# Patient Record
Sex: MALE | Race: WHITE | Employment: OTHER
[De-identification: names, ages, dates, MRNs, and addresses within clinical notes are randomized per-mention and may not be internally consistent; named-entity substitution may affect disease eponyms.]

---

## 2017-01-02 ENCOUNTER — HOME CARE VISIT (OUTPATIENT)
Dept: SCHEDULING | Facility: HOME HEALTH | Age: 62
End: 2017-01-02
Payer: COMMERCIAL

## 2017-01-02 VITALS — RESPIRATION RATE: 16 BRPM | TEMPERATURE: 97.9 F

## 2017-01-02 VITALS — HEART RATE: 88 BPM | OXYGEN SATURATION: 98 % | RESPIRATION RATE: 20 BRPM | TEMPERATURE: 98.4 F | HEIGHT: 70 IN

## 2017-01-02 PROCEDURE — G0157 HHC PT ASSISTANT EA 15: HCPCS

## 2017-01-02 PROCEDURE — G0299 HHS/HOSPICE OF RN EA 15 MIN: HCPCS

## 2017-01-03 ENCOUNTER — TELEPHONE ANTICOAG (OUTPATIENT)
Dept: CARDIOLOGY CLINIC | Age: 62
End: 2017-01-03

## 2017-01-03 ENCOUNTER — HOME CARE VISIT (OUTPATIENT)
Dept: SCHEDULING | Facility: HOME HEALTH | Age: 62
End: 2017-01-03
Payer: COMMERCIAL

## 2017-01-03 ENCOUNTER — DOCUMENTATION ONLY (OUTPATIENT)
Dept: CARDIOLOGY CLINIC | Age: 62
End: 2017-01-03

## 2017-01-03 VITALS
OXYGEN SATURATION: 98 % | WEIGHT: 153 LBS | BODY MASS INDEX: 21.95 KG/M2 | RESPIRATION RATE: 20 BRPM | TEMPERATURE: 97.8 F

## 2017-01-03 VITALS — TEMPERATURE: 98.5 F

## 2017-01-03 LAB — INR, EXTERNAL: 3.2

## 2017-01-03 PROCEDURE — G0151 HHCP-SERV OF PT,EA 15 MIN: HCPCS

## 2017-01-03 PROCEDURE — G0299 HHS/HOSPICE OF RN EA 15 MIN: HCPCS

## 2017-01-03 PROCEDURE — G0152 HHCP-SERV OF OT,EA 15 MIN: HCPCS

## 2017-01-03 NOTE — PROGRESS NOTES
Reviewed full labs with patient - notable for WBC 12.9, Cr 1.23. Patient states he is recovering from a \"GI bug\" during which he had vomiting and diarrhea. Has had a low grade fever over the weekend but it has improved and he is feeling better. Instructed patient to notify our office for persistent temperatures > 99, any drainage or redness at drive line site, or any other indications that he is sick. He states understanding. Home health RN will be asked to repeat full set of labs on Friday, Jan 6. He verbalizes understanding.

## 2017-01-04 VITALS — HEART RATE: 98 BPM

## 2017-01-05 ENCOUNTER — OFFICE VISIT (OUTPATIENT)
Dept: CARDIOLOGY CLINIC | Age: 62
End: 2017-01-05

## 2017-01-05 VITALS
HEART RATE: 94 BPM | BODY MASS INDEX: 21.22 KG/M2 | HEIGHT: 70 IN | OXYGEN SATURATION: 98 % | RESPIRATION RATE: 18 BRPM | TEMPERATURE: 97.7 F | WEIGHT: 148.2 LBS

## 2017-01-05 DIAGNOSIS — Z95.811 LEFT VENTRICULAR ASSIST DEVICE PRESENT (HCC): Primary | ICD-10-CM

## 2017-01-05 DIAGNOSIS — F41.9 ANXIETY: ICD-10-CM

## 2017-01-05 DIAGNOSIS — F51.01 PRIMARY INSOMNIA: ICD-10-CM

## 2017-01-05 DIAGNOSIS — Z98.890 S/P LAMINECTOMY: ICD-10-CM

## 2017-01-05 DIAGNOSIS — I50.22 CHRONIC SYSTOLIC HEART FAILURE (HCC): ICD-10-CM

## 2017-01-05 DIAGNOSIS — Z79.01 CHRONIC ANTICOAGULATION: ICD-10-CM

## 2017-01-05 RX ORDER — ALPRAZOLAM 0.25 MG/1
0.25 TABLET ORAL
Qty: 60 TAB | Refills: 0 | Status: SHIPPED | OUTPATIENT
Start: 2017-01-05 | End: 2017-03-23

## 2017-01-05 RX ORDER — ZOLPIDEM TARTRATE 5 MG/1
5 TABLET ORAL
Qty: 30 TAB | Refills: 11 | Status: SHIPPED | OUTPATIENT
Start: 2017-01-05 | End: 2017-02-01 | Stop reason: ALTCHOICE

## 2017-01-05 NOTE — MR AVS SNAPSHOT
Visit Information Date & Time Provider Department Dept. Phone Encounter #  
 1/5/2017  2:00 PM Sabina Syed NP [address] [id number] Your Appointments 4/6/2017 11:00 AM  
PACEMAKER with [facility] (JAHAIRA SCHEDULING) Appt Note: 3 months f/u  
 [address]  
[phone]  
  
   
 [address] [facility]  
  
    
 4/6/2017 11:20 AM  
ESTABLISHED PATIENT with Bc Mendoza MD  
[facility] Appt Note: 3 months f/u  
 [address]  
[address] [facility] [address] Upcoming Health Maintenance Date Due Pneumococcal 19-64 Medium Risk (1 of 1 - PPSV23) 10/11/1975 DTaP/Tdap/Td series (1 - Tdap) 10/11/1977 ZOSTER VACCINE AGE 60> 10/11/2016 FOBT Q 1 YEAR AGE 50-75 12/20/2017 Allergies as of 1/5/2017  Review Complete On: 12/30/2016 By: Mp Hanks NP No Known Allergies Current Immunizations  Reviewed on 12/9/2016 Name Date Influenza Vaccine (Quad) PF 12/21/2016 12:00 PM  
  
 Not reviewed this visit You Were Diagnosed With   
  
 Codes Comments Anxiety    -  Primary ICD-10-CM: F41.9 ICD-9-CM: 300.00 Primary insomnia     ICD-10-CM: F51.01 
ICD-9-CM: 307.42 Vitals Pulse Temp Resp Height(growth percentile) Weight(growth percentile) SpO2  
 94 97.7 °F (36.5 °C) (Oral) 18 5' 10\" (1.778 m) 148 lb 3.2 oz (67.2 kg) 98% BMI Smoking Status 21.26 kg/m2 Former Smoker Vitals History BMI and BSA Data Body Mass Index Body Surface Area  
 21.26 kg/m 2 1.82 m 2 Preferred Pharmacy Pharmacy Name Phone  [facility] [address] [phone] Your Updated Medication List  
  
   
 This list is accurate as of: 1/5/17  4:00 PM.  Always use your most recent med list.  
  
  
  
  
 allopurinol 100 mg tablet Commonly known as:  Ever Gobble Take 1 Tab by mouth daily. ALPRAZolam 0.25 mg tablet Commonly known as:  Pauol Syedini Take 1 Tab by mouth every six (6) hours as needed for Anxiety. Max Daily Amount: 1 mg.  
  
 amiodarone 400 mg tablet Commonly known as:  Zamzam Pagechaka Take 1 Tab by mouth daily. ascorbic acid (vitamin C) 500 mg tablet Commonly known as:  VITAMIN C Take 1 Tab by mouth two (2) times a day. bumetanide 1 mg tablet Commonly known as:  Liliana Redder Take 1 Tab by mouth two (2) times a day. carvedilol 12.5 mg tablet Commonly known as:  Jaylon Couch Take 1 Tab by mouth two (2) times daily (with meals). citalopram 40 mg tablet Commonly known as:  Sherren [place] Take 1 Tab by mouth daily. clopidogrel 75 mg Tab Commonly known as:  PLAVIX Take 1 Tab by mouth daily. colchicine 0.6 mg tablet Take 1 Tab by mouth two (2) times a day. Indications: GOUT  
  
 ferrous sulfate 325 mg (65 mg iron) tablet Take 1 Tab by mouth two (2) times daily (with meals). folic acid 1 mg tablet Commonly known as:  Google Take 1 Tab by mouth daily. HYDROcodone-acetaminophen 5-325 mg per tablet Commonly known as:  [place] Hurt Take  by mouth.  
  
 magnesium oxide 400 mg tablet Commonly known as:  MAG-OX Take 1 Tab by mouth daily. ondansetron 8 mg disintegrating tablet Commonly known as:  ZOFRAN ODT Take 1 Tab by mouth every eight (8) hours as needed for Nausea. pantoprazole 40 mg tablet Commonly known as:  PROTONIX Take 1 Tab by mouth Daily (before breakfast). polyethylene glycol 17 gram packet Commonly known as:  Mackenzie Pile Take 1 Packet by mouth daily. potassium chloride 40 mEq/15 mL Liqd Commonly known as:  KAON 20% Take 15 mL by mouth daily. Indications: HYPOKALEMIA  
  
 traMADol 50 mg tablet Commonly known as:  ULTRAM  
Take 1 Tab by mouth every six (6) hours as needed. Max Daily Amount: 200 mg.  
  
 warfarin 2.5 mg tablet Commonly known as:  COUMADIN Take 1 Tab by mouth daily. zolpidem 5 mg tablet Commonly known as:  AMBIEN Take 1 Tab by mouth nightly as needed for Sleep. Max Daily Amount: 5 mg. Prescriptions Printed Refills ALPRAZolam (XANAX) 0.25 mg tablet 0 Sig: Take 1 Tab by mouth every six (6) hours as needed for Anxiety. Max Daily Amount: 1 mg. Class: Print Route: Oral  
 zolpidem (AMBIEN) 5 mg tablet 11 Sig: Take 1 Tab by mouth nightly as needed for Sleep. Max Daily Amount: 5 mg. Class: Print Route: Oral  
  
To-Do List   
 01/06/2017 9:30 AM  
  Appointment with Allyssa Abad RN at [place] [address]  
  
 01/06/2017 1:00 PM  
  Appointment with Ivet Michael at [place] [address]  
  
 01/09/2017 To Be Determined Appointment with Allyssa Abad RN at [place] [address]  
  
 01/09/2017 To Be Determined Appointment with Ivet Michael at [place] [address]  
  
 01/11/2017 To Be Determined Appointment with Allyssa Abad RN at [place] [address]  
  
 01/11/2017 To Be Determined Appointment with Ivet Michael at [place] [address]  
  
 01/13/2017 To Be Determined Appointment with Ivet Michael at [place] [address]  
  
 01/16/2017 To Be Determined Appointment with Allyssa Abad RN at [place] [address]  
  
 01/16/2017 To Be Determined Appointment with Ivet Michael at [place] [address]  
  
 01/18/2017 To Be [facility]  
  
 01/18/2017 To Be Determined Appointment with Janelle Lennon at [place] [address]  
  
 01/20/2017 To Be Determined Appointment with Janelle Lennon at [place] [address]  
  
 01/25/2017 To Be Determined Appointment with Norma Seip at [place] [address] Patient Instructions 1. Continue taking medications as prescribed. 2. Contact the VAD/HF team if symptoms develop or worsen despite medical management. 3.   Please let our office know if your nausea and diarrhea do not improve within the next three days. 4.   Follow up in 1 week. Introducing [facility] SERVICES! Karey Hernandez introduces 1[employer] patient portal. Now you can access parts of your medical record, email your doctor's office, and request medication refills online. 1. In your internet browser, go to [email or web address]/Offsite Care Resources 2. Click on the First Time User? Click Here link in the Sign In box. You will see the New Member Sign Up page. 3. Enter your [employer] Access Code exactly as it appears below. You will not need to use this code after youve completed the sign-up process. If you do not sign up before the expiration date, you must request a new code. · 1[employer] Access Code: [email or web address] Expires: 2/20/2017 10:34 AM 
 
4. Enter the last four digits of your Social Security Number (xxxx) and Date of Birth (mm/dd/yyyy) as indicated and click Submit. You will be taken to the next sign-up page. 5. Create a Its Time Compliancet ID. This will be your 1[employer] login ID and cannot be changed, so think of one that is secure and easy to remember. 6. Create a 1[employer] password. You can change your password at any time. 7. Enter your Password Reset Question and Answer. This can be used at a later time if you forget your password. 8. Enter your e-mail address. You will receive e-mail notification when new information is available in [address] 9. Click Sign Up. You can now view and download portions of your medical record. 10. Click the Download Summary menu link to download a portable copy of your medical information. If you have questions, please visit the Frequently Asked Questions section of the [employer] website. Remember, [employer] is NOT to be used for urgent needs. For medical emergencies, dial 911. Now available from your iPhone and Android! Please provide this summary of care documentation to your next provider. Your primary care clinician is listed as Lorenzo Mckeon. If you have any questions after today's visit, please call [phone].

## 2017-01-06 ENCOUNTER — TELEPHONE ANTICOAG (OUTPATIENT)
Dept: CARDIOLOGY CLINIC | Age: 62
End: 2017-01-06

## 2017-01-06 ENCOUNTER — HOME CARE VISIT (OUTPATIENT)
Dept: SCHEDULING | Facility: HOME HEALTH | Age: 62
End: 2017-01-06
Payer: COMMERCIAL

## 2017-01-06 ENCOUNTER — DOCUMENTATION ONLY (OUTPATIENT)
Dept: CARDIOLOGY CLINIC | Age: 62
End: 2017-01-06

## 2017-01-06 VITALS — TEMPERATURE: 98.4 F | OXYGEN SATURATION: 97 % | RESPIRATION RATE: 16 BRPM

## 2017-01-06 LAB — INR, EXTERNAL: 2.6

## 2017-01-06 PROCEDURE — G0157 HHC PT ASSISTANT EA 15: HCPCS

## 2017-01-06 PROCEDURE — G0299 HHS/HOSPICE OF RN EA 15 MIN: HCPCS

## 2017-01-06 NOTE — PROGRESS NOTES
Full labs reviewed with patient - notable for improved WBC (down to 10.1 from 12.9). Hgb stable at 9.9. Will repeat in 1 week. Patient verbalizes understanding.

## 2017-01-07 VITALS
OXYGEN SATURATION: 98 % | TEMPERATURE: 98.4 F | BODY MASS INDEX: 21.95 KG/M2 | WEIGHT: 153 LBS | RESPIRATION RATE: 20 BRPM

## 2017-01-09 ENCOUNTER — HOME CARE VISIT (OUTPATIENT)
Dept: SCHEDULING | Facility: HOME HEALTH | Age: 62
End: 2017-01-09
Payer: COMMERCIAL

## 2017-01-09 VITALS — HEIGHT: 70 IN | TEMPERATURE: 98.6 F | OXYGEN SATURATION: 96 %

## 2017-01-09 PROCEDURE — G0299 HHS/HOSPICE OF RN EA 15 MIN: HCPCS

## 2017-01-09 PROCEDURE — G0157 HHC PT ASSISTANT EA 15: HCPCS

## 2017-01-11 ENCOUNTER — HOME CARE VISIT (OUTPATIENT)
Dept: SCHEDULING | Facility: HOME HEALTH | Age: 62
End: 2017-01-11
Payer: COMMERCIAL

## 2017-01-11 ENCOUNTER — TELEPHONE ANTICOAG (OUTPATIENT)
Dept: CARDIOLOGY CLINIC | Age: 62
End: 2017-01-11

## 2017-01-11 VITALS — HEIGHT: 71 IN | RESPIRATION RATE: 20 BRPM | OXYGEN SATURATION: 96 % | TEMPERATURE: 98.1 F

## 2017-01-11 VITALS
TEMPERATURE: 98.4 F | WEIGHT: 155 LBS | BODY MASS INDEX: 22.24 KG/M2 | OXYGEN SATURATION: 98 % | RESPIRATION RATE: 20 BRPM

## 2017-01-11 PROCEDURE — G0157 HHC PT ASSISTANT EA 15: HCPCS

## 2017-01-11 PROCEDURE — G0299 HHS/HOSPICE OF RN EA 15 MIN: HCPCS

## 2017-01-12 ENCOUNTER — OFFICE VISIT (OUTPATIENT)
Dept: CARDIOLOGY CLINIC | Age: 62
End: 2017-01-12

## 2017-01-12 VITALS — HEART RATE: 80 BPM | OXYGEN SATURATION: 97 % | BODY MASS INDEX: 21.76 KG/M2 | TEMPERATURE: 97.6 F | WEIGHT: 156 LBS

## 2017-01-12 VITALS
OXYGEN SATURATION: 98 % | WEIGHT: 156 LBS | RESPIRATION RATE: 20 BRPM | TEMPERATURE: 98.2 F | BODY MASS INDEX: 22.38 KG/M2

## 2017-01-12 DIAGNOSIS — Z95.811 LEFT VENTRICULAR ASSIST DEVICE PRESENT (HCC): ICD-10-CM

## 2017-01-12 DIAGNOSIS — Z79.01 CHRONIC ANTICOAGULATION: ICD-10-CM

## 2017-01-12 DIAGNOSIS — Z95.810 S/P ICD (INTERNAL CARDIAC DEFIBRILLATOR) PROCEDURE: ICD-10-CM

## 2017-01-12 DIAGNOSIS — I47.20 SUSTAINED VT (VENTRICULAR TACHYCARDIA): ICD-10-CM

## 2017-01-12 DIAGNOSIS — I10 ESSENTIAL HYPERTENSION: Primary | ICD-10-CM

## 2017-01-12 RX ORDER — ALLOPURINOL 100 MG/1
100 TABLET ORAL 2 TIMES DAILY
Qty: 60 TAB | Refills: 6 | Status: SHIPPED | OUTPATIENT
Start: 2017-01-12 | End: 2017-03-11 | Stop reason: SDUPTHER

## 2017-01-12 RX ORDER — BUMETANIDE 0.5 MG/1
0.5 TABLET ORAL DAILY
Qty: 30 TAB | Refills: 2 | Status: SHIPPED | OUTPATIENT
Start: 2017-01-12 | End: 2017-03-23

## 2017-01-12 NOTE — PROGRESS NOTES
[address]  LVAD Office Visit      Date of VAD implant: 12/1/2016  Cardiologist: GRAHAM  PCP: Julius Briceno MD  Consultants: Dr. Nilson Chapman (EP), Dr. Rebecca Arevalo (GI), Dr. Birgit Dee (Ortho)    HPI: William Bowers is a 62 y.o. male with a past medical history s/p HM II LVAD for DT, h/o torasades/SVT, HTN, lumbar stenosis s/p lumbar laminectomy, CAD (s/p CABG/sp BMSx2), gout, strobic seizures, ETOH abuse and remote tobacco abuse who si seen today for routine LVAD follow up. He reports overall he is doing well except he continues to be weak. He is experiencing some pain in his low back. He states he was cleared by the ortho PA. He is continuing home PT but states he will be discharged by next week. Pt c/o bleeding from his ICD site. He states it opened up some and they held pressure multiples times. His daughter who is a nurse - put a pressure dressing on it yesterday, but they would like for me to take a look. Pt has gained weight b/c he is now eating better - he doesn't feel it's fluid. Pt c/o fever, back pain, headaches, dizziness, weight gain, cough productive clear sputum, blood tinged mucus when he blows in his nose, bleeding from ICD site and pain in his lower back. Pt denies fevers, chills, diaphoresis, lightheadedness, N/V, changes to his appetite, early satiety, chest pain, palpitations, SOB, constipation/diarrhea, edema, depression/anxiety, orthopnea, blood in urine, melena, BRBPR    Subjective:  Chief Complaint:  Chief Complaint   Patient presents with    Cardiomyopathy    Follow Up Chronic Condition            Assessment / Plan:    Heart Failure Status: NYHA Class II    Ischemic cardiomyopathy, s/p HM II LVAD implant as DT 12/1/16 -  Alarm history reviewed. VAD interrogation: Please see VAD flow sheet for readings. Pt continues to have PI events with speed drops. Adequate clinical perfusion and function of his LVAD. Will schedule for a RAMP test in the next 2 weeks.  Will decrease Bumex to .5 mg daily. Bleeding from ICD site - site examine, some eschar on the site, small area oozing dark red blood. Pressure help for 10 + minutes, bleeding almost stopped. Contacted Dr. Duckworth to make him aware to follow up with this.      Post-operative RV insufficiency: resolved, T. Bili in normal range. KLEVER: resolved, creatinine decreased to 1.03, euvolemic on exam, decrease Bumex to .5 mg daily and decrease potassium to 10 meq daily.     Multivessel CAD/S/p CABG x 2 (SVG to RCA, LIMA to LAD) - s/p- RCA BMS x 2. On Plavix, BB. No statin d/t elevated LFTs. No ASA since on Plavix and Coumadin.     Lumbar stenosis, s/p decompressive laminectomy and instrumented fusion: Pt seen by ortho PA and was told everything was good - needs to continue PT. When cleared will refer to Cardiac Rehab.      Post operative anemia d/t acute blood loss: Hgb 9.6 on 1/11/17 - will continue iron, Vitamin C and folic acid. Until Hg >10     H/o ETHO abuse:  Pt denies any alcohol use or any urge to drink alcohol. Has not had a drink since prior to his lumbar surgery.      SVT/A-fib/Torsades post op: S/p AICD 12/16. Cont. Amiodarone per Dr. Duckworth. F/u with Dr. Duckworth     HTN, MAP goal 70-90: MAP 98 today, will continue Coreg for now and adjust at next appointment if necessary. Chronic anticoagulation for LVAD, INR goal 2-2.5. INR sub-therapeutic on 1/11/17 will check PT/INR on 1/13/17  Cont. Warfarin and Plavix. Please see anticoagulation tracker for details.      Gout Pain: resolved, pt to continue Allopurinol and use Colchicine with active gout flare.       Depression/Anxiety: controlled on current dose of Celexa, pt using Xanax prn but trying to decrease this. Insomnia:  Pt has been taking Ambien most nights whether he needed it or not, he is almost out. We discussed the risks to chronic use of sleeping pills (addiction/tolerance/dependency). He will try to sleep without using sleeping pills.   Suggested he could try Melatonin 3mg-5mg 1 hour prior to bedtime. Deconditioning: Cont to work with home PT/OT, once discharged from home PT - will start Cardiac Rehab. Will send referral.    VAD specific education: Greater than 50% of appt time (60 minutes) was spent counseling Mr. Mann and his wife about LVAD management, plan of care, and medication management. Problem List:  Patient Active Problem List   Diagnosis Code    S/P laminectomy Z98.890    Sinus tachycardia R00.0    Acute respiratory failure with hypoxia ([facility]) J96.01    Essential hypertension I10    Alcohol abuse F10.10    Chronic systolic heart failure ([facility]) I50.22    CAD, multiple vessel I25.10    Ischemic cardiomyopathy I25.5    MI (myocardial infarction) ([facility] I21.3    Sustained VT (ventricular tachycardia) ([facility]) I47.2    S/P ICD (internal cardiac defibrillator) procedure Z95.810    Chronic anticoagulation Z79.01    Left ventricular assist device present ([facility] Z95.811    Gout M10.9        Past Medical History   Diagnosis Date    Chronic pain      back    Hypertension     Ill-defined condition      gout    Seizures ([facility]      strobic seizures early 19's       Family History   Problem Relation Age of Onset    Diabetes Mother     Dementia Mother     Other Mother      Latex allergy    Heart Disease Father     Stroke Father     No Known Problems Sister     Cancer Brother      brain       Social History     Social History    Marital status:      Spouse name: N/A    Number of children: N/A    Years of education: N/A     Occupational History    Not on file.      Social History Main Topics    Smoking status: Former Smoker     Packs/day: 1.50     Years: 30.00     Quit date: 2008    Smokeless tobacco: Never Used    Alcohol use 24.0 oz/week     40 Cans of beer per week    Drug use: No    Sexual activity: Not on file     Other Topics Concern    Not on file     Social History Narrative       Medications:  No Known Allergies Current Outpatient Prescriptions on File Prior to Visit   Medication Sig    ALPRAZolam (XANAX) 0.25 mg tablet Take 1 Tab by mouth every six (6) hours as needed for Anxiety. Max Daily Amount: 1 mg.  zolpidem (AMBIEN) 5 mg tablet Take 1 Tab by mouth nightly as needed for Sleep. Max Daily Amount: 5 mg.  HYDROcodone-acetaminophen (NORCO) 5-325 mg per tablet Take  by mouth.  potassium chloride (KAON 20%) 40 mEq/15 mL liqd Take 15 mL by mouth daily. Indications: HYPOKALEMIA (Patient taking differently: Take 20 mEq by mouth daily. Indications: HYPOKALEMIA)    amiodarone (PACERONE) 400 mg tablet Take 1 Tab by mouth daily.  ascorbic acid, vitamin C, (VITAMIN C) 500 mg tablet Take 1 Tab by mouth two (2) times a day.  carvedilol (COREG) 12.5 mg tablet Take 1 Tab by mouth two (2) times daily (with meals).  citalopram (CELEXA) 40 mg tablet Take 1 Tab by mouth daily.  clopidogrel (PLAVIX) 75 mg tab Take 1 Tab by mouth daily.  ferrous sulfate 325 mg (65 mg iron) tablet Take 1 Tab by mouth two (2) times daily (with meals).  folic acid (FOLVITE) 1 mg tablet Take 1 Tab by mouth daily.  magnesium oxide (MAG-OX) 400 mg tablet Take 1 Tab by mouth daily.  pantoprazole (PROTONIX) 40 mg tablet Take 1 Tab by mouth Daily (before breakfast).  warfarin (COUMADIN) 2.5 mg tablet Take 1 Tab by mouth daily.  ondansetron (ZOFRAN ODT) 8 mg disintegrating tablet Take 1 Tab by mouth every eight (8) hours as needed for Nausea.  colchicine 0.6 mg tablet Take 1 Tab by mouth two (2) times a day. Indications: GOUT    traMADol (ULTRAM) 50 mg tablet Take 1 Tab by mouth every six (6) hours as needed. Max Daily Amount: 200 mg. No current facility-administered medications on file prior to visit.          Results for orders placed or performed in visit on 01/06/17   AMB PT/INR EXTERNAL   Result Value Ref Range    INR, External 2.6     Please see scanned media for full panel of labs from 1/11/17    Recent tests or procedures:   S/p Laminectomy 11/22/16  S/p LVAD implant 12/1/16  S/p AICD implant 12/16/16  S/p Ramp test 12/16/16          Objective:  Physical Exam:  Visit Vitals    Pulse 80    Temp 97.6 °F (36.4 °C)    Wt 156 lb (70.8 kg)    SpO2 97%    BMI 21.76 kg/m2      MAP: 96    VAD readings:  LVAD ([employer]mate)  Pump Speed (RPM): 8600  Pump Flow (LPM): 4.3  PI (Pulsitility Index): 6.2  Power: 4.4    Exam:  Gen:  WA, WN, NAD   CVS:  +LVAD hum  Pul:  Slightly decreased BS, (-) r,r,w  Abd:  +BS, soft, NT,ND  Ext: 1+ b/l ankle edema, (-) calf tenderness  Neuro:  No obvious deficits  ICD site - some oozing w/ clot on site, bleeding controlled with extended pressure (-) erythema, + ecchymosis around site      Drive Line Exam:  Stabilization device intact: yes  Line inspected for damage: yes    Appearance: no edema, erythema, drainage   Sterile dressing changed per policy: no  Frequency of dressing change at home: every other day  Frequency of use of stabilization device: 100%    Follow-up Disposition:  Return in about 2 weeks (around 1/26/2017). Thank you for letting us see him with you. Rama So PA-C     [address] [place] [address]  Office: [phone]  24h VAD Pager: [phone]          Patient seen and examined. Data and note reviewed. I have discussed and agree with the plans as noted. Will decrease his diuretics today due to recurrent PI event s. Ramp echo study scheduled for next week. Thank you for letting us see him with you,     Kristi Daniel M.D.  [facility], [address] [place] [id number]  Dept: [phone]  Dept Fax: [phone]: [phone] Fax: [phone]  24 hour VAD/HF Pager: [phone]

## 2017-01-12 NOTE — MR AVS SNAPSHOT
Visit Information Date & Time Provider Department Dept. Phone Encounter #  
 4/29/8469  1:00 PM Anabel Mcgill, [address]  [id number] Follow-up Instructions Return in about 2 weeks (around 1/26/2017). Your Appointments 4/6/2017 11:00 AM  
PACEMAKER with PAC[facility] (JAHAIRA SCHEDULING) Appt Note: 3 months f/u  
 [address] [place] [address]  
[phone]  
  
   
 [address] [facility] [address]  
  
    
 4/6/2017 11:20 AM  
ESTABLISHED PATIENT with Chrystal Nayak MD  
[facility] ([facility]) Appt Note: 3 months f/u  
 [address] [place] [address]  
[address] [address] Upcoming Health Maintenance Date Due Pneumococcal 19-64 Medium Risk (1 of 1 - PPSV23) 10/11/1975 DTaP/Tdap/Td series (1 - Tdap) 10/11/1977 ZOSTER VACCINE AGE 60> 10/11/2016 FOBT Q 1 YEAR AGE 50-75 12/20/2017 Allergies as of 1/12/2017  Review Complete On: 9/15/2795 By: MRAIA LUZ Multani No Known Allergies Current Immunizations  Reviewed on 12/9/2016 Name Date Influenza Vaccine (Quad) PF 12/21/2016 12:00 PM  
  
 Not reviewed this visit You Were Diagnosed With   
  
 Codes Comments Essential hypertension    -  Primary ICD-10-CM: I10 
ICD-9-CM: 401.9 Left ventricular assist device present [facility])     ICD-10-CM: D96.471 ICD-9-CM: V43.21 Chronic anticoagulation     ICD-10-CM: Z79.01 
ICD-9-CM: V58.61 Vitals Pulse Temp Weight(growth percentile) SpO2 BMI Smoking Status 80 97.6 °F (36.4 °C) 156 lb (70.8 kg) 97% 21.76 kg/m2 Former Smoker BMI and BSA Data Body Mass Index Body Surface Area 21.76 kg/m 2 1.88 m 2 Preferred Pharmacy Pharmacy Name Phone [facility] [address] [phone] Your Updated Medication List  
  
   
This list is accurate as of: 1/12/17  4:42 PM.  Always use your most recent med list.  
  
  
  
  
 allopurinol 100 mg tablet Commonly known as:  Ardie Fleischer Take 1 Tab by mouth two (2) times a day. ALPRAZolam 0.25 mg tablet Commonly known as:  Sakshi Sanford Take 1 Tab by mouth every six (6) hours as needed for Anxiety. Max Daily Amount: 1 mg.  
  
 amiodarone 400 mg tablet Commonly known as:  Jonnathan Arzola Take 1 Tab by mouth daily. ascorbic acid (vitamin C) 500 mg tablet Commonly known as:  VITAMIN C Take 1 Tab by mouth two (2) times a day. bumetanide 0.5 mg tablet Commonly known as:  Penny Sexton Take 1 Tab by mouth daily. carvedilol 12.5 mg tablet Commonly known as:  Dustin Peat Take 1 Tab by mouth two (2) times daily (with meals). citalopram 40 mg tablet Commonly known as:  Donata Delilah Take 1 Tab by mouth daily. clopidogrel 75 mg Tab Commonly known as:  PLAVIX Take 1 Tab by mouth daily. colchicine 0.6 mg tablet Take 1 Tab by mouth two (2) times a day. Indications: GOUT  
  
 ferrous sulfate 325 mg (65 mg iron) tablet Take 1 Tab by mouth two (2) times daily (with meals). folic acid 1 mg tablet Commonly known as:  Google Take 1 Tab by mouth daily. HYDROcodone-acetaminophen 5-325 mg per tablet Commonly known as:  Nicholas Handler Take  by mouth.  
  
 magnesium oxide 400 mg tablet Commonly known as:  MAG-OX Take 1 Tab by mouth daily. ondansetron 8 mg disintegrating tablet Commonly known as:  ZOFRAN ODT Take 1 Tab by mouth every eight (8) hours as needed for Nausea. pantoprazole 40 mg tablet Commonly known as:  PROTONIX Take 1 Tab by mouth Daily (before breakfast). potassium chloride 40 mEq/15 mL Liqd Commonly known as:  KAON 20% Take 15 mL by mouth daily. Indications: HYPOKALEMIA  
  
 traMADol 50 mg tablet Commonly known as:  ULTRAM  
Take 1 Tab by mouth every six (6) hours as needed. Max Daily Amount: 200 mg.  
  
 warfarin 2.5 mg tablet Commonly known as:  COUMADIN Take 1 Tab by mouth daily. zolpidem 5 mg tablet Commonly known as:  AMBIEN Take 1 Tab by mouth nightly as needed for Sleep. Max Daily Amount: 5 mg. Prescriptions Sent to Pharmacy Refills  
 allopurinol (ZYLOPRIM) 100 mg tablet 6 Sig: Take 1 Tab by mouth two (2) times a day. Class: Normal  
 Pharmacy: [address] Ph #: [phone] Route: Oral  
 bumetanide (BUMEX) 0.5 mg tablet 2 Sig: Take 1 Tab by mouth daily. Class: Normal  
 Pharmacy: [place] [address] Ph #: [phone] Route: Oral  
  
Follow-up Instructions Return in about 2 weeks (around 2017). To-Do List   
 2017 1:00 PM  
  Appointment with Cayetano Hodgkins at [place] [address]  
  
 2017 To Be Determined Appointment with Archana Martínez RN at [place] [address]  
  
 2017 To Be Determined Appointment with Cayetano Hodgkins at [place] [address]  
  
 2017 To Be Determined Appointment with Archana Martínez RN at [place] [address]  
  
 2017 To Be Determined Appointment with Cayetano Hodgkins at [place] [address]  
  
 2017 To Be Determined Appointment with Cayetano Hodgkins at [place] [address]  
  
 2017 To Be Determined Appointment with Dany Wakefield at [place] [address] Patient Instructions 1. Decrease Bumex to .5 mg (half tablet) daily 2. Decrease potassium to 10 meq daily. , 
 
3.  Continue all other medications 4. Will arrange for ramp study - will contact you to schedule this 5.  Once complete PT will refer to Cardiac Rehab 6. Will check labwork tomorrow. 7.  Return to clinic in 2 weeks Introducing [facility] & HEALTH SERVICES! [employer] introduces Mimosa patient portal. Now you can access parts of your medical record, email your doctor's office, and request medication refills online. 1. In your internet browser, go to [email or web address] 2. Click on the First Time User? Click Here link in the Sign In box. You will see the New Member Sign Up page. 3. Enter your Mimosa Access Code exactly as it appears below. You will not need to use this code after youve completed the sign-up process. If you do not sign up before the expiration date, you must request a new code. · Mimosa Access Code: [email or web address] Expires: 2/20/2017 10:34 AM 
 
4. Enter the last four digits of your Social Security Number (xxxx) and Date of Birth (mm/dd/yyyy) as indicated and click Submit. You will be taken to the next sign-up page. 5. Create a Mimosa ID. This will be your Mimosa login ID and cannot be changed, so think of one that is secure and easy to remember. 6. Create a Mimosa password. You can change your password at any time. 7. Enter your Password Reset Question and Answer. This can be used at a later time if you forget your password. 8. Enter your e-mail address. You will receive e-mail notification when new information is available in [address] 9. Click Sign Up. You can now view and download portions of your medical record. 10. Click the Download Summary menu link to download a portable copy of your medical information. If you have questions, please visit the Frequently Asked Questions section of the Mimosa website. Remember, Mimosa is NOT to be used for urgent needs. For medical emergencies, dial 911. Now available from your iPhone and Android! Please provide this summary of care documentation to your next provider. Your primary care clinician is listed as Abdelrahman Castañeda. If you have any questions after today's visit, please call [phone].

## 2017-01-12 NOTE — PATIENT INSTRUCTIONS
1.  Decrease Bumex to .5 mg (half tablet) daily     2. Decrease potassium to 10 meq daily. ,    3.  Continue all other medications    4. Will arrange for ramp study - will contact you to schedule this    5. Once complete PT will refer to Cardiac Rehab    6. Will check labwork tomorrow.     7.  Return to clinic in 2 weeks

## 2017-01-13 ENCOUNTER — HOSPITAL ENCOUNTER (OUTPATIENT)
Dept: NON INVASIVE DIAGNOSTICS | Age: 62
Discharge: HOME | End: 2017-01-13
Attending: INTERNAL MEDICINE | Admitting: INTERNAL MEDICINE
Payer: COMMERCIAL

## 2017-01-13 ENCOUNTER — TELEPHONE (OUTPATIENT)
Dept: CARDIOLOGY CLINIC | Age: 62
End: 2017-01-13

## 2017-01-13 ENCOUNTER — TELEPHONE ANTICOAG (OUTPATIENT)
Dept: CARDIOLOGY CLINIC | Age: 62
End: 2017-01-13

## 2017-01-13 ENCOUNTER — HOME CARE VISIT (OUTPATIENT)
Dept: SCHEDULING | Facility: HOME HEALTH | Age: 62
End: 2017-01-13
Payer: COMMERCIAL

## 2017-01-13 DIAGNOSIS — Z95.811 LEFT VENTRICULAR ASSIST DEVICE PRESENT (HCC): Primary | ICD-10-CM

## 2017-01-13 LAB
INR BLD: 1.9 (ref 0.9–1.2)
INR, EXTERNAL: 1.7

## 2017-01-13 PROCEDURE — G0299 HHS/HOSPICE OF RN EA 15 MIN: HCPCS

## 2017-01-13 PROCEDURE — 85610 PROTHROMBIN TIME: CPT

## 2017-01-13 NOTE — IP AVS SNAPSHOT
[address] 
[phone] Patient: Anamika Casarez MRN: [id number] :1955 You are allergic to the following No active allergies Recent Documentation Smoking Status Former Smoker Emergency Contacts Name Discharge Info Relation Home Work Mobile Tonia Mann DISCHARGE CAREGIVER [3] Spouse [3]   [phone] About your hospitalization You were admitted on:  2017 You last received care in the:  [address] [place] [address] You were discharged on:  2017 Unit phone number:  [phone] Why you were hospitalized Your primary diagnosis was:  Not on File Providers Seen During Your Hospitalizations Provider Role Specialty Primary office phone Suresh Lackey MD Attending Provider Cardiology [phone] Your Primary Care Physician (PCP) Primary Care Physician Office Phone Office Fax [facility] [phone] Follow-up Information Follow up With Details Comments Contact Info Pina Gonzalez MD   [address] 
[phone] Your Appointments 2017 To Be Determined ROUTINE with OUMAR White [address] ([address]) [address]) 2017 To Be Determined PT ROUTINE with Liam Diggs [address] ([address]) [address]) 2017 To Be Determined ROUTINE with OUMAR White [address] ([address]) [address] ([address]) Wednesday January 18, 2017 To Be Determined PT ROUTINE with Benay Degree [address] ([address]) [address] ([address]) Friday January 20, 2017 To Be Determined PT ROUTINE with Benay Degree [address] ([address]) [address]) Wednesday January 25, 2017 To Be Determined PT DISCHARGE with Libby Cuadra [address] ([address]) [address] ([address]) Thursday January 26, 2017  3:00 PM EST Follow Up with Blake Williamson MD  
[address] [facility] ([address]  
[phone] Current Discharge Medication List  
  
CONTINUE these medications which have CHANGED Dose & Instructions Dispensing Information Comments Morning Noon Evening Bedtime  
 potassium chloride 40 mEq/15 mL Liqd Commonly known as:  KAON 20% What changed:  how much to take Your next dose is: Today, Tomorrow Other:  _________ Dose:  40 mEq Take 15 mL by mouth daily. Indications: HYPOKALEMIA Quantity:  480 mL Refills:  1 CONTINUE these medications which have NOT CHANGED Dose & Instructions Dispensing Information Comments Morning Noon Evening Bedtime  
 allopurinol 100 mg tablet Commonly known as:  Joel Gudino Your next dose is: Today, Tomorrow Other:  _________ Dose:  100 mg Take 1 Tab by mouth two (2) times a day. Quantity:  60 Tab Refills:  6 ALPRAZolam 0.25 mg tablet Commonly known as:  Everet Kill Your next dose is: Today, Tomorrow Other:  _________ Dose:  0.25 mg Take 1 Tab by mouth every six (6) hours as needed for Anxiety. Max Daily Amount: 1 mg. Quantity:  60 Tab Refills:  0  
     
   
   
   
  
 amiodarone 400 mg tablet Commonly known as:  Edna Manzanares Your next dose is: Today, Tomorrow Other:  _________ Dose:  400 mg Take 1 Tab by mouth daily. Quantity:  30 Tab Refills:  6  
     
   
   
   
  
 ascorbic acid (vitamin C) 500 mg tablet Commonly known as:  VITAMIN C Your next dose is: Today, Tomorrow Other:  _________ Dose:  500 mg Take 1 Tab by mouth two (2) times a day. Quantity:  60 Tab Refills:  6  
     
   
   
   
  
 bumetanide 0.5 mg tablet Commonly known as:  Jonathan Elijah Your next dose is: Today, Tomorrow Other:  _________ Dose:  0.5 mg Take 1 Tab by mouth daily. Quantity:  30 Tab Refills:  2  
     
   
   
   
  
 carvedilol 12.5 mg tablet Commonly known as:  José Migueljaymie Yancey Your next dose is: Today, Tomorrow Other:  _________ Dose:  12.5 mg Take 1 Tab by mouth two (2) times daily (with meals). Quantity:  60 Tab Refills:  6  
     
   
   
   
  
 citalopram 40 mg tablet Commonly known as:  Viona Creamer Your next dose is: Today, Tomorrow Other:  _________ Dose:  40 mg Take 1 Tab by mouth daily. Quantity:  30 Tab Refills:  6  
     
   
   
   
  
 clopidogrel 75 mg Tab Commonly known as:  PLAVIX Your next dose is: Today, Tomorrow Other:  _________ Dose:  75 mg Take 1 Tab by mouth daily. Quantity:  30 Tab Refills:  6  
     
   
   
   
  
 colchicine 0.6 mg tablet Your next dose is: Today, Tomorrow Other:  _________ Dose:  0.6 mg Take 1 Tab by mouth two (2) times a day. Indications: GOUT  
 Quantity:  60 Tab Refills:  4  
     
   
   
   
  
 ferrous sulfate 325 mg (65 mg iron) tablet Your next dose is: Today, Tomorrow Other:  _________ Dose:  325 mg Take 1 Tab by mouth two (2) times daily (with meals). Quantity:  60 Tab Refills:  6  
     
   
   
   
  
 folic acid 1 mg tablet Commonly known as:  Google Your next dose is: Today, Tomorrow Other:  _________ Dose:  1 mg Take 1 Tab by mouth daily. Quantity:  30 Tab Refills:  6 HYDROcodone-acetaminophen 5-325 mg per tablet Commonly known as:  March Castles Your next dose is: Today, Tomorrow Other:  _________ Take  by mouth. Refills:  0  
     
   
   
   
  
 magnesium oxide 400 mg tablet Commonly known as:  MAG-OX Your next dose is: Today, Tomorrow Other:  _________ Dose:  400 mg Take 1 Tab by mouth daily. Quantity:  30 Tab Refills:  6  
     
   
   
   
  
 ondansetron 8 mg disintegrating tablet Commonly known as:  ZOFRAN ODT Your next dose is: Today, Tomorrow Other:  _________ Dose:  8 mg Take 1 Tab by mouth every eight (8) hours as needed for Nausea. Quantity:  30 Tab Refills:  2  
     
   
   
   
  
 pantoprazole 40 mg tablet Commonly known as:  PROTONIX Your next dose is: Today, Tomorrow Other:  _________ Dose:  40 mg Take 1 Tab by mouth Daily (before breakfast). Quantity:  30 Tab Refills:  6  
     
   
   
   
  
 traMADol 50 mg tablet Commonly known as:  ULTRAM  
   
Your next dose is: Today, Tomorrow Other:  _________ Dose:  50 mg Take 1 Tab by mouth every six (6) hours as needed. Max Daily Amount: 200 mg. Quantity:  60 Tab Refills:  0  
     
   
   
   
  
 warfarin 2.5 mg tablet Commonly known as:  COUMADIN Your next dose is: Today, Tomorrow Other:  _________ Dose:  2.5 mg Take 1 Tab by mouth daily. Quantity:  120 Tab Refills:  6  
     
   
   
   
  
 zolpidem 5 mg tablet Commonly known as:  AMBIEN  
   
 Your next dose is: Today, Tomorrow Other:  _________ Dose:  5 mg Take 1 Tab by mouth nightly as needed for Sleep. Max Daily Amount: 5 mg. Quantity:  30 Tab Refills:  11 Discharge Instructions Follow up with Dr Gemma Centeno as scheduled Call Dr. Gemma Centeno at [phone] if you experience any of the following symptoms: 
Redness at the ICD site Swelling at or around the ICD or in the left arm Pain around the ICD Kai Aleman M.D. [facility] Electrophysiology/Cardiology [facility] and [facility] [place] [address], [place] [address] , [facility] [address] [facility], [address] 
[phone] Discharge Orders None Introducing [facility] & HEALTH SERVICES! [employer] introduces Qingguo patient portal. Now you can access parts of your medical record, email your doctor's office, and request medication refills online. 1. In your internet browser, go to [email or web address] 2. Click on the First Time User? Click Here link in the Sign In box. You will see the New Member Sign Up page. 3. Enter your Qingguo Access Code exactly as it appears below. You will not need to use this code after youve completed the sign-up process. If you do not sign up before the expiration date, you must request a new code. · Qingguo Access Code: [email or web address] Expires: 2/20/2017 10:34 AM 
 
4. Enter the last four digits of your Social Security Number (xxxx) and Date of Birth (mm/dd/yyyy) as indicated and click Submit. You will be taken to the next sign-up page. 5. Create a Qingguo ID. This will be your Qingguo login ID and cannot be changed, so think of one that is secure and easy to remember. 6. Create a LimeRoadt password. You can change your password at any time. 7. Enter your Password Reset Question and Answer. This can be used at a later time if you forget your password. 8. Enter your e-mail address. You will receive e-mail notification when new information is available in [address] 9. Click Sign Up. You can now view and download portions of your medical record. 10. Click the Download Summary menu link to download a portable copy of your medical information. If you have questions, please visit the Frequently Asked Questions section of the Heyo website. Remember, Heyo is NOT to be used for urgent needs. For medical emergencies, dial 911. Now available from your iPhone and Android! General Information Please provide this summary of care documentation to your next provider. Patient Signature:  ____________________________________________________________ Date:  ____________________________________________________________  
  
[facility] Provider Signature:  ____________________________________________________________ Date:  ____________________________________________________________

## 2017-01-13 NOTE — PROCEDURES
Procedure was not done because there was no hematoma in pocket to evacuate  Red blood Bleeding was from a superficial skin site when scab fell off  Patient is on plavix and coumadin post MI, CABG and then LVAD  INR is checked and it is 1.9

## 2017-01-13 NOTE — TELEPHONE ENCOUNTER
Please call patients wife regarding ICD site bleeding.     She can be reached on her mobile #[phone]

## 2017-01-13 NOTE — TELEPHONE ENCOUNTER
Spoke with Melonie Kaba, she states Froilan is bleeding \"quite a bit\" from his ICD site. He woke up with a lot of blood on him and in the bed. His daughter put a pressure dressing on it and it is not bleeding right now. Told her I would touch base with Dr. Herman Dsouza and get back to her.

## 2017-01-13 NOTE — TELEPHONE ENCOUNTER
Spoke with Dr. Scarlett Alvarado - he wants patient to come to EP lab so he can examine the ICD site. Spoke wi th Leslie Living (pts wife) they will come to outpatient registration and then will proceed to EP lab to be seen by Dr. Scarlett Alvarado.

## 2017-01-13 NOTE — DISCHARGE INSTRUCTIONS
Follow up with Dr Katey Vela as scheduled        Call Dr. Katey Vela at [phone] if you experience any of the following symptoms:  Redness at the ICD site  Swelling at or around the ICD or in the left arm  Pain around the Edith Tao M.D.  [facility]  Electrophysiology/Cardiology  [facility] and [facility] [place]  [address], [address] [place] [address]  [phone]

## 2017-01-13 NOTE — H&P
Cardiac Electrophysiology H.P Note     Subjective:      Duane Wilkerson is a 62 y.o. patient who is seen for evaluation of AICD pocket bleeding  I was not in the office when advanced CHF/LVAD clinic nurse practitioner called and mentioned that the site was bleeding and needs evaluation  The patient was at home and I was in EP lab  I was informed that his ICD pocket had hematoma and is was bleeding   The patient said over the phone that he has been putting gauzes on  No fever chills  The ICD was placed last month   He had large MI and severe CHF and had VT/VF arrest despite incomplete CABG and LVAD so ICD was placed last month for secondary prevention    Problem List  Date Reviewed: 12/29/2016          Codes Class Noted    Gout (Chronic) ICD-10-CM: M10.9  ICD-9-CM: 274.9  12/30/2016        Chronic anticoagulation ICD-10-CM: Z79.01  ICD-9-CM: V58.61  12/27/2016        Left ventricular assist device present [facility]) ICD-10-CM: Z95.811  ICD-9-CM: V43.21  12/27/2016        S/P ICD (internal cardiac defibrillator) procedure ICD-10-CM: Z95.810  ICD-9-CM: V45.02  12/16/2016    Overview Signed 12/16/2016  3:08 PM by Matt Garcia MD     12/16/16 Dual chamber Medtronic ICD for secondary prevention of sudden death  DFT < = 25 J             Sustained VT (ventricular tachycardia) ([facility] [address] ICD-10-CM: I47.2  ICD-9-CM: 427.1  12/13/2016    Overview Signed 12/13/2016  9:43 PM by Matt Garcia MD     12/6/2016 defib 150 J             MI (myocardial infarction) ([facility] [address] ICD-10-CM: I21.3  ICD-9-CM: 410.90  11/27/2016        Chronic systolic heart failure ([facility] [address] ICD-10-CM: I50.22  ICD-9-CM: 428.22  11/26/2016        CAD, multiple vessel ICD-10-CM: I25.10  ICD-9-CM: 414.00  11/26/2016        Ischemic cardiomyopathy ICD-10-CM: I25.5  ICD-9-CM: 414.8  11/26/2016        Sinus tachycardia ICD-10-CM: R00.0  ICD-9-CM: 427.89  11/24/2016        Acute respiratory failure with hypoxia [facility]) ICD-10-CM: J96.01  ICD-9-CM: 518.81  11/24/2016 Essential hypertension ICD-10-CM: I10  ICD-9-CM: 401.9  11/24/2016        Alcohol abuse ICD-10-CM: F10.10  ICD-9-CM: 305.00  11/24/2016        S/P laminectomy ICD-10-CM: W32.471  ICD-9-CM: V45.89  11/22/2016                  No Known Allergies  Past Medical History   Diagnosis Date    Chronic pain      back    Hypertension     Ill-defined condition      gout    Seizures ([address]      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     Past Surgical History   Procedure Laterality Date    Hx heent       wisdom teeth removed     Family History   Problem Relation Age of Onset    Diabetes Mother     Dementia Mother     Other Mother      Latex allergy    Heart Disease Father     Stroke Father     No Known Problems Sister     Cancer Brother      brain     Social History   Substance Use Topics    Smoking status: Former Smoker     Packs/day: 1.50     Years: 30.00     Quit date: 2008    Smokeless tobacco: Never Used    Alcohol use 24.0 oz/week     40 Cans of beer per week        Review of Systems:   Constitutional: Negative for fever, chills, weight loss, + malaise/fatigue. HEENT: Negative for nosebleeds, vision changes. Respiratory: Negative for cough, hemoptysis  Cardiovascular: Negative for chest pain, palpitations, orthopnea, claudication, leg swelling, syncope, and PND. Gastrointestinal: Negative for nausea, vomiting, diarrhea, blood in stool and melena. Genitourinary: Negative for dysuria, and hematuria. Musculoskeletal: Negative for myalgias, arthralgia. Skin: Negative for rash. Heme: Does bleed/bruise easily. Neurological: Negative for speech change and focal weakness     Objective:      Physical Exam:   Constitutional: well-developed and well-nourished. No respiratory distress. Head: Normocephalic and atraumatic. Eyes: Pupils are equal, round  ENT: hearing normal  Neck: supple. No JVD present. Cardiovascular: LVAD humming  Pulmonary/Chest: Effort normal and breath sounds normal. No wheezes.    Abdominal: Soft, no tenderness. Musculoskeletal: no edema. Neurological: alert,oriented. Skin: Skin is warm and dry, left sided ICD pocket without hematoma. Small mid incision scab oozing with red blood  Psychiatric: normal mood and affect. Behavior is normal. Judgment and thought content normal.        Assessment/Plan:   AICD pocket with superficial fresh blood bleeding from the falling off scab, confirmed with patient and his daughter. INR check is 1.9 but he is also on plavix  He had CABG LVAD and old MI  There is no pocket hematoma to evacuate  I explained risk of infection  I prepped and cleaned with chlorhexadine and then applied derma bond to the old scab and then steri strips  He will keep gauze over the pocket while putting on LVAD strap  He will call and see me in a week if there continues to be problem  Steri strips should fall off over time  There is no sign of infection    Thank you for involving me in this patient's care and please call with further concerns or questions. Danay Cloud M.D.   Electrophysiology/Cardiology  [facility] and Vascular [place]  [address], [address] [facility], [address], [facility] [address]                             [address] [place] [address]  [phone]

## 2017-01-14 ENCOUNTER — HOME CARE VISIT (OUTPATIENT)
Dept: SCHEDULING | Facility: HOME HEALTH | Age: 62
End: 2017-01-14
Payer: COMMERCIAL

## 2017-01-14 VITALS
RESPIRATION RATE: 20 BRPM | TEMPERATURE: 98.4 F | WEIGHT: 153 LBS | OXYGEN SATURATION: 98 % | BODY MASS INDEX: 21.34 KG/M2

## 2017-01-14 PROCEDURE — G0157 HHC PT ASSISTANT EA 15: HCPCS

## 2017-01-15 VITALS — TEMPERATURE: 97.3 F | HEIGHT: 71 IN | BODY MASS INDEX: 21.34 KG/M2 | OXYGEN SATURATION: 96 % | RESPIRATION RATE: 16 BRPM

## 2017-01-16 ENCOUNTER — HOME CARE VISIT (OUTPATIENT)
Dept: SCHEDULING | Facility: HOME HEALTH | Age: 62
End: 2017-01-16
Payer: COMMERCIAL

## 2017-01-16 ENCOUNTER — TELEPHONE (OUTPATIENT)
Dept: CARDIOLOGY CLINIC | Age: 62
End: 2017-01-16

## 2017-01-16 ENCOUNTER — TELEPHONE ANTICOAG (OUTPATIENT)
Dept: CARDIOLOGY CLINIC | Age: 62
End: 2017-01-16

## 2017-01-16 VITALS — HEART RATE: 57 BPM | OXYGEN SATURATION: 95 % | RESPIRATION RATE: 16 BRPM | TEMPERATURE: 99.3 F

## 2017-01-16 LAB — INR, EXTERNAL: 2.3

## 2017-01-16 PROCEDURE — G0157 HHC PT ASSISTANT EA 15: HCPCS

## 2017-01-16 PROCEDURE — G0299 HHS/HOSPICE OF RN EA 15 MIN: HCPCS

## 2017-01-16 NOTE — TELEPHONE ENCOUNTER
Jarad Beebe from [facility] called regarding needing written documentation specifying what limits patient has.     patient told her the only one he is aware of is not lifting anything over 5 lbs     Fax #[phone]

## 2017-01-17 ENCOUNTER — TELEPHONE (OUTPATIENT)
Dept: CARDIOLOGY CLINIC | Age: 62
End: 2017-01-17

## 2017-01-17 ENCOUNTER — DOCUMENTATION ONLY (OUTPATIENT)
Dept: CARDIOLOGY CLINIC | Age: 62
End: 2017-01-17

## 2017-01-17 VITALS
BODY MASS INDEX: 21.9 KG/M2 | TEMPERATURE: 97.1 F | RESPIRATION RATE: 20 BRPM | WEIGHT: 157 LBS | OXYGEN SATURATION: 98 %

## 2017-01-17 NOTE — PROGRESS NOTES
Ramp Test scheduled for patient - Thursday January 19th at 3:30pm.    Patient to be called by our Nurse Practioner

## 2017-01-17 NOTE — TELEPHONE ENCOUNTER
Called patient to advise him of ramp test scheduled for Thursday, Jan 19, at 3:30 pm.  Patient is scheduled to arrive at outpatient registration at 3:00 pm.  He verbalizes understanding.

## 2017-01-17 NOTE — TELEPHONE ENCOUNTER
Returned call to Lenora Fields PT. Reinforced sternal precautions including 5 lb weight limit for pushing/pulling/lifting. She will reinforce with the patient.

## 2017-01-18 ENCOUNTER — HOME CARE VISIT (OUTPATIENT)
Dept: SCHEDULING | Facility: HOME HEALTH | Age: 62
End: 2017-01-18
Payer: COMMERCIAL

## 2017-01-18 VITALS — RESPIRATION RATE: 16 BRPM | TEMPERATURE: 98.2 F | HEIGHT: 71 IN | OXYGEN SATURATION: 98 % | HEART RATE: 90 BPM

## 2017-01-18 PROCEDURE — G0157 HHC PT ASSISTANT EA 15: HCPCS

## 2017-01-19 ENCOUNTER — HOSPITAL ENCOUNTER (OUTPATIENT)
Dept: NON INVASIVE DIAGNOSTICS | Age: 62
Discharge: HOME | End: 2017-01-19
Attending: PHYSICIAN ASSISTANT

## 2017-01-19 ENCOUNTER — DOCUMENTATION ONLY (OUTPATIENT)
Dept: CARDIOLOGY CLINIC | Age: 62
End: 2017-01-19

## 2017-01-19 ENCOUNTER — TELEPHONE (OUTPATIENT)
Dept: CARDIOLOGY CLINIC | Age: 62
End: 2017-01-19

## 2017-01-19 DIAGNOSIS — T82.7XXA PACEMAKER INFECTION, INITIAL ENCOUNTER (HCC): Primary | ICD-10-CM

## 2017-01-19 DIAGNOSIS — Z95.811 LEFT VENTRICULAR ASSIST DEVICE PRESENT (HCC): ICD-10-CM

## 2017-01-19 RX ORDER — SODIUM CHLORIDE 0.9 % (FLUSH) 0.9 %
5-10 SYRINGE (ML) INJECTION AS NEEDED
Status: CANCELLED | OUTPATIENT
Start: 2017-01-19

## 2017-01-19 RX ORDER — SODIUM CHLORIDE 0.9 % (FLUSH) 0.9 %
5-10 SYRINGE (ML) INJECTION EVERY 8 HOURS
Status: CANCELLED | OUTPATIENT
Start: 2017-01-19

## 2017-01-19 RX ORDER — CEFAZOLIN SODIUM IN 0.9 % NACL 2 G/50 ML
2 INTRAVENOUS SOLUTION, PIGGYBACK (ML) INTRAVENOUS ONCE
Status: CANCELLED | OUTPATIENT
Start: 2017-01-19 | End: 2017-01-20

## 2017-01-19 RX ORDER — AMOXICILLIN AND CLAVULANATE POTASSIUM 875; 125 MG/1; MG/1
1 TABLET, FILM COATED ORAL EVERY 12 HOURS
Qty: 14 TAB | Refills: 0 | Status: ON HOLD | OUTPATIENT
Start: 2017-01-19 | End: 2017-01-20

## 2017-01-19 NOTE — TELEPHONE ENCOUNTER
Called patient and wife to inform them that they will need to go to Admitting at 1:45 pm to register for pacemaker pocket revision. They verbalize understanding.

## 2017-01-19 NOTE — PROGRESS NOTES
LVAD Ramp Echo Study      Baseline speed: 8600 rpm          Speed   8600    8400   8800  LVIDd   4.2    4.3   4.3  LVIDs   3.5    3.2   3.1  EF   34%    39%   40%  AV opening  Every beat   Every beat  Every beat  AI   None    None   None  MR   Trace    Trace   Trace  Inflow Velocity  0.56    0.56   0.8  IVS   Septal bounce   Septal bounce  Septal bounce  RV size  3.9    3.7   4.0  TAPSE  0.9    1.4   1.6  TR   Mild    Mild   Mild  PAPs   22    25   19    Pump Flow  4.6    4.6   4.8  Pulsatility Index 6.8    6.7   7.6  Pump Power  4.6    4.4   4.9    The patient's fixed LVAD speed was changed to 8800 rpm with a low speed of 8200 rpm. His back up controller was also changed to a fixed speed of 8800 rpm with a low speed of 8200 rpm.      Kristi Torres M.D. [facility] [place] [address] Director, [address] [facility].  [place] [phone]  Dept: [address] [place] [address] Ne: [phone]  57 hour VAD/HF Pager: [phone]

## 2017-01-19 NOTE — TELEPHONE ENCOUNTER
Per Dr. Eliseo Wheeler, patient is needing to have pocket revised for recently inserted device. He is scheduled for tomorrow, 1/20/2017 at 3:45pm. He will need to arrive to [facility] by 1:45pm. He should be NPO after 9:45am. Per Karyna Seyd NP, he will need to go to pharmacy to get antibiotic prescription tonight. Attempted to reach patient by telephone. A message was left for return call.

## 2017-01-19 NOTE — PROGRESS NOTES
There was not much drainage on the wound and I did not see any opening when I got to see him last Friday at [facility]. The patient and his daughter were asked to call if there is drainage but I did not get a call until now. I got a call from Clarence Ramirez of CHF team  I am concerned that the pocket of ICD could be infected   I recommend that he comes to [address] [place] [address] lab for me to revise.   If there is already infection then the system has to be removed

## 2017-01-19 NOTE — PROGRESS NOTES
Fabby BRAGA with Advanced Heart failure contacted Dr Felix Bell. She saw Mr Tripp Cohen in the office today and the patient mention that the ICD incision was bleeding and started draining thick yellow drainage. Reported this started happening on Sunday. [facility] The patient did not contact our office about this as instructed when he was seen last Friday. He was seen at [facility] 1/13 for bleeding from the ICD site, at the time this was a superificial abrasion draining sanguinous drainage. The area was cleaned with sterile technique and derma bond was applied to the area and covered with steri strips.      He is scheduled for a pocket revision tomorrow afternoon at 3:45, he will need to arrive at the hospital at 1:45  He will start augmentin tonight q12h for 7 days- Rx sent to pharmacy

## 2017-01-19 NOTE — TELEPHONE ENCOUNTER
Spoke with Lauren Her NP at [facility] regarding tomorrow's procedure, instructions for the patient and antibiotic which was sent to pharmacy by Meryl Garner NP. The patient is physically present in their office at time of our phone conversation. She will inform the patient of all information reviewed. Patient to call our office if additional questions or concerns. The provider verbalized understanding and will call our office with any further questions or concerns.

## 2017-01-20 ENCOUNTER — HOSPITAL ENCOUNTER (INPATIENT)
Dept: CARDIAC CATH/INVASIVE PROCEDURES | Age: 62
LOS: 4 days | Discharge: HOME | DRG: 261 | End: 2017-01-24
Attending: INTERNAL MEDICINE | Admitting: INTERNAL MEDICINE
Payer: COMMERCIAL

## 2017-01-20 ENCOUNTER — HOME CARE VISIT (OUTPATIENT)
Dept: HOME HEALTH SERVICES | Facility: HOME HEALTH | Age: 62
End: 2017-01-20
Payer: COMMERCIAL

## 2017-01-20 ENCOUNTER — TELEPHONE (OUTPATIENT)
Dept: CARDIOLOGY CLINIC | Age: 62
End: 2017-01-20

## 2017-01-20 PROBLEM — T82.7XXA ICD (IMPLANTABLE CARDIOVERTER-DEFIBRILLATOR) INFECTION (HCC): Status: ACTIVE | Noted: 2017-01-20

## 2017-01-20 LAB
ALBUMIN SERPL BCP-MCNC: 3.2 G/DL (ref 3.5–5)
ALBUMIN/GLOB SERPL: 0.9 {RATIO} (ref 1.1–2.2)
ALP SERPL-CCNC: 114 U/L (ref 45–117)
ALT SERPL-CCNC: 35 U/L (ref 12–78)
ANION GAP BLD CALC-SCNC: 10 MMOL/L (ref 5–15)
AST SERPL W P-5'-P-CCNC: 34 U/L (ref 15–37)
BASOPHILS # BLD AUTO: 0 K/UL (ref 0–0.1)
BASOPHILS # BLD: 1 % (ref 0–1)
BILIRUB SERPL-MCNC: 0.4 MG/DL (ref 0.2–1)
BUN SERPL-MCNC: 11 MG/DL (ref 6–20)
BUN/CREAT SERPL: 11 (ref 12–20)
CALCIUM SERPL-MCNC: 8.7 MG/DL (ref 8.5–10.1)
CHLORIDE SERPL-SCNC: 99 MMOL/L (ref 97–108)
CO2 SERPL-SCNC: 26 MMOL/L (ref 21–32)
CREAT SERPL-MCNC: 0.99 MG/DL (ref 0.7–1.3)
EOSINOPHIL # BLD: 0.5 K/UL (ref 0–0.4)
EOSINOPHIL NFR BLD: 7 % (ref 0–7)
ERYTHROCYTE [DISTWIDTH] IN BLOOD BY AUTOMATED COUNT: 15.9 % (ref 11.5–14.5)
GLOBULIN SER CALC-MCNC: 3.5 G/DL (ref 2–4)
GLUCOSE SERPL-MCNC: 84 MG/DL (ref 65–100)
HCT VFR BLD AUTO: 30 % (ref 36.6–50.3)
HGB BLD-MCNC: 9.2 G/DL (ref 12.1–17)
INR BLD: 2.4 (ref 0.9–1.2)
INR PPP: 2.6 (ref 0.9–1.1)
LYMPHOCYTES # BLD AUTO: 21 % (ref 12–49)
LYMPHOCYTES # BLD: 1.5 K/UL (ref 0.8–3.5)
MCH RBC QN AUTO: 25.6 PG (ref 26–34)
MCHC RBC AUTO-ENTMCNC: 30.7 G/DL (ref 30–36.5)
MCV RBC AUTO: 83.6 FL (ref 80–99)
MONOCYTES # BLD: 0.5 K/UL (ref 0–1)
MONOCYTES NFR BLD AUTO: 7 % (ref 5–13)
NEUTS SEG # BLD: 4.7 K/UL (ref 1.8–8)
NEUTS SEG NFR BLD AUTO: 64 % (ref 32–75)
PLATELET # BLD AUTO: 426 K/UL (ref 150–400)
POTASSIUM SERPL-SCNC: 4 MMOL/L (ref 3.5–5.1)
PROT SERPL-MCNC: 6.7 G/DL (ref 6.4–8.2)
PROTHROMBIN TIME: 26.1 SEC (ref 9–11.1)
RBC # BLD AUTO: 3.59 M/UL (ref 4.1–5.7)
SODIUM SERPL-SCNC: 135 MMOL/L (ref 136–145)
WBC # BLD AUTO: 7.3 K/UL (ref 4.1–11.1)

## 2017-01-20 PROCEDURE — 85610 PROTHROMBIN TIME: CPT

## 2017-01-20 PROCEDURE — 87040 BLOOD CULTURE FOR BACTERIA: CPT | Performed by: INTERNAL MEDICINE

## 2017-01-20 PROCEDURE — 74011250637 HC RX REV CODE- 250/637: Performed by: INTERNAL MEDICINE

## 2017-01-20 PROCEDURE — 87186 SC STD MICRODIL/AGAR DIL: CPT | Performed by: INTERNAL MEDICINE

## 2017-01-20 PROCEDURE — 74011250636 HC RX REV CODE- 250/636: Performed by: INTERNAL MEDICINE

## 2017-01-20 PROCEDURE — 77030031139 HC SUT VCRL2 J&J -A

## 2017-01-20 PROCEDURE — 02PA3MZ REMOVAL OF CARDIAC LEAD FROM HEART, PERCUTANEOUS APPROACH: ICD-10-PCS | Performed by: INTERNAL MEDICINE

## 2017-01-20 PROCEDURE — 74011250637 HC RX REV CODE- 250/637: Performed by: NURSE PRACTITIONER

## 2017-01-20 PROCEDURE — 85025 COMPLETE CBC W/AUTO DIFF WBC: CPT | Performed by: NURSE PRACTITIONER

## 2017-01-20 PROCEDURE — 87205 SMEAR GRAM STAIN: CPT | Performed by: INTERNAL MEDICINE

## 2017-01-20 PROCEDURE — 85610 PROTHROMBIN TIME: CPT | Performed by: NURSE PRACTITIONER

## 2017-01-20 PROCEDURE — 74011000250 HC RX REV CODE- 250: Performed by: INTERNAL MEDICINE

## 2017-01-20 PROCEDURE — 74011000272 HC RX REV CODE- 272: Performed by: INTERNAL MEDICINE

## 2017-01-20 PROCEDURE — 80053 COMPREHEN METABOLIC PANEL: CPT | Performed by: NURSE PRACTITIONER

## 2017-01-20 PROCEDURE — 77030018729 HC ELECTRD DEFIB PAD CARD -B

## 2017-01-20 PROCEDURE — 77030028698 HC BLD TISS PLSM MEDT -D

## 2017-01-20 PROCEDURE — 99152 MOD SED SAME PHYS/QHP 5/>YRS: CPT

## 2017-01-20 PROCEDURE — 0JPT0PZ REMOVAL OF CARDIAC RHYTHM RELATED DEVICE FROM TRUNK SUBCUTANEOUS TISSUE AND FASCIA, OPEN APPROACH: ICD-10-PCS | Performed by: INTERNAL MEDICINE

## 2017-01-20 PROCEDURE — 87077 CULTURE AEROBIC IDENTIFY: CPT | Performed by: INTERNAL MEDICINE

## 2017-01-20 PROCEDURE — 65660000000 HC RM CCU STEPDOWN

## 2017-01-20 PROCEDURE — 74011250636 HC RX REV CODE- 250/636

## 2017-01-20 PROCEDURE — 36415 COLL VENOUS BLD VENIPUNCTURE: CPT | Performed by: NURSE PRACTITIONER

## 2017-01-20 RX ORDER — SODIUM CHLORIDE 0.9 % (FLUSH) 0.9 %
5-10 SYRINGE (ML) INJECTION AS NEEDED
Status: DISCONTINUED | OUTPATIENT
Start: 2017-01-20 | End: 2017-01-24 | Stop reason: HOSPADM

## 2017-01-20 RX ORDER — LANOLIN ALCOHOL/MO/W.PET/CERES
325 CREAM (GRAM) TOPICAL 2 TIMES DAILY WITH MEALS
Status: DISCONTINUED | OUTPATIENT
Start: 2017-01-20 | End: 2017-01-24 | Stop reason: HOSPADM

## 2017-01-20 RX ORDER — HYDRALAZINE HYDROCHLORIDE 20 MG/ML
20 INJECTION INTRAMUSCULAR; INTRAVENOUS
Status: DISCONTINUED | OUTPATIENT
Start: 2017-01-20 | End: 2017-01-24 | Stop reason: HOSPADM

## 2017-01-20 RX ORDER — SODIUM CHLORIDE 0.9 % (FLUSH) 0.9 %
5-10 SYRINGE (ML) INJECTION EVERY 8 HOURS
Status: DISCONTINUED | OUTPATIENT
Start: 2017-01-20 | End: 2017-01-20

## 2017-01-20 RX ORDER — SODIUM CHLORIDE 0.9 % (FLUSH) 0.9 %
5-10 SYRINGE (ML) INJECTION AS NEEDED
Status: DISCONTINUED | OUTPATIENT
Start: 2017-01-20 | End: 2017-01-20

## 2017-01-20 RX ORDER — VANCOMYCIN 1.75 GRAM/500 ML IN 0.9 % SODIUM CHLORIDE INTRAVENOUS
1750 ONCE
Status: COMPLETED | OUTPATIENT
Start: 2017-01-20 | End: 2017-01-20

## 2017-01-20 RX ORDER — ALPRAZOLAM 0.25 MG/1
0.25 TABLET ORAL
Status: DISCONTINUED | OUTPATIENT
Start: 2017-01-20 | End: 2017-01-24 | Stop reason: HOSPADM

## 2017-01-20 RX ORDER — CARVEDILOL 12.5 MG/1
12.5 TABLET ORAL 2 TIMES DAILY WITH MEALS
Status: DISCONTINUED | OUTPATIENT
Start: 2017-01-20 | End: 2017-01-22

## 2017-01-20 RX ORDER — SODIUM CHLORIDE 9 MG/ML
25 INJECTION, SOLUTION INTRAVENOUS CONTINUOUS
Status: DISCONTINUED | OUTPATIENT
Start: 2017-01-20 | End: 2017-01-20

## 2017-01-20 RX ORDER — ATROPINE SULFATE 0.1 MG/ML
0.5 INJECTION INTRAVENOUS AS NEEDED
Status: DISCONTINUED | OUTPATIENT
Start: 2017-01-20 | End: 2017-01-20

## 2017-01-20 RX ORDER — HYDRALAZINE HYDROCHLORIDE 20 MG/ML
10 INJECTION INTRAMUSCULAR; INTRAVENOUS
Status: DISCONTINUED | OUTPATIENT
Start: 2017-01-20 | End: 2017-01-24 | Stop reason: HOSPADM

## 2017-01-20 RX ORDER — TRAMADOL HYDROCHLORIDE 50 MG/1
50 TABLET ORAL
Status: DISCONTINUED | OUTPATIENT
Start: 2017-01-20 | End: 2017-01-24 | Stop reason: HOSPADM

## 2017-01-20 RX ORDER — SODIUM CHLORIDE 0.9 % (FLUSH) 0.9 %
5-10 SYRINGE (ML) INJECTION EVERY 8 HOURS
Status: DISCONTINUED | OUTPATIENT
Start: 2017-01-20 | End: 2017-01-24 | Stop reason: HOSPADM

## 2017-01-20 RX ORDER — VANCOMYCIN HYDROCHLORIDE 1 G/20ML
1000 INJECTION, POWDER, LYOPHILIZED, FOR SOLUTION INTRAVENOUS ONCE
Status: ACTIVE | OUTPATIENT
Start: 2017-01-20 | End: 2017-01-21

## 2017-01-20 RX ORDER — AMIODARONE HYDROCHLORIDE 200 MG/1
200 TABLET ORAL DAILY
Status: DISCONTINUED | OUTPATIENT
Start: 2017-01-21 | End: 2017-01-24 | Stop reason: HOSPADM

## 2017-01-20 RX ORDER — WARFARIN 2.5 MG/1
2.5 TABLET ORAL EVERY EVENING
Status: DISCONTINUED | OUTPATIENT
Start: 2017-01-20 | End: 2017-01-20

## 2017-01-20 RX ORDER — AMIODARONE HYDROCHLORIDE 200 MG/1
400 TABLET ORAL DAILY
Status: DISCONTINUED | OUTPATIENT
Start: 2017-01-21 | End: 2017-01-20

## 2017-01-20 RX ORDER — CITALOPRAM 20 MG/1
40 TABLET, FILM COATED ORAL DAILY
Status: DISCONTINUED | OUTPATIENT
Start: 2017-01-21 | End: 2017-01-24 | Stop reason: HOSPADM

## 2017-01-20 RX ORDER — BUMETANIDE 1 MG/1
0.5 TABLET ORAL DAILY
Status: DISCONTINUED | OUTPATIENT
Start: 2017-01-21 | End: 2017-01-24 | Stop reason: HOSPADM

## 2017-01-20 RX ORDER — ASPIRIN 325 MG
325 TABLET, DELAYED RELEASE (ENTERIC COATED) ORAL DAILY
Status: DISCONTINUED | OUTPATIENT
Start: 2017-01-21 | End: 2017-01-20

## 2017-01-20 RX ORDER — CLOPIDOGREL BISULFATE 75 MG/1
75 TABLET ORAL DAILY
Status: DISCONTINUED | OUTPATIENT
Start: 2017-01-21 | End: 2017-01-24 | Stop reason: HOSPADM

## 2017-01-20 RX ORDER — SODIUM CHLORIDE 0.9 % (FLUSH) 0.9 %
5 SYRINGE (ML) INJECTION AS NEEDED
Status: DISCONTINUED | OUTPATIENT
Start: 2017-01-20 | End: 2017-01-20 | Stop reason: HOSPADM

## 2017-01-20 RX ORDER — LANOLIN ALCOHOL/MO/W.PET/CERES
400 CREAM (GRAM) TOPICAL DAILY
Status: DISCONTINUED | OUTPATIENT
Start: 2017-01-21 | End: 2017-01-24 | Stop reason: HOSPADM

## 2017-01-20 RX ORDER — POTASSIUM CHLORIDE 750 MG/1
10 TABLET, FILM COATED, EXTENDED RELEASE ORAL DAILY
Status: DISCONTINUED | OUTPATIENT
Start: 2017-01-21 | End: 2017-01-24 | Stop reason: HOSPADM

## 2017-01-20 RX ORDER — SODIUM CHLORIDE 0.9 % (FLUSH) 0.9 %
5-10 SYRINGE (ML) INJECTION EVERY 8 HOURS
Status: DISCONTINUED | OUTPATIENT
Start: 2017-01-20 | End: 2017-01-20 | Stop reason: HOSPADM

## 2017-01-20 RX ORDER — MIDAZOLAM HYDROCHLORIDE 1 MG/ML
.5-1 INJECTION, SOLUTION INTRAMUSCULAR; INTRAVENOUS
Status: DISCONTINUED | OUTPATIENT
Start: 2017-01-20 | End: 2017-01-20

## 2017-01-20 RX ORDER — PANTOPRAZOLE SODIUM 40 MG/1
40 TABLET, DELAYED RELEASE ORAL
Status: DISCONTINUED | OUTPATIENT
Start: 2017-01-21 | End: 2017-01-24 | Stop reason: HOSPADM

## 2017-01-20 RX ORDER — ZOLPIDEM TARTRATE 5 MG/1
5 TABLET ORAL
Status: DISCONTINUED | OUTPATIENT
Start: 2017-01-20 | End: 2017-01-24 | Stop reason: HOSPADM

## 2017-01-20 RX ORDER — ALLOPURINOL 100 MG/1
100 TABLET ORAL 2 TIMES DAILY
Status: DISCONTINUED | OUTPATIENT
Start: 2017-01-20 | End: 2017-01-24 | Stop reason: HOSPADM

## 2017-01-20 RX ORDER — CEFAZOLIN SODIUM IN 0.9 % NACL 2 G/50 ML
2 INTRAVENOUS SOLUTION, PIGGYBACK (ML) INTRAVENOUS ONCE
Status: COMPLETED | OUTPATIENT
Start: 2017-01-20 | End: 2017-01-20

## 2017-01-20 RX ORDER — WARFARIN 2.5 MG/1
2.5 TABLET ORAL ONCE
Status: DISCONTINUED | OUTPATIENT
Start: 2017-01-20 | End: 2017-01-20

## 2017-01-20 RX ORDER — VANCOMYCIN HYDROCHLORIDE 1 G/20ML
INJECTION, POWDER, LYOPHILIZED, FOR SOLUTION INTRAVENOUS
Status: COMPLETED
Start: 2017-01-20 | End: 2017-01-20

## 2017-01-20 RX ORDER — LIDOCAINE HYDROCHLORIDE AND EPINEPHRINE 10; 10 MG/ML; UG/ML
1.5 INJECTION, SOLUTION INFILTRATION; PERINEURAL
Status: DISCONTINUED | OUTPATIENT
Start: 2017-01-20 | End: 2017-01-20

## 2017-01-20 RX ORDER — FENTANYL CITRATE 50 UG/ML
25-200 INJECTION, SOLUTION INTRAMUSCULAR; INTRAVENOUS
Status: DISCONTINUED | OUTPATIENT
Start: 2017-01-20 | End: 2017-01-20

## 2017-01-20 RX ORDER — VANCOMYCIN 1.75 GRAM/500 ML IN 0.9 % SODIUM CHLORIDE INTRAVENOUS
1750 ONCE
Status: DISCONTINUED | OUTPATIENT
Start: 2017-01-20 | End: 2017-01-20

## 2017-01-20 RX ORDER — NALOXONE HYDROCHLORIDE 0.4 MG/ML
0.4 INJECTION, SOLUTION INTRAMUSCULAR; INTRAVENOUS; SUBCUTANEOUS AS NEEDED
Status: DISCONTINUED | OUTPATIENT
Start: 2017-01-20 | End: 2017-01-24 | Stop reason: HOSPADM

## 2017-01-20 RX ADMIN — Medication 10 ML: at 23:43

## 2017-01-20 RX ADMIN — SODIUM CHLORIDE: 900 IRRIGANT IRRIGATION at 17:39

## 2017-01-20 RX ADMIN — LIDOCAINE HYDROCHLORIDE,EPINEPHRINE BITARTRATE 30 MG: 10; .01 INJECTION, SOLUTION INFILTRATION; PERINEURAL at 17:04

## 2017-01-20 RX ADMIN — FENTANYL CITRATE 50 MCG: 50 INJECTION, SOLUTION INTRAMUSCULAR; INTRAVENOUS at 17:07

## 2017-01-20 RX ADMIN — FERROUS SULFATE TAB 325 MG (65 MG ELEMENTAL FE) 325 MG: 325 (65 FE) TAB at 20:46

## 2017-01-20 RX ADMIN — ALLOPURINOL 100 MG: 100 TABLET ORAL at 20:46

## 2017-01-20 RX ADMIN — Medication 5 ML: at 18:30

## 2017-01-20 RX ADMIN — VANCOMYCIN HYDROCHLORIDE 1000 MG: 1 INJECTION, POWDER, LYOPHILIZED, FOR SOLUTION INTRAVENOUS at 17:25

## 2017-01-20 RX ADMIN — MIDAZOLAM HYDROCHLORIDE 2 MG: 1 INJECTION, SOLUTION INTRAMUSCULAR; INTRAVENOUS at 17:06

## 2017-01-20 RX ADMIN — CARVEDILOL 12.5 MG: 12.5 TABLET, FILM COATED ORAL at 20:46

## 2017-01-20 RX ADMIN — TRAMADOL HYDROCHLORIDE 50 MG: 50 TABLET, FILM COATED ORAL at 23:43

## 2017-01-20 RX ADMIN — MIDAZOLAM HYDROCHLORIDE 2 MG: 1 INJECTION, SOLUTION INTRAMUSCULAR; INTRAVENOUS at 17:26

## 2017-01-20 RX ADMIN — Medication 10 ML: at 17:09

## 2017-01-20 RX ADMIN — SODIUM CHLORIDE 25 ML/HR: 900 INJECTION, SOLUTION INTRAVENOUS at 15:30

## 2017-01-20 RX ADMIN — ZOLPIDEM TARTRATE 5 MG: 5 TABLET, FILM COATED ORAL at 23:43

## 2017-01-20 RX ADMIN — FENTANYL CITRATE 50 MCG: 50 INJECTION, SOLUTION INTRAMUSCULAR; INTRAVENOUS at 16:55

## 2017-01-20 RX ADMIN — VANCOMYCIN HYDROCHLORIDE 1750 MG: 10 INJECTION, POWDER, LYOPHILIZED, FOR SOLUTION INTRAVENOUS at 20:46

## 2017-01-20 RX ADMIN — CEFAZOLIN 2 G: 1 INJECTION, POWDER, FOR SOLUTION INTRAMUSCULAR; INTRAVENOUS; PARENTERAL at 16:50

## 2017-01-20 RX ADMIN — MIDAZOLAM HYDROCHLORIDE 1 MG: 1 INJECTION, SOLUTION INTRAMUSCULAR; INTRAVENOUS at 16:55

## 2017-01-20 NOTE — PROGRESS NOTES
TRANSFER - IN REPORT:    Verbal report received from [facility] on Louisa Paredes  being received from procedure for routine progression of care. Report consisted of patients Situation, Background, Assessment and Recommendations(SBAR). Information from the following report(s) Procedure Summary, MAR and Recent Results was reviewed with the receiving clinician. Opportunity for questions and clarification was provided. Assessment completed upon patients arrival to [address] [place] [address] and care assumed. Cardiac Cath Lab Recovery Arrival Note:    Louisa Paredes arrived to [facility] recovery area. Patient procedure= removal of ICD and leads. Patient on cardiac monitor, non-invasive blood pressure, SPO2 monitor. On O2 @ 2 lpm via n/c. IV  of nacl on pump at 25 ml/hr. Patient status doing well without problems. Patient is A&Ox 4, but very sleepy. Patient reports no complaints. PROCEDURE SITE CHECK:    Procedure site:without any bleeding and or hematoma, ice bag applied, no pain/discomfort reported at procedure site. No change in patient status. Continue to monitor patient and status.

## 2017-01-20 NOTE — PROGRESS NOTES
Cardiac Cath Lab Recovery Arrival Note:      Meme Cardenas arrived to Cardiac Cath Lab, Recovery Area. Staff introduced to patient. Patient identifiers verified with NAME and DATE OF BIRTH. Procedure verified with patient. Consent forms reviewed and signed by patient or authorized representative and verified. Allergies verified. Patient and family oriented to department. Patient and family informed of procedure and plan of care. Questions answered with review. Patient prepped for procedure, per orders from physician, prior to arrival.    Patient on cardiac monitor, non-invasive blood pressure, SPO2 monitor. On room air. Patient is A&Ox 4. Patient reports no complaints. Patient in stretcher, in low position, with side rails up, call bell within reach, patient instructed to call if assistance as needed. Patient prep in: [address], [place] [address].    Patient family has pager # [phone]  Family in: hospital.   Prep by: Precious Camilo RN

## 2017-01-20 NOTE — TELEPHONE ENCOUNTER
Patient needs refills on the following medications -     Protonix  Warfarin  Celexa  Pacerone  Folic Acid  Plavix    Patient will use [facility] for a months supply then [facility] mail order.      Also, need to order antibiotic for patient

## 2017-01-20 NOTE — PROGRESS NOTES
Cardiac Cath Lab Procedure Area Arrival Note:    Tutu Escobedo arrived to Cardiac Cath Lab, Procedure Area. Patient identifiers verified with NAME and DATE OF BIRTH. Procedure verified with patient. Consent forms verified. Allergies verified. Patient informed of procedure and plan of care. Questions answered with review. Patient voiced understanding of procedure and plan of care. Patient on cardiac monitor, non-invasive blood pressure, SPO2 monitor. On RA, placed on O2 @ 2 lpm via nc. IV of ns on pump at 25 ml/hr. Patient status doing well without problems. Patient is A&Ox 3. Patient reports no pain. Patient medicated during procedure with orders obtained and verified by Dr. Deepak Cano. Refer to patients Cardiac Cath Lab PROCEDURE REPORT for vital signs, assessment, status, and response during procedure, printed at end of case. Printed report on chart or scanned into chart.

## 2017-01-20 NOTE — H&P
Cardiac Electrophysiology H&P Note     Subjective:      Ilda Hancock is a 62 y.o. male patient who came to [facility] today for ICD pocket revision and possible ICD removal  Sanjiv BRAGA with Advanced Heart failure contacted EP office yesterday late afternoon at 5 pm. She saw Mr Raimundo Castro while he was getting his RAMP echo and the patient mentioned that the ICD incision was bleeding and started draining thick yellow drainage. Reported this started happening on Sunday and the incision opened up on Tuesday evening. They said the home health nurse has been changing the dressing. The area is non tender, he denies fever. She took the patient to the office yesterday after his echo and the drainage was cultured. He was prescribed Augmentin last night, but was unable to go pick it up at the pharmacy. He was previously seen in the EP lab at [facility] one week ago for drainage from the ICD incision. However, when examineed it appeared to be a small scab in the middle of the incision. It was dry at the time I saw him so did not see drainage to recommend pocket revision at the time. The area was cleaned using chlorhexadine and derma bond was applied to the area and covered with steri strips. He was instructed to call if he had any further drainage, redness or swelling.      The ICD was placed last month- 12/16/16 dual chamber ICD   He had large MI and severe CHF and had VT/VF arrest despite incomplete CABG and LVAD so ICD was placed last month for secondary prevention    Patient Active Problem List   Diagnosis Code    S/P laminectomy Z98.890    Sinus tachycardia R00.0    Acute respiratory failure with hypoxia ([address] J96.01    Essential hypertension I10    Alcohol abuse F10.10    Chronic systolic heart failure (HCC) I50.22    CAD, multiple vessel I25.10    Ischemic cardiomyopathy I25.5    MI (myocardial infarction) ([address] I21.3    Sustained VT (ventricular tachycardia) ([facility]) I47.2    S/P ICD (internal cardiac defibrillator) procedure Z95.810    Chronic anticoagulation Z79.01    Left ventricular assist device present ([facility] Z95.811    Gout M10.9    ICD (implantable cardioverter-defibrillator) infection ([facility] T82. 7XXA     Current Facility-Administered Medications   Medication Dose Route Frequency Provider Last Rate Last Dose    ceFAZolin in 0.9% NS (ANCEF) IVPB soln 2 g  2 g IntraVENous ONCE Ruth Zuniga MD        sodium chloride (NS) flush 5-10 mL  5-10 mL IntraVENous Q8H Ruth Zuniga MD        sodium chloride (NS) flush 5-10 mL  5-10 mL IntraVENous PRN Ruth Zuniga MD        sodium chloride (NS) flush 5-10 mL  5-10 mL IntraVENous Q8H Criselda Starr NP        sodium chloride (NS) flush 5-10 mL  5-10 mL IntraVENous PRN Criselda Starr NP        [START ON 1/21/2017] potassium chloride SR (KLOR-CON 10) tablet 10 mEq  10 mEq Oral DAILY Criselda Starr NP       [facility] Prior ON 1/21/2017] bumetanide (BUMEX) tablet 0.5 mg  0.5 mg Oral DAILY Criselda Starr NP        ALPRAZolam [address]) tablet 0.25 mg  0.25 mg Oral Q6H PRN Criselda Starr NP        zolpidem (AMBIEN) tablet 5 mg  5 mg Oral QHS PRN Criselda Starr NP       [facility] [START ON 1/21/2017] amiodarone (CORDARONE) tablet 400 mg  400 mg Oral DAILY Criselda Starr NP        carvedilol (COREG) tablet 12.5 mg  12.5 mg Oral BID WITH MEALS Criselda Starr NP       [facility] Prior ON 1/21/2017] citalopram (CELEXA) tablet 40 mg  40 mg Oral DAILY Criselda Starr NP        [START ON 1/21/2017] clopidogrel (PLAVIX) tablet 75 mg  75 mg Oral DAILY Criselda Starr NP        ferrous sulfate tablet 325 mg  325 mg Oral BID WITH MEALS Criselda Starr NP       [facility] [START ON 1/21/2017] magnesium oxide (MAG-OX) tablet 400 mg  400 mg Oral DAILY Criselda Starr NP       [facility] [START ON 1/21/2017] pantoprazole (PROTONIX) tablet 40 mg  40 mg Oral ACB Criselda Starr NP        warfarin (COUMADIN) tablet 2.5 mg  2.5 mg Oral QPM Criselda Starr NP        allopurinol (ZYLOPRIM) tablet 100 mg  100 mg Oral BID Criselda Starr NP No Known Allergies  Past Medical History   Diagnosis Date    Chronic pain      back    Hypertension     Ill-defined condition      gout    Seizures ([facility]      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     Past Surgical History   Procedure Laterality Date    Hx heent       wisdom teeth removed     Family History   Problem Relation Age of Onset    Diabetes Mother     Dementia Mother     Other Mother      Latex allergy    Heart Disease Father     Stroke Father     No Known Problems Sister     Cancer Brother      brain     Social History   Substance Use Topics    Smoking status: Former Smoker     Packs/day: 1.50     Years: 30.00     Quit date: 2008    Smokeless tobacco: Never Used    Alcohol use 24.0 oz/week     40 Cans of beer per week        Review of Systems:   Constitutional: Negative for fever, chills, weight loss, + malaise/fatigue. HEENT: Negative for nosebleeds, vision changes. Respiratory: Negative for cough, hemoptysis, sputum production, and wheezing. Cardiovascular: Negative for chest pain, palpitations, orthopnea, claudication, leg swelling, syncope, and PND. Gastrointestinal: Negative for nausea, vomiting, diarrhea, constipation, blood in stool and melena. Genitourinary: Negative for dysuria, and hematuria. Musculoskeletal: Negative for myalgias, arthralgia. Skin: Negative for rash. + drainage from ICD site  Heme:  bruise easily. Neurological: Negative for speech change and focal weakness     Objective:     Visit Vitals    Temp 98.1 °F (36.7 °C)    Resp 20    Ht 5' 11\" (1.803 m)    Wt 159 lb (72.1 kg)    BMI 22.18 kg/m2      Physical Exam:   Constitutional: well-nourished/thin. No distress. uses a cane to ambulate  Head: Normocephalic and atraumatic. Eyes: Pupils are equal, round  Neck: supple. No JVD present. Cardiovascular: LVAD humm  Pulmonary/Chest: Effort normal and breath sounds normal. No wheezes. Abdominal: Soft, no tenderness. Musculoskeletal: no edema. Neurological: alert,oriented. Skin: Skin is warm and dry. ICD incision well approximated except in the middle of the incision there is a pencil eraser sized opening draining thick yellowish drainage- small amount. Skin is pink about the opening. The scab that was on the incision has fallen off  Midline sternal scar noted  LVAD drive line covered with clean and dry bandage  Psychiatric: normal mood and affect. Behavior is normal. Judgment and thought content normal.          Assessment/Plan:   ICD pocket infection with persistent drainage  LVAD  Ischemic cardiomyopathy post large NSTEMI  Hx of VF/VT Cardiac arrest  S/p CABG x2  Anticoagulation with coumadin     -Paired blood cultures/CBC with diff/CMP/PT-INR  He will need to be admitted and started on IV vancomycin until blood cultures and ICD pocket cultures are resulted. Reviewed plan with the patient and his wife, they agree with above plan. I spoke with Deysi Pierson LVAD coordinator, she is aware of his admission and said she would put in LVAD orders. Coumadin ordered for tonight, to be adjusted per advanced heart failure team.   Consult to Pharmacy to dose vancomycin. Duration of antibiotic will depend of culture results. If infection is localized to the pocket, may need 2 weeks of antibiotic treatment and device will have to reimplanted on the right side. Do not recommend subcutaneous ICD d/t the patient being thin and the placement would be very close in proximity to LVAD drive line placement, high risk of pocket erosion again    Thank you for involving me in this patient's care and please call with further concerns or questions.     Geena Pierce NP  [phone]    Addendum from EP attending:   I have seen, examined patient, and discussed with nurse practitioner, registered nurse, reviewed, updated note and agree with the assessment and plan    I have talked to him and his wife today    Exam shows LVAD noise  Chest wall shows left sided ICD pocket with a quarter size drainage on the 4x4 dressing and it is brown-yellow and thick. There is a hole 2 pen tip size in the middle of the incision and it is unclear if it is extending into the pocket of the ICD   He is thin so the erosion could have allowed skin bacteria to get inside. Last week there was a report of drainage near the scab in the middle of the incision but when I saw him in EP lab holding area, it was dry and had been cleaned up so I decided not to open up the pocket but wait and see if it remains dry and can heal (the risk of introducing skin bacteria into the pocket with revision at that stage is the same). The area started to drain again 5 days ago but I did not get a phone call. The patient and his wife said home health nurse has been helping with dressing change. Yesterday was the first time they noted the hole with drainage  Prior to AICD implant for secondary prevention the patient was critically ill post NSTEMI. He was in the hospital for extended period of time and had hematoma while on coumadin so all of these have increased the risk of pocket infection  Assessment and Plan:  I will explore the pocket incision and if the erosion is limited to superficial layer, I may consider to revise the pocket but otherwise it is assumed that the inside of the ICD pocket has been infected and the device and lead have to be removed  He will remain in hospital with external defibrillator near by and get IV antibiotic   Will wait for culture before reimplant new ICD  He does not fit life vest defibrillator due to LVAD vest  He is too thin and the drive line for LVAD is too close to S ICD site so another transvenous AICD implant is the only choice for secondary prevention of sudden death    Isael Flynn M.D.  [facility] [place]  Electrophysiology/Cardiology  [facility]  [phone] , [address]  [address] [facility] [address]  [phone]                                        [phone]

## 2017-01-20 NOTE — CONSULTS
[facility]  VAD Coordinator Progress Note      Date: January 20, 2017     Admit Date: 1/20/2017    Procedure:     ICD removal, lead extraction, pocket irrigation. Mr. Woody Lanier is a 62year old male for whom we were asked to see in consultation by Dr. Jonathan Cho for LVAD management. 1/20: ICD removal, lead extraction, and pocket irrigation. Subjective:     Antoine Doheny. Woody Lanier is a 62year old male patient with a PMH significant for ICM, s/p HM II implant as DT on 12/1/6. Additional PMH includes lumbar stenosis s/p laminectomy, sinus tachycardia, VT/VF, HTN, ETOH abuse, chronic anticoagulation, and gout. During his implant hospitalization, Mr. Woody Lanier had several episodes of VT/VF requiring external defibrillation. He was subsequently implanted with a dual chamber ICD on 12/16/16. His post-implant course has been complicated with hematoma formation and delayed wound healing. He was seen by MARIA LUZ Dugan for a scheduled ramp study on 1/19/17. Incidentally, she noted that his ICD site was still edematous and tender, and had a pencil sized opening in the incision with thick, serosanguinous drainage. The wound was cultured and dressed. Dr. Jcarlos Ornelas office was notified, and Mr. Woody Lanier was scheduled for a pocket revision 1/20. Upon initial assessment pre-procedure, his wound was noted to be draining thick, yellow fluid, and he was taken to the EP lab for ICD removal, lead extraction, and pocket irrigation. He has subsequently been admitted to [facility] for further assessment and treatment of infected ICD pocket. Mr. Woody Lanier denies any fevers, chills, rigors, or muscle aches. He has been afebrile, and wound and blood cx are pending.         Active Ambulatory Problems     Diagnosis Date Noted    S/P laminectomy 11/22/2016    Sinus tachycardia 11/24/2016    Acute respiratory failure with hypoxia ([address] 11/24/2016    Essential hypertension 11/24/2016    Alcohol abuse 11/24/2016    [facility] 2016    CAD, multiple vessel 2016    Ischemic cardiomyopathy 2016    MI (myocardial infarction) ([facility] 2016    Sustained VT (ventricular tachycardia) ([facility] 2016    S/P ICD (internal cardiac defibrillator) procedure 2016    Chronic anticoagulation 2016    Left ventricular assist device present ([facility] 2016    Gout 2016     Resolved Ambulatory Problems     Diagnosis Date Noted    HCAP (healthcare-associated pneumonia) 2016    VF (ventricular fibrillation) ([facility] 2016     Past Medical History   Diagnosis Date    Chronic pain     Hypertension     Ill-defined condition     Seizures (HCC)         Objective:   Vitals:  Visit Vitals    Pulse 89    Temp 98.1 °F (36.7 °C)    Resp 16    Ht 5' 11\" (1.803 m)    Wt 159 lb (72.1 kg)    SpO2 98%    BMI 22.18 kg/m2     \"Pulse\" reflects HR via telemetry. MAP: 90    Temp (24hrs), Av.1 °F (36.7 °C), Min:98.1 °F (36.7 °C), Max:98.1 °F (36.7 °C)    VAD Data:          Backup  at bedside, program matches primary. Oxygen Therapy:  Oxygen Therapy  O2 Sat (%): 98 % (17 1626)  Pulse via Oximetry: 89 beats per minute (17 1626)  O2 Device: Room air (17 1445)    CXR:17: No CXR as of time of this note. Admission Weight: Last Weight   Weight: 159 lb (72.1 kg) Weight: 159 lb (72.1 kg)           Intake / Output / Drain:  Last 24 hrs.: No intake or output data in the 24 hours ending 17 1759    General: AO, NAD  CV: +LVAD hum  RR: WNL  Integ: Please see pocket site description in procedural notes. ,   Extremities: No edema     Labs:  Recent Results (from the past 24 hour(s))   CBC WITH AUTOMATED DIFF    Collection Time: 17  3:46 PM   Result Value Ref Range    WBC 7.3 4.1 - 11.1 K/uL    RBC 3.59 (L) 4.10 - 5.70 M/uL    HGB 9.2 (L) 12.1 - 17.0 g/dL    HCT 30.0 (L) 36.6 - 50.3 %    MCV 83.6 80.0 - 99.0 FL    MCH 25.6 (L) 26.0 - 34.0 PG    MCHC 30.7 30.0 - 36.5 g/dL    RDW 15.9 (H) 11.5 - 14.5 %    PLATELET 654 (H) 824 - 400 K/uL    NEUTROPHILS 64 32 - 75 %    LYMPHOCYTES 21 12 - 49 %    MONOCYTES 7 5 - 13 %    EOSINOPHILS 7 0 - 7 %    BASOPHILS 1 0 - 1 %    ABS. NEUTROPHILS 4.7 1.8 - 8.0 K/UL    ABS. LYMPHOCYTES 1.5 0.8 - 3.5 K/UL    ABS. MONOCYTES 0.5 0.0 - 1.0 K/UL    ABS. EOSINOPHILS 0.5 (H) 0.0 - 0.4 K/UL    ABS. BASOPHILS 0.0 0.0 - 0.1 K/UL   METABOLIC PANEL, COMPREHENSIVE    Collection Time: 01/20/17  3:46 PM   Result Value Ref Range    Sodium 135 (L) 136 - 145 mmol/L    Potassium 4.0 3.5 - 5.1 mmol/L    Chloride 99 97 - 108 mmol/L    CO2 26 21 - 32 mmol/L    Anion gap 10 5 - 15 mmol/L    Glucose 84 65 - 100 mg/dL    BUN 11 6 - 20 MG/DL    Creatinine 0.99 0.70 - 1.30 MG/DL    BUN/Creatinine ratio 11 (L) 12 - 20      GFR est AA >60 >60 ml/min/1.73m2    GFR est non-AA >60 >60 ml/min/1.73m2    Calcium 8.7 8.5 - 10.1 MG/DL    Bilirubin, total 0.4 0.2 - 1.0 MG/DL    ALT 35 12 - 78 U/L    AST 34 15 - 37 U/L    Alk.  phosphatase 114 45 - 117 U/L    Protein, total 6.7 6.4 - 8.2 g/dL    Albumin 3.2 (L) 3.5 - 5.0 g/dL    Globulin 3.5 2.0 - 4.0 g/dL    A-G Ratio 0.9 (L) 1.1 - 2.2     PROTHROMBIN TIME + INR    Collection Time: 01/20/17  3:46 PM   Result Value Ref Range    INR 2.6 (H) 0.9 - 1.1      Prothrombin time 26.1 (H) 9.0 - 11.1 sec   POC INR    Collection Time: 01/20/17  4:28 PM   Result Value Ref Range    INR (POC) 2.4 (H) <1.2           Meds:  Current Facility-Administered Medications   Medication Dose Route Frequency    sodium chloride (NS) flush 5-10 mL  5-10 mL IntraVENous Q8H    sodium chloride (NS) flush 5-10 mL  5-10 mL IntraVENous PRN    sodium chloride (NS) flush 5-10 mL  5-10 mL IntraVENous Q8H    sodium chloride (NS) flush 5-10 mL  5-10 mL IntraVENous PRN    naloxone (NARCAN) injection 0.4 mg  0.4 mg IntraVENous PRN    sodium chloride (NS) flush 5-10 mL  5-10 mL IntraVENous Q8H    sodium chloride (NS) flush 5-10 mL  5-10 mL IntraVENous PRN    [START ON 1/21/2017] potassium chloride SR (KLOR-CON 10) tablet 10 mEq  10 mEq Oral DAILY    [START ON 1/21/2017] bumetanide (BUMEX) tablet 0.5 mg  0.5 mg Oral DAILY    ALPRAZolam (XANAX) tablet 0.25 mg  0.25 mg Oral Q6H PRN    zolpidem (AMBIEN) tablet 5 mg  5 mg Oral QHS PRN    carvedilol (COREG) tablet 12.5 mg  12.5 mg Oral BID WITH MEALS    [START ON 1/21/2017] citalopram (CELEXA) tablet 40 mg  40 mg Oral DAILY    [START ON 1/21/2017] clopidogrel (PLAVIX) tablet 75 mg  75 mg Oral DAILY    ferrous sulfate tablet 325 mg  325 mg Oral BID WITH MEALS    [START ON 1/21/2017] magnesium oxide (MAG-OX) tablet 400 mg  400 mg Oral DAILY    [START ON 1/21/2017] pantoprazole (PROTONIX) tablet 40 mg  40 mg Oral ACB    allopurinol (ZYLOPRIM) tablet 100 mg  100 mg Oral BID    Vancomycin- pharmacy to dose   Other Rx Dosing/Monitoring    vancomycin (VANCOCIN) 1750 mg in  ml infusion  1,750 mg IntraVENous ONCE    saline peripheral flush soln 5 mL  5 mL InterCATHeter PRN    0.9% sodium chloride infusion  25 mL/hr IntraVENous CONTINUOUS    [START ON 1/21/2017] amiodarone (CORDARONE) tablet 200 mg  200 mg Oral DAILY    [START ON 1/21/2017] vancomycin (VANCOCIN) 1,000 mg in 0.9% sodium chloride (MBP/ADV) 250 mL  1,000 mg IntraVENous Q12H    OTHER(NON-FORMULARY) 1,000 mg  1,000 mg  ONCE    warfarin (COUMADIN) tablet 2.5 mg  2.5 mg Oral ONCE    [START ON 1/21/2017] aspirin delayed-release tablet 325 mg  325 mg Oral DAILY          Assessment/Plan:     ICD pocket infection: S/p ICD removal by Dr. Karyna Mcdermott. Wound cx pending, check blood cx. Begin IV vanc - dosing per pharmacy. Pan culture any temps > 101. Trend CBC. ICM, s/p HM II implant as DT: Please see VAD flow sheet for readings. Continue Coreg, Bumex. No ACE-1/ARB d/t hypotension. Daily weights, strict I/O. Chronic anticoagulation, goal INR 2-2.5: INR therapeutic.   Will hold Coumadin through the week-end in case goes back for ICD on right. If INR drops below 2.0 will start Heparin gtt. Please see anticoagulation tracker for details. Hx of VT/VF: Monitor closely for ectopy since ICD removed. Continue amiodarone. Keep K+ > 4.0 and Mg++ > 2.0. Post-operative anemia: Hgb improving. Continue folic acid, iron, vitamin c.  Monitor labs. CAD, s/p CABG and PCI: Continue Coreg, Plavix. Consider statin. Hx of lumbar stenosis, s/p decompressive laminectomy and instrumentation: Management per ortho. HTN: MAP goal 70-90 mmHg. Continue Coreg. Gout: Continue allopurinol daily, colchicine for flares. Depression/anxiety: Celexa. Ppx: Pantoprazole for SUP, warfarin covers DVT ppx. Patient/Family Education:     Please see EMR for details.        Signed By: Clarisa Abdi NP      Saw patient, agree with above  Risk of morbidity and mortality - high  Medical decision making - high complexity

## 2017-01-20 NOTE — PROGRESS NOTES
Dr Katey Vela in to see pt, assessed left chest wall site, wife @ bedside.   Consent signed  7988 labs drawn, IV 22 G right FA, attempts to get blood C&S ,unable to obtain  chloraprep to left chest wall

## 2017-01-20 NOTE — PROCEDURES
Cardiac Procedure Note   Patient: Olayinka Glasgow  MRN: [id number]  SSN: [id number]   YOB: 1955 Age: 62 y.o.   Sex: male    Date of Procedure: 1/20/2017   Pre-procedure Diagnosis: ICD pocket drainage and erosion, hx of VF arrest, LVAD  Post-procedure Diagnosis: same  Procedure: ICD and leads removal  :  Dr. Kin Polanco MD    Assistant(s):  None  Anesthesia: Moderate Sedation   Estimated Blood Loss: Less than 10 mL with coumadin INR 2.6  Specimens Removed: ICD generator and lead removals  Findings: small erosion into pocket and inside pocket there was yellow white thick fluid indicative of infection  Complications: None   Implants:  None  Signed by:  Kin Polanco MD  1/20/2017  5:51 PM

## 2017-01-20 NOTE — PROGRESS NOTES
TRANSFER - OUT REPORT:    Verbal report given to Adria William on [facility] being transferred to  for routine progression of care       Report consisted of patients Situation, Background, Assessment and   Recommendations(SBAR). Information from the following report(s) SBAR, Procedure Summary and MAR was reviewed with the receiving nurse. Opportunity for questions and clarification was provided.

## 2017-01-20 NOTE — IP AVS SNAPSHOT
[address] 
[phone] Patient: Cookie Negron MRN: [id number] :1955 You are allergic to the following No active allergies Recent Documentation Height Weight BMI Smoking Status 1.803 m 73.9 kg 22.72 kg/m2 Former Smoker Unresulted Labs Order Current Status CULTURE, BLOOD, PAIRED In process HEMOGLOBIN, FREE, PLASMA In process CULTURE, BLOOD Preliminary result Emergency Contacts Name Discharge Info Relation Home Work Mobile Tonia Mann DISCHARGE CAREGIVER [3] Spouse [3]   [phone] About your hospitalization You were admitted on:  2017 You last received care in the:  [facility] 4 CV SERVICES UNIT You were discharged on:  2017 Unit phone number:  [phone] Why you were hospitalized Your primary diagnosis was:  Icd (Implantable Cardioverter-Defibrillator) Infection (Hcc) Your diagnoses also included:  Ischemic Cardiomyopathy, Left Ventricular Assist Device Present (Hcc), Mi (Myocardial Infarction) (Hcc), Chronic Anticoagulation Providers Seen During Your Hospitalizations Provider Role Specialty Primary office phone Shaggy Steinberg MD Attending Provider Cardiology [phone] Your Primary Care Physician (PCP) Primary Care Physician Office Phone Office Fax Jessica Otero [phone] Follow-up Information Follow up With Details Comments Contact Info Jennifer Rosales MD   [facility] 4 [address] [facility] 
[phone] [address] On 2017  [place] [employer] [address] 
[phone] Your Appointments   3:00 PM EST Follow Up with Ciera Yeboah MD  
[address] ([facility]) [place] [address] [place] [address]  
[phone] Wednesday February 01, 2017  8:00 AM EST  
ESTABLISHED PATIENT with Kin Polanco MD  
[facility] ([address]) [address] [place] [address]  [address] [place] [address]  
[phone] Current Discharge Medication List  
  
START taking these medications Dose & Instructions Dispensing Information Comments Morning Noon Evening Bedtime  
 enoxaparin 80 mg/0.8 mL injection Commonly known as:  LOVENOX Your next dose is: Today, Tomorrow Other:  _________ Dose:  70 mg  
70 mg by SubCUTAneous route every twelve (12) hours for 3 days. Indications: LVAD Quantity:  6 Syringe Refills:  0  
     
   
   
   
  
 levoFLOXacin 750 mg tablet Commonly known as:  Pb Meyers Your next dose is: Today, Tomorrow Other:  _________ Dose:  750 mg Take 1 Tab by mouth daily for 14 days. Quantity:  14 Tab Refills:  0 CONTINUE these medications which have CHANGED Dose & Instructions Dispensing Information Comments Morning Noon Evening Bedtime  
 amiodarone 200 mg tablet Commonly known as:  CORDARONE What changed:   
- medication strength 
- how much to take Your next dose is: Today, Tomorrow Other:  _________ Dose:  200 mg Take 1 Tab by mouth daily. Quantity:  30 Tab Refills:  3  
     
   
   
   
  
 carvedilol 25 mg tablet Commonly known as:  Ash Calvoin What changed:   
- medication strength 
- how much to take Your next dose is: Today, Tomorrow Other:  _________ Dose:  25 mg Take 1 Tab by mouth two (2) times daily (with meals). Quantity:  60 Tab Refills:  4 CONTINUE these medications which have NOT CHANGED Dose & Instructions Dispensing Information Comments Morning Noon Evening Bedtime  
 allopurinol 100 mg tablet Commonly known as:  Emma Zuniga Your next dose is: Today, Tomorrow Other:  _________ Dose:  100 mg Take 1 Tab by mouth two (2) times a day. Quantity:  60 Tab Refills:  6 ALPRAZolam 0.25 mg tablet Commonly known as:  Lucio Strickland Your next dose is: Today, Tomorrow Other:  _________ Dose:  0.25 mg Take 1 Tab by mouth every six (6) hours as needed for Anxiety. Max Daily Amount: 1 mg. Quantity:  60 Tab Refills:  0  
     
   
   
   
  
 ascorbic acid (vitamin C) 500 mg tablet Commonly known as:  VITAMIN C Your next dose is: Today, Tomorrow Other:  _________ Dose:  500 mg Take 1 Tab by mouth two (2) times a day. Quantity:  60 Tab Refills:  6  
     
   
   
   
  
 bumetanide 0.5 mg tablet Commonly known as:  [address] Your next dose is: Today, Tomorrow Other:  _________ Dose:  0.5 mg Take 1 Tab by mouth daily. Quantity:  30 Tab Refills:  2  
     
   
   
   
  
 citalopram 40 mg tablet Commonly known as:  Magalie Bongo Your next dose is: Today, Tomorrow Other:  _________ Dose:  40 mg Take 1 Tab by mouth daily. Quantity:  30 Tab Refills:  6  
     
   
   
   
  
 clopidogrel 75 mg Tab Commonly known as:  PLAVIX Your next dose is: Today, Tomorrow Other:  _________ Dose:  75 mg Take 1 Tab by mouth daily. Quantity:  30 Tab Refills:  6  
     
   
   
   
  
 colchicine 0.6 mg tablet Your next dose is: Today, Tomorrow Other:  _________ Dose:  0.6 mg Take 1 Tab by mouth two (2) times a day. Indications: GOUT  
 Quantity:  60 Tab Refills:  4  
     
   
   
   
  
 ferrous sulfate 325 mg (65 mg iron) tablet Your next dose is: Today, Tomorrow Other:  _________ Dose:  325 mg Take 1 Tab by mouth two (2) times daily (with meals). Quantity:  60 Tab Refills:  6 folic acid 1 mg tablet Commonly known as:  Google Your next dose is: Today, Tomorrow Other:  _________ Dose:  1 mg Take 1 Tab by mouth daily. Quantity:  30 Tab Refills:  6 HYDROcodone-acetaminophen 5-325 mg per tablet Commonly known as:  Mike Daria Your next dose is: Today, Tomorrow Other:  _________ Take  by mouth. Refills:  0  
     
   
   
   
  
 magnesium oxide 400 mg tablet Commonly known as:  MAG-OX Your next dose is: Today, Tomorrow Other:  _________ Dose:  400 mg Take 1 Tab by mouth daily. Quantity:  30 Tab Refills:  6  
     
   
   
   
  
 ondansetron 8 mg disintegrating tablet Commonly known as:  ZOFRAN ODT Your next dose is: Today, Tomorrow Other:  _________ Dose:  8 mg Take 1 Tab by mouth every eight (8) hours as needed for Nausea. Quantity:  30 Tab Refills:  2  
     
   
   
   
  
 pantoprazole 40 mg tablet Commonly known as:  PROTONIX Your next dose is: Today, Tomorrow Other:  _________ Dose:  40 mg Take 1 Tab by mouth Daily (before breakfast). Quantity:  30 Tab Refills:  6 POTASSIUM CHLORIDE Your next dose is: Today, Tomorrow Other:  _________ Dose:  10 mEq Take 10 mEq by mouth daily. Refills:  0  
     
   
   
   
  
 traMADol 50 mg tablet Commonly known as:  ULTRAM  
   
Your next dose is: Today, Tomorrow Other:  _________ Dose:  50 mg Take 1 Tab by mouth every six (6) hours as needed. Max Daily Amount: 200 mg. Quantity:  60 Tab Refills:  0  
     
   
   
   
  
 warfarin 2.5 mg tablet Commonly known as:  COUMADIN Your next dose is: Today, Tomorrow Other:  _________ Dose:  2.5 mg Take 1 Tab by mouth daily. Quantity:  120 Tab Refills:  6  
     
   
   
   
  
 zolpidem 5 mg tablet Commonly known as:  AMBIEN Your next dose is: Today, Tomorrow Other:  _________ Dose:  5 mg Take 1 Tab by mouth nightly as needed for Sleep. Max Daily Amount: 5 mg. Quantity:  30 Tab Refills:  11 Where to Get Your Medications These medications were sent to [address]  [address], [place] [address] [address] Phone:  [phone]  
  amiodarone 200 mg tablet  
 carvedilol 25 mg tablet  
 enoxaparin 80 mg/0.8 mL injection  
 levoFLOXacin 750 mg tablet Discharge Instructions Coumadin Dosing You were given 5 mg of Coumadin prior to discharge. We will check your INR tomorrow via [facility] Hossein Batista, and dose your Coumadin based upon the result. We will call you and provide instructions tomorrow afternoon/evening. Patient Instructions Post-ICD removal  
 
 
 
1. Change gauze over incision daily or as needed. 2. No shower, may take sponge bath provided you keep incisions and LVAD dry. 3.  Follow-up with Dr. Tosha Tucker as scheduled, we will remove the sutures in the office. Future Appointments Date Time Provider [facility] Nedra 1/26/2017 3:00 PM Erasmo Hernandez MD [facility] JAHAIRA SCHED  
2/1/2017 8:00 AM Maggi Gruber  [address]  
4/6/2017 11:00 AM PACEMAKER3, Jenn De Leon JAHAIRA SCHED  
4/6/2017 11:20 AM Maggi Gruber  [address] 4. Please start your antibiotic tomorrow afternoon and follow up with  Edvin Brody MD  
Office Phone # [phone] [address] [facility] Internal Medicine [facility] [address] 
 
6. You may use extra strength Tylenol and ice pack for pain relief. LOVENOX IS USED UNTIL INR is 2 or higher then stop LOVENOX Check INR daily LEVAQUIN for 2 weeks Isael Flynn M.D. [facility] Electrophysiology/Cardiology [facility] [place] [address], [place] [address] [place] [address] 
[phone] Discharge Orders None [employer] Announcement We are excited to announce that we are making your provider's discharge notes available to you in [employer]. You will see these notes when they are completed and signed by the physician that discharged you from your recent hospital stay. If you have any questions or concerns about any information you see in [employer], please call the Health Information Department where you were seen or reach out to your Primary Care Provider for more information about your plan of care. Introducing [facility]! Kenan Prado introduces [employer] patient portal. Now you can access parts of your medical record, email your doctor's office, and request medication refills online. 1. In your internet browser, go to [email or web address] 2. Click on the First Time User? Click Here link in the Sign In box. You will see the New Member Sign Up page. 3. Enter your [employer] Access Code exactly as it appears below. You will not need to use this code after youve completed the sign-up process. If you do not sign up before the expiration date, you must request a new code. · [employer] Access Code: [email or web address] Expires: 2/20/2017 10:34 AM 
 
4. Enter the last four digits of your Social Security Number (xxxx) and Date of Birth (mm/dd/yyyy) as indicated and click Submit. You will be taken to the next sign-up page. 5. Create a [employer] ID. This will be your [employer] login ID and cannot be changed, so think of one that is secure and easy to remember. 6. Create a [employer] password. You can change your password at any time. 7. Enter your Password Reset Question and Answer. This can be used at a later time if you forget your password. 8. Enter your e-mail address. You will receive e-mail notification when new information is available in [address] 9. Click Sign Up. You can now view and download portions of your medical record. 10. Click the Download Summary menu link to download a portable copy of your medical information. If you have questions, please visit the Frequently Asked Questions section of the [employer] website. Remember, [employer] is NOT to be used for urgent needs. For medical emergencies, dial 911. Now available from your iPhone and Android! General Information Please provide this summary of care documentation to your next provider. Patient Signature:  ____________________________________________________________ Date:  ____________________________________________________________  
  
Miki Aguilar Provider Signature:  ____________________________________________________________ Date:  ____________________________________________________________

## 2017-01-20 NOTE — DISCHARGE INSTRUCTIONS
Coumadin Dosing  You were given 5 mg of Coumadin prior to discharge. We will check your INR tomorrow via [address], and dose your Coumadin based upon the result. We will call you and provide instructions tomorrow afternoon/evening. Patient Instructions Post-ICD removal         1. Change gauze over incision daily or as needed. 2. No shower, may take sponge bath provided you keep incisions and LVAD dry. 3.  Follow-up with Dr. Abel Fung as scheduled, we will remove the sutures in the office. Future Appointments  Date Time Provider Kylah Sneed   1/26/2017 3:00 PM Rima Hook MD [facility] JAHAIRA SCHED   2/1/2017 8:00 AM Hassel Collet,  [address]   4/6/2017 11:00 AM PACEMAKER3, [address]   4/6/2017 11:20 AM Hassel Collet,  [address]     4. Please start your antibiotic tomorrow afternoon and follow up with  Amelia Byers MD   Office Phone # [phone] [address] [address] [address]    6. You may use extra strength Tylenol and ice pack for pain relief. LOVENOX IS USED UNTIL INR is 2 or higher then stop LOVENOX  Check INR daily    LEVAQUIN for 2 weeks         Mellisa Vanessa M.D.  [facility]  Electrophysiology/Cardiology  [facility] [place]  [address] [place] [address], [address]  [address]                             [address] [place] [address]  [phone]

## 2017-01-20 NOTE — IP AVS SNAPSHOT
Current Discharge Medication List  
  
Take these medications at their scheduled times Dose & Instructions Dispensing Information Comments Morning Noon Evening Bedtime  
 allopurinol 100 mg tablet Commonly known as:  Senthil Fung Your next dose is: Today, Tomorrow Other:  ____________ Dose:  100 mg Take 1 Tab by mouth two (2) times a day. Quantity:  60 Tab Refills:  6  
     
   
   
   
  
 amiodarone 200 mg tablet Commonly known as:  CORDARONE Your next dose is: Today, Tomorrow Other:  ____________ Dose:  200 mg Take 1 Tab by mouth daily. Quantity:  30 Tab Refills:  3  
     
   
   
   
  
 ascorbic acid (vitamin C) 500 mg tablet Commonly known as:  VITAMIN C Your next dose is: Today, Tomorrow Other:  ____________ Dose:  500 mg Take 1 Tab by mouth two (2) times a day. Quantity:  60 Tab Refills:  6  
     
   
   
   
  
 bumetanide 0.5 mg tablet Commonly known as:  Zaira Cherie Your next dose is: Today, Tomorrow Other:  ____________ Dose:  0.5 mg Take 1 Tab by mouth daily. Quantity:  30 Tab Refills:  2  
     
   
   
   
  
 carvedilol 25 mg tablet Commonly known as:  Marilynn Millsch Your next dose is: Today, Tomorrow Other:  ____________ Dose:  25 mg Take 1 Tab by mouth two (2) times daily (with meals). Quantity:  60 Tab Refills:  4  
     
   
   
   
  
 citalopram 40 mg tablet Commonly known as:  Nitza Garcia Your next dose is: Today, Tomorrow Other:  ____________ Dose:  40 mg Take 1 Tab by mouth daily. Quantity:  30 Tab Refills:  6  
     
   
   
   
  
 clopidogrel 75 mg Tab Commonly known as:  PLAVIX Your next dose is: Today, Tomorrow Other:  ____________ Dose:  75 mg Take 1 Tab by mouth daily. Quantity:  30 Tab Refills:  6  
     
   
   
   
  
 colchicine 0.6 mg tablet Your next dose is: Today, Tomorrow Other:  ____________ Dose:  0.6 mg Take 1 Tab by mouth two (2) times a day. Indications: GOUT  
 Quantity:  60 Tab Refills:  4  
     
   
   
   
  
 enoxaparin 80 mg/0.8 mL injection Commonly known as:  LOVENOX Your next dose is: Today, Tomorrow Other:  ____________ Dose:  70 mg  
70 mg by SubCUTAneous route every twelve (12) hours for 3 days. Indications: LVAD Quantity:  6 Syringe Refills:  0  
     
   
   
   
  
 ferrous sulfate 325 mg (65 mg iron) tablet Your next dose is: Today, Tomorrow Other:  ____________ Dose:  325 mg Take 1 Tab by mouth two (2) times daily (with meals). Quantity:  60 Tab Refills:  6  
     
   
   
   
  
 folic acid 1 mg tablet Commonly known as:  Google Your next dose is: Today, Tomorrow Other:  ____________ Dose:  1 mg Take 1 Tab by mouth daily. Quantity:  30 Tab Refills:  6  
     
   
   
   
  
 levoFLOXacin 750 mg tablet Commonly known as:  Kenard Martin Your next dose is: Today, Tomorrow Other:  ____________ Dose:  750 mg Take 1 Tab by mouth daily for 14 days. Quantity:  14 Tab Refills:  0  
     
   
   
   
  
 magnesium oxide 400 mg tablet Commonly known as:  MAG-OX Your next dose is: Today, Tomorrow Other:  ____________ Dose:  400 mg Take 1 Tab by mouth daily. Quantity:  30 Tab Refills:  6  
     
   
   
   
  
 pantoprazole 40 mg tablet Commonly known as:  PROTONIX Your next dose is: Today, Tomorrow Other:  ____________ Dose:  40 mg Take 1 Tab by mouth Daily (before breakfast). Quantity:  30 Tab Refills:  6 POTASSIUM CHLORIDE Your next dose is: Today, Tomorrow Other:  ____________ Dose:  10 mEq Take 10 mEq by mouth daily. Refills:  0 warfarin 2.5 mg tablet Commonly known as:  COUMADIN Your next dose is: Today, Tomorrow Other:  ____________ Dose:  2.5 mg Take 1 Tab by mouth daily. Quantity:  120 Tab Refills:  6 Take these medications as needed Dose & Instructions Dispensing Information Comments Morning Noon Evening Bedtime ALPRAZolam 0.25 mg tablet Commonly known as:  Rebbeca Lawn Your next dose is: Today, Tomorrow Other:  ____________ Dose:  0.25 mg Take 1 Tab by mouth every six (6) hours as needed for Anxiety. Max Daily Amount: 1 mg. Quantity:  60 Tab Refills:  0  
     
   
   
   
  
 ondansetron 8 mg disintegrating tablet Commonly known as:  ZOFRAN ODT Your next dose is: Today, Tomorrow Other:  ____________ Dose:  8 mg Take 1 Tab by mouth every eight (8) hours as needed for Nausea. Quantity:  30 Tab Refills:  2  
     
   
   
   
  
 traMADol 50 mg tablet Commonly known as:  ULTRAM  
   
Your next dose is: Today, Tomorrow Other:  ____________ Dose:  50 mg Take 1 Tab by mouth every six (6) hours as needed. Max Daily Amount: 200 mg. Quantity:  60 Tab Refills:  0  
     
   
   
   
  
 zolpidem 5 mg tablet Commonly known as:  AMBIEN Your next dose is: Today, Tomorrow Other:  ____________ Dose:  5 mg Take 1 Tab by mouth nightly as needed for Sleep. Max Daily Amount: 5 mg. Quantity:  30 Tab Refills:  11 Take these medications as directed Dose & Instructions Dispensing Information Comments Morning Noon Evening Bedtime HYDROcodone-acetaminophen 5-325 mg per tablet Commonly known as:  Tamar Loza Your next dose is: Today, Tomorrow Other:  ____________ Take  by mouth. Refills:  0 Where to Get Your Medications These medications were sent to [address]  [address] [facility], [place] [id number] Phone:  [phone]  
  amiodarone 200 mg tablet  
 carvedilol 25 mg tablet  
 enoxaparin 80 mg/0.8 mL injection  
 levoFLOXacin 750 mg tablet

## 2017-01-21 ENCOUNTER — HOME CARE VISIT (OUTPATIENT)
Dept: HOME HEALTH SERVICES | Facility: HOME HEALTH | Age: 62
End: 2017-01-21
Payer: COMMERCIAL

## 2017-01-21 LAB
ANION GAP BLD CALC-SCNC: 10 MMOL/L (ref 5–15)
BUN SERPL-MCNC: 13 MG/DL (ref 6–20)
BUN/CREAT SERPL: 15 (ref 12–20)
CALCIUM SERPL-MCNC: 8.4 MG/DL (ref 8.5–10.1)
CHLORIDE SERPL-SCNC: 104 MMOL/L (ref 97–108)
CO2 SERPL-SCNC: 24 MMOL/L (ref 21–32)
CREAT SERPL-MCNC: 0.87 MG/DL (ref 0.7–1.3)
GLUCOSE SERPL-MCNC: 84 MG/DL (ref 65–100)
INR PPP: 2.2 (ref 0.9–1.1)
LDH SERPL L TO P-CCNC: 225 U/L (ref 85–241)
POTASSIUM SERPL-SCNC: 3.9 MMOL/L (ref 3.5–5.1)
PROTHROMBIN TIME: 22.8 SEC (ref 9–11.1)
SODIUM SERPL-SCNC: 138 MMOL/L (ref 136–145)

## 2017-01-21 PROCEDURE — 65660000000 HC RM CCU STEPDOWN

## 2017-01-21 PROCEDURE — 83615 LACTATE (LD) (LDH) ENZYME: CPT | Performed by: NURSE PRACTITIONER

## 2017-01-21 PROCEDURE — 74011250637 HC RX REV CODE- 250/637: Performed by: INTERNAL MEDICINE

## 2017-01-21 PROCEDURE — 97162 PT EVAL MOD COMPLEX 30 MIN: CPT

## 2017-01-21 PROCEDURE — 74011250637 HC RX REV CODE- 250/637: Performed by: NURSE PRACTITIONER

## 2017-01-21 PROCEDURE — 97116 GAIT TRAINING THERAPY: CPT

## 2017-01-21 PROCEDURE — 80048 BASIC METABOLIC PNL TOTAL CA: CPT | Performed by: NURSE PRACTITIONER

## 2017-01-21 PROCEDURE — 36415 COLL VENOUS BLD VENIPUNCTURE: CPT | Performed by: NURSE PRACTITIONER

## 2017-01-21 PROCEDURE — 85610 PROTHROMBIN TIME: CPT | Performed by: NURSE PRACTITIONER

## 2017-01-21 PROCEDURE — 74011000258 HC RX REV CODE- 258: Performed by: INTERNAL MEDICINE

## 2017-01-21 PROCEDURE — 74011250636 HC RX REV CODE- 250/636: Performed by: INTERNAL MEDICINE

## 2017-01-21 PROCEDURE — 74011250636 HC RX REV CODE- 250/636: Performed by: THORACIC SURGERY (CARDIOTHORACIC VASCULAR SURGERY)

## 2017-01-21 PROCEDURE — 74011250636 HC RX REV CODE- 250/636: Performed by: NURSE PRACTITIONER

## 2017-01-21 RX ORDER — WARFARIN 2.5 MG/1
2.5 TABLET ORAL ONCE
Status: COMPLETED | OUTPATIENT
Start: 2017-01-21 | End: 2017-01-21

## 2017-01-21 RX ORDER — LEVOFLOXACIN 5 MG/ML
750 INJECTION, SOLUTION INTRAVENOUS EVERY 24 HOURS
Status: DISCONTINUED | OUTPATIENT
Start: 2017-01-21 | End: 2017-01-21

## 2017-01-21 RX ORDER — VANCOMYCIN HYDROCHLORIDE
1250 EVERY 12 HOURS
Status: DISCONTINUED | OUTPATIENT
Start: 2017-01-21 | End: 2017-01-23

## 2017-01-21 RX ADMIN — Medication 10 ML: at 23:16

## 2017-01-21 RX ADMIN — PANTOPRAZOLE SODIUM 40 MG: 40 TABLET, DELAYED RELEASE ORAL at 08:08

## 2017-01-21 RX ADMIN — TRAMADOL HYDROCHLORIDE 50 MG: 50 TABLET, FILM COATED ORAL at 17:29

## 2017-01-21 RX ADMIN — MEROPENEM 500 MG: 500 INJECTION, POWDER, FOR SOLUTION INTRAVENOUS at 23:16

## 2017-01-21 RX ADMIN — ALLOPURINOL 100 MG: 100 TABLET ORAL at 08:08

## 2017-01-21 RX ADMIN — CARVEDILOL 12.5 MG: 12.5 TABLET, FILM COATED ORAL at 17:29

## 2017-01-21 RX ADMIN — VANCOMYCIN HYDROCHLORIDE 1000 MG: 1 INJECTION, POWDER, LYOPHILIZED, FOR SOLUTION INTRAVENOUS at 08:07

## 2017-01-21 RX ADMIN — FERROUS SULFATE TAB 325 MG (65 MG ELEMENTAL FE) 325 MG: 325 (65 FE) TAB at 08:08

## 2017-01-21 RX ADMIN — CLOPIDOGREL BISULFATE 75 MG: 75 TABLET ORAL at 08:08

## 2017-01-21 RX ADMIN — Medication 10 ML: at 08:09

## 2017-01-21 RX ADMIN — BUMETANIDE 0.5 MG: 1 TABLET ORAL at 08:08

## 2017-01-21 RX ADMIN — Medication 10 ML: at 17:29

## 2017-01-21 RX ADMIN — ALLOPURINOL 100 MG: 100 TABLET ORAL at 17:29

## 2017-01-21 RX ADMIN — Medication 400 MG: at 08:09

## 2017-01-21 RX ADMIN — FERROUS SULFATE TAB 325 MG (65 MG ELEMENTAL FE) 325 MG: 325 (65 FE) TAB at 17:29

## 2017-01-21 RX ADMIN — VANCOMYCIN HYDROCHLORIDE 1250 MG: 10 INJECTION, POWDER, LYOPHILIZED, FOR SOLUTION INTRAVENOUS at 20:26

## 2017-01-21 RX ADMIN — CITALOPRAM HYDROBROMIDE 40 MG: 20 TABLET ORAL at 08:08

## 2017-01-21 RX ADMIN — WARFARIN SODIUM 2.5 MG: 2.5 TABLET ORAL at 17:29

## 2017-01-21 RX ADMIN — ZOLPIDEM TARTRATE 5 MG: 5 TABLET, FILM COATED ORAL at 23:20

## 2017-01-21 RX ADMIN — LEVOFLOXACIN 750 MG: 5 INJECTION, SOLUTION INTRAVENOUS at 10:30

## 2017-01-21 RX ADMIN — POTASSIUM CHLORIDE 10 MEQ: 750 TABLET, FILM COATED, EXTENDED RELEASE ORAL at 08:09

## 2017-01-21 RX ADMIN — MEROPENEM 500 MG: 500 INJECTION, POWDER, FOR SOLUTION INTRAVENOUS at 17:30

## 2017-01-21 RX ADMIN — CARVEDILOL 12.5 MG: 12.5 TABLET, FILM COATED ORAL at 08:09

## 2017-01-21 RX ADMIN — AMIODARONE HYDROCHLORIDE 200 MG: 200 TABLET ORAL at 08:08

## 2017-01-21 NOTE — PROGRESS NOTES
Pharmacist Note  Warfarin Dosing  Consult provided for this 62 y.o.male to manage warfarin for LVAD    INR Goal: 2 - 2.5    Home regimen/ tablet size: 2.5 mg daily    Drugs that may increase INR: Amiodarone and Quinolones  Drugs that may decrease INR: None  Other current anticoagulants/ drugs that may increase bleeding risk: Clopidogrel  Daily INR ordered: YES    Recent Labs      01/21/17   0434  01/20/17   1628  01/20/17   1546   HGB   --    --   9.2*   INR  2.2*  2.4*  2.6*     Date               INR                  Dose  1/21/17           2.2              2.5 mg                                                                               Assessment/ Plan: Will order warfarin 2.5 mg PO x 1 dose. Pharmacy will continue to monitor daily and adjust therapy as indicated. Please contact the pharmacist at  for outpatient recommendations if needed.

## 2017-01-21 NOTE — PROGRESS NOTES
0730: Bedside and Verbal shift change report given to CANDELARIA RN (oncoming nurse) by OUMAR Almendarez (offgoing nurse). Report included the following information SBAR, Kardex, Intake/Output, MAR, Recent Results, Med Rec Status and Cardiac Rhythm NSR to ST.   1135: Received phone call from MARIA LUZ De La Cruz to let Gayatri Mallory MD to come see patient sooner rather than later. Will page and continue to monitor. 1300: Pt's pacer dressing site saturated with serosanguinous drainage. Changed dressing site with 4 x 4 and transparent film. Notified April Greer MD and received orders to wait for Gayatri Mallory MD recommendations for proper site care. 1535: Gayatri Mallory MD at bedside. 1930: Bedside and Verbal shift change report given to OUMAR Araya (oncoming nurse) by OUMAR GAONA (offgoing nurse). Report included the following information SBAR, Kardex, Intake/Output, MAR, Recent Results, Med Rec Status and Cardiac Rhythm NSR to ST. Problem: Falls - Risk of  Goal: *Absence of falls  Outcome: Progressing Towards Goal  Pt's bed in low/locked position. All personal items and call bell within reach. Side rails up x 3. Bathroom light on at all times. Goal: *Knowledge of fall prevention  Outcome: Progressing Towards Goal  Pt aware to call out when in need of assistance. Problem: Pacer/ICD: Discharge Outcomes  Goal: *Hemodynamically stable  Outcome: Progressing Towards Goal  Pt's VSS. Goal: *Stable cardiac rhythm  Outcome: Progressing Towards Goal  Pt in NSR to ST. Goal: *Lungs clear or at baseline  Outcome: Progressing Towards Goal  Pt's lungs are clear to auscultation. Goal: *Demonstrates ability to perform prescribed activity without shortness of breath or discomfort  Outcome: Progressing Towards Goal  Pt able to ambulate without any s/sx of shortness of breath. Goal: *Anxiety reduced or absent  Outcome: Progressing Towards Goal  Pt has no s/sx or complaints of anxiety.   Goal: *Optimal pain control at patients stated goal  Outcome: Progressing Towards Goal  Pt has no complaints of pain at this time.

## 2017-01-21 NOTE — PROGRESS NOTES
Nurse in EP lab recovery area could obtain only 1 set of blood culture so far and that is negative  1 of the 2 ICD lead tip is positive for gram negative rods and this is not common skin organism I would expect to cause pocket infection (usually GPC-staph)  I noted Dr Robert Servin has started levaquin  If no GPC grow we can stop vancomycin  I have placed consult to ID, Dr Aliza Francis  Because of the lead tip infection, we need to treat this as blood stream infection and will not plan for ICD reimplant soon next week.   Therefore will resume coumadin  He is on aspirin, plavix (last month NSTEMI) and antibiotics so will restart it low dose first, INR 2.2  Amiodarone reduced to 200 mg every day  Need to avoid overshooting INR/pocket hematoma

## 2017-01-21 NOTE — PROGRESS NOTES
[facility]  Progress Note      Date: 2017     Admit Date: 2017    Procedure:  ICD removal, lead extraction, pocket irrigation.      : ICD removal, lead extraction, and pocket irrigation. Subjective:   No issues overnight. On antibiotics.       Objective:     Visit Vitals    Pulse 87    Temp 97.9 °F (36.6 °C)    Resp 16    Ht 5' 11\" (1.803 m)    Wt 160 lb 7.9 oz (72.8 kg)    SpO2 94%    BMI 22.38 kg/m2       Temp (24hrs), Av.9 °F (36.6 °C), Min:97.7 °F (36.5 °C), Max:98.1 °F (36.7 °C)    VAD Data:    LVAD (Heartmate)  Pump Speed (RPM): 8800  Pump Flow (LPM): 4.5  PI (Pulsitility Index): 6.9  Power: 4.8   Test: No  Back Up  at Bedside & Labeled: Yes  Power Module Test: No  Driveline Site Care: No  Driveline Dressing: Clean, Dry, and Intact     Backup  at bedside, program matches primary    Oxygen Therapy:  Oxygen Therapy  O2 Sat (%): 94 % (17 043)  Pulse via Oximetry: 86 beats per minute (17 1915)  O2 Device: Room air (17)  O2 Flow Rate (L/min): 2 l/min (17 1815)      Admission Weight: Last Weight   Weight: 159 lb (72.1 kg) Weight: 160 lb 7.9 oz (72.8 kg)       Intake / Output / Drain:  Last 24 hrs.: No intake or output data in the 24 hours ending 17 0656    EXAM:  Neuro: A&O  Resp: CTA  CV: no murmur, +VAD  GI: +BS Soft NT/ND  : voiding  Integ: drive line dressing & stabilization device intact  Ext: no edema  MS: sternum stable    Recent Results (from the past 24 hour(s))   CBC WITH AUTOMATED DIFF    Collection Time: 17  3:46 PM   Result Value Ref Range    WBC 7.3 4.1 - 11.1 K/uL    RBC 3.59 (L) 4.10 - 5.70 M/uL    HGB 9.2 (L) 12.1 - 17.0 g/dL    HCT 30.0 (L) 36.6 - 50.3 %    MCV 83.6 80.0 - 99.0 FL    MCH 25.6 (L) 26.0 - 34.0 PG    MCHC 30.7 30.0 - 36.5 g/dL    RDW 15.9 (H) 11.5 - 14.5 %    PLATELET 814 (H) 271 - 400 K/uL    NEUTROPHILS 64 32 - 75 % LYMPHOCYTES 21 12 - 49 %    MONOCYTES 7 5 - 13 %    EOSINOPHILS 7 0 - 7 %    BASOPHILS 1 0 - 1 %    ABS. NEUTROPHILS 4.7 1.8 - 8.0 K/UL    ABS. LYMPHOCYTES 1.5 0.8 - 3.5 K/UL    ABS. MONOCYTES 0.5 0.0 - 1.0 K/UL    ABS. EOSINOPHILS 0.5 (H) 0.0 - 0.4 K/UL    ABS. BASOPHILS 0.0 0.0 - 0.1 K/UL   METABOLIC PANEL, COMPREHENSIVE    Collection Time: 01/20/17  3:46 PM   Result Value Ref Range    Sodium 135 (L) 136 - 145 mmol/L    Potassium 4.0 3.5 - 5.1 mmol/L    Chloride 99 97 - 108 mmol/L    CO2 26 21 - 32 mmol/L    Anion gap 10 5 - 15 mmol/L    Glucose 84 65 - 100 mg/dL    BUN 11 6 - 20 MG/DL    Creatinine 0.99 0.70 - 1.30 MG/DL    BUN/Creatinine ratio 11 (L) 12 - 20      GFR est AA >60 >60 ml/min/1.73m2    GFR est non-AA >60 >60 ml/min/1.73m2    Calcium 8.7 8.5 - 10.1 MG/DL    Bilirubin, total 0.4 0.2 - 1.0 MG/DL    ALT 35 12 - 78 U/L    AST 34 15 - 37 U/L    Alk.  phosphatase 114 45 - 117 U/L    Protein, total 6.7 6.4 - 8.2 g/dL    Albumin 3.2 (L) 3.5 - 5.0 g/dL    Globulin 3.5 2.0 - 4.0 g/dL    A-G Ratio 0.9 (L) 1.1 - 2.2     PROTHROMBIN TIME + INR    Collection Time: 01/20/17  3:46 PM   Result Value Ref Range    INR 2.6 (H) 0.9 - 1.1      Prothrombin time 26.1 (H) 9.0 - 11.1 sec   CULTURE, BLOOD    Collection Time: 01/20/17  3:50 PM   Result Value Ref Range    Special Requests: NO SPECIAL REQUESTS      Culture result: NO GROWTH AFTER 10 HOURS     POC INR    Collection Time: 01/20/17  4:28 PM   Result Value Ref Range    INR (POC) 2.4 (H) <1.2     CULTURE, WOUND W GRAM STAIN    Collection Time: 01/20/17  5:55 PM   Result Value Ref Range    Special Requests: ICD POCKET     GRAM STAIN RARE  WBCS SEEN        GRAM STAIN NO ORGANISMS SEEN      Culture result: PENDING    CULTURE, WOUND W GRAM STAIN    Collection Time: 01/20/17  5:56 PM   Result Value Ref Range    Special Requests: RV LEAD TIP     GRAM STAIN RARE  WBCS SEEN        GRAM STAIN OCCASIONAL  GRAM NEGATIVE RODS        Culture result: PENDING    CULTURE, WOUND Swapnil Dew STAIN    Collection Time: 01/20/17  5:56 PM   Result Value Ref Range    Special Requests: RA LEAD TIP     GRAM STAIN NO WBC'S SEEN      GRAM STAIN NO ORGANISMS SEEN      Culture result: PENDING    LD    Collection Time: 01/21/17  4:34 AM   Result Value Ref Range     85 - 241 U/L   PROTHROMBIN TIME + INR    Collection Time: 01/21/17  4:34 AM   Result Value Ref Range    INR 2.2 (H) 0.9 - 1.1      Prothrombin time 22.8 (H) 9.0 - 18.5 sec   METABOLIC PANEL, BASIC    Collection Time: 01/21/17  4:34 AM   Result Value Ref Range    Sodium 138 136 - 145 mmol/L    Potassium 3.9 3.5 - 5.1 mmol/L    Chloride 104 97 - 108 mmol/L    CO2 24 21 - 32 mmol/L    Anion gap 10 5 - 15 mmol/L    Glucose 84 65 - 100 mg/dL    BUN 13 6 - 20 MG/DL    Creatinine 0.87 0.70 - 1.30 MG/DL    BUN/Creatinine ratio 15 12 - 20      GFR est AA >60 >60 ml/min/1.73m2    GFR est non-AA >60 >60 ml/min/1.73m2    Calcium 8.4 (L) 8.5 - 10.1 MG/DL       Current Facility-Administered Medications:     naloxone (NARCAN) injection 0.4 mg, 0.4 mg, IntraVENous, PRN, Karen Nelson NP    sodium chloride (NS) flush 5-10 mL, 5-10 mL, IntraVENous, Q8H, Karen Nelson NP, 10 mL at 01/20/17 2343    sodium chloride (NS) flush 5-10 mL, 5-10 mL, IntraVENous, PRN, Karen Nelson NP, 10 mL at 01/20/17 1709    potassium chloride SR (KLOR-CON 10) tablet 10 mEq, 10 mEq, Oral, DAILY, Karen Nelson NP    bumetanide (BUMEX) tablet 0.5 mg, 0.5 mg, Oral, DAILY, Karen Nelson NP    ALPRAZolam Larissa Broaden) tablet 0.25 mg, 0.25 mg, Oral, Q6H PRN, Karen Nelson NP    zolpidem (AMBIEN) tablet 5 mg, 5 mg, Oral, QHS PRN, Karen Nelson NP, 5 mg at 01/20/17 2343    carvedilol (COREG) tablet 12.5 mg, 12.5 mg, Oral, BID WITH MEALS, Karen Nelson NP, 12.5 mg at 01/20/17 2046    citalopram (CELEXA) tablet 40 mg, 40 mg, Oral, DAILY, Karen Nelson NP    clopidogrel (PLAVIX) tablet 75 mg, 75 mg, Oral, DAILY, Karen Nelson NP    ferrous sulfate tablet 325 mg, 325 mg, Oral, BID WITH MEALS, Daja Sharma NP, 325 mg at 01/20/17 2046    magnesium oxide (MAG-OX) tablet 400 mg, 400 mg, Oral, DAILY, Daja Sharma NP    pantoprazole (PROTONIX) tablet 40 mg, 40 mg, Oral, ACB, Daja Sharma NP    allopurinol (ZYLOPRIM) tablet 100 mg, 100 mg, Oral, BID, Daja Sharma NP, 100 mg at 01/20/17 2046    Vancomycin- pharmacy to dose, , Other, Rx Dosing/Monitoring, Daja Sharma NP    amiodarone (CORDARONE) tablet 200 mg, 200 mg, Oral, DAILY, Michael Garza MD    vancomycin (VANCOCIN) 1,000 mg in 0.9% sodium chloride (MBP/ADV) 250 mL, 1,000 mg, IntraVENous, Q12H, Daja Sharma NP    hydrALAZINE (APRESOLINE) 20 mg/mL injection 20 mg, 20 mg, IntraVENous, Q4H PRN, Betty Suh NP    hydrALAZINE (APRESOLINE) 20 mg/mL injection 10 mg, 10 mg, IntraVENous, Q4H PRN, Betty Suh NP    traMADol Fabi Poag) tablet 50 mg, 50 mg, Oral, Q6H PRN, Michael Garza MD, 50 mg at 01/20/17 2343      Assessment and Plan    ICD pocket infection: S/p ICD removal by Dr. Varsha Dodd. GNRs on leads. On vanc, start levofloxacin. Pan culture any temps > 101. Trend CBC.      ICM, s/p HM II implant as DT: Please see VAD flow sheet for readings. Continue Coreg, Bumex. No ACE-1/ARB d/t hypotension. Daily weights, strict I/O.      Chronic anticoagulation, goal INR 2-2.5: INR therapeutic. If INR drops below 2.0 will start Heparin gtt. Please see anticoagulation tracker for details.      Hx of VT/VF: Monitor closely for ectopy since ICD removed. Continue amiodarone. Keep K+ > 4.0 and Mg++ > 2.0.      Post-operative anemia: Hgb improving. Continue folic acid, iron, vitamin c. Monitor labs.      CAD, s/p CABG and PCI: Continue Coreg, Plavix. Consider statin.      Hx of lumbar stenosis, s/p decompressive laminectomy and instrumentation: Management per ortho.      HTN: MAP goal 70-90 mmHg. Continue Coreg.      Gout: Continue allopurinol daily, colchicine for flares.      Depression/anxiety: Celexa.      Ppx: Pantoprazole for SUP, warfarin covers DVT ppx.      Signed By: Bhumika Harding MD    Risk of morbidity and mortality - high  Medical decision making - high complexity

## 2017-01-21 NOTE — CONSULTS
ID consult  NAME:  Meme Cardenas                      :   1955                       MRN:   [id number]   Date/Time:  2017 4:58 PM  Subjective:   REASON FOR CONSULT:         Meme Cardenas  is a 62 y.o. male with complicated medical course  for the past 2 months is admitted with infected AICD site . Pt underwent Laminectomy L4-S1 for lumbar stenosis on 16 at [facility]- He developed SOB overnight and was diagnosed with ACUTE MI- EF of 15%. He underwent Cardiac cath and found to have triple vessel disease. He was transferred to [facility] on  and he underwent IABP on . On  he had LVAD implanted and CABG X2 ( with LIMA for LAD and SVG- He was defibrillated and  then underwent a cardiac cath and stenting of RCA -2 BMS. He continued to have monomorphic sustained VT and he was put on amiodarone, lidocaine and betablocker. He was seen by  on  and had AICD on . He was discharged to rehab on  and went home on . He was initially doing well and then noted blood oozing from the AICD site. He followed up with  as Op and it was thought to be due to superficial oozing- On  hr noted more discharge and then saw a hole in the incision site and was admitted on   . Pt during his 2 month stay was on zosyn, levaquin and vanco for a period of time. 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 He has a dog at home and the dog sleeps in the same bed with him and his wife. Says the dog did not jump on his chest or lick his chest and he has kept it covered-   The AICD was removed in entirety yesterday and cultures have been sent.  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 Social History Main Topics    Smoking status: Former Smoker     Packs/day: 1.50     Years: 30.00     Quit date: 2008    Smokeless tobacco: Never Used    Alcohol use 24.0 oz/week     40 Cans of beer per week    Drug use: No    Sexual activity: Not on file     Other Topics Concern    Not on file     Social History Narrative      Family History   Problem Relation Age of Onset    Diabetes Mother     Dementia Mother     Other Mother      Latex allergy    Heart Disease Father     Stroke Father     No Known Problems Sister     Cancer Brother      brain     No Known Allergies         REVIEW OF SYSTEMS:     Const: negative fever, negative chills, 20 pound weight loss  Eyes:  negative diplopia or visual changes, negative eye pain  ENT:  negative coryza, negative sore throat  Resp:  negative cough, hemoptysis, dyspnea  Cards: negative for chest pain, palpitations, lower extremity edema  : negative for frequency, dysuria and hematuria  Heme: oozing from the site  MS: Fatigue  Neurolo:negative for headaches, dizziness, vertigo, memory problems       Objective:   VITALS:    Visit Vitals    Pulse 94    Temp 98.3 °F (36.8 °C)    Resp 18    Ht 5' 11\" (1.803 m)    Wt 160 lb 7.9 oz (72.8 kg)    SpO2 99%    BMI 22.38 kg/m2       PHYSICAL EXAM:   General:    Alert, , no distress    Head:   Normocephalic, without obvious abnormality, atraumatic. Eyes:   Conjunctivae clear, anicteric sclerae. Pupils are equal  Nose:  Nares normal. No drainage or sinus tenderness.   Throat:    Lips, mucosa, and tongue normal.  No Thrush  Neck:  Supple, symmetrical,  no adenopathy, thyroid: non tender    no carotid bruit and no JVD.  Back:    Lumbar scar healed well  Lungs:   B/ air entry- decreased air entry left base  Heart:   s1s2  Abdomen:   Soft,   Left catheter- LVAD covered with dressing  Left chest- AICD site covered with steristrip- oozing dark brown blood    Pt's picture from early this week      Pt's picture taken at home        Extremities: Extremities normal, atraumatic, no cyanosis. No edema. DP not felt  Neurologic: Grossly non-focal    Pertinent Labs  Wbc 7.3  Hb 9.2    Cr 0.87  BC- 1/20 NG so far  WC X3 - site, Rt ventricular lead and rt atrial lead area all gram neg rods    IMAGING RESULTS:  None    Impression/Recommendation    AICD infection- gram neg mike - because of long hospitalization/previous antibiotic use MDRO need to be considered until the final report is available- So will DC levaquin and give meropenem. The risk for the infection could have been the hematoma due tocoumadin- but other environmental risk at home like his dog sharing his bed need to be considered until the bacteria is identified.   The duration of antibiotic and route will be decided depending on the final culture and also on DELLA findings  He has LVAD and hence concern for infection- await blood culture      Anemia secondary to previous surgeries and blood loss    CAD/CHF- s/p CABG/ LVAD    HTN      S/p lumbar laminectomy- healed well    Discussed with patient, his family and his nurse

## 2017-01-21 NOTE — PROGRESS NOTES
Day #2 of Vancomycin  Indication:  ICD infection  Current regimen:  1000 mg IV Q 12h  Abx regimen:  Levaquin and Vancomycin  ID Following ?: YES  Concomitant nephrotoxic drugs (requires more frequent monitoring): Loop diuretics  Frequency of BMP?: Daily (until )  Recent Labs      17   0434  17   1546   WBC   --   7.3   CREA  0.87  0.99   BUN  13  11   Est CrCl: 93 ml/min; UO: Not assessed  Temp (24hrs), Av.8 °F (36.6 °C), Min:97.6 °F (36.4 °C), Max:98.1 °F (36.7 °C)    Cultures:    Blood - NGTD - pending   Wound, ICD pocket - NGTD - pending   Wound, RV Lead tip - OCCASIONAL GRAM NEGATIVE RODS - pending   Wound, RA Lead tip - NGTD - pending    Goal trough = 15 - 20 mcg/mL    Recent trough history (date/time/level/dose/action taken):  None thus far    Plan: Change to 1250 mg Q12 hr for improved renal function. Predicting a therapeutic trough of ~15 mcg/mL.

## 2017-01-21 NOTE — PROGRESS NOTES
Problem: Mobility Impaired (Adult and Pediatric)  Goal: *Acute Goals and Plan of Care (Insert Text)  Physical Therapy Goals  Initiated 1/21/2017  1. Patient will move from supine to sit and sit to supine in bed with independence within 7 day(s). 2. Patient will transfer from bed to chair and chair to bed with independence using the least restrictive device within 7 day(s). 3. Patient will perform sit to stand with independence within 7 day(s). 4. Patient will ambulate with modified independence for 200 feet with the least restrictive device within 7 day(s). 5. Patient will ascend/descend 3 stairs with 1 handrail(s) with modified independence within 7 day(s). PHYSICAL THERAPY EVALUATION  Patient: Elisa Dawson (84 y.o. male)  Date: 1/21/2017  Primary Diagnosis: cardiac cath  ICD (implantable cardioverter-defibrillator) infection [facility])        Precautions:   Fall (LVAD)      ASSESSMENT :  Based on the objective data described below, the patient presents with near his recent baseline level of activity where he was able to independently manage his LVAD and ambulated with a quad cane. Noted small path deviations and high guard without use of his cane but much improved when using his quad cane. Will follow 3x week to further assess for needs and progress mobility in preparation for return home. Patient will benefit from skilled intervention to address the above impairments.   Patients rehabilitation potential is considered to be Good  Factors which may influence rehabilitation potential include:   [X]         None noted  [ ]         Mental ability/status  [ ]         Medical condition  [ ]         Home/family situation and support systems  [ ]         Safety awareness  [ ]         Pain tolerance/management  [ ]         Other:        PLAN :  Recommendations and Planned Interventions:  [X]           Bed Mobility Training             [ ]    Neuromuscular Re-Education  [X]           Transfer Training [ ]    Orthotic/Prosthetic Training  [X]           Gait Training                         [ ]    Modalities  [X]           Therapeutic Exercises           [ ]    Edema Management/Control  [X]           Therapeutic Activities            [ ]    Patient and Family Training/Education  [ ]           Other (comment):     Frequency/Duration: Patient will be followed by physical therapy  3 times a week to address goals. Discharge Recommendations: Home Health and To Be Determined  Further Equipment Recommendations for Discharge: None       SUBJECTIVE:   Patient stated I've been getting along pretty well at home. 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     Past Surgical History   Procedure Laterality Date    Hx heent           wisdom teeth removed     Prior Level of Function/Home Situation: modified independent with quad cane  Personal factors and/or comorbidities impacting plan of care:      Home Situation  Home Environment: Private residence  One/Two Story Residence: One story  Living Alone: No  Support Systems: Friends \ neighbors, Family member(s)  Patient Expects to be Discharged to[de-identified] Private residence  Current DME Used/Available at Home: Cane, quad, Other (comment) (LVAD equipment )     EXAMINATION/PRESENTATION/DECISION MAKING:   Critical Behavior:  Neurologic State: Alert  Orientation Level: Oriented X4        Skin:  Pace maker dressing intact  Strength:    Strength: Generally decreased, functional                    Tone & Sensation:                                  Range Of Motion:  AROM: Generally decreased, functional                       Coordination:  Coordination: Generally decreased, functional     Functional Mobility:  Bed Mobility:     Supine to Sit: Supervision        Transfers:  Sit to Stand: Supervision Balance:   Sitting: Intact  Standing: Impaired  Standing - Static: Fair  Standing - Dynamic : Fair (good with cane)  Ambulation/Gait Training:  Distance (ft): 220 Feet (ft)  Assistive Device: Gait belt;Cane, quad  Ambulation - Level of Assistance: Minimal assistance;Stand-by asssistance     Gait Description (WDL): Exceptions to WDL  Gait Abnormalities: Decreased step clearance;Trunk sway increased; Path deviations        Base of Support: Widened     Speed/Dora: Slow                                150' without DME with high guard, path deviations, use of rails              79' with quad cane                Functional Measure:  Tinetti test:      Sitting Balance: 1  Arises: 1  Attempts to Rise: 1  Immediate Standing Balance: 2  Standing Balance: 1  Nudged: 1  Eyes Closed: 1  Turn 360 Degrees - Continuous/Discontinuous: 0  Turn 360 Degrees - Steady/Unsteady: 0  Sitting Down: 0  Balance Score: 8  Indication of Gait: 1  R Step Length/Height: 1  L Step Length/Height: 1  R Foot Clearance: 1  L Foot Clearance: 1  Step Symmetry: 1  Step Continuity: 1  Path: 1  Trunk: 0  Walking Time: 1  Gait Score: 9  Total Score: 17         Tinetti Test and G-code impairment scale:  Percentage of Impairment CH     0%    CI     1-19% CJ     20-39% CK     40-59% CL     60-79% CM     80-99% CN      100%   Tinetti  Score 0-28 28 23-27 17-22 12-16 6-11 1-5 0          Tinetti Tool Score Risk of Falls  <19 = High Fall Risk  19-24 = Moderate Fall Risk  25-28 = Low Fall Risk  Tinetti ME. Performance-Oriented Assessment of Mobility Problems in Elderly Patients. Prakash 66; [id number].  (Scoring Description: PT Bulletin Feb. 10, 1993)     Older adults: Era Perez et al, 2009; n = 1000 [facility] elderly evaluated with ABC, SHERI, ADL, and IADL)  · Mean SHERI score for males aged 69-68 years = 26.21(3.40)  · Mean SHERI score for females age 69-68 years = 25.16(4.30)  · Mean SHERI score for males over 80 years = 23.29(6.02)  · Mean SHERI score for females over [de-identified] years = 17.20(8.32)               Physical Therapy Evaluation Charge Determination   History Examination Presentation Decision-Making   HIGH Complexity :3+ comorbidities / personal factors will impact the outcome/ POC  LOW Complexity : 1-2 Standardized tests and measures addressing body structure, function, activity limitation and / or participation in recreation  LOW Complexity : Stable, uncomplicated  LOW Complexity : FOTO score of       Based on the above components, the patient evaluation is determined to be of the following complexity level: LOW      Pain:  Pain Scale 1: Numeric (0 - 10)  Pain Intensity 1: 0     . After treatment:   [X]         Patient left in no apparent distress sitting up in chair  [ ]         Patient left in no apparent distress in bed  [X]         Call bell left within reach  [ ]         Nursing notified  [ ]         Caregiver present  [ ]         Bed alarm activated      COMMUNICATION/EDUCATION:   The patients plan of care was discussed with: Registered Nurse.  [X]         Fall prevention education was provided and the patient/caregiver indicated understanding. [X]         Patient/family have participated as able in goal setting and plan of care. [ ]         Patient/family agree to work toward stated goals and plan of care. [ ]         Patient understands intent and goals of therapy, but is neutral about his/her participation. [ ]         Patient is unable to participate in goal setting and plan of care.      Thank you for this referral.  Liam Jarrett, PT, DPT   Time Calculation: 25 mins

## 2017-01-21 NOTE — PROGRESS NOTES
Cardiology Progress Note  1/21/2017 11:29 AM    Admit Date: 1/20/2017    Admit Diagnosis: cardiac cath;ICD (implantable cardioverter-defibrillator) i*      Assessment:     Principal Problem:    ICD (implantable cardioverter-defibrillator) infection ([address] (1/20/2017)        Plan:     Ventricular Tachycardia  improved   ICD removed yesterday for infection, ID to see today, stable with Janine Cash MD  [phone]  Cell        Subjective:     Charla Zhou denies chest pressure/discomfort, dyspnea. He reports symptoms are unchanged since yesterday. ROS: negative except as noted above.     Objective:       Visit Vitals    Pulse (!) 103    Temp 97.6 °F (36.4 °C)    Resp 16    Ht 5' 11\" (1.803 m)    Wt 72.8 kg (160 lb 7.9 oz)    SpO2 98%    BMI 22.38 kg/m2       Current Facility-Administered Medications   Medication Dose Route Frequency    levoFLOXacin (LEVAQUIN) 750 mg in D5W IVPB  750 mg IntraVENous Q24H    warfarin (COUMADIN) tablet 2.5 mg  2.5 mg Oral ONCE    vancomycin (VANCOCIN) 1250 mg in  ml infusion  1,250 mg IntraVENous Q12H    warfarin - pharmacy to dose  1 Each Other PRN    naloxone (NARCAN) injection 0.4 mg  0.4 mg IntraVENous PRN    sodium chloride (NS) flush 5-10 mL  5-10 mL IntraVENous Q8H    sodium chloride (NS) flush 5-10 mL  5-10 mL IntraVENous PRN    potassium chloride SR (KLOR-CON 10) tablet 10 mEq  10 mEq Oral DAILY    bumetanide (BUMEX) tablet 0.5 mg  0.5 mg Oral DAILY    ALPRAZolam (XANAX) tablet 0.25 mg  0.25 mg Oral Q6H PRN    zolpidem (AMBIEN) tablet 5 mg  5 mg Oral QHS PRN    carvedilol (COREG) tablet 12.5 mg  12.5 mg Oral BID WITH MEALS    citalopram (CELEXA) tablet 40 mg  40 mg Oral DAILY    clopidogrel (PLAVIX) tablet 75 mg  75 mg Oral DAILY    ferrous sulfate tablet 325 mg  325 mg Oral BID WITH MEALS    magnesium oxide (MAG-OX) tablet 400 mg  400 mg Oral DAILY    pantoprazole (PROTONIX) tablet 40 mg  40 mg Oral ACB    allopurinol (ZYLOPRIM) tablet 100 mg  100 mg Oral BID    Vancomycin- pharmacy to dose   Other Rx Dosing/Monitoring    amiodarone (CORDARONE) tablet 200 mg  200 mg Oral DAILY    hydrALAZINE (APRESOLINE) 20 mg/mL injection 20 mg  20 mg IntraVENous Q4H PRN    hydrALAZINE (APRESOLINE) 20 mg/mL injection 10 mg  10 mg IntraVENous Q4H PRN    traMADol (ULTRAM) tablet 50 mg  50 mg Oral Q6H PRN         Physical Exam:  Visit Vitals    Pulse (!) 103    Temp 97.6 °F (36.4 °C)    Resp 16    Ht 5' 11\" (1.803 m)    Wt 72.8 kg (160 lb 7.9 oz)    SpO2 98%    BMI 22.38 kg/m2     General Appearance:  Well developed, well nourished,alert and oriented x 3,  individual in no acute distress. Ears/Nose/Mouth/Throat:   Hearing grossly normal.         Neck: Supple. Chest:   Lungs clear to auscultation bilaterally. Cardiovascular:  LVAD no heart sounds   Abdomen:   Soft, non-tender, bowel sounds are active. Extremities: No edema bilaterally. Skin: Warm and dry. Telemetry: normal sinus rhythm    Data Review:     Labs:    Recent Results (from the past 24 hour(s))   CBC WITH AUTOMATED DIFF    Collection Time: 01/20/17  3:46 PM   Result Value Ref Range    WBC 7.3 4.1 - 11.1 K/uL    RBC 3.59 (L) 4.10 - 5.70 M/uL    HGB 9.2 (L) 12.1 - 17.0 g/dL    HCT 30.0 (L) 36.6 - 50.3 %    MCV 83.6 80.0 - 99.0 FL    MCH 25.6 (L) 26.0 - 34.0 PG    MCHC 30.7 30.0 - 36.5 g/dL    RDW 15.9 (H) 11.5 - 14.5 %    PLATELET 581 (H) 361 - 400 K/uL    NEUTROPHILS 64 32 - 75 %    LYMPHOCYTES 21 12 - 49 %    MONOCYTES 7 5 - 13 %    EOSINOPHILS 7 0 - 7 %    BASOPHILS 1 0 - 1 %    ABS. NEUTROPHILS 4.7 1.8 - 8.0 K/UL    ABS. LYMPHOCYTES 1.5 0.8 - 3.5 K/UL    ABS. MONOCYTES 0.5 0.0 - 1.0 K/UL    ABS. EOSINOPHILS 0.5 (H) 0.0 - 0.4 K/UL    ABS.  BASOPHILS 0.0 0.0 - 0.1 K/UL   METABOLIC PANEL, COMPREHENSIVE    Collection Time: 01/20/17  3:46 PM   Result Value Ref Range    Sodium 135 (L) 136 - 145 mmol/L    Potassium 4.0 3.5 - 5.1 mmol/L    Chloride 99 97 - 108 mmol/L    CO2 26 21 - 32 mmol/L    Anion gap 10 5 - 15 mmol/L    Glucose 84 65 - 100 mg/dL    BUN 11 6 - 20 MG/DL    Creatinine 0.99 0.70 - 1.30 MG/DL    BUN/Creatinine ratio 11 (L) 12 - 20      GFR est AA >60 >60 ml/min/1.73m2    GFR est non-AA >60 >60 ml/min/1.73m2    Calcium 8.7 8.5 - 10.1 MG/DL    Bilirubin, total 0.4 0.2 - 1.0 MG/DL    ALT 35 12 - 78 U/L    AST 34 15 - 37 U/L    Alk.  phosphatase 114 45 - 117 U/L    Protein, total 6.7 6.4 - 8.2 g/dL    Albumin 3.2 (L) 3.5 - 5.0 g/dL    Globulin 3.5 2.0 - 4.0 g/dL    A-G Ratio 0.9 (L) 1.1 - 2.2     PROTHROMBIN TIME + INR    Collection Time: 01/20/17  3:46 PM   Result Value Ref Range    INR 2.6 (H) 0.9 - 1.1      Prothrombin time 26.1 (H) 9.0 - 11.1 sec   CULTURE, BLOOD    Collection Time: 01/20/17  3:50 PM   Result Value Ref Range    Special Requests: NO SPECIAL REQUESTS      Culture result: NO GROWTH AFTER 10 HOURS     POC INR    Collection Time: 01/20/17  4:28 PM   Result Value Ref Range    INR (POC) 2.4 (H) <1.2     CULTURE, WOUND W GRAM STAIN    Collection Time: 01/20/17  5:55 PM   Result Value Ref Range    Special Requests: ICD POCKET     GRAM STAIN RARE  WBCS SEEN        GRAM STAIN NO ORGANISMS SEEN      Culture result: LIGHT  GRAM NEGATIVE RODS       CULTURE, WOUND W GRAM STAIN    Collection Time: 01/20/17  5:56 PM   Result Value Ref Range    Special Requests: RV LEAD TIP     GRAM STAIN RARE  WBCS SEEN        GRAM STAIN OCCASIONAL  GRAM NEGATIVE RODS        Culture result: MODERATE  GRAM NEGATIVE RODS       CULTURE, WOUND W GRAM STAIN    Collection Time: 01/20/17  5:56 PM   Result Value Ref Range    Special Requests: RA LEAD TIP     GRAM STAIN NO WBC'S SEEN      GRAM STAIN NO ORGANISMS SEEN      Culture result: FEW  GRAM NEGATIVE RODS       LD    Collection Time: 01/21/17  4:34 AM   Result Value Ref Range     85 - 241 U/L   PROTHROMBIN TIME + INR    Collection Time: 01/21/17  4:34 AM   Result Value Ref Range    INR 2.2 (H) 0.9 - 1.1 Prothrombin time 22.8 (H) 9.0 - 31.9 sec   METABOLIC PANEL, BASIC    Collection Time: 01/21/17  4:34 AM   Result Value Ref Range    Sodium 138 136 - 145 mmol/L    Potassium 3.9 3.5 - 5.1 mmol/L    Chloride 104 97 - 108 mmol/L    CO2 24 21 - 32 mmol/L    Anion gap 10 5 - 15 mmol/L    Glucose 84 65 - 100 mg/dL    BUN 13 6 - 20 MG/DL    Creatinine 0.87 0.70 - 1.30 MG/DL    BUN/Creatinine ratio 15 12 - 20      GFR est AA >60 >60 ml/min/1.73m2    GFR est non-AA >60 >60 ml/min/1.73m2    Calcium 8.4 (L) 8.5 - 10.1 MG/DL       Ami Renteria MD

## 2017-01-21 NOTE — PROGRESS NOTES
TRANSFER - OUT REPORT:    Verbal report given to Carlo Mcfadden RN(name) on Cookie Negron  being transferred to CVSU(unit) for routine progression of care       Report consisted of patients Situation, Background, Assessment and   Recommendations(SBAR). Information from the following report(s) SBAR, Kardex, Procedure Summary, Intake/Output, MAR, Accordion, Recent Results, Med Rec Status and Cardiac Rhythm SR was reviewed with the receiving nurse. Lines:   PICC Triple Lumen 79/31/69 Right;Basilic (Active)       Peripheral IV 01/20/17 Right Forearm (Active)   Site Assessment Clean, dry, & intact 1/20/2017  5:55 PM   Phlebitis Assessment 0 1/20/2017  5:55 PM   Infiltration Assessment 0 1/20/2017  5:55 PM   Dressing Status Clean, dry, & intact 1/20/2017  5:55 PM   Dressing Type Transparent 1/20/2017  5:55 PM   Hub Color/Line Status Blue; Infusing 1/20/2017  5:55 PM   Alcohol Cap Used Yes 1/20/2017  5:55 PM        Opportunity for questions and clarification was provided. Patient transported with:   Monitor  Registered Nurse     [id number]  Pt rec'd to room 34 33 96 by Carlo Mcfadden RN and Tanesha RN  Left chest dsg drainage circled with marker. Ice bag maintained to left chest wall. David Blackman NP and wife @ bedside.

## 2017-01-21 NOTE — PROCEDURES
DATE OF PROCEDURE: 1/20/2017      PROCEDURE: Removal of dual chamber AICD generator and its leads under fluoroscopy.     HISTORY: This is a 62 y.o. man who has had sudden death from sustained monomorphic VT aborted by defibrillation. This happened during his recovery after LVAD implant. He had large MI before the LVAD. CABG was performed but limited grafts so there was incomplete revascularization (he had 3 vessel disease and left main severe stenosis). He was unstable so LVAD was performed. LVEF was 10 %. He has NYHA class III. Therefore he had ICD implant as secondary prevention of sudden death. Post implant while on coumadin the patient had a pocket hematoma and that resolved. However there was a scab in the middle of the incision that started to have drainage 1-2 weeks ago. The scab fell off 5 days ago and now there is a small erosion on the incision line with thick mucus draining out. This indicated that there is infection inside the pocket and the device and its leads are likely to be infected as well. If the device and leads are not removed, infection can spread further. The risks and benefits were discussed with the patient and his wife and they agreed to proceed. He has not had fever or chills  PROCEDURE IN DETAILS:   After informed consent was obtained, the patient was brought to the electrophysiology suite and was prepped and draped in the usual sterile fashion. Conscious sedation was initiated and maintained with versed and fentanyl. Local anesthesia with 2% Xylocaine was given in the left infraclavicular area along the scar. The #10 blade scalpel was then used to make a 3 cm incision. The incision was taken down the capsule layer using Bovie cautery and blunt dissection. There was white thick mucus fluid inside and this was cultured by swab. The AICD generator and both atrial and ventricular leads were removed under fluoroscopy.   The pocket is now irrigated with antibiotic solution and Vancomycin powder is placed inside the pocket  The pocket is now closed in interrupted fashion using 3-0 nylon sutures     COMPLICATIONS: None. Estimated blood loss is: 20 mL. INR is 2.6  EXPLANTED HARDWARE/Specimen removed    The ICD is a Medtronic ICD Dimitry SCHMID DR, serial # [id number], model [id number] .  DOI 12/16/2016  The atrial Lead: model # L0645934 , serial # [id number]  The ventricular lead: model # I5286166, serial # [id number]     ASSESSMENT AND PLAN:   The patient will be admitted for IV antibiotic and this will be tailored to the result of cultures

## 2017-01-22 LAB
ANION GAP BLD CALC-SCNC: 10 MMOL/L (ref 5–15)
BUN SERPL-MCNC: 11 MG/DL (ref 6–20)
BUN/CREAT SERPL: 12 (ref 12–20)
CALCIUM SERPL-MCNC: 8.6 MG/DL (ref 8.5–10.1)
CHLORIDE SERPL-SCNC: 103 MMOL/L (ref 97–108)
CO2 SERPL-SCNC: 23 MMOL/L (ref 21–32)
CREAT SERPL-MCNC: 0.95 MG/DL (ref 0.7–1.3)
GLUCOSE SERPL-MCNC: 81 MG/DL (ref 65–100)
INR PPP: 2.1 (ref 0.9–1.1)
LDH SERPL L TO P-CCNC: 243 U/L (ref 85–241)
POTASSIUM SERPL-SCNC: 3.9 MMOL/L (ref 3.5–5.1)
PROTHROMBIN TIME: 21.4 SEC (ref 9–11.1)
SODIUM SERPL-SCNC: 136 MMOL/L (ref 136–145)

## 2017-01-22 PROCEDURE — 74011250637 HC RX REV CODE- 250/637: Performed by: INTERNAL MEDICINE

## 2017-01-22 PROCEDURE — 74011250636 HC RX REV CODE- 250/636: Performed by: NURSE PRACTITIONER

## 2017-01-22 PROCEDURE — 80048 BASIC METABOLIC PNL TOTAL CA: CPT | Performed by: NURSE PRACTITIONER

## 2017-01-22 PROCEDURE — 36415 COLL VENOUS BLD VENIPUNCTURE: CPT | Performed by: NURSE PRACTITIONER

## 2017-01-22 PROCEDURE — 85610 PROTHROMBIN TIME: CPT | Performed by: NURSE PRACTITIONER

## 2017-01-22 PROCEDURE — 65660000000 HC RM CCU STEPDOWN

## 2017-01-22 PROCEDURE — 74011250637 HC RX REV CODE- 250/637: Performed by: NURSE PRACTITIONER

## 2017-01-22 PROCEDURE — 74011250637 HC RX REV CODE- 250/637: Performed by: PHYSICIAN ASSISTANT

## 2017-01-22 PROCEDURE — 83615 LACTATE (LD) (LDH) ENZYME: CPT | Performed by: NURSE PRACTITIONER

## 2017-01-22 PROCEDURE — 74011250636 HC RX REV CODE- 250/636: Performed by: INTERNAL MEDICINE

## 2017-01-22 PROCEDURE — 74011000258 HC RX REV CODE- 258: Performed by: INTERNAL MEDICINE

## 2017-01-22 RX ORDER — CARVEDILOL 12.5 MG/1
25 TABLET ORAL 2 TIMES DAILY WITH MEALS
Status: DISCONTINUED | OUTPATIENT
Start: 2017-01-22 | End: 2017-01-24 | Stop reason: HOSPADM

## 2017-01-22 RX ORDER — WARFARIN 1 MG/1
3 TABLET ORAL ONCE
Status: COMPLETED | OUTPATIENT
Start: 2017-01-22 | End: 2017-01-22

## 2017-01-22 RX ADMIN — CITALOPRAM HYDROBROMIDE 40 MG: 20 TABLET ORAL at 08:19

## 2017-01-22 RX ADMIN — MEROPENEM 500 MG: 500 INJECTION, POWDER, FOR SOLUTION INTRAVENOUS at 12:45

## 2017-01-22 RX ADMIN — ZOLPIDEM TARTRATE 5 MG: 5 TABLET, FILM COATED ORAL at 23:34

## 2017-01-22 RX ADMIN — POTASSIUM CHLORIDE 10 MEQ: 750 TABLET, FILM COATED, EXTENDED RELEASE ORAL at 08:19

## 2017-01-22 RX ADMIN — ALLOPURINOL 100 MG: 100 TABLET ORAL at 17:53

## 2017-01-22 RX ADMIN — AMIODARONE HYDROCHLORIDE 200 MG: 200 TABLET ORAL at 08:19

## 2017-01-22 RX ADMIN — VANCOMYCIN HYDROCHLORIDE 1250 MG: 10 INJECTION, POWDER, LYOPHILIZED, FOR SOLUTION INTRAVENOUS at 20:08

## 2017-01-22 RX ADMIN — MEROPENEM 500 MG: 500 INJECTION, POWDER, FOR SOLUTION INTRAVENOUS at 17:53

## 2017-01-22 RX ADMIN — FERROUS SULFATE TAB 325 MG (65 MG ELEMENTAL FE) 325 MG: 325 (65 FE) TAB at 17:53

## 2017-01-22 RX ADMIN — VANCOMYCIN HYDROCHLORIDE 1250 MG: 10 INJECTION, POWDER, LYOPHILIZED, FOR SOLUTION INTRAVENOUS at 08:28

## 2017-01-22 RX ADMIN — Medication 10 ML: at 17:54

## 2017-01-22 RX ADMIN — CARVEDILOL 12.5 MG: 12.5 TABLET, FILM COATED ORAL at 08:19

## 2017-01-22 RX ADMIN — CARVEDILOL 25 MG: 12.5 TABLET, FILM COATED ORAL at 17:53

## 2017-01-22 RX ADMIN — FERROUS SULFATE TAB 325 MG (65 MG ELEMENTAL FE) 325 MG: 325 (65 FE) TAB at 08:19

## 2017-01-22 RX ADMIN — Medication 10 ML: at 23:24

## 2017-01-22 RX ADMIN — ALLOPURINOL 100 MG: 100 TABLET ORAL at 08:19

## 2017-01-22 RX ADMIN — BUMETANIDE 0.5 MG: 1 TABLET ORAL at 08:19

## 2017-01-22 RX ADMIN — WARFARIN SODIUM 3 MG: 1 TABLET ORAL at 17:53

## 2017-01-22 RX ADMIN — Medication 10 ML: at 06:16

## 2017-01-22 RX ADMIN — MEROPENEM 500 MG: 500 INJECTION, POWDER, FOR SOLUTION INTRAVENOUS at 23:24

## 2017-01-22 RX ADMIN — PANTOPRAZOLE SODIUM 40 MG: 40 TABLET, DELAYED RELEASE ORAL at 07:46

## 2017-01-22 RX ADMIN — CLOPIDOGREL BISULFATE 75 MG: 75 TABLET ORAL at 08:19

## 2017-01-22 RX ADMIN — MEROPENEM 500 MG: 500 INJECTION, POWDER, FOR SOLUTION INTRAVENOUS at 06:16

## 2017-01-22 RX ADMIN — Medication 400 MG: at 08:19

## 2017-01-22 NOTE — PROGRESS NOTES
Pharmacist Note  Warfarin Dosing  Consult provided for this 61 y.o.male to manage warfarin for LVAD    INR Goal: 2 - 2.5  Home regimen/ tablet size: 2.5 mg daily    Drugs that may increase INR: Amiodarone and Quinolones  Drugs that may decrease INR: None  Other current anticoagulants/ drugs that may increase bleeding risk: Clopidogrel  Risk factors: None  Daily INR ordered: YES    Recent Labs      01/22/17   0423  01/21/17   0434  01/20/17   1628  01/20/17   1546   HGB   --    --    --   9.2*   INR  2.1*  2.2*  2.4*  2.6*     Date               INR                  Dose  1/21  2.2             2.5 mg  1/22  2.1  3 mg                                                                                 Assessment/ Plan: Will order warfarin 3 mg PO x 1 dose. Pharmacy will continue to monitor daily and adjust therapy as indicated. Please contact the pharmacist at  for outpatient recommendations if needed.

## 2017-01-22 NOTE — PROGRESS NOTES
Cardiology Progress Note  1/22/2017 11:29 AM    Admit Date: 1/20/2017    Admit Diagnosis: cardiac cath;ICD (implantable cardioverter-defibrillator) i*      Assessment:     Principal Problem:    ICD (implantable cardioverter-defibrillator) infection ([address] (1/20/2017)        Plan:     Ventricular Tachycardia  improved   ICD removed yesterday for infection, ID to see today, stable with Denny Taylor MD  [phone]  Cell        Subjective:     Leydi Ferrell denies chest pressure/discomfort, dyspnea. He reports symptoms are unchanged since yesterday. ID note appreciated, antibiotics changed pending bacterial identifecation. Anemia worse. ROS: negative except as noted above.     Objective:       Visit Vitals    Pulse 95    Temp 97.5 °F (36.4 °C)    Resp 16    Ht 5' 11\" (1.803 m)    Wt 73.7 kg (162 lb 7.7 oz)    SpO2 94%    BMI 22.66 kg/m2       Current Facility-Administered Medications   Medication Dose Route Frequency    vancomycin (VANCOCIN) 1250 mg in  ml infusion  1,250 mg IntraVENous Q12H    warfarin - pharmacy to dose  1 Each Other PRN    meropenem (MERREM) 500 mg in 0.9% sodium chloride (MBP/ADV) 50 mL  0.5 g IntraVENous Q6H    naloxone (NARCAN) injection 0.4 mg  0.4 mg IntraVENous PRN    sodium chloride (NS) flush 5-10 mL  5-10 mL IntraVENous Q8H    sodium chloride (NS) flush 5-10 mL  5-10 mL IntraVENous PRN    potassium chloride SR (KLOR-CON 10) tablet 10 mEq  10 mEq Oral DAILY    bumetanide (BUMEX) tablet 0.5 mg  0.5 mg Oral DAILY    ALPRAZolam (XANAX) tablet 0.25 mg  0.25 mg Oral Q6H PRN    zolpidem (AMBIEN) tablet 5 mg  5 mg Oral QHS PRN    carvedilol (COREG) tablet 12.5 mg  12.5 mg Oral BID WITH MEALS    citalopram (CELEXA) tablet 40 mg  40 mg Oral DAILY    clopidogrel (PLAVIX) tablet 75 mg  75 mg Oral DAILY    ferrous sulfate tablet 325 mg  325 mg Oral BID WITH MEALS    magnesium oxide (MAG-OX) tablet 400 mg  400 mg Oral DAILY    pantoprazole (PROTONIX) tablet 40 mg  40 mg Oral ACB    allopurinol (ZYLOPRIM) tablet 100 mg  100 mg Oral BID    Vancomycin- pharmacy to dose   Other Rx Dosing/Monitoring    amiodarone (CORDARONE) tablet 200 mg  200 mg Oral DAILY    hydrALAZINE (APRESOLINE) 20 mg/mL injection 20 mg  20 mg IntraVENous Q4H PRN    hydrALAZINE (APRESOLINE) 20 mg/mL injection 10 mg  10 mg IntraVENous Q4H PRN    traMADol (ULTRAM) tablet 50 mg  50 mg Oral Q6H PRN         Physical Exam:  Visit Vitals    Pulse 95    Temp 97.5 °F (36.4 °C)    Resp 16    Ht 5' 11\" (1.803 m)    Wt 73.7 kg (162 lb 7.7 oz)    SpO2 94%    BMI 22.66 kg/m2     General Appearance:  Well developed, well nourished,alert and oriented x 3,  individual in no acute distress. Ears/Nose/Mouth/Throat:   Hearing grossly normal.         Neck: Supple. Chest:   Lungs clear to auscultation bilaterally. Cardiovascular:  LVAD no heart sounds   Abdomen:   Soft, non-tender, bowel sounds are active. Extremities: No edema bilaterally. Skin: Warm and dry.                Telemetry: normal sinus rhythm    Data Review:     Labs:    Recent Results (from the past 24 hour(s))   LD    Collection Time: 01/22/17  4:23 AM   Result Value Ref Range     (H) 85 - 241 U/L   PROTHROMBIN TIME + INR    Collection Time: 01/22/17  4:23 AM   Result Value Ref Range    INR 2.1 (H) 0.9 - 1.1      Prothrombin time 21.4 (H) 9.0 - 10.7 sec   METABOLIC PANEL, BASIC    Collection Time: 01/22/17  4:23 AM   Result Value Ref Range    Sodium 136 136 - 145 mmol/L    Potassium 3.9 3.5 - 5.1 mmol/L    Chloride 103 97 - 108 mmol/L    CO2 23 21 - 32 mmol/L    Anion gap 10 5 - 15 mmol/L    Glucose 81 65 - 100 mg/dL    BUN 11 6 - 20 MG/DL    Creatinine 0.95 0.70 - 1.30 MG/DL    BUN/Creatinine ratio 12 12 - 20      GFR est AA >60 >60 ml/min/1.73m2    GFR est non-AA >60 >60 ml/min/1.73m2    Calcium 8.6 8.5 - 10.1 MG/DL       Deidra Vick MD

## 2017-01-22 NOTE — PROGRESS NOTES
1930: Bedside and Verbal shift change report given to OUMAR Araya (oncoming nurse) by OUMAR GAONA (offgoing nurse). Report included the following information SBAR, Kardex, Intake/Output, MAR and Recent Results. 0700: Patient had uneventful night. 0730: Bedside and Verbal shift change report given to OUMAR GAONA (oncoming nurse) by Elif Grande RN (offgoing nurse). Report included the following information SBAR, Kardex, Intake/Output, MAR and Recent Results.

## 2017-01-22 NOTE — PROGRESS NOTES
0730: Bedside and Verbal shift change report given to OUMAR GAONA (oncoming nurse) by Gricel Nunn RN (offgoing nurse). Report included the following information SBAR, Kardex, Intake/Output, MAR, Recent Results, Med Rec Status and Cardiac Rhythm NSR with 1st degree AVB. 1800: Pt had an uneventful shift. No complaints of pain. VSS. LVAD driveline exit site dressing changed. C/D/I. No drainage. 1930: Bedside and Verbal shift change report given to OUMAR Araya (oncoming nurse) by OUMAR GAONA (offgoing nurse). Report included the following information SBAR, Kardex, Intake/Output, MAR, Recent Results, Med Rec Status and Cardiac Rhythm NSR. Problem: Falls - Risk of  Goal: *Absence of falls  Outcome: Progressing Towards Goal  Pt's bed in low/locked position. All personal items and call bell within reach. Side rails up x 3. Bathroom light on at all times. Goal: *Knowledge of fall prevention  Outcome: Progressing Towards Goal  Pt aware to call out when in need of assistance. Problem: Pacer/ICD: Discharge Outcomes  Goal: *Hemodynamically stable  Outcome: Progressing Towards Goal  Pt's VSS. Goal: *Stable cardiac rhythm  Outcome: Progressing Towards Goal  Pt in NSR with 1st degree AVB. Goal: *Lungs clear or at baseline  Outcome: Progressing Towards Goal  Pt's lungs are clear to ausculation bilaterally. Goal: *Demonstrates ability to perform prescribed activity without shortness of breath or discomfort  Outcome: Progressing Towards Goal  Pt ambulates around room without any shortness of breath noted. Goal: *Anxiety reduced or absent  Outcome: Progressing Towards Goal  Pt has no s/sx or complaints of anxiety at this time.

## 2017-01-22 NOTE — PROGRESS NOTES
[facility]   Progress Note      Date: 2017     Admit Date: 2017    AICD pocket infection     Subjective:   Feels good and in good spirits.       Objective:     Visit Vitals    Pulse 95    Temp 98.4 °F (36.9 °C)    Resp 18    Ht 5' 11\" (1.803 m)    Wt 162 lb 7.7 oz (73.7 kg)    SpO2 96%    BMI 22.66 kg/m2       Temp (24hrs), Av.1 °F (36.7 °C), Min:97.6 °F (36.4 °C), Max:98.4 °F (36.9 °C)    VAD Data:    LVAD (Heartmate)  Pump Speed (RPM): 8800  Pump Flow (LPM): 4.8  Chatter in Lines: No  PI (Pulsitility Index): 7.6  Power: 4.9   Test: No  Back Up  at Bedside & Labeled: Yes  Power Module Test: No  Driveline Site Care: No  Driveline Dressing: Clean, Dry, and Intact     Backup  at bedside, program matches primary    Oxygen Therapy:  Oxygen Therapy  O2 Sat (%): 96 % (17 035)  Pulse via Oximetry: 86 beats per minute (17 191)  O2 Device: Room air (17)  O2 Flow Rate (L/min): 2 l/min (17)      Admission Weight: Last Weight   Weight: 159 lb (72.1 kg) Weight: 162 lb 7.7 oz (73.7 kg)           Intake / Output / Drain:  Last 24 hrs.:   Intake/Output Summary (Last 24 hours) at 1706  Last data filed at 17   Gross per 24 hour   Intake             1120 ml   Output              300 ml   Net              820 ml       EXAM:  Neuro: A&O  Resp: CTA  CV:  +VAD  GI: +BS Soft NT/ND  : voiding  Integ: drive line dressing & stabilization device intact  Ext: no edema    Recent Results (from the past 24 hour(s))   LD    Collection Time: 17  4:23 AM   Result Value Ref Range     (H) 85 - 241 U/L   PROTHROMBIN TIME + INR    Collection Time: 17  4:23 AM   Result Value Ref Range    INR 2.1 (H) 0.9 - 1.1      Prothrombin time 21.4 (H) 9.0 - 54.1 sec   METABOLIC PANEL, BASIC    Collection Time: 17  4:23 AM   Result Value Ref Range    Sodium 136 136 - 145 mmol/L Potassium 3.9 3.5 - 5.1 mmol/L    Chloride 103 97 - 108 mmol/L    CO2 23 21 - 32 mmol/L    Anion gap 10 5 - 15 mmol/L    Glucose 81 65 - 100 mg/dL    BUN 11 6 - 20 MG/DL    Creatinine 0.95 0.70 - 1.30 MG/DL    BUN/Creatinine ratio 12 12 - 20      GFR est AA >60 >60 ml/min/1.73m2    GFR est non-AA >60 >60 ml/min/1.73m2    Calcium 8.6 8.5 - 10.1 MG/DL         Current Facility-Administered Medications:     vancomycin (VANCOCIN) 1250 mg in  ml infusion, 1,250 mg, IntraVENous, Q12H, Simran Jane NP, Last Rate: 125 mL/hr at 01/21/17 2026, 1,250 mg at 01/21/17 2026    warfarin - pharmacy to dose, 1 Each, Other, PRN, Ashlie Grubbs MD    meropenem (MERREM) 500 mg in 0.9% sodium chloride (MBP/ADV) 50 mL, 0.5 g, IntraVENous, Q6H, Agustin Blancas MD, Last Rate: 100 mL/hr at 01/22/17 0616, 500 mg at 01/22/17 0616    naloxone (NARCAN) injection 0.4 mg, 0.4 mg, IntraVENous, PRN, Julietaolm Lucinda, NP    sodium chloride (NS) flush 5-10 mL, 5-10 mL, IntraVENous, Q8H, Randolm Starcher, NP, 10 mL at 01/22/17 0616    sodium chloride (NS) flush 5-10 mL, 5-10 mL, IntraVENous, PRN, Randolm Starcher, NP, 10 mL at 01/20/17 1709    potassium chloride SR (KLOR-CON 10) tablet 10 mEq, 10 mEq, Oral, DAILY, Randolm Starcher, NP, 10 mEq at 01/21/17 0809    bumetanide (BUMEX) tablet 0.5 mg, 0.5 mg, Oral, DAILY, Randolm Starcher, NP, 0.5 mg at 01/21/17 0808    ALPRAZolam (XANAX) tablet 0.25 mg, 0.25 mg, Oral, Q6H PRN, Randolm Starcher, NP    zolpidem (AMBIEN) tablet 5 mg, 5 mg, Oral, QHS PRN, Simran Jane NP, 5 mg at 01/21/17 2320    carvedilol (COREG) tablet 12.5 mg, 12.5 mg, Oral, BID WITH MEALS, Simran Jane NP, 12.5 mg at 01/21/17 1729    citalopram (CELEXA) tablet 40 mg, 40 mg, Oral, DAILY, Simran Jane NP, 40 mg at 01/21/17 0808    clopidogrel (PLAVIX) tablet 75 mg, 75 mg, Oral, DAILY, Simran Jane NP, 75 mg at 01/21/17 0808    ferrous sulfate tablet 325 mg, 325 mg, Oral, BID WITH MEALS, Simran Jane NP, 325 mg at 01/21/17 1729   magnesium oxide (MAG-OX) tablet 400 mg, 400 mg, Oral, DAILY, Tristen Sanchez NP, 400 mg at 01/21/17 0809    pantoprazole (PROTONIX) tablet 40 mg, 40 mg, Oral, ACB, Tristen Sanchez NP, 40 mg at 01/21/17 0808    allopurinol (ZYLOPRIM) tablet 100 mg, 100 mg, Oral, BID, Tristen Sanchez NP, 100 mg at 01/21/17 1729    Vancomycin- pharmacy to dose, , Other, Rx Dosing/Monitoring, Tristen Sanchez NP    amiodarone (CORDARONE) tablet 200 mg, 200 mg, Oral, DAILY, Shaggy Steinberg MD, 200 mg at 01/21/17 0808    hydrALAZINE (APRESOLINE) 20 mg/mL injection 20 mg, 20 mg, IntraVENous, Q4H PRN, Ashleigh Beach NP    hydrALAZINE (APRESOLINE) 20 mg/mL injection 10 mg, 10 mg, IntraVENous, Q4H PRN, Ashleigh Beach NP    traMADol (ULTRAM) tablet 50 mg, 50 mg, Oral, Q6H PRN, Shaggy Steinberg MD, 50 mg at 01/21/17 1729     Assessment and Plan     ICD pocket infection: S/p ICD removal by Dr. Denis Thomas. GNRs on leads. On vanc and juan. Pan culture any temps > 101. Trend CBC. Will likely need a PICC line and long term antibiotics        ICM, s/p HM II implant as DT: Please see VAD flow sheet for readings. Continue Coreg, Bumex. No ACE-1/ARB d/t hypotension. Daily weights, strict I/O.       Chronic anticoagulation, goal INR 2-2.5: INR therapeutic. If INR drops below 2.0 will start Heparin gtt. Please see anticoagulation tracker for details.       Hx of VT/VF: Monitor closely for ectopy since ICD removed. Continue amiodarone. Keep K+ > 4.0 and Mg++ > 2.0.       Post-operative anemia: Hgb improving. Continue folic acid, iron, vitamin c. Monitor labs.       CAD, s/p CABG and PCI: Continue Coreg, Plavix. Consider statin.       Hx of lumbar stenosis, s/p decompressive laminectomy and instrumentation: Management per ortho.       HTN: MAP goal 70-90 mmHg.  Continue Coreg.       Gout: Continue allopurinol daily, colchicine for flares.       Depression/anxiety: Celexa.       Ppx: Pantoprazole for SUP, warfarin covers DVT ppx.      Risk of morbidity and mortality - high  Medical decision making - high complexity        Signed By: Khushbu Thompson MD

## 2017-01-23 ENCOUNTER — DOCUMENTATION ONLY (OUTPATIENT)
Dept: CARDIOLOGY CLINIC | Age: 62
End: 2017-01-23

## 2017-01-23 LAB
ANION GAP BLD CALC-SCNC: 8 MMOL/L (ref 5–15)
APPEARANCE UR: CLEAR
BACTERIA SPEC CULT: NORMAL
BACTERIA URNS QL MICRO: NEGATIVE /HPF
BILIRUB UR QL: NEGATIVE
BUN SERPL-MCNC: 10 MG/DL (ref 6–20)
BUN/CREAT SERPL: 10 (ref 12–20)
CALCIUM SERPL-MCNC: 8.4 MG/DL (ref 8.5–10.1)
CHLORIDE SERPL-SCNC: 101 MMOL/L (ref 97–108)
CO2 SERPL-SCNC: 24 MMOL/L (ref 21–32)
COLOR UR: NORMAL
CREAT SERPL-MCNC: 0.97 MG/DL (ref 0.7–1.3)
DATE LAST DOSE: ABNORMAL
EPITH CASTS URNS QL MICRO: NORMAL /LPF
ERYTHROCYTE [DISTWIDTH] IN BLOOD BY AUTOMATED COUNT: 15.9 % (ref 11.5–14.5)
GLUCOSE SERPL-MCNC: 87 MG/DL (ref 65–100)
GLUCOSE UR STRIP.AUTO-MCNC: NEGATIVE MG/DL
GRAM STN SPEC: NORMAL
HCT VFR BLD AUTO: 26.2 % (ref 36.6–50.3)
HGB BLD-MCNC: 8 G/DL (ref 12.1–17)
HGB UR QL STRIP: NEGATIVE
HYALINE CASTS URNS QL MICRO: NORMAL /LPF (ref 0–5)
INR PPP: 1.9 (ref 0.9–1.1)
KETONES UR QL STRIP.AUTO: NEGATIVE MG/DL
LDH SERPL L TO P-CCNC: 346 U/L (ref 85–241)
LEUKOCYTE ESTERASE UR QL STRIP.AUTO: NEGATIVE
MCH RBC QN AUTO: 25.1 PG (ref 26–34)
MCHC RBC AUTO-ENTMCNC: 30.5 G/DL (ref 30–36.5)
MCV RBC AUTO: 82.1 FL (ref 80–99)
NITRITE UR QL STRIP.AUTO: NEGATIVE
PH UR STRIP: 6 [PH] (ref 5–8)
PLATELET # BLD AUTO: 421 K/UL (ref 150–400)
POTASSIUM SERPL-SCNC: 4.1 MMOL/L (ref 3.5–5.1)
PROT UR STRIP-MCNC: NEGATIVE MG/DL
PROTHROMBIN TIME: 19 SEC (ref 9–11.1)
RBC # BLD AUTO: 3.19 M/UL (ref 4.1–5.7)
RBC #/AREA URNS HPF: NORMAL /HPF (ref 0–5)
REPORTED DOSE,DOSE: ABNORMAL UNITS
REPORTED DOSE/TIME,TMG: ABNORMAL
SERVICE CMNT-IMP: NORMAL
SODIUM SERPL-SCNC: 133 MMOL/L (ref 136–145)
SP GR UR REFRACTOMETRY: 1.01 (ref 1–1.03)
UROBILINOGEN UR QL STRIP.AUTO: 0.2 EU/DL (ref 0.2–1)
VANCOMYCIN TROUGH SERPL-MCNC: 24.7 UG/ML (ref 5–10)
WBC # BLD AUTO: 7.2 K/UL (ref 4.1–11.1)
WBC URNS QL MICRO: NORMAL /HPF (ref 0–4)

## 2017-01-23 PROCEDURE — 65660000000 HC RM CCU STEPDOWN

## 2017-01-23 PROCEDURE — 81001 URINALYSIS AUTO W/SCOPE: CPT | Performed by: NURSE PRACTITIONER

## 2017-01-23 PROCEDURE — G8989 SELF CARE D/C STATUS: HCPCS

## 2017-01-23 PROCEDURE — 74011250636 HC RX REV CODE- 250/636: Performed by: NURSE PRACTITIONER

## 2017-01-23 PROCEDURE — G8987 SELF CARE CURRENT STATUS: HCPCS

## 2017-01-23 PROCEDURE — 36415 COLL VENOUS BLD VENIPUNCTURE: CPT | Performed by: NURSE PRACTITIONER

## 2017-01-23 PROCEDURE — 74011250636 HC RX REV CODE- 250/636: Performed by: INTERNAL MEDICINE

## 2017-01-23 PROCEDURE — 74011250637 HC RX REV CODE- 250/637: Performed by: PHYSICIAN ASSISTANT

## 2017-01-23 PROCEDURE — 80202 ASSAY OF VANCOMYCIN: CPT | Performed by: NURSE PRACTITIONER

## 2017-01-23 PROCEDURE — 85610 PROTHROMBIN TIME: CPT | Performed by: NURSE PRACTITIONER

## 2017-01-23 PROCEDURE — 74011000258 HC RX REV CODE- 258: Performed by: INTERNAL MEDICINE

## 2017-01-23 PROCEDURE — 74011250637 HC RX REV CODE- 250/637: Performed by: NURSE PRACTITIONER

## 2017-01-23 PROCEDURE — G8988 SELF CARE GOAL STATUS: HCPCS

## 2017-01-23 PROCEDURE — 83051 HEMOGLOBIN PLASMA: CPT | Performed by: NURSE PRACTITIONER

## 2017-01-23 PROCEDURE — 80048 BASIC METABOLIC PNL TOTAL CA: CPT | Performed by: NURSE PRACTITIONER

## 2017-01-23 PROCEDURE — 74011250637 HC RX REV CODE- 250/637: Performed by: INTERNAL MEDICINE

## 2017-01-23 PROCEDURE — 85027 COMPLETE CBC AUTOMATED: CPT | Performed by: NURSE PRACTITIONER

## 2017-01-23 PROCEDURE — 97165 OT EVAL LOW COMPLEX 30 MIN: CPT

## 2017-01-23 PROCEDURE — 83615 LACTATE (LD) (LDH) ENZYME: CPT | Performed by: NURSE PRACTITIONER

## 2017-01-23 RX ORDER — WARFARIN 4 MG/1
4 TABLET ORAL ONCE
Status: COMPLETED | OUTPATIENT
Start: 2017-01-23 | End: 2017-01-23

## 2017-01-23 RX ORDER — VANCOMYCIN HYDROCHLORIDE
1250
Status: DISCONTINUED | OUTPATIENT
Start: 2017-01-24 | End: 2017-01-23

## 2017-01-23 RX ADMIN — VANCOMYCIN HYDROCHLORIDE 1250 MG: 10 INJECTION, POWDER, LYOPHILIZED, FOR SOLUTION INTRAVENOUS at 07:12

## 2017-01-23 RX ADMIN — MEROPENEM 500 MG: 500 INJECTION, POWDER, FOR SOLUTION INTRAVENOUS at 05:38

## 2017-01-23 RX ADMIN — FERROUS SULFATE TAB 325 MG (65 MG ELEMENTAL FE) 325 MG: 325 (65 FE) TAB at 18:02

## 2017-01-23 RX ADMIN — MEROPENEM 500 MG: 500 INJECTION, POWDER, FOR SOLUTION INTRAVENOUS at 13:05

## 2017-01-23 RX ADMIN — Medication 10 ML: at 05:38

## 2017-01-23 RX ADMIN — WARFARIN SODIUM 4 MG: 4 TABLET ORAL at 18:02

## 2017-01-23 RX ADMIN — CLOPIDOGREL BISULFATE 75 MG: 75 TABLET ORAL at 08:35

## 2017-01-23 RX ADMIN — CITALOPRAM HYDROBROMIDE 40 MG: 20 TABLET ORAL at 08:35

## 2017-01-23 RX ADMIN — MEROPENEM 500 MG: 500 INJECTION, POWDER, FOR SOLUTION INTRAVENOUS at 18:02

## 2017-01-23 RX ADMIN — Medication 10 ML: at 22:55

## 2017-01-23 RX ADMIN — Medication 400 MG: at 08:35

## 2017-01-23 RX ADMIN — FERROUS SULFATE TAB 325 MG (65 MG ELEMENTAL FE) 325 MG: 325 (65 FE) TAB at 08:35

## 2017-01-23 RX ADMIN — AMIODARONE HYDROCHLORIDE 200 MG: 200 TABLET ORAL at 08:35

## 2017-01-23 RX ADMIN — TRAMADOL HYDROCHLORIDE 50 MG: 50 TABLET, FILM COATED ORAL at 23:31

## 2017-01-23 RX ADMIN — CARVEDILOL 25 MG: 12.5 TABLET, FILM COATED ORAL at 18:02

## 2017-01-23 RX ADMIN — ALLOPURINOL 100 MG: 100 TABLET ORAL at 18:02

## 2017-01-23 RX ADMIN — PANTOPRAZOLE SODIUM 40 MG: 40 TABLET, DELAYED RELEASE ORAL at 07:08

## 2017-01-23 RX ADMIN — POTASSIUM CHLORIDE 10 MEQ: 750 TABLET, FILM COATED, EXTENDED RELEASE ORAL at 08:35

## 2017-01-23 RX ADMIN — ZOLPIDEM TARTRATE 5 MG: 5 TABLET, FILM COATED ORAL at 22:54

## 2017-01-23 RX ADMIN — ALLOPURINOL 100 MG: 100 TABLET ORAL at 08:35

## 2017-01-23 RX ADMIN — Medication 10 ML: at 13:05

## 2017-01-23 RX ADMIN — BUMETANIDE 0.5 MG: 1 TABLET ORAL at 08:34

## 2017-01-23 RX ADMIN — CARVEDILOL 25 MG: 12.5 TABLET, FILM COATED ORAL at 08:35

## 2017-01-23 RX ADMIN — MEROPENEM 500 MG: 500 INJECTION, POWDER, FOR SOLUTION INTRAVENOUS at 23:00

## 2017-01-23 NOTE — HOME CARE
This patient is currently open to [facility] for [address] and PT. We can resume services with a resumption of care order prior to end of certification period 2/25/2017.  Chely GOTTI [facility]

## 2017-01-23 NOTE — PROGRESS NOTES
LifeVest order form completed by provider, [facility]  with supporting documentation at [phone]. Fax confirmation received.

## 2017-01-23 NOTE — PROGRESS NOTES
Cardiac Electrophysiology Progress Note      1/23/2017 10:10 AM    Admit Date: 1/20/2017    Admit Diagnosis: cardiac cath  ICD (implantable cardioverter-defibrillator) infection [facility])      Subjective:     Rey Tate feels fine. He denies chest pain or SOB. 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      Social History Main Topics    Smoking status: Former Smoker     Packs/day: 1.50     Years: 30.00     Quit date: 2008    Smokeless tobacco: Never Used    Alcohol use 24.0 oz/week     40 Cans of beer per week    Drug use: No    Sexual activity: Not on file     Other Topics Concern    Not on file     Social History Narrative       Visit Vitals    Pulse 95    Temp 98.3 °F (36.8 °C)    Resp 16    Ht 5' 11\" (1.803 m)    Wt 162 lb 7.7 oz (73.7 kg)    SpO2 92%    BMI 22.66 kg/m2     Current Facility-Administered Medications   Medication Dose Route Frequency    warfarin (COUMADIN) tablet 4 mg  4 mg Oral ONCE    carvedilol (COREG) tablet 25 mg  25 mg Oral BID WITH MEALS    warfarin - pharmacy to dose  1 Each Other PRN    meropenem (MERREM) 500 mg in 0.9% sodium chloride (MBP/ADV) 50 mL  0.5 g IntraVENous Q6H    naloxone (NARCAN) injection 0.4 mg  0.4 mg IntraVENous PRN    sodium chloride (NS) flush 5-10 mL  5-10 mL IntraVENous Q8H    sodium chloride (NS) flush 5-10 mL  5-10 mL IntraVENous PRN    potassium chloride SR (KLOR-CON 10) tablet 10 mEq  10 mEq Oral DAILY    bumetanide (BUMEX) tablet 0.5 mg  0.5 mg Oral DAILY    ALPRAZolam Dianna Roosevelt) tablet 0.25 mg  0.25 mg Oral Q6H PRN    zolpidem (AMBIEN) tablet 5 mg  5 mg Oral QHS PRN    citalopram (CELEXA) tablet 40 mg  40 mg Oral DAILY    clopidogrel (PLAVIX) tablet 75 mg  75 mg Oral DAILY    ferrous sulfate tablet 325 mg  325 mg Oral BID WITH MEALS    magnesium oxide (MAG-OX) tablet 400 mg  400 mg Oral DAILY    pantoprazole (PROTONIX) tablet 40 mg  40 mg Oral ACB    allopurinol (ZYLOPRIM) tablet 100 mg  100 mg Oral BID    amiodarone (CORDARONE) tablet 200 mg  200 mg Oral DAILY    hydrALAZINE (APRESOLINE) 20 mg/mL injection 20 mg  20 mg IntraVENous Q4H PRN    hydrALAZINE (APRESOLINE) 20 mg/mL injection 10 mg  10 mg IntraVENous Q4H PRN    traMADol (ULTRAM) tablet 50 mg  50 mg Oral Q6H PRN         Objective:      Physical Exam:  Visit Vitals    Pulse 95    Temp 98.3 °F (36.8 °C)    Resp 16    Ht 5' 11\" (1.803 m)    Wt 162 lb 7.7 oz (73.7 kg)    SpO2 92%    BMI 22.66 kg/m2     General Appearance:  Well nourished,alert and oriented x 3, and individual in no acute distress. Ears/Nose/Mouth/Throat:   Hearing grossly normal.         Neck: Supple. Chest:   Lungs clear to auscultation bilaterally. Cardiovascular:  LVAD hum   Abdomen:   Soft, non-tender, bowel sounds are active. LVAD drive line CDI- left side. Extremities: No edema bilaterally. Skin: Warm and dry. ICD removal incision edges approximated closed with nylon sutures.                 Data Review:   Labs:    Recent Results (from the past 24 hour(s))   LD    Collection Time: 01/23/17  4:30 AM   Result Value Ref Range     (H) 85 - 241 U/L   PROTHROMBIN TIME + INR    Collection Time: 01/23/17  4:30 AM   Result Value Ref Range    INR 1.9 (H) 0.9 - 1.1      Prothrombin time 19.0 (H) 9.0 - 11.1 sec   CBC W/O DIFF    Collection Time: 01/23/17  4:30 AM   Result Value Ref Range    WBC 7.2 4.1 - 11.1 K/uL    RBC 3.19 (L) 4.10 - 5.70 M/uL    HGB 8.0 (L) 12.1 - 17.0 g/dL    HCT 26.2 (L) 36.6 - 50.3 %    MCV 82.1 80.0 - 99.0 FL    MCH 25.1 (L) 26.0 - 34.0 PG    MCHC 30.5 30.0 - 36.5 g/dL    RDW 15.9 (H) 11.5 - 14.5 %    PLATELET 327 (H) 084 - 197 K/uL   METABOLIC PANEL, BASIC    Collection Time: 01/23/17  4:30 AM   Result Value Ref Range    Sodium 133 (L) 136 - 145 mmol/L    Potassium 4.1 3.5 - 5.1 mmol/L    Chloride 101 97 - 108 mmol/L    CO2 24 21 - 32 mmol/L    Anion gap 8 5 - 15 mmol/L    Glucose 87 65 - 100 mg/dL    BUN 10 6 - 20 MG/DL    Creatinine 0.97 0.70 - 1.30 MG/DL    BUN/Creatinine ratio 10 (L) 12 - 20      GFR est AA >60 >60 ml/min/1.73m2    GFR est non-AA >60 >60 ml/min/1.73m2    Calcium 8.4 (L) 8.5 - 10.1 MG/DL   VANCOMYCIN, TROUGH    Collection Time: 01/23/17  7:02 AM   Result Value Ref Range    Vancomycin,trough 24.7 (HH) 5.0 - 10.0 ug/mL    Reported dose date: NOT PROVIDED      Reported dose time: NOT PROVIDED      Reported dose: NOT PROVIDED UNITS       Telemetry: NSR      Assessment:     Principal Problem:    ICD (implantable cardioverter-defibrillator) infection ([facility] [address] (1/20/2017)    S/P ICD removal 1/20/17  Ischemic cardiomyopathy  LVAD in place  Chronic systolic CHF, NYHA class II  Hx of VF arrest  Anemia      Assessment/ Plan:  RA lead/ICD pocket: gram negative rods, cultures enterobacter cloacae  RV lead gram negative rods enterobacter species   Blood cultures negative after three days  POD#3 removal of ICD  ID following, he will need -IV antibiotics and PICC line before d/c home. ID to decide duration of therapy. Currently on meropenum and vanco.   Creatinine stable/WNL. ID input much appreciated. He will need lifevest prior to discharge. I will start process today. AHF dosing coumadin INR 1.9 this morning. Daily or PRN dressing changes to ICD removal site.  I will arrange appt for him to have suture removed in 1 week              Cherie Corona NP     Addendum from EP attending:   I have seen, examined patient, and discussed with nurse practitioner, registered nurse, reviewed, updated note and agree with the assessment and plan    I have talked to him this am. He is feeling fine, no concerns  Vital signs are stable  Exam shows LVAD sound and 2/6 diastolic murmur  Chest wall no redness of ICD site, sutures were interrupted for drainage  Assessment and Plan:  Continue with antibiotic. Stop vancomycin  I have discussed with Dr Anastasiya Motta of ID and she requested DELLA.     If no vegetation and since there is no bacteremia, may change IV meropenem to PO levaquin for 2 weeks  Life vest defibrillator until wound heals and then reimplant ICD for secondary prevention of sudden death  DELLA to be arranged for tomorrow

## 2017-01-23 NOTE — PROGRESS NOTES
0730: Bedside and Verbal shift change report given to Melisa Mujica (oncoming nurse) by Shanika Tanner (offgoing nurse). Report included the following information SBAR, Kardex, MAR and Recent Results. 0825: Received critical vanc trough from lab. Notified attending and ID who DC'd vanc. ICD site oozing, attending and NP at bedside & aware, ICD site dressing changed by NP.    1930: Uneventful shift. Bedside and Verbal shift change report given to David Torres (oncoming nurse) by Melisa Mujica (offgoing nurse). Report included the following information SBAR, Kardex, MAR and Recent Results.

## 2017-01-23 NOTE — PROGRESS NOTES
[facility]   Progress Note      Date: 2017     Admit Date: 2017    Reed Lr is a 62year old male patient with a PMH significant for ICM, s/p HM II implant as DT on . Additional PMH includes lumbar stenosis s/p laminectomy, sinus tachycardia, VT/VF, HTN, ETOH abuse, chronic anticoagulation, and gout. During his implant hospitalization, Mr. Virginia Lr had several episodes of VT/VF requiring external defibrillation. He was subsequently implanted with a dual chamber ICD on 16. His post-implant course has been complicated with hematoma formation and delayed wound healing. He was seen by MARIA LUZ Ramirez for a scheduled ramp study on 17. Incidentally, she noted that his ICD site was still edematous and tender, and had a pencil sized opening in the incision with thick, serosanguinous drainage. The wound was cultured and dressed. Dr. Immanuel Oseguera office was notified, and Mr. Virginia Lr was scheduled for a pocket revision . Upon initial assessment pre-procedure, his wound was noted to be draining thick, yellow fluid, and he was taken to the EP lab for ICD removal, lead extraction, and pocket irrigation. He has subsequently been admitted to [facility] for further assessment and treatment of infected ICD pocket. Subjective:     Seen with Dr. Shai Salinas and Dr. Colon Patch    Last 24 hours:   RA and RV lead culture (+) GNR  WBC OK, afebrile  INR therapeutic    This AM:   Feels \"fine\", but frustrated with recent events.       Objective:     Visit Vitals    Pulse 93    Temp 97.9 °F (36.6 °C)    Resp 18    Ht 5' 11\" (1.803 m)    Wt 162 lb 7.7 oz (73.7 kg)    SpO2 98%    BMI 22.66 kg/m2       Temp (24hrs), Av.1 °F (36.7 °C), Min:97.8 °F (36.6 °C), Max:98.6 °F (37 °C)    VAD Data:    LVAD (Heartmate)  Pump Speed (RPM): 8790  Pump Flow (LPM): 4.8  Chatter in Lines: No  PI (Pulsitility Index): 7.4  Power: 5   Test: Yes  Back Up  at Bedside & Labeled: Yes  Power Module Test: Yes  Driveline Site Care: No  Driveline Dressing: Clean, Dry, and Intact     Backup  at bedside, program matches primary    Oxygen Therapy:  Oxygen Therapy  O2 Sat (%): 98 % (01/23/17 1545)  Pulse via Oximetry: 86 beats per minute (01/20/17 1915)  O2 Device: Room air (01/23/17 1545)  O2 Flow Rate (L/min): 2 l/min (01/20/17 1815)      Admission Weight: Last Weight   Weight: 159 lb (72.1 kg) Weight: 162 lb 7.7 oz (73.7 kg)           Intake / Output / Drain:  Last 24 hrs.:     Intake/Output Summary (Last 24 hours) at 01/23/17 1822  Last data filed at 01/23/17 1609   Gross per 24 hour   Intake             1065 ml   Output              300 ml   Net              765 ml       EXAM:  Neuro: A&O  Resp: CTA  CV:  +VAD  GI: +BS Soft NT/ND  : voiding  Integ: drive line dressing & stabilization device intact. Left upper chest incision dressing removed, moderate sanguinous drainage. Wound closed w/interrupted sutures. Slight edema around wound edges, but no warmth, pain w/palpation.     Ext: trace lower extremity edema    Recent Results (from the past 24 hour(s))   LD    Collection Time: 01/23/17  4:30 AM   Result Value Ref Range     (H) 85 - 241 U/L   PROTHROMBIN TIME + INR    Collection Time: 01/23/17  4:30 AM   Result Value Ref Range    INR 1.9 (H) 0.9 - 1.1      Prothrombin time 19.0 (H) 9.0 - 11.1 sec   CBC W/O DIFF    Collection Time: 01/23/17  4:30 AM   Result Value Ref Range    WBC 7.2 4.1 - 11.1 K/uL    RBC 3.19 (L) 4.10 - 5.70 M/uL    HGB 8.0 (L) 12.1 - 17.0 g/dL    HCT 26.2 (L) 36.6 - 50.3 %    MCV 82.1 80.0 - 99.0 FL    MCH 25.1 (L) 26.0 - 34.0 PG    MCHC 30.5 30.0 - 36.5 g/dL    RDW 15.9 (H) 11.5 - 14.5 %    PLATELET 052 (H) 525 - 159 K/uL   METABOLIC PANEL, BASIC    Collection Time: 01/23/17  4:30 AM   Result Value Ref Range    Sodium 133 (L) 136 - 145 mmol/L    Potassium 4.1 3.5 - 5.1 mmol/L    Chloride 101 97 - 108 mmol/L    CO2 24 21 - 32 mmol/L    Anion gap 8 5 - 15 mmol/L    Glucose 87 65 - 100 mg/dL    BUN 10 6 - 20 MG/DL    Creatinine 0.97 0.70 - 1.30 MG/DL    BUN/Creatinine ratio 10 (L) 12 - 20      GFR est AA >60 >60 ml/min/1.73m2    GFR est non-AA >60 >60 ml/min/1.73m2    Calcium 8.4 (L) 8.5 - 10.1 MG/DL   VANCOMYCIN, TROUGH    Collection Time: 01/23/17  7:02 AM   Result Value Ref Range    Vancomycin,trough 24.7 (HH) 5.0 - 10.0 ug/mL    Reported dose date: NOT PROVIDED      Reported dose time: NOT PROVIDED      Reported dose: NOT PROVIDED UNITS   URINALYSIS W/MICROSCOPIC    Collection Time: 01/23/17  3:42 PM   Result Value Ref Range    Color YELLOW/STRAW      Appearance CLEAR CLEAR      Specific gravity 1.011 1.003 - 1.030      pH (UA) 6.0 5.0 - 8.0      Protein NEGATIVE  NEG mg/dL    Glucose NEGATIVE  NEG mg/dL    Ketone NEGATIVE  NEG mg/dL    Bilirubin NEGATIVE  NEG      Blood NEGATIVE  NEG      Urobilinogen 0.2 0.2 - 1.0 EU/dL    Nitrites NEGATIVE  NEG      Leukocyte Esterase NEGATIVE  NEG      WBC 0-4 0 - 4 /hpf    RBC 0-5 0 - 5 /hpf    Epithelial cells FEW FEW /lpf    Bacteria NEGATIVE  NEG /hpf    Hyaline Cast 0-2 0 - 5 /lpf       Current Facility-Administered Medications   Medication Dose Route Frequency    carvedilol (COREG) tablet 25 mg  25 mg Oral BID WITH MEALS    warfarin - pharmacy to dose  1 Each Other PRN    meropenem (MERREM) 500 mg in 0.9% sodium chloride (MBP/ADV) 50 mL  0.5 g IntraVENous Q6H    naloxone (NARCAN) injection 0.4 mg  0.4 mg IntraVENous PRN    sodium chloride (NS) flush 5-10 mL  5-10 mL IntraVENous Q8H    sodium chloride (NS) flush 5-10 mL  5-10 mL IntraVENous PRN    potassium chloride SR (KLOR-CON 10) tablet 10 mEq  10 mEq Oral DAILY    bumetanide (BUMEX) tablet 0.5 mg  0.5 mg Oral DAILY    ALPRAZolam (XANAX) tablet 0.25 mg  0.25 mg Oral Q6H PRN    zolpidem (AMBIEN) tablet 5 mg  5 mg Oral QHS PRN    citalopram (CELEXA) tablet 40 mg  40 mg Oral DAILY    clopidogrel (PLAVIX) tablet 75 mg  75 mg Oral DAILY    ferrous sulfate tablet 325 mg  325 mg Oral BID WITH MEALS    magnesium oxide (MAG-OX) tablet 400 mg  400 mg Oral DAILY    pantoprazole (PROTONIX) tablet 40 mg  40 mg Oral ACB    allopurinol (ZYLOPRIM) tablet 100 mg  100 mg Oral BID    amiodarone (CORDARONE) tablet 200 mg  200 mg Oral DAILY    hydrALAZINE (APRESOLINE) 20 mg/mL injection 20 mg  20 mg IntraVENous Q4H PRN    hydrALAZINE (APRESOLINE) 20 mg/mL injection 10 mg  10 mg IntraVENous Q4H PRN    traMADol (ULTRAM) tablet 50 mg  50 mg Oral Q6H PRN         Assessment and Plan:     ICD pocket infection: S/p ICD removal by Dr. Uday Mandujano. GNRs on both RA and RV leads. On vanc and meropenem. Pan culture any temps > 101. Trend CBC. Per ID, will need DELLA to r/o valve vegetations and if negative, can switch to PO abx.        ICM, s/p HM II implant as DT: Please see VAD flow sheet for readings. Continue Coreg, Bumex. No ACE-1/ARB d/t hypotension. Daily weights, strict I/O.       Chronic anticoagulation, goal INR 2-2.5: INR slightly sub-therapeutic (1.9). Continue Coumadin, Plavix. If INR remains < 2.0 tomorrow, start heparin gtt. Please see anticoagulation tracker for details.       Hx of VT/VF: Monitor closely for ectopy since ICD removed. Continue amiodarone. Keep K+ > 4.0 and Mg++ > 2.0.       Post-operative anemia: Hgb stable. Continue folic acid, iron, vitamin c. Monitor labs.       CAD, s/p CABG and PCI: Continue Coreg, Plavix. Consider statin.       Hx of lumbar stenosis, s/p decompressive laminectomy and instrumentation: Management per ortho. PT/OT.     HTN: MAP goal 70-90 mmHg. Continue Coreg.       Gout: Continue allopurinol daily, colchicine for flares.       Depression/anxiety: Celexa.       Ppx: Pantoprazole for SUP, warfarin covers DVT ppx.        Signed By: Olya Toledo NP        Saw patient, agree with above  Risk of morbidity and mortality - high  Medical decision making - high complexity

## 2017-01-23 NOTE — PROGRESS NOTES
Met with the patient to touch base regarding his d/c. He has no preference for IV infusion company. Referral sent to [facility] and choice letter completed. Called [facility] of [facility] and informed her of anticipated d/c of tomorrow and or Wednesday. Also notified the patient's wife of the potential d/c. Shared she will need to participate in IV antibiotic training with home infusion company to which she is agreeable. Toshia Boo NP reports she is working on [employer] for discharge. Of note, need the iv antibiotic order from infectious disease physician for IV antibiotic teaching/ medication order. Awaiting the order. Will continue to follow and assist with d/c plan.     Marcy Montiel, MSW, LCSW    Clinical    [phone]

## 2017-01-23 NOTE — PROGRESS NOTES
Problem: Discharge Planning  Goal: *Discharge to safe environment  Outcome: Progressing Towards Goal  See sw note   Goal: *Knowledge of medication management  Outcome: Progressing Towards Goal  See sw note   Goal: *Knowledge of discharge instructions  Outcome: Progressing Towards Goal  See sw note     Problem: Patient Education: Go to Patient Education Activity  Goal: Patient/Family Education  Outcome: Progressing Towards Goal  See sw note

## 2017-01-23 NOTE — PROGRESS NOTES
Pt seen ambulating in the hallways mod I with rollator. Steady and normalized gait pattern. Pt reporting that he had no issues at home with mobility except for a gout flare up which kept him from walking for 4-5 days. Pt using a quad cane at baseline. Will follow up to assess balance and address any discharge concerns with the cane. Pt cleared to ambulate the hallways with rollator with distant supervision of nursing staff. Thanks!     Alpa Johns PT, DPT

## 2017-01-23 NOTE — PROGRESS NOTES
Cultures still gram neg mike. Not finalized . Aileen Po Continue vanco and meropenem. Discussed with his nurse AP on the phone. Pt is doing well.  Wound less drainage today as per his nurse

## 2017-01-23 NOTE — PROGRESS NOTES
1930: Bedside and Verbal shift change report given to OUMAR Araya (oncoming nurse) by OUMAR GAONA (offgoing nurse). Report included the following information SBAR, Kardex, Intake/Output, MAR and Recent Results. 6275: Moderate amount of drainage on left chest dressing; changed per order. Will continue to monitor. 0700: Patient had uneventful night. 0730: Bedside and Verbal shift change report given to Janak Carlson RN (oncoming nurse) by Kamilah Montana RN (offgoing nurse). Report included the following information SBAR, Kardex, Intake/Output, MAR and Recent Results.

## 2017-01-23 NOTE — PROGRESS NOTES
Kingston Easton is a 62 y.o. male with complicated medical course  for the past 2 months is admitted with infected AICD site . Pt underwent Laminectomy L4-S1 for lumbar stenosis on 11/22/16 at [facility]- He developed SOB overnight and was diagnosed with ACUTE MI- EF of 15%. He underwent Cardiac cath and found to have triple vessel disease. He was transferred to [facility] on 11/26 and he underwent IABP on 11/29. On 12/1 he had LVAD implanted and CABG X2 ( with LIMA for LAD and SVG- He was defibrillated and then underwent a cardiac cath and stenting of RCA -2 BMS. He continued to have monomorphic sustained VT and he was put on amiodarone, lidocaine and betablocker. He was seen by  on 12/13 and had AICD on 12/16. He was discharged to rehab on 12/21 and went home on 12/27. He was initially doing well and then noted blood oozing from the AICD site. He followed up with  as Op and it was thought to be due to superficial oozing- On 1/16 hr noted more discharge and then saw a hole in the incision site and was admitted on 1/20 . Pt during his 2 month stay was on zosyn, levaquin and vanco for a period of time. 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 Says the dog did not jump on his chest or lick his chest and he has kept it covered-   The AICD was removed in entirety yesterday and cultures have been sent. He is on vanco and levaquin and I am asked to see him for the same       subjective  Doing well     Objective:   VITALS:   97.9 °F (36.6 °C) 94        PHYSICAL EXAM:   General:  Alert, , no distress   Head:   Normocephalic, without obvious abnormality, atraumatic. Eyes:   Conjunctivae clear, anicteric sclerae. Pupils are equal  Nose:  Nares normal. No drainage or sinus tenderness.   Throat:   Lips, mucosa, and tongue normal. No Thrush  Neck:  Supple, symmetrical, no adenopathy, thyroid: non tender  no carotid bruit and no JVD. Back:   Lumbar scar healed well  Lungs:  B/ air entry- decreased air entry left base  Heart:   s1s2  Abdomen:  Soft,   Left catheter- LVAD covered with dressing  Left chest- AICD site covered with steristrip- oozing dark brown blood     Pt's picture from early this week       Pt's picture taken at home          Extremities: Extremities normal, atraumatic, no cyanosis. No edema.  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

## 2017-01-23 NOTE — PROGRESS NOTES
Pharmacist Note  Warfarin Dosing  Consult provided for this 61 y.o.male to manage warfarin for LVAD    INR Goal: 2 - 2.5  Home regimen/ tablet size: 2.5 mg daily    Drugs that may increase INR: Amiodarone (levaquin dc'd on 1/21)  Drugs that may decrease INR: None  Other current anticoagulants/ drugs that may increase bleeding risk: Clopidogrel  Risk factors: None  Daily INR ordered: YES    Recent Labs      01/23/17   0430  01/22/17   0423  01/21/17   0434   01/20/17   1546   HGB  8.0*   --    --    --   9.2*   INR  1.9*  2.1*  2.2*   < >  2.6*    < > = values in this interval not displayed. Date               INR                  Dose  1/21  2.2             2.5 mg  1/22  2.1  3 mg  1/23                 1.9                   4 mg                                                                                 Assessment/ Plan: Will order warfarin 4 mg PO x 1 dose. Pharmacy will continue to monitor daily and adjust therapy as indicated. Please contact the pharmacist at  or [phone] for outpatient recommendations if needed.

## 2017-01-23 NOTE — PROGRESS NOTES
Day #4 of Vancomycin  Indication:  ICD infection  Current regimen:  1250 mg IV Q 12h  Abx regimen:  Merrem and Vancomycin  ID Following ?: YES  Concomitant nephrotoxic drugs (requires more frequent monitoring): Loop diuretics  Frequency of BMP?: Daily (through )  Recent Labs      17   0430  17   0423  17   0434  17   1546   WBC  7.2   --    --   7.3   CREA  0.97  0.95  0.87  0.99   BUN  10  11  13  11   Est CrCl: 84 ml/min; UO: Not assessed  Temp (24hrs), Av °F (36.7 °C), Min:97.5 °F (36.4 °C), Max:98.6 °F (37 °C)    Cultures:    Blood - NGTD    Wound, ICD pocket - Light GNR    Wound, RV Lead tip - Mod GNR    Wound, RA Lead tip - GNR    Goal trough = 15 - 20 mcg/mL    Plan:  Trough today @ 0702 = 24.7 (drawn 11 hours after last dose)  Extrapolated true trough = 23  Adjust dose to 1250 mg IV q 16 hours for predicted trough ~ 15. Consider DC Vancomycin once cultures finalized.

## 2017-01-23 NOTE — PROGRESS NOTES
Problem: Self Care Deficits Care Plan (Adult)  Goal: *Acute Goals and Plan of Care (Insert Text)  OCCUPATIONAL THERAPY EVALUATION/DISCHARGE  Patient: Faustino Sanchez (89 y.o. male)  Date: 1/23/2017  Primary Diagnosis: cardiac cath  ICD (implantable cardioverter-defibrillator) infection ([facility] [address]        Precautions:   Fall (LVAD)      ASSESSMENT:   Based on the objective data described below, the patient presents with overall baseline independent-Min A for functional ADLs and mobility with rollator/SPC s/p ICD removal 1/21/2017. Patient received sitting in chair with nursing present. Patient evaluated by PT this weekend, is close to functional baseline and will be followed 3x/week for strength training. Patient has been ambulating to bathroom with rollator. Patient receives assist from wife for socks at baseline d/t spinal precautions initially and now residual pain (has all LBD AE). Patient completed full PM>battery switchover today independently. Patient with no concerns, has progressed his OOB mobility, has assist as needed for ADLs at home, will progress strengthening with acute and potential HHPT. No further needs noted. Will complete orders at this time. Further skilled acute occupational therapy is not indicated at this time. Discharge Recommendations: None  Further Equipment Recommendations for Discharge: None noted        SUBJECTIVE:   Patient stated I had a home health OT come to my house after rehab, she took one look at me and said, 'Nope, you don't need me'. So I was doing great! 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 Discharged to inpatient rehab near end of December for 5 days. [facility] [facility] and [place]. Daughter is a nurse and very supportive. Patient with history of spinal surgery few days prior to MI that resulted in need for LVAD; does not need to wear a brace  Expanded or extensive additional review of patient history:      Home Situation  Home Environment: Private residence  One/Two Story Residence: One story  Living Alone: No  Support Systems: Friends \ neighbors, Family member(s)  Patient Expects to be Discharged to[de-identified] Private residence  Current DME Used/Available at Home: Cane, quad, Other (comment) (LVAD equipment )  [ ]  Right hand dominant   [ ]  Left hand dominant     EXAMINATION OF PERFORMANCE DEFICITS:  Cognitive/Behavioral Status:  Neurologic State: Alert  Orientation Level: Oriented X4  Cognition: Appropriate decision making; Appropriate for age attention/concentration; Appropriate safety awareness; Follows commands  Perception: Appears intact  Perseveration: No perseveration noted  Safety/Judgement: Awareness of environment; Insight into deficits;Home safety;Good awareness of safety precautions  Skin: Appears intact  Edema: None noted in BUEs  Vision/Perceptual:                           Acuity: Within Defined Limits    Corrective Lenses: Glasses  Range of Motion:  AROM: Within functional limits  PROM: Within functional limits                    Strength:  Strength: Within functional limits              Coordination:  Coordination: Within functional limits  Fine Motor Skills-Upper: Left Intact; Right Intact    Gross Motor Skills-Upper: Left Intact; Right Intact  Tone & Sensation:  Tone: Normal  Sensation: Intact                       Balance:  Sitting: Intact  Standing: Impaired (Per PT - close to baseline, has been ambulating to bathroom)  Standing - Static: Good  Standing - Dynamic : Fair     Functional Mobility and Transfers for ADLs:  Bed Mobility: Transfers: Toilet Transfer : Supervision; Adaptive equipment (With RW)     ADL Assessment:  Feeding: Independent     Oral Facial Hygiene/Grooming: Independent     Bathing: Minimum assistance     Upper Body Dressing: Independent     Lower Body Dressing: Minimum assistance (Wife has been donning socks d/t back pain; has AE)     Toileting: Independent                 ADL Intervention and task modifications:     Lower Body Dressing Assistance  Socks: Total assistance (dependent) (Baseline - has AE, wife completes for spinal pxn)           Cognitive Retraining  Safety/Judgement: Awareness of environment; Insight into deficits;Home safety;Good awareness of safety precautions     Functional Measure:  Barthel Index:      Bathin  Bladder: 10  Bowels: 10  Groomin  Dressin  Feeding: 10  Mobility: 10  Stairs: 0  Toilet Use: 10  Transfer (Bed to Chair and Back): 10  Total: 75         Barthel and G-code impairment scale:  Percentage of impairment CH  0% CI  1-19% CJ  20-39% CK  40-59% CL  60-79% CM  80-99% CN  100%   Barthel Score 0-100 100 99-80 79-60 59-40 20-39 1-19    0   Barthel Score 0-20 20 17-19 13-16 9-12 5-8 1-4 0      The Barthel ADL Index: Guidelines  1. The index should be used as a record of what a patient does, not as a record of what a patient could do. 2. The main aim is to establish degree of independence from any help, physical or verbal, however minor and for whatever reason. 3. The need for supervision renders the patient not independent. 4. A patient's performance should be established using the best available evidence. Asking the patient, friends/relatives and nurses are the usual sources, but direct observation and common sense are also important. However direct testing is not needed. 5. Usually the patient's performance over the preceding 24-48 hours is important, but occasionally longer periods will be relevant.   6. Middle categories imply that the patient supplies over 50 per cent of the effort. 7. Use of aids to be independent is allowed. Yuridia Bundy., Barthel, D.W. (6722). Functional evaluation: the Barthel Index. [facility] (14)2. DEAN Loya, Risa Austin., Jf Jane., Shalini, [address] (1999). Measuring the change indisability after inpatient rehabilitation; comparison of the responsiveness of the Barthel Index and Functional Homer Measure. Journal of Neurology, Neurosurgery, and Psychiatry, 66(4), 320-335. TAYA Santiago, ALICIA Navas, & Sage Jones MAugustinA. (2004.) Assessment of post-stroke quality of life in cost-effectiveness studies: The usefulness of the Barthel Index and the EuroQoL-5D. Quality of Life Research, 13, 425-38            G codes: In compliance with CMSs Claims Based Outcome Reporting, the following G-code set was chosen for this patient based on their primary functional limitation being treated: The outcome measure chosen to determine the severity of the functional limitation was the Barthel Index with a score of 75/100 which was correlated with the impairment scale.       · Self Care:               - CURRENT STATUS:    CJ - 20%-39% impaired, limited or restricted               - GOAL STATUS:           CI - 1%-19% impaired, limited or restricted               - D/C STATUS:             CI - 1%-19% impaired, limited or restricted             Occupational Therapy Evaluation Charge Determination   History Examination Decision-Making   LOW Complexity : Brief history review  LOW Complexity : 1-3 performance deficits relating to physical, cognitive , or psychosocial skils that result in activity limitations and / or participation restrictions  LOW Complexity : No comorbidities that affect functional and no verbal or physical assistance needed to complete eval tasks       Based on the above components, the patient evaluation is determined to be of the following complexity level: LOW   Pain:  Pain Scale 1: (P) Numeric (0 - 10)  Pain Intensity 1: (P) 0              Activity Tolerance:   Good. VSS. MAP ~92  Please refer to the flowsheet for vital signs taken during this treatment. After treatment:   [X]  Patient left in no apparent distress sitting up in chair  [ ]  Patient left in no apparent distress in bed  [X]  Call bell left within reach  [X]  Nursing notified  [ ]  Caregiver present  [ ]  Bed alarm activated      COMMUNICATION/EDUCATION:   Communication/Collaboration:  [X]      Home safety education was provided and the patient/caregiver indicated understanding. [X]      Patient/family have participated as able and agree with findings and recommendations. [ ]      Patient is unable to participate in plan of care at this time.   Findings and recommendations were discussed with: Physical Therapist and Registered Nurse     Maria Esther Phillip OT  Time Calculation: 11 mins

## 2017-01-24 ENCOUNTER — TELEPHONE (OUTPATIENT)
Dept: CARDIOLOGY CLINIC | Age: 62
End: 2017-01-24

## 2017-01-24 VITALS
HEIGHT: 71 IN | WEIGHT: 162.92 LBS | RESPIRATION RATE: 18 BRPM | BODY MASS INDEX: 22.81 KG/M2 | HEART RATE: 88 BPM | OXYGEN SATURATION: 100 % | TEMPERATURE: 97.5 F

## 2017-01-24 LAB
ALBUMIN SERPL BCP-MCNC: 2.6 G/DL (ref 3.5–5)
ALBUMIN/GLOB SERPL: 0.7 {RATIO} (ref 1.1–2.2)
ALP SERPL-CCNC: 97 U/L (ref 45–117)
ALT SERPL-CCNC: 24 U/L (ref 12–78)
ANION GAP BLD CALC-SCNC: 8 MMOL/L (ref 5–15)
AST SERPL W P-5'-P-CCNC: 25 U/L (ref 15–37)
BILIRUB SERPL-MCNC: 0.4 MG/DL (ref 0.2–1)
BUN SERPL-MCNC: 10 MG/DL (ref 6–20)
BUN/CREAT SERPL: 10 (ref 12–20)
CALCIUM SERPL-MCNC: 8.5 MG/DL (ref 8.5–10.1)
CHLORIDE SERPL-SCNC: 100 MMOL/L (ref 97–108)
CO2 SERPL-SCNC: 25 MMOL/L (ref 21–32)
CREAT SERPL-MCNC: 1.05 MG/DL (ref 0.7–1.3)
GLOBULIN SER CALC-MCNC: 3.6 G/DL (ref 2–4)
GLUCOSE SERPL-MCNC: 82 MG/DL (ref 65–100)
INR PPP: 1.7 (ref 0.9–1.1)
LDH SERPL L TO P-CCNC: 214 U/L (ref 85–241)
POTASSIUM SERPL-SCNC: 3.8 MMOL/L (ref 3.5–5.1)
PROT SERPL-MCNC: 6.2 G/DL (ref 6.4–8.2)
PROTHROMBIN TIME: 17.9 SEC (ref 9–11.1)
SODIUM SERPL-SCNC: 133 MMOL/L (ref 136–145)

## 2017-01-24 PROCEDURE — 74011250636 HC RX REV CODE- 250/636: Performed by: NURSE PRACTITIONER

## 2017-01-24 PROCEDURE — 87040 BLOOD CULTURE FOR BACTERIA: CPT | Performed by: INTERNAL MEDICINE

## 2017-01-24 PROCEDURE — 74011250636 HC RX REV CODE- 250/636: Performed by: INTERNAL MEDICINE

## 2017-01-24 PROCEDURE — 83615 LACTATE (LD) (LDH) ENZYME: CPT | Performed by: NURSE PRACTITIONER

## 2017-01-24 PROCEDURE — 74011000258 HC RX REV CODE- 258: Performed by: INTERNAL MEDICINE

## 2017-01-24 PROCEDURE — 74011250637 HC RX REV CODE- 250/637: Performed by: NURSE PRACTITIONER

## 2017-01-24 PROCEDURE — 36415 COLL VENOUS BLD VENIPUNCTURE: CPT | Performed by: NURSE PRACTITIONER

## 2017-01-24 PROCEDURE — 74011250637 HC RX REV CODE- 250/637: Performed by: PHYSICIAN ASSISTANT

## 2017-01-24 PROCEDURE — 99152 MOD SED SAME PHYS/QHP 5/>YRS: CPT

## 2017-01-24 PROCEDURE — 93325 DOPPLER ECHO COLOR FLOW MAPG: CPT

## 2017-01-24 PROCEDURE — 80053 COMPREHEN METABOLIC PANEL: CPT | Performed by: NURSE PRACTITIONER

## 2017-01-24 PROCEDURE — 74011250637 HC RX REV CODE- 250/637: Performed by: INTERNAL MEDICINE

## 2017-01-24 PROCEDURE — 85610 PROTHROMBIN TIME: CPT | Performed by: INTERNAL MEDICINE

## 2017-01-24 RX ORDER — ATROPINE SULFATE 0.1 MG/ML
0.5 INJECTION INTRAVENOUS AS NEEDED
Status: DISCONTINUED | OUTPATIENT
Start: 2017-01-24 | End: 2017-01-24

## 2017-01-24 RX ORDER — ENOXAPARIN SODIUM 100 MG/ML
1 INJECTION SUBCUTANEOUS EVERY 12 HOURS
Status: DISCONTINUED | OUTPATIENT
Start: 2017-01-24 | End: 2017-01-24 | Stop reason: HOSPADM

## 2017-01-24 RX ORDER — WARFARIN SODIUM 5 MG/1
5 TABLET ORAL ONCE
Status: DISCONTINUED | OUTPATIENT
Start: 2017-01-24 | End: 2017-01-24 | Stop reason: SDUPTHER

## 2017-01-24 RX ORDER — ATORVASTATIN CALCIUM 10 MG/1
10 TABLET, FILM COATED ORAL DAILY
Status: DISCONTINUED | OUTPATIENT
Start: 2017-01-24 | End: 2017-01-24 | Stop reason: HOSPADM

## 2017-01-24 RX ORDER — MIDAZOLAM HYDROCHLORIDE 1 MG/ML
.5-1 INJECTION, SOLUTION INTRAMUSCULAR; INTRAVENOUS
Status: DISCONTINUED | OUTPATIENT
Start: 2017-01-24 | End: 2017-01-24

## 2017-01-24 RX ORDER — FENTANYL CITRATE 50 UG/ML
25-200 INJECTION, SOLUTION INTRAMUSCULAR; INTRAVENOUS
Status: DISCONTINUED | OUTPATIENT
Start: 2017-01-24 | End: 2017-01-24

## 2017-01-24 RX ORDER — LEVOFLOXACIN 750 MG/1
750 TABLET ORAL DAILY
Qty: 14 TAB | Refills: 0 | Status: SHIPPED | OUTPATIENT
Start: 2017-01-24 | End: 2017-02-07

## 2017-01-24 RX ORDER — WARFARIN SODIUM 5 MG/1
5 TABLET ORAL ONCE
Status: COMPLETED | OUTPATIENT
Start: 2017-01-24 | End: 2017-01-24

## 2017-01-24 RX ORDER — AMIODARONE HYDROCHLORIDE 200 MG/1
200 TABLET ORAL DAILY
Qty: 30 TAB | Refills: 3 | Status: SHIPPED | OUTPATIENT
Start: 2017-01-24 | End: 2017-03-11 | Stop reason: SDUPTHER

## 2017-01-24 RX ORDER — CARVEDILOL 25 MG/1
25 TABLET ORAL 2 TIMES DAILY WITH MEALS
Qty: 60 TAB | Refills: 4 | Status: SHIPPED | OUTPATIENT
Start: 2017-01-24 | End: 2017-03-11 | Stop reason: SDUPTHER

## 2017-01-24 RX ORDER — ENOXAPARIN SODIUM 100 MG/ML
70 INJECTION SUBCUTANEOUS EVERY 12 HOURS
Qty: 6 SYRINGE | Refills: 0 | Status: SHIPPED | OUTPATIENT
Start: 2017-01-24 | End: 2017-01-27

## 2017-01-24 RX ORDER — SODIUM CHLORIDE 9 MG/ML
25 INJECTION, SOLUTION INTRAVENOUS CONTINUOUS
Status: DISCONTINUED | OUTPATIENT
Start: 2017-01-24 | End: 2017-01-24 | Stop reason: HOSPADM

## 2017-01-24 RX ADMIN — AMIODARONE HYDROCHLORIDE 200 MG: 200 TABLET ORAL at 12:45

## 2017-01-24 RX ADMIN — CITALOPRAM HYDROBROMIDE 40 MG: 20 TABLET ORAL at 12:44

## 2017-01-24 RX ADMIN — ATORVASTATIN CALCIUM 10 MG: 10 TABLET, FILM COATED ORAL at 12:45

## 2017-01-24 RX ADMIN — Medication 10 ML: at 05:40

## 2017-01-24 RX ADMIN — ALLOPURINOL 100 MG: 100 TABLET ORAL at 12:44

## 2017-01-24 RX ADMIN — CARVEDILOL 25 MG: 12.5 TABLET, FILM COATED ORAL at 17:59

## 2017-01-24 RX ADMIN — SODIUM CHLORIDE 25 ML/HR: 900 INJECTION, SOLUTION INTRAVENOUS at 10:00

## 2017-01-24 RX ADMIN — HYDRALAZINE HYDROCHLORIDE 10 MG: 20 INJECTION INTRAMUSCULAR; INTRAVENOUS at 05:01

## 2017-01-24 RX ADMIN — Medication 400 MG: at 12:44

## 2017-01-24 RX ADMIN — PANTOPRAZOLE SODIUM 40 MG: 40 TABLET, DELAYED RELEASE ORAL at 08:00

## 2017-01-24 RX ADMIN — WARFARIN SODIUM 5 MG: 5 TABLET ORAL at 17:00

## 2017-01-24 RX ADMIN — BUMETANIDE 0.5 MG: 1 TABLET ORAL at 12:44

## 2017-01-24 RX ADMIN — FERROUS SULFATE TAB 325 MG (65 MG ELEMENTAL FE) 325 MG: 325 (65 FE) TAB at 12:44

## 2017-01-24 RX ADMIN — FERROUS SULFATE TAB 325 MG (65 MG ELEMENTAL FE) 325 MG: 325 (65 FE) TAB at 17:58

## 2017-01-24 RX ADMIN — CARVEDILOL 25 MG: 12.5 TABLET, FILM COATED ORAL at 08:49

## 2017-01-24 RX ADMIN — MIDAZOLAM HYDROCHLORIDE 2 MG: 1 INJECTION, SOLUTION INTRAMUSCULAR; INTRAVENOUS at 11:06

## 2017-01-24 RX ADMIN — FENTANYL CITRATE 50 MCG: 50 INJECTION, SOLUTION INTRAMUSCULAR; INTRAVENOUS at 11:07

## 2017-01-24 RX ADMIN — MIDAZOLAM HYDROCHLORIDE 2 MG: 1 INJECTION, SOLUTION INTRAMUSCULAR; INTRAVENOUS at 11:09

## 2017-01-24 RX ADMIN — CLOPIDOGREL BISULFATE 75 MG: 75 TABLET ORAL at 12:45

## 2017-01-24 RX ADMIN — ENOXAPARIN SODIUM 70 MG: 80 INJECTION SUBCUTANEOUS at 12:42

## 2017-01-24 RX ADMIN — POTASSIUM CHLORIDE 10 MEQ: 750 TABLET, FILM COATED, EXTENDED RELEASE ORAL at 12:44

## 2017-01-24 RX ADMIN — ALLOPURINOL 100 MG: 100 TABLET ORAL at 17:58

## 2017-01-24 RX ADMIN — LEVOFLOXACIN 750 MG: 500 TABLET, FILM COATED ORAL at 14:34

## 2017-01-24 RX ADMIN — MEROPENEM 500 MG: 500 INJECTION, POWDER, FOR SOLUTION INTRAVENOUS at 05:39

## 2017-01-24 NOTE — PROGRESS NOTES
Ilda Hancock is a 62 y.o. male with complicated medical course  for the past 2 months is admitted with infected AICD site . Pt underwent Laminectomy L4-S1 for lumbar stenosis on 11/22/16 at [facility]- He developed SOB overnight and was diagnosed with ACUTE MI- EF of 15%. He underwent Cardiac cath and found to have triple vessel disease. He was transferred to [facility] on 11/26 and he underwent IABP on 11/29. On 12/1 he had LVAD implanted and CABG X2 ( with LIMA for LAD and SVG- He was defibrillated and then underwent a cardiac cath and stenting of RCA -2 BMS. He continued to have monomorphic sustained VT and he was put on amiodarone, lidocaine and betablocker. He was seen by  on 12/13 and had AICD on 12/16. He was discharged to rehab on 12/21 and went home on 12/27. He was initially doing well and then noted blood oozing from the AICD site. He followed up with  as Op and it was thought to be due to superficial oozing- On 1/16 hr noted more discharge and then saw a hole in the incision site and was admitted on 1/20 . Pt during his 2 month stay was on zosyn, levaquin and vanco for a period of time. 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 Says the dog did not jump on his chest or lick his chest and he has kept it covered-   The AICD was removed in entirety yesterday and cultures have been sent. He is on vanco and levaquin and I am asked to see him for the same       subjective  Doing well     Objective:   VITALS:    97.5 °F (36.4 °C) 88       PHYSICAL EXAM:   General:  Alert, , no distress   Head:   Normocephalic, without obvious abnormality, atraumatic. Eyes:   Conjunctivae clear, anicteric sclerae. Pupils are equal  Nose:  Nares normal. No drainage or sinus tenderness.   Throat:   Lips, mucosa, and tongue normal. No Thrush  Neck:  Supple, symmetrical, no adenopathy, thyroid: non tender  no carotid bruit and no JVD. Back:   Lumbar scar healed well  Lungs:  B/ air entry- decreased air entry left base  Heart:   s1s2  Abdomen:  Soft,   Left catheter- LVAD covered with dressing  Left chest- AICD site covered with steristrip-         Extremities: Extremities normal, atraumatic, no cyanosis. No edema.  DP not felt  Neurologic: Grossly non-focal     Pertinent Labs  Wbc 7.3  Hb 9.2    Cr 0.87> 1.05  BC- 1/20 NG so far  WC X3 - site, Rt ventricular lead and rt atrial lead area all gram neg rods     IMAGING RESULTS:  None     Impression/Recommendation     AICD POCKET infection with enterobacter cloacae- an intestinal organism in humans and animals and  can also be nosocomially acquired- on meropenem- sensitive to cefepime and quinolone-DELLA negative, Blood culture neg and lead wires no thrombus  So we can send him on Levaquin 750mg PO every day for 10-14 days  Will repeat a wound culture next week to look for elimination of bacteria  He has LVAD   Anemia secondary to previous surgeries and blood loss     CAD/CHF- s/p CABG/ LVAD     HTN        S/p lumbar laminectomy- healed well     Discussed with patient, his wife  and his nurse and   Discussed the side effects of levaquin- will follow him as OP in 1 week

## 2017-01-24 NOTE — PROGRESS NOTES
Pharmacist Note  Warfarin Dosing  Consult provided for this 61 y.o.male to manage warfarin for LVAD    INR Goal: 2 - 2.5  Home regimen/ tablet size: 2.5 mg daily    Drugs that may increase INR: Amiodarone (levaquin dc'd on 1/21)  Drugs that may decrease INR: None  Other current anticoagulants/ drugs that may increase bleeding risk: Clopidogrel  Risk factors: None  Daily INR ordered: YES    Recent Labs      01/24/17   0400  01/23/17   0430  01/22/17   0423   HGB   --   8.0*   --    INR  1.7*  1.9*  2.1*     Date               INR                  Dose  1/21  2.2             2.5 mg  1/22  2.1  3 mg  1/23                1.9                   4 mg  1/24                1.7                   5 mg                                                                               Assessment/ Plan: Will order warfarin 5 mg PO x 1 dose. Pharmacy will continue to monitor daily and adjust therapy as indicated. Please contact the pharmacist at  or [phone] for outpatient recommendations if needed.

## 2017-01-24 NOTE — TELEPHONE ENCOUNTER
Spoke with [facility] pharmacist regarding Carolinajerry Jarvismegan. She states he is being d/c on amiodarone and levofloxacin, medications not recommended to be given together. She requests clarification. Provider notified. Pharmacist can be reached at [phone].

## 2017-01-24 NOTE — PROGRESS NOTES
[facility]  Cardiology Progress Note            Date: January 24, 2017     Admit Date: 1/20/2017    Procedure:     ICD removal, lead extraction, pocket irrigation. Mr. Mannie Singer is a 62year old male for whom we were asked to see in consultation by Dr. Malcom Horn for LVAD management. 1/20: ICD removal, lead extraction, and pocket irrigation. Subjective:     Waldo Singer is a 62year old male patient with a PMH significant for ICM, s/p HM II implant as DT on 12/1/6. Additional PMH includes lumbar stenosis s/p laminectomy, sinus tachycardia, VT/VF, HTN, ETOH abuse, chronic anticoagulation, and gout. During his implant hospitalization, Mr. Mannie Singer had several episodes of VT/VF requiring external defibrillation. He was subsequently implanted with a dual chamber ICD on 12/16/16. His post-implant course has been complicated with hematoma formation and delayed wound healing. He was seen by MARIA LUZ Griffiths for a scheduled ramp study on 1/19/17. Incidentally, she noted that his ICD site was still edematous and tender, and had a pencil sized opening in the incision with thick, serosanguinous drainage. The wound was cultured and dressed. Dr. Cynthia Siegel office was notified, and Mr. Mannie Singer was scheduled for a pocket revision 1/20. Upon initial assessment pre-procedure, his wound was noted to be draining thick, yellow fluid, and he was taken to the EP lab for ICD removal, lead extraction, and pocket irrigation. He has subsequently been admitted to [facility] for further assessment and treatment of infected ICD pocket. His wound culture, RA and RV leads grew enterobacter. His blood culture reveals NGTD. Mr. Mannie Singer denies any fevers, chills, rigors, or muscle aches.       Current Facility-Administered Medications:     warfarin (COUMADIN) tablet 5 mg, 5 mg, Oral, ONCE, Janine Piña MD    enoxaparin (LOVENOX) injection 70 mg, 1 mg/kg, SubCUTAneous, Q12H, Alice Richards NP, 70 mg at 01/24/17 1242    atorvastatin (LIPITOR) tablet 10 mg, 10 mg, Oral, DAILY, Addi Whitakerd, NP, 10 mg at 01/24/17 1245    levoFLOXacin (LEVAQUIN) tablet 750 mg, 750 mg, Oral, Q24H, Pipe Puffer, NP, 750 mg at 01/24/17 1434    carvedilol (COREG) tablet 25 mg, 25 mg, Oral, BID WITH MEALS, MARIA LUZ Albert, 25 mg at 01/24/17 0849    warfarin - pharmacy to dose, 1 Each, Other, PRN, Harleen Benz MD    naloxone [facility]) injection 0.4 mg, 0.4 mg, IntraVENous, PRN, Pipe Puffer, NP    sodium chloride (NS) flush 5-10 mL, 5-10 mL, IntraVENous, Q8H, Pipe Puffer, NP, 10 mL at 01/24/17 0540    sodium chloride (NS) flush 5-10 mL, 5-10 mL, IntraVENous, PRN, Pipe Puffer, NP, 10 mL at 01/20/17 1709    potassium chloride SR (KLOR-CON 10) tablet 10 mEq, 10 mEq, Oral, DAILY, Pipe Puffer, NP, 10 mEq at 01/24/17 1244    bumetanide (BUMEX) tablet 0.5 mg, 0.5 mg, Oral, DAILY, Pipe Puffer, NP, 0.5 mg at 01/24/17 1244    ALPRAZolam (XANAX) tablet 0.25 mg, 0.25 mg, Oral, Q6H PRN, Pipe Puffer, NP    zolpidem (AMBIEN) tablet 5 mg, 5 mg, Oral, QHS PRN, Pipe Puffer, NP, 5 mg at 01/23/17 2254    citalopram (CELEXA) tablet 40 mg, 40 mg, Oral, DAILY, Pipe Puffer, NP, 40 mg at 01/24/17 1244    clopidogrel (PLAVIX) tablet 75 mg, 75 mg, Oral, DAILY, Pipe Puffer, NP, 75 mg at 01/24/17 1245    ferrous sulfate tablet 325 mg, 325 mg, Oral, BID WITH MEALS, Pipe Puffer, NP, 325 mg at 01/24/17 1244    magnesium oxide (MAG-OX) tablet 400 mg, 400 mg, Oral, DAILY, Pipe Puffer, NP, 400 mg at 01/24/17 1244    pantoprazole (PROTONIX) tablet 40 mg, 40 mg, Oral, ACB, Pipe Puffer, NP, 40 mg at 01/24/17 0800    allopurinol (ZYLOPRIM) tablet 100 mg, 100 mg, Oral, BID, Pipe Puffer, NP, 100 mg at 01/24/17 1244    amiodarone (CORDARONE) tablet 200 mg, 200 mg, Oral, DAILY, Harleen Benz MD, 200 mg at 01/24/17 1245    hydrALAZINE (APRESOLINE) 20 mg/mL injection 20 mg, 20 mg, IntraVENous, Q4H PRN, America Halsted, NP    hydrALAZINE (APRESOLINE) 20 mg/mL injection 10 mg, 10 mg, IntraVENous, Q4H PRN, Salina Parmar NP, 10 mg at 17 0501    traMADol (ULTRAM) tablet 50 mg, 50 mg, Oral, Q6H PRN, Kin Polanco MD, 50 mg at 17 2331    No Known Allergies       Objective:   Vitals:  Visit Vitals    Pulse 88    Temp 97.5 °F (36.4 °C)    Resp 18    Ht 5' 11\" (1.803 m)    Wt 73.9 kg (162 lb 14.7 oz)    SpO2 100%    BMI 22.72 kg/m2     \"Pulse\" reflects HR via telemetry. MAP: 90    Temp (24hrs), Av °F (36.7 °C), Min:97.5 °F (36.4 °C), Max:98.7 °F (37.1 °C)    VAD Data:    LVAD (Heartmate)  Pump Speed (RPM): 8800  Pump Flow (LPM): 4.5  Chatter in Lines: No  PI (Pulsitility Index): 4.1  Power: 4.7   Test: No  Back Up  at Bedside & Labeled: Yes  Power Module Test: No  Driveline Site Care: No (per pt his wife will change at home tonight)  Driveline Dressing: Clean, Dry, and Intact     Backup  at bedside, program matches primary. Oxygen Therapy:  Oxygen Therapy  O2 Sat (%): 100 % (17 1541)  Pulse via Oximetry: 80 beats per minute (17 1214)  O2 Device: Room air (17 1541)  O2 Flow Rate (L/min): 1 l/min (17 1200)    CXR:17: No CXR as of time of this note. Admission Weight: Last Weight   Weight: 72.1 kg (159 lb) Weight: 73.9 kg (162 lb 14.7 oz)           Intake / Output / Drain:  Last 24 hrs.:     Intake/Output Summary (Last 24 hours) at 17 1730  Last data filed at 17 1435   Gross per 24 hour   Intake              850 ml   Output              400 ml   Net              450 ml       General: AO, NAD  CV: +LVAD hum  RR: WNL  Integ: Please see pocket site description in procedural notes. ,   Extremities: No edema     Labs:  Recent Results (from the past 24 hour(s))   PROTHROMBIN TIME + INR    Collection Time: 17  4:00 AM   Result Value Ref Range    INR 1.7 (H) 0.9 - 1.1      Prothrombin time 17.9 (H) 9.0 - 11.1 sec   LD    Collection Time: 01/24/17  5:33 AM   Result Value Ref Range     85 - 293 U/L   METABOLIC PANEL, COMPREHENSIVE    Collection Time: 01/24/17  5:33 AM   Result Value Ref Range    Sodium 133 (L) 136 - 145 mmol/L    Potassium 3.8 3.5 - 5.1 mmol/L    Chloride 100 97 - 108 mmol/L    CO2 25 21 - 32 mmol/L    Anion gap 8 5 - 15 mmol/L    Glucose 82 65 - 100 mg/dL    BUN 10 6 - 20 MG/DL    Creatinine 1.05 0.70 - 1.30 MG/DL    BUN/Creatinine ratio 10 (L) 12 - 20      GFR est AA >60 >60 ml/min/1.73m2    GFR est non-AA >60 >60 ml/min/1.73m2    Calcium 8.5 8.5 - 10.1 MG/DL    Bilirubin, total 0.4 0.2 - 1.0 MG/DL    ALT 24 12 - 78 U/L    AST 25 15 - 37 U/L    Alk. phosphatase 97 45 - 117 U/L    Protein, total 6.2 (L) 6.4 - 8.2 g/dL    Albumin 2.6 (L) 3.5 - 5.0 g/dL    Globulin 3.6 2.0 - 4.0 g/dL    A-G Ratio 0.7 (L) 1.1 - 2.2         All Micro Results     Procedure Component Value Units Date/Time    CULTURE, BLOOD, PAIRED [[id number]] Collected:  01/24/17 0533    Order Status:  Completed Specimen:  Blood Updated:  01/24/17 0626    CULTURE, BLOOD [[id number]] Collected:  01/20/17 1550    Order Status:  Completed Specimen:  Blood from Blood Updated:  01/24/17 0554     Special Requests: NO SPECIAL REQUESTS        Culture result: NO GROWTH 4 DAYS       CULTURE, Tri Lacy STAIN [[id number]]  (Susceptibility) Collected:  01/20/17 1756    Order Status:  Completed Specimen:  Misc. Wound Updated:  01/23/17 0950     Special Requests: RV LEAD TIP      GRAM STAIN RARE  WBCS SEEN         OCCASIONAL  GRAM NEGATIVE RODS        Culture result: MODERATE  ENTEROBACTER SPECIES       CULTURE, Tri Lacy STAIN [[id number]]  (Susceptibility) Collected:  01/20/17 1755    Order Status:  Completed Specimen:  Misc.  Wound Updated:  01/23/17 0949     Special Requests: ICD POCKET      GRAM STAIN RARE  WBCS SEEN         NO ORGANISMS SEEN        Culture result: LIGHT  ENTEROBACTER CLOACAE       CULTURE, WOUND Jose Juan Hedges STAIN [[id number]]  (Susceptibility) Collected: 01/20/17 1756    Order Status:  Completed Specimen:  Misc. Wound Updated:  01/23/17 0948     Special Requests: RA LEAD TIP      GRAM STAIN NO WBC'S SEEN         NO ORGANISMS SEEN        Culture result: FEW  ENTEROBACTER CLOACAE       CULTURE, BLOOD, PAIRED [[id number]]     Order Status:  Canceled Specimen:  Blood                Assessment/Plan:     ICD pocket infection: S/p ICD removal by Dr. Birt Saint. Wound cx enterobacter cloacae, blood cx NGTD   IV vanco and IV merrem changed to levaquin 750 mg q 12 hours      ICM, s/p HM II implant as DT: Please see VAD flow sheet for readings. Continue Coreg, Bumex. No ACE-1/ARB d/t hypotension. Daily weights, strict I/O. Chronic anticoagulation, goal INR 2-2.5: INR 1.9  Resume coumadin - pharmacy to dose  Please see anticoagulation tracker for details. Hx of VT/VF: Monitor closely for ectopy since ICD removed. Continue amiodarone. Keep K+ > 4.0 and Mg++ > 2.0. LifeVest prior to discharge    Post-operative anemia: Hgb improving. Continue Folic acid, iron, vitamin c.  Monitor labs. CAD, s/p CABG and PCI: Continue Coreg, Plavix. Consider statin. Hx of lumbar stenosis, s/p decompressive laminectomy and instrumentation: Management per ortho. HTN: MAP goal 70-90 mmHg. Continue Coreg. Gout: Continue allopurinol daily, colchicine for flares. Depression/anxiety: Celexa. Ppx: Pantoprazole for SUP, warfarin covers DVT ppx. Thank you for letting us see him with you,    Kristi Bryan M.D.  [facility] - [address] [address]  Dept: [phone]  Loc: [phone]  22 hour VAD/HF Pager: [phone]

## 2017-01-24 NOTE — PROGRESS NOTES
Cardiac Cath Lab Procedure Area Arrival Note:    An Mejia arrived to Cardiac Cath Lab, Procedure Area. Patient identifiers verified with NAME and DATE OF BIRTH. Procedure verified with patient. Consent forms verified. Allergies verified. Patient informed of procedure and plan of care. Questions answered with review. Patient voiced understanding of procedure and plan of care. Patient on cardiac monitor, non-invasive blood pressure, SPO2 monitor. On RA, placed on O2 @ 2 lpm via nc. IV of ns on pump at 25 ml/hr. Patient status doing well without problems. Patient is A&Ox 3. Patient reports no pain. Patient medicated during procedure with orders obtained and verified by Dr. Jovan Jacinto. Refer to patients Cardiac Cath Lab PROCEDURE REPORT for vital signs, assessment, status, and response during procedure, printed at end of case. Printed report on chart or scanned into chart.

## 2017-01-24 NOTE — PROGRESS NOTES
TRANSFER - OUT REPORT:    Verbal report given to Birdie Hollingsworth RN(name) on Duane Wilkerson  being transferred to CVSU(unit) for routine progression of care       Report consisted of patients Situation, Background, Assessment and   Recommendations(SBAR). Information from the following report(s) Procedure Summary, Intake/Output, MAR, Recent Results, Med Rec Status and Cardiac Rhythm SR was reviewed with the receiving nurse. Lines:   PICC Triple Lumen 64/99/53 Right;Basilic (Active)       Peripheral IV 01/21/17 Left Arm (Active)   Site Assessment Clean, dry, & intact 1/24/2017 10:00 AM   Phlebitis Assessment 0 1/24/2017 10:00 AM   Infiltration Assessment 0 1/24/2017 10:00 AM   Dressing Status Clean, dry, & intact 1/24/2017 10:00 AM   Dressing Type Transparent 1/24/2017 10:00 AM   Hub Color/Line Status Blue; Infusing;Flushed 1/24/2017 10:00 AM   Action Taken Open ports on tubing capped 1/24/2017  4:57 AM   Alcohol Cap Used Yes 1/24/2017 10:00 AM        Opportunity for questions and clarification was provided.       Patient transported with:   Monitor  Registered Nurse, LVAD  Battery and back up battery  [id number] [phone] by [facility]

## 2017-01-24 NOTE — PROGRESS NOTES
Cardiac Electrophysiology Progress Note      1/24/2017 10:10 AM    Admit Date: 1/20/2017    Admit Diagnosis: cardiac cath;ICD (implantable cardioverter-defibrillator) i*      Subjective:     Charla Zhou feels fine. He denies chest pain or SOB. 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      Social History Main Topics    Smoking status: Former Smoker     Packs/day: 1.50     Years: 30.00     Quit date: 2008    Smokeless tobacco: Never Used    Alcohol use 24.0 oz/week     40 Cans of beer per week    Drug use: No    Sexual activity: Not on file     Other Topics Concern    Not on file     Social History Narrative       Visit Vitals    Pulse 76    Temp 97.6 °F (36.4 °C)    Resp 17    Ht 5' 11\" (1.803 m)    Wt 162 lb 14.7 oz (73.9 kg)    SpO2 100%    BMI 22.72 kg/m2     Current Facility-Administered Medications   Medication Dose Route Frequency    warfarin (COUMADIN) tablet 5 mg  5 mg Oral ONCE    enoxaparin (LOVENOX) injection 70 mg  1 mg/kg SubCUTAneous Q12H    atorvastatin (LIPITOR) tablet 10 mg  10 mg Oral DAILY    0.9% sodium chloride infusion  25 mL/hr IntraVENous CONTINUOUS    carvedilol (COREG) tablet 25 mg  25 mg Oral BID WITH MEALS    warfarin - pharmacy to dose  1 Each Other PRN    meropenem (MERREM) 500 mg in 0.9% sodium chloride (MBP/ADV) 50 mL  0.5 g IntraVENous Q6H    naloxone (NARCAN) injection 0.4 mg  0.4 mg IntraVENous PRN    sodium chloride (NS) flush 5-10 mL  5-10 mL IntraVENous Q8H    sodium chloride (NS) flush 5-10 mL  5-10 mL IntraVENous PRN    potassium chloride SR (KLOR-CON 10) tablet 10 mEq  10 mEq Oral DAILY    bumetanide (BUMEX) tablet 0.5 mg  0.5 mg Oral DAILY    ALPRAZolam (XANAX) tablet 0.25 mg  0.25 mg Oral Q6H PRN    zolpidem (AMBIEN) tablet 5 mg  5 mg Oral QHS PRN    citalopram (CELEXA) tablet 40 mg  40 mg Oral DAILY    clopidogrel (PLAVIX) tablet 75 mg  75 mg Oral DAILY    ferrous sulfate tablet 325 mg  325 mg Oral BID WITH MEALS    magnesium oxide (MAG-OX) tablet 400 mg  400 mg Oral DAILY    pantoprazole (PROTONIX) tablet 40 mg  40 mg Oral ACB    allopurinol (ZYLOPRIM) tablet 100 mg  100 mg Oral BID    amiodarone (CORDARONE) tablet 200 mg  200 mg Oral DAILY    hydrALAZINE (APRESOLINE) 20 mg/mL injection 20 mg  20 mg IntraVENous Q4H PRN    hydrALAZINE (APRESOLINE) 20 mg/mL injection 10 mg  10 mg IntraVENous Q4H PRN    traMADol (ULTRAM) tablet 50 mg  50 mg Oral Q6H PRN         Objective:      Physical Exam:  Visit Vitals    Pulse 76    Temp 97.6 °F (36.4 °C)    Resp 17    Ht 5' 11\" (1.803 m)    Wt 162 lb 14.7 oz (73.9 kg)    SpO2 100%    BMI 22.72 kg/m2     General Appearance:  Well nourished,alert and oriented x 3, and individual in no acute distress. Ears/Nose/Mouth/Throat:   Hearing grossly normal.         Neck: Supple. Chest:   Lungs clear to auscultation bilaterally. Cardiovascular:  LVAD hum   Abdomen:   Soft, non-tender, bowel sounds are active. LVAD drive line CDI- left side. Extremities: No edema bilaterally. Skin: Warm and dry. ICD removal incision edges approximated closed with nylon sutures.                 Data Review:   Labs:    Recent Results (from the past 24 hour(s))   URINALYSIS W/MICROSCOPIC    Collection Time: 01/23/17  3:42 PM   Result Value Ref Range    Color YELLOW/STRAW      Appearance CLEAR CLEAR      Specific gravity 1.011 1.003 - 1.030      pH (UA) 6.0 5.0 - 8.0      Protein NEGATIVE  NEG mg/dL    Glucose NEGATIVE  NEG mg/dL    Ketone NEGATIVE  NEG mg/dL    Bilirubin NEGATIVE  NEG      Blood NEGATIVE  NEG      Urobilinogen 0.2 0.2 - 1.0 EU/dL    Nitrites NEGATIVE  NEG      Leukocyte Esterase NEGATIVE  NEG      WBC 0-4 0 - 4 /hpf    RBC 0-5 0 - 5 /hpf    Epithelial cells FEW FEW /lpf    Bacteria NEGATIVE  NEG /hpf    Hyaline Cast 0-2 0 - 5 /lpf   PROTHROMBIN TIME + INR    Collection Time: 01/24/17  4:00 AM   Result Value Ref Range    INR 1.7 (H) 0.9 - 1.1      Prothrombin time 17.9 (H) 9.0 - 11.1 sec   LD    Collection Time: 01/24/17  5:33 AM   Result Value Ref Range     85 - 143 U/L   METABOLIC PANEL, COMPREHENSIVE    Collection Time: 01/24/17  5:33 AM   Result Value Ref Range    Sodium 133 (L) 136 - 145 mmol/L    Potassium 3.8 3.5 - 5.1 mmol/L    Chloride 100 97 - 108 mmol/L    CO2 25 21 - 32 mmol/L    Anion gap 8 5 - 15 mmol/L    Glucose 82 65 - 100 mg/dL    BUN 10 6 - 20 MG/DL    Creatinine 1.05 0.70 - 1.30 MG/DL    BUN/Creatinine ratio 10 (L) 12 - 20      GFR est AA >60 >60 ml/min/1.73m2    GFR est non-AA >60 >60 ml/min/1.73m2    Calcium 8.5 8.5 - 10.1 MG/DL    Bilirubin, total 0.4 0.2 - 1.0 MG/DL    ALT 24 12 - 78 U/L    AST 25 15 - 37 U/L    Alk.  phosphatase 97 45 - 117 U/L    Protein, total 6.2 (L) 6.4 - 8.2 g/dL    Albumin 2.6 (L) 3.5 - 5.0 g/dL    Globulin 3.6 2.0 - 4.0 g/dL    A-G Ratio 0.7 (L) 1.1 - 2.2         Telemetry: NSR      Assessment:     Principal Problem:    ICD (implantable cardioverter-defibrillator) infection ([facility] [address] (1/20/2017)      Overview: 1/20/2017: Removal of dual chamber AICD generator and its leads under       fluoroscopy      Cultures 1/20/17      RA lead/ICD pocket: gram negative rods, cultures enterobacter cloacae      RV lead gram negative rods enterobacter species             DELLA 1/24/17: No vegetation on valves     S/P ICD removal 1/20/17  Ischemic cardiomyopathy  LVAD in place  Chronic systolic CHF, NYHA class II  Hx of VF arrest  Anemia      Assessment/ Plan:  RA lead/ICD pocket: gram negative rods, cultures enterobacter cloacae  RV lead gram negative rods enterobacter species   Blood cultures negative after 4 days  POD#4 removal of ICD  He will need lifevest prior to discharge. Daily or PRN dressing changes to ICD removal site. I will arrange appt for him to have suture removed in 1 week                   Addendum from EP attending:   I have seen, examined patient, and discussed with nurse practitioner, registered nurse, reviewed, updated note and agree with the assessment and plan    I have talked to him this am. He is feeling fine, no concerns  Vital signs are stable  Exam shows LVAD sound and 2/6 diastolic murmur  Chest wall no redness of ICD site, sutures were interrupted for drainage, dressing is changed  Assessment and Plan:  Continue with antibiotic.     I have discussed with Dr Jeffery Armenta of ID about DELLA findings and will change IV meropenem to PO levaquin for 2 weeks 750 mg every day and she will see him in office 1 week  Life vest defibrillator until wound heals and then reimplant ICD for secondary prevention of sudden death  Plan to dc home when life vest defib rep fits him

## 2017-01-24 NOTE — PROGRESS NOTES
[facility]   Progress Note      Date: 2017     Admit Date: 2017    Romeo RiccihtyAugustin Colin is a 62year old male patient with a PMH significant for ICM, s/p HM II implant as DT on . Additional PMH includes lumbar stenosis s/p laminectomy, sinus tachycardia, VT/VF, HTN, ETOH abuse, chronic anticoagulation, and gout. During his implant hospitalization, Mr. Alem Colin had several episodes of VT/VF requiring external defibrillation. He was subsequently implanted with a dual chamber ICD on 16. His post-implant course has been complicated with hematoma formation and delayed wound healing. He was seen by MARIA LUZ Gray for a scheduled ramp study on 17. Incidentally, she noted that his ICD site was still edematous and tender, and had a pencil sized opening in the incision with thick, serosanguinous drainage. The wound was cultured and dressed. Dr. Lyn Migeul office was notified, and Mr. Alem Colin was scheduled for a pocket revision . Upon initial assessment pre-procedure, his wound was noted to be draining thick, yellow fluid, and he was taken to the EP lab for ICD removal, lead extraction, and pocket irrigation. He has subsequently been admitted to [facility] for further assessment and treatment of infected ICD pocket. Subjective:     Seen with Dr. Ross Claude and Dr. Tennille Leslie    Last 24 hours:   RA/RV lead cultures growing enterobacter clocae  WBC OK, afebrile  INR sub-therapeutic    This AM:   Feels \"good\" this AM, ready to get DELLA over with. Objective:     Visit Vitals    Pulse 97    Temp 98.1 °F (36.7 °C)    Resp 20    Ht 5' 11\" (1.803 m)    Wt 162 lb 14.7 oz (73.9 kg)    SpO2 94%    BMI 22.72 kg/m2     \"Pulse\" reflects HR via telemetry.      MAP: 110 (?)      Temp (24hrs), Av.1 °F (36.7 °C), Min:97.9 °F (36.6 °C), Max:98.3 °F (36.8 °C)    VAD Data:    LVAD (Heartmate)  Pump Speed (RPM): 8790  Pump Flow (LPM): 4.5  Chatter in Lines: No  PI (Pulsitility Index): 7.6  Power: 4.7   Test: Yes  Back Up  at Bedside & Labeled: Yes  Power Module Test: No  Driveline Site Care: No  Driveline Dressing: Clean, Dry, and Intact     Backup  at bedside, program matches primary    Oxygen Therapy:  Oxygen Therapy  O2 Sat (%): 94 % (01/24/17 0830)  Pulse via Oximetry: 86 beats per minute (01/20/17 1915)  O2 Device: Room air (01/24/17 0830)  O2 Flow Rate (L/min): 2 l/min (01/20/17 1815)      Admission Weight: Last Weight   Weight: 159 lb (72.1 kg) Weight: 162 lb 14.7 oz (73.9 kg)           Intake / Output / Drain:  Last 24 hrs.:     Intake/Output Summary (Last 24 hours) at 01/24/17 0948  Last data filed at 01/24/17 0804   Gross per 24 hour   Intake             1060 ml   Output              700 ml   Net              360 ml       EXAM:  Neuro: A&O  Resp: CTA  CV:  +VAD  GI: +BS Soft NT/ND  : voiding  Integ: drive line dressing & stabilization device intact. Left upper chest incision dressing removed, moderate sanguinous drainage. Wound closed w/interrupted sutures. Slight edema around wound edges, but no warmth, pain w/palpation.     Ext: trace lower extremity edema    Recent Results (from the past 24 hour(s))   URINALYSIS W/MICROSCOPIC    Collection Time: 01/23/17  3:42 PM   Result Value Ref Range    Color YELLOW/STRAW      Appearance CLEAR CLEAR      Specific gravity 1.011 1.003 - 1.030      pH (UA) 6.0 5.0 - 8.0      Protein NEGATIVE  NEG mg/dL    Glucose NEGATIVE  NEG mg/dL    Ketone NEGATIVE  NEG mg/dL    Bilirubin NEGATIVE  NEG      Blood NEGATIVE  NEG      Urobilinogen 0.2 0.2 - 1.0 EU/dL    Nitrites NEGATIVE  NEG      Leukocyte Esterase NEGATIVE  NEG      WBC 0-4 0 - 4 /hpf    RBC 0-5 0 - 5 /hpf    Epithelial cells FEW FEW /lpf    Bacteria NEGATIVE  NEG /hpf    Hyaline Cast 0-2 0 - 5 /lpf   PROTHROMBIN TIME + INR    Collection Time: 01/24/17  4:00 AM   Result Value Ref Range    INR 1.7 (H) 0.9 - 1.1      Prothrombin time 17.9 (H) 9.0 - 11.1 sec   LD    Collection Time: 01/24/17  5:33 AM   Result Value Ref Range     85 - 483 U/L   METABOLIC PANEL, COMPREHENSIVE    Collection Time: 01/24/17  5:33 AM   Result Value Ref Range    Sodium 133 (L) 136 - 145 mmol/L    Potassium 3.8 3.5 - 5.1 mmol/L    Chloride 100 97 - 108 mmol/L    CO2 25 21 - 32 mmol/L    Anion gap 8 5 - 15 mmol/L    Glucose 82 65 - 100 mg/dL    BUN 10 6 - 20 MG/DL    Creatinine 1.05 0.70 - 1.30 MG/DL    BUN/Creatinine ratio 10 (L) 12 - 20      GFR est AA >60 >60 ml/min/1.73m2    GFR est non-AA >60 >60 ml/min/1.73m2    Calcium 8.5 8.5 - 10.1 MG/DL    Bilirubin, total 0.4 0.2 - 1.0 MG/DL    ALT 24 12 - 78 U/L    AST 25 15 - 37 U/L    Alk.  phosphatase 97 45 - 117 U/L    Protein, total 6.2 (L) 6.4 - 8.2 g/dL    Albumin 2.6 (L) 3.5 - 5.0 g/dL    Globulin 3.6 2.0 - 4.0 g/dL    A-G Ratio 0.7 (L) 1.1 - 2.2         Current Facility-Administered Medications   Medication Dose Route Frequency    warfarin (COUMADIN) tablet 5 mg  5 mg Oral ONCE    enoxaparin (LOVENOX) injection 70 mg  1 mg/kg SubCUTAneous Q12H    warfarin (COUMADIN) tablet 5 mg  5 mg Oral ONCE    carvedilol (COREG) tablet 25 mg  25 mg Oral BID WITH MEALS    warfarin - pharmacy to dose  1 Each Other PRN    meropenem (MERREM) 500 mg in 0.9% sodium chloride (MBP/ADV) 50 mL  0.5 g IntraVENous Q6H    naloxone (NARCAN) injection 0.4 mg  0.4 mg IntraVENous PRN    sodium chloride (NS) flush 5-10 mL  5-10 mL IntraVENous Q8H    sodium chloride (NS) flush 5-10 mL  5-10 mL IntraVENous PRN    potassium chloride SR (KLOR-CON 10) tablet 10 mEq  10 mEq Oral DAILY    bumetanide (BUMEX) tablet 0.5 mg  0.5 mg Oral DAILY    ALPRAZolam (XANAX) tablet 0.25 mg  0.25 mg Oral Q6H PRN    zolpidem (AMBIEN) tablet 5 mg  5 mg Oral QHS PRN    citalopram (CELEXA) tablet 40 mg  40 mg Oral DAILY    clopidogrel (PLAVIX) tablet 75 mg  75 mg Oral DAILY    ferrous sulfate tablet 325 mg  325 mg Oral BID WITH MEALS    magnesium oxide (MAG-OX) tablet 400 mg  400 mg Oral DAILY    pantoprazole (PROTONIX) tablet 40 mg  40 mg Oral ACB    allopurinol (ZYLOPRIM) tablet 100 mg  100 mg Oral BID    amiodarone (CORDARONE) tablet 200 mg  200 mg Oral DAILY    hydrALAZINE (APRESOLINE) 20 mg/mL injection 20 mg  20 mg IntraVENous Q4H PRN    hydrALAZINE (APRESOLINE) 20 mg/mL injection 10 mg  10 mg IntraVENous Q4H PRN    traMADol (ULTRAM) tablet 50 mg  50 mg Oral Q6H PRN         Assessment and Plan:     ICD pocket infection: S/p ICD removal by Dr. Angel Bunch. RA and RV leads growing enterobacter clocae. DELLA this AM to assess for valve vegetations. If negative, can transition to PO abx. Until then, continue vanc and meropenem. Pan culture any temps > 101. Trend CBC.       ICM, s/p HM II implant as DT: Please see VAD flow sheet for readings. Continue Coreg, Bumex. No ACE-1/ARB d/t h/x of hypotension - re-check MAP this afternoon, if remains elevated, will start low dose ACE-1. Daily weights, strict I/O.       Chronic anticoagulation, goal INR 2-2.5: INR slightly sub-therapeutic (1.7). Begin enoxaparin 1 mg/kg q12h. Continue Coumadin, Plavix. Please see anticoagulation tracker for details.       Hx of VT/VF: Monitor closely for ectopy since ICD removed. Continue amiodarone. Keep K+ > 4.0 and Mg++ > 2.0.       Post-operative anemia: Hgb stable. Continue folic acid, iron, vitamin c. Monitor labs.       CAD, s/p CABG and PCI: Continue Coreg, Plavix. Consider statin.       Hx of lumbar stenosis, s/p decompressive laminectomy and instrumentation: Management per ortho. PT/OT.     HTN: MAP goal 70-90 mmHg. Continue Coreg, consider ACE-1 if remains hypertensive.       Gout: Continue allopurinol daily, colchicine for flares.       Depression/anxiety: Celexa.       Ppx: Pantoprazole for SUP, warfarin covers DVT ppx.        Signed By: Dallin Montero NP

## 2017-01-24 NOTE — PROGRESS NOTES
Cardiac Cath Lab Recovery Arrival Note:      Duane Wilkerson arrived to Cardiac Cath Lab, Recovery Area. Staff introduced to patient. Patient identifiers verified with NAME and DATE OF BIRTH. Procedure verified with patient. Consent forms reviewed and signed by patient or authorized representative and verified. Allergies verified. Patient and family oriented to department. Patient and family informed of procedure and plan of care. Questions answered with review. Patient prepped for procedure, per orders from physician, prior to arrival.    Patient on cardiac monitor, non-invasive blood pressure, SPO2 monitor. On room air. Patient is A&Ox 4. Patient reports no complaints. Patient in stretcher, in low position, with side rails up, call bell within reach, patient instructed to call if assistance as needed. Patient prep in: [address], [place]. Patient family has pager # [phone]  Family in: outside hospital  Wife #[phone].    Prep by: Rell Reddy RN    Pre DELLA teaching completed

## 2017-01-24 NOTE — PROGRESS NOTES
Spoke with patient's wife, Annika Lennon, about the patient being discharged today. She is aware of the plan for [facility] and the patient no longer needing home IV antibiotics. She will assist the patient home this evening. Spoke with Pino Cramer of [facility] and they will resume orders tomorrow for home health nursing and PT. Asked Clyde Schneider to put in home health orders. Will continue to follow the patient at [facility].     Sissy Alfredo, MSW, LCSW    Clinical    Norman Sims [phone]

## 2017-01-24 NOTE — CONSULTS
[facility]  Cardiology Consult Note            Date: January 23, 2017     Admit Date: 1/20/2017    Procedure:     ICD removal, lead extraction, pocket irrigation. Mr. Hernandez Patient is a 62year old male for whom we were asked to see in consultation by Dr. Abel Fung for LVAD management. 1/20: ICD removal, lead extraction, and pocket irrigation. Subjective:     Jarrett FlushingAugustin Hernandez Patient is a 62year old male patient with a PMH significant for ICM, s/p HM II implant as DT on 12/1/6. Additional PMH includes lumbar stenosis s/p laminectomy, sinus tachycardia, VT/VF, HTN, ETOH abuse, chronic anticoagulation, and gout. During his implant hospitalization, Mr. Mary Roberts had several episodes of VT/VF requiring external defibrillation. He was subsequently implanted with a dual chamber ICD on 12/16/16. His post-implant course has been complicated with hematoma formation and delayed wound healing. He was seen by MARIA LUZ Gregg for a scheduled ramp study on 1/19/17. Incidentally, she noted that his ICD site was still edematous and tender, and had a pencil sized opening in the incision with thick, serosanguinous drainage. The wound was cultured and dressed. Dr. Radha Maloney office was notified, and Mr. Mary Roberts was scheduled for a pocket revision 1/20. Upon initial assessment pre-procedure, his wound was noted to be draining thick, yellow fluid, and he was taken to the EP lab for ICD removal, lead extraction, and pocket irrigation. He has subsequently been admitted to [facility] for further assessment and treatment of infected ICD pocket. His wound culture, RA and RV leads grew enterobacter. His blood culture reveals NGTD. Mr. Hernandez Patient denies any fevers, chills, rigors, or muscle aches.         Active Ambulatory Problems     Diagnosis Date Noted    S/P laminectomy 11/22/2016    Sinus tachycardia 11/24/2016    Acute respiratory failure with hypoxia ([address] 11/24/2016    Essential hypertension 11/24/2016    Alcohol abuse 11/24/2016    Chronic systolic heart failure ([facility] 11/26/2016    CAD, multiple vessel 11/26/2016    Ischemic cardiomyopathy 11/26/2016    MI ([facility]ocardial infarction) ([facility] 11/27/2016    Sustained VT (ventricular tachycardia) ([facility] 12/13/2016    S/P ICD (internal cardiac defibrillator) procedure 12/16/2016    Chronic anticoagulation 12/27/2016    Left ventricular assist device present ([facility] 12/27/2016    Gout 12/30/2016     Resolved Ambulatory Problems     Diagnosis Date Noted    HCAP (healthcare-associated pneumonia) 11/24/2016    VF (ventricular fibrillation) ([facility] 12/13/2016     Past Medical History   Diagnosis Date    Chronic pain     Hypertension     Ill-defined condition     Seizures ([facility]      Past Surgical History   Procedure Laterality Date    Hx heent       wisdom teeth removed     Family History   Problem Relation Age of Onset    Diabetes Mother     Dementia Mother     Other Mother      Latex allergy    Heart Disease Father     Stroke Father     No Known Problems Sister     Cancer Brother      brain     Social History     Social History    Marital status:      Spouse name: N/A    Number of children: N/A    Years of education: N/A     Occupational History    Not on file.      Social History Main Topics    Smoking status: Former Smoker     Packs/day: 1.50     Years: 30.00     Quit date: 2008    Smokeless tobacco: Never Used    Alcohol use 24.0 oz/week     40 Cans of beer per week    Drug use: No    Sexual activity: Not on file     Other Topics Concern    Not on file     Social History Narrative         Current Facility-Administered Medications:     carvedilol (COREG) tablet 25 mg, 25 mg, Oral, BID WITH MEALS, MARIA LUZ Terrell, 25 mg at 01/23/17 1802    warfarin - pharmacy to dose, 1 Each, Other, PRN, Tristin Boyer MD    meropenem (MERREM) 500 mg in 0.9% sodium chloride (MBP/ADV) 50 mL, 0.5 g, IntraVENous, Q6H, Dawit Blackmon MD, Last Rate: 100 mL/hr at 01/23/17 2300, 500 mg at 01/23/17 2300    naloxone [facility]) injection 0.4 mg, 0.4 mg, IntraVENous, PRN, Yimi Hwang NP    sodium chloride (NS) flush 5-10 mL, 5-10 mL, IntraVENous, Q8H, Yimi Hwang NP, 10 mL at 01/23/17 2255    sodium chloride (NS) flush 5-10 mL, 5-10 mL, IntraVENous, PRN, Yimi Hwang NP, 10 mL at 01/20/17 1709    potassium chloride SR (KLOR-CON 10) tablet 10 mEq, 10 mEq, Oral, DAILY, Yimi Hwang NP, 10 mEq at 01/23/17 5219    bumetanide (BUMEX) tablet 0.5 mg, 0.5 mg, Oral, DAILY, Yimi Hwang NP, 0.5 mg at 01/23/17 8173    ALPRAZolam (XANAX) tablet 0.25 mg, 0.25 mg, Oral, Q6H PRN, Yimi Hwang NP    zolpidem (AMBIEN) tablet 5 mg, 5 mg, Oral, QHS PRN, Yimi Hwang NP, 5 mg at 01/23/17 2254    citalopram (CELEXA) tablet 40 mg, 40 mg, Oral, DAILY, Yimi Hwang NP, 40 mg at 01/23/17 0809    clopidogrel (PLAVIX) tablet 75 mg, 75 mg, Oral, DAILY, Yimi Hwang NP, 75 mg at 01/23/17 6550    ferrous sulfate tablet 325 mg, 325 mg, Oral, BID WITH MEALS, Yimi Hwang NP, 325 mg at 01/23/17 1802    magnesium oxide (MAG-OX) tablet 400 mg, 400 mg, Oral, DAILY, Yimi Hwang NP, 400 mg at 01/23/17 0835    pantoprazole (PROTONIX) tablet 40 mg, 40 mg, Oral, ACB, Yimi Hwang NP, 40 mg at 01/23/17 0708    allopurinol (ZYLOPRIM) tablet 100 mg, 100 mg, Oral, BID, Yimi Hwang NP, 100 mg at 01/23/17 1802    amiodarone (CORDARONE) tablet 200 mg, 200 mg, Oral, DAILY, Betsy Blizzard, MD, 200 mg at 01/23/17 0835    hydrALAZINE (APRESOLINE) 20 mg/mL injection 20 mg, 20 mg, IntraVENous, Q4H PRN, Teresa Gonzalez NP    hydrALAZINE (APRESOLINE) 20 mg/mL injection 10 mg, 10 mg, IntraVENous, Q4H PRN, Teresa Gonzalez NP    traMADol (ULTRAM) tablet 50 mg, 50 mg, Oral, Q6H PRN, Betsy Blizzard, MD, 50 mg at 01/21/17 1729    No Known Allergies       Objective:   Vitals:  Visit Vitals    Pulse 91    Temp 98.3 °F (36.8 °C)    Resp 18    Ht 5' 11\" (1.803 m)    Wt 73.7 kg (162 lb 7.7 oz)    SpO2 95%    BMI 22.66 kg/m2     \"Pulse\" reflects HR via telemetry. MAP: 90    Temp (24hrs), Av.1 °F (36.7 °C), Min:97.8 °F (36.6 °C), Max:98.6 °F (37 °C)    VAD Data:    LVAD (Heartmate)  Pump Speed (RPM): 8800  Pump Flow (LPM): 5  Chatter in Lines: No  PI (Pulsitility Index): 7.6  Power: 4.8   Test: No  Back Up  at Bedside & Labeled: Yes  Power Module Test: No  Driveline Site Care: No  Driveline Dressing: Clean, Dry, and Intact     Backup  at bedside, program matches primary. Oxygen Therapy:  Oxygen Therapy  O2 Sat (%): 95 % (17)  Pulse via Oximetry: 86 beats per minute (17)  O2 Device: Room air (17)  O2 Flow Rate (L/min): 2 l/min (17)    CXR:17: No CXR as of time of this note. Admission Weight: Last Weight   Weight: 72.1 kg (159 lb) Weight: 73.7 kg (162 lb 7.7 oz)           Intake / Output / Drain:  Last 24 hrs.:     Intake/Output Summary (Last 24 hours) at 17  Last data filed at 17   Gross per 24 hour   Intake             1595 ml   Output              300 ml   Net             1295 ml       General: AO, NAD  CV: +LVAD hum  RR: WNL  Integ: Please see pocket site description in procedural notes. ,   Extremities: No edema     Labs:  Recent Results (from the past 24 hour(s))   LD    Collection Time: 17  4:30 AM   Result Value Ref Range     (H) 85 - 241 U/L   PROTHROMBIN TIME + INR    Collection Time: 17  4:30 AM   Result Value Ref Range    INR 1.9 (H) 0.9 - 1.1      Prothrombin time 19.0 (H) 9.0 - 11.1 sec   CBC W/O DIFF    Collection Time: 17  4:30 AM   Result Value Ref Range    WBC 7.2 4.1 - 11.1 K/uL    RBC 3.19 (L) 4.10 - 5.70 M/uL    HGB 8.0 (L) 12.1 - 17.0 g/dL    HCT 26.2 (L) 36.6 - 50.3 %    MCV 82.1 80.0 - 99.0 FL    MCH 25.1 (L) 26.0 - 34.0 PG    MCHC 30.5 30.0 - 36.5 g/dL    RDW 15.9 (H) 11.5 - 14.5 %    PLATELET 286 (H) 556 - 073 K/uL   METABOLIC PANEL, BASIC    Collection Time: 01/23/17  4:30 AM   Result Value Ref Range    Sodium 133 (L) 136 - 145 mmol/L    Potassium 4.1 3.5 - 5.1 mmol/L    Chloride 101 97 - 108 mmol/L    CO2 24 21 - 32 mmol/L    Anion gap 8 5 - 15 mmol/L    Glucose 87 65 - 100 mg/dL    BUN 10 6 - 20 MG/DL    Creatinine 0.97 0.70 - 1.30 MG/DL    BUN/Creatinine ratio 10 (L) 12 - 20      GFR est AA >60 >60 ml/min/1.73m2    GFR est non-AA >60 >60 ml/min/1.73m2    Calcium 8.4 (L) 8.5 - 10.1 MG/DL   VANCOMYCIN, TROUGH    Collection Time: 01/23/17  7:02 AM   Result Value Ref Range    Vancomycin,trough 24.7 (HH) 5.0 - 10.0 ug/mL    Reported dose date: NOT PROVIDED      Reported dose time: NOT PROVIDED      Reported dose: NOT PROVIDED UNITS   URINALYSIS W/MICROSCOPIC    Collection Time: 01/23/17  3:42 PM   Result Value Ref Range    Color YELLOW/STRAW      Appearance CLEAR CLEAR      Specific gravity 1.011 1.003 - 1.030      pH (UA) 6.0 5.0 - 8.0      Protein NEGATIVE  NEG mg/dL    Glucose NEGATIVE  NEG mg/dL    Ketone NEGATIVE  NEG mg/dL    Bilirubin NEGATIVE  NEG      Blood NEGATIVE  NEG      Urobilinogen 0.2 0.2 - 1.0 EU/dL    Nitrites NEGATIVE  NEG      Leukocyte Esterase NEGATIVE  NEG      WBC 0-4 0 - 4 /hpf    RBC 0-5 0 - 5 /hpf    Epithelial cells FEW FEW /lpf    Bacteria NEGATIVE  NEG /hpf    Hyaline Cast 0-2 0 - 5 /lpf       All Micro Results     Procedure Component Value Units Date/Time    CULTURE, Deborah Clamp STAIN [[id number]]  (Susceptibility) Collected:  01/20/17 1756    Order Status:  Completed Specimen:  Misc. Wound Updated:  01/23/17 0988     Special Requests: RV LEAD TIP      GRAM STAIN RARE  WBCS SEEN         OCCASIONAL  GRAM NEGATIVE RODS        Culture result: MODERATE  ENTEROBACTER SPECIES       CULTURE, Deborah Clamp STAIN [[id number]]  (Susceptibility) Collected:  01/20/17 1755    Order Status:  Completed Specimen:  Misc.  Wound Updated:  01/23/17 0948     Special Requests: ICD POCKET      GRAM STAIN RARE  WBCS SEEN         NO ORGANISMS SEEN        Culture result: LIGHT  ENTEROBACTER CLOACAE       CULTURE, Elan Rizzo STAIN [[id number]]  (Susceptibility) Collected:  01/20/17 1756    Order Status:  Completed Specimen:  Misc. Wound Updated:  01/23/17 0948     Special Requests: RA LEAD TIP      GRAM STAIN NO WBC'S SEEN         NO ORGANISMS SEEN        Culture result: FEW  ENTEROBACTER CLOACAE       CULTURE, BLOOD [[id number]] Collected:  01/20/17 1550    Order Status:  Completed Specimen:  Blood from Blood Updated:  01/23/17 0605     Special Requests: NO SPECIAL REQUESTS        Culture result: NO GROWTH 3 DAYS       CULTURE, BLOOD, PAIRED [[id number]]     Order Status:  Canceled Specimen:  Blood                Assessment/Plan:     ICD pocket infection: S/p ICD removal by Dr. Zoila Foreman. Wound cx enterobacter, blood cx NGTD   IV vanco and IV merrem. Pan culture any temps > 101. Trend CBC. ICM, s/p HM II implant as DT: Please see VAD flow sheet for readings. Continue Coreg, Bumex. No ACE-1/ARB d/t hypotension. Daily weights, strict I/O. Chronic anticoagulation, goal INR 2-2.5: INR 1.9  Resume coumadin - pharmacy to dose  Please see anticoagulation tracker for details. Hx of VT/VF: Monitor closely for ectopy since ICD removed. Continue amiodarone. Keep K+ > 4.0 and Mg++ > 2.0. Post-operative anemia: Hgb improving. Continue folic acid, iron, vitamin c.  Monitor labs. CAD, s/p CABG and PCI: Continue Coreg, Plavix. Consider statin. Hx of lumbar stenosis, s/p decompressive laminectomy and instrumentation: Management per ortho. HTN: MAP goal 70-90 mmHg. Continue Coreg. Gout: Continue allopurinol daily, colchicine for flares. Depression/anxiety: Celexa. Ppx: Pantoprazole for SUP, warfarin covers DVT ppx. Thank you for letting us see him with you,    Kristi Rice M.D.  [facility] [place], [address] [address]  Dept: [phone]  Loc: [phone]  24 hour VAD/HF Pager: [phone]

## 2017-01-24 NOTE — PROGRESS NOTES
Dr Felix Bell in to talk with pt.    OK to change left chest wall dsg    1030 left chest wall dsg changed, area cleansed with chloraprep, sterile 4x4 dsg and tegaderm dsg applied, mod amt of serosanganous drainage noted no odor noted, sutures intact

## 2017-01-24 NOTE — PROCEDURES
Cardiac Procedure Note   Patient: Cinda Pedro  MRN: [id number]  SSN: [id number]   YOB: 1955 Age: 62 y.o. Sex: male    Date of Procedure: 1/24/2017   Pre-procedure Diagnosis: AICD pocket infection with RV and RA lead +bacteria  LVAD  Post-procedure Diagnosis: same  Procedure: DELLA  :  Dr. Conchis Bailey MD    Assistant(s):  None  Anesthesia: Moderate Sedation with versed and fentanyl  Estimated Blood Loss: none  Specimens Removed: None  Findings: no valvular vegetation, mild MR, trivial AR and TR.   No MA.  LVEF 20-25%  + pleural effusion  Complications: None   Implants:  None  Signed by:  Conchis Bailey MD  1/24/2017  11:58 AM

## 2017-01-24 NOTE — PROGRESS NOTES
Problem: Falls - Risk of  Goal: *Absence of falls  Outcome: Progressing Towards Goal  Up with standby assistance. Pt uses can and rollator at home. Problem: Pacer/ICD: Post-Procedure  Goal: Off Pathway (Use only if patient is Off Pathway)  Outcome: Progressing Towards Goal  Pacemaker removed on Friday. Education on wound site care, hand hygiene, etc.    Problem: Discharge Planning  Goal: *Discharge to safe environment  Outcome: Progressing Towards Goal  Pt to be discharged post [address] education. Provided with written and verbal discharge information. Opportunities for questions provided. Follow up appointments discussed.   Goal: *Knowledge of medication management  Outcome: Progressing Towards Goal  Copy of medications in discharge summary/teaching

## 2017-01-24 NOTE — PROGRESS NOTES
TRANSFER - IN REPORT:    Verbal report received from [facility] on [place] Easton  being received from procedure for routine progression of care. Report consisted of patients Situation, Background, Assessment and Recommendations(SBAR). Information from the following report(s) Procedure Summary, MAR and Recent Results was reviewed with the receiving clinician. Opportunity for questions and clarification was provided. Assessment completed upon patients arrival to [address] [place] [address] and care assumed. Cardiac Cath Lab Recovery Arrival Note:    [place] Easton arrived to [facility] recovery area. Patient procedure= DELLA. Patient on cardiac monitor, non-invasive blood pressure, SPO2 monitor. On  O2 @ 2 lpm via n/c. IV  of nacl on pump at 25 ml/hr. Patient status doing well without problems. Patient is A&Ox 4. Patient reports no complaints. PROCEDURE SITE CHECK:    Procedure site:without any bleeding and or hematoma, no pain/discomfort reported at procedure site. No change in patient status. Continue to monitor patient and status.     Dr Gemma Centeno talked with family via phone

## 2017-01-24 NOTE — PROGRESS NOTES
Chart reviewed. Pt off the floor at [facility] and unable to participate in Physical Therapy at this time. Will check back as time permits and as appropriate. Thank you!      Alpa Phelan, PT, DPT

## 2017-01-24 NOTE — PROGRESS NOTES
0800hrs:  Bedside and Verbal shift change report given to Mateusz Winston RN (oncoming nurse) by Wilfrid Holt RN (offgoing nurse). Report included the following information SBAR, Kardex, Procedure Summary, Intake/Output, MAR, Recent Results, Med Rec Status and Cardiac Rhythm NSR.    0825hrs:  Report given to Anca Ulrich in [address] [place] [address]. Patient scheduled for DELLA this morning. 1205hrs:  Report received from [address] in [address] [place] [address]. Patient awake but drowsy. Ok to eat once less drowsy. Stable vitals.

## 2017-01-24 NOTE — DISCHARGE SUMMARY
Cardiology Discharge Summary     Patient ID:  Cinda Pedro  [id number]  21 y.o.  1955    Admit Date: 1/20/2017    Discharge Date: 1/24/2017     Admitting Physician: Conchis Bailey MD     Discharge Physician: MD Nino Baker NP    Admission Diagnoses: cardiac cath  ICD (implantable cardioverter-defibrillator) infection [facility])    Discharge Diagnoses: Principal Problem:    ICD (implantable cardioverter-defibrillator) infection ([address] (1/20/2017)      Overview: 1/20/2017: Removal of dual chamber AICD generator and its leads under       fluoroscopy      Cultures 1/20/17      RA lead/ICD pocket: gram negative rods, cultures enterobacter cloacae      RV lead gram negative rods enterobacter species             DELLA 1/24/17: No vegetation on valves         Discharge Condition: Stable    Cardiology Procedures this Admission:  ICD explant and RA/RV ICD lead removal/ DELLA    Consults: ID and Advanced Heart Failure    Visit Vitals    Pulse 76    Temp 97.6 °F (36.4 °C)    Resp 17    Ht 5' 11\" (1.803 m)    Wt 162 lb 14.7 oz (73.9 kg)    SpO2 100%    BMI 22.72 kg/m2       Discharge Exam:     Visit Vitals    Pulse 76    Temp 97.6 °F (36.4 °C)    Resp 17    Ht 5' 11\" (1.803 m)    Wt 162 lb 14.7 oz (73.9 kg)    SpO2 100%    BMI 22.72 kg/m2     General Appearance:  Well nourished,alert and oriented x 3, and individual in no acute distress. Ears/Nose/Mouth/Throat:   Hearing grossly normal.         Neck: Supple. Chest:   Lungs clear to auscultation bilaterally. Cardiovascular:  LVAd humm   Abdomen:   Soft, non-tender. LVAD drive line    Extremities: No edema bilaterally. Skin: Warm and dry. Left side incision (removal of ICD) draining serosanguinous, thick. Nylon sutures intact, covered with CD gauze. HOSPITAL COURSE: Cinda Pedro is a 62 y.o. male who was admitted for ICD infection. Dr Herman Dsouza removed dual chamber AICD generator and its leads under fluoroscopy 1/20/2017.    Cultures 1/20/17:  RA lead/ICD pocket: gram negative rods, cultures enterobacter cloacae  RV lead gram negative rods enterobacter species   ID consulted, he was on IV Vanco then meropenum. DELLA 1/24/17 showed no vegetation on valves. He will go home with PO levaquin 750 mg daily x 14 days and follow up with ID out patient. Medications changes include increase in Coreg dose and decrease in amiodarone dose- new Rx sent to pharmacy. Coumadin dosing per Adavanced heart failure. No ACE or ARB for now d/t hypotension, AHF team will reassess MAP outpatient and may start a low dose ACEI in the future. He will go home with the [facility] defibrillator. Admission CHF NYHA class 3  Discharge CHF NYHA class 3    Disposition: home    Patient Instructions:     lovenox 70 mg q 12 hrs until INR 2 then stop it  Current Discharge Medication List      START taking these medications    Details   levoFLOXacin (LEVAQUIN) 750 mg tablet Take 1 Tab by mouth daily for 14 days. Qty: 14 Tab, Refills: 0         CONTINUE these medications which have CHANGED    Details   carvedilol (COREG) 25 mg tablet Take 1 Tab by mouth two (2) times daily (with meals). Qty: 60 Tab, Refills: 4      amiodarone (CORDARONE) 200 mg tablet Take 1 Tab by mouth daily. Qty: 30 Tab, Refills: 3         CONTINUE these medications which have NOT CHANGED    Details   POTASSIUM CHLORIDE Take 10 mEq by mouth daily. allopurinol (ZYLOPRIM) 100 mg tablet Take 1 Tab by mouth two (2) times a day. Qty: 60 Tab, Refills: 6      bumetanide (BUMEX) 0.5 mg tablet Take 1 Tab by mouth daily. Qty: 30 Tab, Refills: 2      zolpidem (AMBIEN) 5 mg tablet Take 1 Tab by mouth nightly as needed for Sleep. Max Daily Amount: 5 mg. Qty: 30 Tab, Refills: 11    Associated Diagnoses: Primary insomnia      HYDROcodone-acetaminophen (NORCO) 5-325 mg per tablet Take  by mouth.       ondansetron (ZOFRAN ODT) 8 mg disintegrating tablet Take 1 Tab by mouth every eight (8) hours as needed for Nausea. Qty: 30 Tab, Refills: 2      colchicine 0.6 mg tablet Take 1 Tab by mouth two (2) times a day. Indications: GOUT  Qty: 60 Tab, Refills: 4      ascorbic acid, vitamin C, (VITAMIN C) 500 mg tablet Take 1 Tab by mouth two (2) times a day. Qty: 60 Tab, Refills: 6      citalopram (CELEXA) 40 mg tablet Take 1 Tab by mouth daily. Qty: 30 Tab, Refills: 6      clopidogrel (PLAVIX) 75 mg tab Take 1 Tab by mouth daily. Qty: 30 Tab, Refills: 6      ferrous sulfate 325 mg (65 mg iron) tablet Take 1 Tab by mouth two (2) times daily (with meals). Qty: 60 Tab, Refills: 6      folic acid (FOLVITE) 1 mg tablet Take 1 Tab by mouth daily. Qty: 30 Tab, Refills: 6      magnesium oxide (MAG-OX) 400 mg tablet Take 1 Tab by mouth daily. Qty: 30 Tab, Refills: 6      pantoprazole (PROTONIX) 40 mg tablet Take 1 Tab by mouth Daily (before breakfast). Qty: 30 Tab, Refills: 6      traMADol (ULTRAM) 50 mg tablet Take 1 Tab by mouth every six (6) hours as needed. Max Daily Amount: 200 mg. Qty: 60 Tab, Refills: 0      warfarin (COUMADIN) 2.5 mg tablet Take 1 Tab by mouth daily. Qty: 120 Tab, Refills: 6      ALPRAZolam (XANAX) 0.25 mg tablet Take 1 Tab by mouth every six (6) hours as needed for Anxiety. Max Daily Amount: 1 mg. Qty: 60 Tab, Refills: 0    Associated Diagnoses: Anxiety             Referenced discharge instructions provided by nursing for diet and activity.     Quality Care Documentation    Patient not prescribed ACEI due to Hypotension      Patient not prescribed ARB due to Hypotension        Follow-up with     Future Appointments  Date Time Provider Kylah Sneed   1/26/2017 3:00 PM Arleth Mack MD [facility] SCHED   2/1/2017 8:00 AM George oCnnors  [address]   4/6/2017 11:00 [facility] [id number] [facility]   4/6/2017 11:20 AM George Connors  [address]         Signed:  Karla Downing NP  1/24/2017  11:56 AM  Addendum from EP attending:   I have seen, examined patient, and discussed with nurse practitioner, registered nurse, reviewed, updated note and agree with the assessment and plan    I have talked to him and his wife and advanced CHF Juli Officer, NP  Vital signs are stable  Exam shows LVAD sounds  Chest wall with drainage from interrupted sutures. Assessment and Plan:  ICD pocket erosion and + enterobacter cloacae. This is likely nosocomial infection since he was in hospital for long period of time post MI and LVAD. The lead may grew bacteria as it came out through the pocket and may have been contaminated (he did not have bacteremia or valve vegetation)  I have discussed with Dr Ina Estrada and he will be seen in a week but for now may go home with levaquin 750 mg every day 14 days  He will have lifevest defib on today before leaving, I have talked to [address] rep  He will be on lovenox till INR 2 or higher, daily check INR with home health nurse and his wife is making calls.   I have spoken to Juli Officer NP of LVAD team  Future Appointments  Date Time Provider Kylah Sneed   1/26/2017 3:00 PM Hilario Euceda MD [facility] SCHED   2/1/2017 8:00 AM Reema De Anda  [address]   4/6/2017 11:00  [place], [address] [facility]   4/6/2017 11:20 AM Reema De Anda  [address]     Dr Ina Estrada of ID team will make appt

## 2017-01-24 NOTE — PROGRESS NOTES
TRANSFER - OUT REPORT:    Verbal report given to Nathan Curiel on Olayinka Glasgow being transferred to  for routine progression of care       Report consisted of patients Situation, Background, Assessment and   Recommendations(SBAR). Information from the following report(s) SBAR, Procedure Summary and MAR was reviewed with the receiving nurse. Opportunity for questions and clarification was provided.

## 2017-01-25 ENCOUNTER — HOME CARE VISIT (OUTPATIENT)
Dept: SCHEDULING | Facility: HOME HEALTH | Age: 62
End: 2017-01-25
Payer: COMMERCIAL

## 2017-01-25 LAB — HGB FREE PLAS-MCNC: 22.6 MG/DL (ref 0–4.9)

## 2017-01-25 PROCEDURE — G0299 HHS/HOSPICE OF RN EA 15 MIN: HCPCS

## 2017-01-25 NOTE — TELEPHONE ENCOUNTER
Spoke with pharmacist Vivian Burgos at [facility] to confirm Dr. Elyssa Menjivar aware of interaction, should fill medication for the patient. Also spoke with Mr. and Mrs. Mackenzie (on speakerphone) to inform of this, and they state will go to pharmacy on today to obtain medication. The patient and his wife verbalized understanding and will call our office with any further questions or concerns.

## 2017-01-26 ENCOUNTER — HOSPITAL ENCOUNTER (OUTPATIENT)
Dept: CT IMAGING | Age: 62
Discharge: HOME | End: 2017-01-26
Attending: PHYSICIAN ASSISTANT
Payer: COMMERCIAL

## 2017-01-26 ENCOUNTER — OFFICE VISIT (OUTPATIENT)
Dept: CARDIOLOGY CLINIC | Age: 62
End: 2017-01-26

## 2017-01-26 VITALS — OXYGEN SATURATION: 98 % | HEART RATE: 67 BPM

## 2017-01-26 DIAGNOSIS — S09.90XA HEAD INJURY, INITIAL ENCOUNTER: ICD-10-CM

## 2017-01-26 DIAGNOSIS — S09.90XA HEAD INJURY, INITIAL ENCOUNTER: Primary | ICD-10-CM

## 2017-01-26 LAB
BACTERIA SPEC CULT: NORMAL
SERVICE CMNT-IMP: NORMAL

## 2017-01-26 PROCEDURE — 70450 CT HEAD/BRAIN W/O DYE: CPT

## 2017-01-26 RX ORDER — WARFARIN 2.5 MG/1
2.5 TABLET ORAL DAILY
Qty: 50 TAB | Refills: 0 | Status: SHIPPED | OUTPATIENT
Start: 2017-01-26 | End: 2017-02-07 | Stop reason: SDUPTHER

## 2017-01-26 RX ORDER — FOLIC ACID 1 MG/1
1 TABLET ORAL DAILY
Qty: 30 TAB | Refills: 6 | Status: SHIPPED | OUTPATIENT
Start: 2017-01-26 | End: 2017-08-26 | Stop reason: SDUPTHER

## 2017-01-26 RX ORDER — CLOPIDOGREL BISULFATE 75 MG/1
75 TABLET ORAL DAILY
Qty: 30 TAB | Refills: 0 | Status: SHIPPED | OUTPATIENT
Start: 2017-01-26 | End: 2017-02-22 | Stop reason: SDUPTHER

## 2017-01-26 RX ORDER — PANTOPRAZOLE SODIUM 40 MG/1
40 TABLET, DELAYED RELEASE ORAL
Qty: 30 TAB | Refills: 0 | Status: SHIPPED | OUTPATIENT
Start: 2017-01-26 | End: 2017-02-07 | Stop reason: SDUPTHER

## 2017-01-26 RX ORDER — CITALOPRAM 40 MG/1
40 TABLET, FILM COATED ORAL DAILY
Qty: 30 TAB | Refills: 0 | Status: SHIPPED | OUTPATIENT
Start: 2017-01-26 | End: 2017-02-22 | Stop reason: SDUPTHER

## 2017-01-26 NOTE — PROGRESS NOTES
Norman Sims 1721  LVAD Office Visit      Date of VAD implant: 12/1/2016  Cardiologist: GRAHAM  PCP: Justin Pemberton MD  Consultants: Dr. Gemma Centeno (EP), Dr. Georgette Crystal (GI), Dr. Yony Hollins (Ortho)    HPI: Kingston Easton is a 62 y.o. male with a past medical history s/p HM II LVAD for DT, h/o torasades/SVT, HTN, lumbar stenosis s/p lumbar laminectomy, CAD (s/p CABG/sp BMSx2), gout, strobic seizures, ETOH abuse and remote tobacco abuse who presents today for follow up after discharge from the hospital d/t ICD site infectionseen today for routine LVAD follow up. Wjhile stepping off the curb at home, pt caught his toe and fell, he hit his right arm, shoulder and head on the concrete. He denies LOC. He denies lightheadedness, dizziness, SOB, CP or any issues prior to the fall. He states \"I just tripped on my toe\". Pt is having bleeding from his elbow and forehead. In light of the patient's fall - the visit was terminated so we could get the patient to the hospital for a stat head CT. Informed the patient and Mrs. Mann we can deal with all the other issues at a later date. Since discharge from the hospital he continues to have fatigue. He is tolerating the antibiotics without issue. Wife is concerned about some LE swelling. He is not performing daily weights. His wife is also concerned about Mr. Mann experiencing some insomnia. She stated he tried the melatonin but didn't think it really helped. Pt c/o fatigue, bleeding from Lovenox injection sites,     Pt denies fevers, chills, diaphoresis, headache, visual changes, dizziness, syncope, lightheadedness, chest pain, palpitations, SOB, constipation/diarrhea, edema, depression/anxiety. Assessment / Plan:    Heart Failure Status: NYHA Class II    Ischemic cardiomyopathy, s/p HM II LVAD implant as DT 12/1/16 -  Pt denies alarrms at home. Pt not interrogated d/t urgent nature of needing to get head CT d/t trauma.     S/p fall w/ head trauma - pt denies LOC and passed mini neuro exam.  Will take to hospital for stat head CT to evaluate for bleed since pt is on Coumadin. Head laceration does not require sutures. Bleeding was controlled at elbow abrasion. ICD infection - pt continues on Levaquin and is followed by ID. ID wants to repeat cultures next week. Continue Life Vest      Problem List:  Patient Active Problem List   Diagnosis Code    S/P laminectomy Z98.890    Sinus tachycardia R00.0    Acute respiratory failure with hypoxia ([facility]) J96.01    Essential hypertension I10    Alcohol abuse F10.10    Chronic systolic heart failure ([facility]) I50.22    CAD, multiple vessel I25.10    Ischemic cardiomyopathy I25.5    MI (myocardial infarction) ([facility] [address] I21.3    Sustained VT (ventricular tachycardia) ([facility]) I47.2    S/P ICD (internal cardiac defibrillator) procedure Z95.810    Chronic anticoagulation Z79.01    Left ventricular assist device present ([address] Z95.811    Gout M10.9    ICD (implantable cardioverter-defibrillator) infection ([facility] [address] T82. Dulcynea.Sa        Past Medical History   Diagnosis Date    Chronic pain      back    Hypertension     Ill-defined condition      gout    Seizures ([facility])      strobic seizures early 19's       Family History   Problem Relation Age of Onset    Diabetes Mother     Dementia Mother     Other Mother      Latex allergy    Heart Disease Father     Stroke Father     No Known Problems Sister     Cancer Brother      brain       Social History     Social History    Marital status:      Spouse name: N/A    Number of children: N/A    Years of education: N/A     Occupational History    Not on file.      Social History Main Topics    Smoking status: Former Smoker     Packs/day: 1.50     Years: 30.00     Quit date: 2008    Smokeless tobacco: Never Used    Alcohol use 24.0 oz/week     40 Cans of beer per week    Drug use: No    Sexual activity: Not on file     Other Topics Concern    Not on file Social History Narrative       Medications:  No Known Allergies     Current Outpatient Prescriptions on File Prior to Visit   Medication Sig    pantoprazole (PROTONIX) 40 mg tablet Take 1 Tab by mouth Daily (before breakfast).  warfarin (COUMADIN) 2.5 mg tablet Take 1 Tab by mouth daily.  citalopram (CELEXA) 40 mg tablet Take 1 Tab by mouth daily.  folic acid (FOLVITE) 1 mg tablet Take 1 Tab by mouth daily.  clopidogrel (PLAVIX) 75 mg tab Take 1 Tab by mouth daily.  carvedilol (COREG) 25 mg tablet Take 1 Tab by mouth two (2) times daily (with meals).  levoFLOXacin (LEVAQUIN) 750 mg tablet Take 1 Tab by mouth daily for 14 days.  amiodarone (CORDARONE) 200 mg tablet Take 1 Tab by mouth daily.  POTASSIUM CHLORIDE Take 10 mEq by mouth daily.  allopurinol (ZYLOPRIM) 100 mg tablet Take 1 Tab by mouth two (2) times a day.  bumetanide (BUMEX) 0.5 mg tablet Take 1 Tab by mouth daily.  ALPRAZolam (XANAX) 0.25 mg tablet Take 1 Tab by mouth every six (6) hours as needed for Anxiety. Max Daily Amount: 1 mg.    HYDROcodone-acetaminophen (NORCO) 5-325 mg per tablet Take  by mouth.  colchicine 0.6 mg tablet Take 1 Tab by mouth two (2) times a day. Indications: GOUT (Patient taking differently: Take 0.6 mg by mouth as needed. Indications: GOUT)    ascorbic acid, vitamin C, (VITAMIN C) 500 mg tablet Take 1 Tab by mouth two (2) times a day.  ferrous sulfate 325 mg (65 mg iron) tablet Take 1 Tab by mouth two (2) times daily (with meals).  magnesium oxide (MAG-OX) 400 mg tablet Take 1 Tab by mouth daily.  traMADol (ULTRAM) 50 mg tablet Take 1 Tab by mouth every six (6) hours as needed. Max Daily Amount: 200 mg.  enoxaparin (LOVENOX) 80 mg/0.8 mL injection 70 mg by SubCUTAneous route every twelve (12) hours for 3 days. Indications: LVAD    zolpidem (AMBIEN) 5 mg tablet Take 1 Tab by mouth nightly as needed for Sleep. Max Daily Amount: 5 mg.     ondansetron (ZOFRAN ODT) 8 mg disintegrating tablet Take 1 Tab by mouth every eight (8) hours as needed for Nausea. No current facility-administered medications on file prior to visit.          Results for orders placed or performed during the hospital encounter of 01/20/17   CULTURE, WOUND W GRAM STAIN   Result Value Ref Range    Special Requests: ICD POCKET     GRAM STAIN RARE  WBCS SEEN        GRAM STAIN NO ORGANISMS SEEN      Culture result: LIGHT  ENTEROBACTER CLOACAE           Susceptibility    Enterobacter cloacae - ALIZA     Amikacin ($) <=16 Susceptible ug/mL     Aztreonam ($$$$) <=4 Susceptible ug/mL     Ceftazidime ($) <=1 Susceptible ug/mL     Ceftriaxone ($) <=1 Susceptible ug/mL     Cefotaxime <=2 Susceptible ug/mL     Cefepime ($$) <=4 Susceptible ug/mL     Ciprofloxacin ($) <=1 Susceptible ug/mL     Gentamicin ($) <=4 Susceptible ug/mL     Imipenem <=1 Susceptible ug/mL     Levofloxacin ($) <=2 Susceptible ug/mL     Meropenem ($$) <=1 Susceptible ug/mL     Piperacillin/Tazobac ($) <=16 Susceptible ug/mL     Tobramycin ($) <=4 Susceptible ug/mL     Trimeth-Sulfamethoxa <=2/38 Susceptible ug/mL   CULTURE, WOUND W GRAM STAIN   Result Value Ref Range    Special Requests: RV LEAD TIP     GRAM STAIN RARE  WBCS SEEN        GRAM STAIN OCCASIONAL  GRAM NEGATIVE RODS        Culture result: MODERATE  ENTEROBACTER SPECIES           Susceptibility    Enterobacter species - ALIZA     Amikacin ($) <=16 Susceptible ug/mL     Ampicillin ($) 16 Intermediate ug/mL     Ampicillin/sulbactam ($) 16/8 Intermediate ug/mL     Aztreonam ($$$$) <=4 Susceptible ug/mL     Cefazolin ($) >16 Resistant ug/mL     Ceftazidime ($) <=1 Susceptible ug/mL     Ceftriaxone ($) <=1 Susceptible ug/mL     Cefuroxime ($) 16 Intermediate ug/mL     Cefotaxime <=2 Susceptible ug/mL     Cefepime ($$) <=4 Susceptible ug/mL     Ciprofloxacin ($) <=1 Susceptible ug/mL     Gentamicin ($) <=4 Susceptible ug/mL     Imipenem <=1 Susceptible ug/mL     Levofloxacin ($) <=2 Susceptible ug/mL     Meropenem ($$) <=1 Susceptible ug/mL     Piperacillin/Tazobac ($) <=16 Susceptible ug/mL     Tobramycin ($) <=4 Susceptible ug/mL     Trimeth-Sulfamethoxa <=2/38 Susceptible ug/mL   CULTURE, WOUND W GRAM STAIN   Result Value Ref Range    Special Requests: RA LEAD TIP     GRAM STAIN NO WBC'S SEEN      GRAM STAIN NO ORGANISMS SEEN      Culture result: FEW  ENTEROBACTER CLOACAE           Susceptibility    Enterobacter cloacae - ALIZA     Amikacin ($) <=16 Susceptible ug/mL     Aztreonam ($$$$) <=4 Susceptible ug/mL     Ceftazidime ($) <=1 Susceptible ug/mL     Ceftriaxone ($) <=1 Susceptible ug/mL     Cefotaxime <=2 Susceptible ug/mL     Cefepime ($$) <=4 Susceptible ug/mL     Ciprofloxacin ($) <=1 Susceptible ug/mL     Gentamicin ($) <=4 Susceptible ug/mL     Imipenem <=1 Susceptible ug/mL     Levofloxacin ($) <=2 Susceptible ug/mL     Meropenem ($$) <=1 Susceptible ug/mL     Piperacillin/Tazobac ($) <=16 Susceptible ug/mL     Tobramycin ($) <=4 Susceptible ug/mL     Trimeth-Sulfamethoxa <=2/38 Susceptible ug/mL   CULTURE, BLOOD   Result Value Ref Range    Special Requests: NO SPECIAL REQUESTS      Culture result: NO GROWTH 6 DAYS     CULTURE, BLOOD, PAIRED   Result Value Ref Range    Special Requests: NO SPECIAL REQUESTS      Culture result: NO GROWTH 2 DAYS     CBC WITH AUTOMATED DIFF   Result Value Ref Range    WBC 7.3 4.1 - 11.1 K/uL    RBC 3.59 (L) 4.10 - 5.70 M/uL    HGB 9.2 (L) 12.1 - 17.0 g/dL    HCT 30.0 (L) 36.6 - 50.3 %    MCV 83.6 80.0 - 99.0 FL    MCH 25.6 (L) 26.0 - 34.0 PG    MCHC 30.7 30.0 - 36.5 g/dL    RDW 15.9 (H) 11.5 - 14.5 %    PLATELET 192 (H) 234 - 400 K/uL    NEUTROPHILS 64 32 - 75 %    LYMPHOCYTES 21 12 - 49 %    MONOCYTES 7 5 - 13 %    EOSINOPHILS 7 0 - 7 %    BASOPHILS 1 0 - 1 %    ABS. NEUTROPHILS 4.7 1.8 - 8.0 K/UL    ABS. LYMPHOCYTES 1.5 0.8 - 3.5 K/UL    ABS. MONOCYTES 0.5 0.0 - 1.0 K/UL    ABS. EOSINOPHILS 0.5 (H) 0.0 - 0.4 K/UL    ABS.  BASOPHILS 0.0 0.0 - 0.1 K/UL   METABOLIC PANEL, COMPREHENSIVE   Result Value Ref Range    Sodium 135 (L) 136 - 145 mmol/L    Potassium 4.0 3.5 - 5.1 mmol/L    Chloride 99 97 - 108 mmol/L    CO2 26 21 - 32 mmol/L    Anion gap 10 5 - 15 mmol/L    Glucose 84 65 - 100 mg/dL    BUN 11 6 - 20 MG/DL    Creatinine 0.99 0.70 - 1.30 MG/DL    BUN/Creatinine ratio 11 (L) 12 - 20      GFR est AA >60 >60 ml/min/1.73m2    GFR est non-AA >60 >60 ml/min/1.73m2    Calcium 8.7 8.5 - 10.1 MG/DL    Bilirubin, total 0.4 0.2 - 1.0 MG/DL    ALT 35 12 - 78 U/L    AST 34 15 - 37 U/L    Alk.  phosphatase 114 45 - 117 U/L    Protein, total 6.7 6.4 - 8.2 g/dL    Albumin 3.2 (L) 3.5 - 5.0 g/dL    Globulin 3.5 2.0 - 4.0 g/dL    A-G Ratio 0.9 (L) 1.1 - 2.2     PROTHROMBIN TIME + INR   Result Value Ref Range    INR 2.6 (H) 0.9 - 1.1      Prothrombin time 26.1 (H) 9.0 - 11.1 sec   LD   Result Value Ref Range     85 - 241 U/L   PROTHROMBIN TIME + INR   Result Value Ref Range    INR 2.2 (H) 0.9 - 1.1      Prothrombin time 22.8 (H) 9.0 - 20.1 sec   METABOLIC PANEL, BASIC   Result Value Ref Range    Sodium 138 136 - 145 mmol/L    Potassium 3.9 3.5 - 5.1 mmol/L    Chloride 104 97 - 108 mmol/L    CO2 24 21 - 32 mmol/L    Anion gap 10 5 - 15 mmol/L    Glucose 84 65 - 100 mg/dL    BUN 13 6 - 20 MG/DL    Creatinine 0.87 0.70 - 1.30 MG/DL    BUN/Creatinine ratio 15 12 - 20      GFR est AA >60 >60 ml/min/1.73m2    GFR est non-AA >60 >60 ml/min/1.73m2    Calcium 8.4 (L) 8.5 - 10.1 MG/DL   LD   Result Value Ref Range     (H) 85 - 241 U/L   PROTHROMBIN TIME + INR   Result Value Ref Range    INR 2.1 (H) 0.9 - 1.1      Prothrombin time 21.4 (H) 9.0 - 93.2 sec   METABOLIC PANEL, BASIC   Result Value Ref Range    Sodium 136 136 - 145 mmol/L    Potassium 3.9 3.5 - 5.1 mmol/L    Chloride 103 97 - 108 mmol/L    CO2 23 21 - 32 mmol/L    Anion gap 10 5 - 15 mmol/L    Glucose 81 65 - 100 mg/dL    BUN 11 6 - 20 MG/DL    Creatinine 0.95 0.70 - 1.30 MG/DL    BUN/Creatinine ratio 12 12 - 20      GFR est AA >60 >60 ml/min/1.73m2    GFR est non-AA >60 >60 ml/min/1.73m2    Calcium 8.6 8.5 - 10.1 MG/DL   LD   Result Value Ref Range     (H) 85 - 241 U/L   PROTHROMBIN TIME + INR   Result Value Ref Range    INR 1.9 (H) 0.9 - 1.1      Prothrombin time 19.0 (H) 9.0 - 11.1 sec   CBC W/O DIFF   Result Value Ref Range    WBC 7.2 4.1 - 11.1 K/uL    RBC 3.19 (L) 4.10 - 5.70 M/uL    HGB 8.0 (L) 12.1 - 17.0 g/dL    HCT 26.2 (L) 36.6 - 50.3 %    MCV 82.1 80.0 - 99.0 FL    MCH 25.1 (L) 26.0 - 34.0 PG    MCHC 30.5 30.0 - 36.5 g/dL    RDW 15.9 (H) 11.5 - 14.5 %    PLATELET 879 (H) 798 - 040 K/uL   METABOLIC PANEL, BASIC   Result Value Ref Range    Sodium 133 (L) 136 - 145 mmol/L    Potassium 4.1 3.5 - 5.1 mmol/L    Chloride 101 97 - 108 mmol/L    CO2 24 21 - 32 mmol/L    Anion gap 8 5 - 15 mmol/L    Glucose 87 65 - 100 mg/dL    BUN 10 6 - 20 MG/DL    Creatinine 0.97 0.70 - 1.30 MG/DL    BUN/Creatinine ratio 10 (L) 12 - 20      GFR est AA >60 >60 ml/min/1.73m2    GFR est non-AA >60 >60 ml/min/1.73m2    Calcium 8.4 (L) 8.5 - 10.1 MG/DL   VANCOMYCIN, TROUGH   Result Value Ref Range    Vancomycin,trough 24.7 (HH) 5.0 - 10.0 ug/mL    Reported dose date: NOT PROVIDED      Reported dose time: NOT PROVIDED      Reported dose: NOT PROVIDED UNITS   HEMOGLOBIN, FREE, PLASMA   Result Value Ref Range    Hemoglobin, Free, Plasma 22.6 (H) 0.0 - 4.9 mg/dL   URINALYSIS W/MICROSCOPIC   Result Value Ref Range    Color YELLOW/STRAW      Appearance CLEAR CLEAR      Specific gravity 1.011 1.003 - 1.030      pH (UA) 6.0 5.0 - 8.0      Protein NEGATIVE  NEG mg/dL    Glucose NEGATIVE  NEG mg/dL    Ketone NEGATIVE  NEG mg/dL    Bilirubin NEGATIVE  NEG      Blood NEGATIVE  NEG      Urobilinogen 0.2 0.2 - 1.0 EU/dL    Nitrites NEGATIVE  NEG      Leukocyte Esterase NEGATIVE  NEG      WBC 0-4 0 - 4 /hpf    RBC 0-5 0 - 5 /hpf    Epithelial cells FEW FEW /lpf    Bacteria NEGATIVE  NEG /hpf    Hyaline Cast 0-2 0 - 5 /lpf   LD   Result Value Ref Range     85 - 241 U/L   PROTHROMBIN TIME + INR   Result Value Ref Range    INR 1.7 (H) 0.9 - 1.1      Prothrombin time 17.9 (H) 9.0 - 16.3 sec   METABOLIC PANEL, COMPREHENSIVE   Result Value Ref Range    Sodium 133 (L) 136 - 145 mmol/L    Potassium 3.8 3.5 - 5.1 mmol/L    Chloride 100 97 - 108 mmol/L    CO2 25 21 - 32 mmol/L    Anion gap 8 5 - 15 mmol/L    Glucose 82 65 - 100 mg/dL    BUN 10 6 - 20 MG/DL    Creatinine 1.05 0.70 - 1.30 MG/DL    BUN/Creatinine ratio 10 (L) 12 - 20      GFR est AA >60 >60 ml/min/1.73m2    GFR est non-AA >60 >60 ml/min/1.73m2    Calcium 8.5 8.5 - 10.1 MG/DL    Bilirubin, total 0.4 0.2 - 1.0 MG/DL    ALT 24 12 - 78 U/L    AST 25 15 - 37 U/L    Alk. phosphatase 97 45 - 117 U/L    Protein, total 6.2 (L) 6.4 - 8.2 g/dL    Albumin 2.6 (L) 3.5 - 5.0 g/dL    Globulin 3.6 2.0 - 4.0 g/dL    A-G Ratio 0.7 (L) 1.1 - 2.2     POC INR   Result Value Ref Range    INR (POC) 2.4 (H) <1.2      Please see scanned media for full panel of labs from 1/11/17    Recent tests or procedures:   S/p Laminectomy 11/22/16  S/p LVAD implant 12/1/16  S/p AICD implant 12/16/16  S/p Ramp test 12/16/16          Objective:  Physical Exam:  Visit Vitals    Pulse 67    SpO2 98%      MAP: 86       Exam:  Gen:  WA, WN, NAD   HEENT:  Small raised welt  2cm x 2 cm superior and lateral to right eye - small laceration approximately . 5 cm -  Not actively bleeding, +ecchymosis and scabbed blood around area,  EOMI, PERRLA  CVS:  +LVAD hum  Pul:  CTA b/l (-) r.r,w  Abd:  +BS, soft, NT,ND  Ext: 1+ b/l ankle edema, (-) calf tenderness  Neuro:  No obvious deficits, mini neuro exam normal  ICD site - sutures intact, no redness or discharge noted.       Drive Line Exam:  Stabilization device intact: yes  Line inspected for damage: yes    Appearance: no edema, erythema, drainage   Sterile dressing changed per policy: no  Frequency of dressing change at home: every other day  Frequency of use of stabilization device: 100%    Follow-up Disposition:  Return in about 2 weeks (around 2/9/2017). Thank you for letting us see him with you.      Ivan Herrera PA-C     [facility] [address]  Office: [phone]  [address] [place] [address] Pager: [phone]

## 2017-01-27 ENCOUNTER — TELEPHONE ANTICOAG (OUTPATIENT)
Dept: CARDIOLOGY CLINIC | Age: 62
End: 2017-01-27

## 2017-01-27 ENCOUNTER — HOME CARE VISIT (OUTPATIENT)
Dept: SCHEDULING | Facility: HOME HEALTH | Age: 62
End: 2017-01-27
Payer: COMMERCIAL

## 2017-01-27 LAB
INR, EXTERNAL: 2.1
INR, EXTERNAL: 2.2

## 2017-01-27 PROCEDURE — G0299 HHS/HOSPICE OF RN EA 15 MIN: HCPCS

## 2017-01-27 NOTE — PROGRESS NOTES
Pt denies headache or issues after his fall yesterday. He is scheduled to see Dr. Nimisha Veras next week so scheduled him for a f/u visit with us the following week. Pts last H/H was low - he denies signs of bleeding, lightheadedness or dizziness. Will repeat next week. He is to notify the office if anything changes.

## 2017-01-29 LAB
BACTERIA SPEC CULT: NORMAL
SERVICE CMNT-IMP: NORMAL

## 2017-01-30 ENCOUNTER — HOME CARE VISIT (OUTPATIENT)
Dept: SCHEDULING | Facility: HOME HEALTH | Age: 62
End: 2017-01-30
Payer: COMMERCIAL

## 2017-01-30 ENCOUNTER — HOME HEALTH ADMISSION (OUTPATIENT)
Dept: HOME HEALTH SERVICES | Facility: HOME HEALTH | Age: 62
End: 2017-01-30

## 2017-01-30 ENCOUNTER — DOCUMENTATION ONLY (OUTPATIENT)
Dept: CARDIOLOGY CLINIC | Age: 62
End: 2017-01-30

## 2017-01-30 VITALS — OXYGEN SATURATION: 90 % | TEMPERATURE: 99 F

## 2017-01-30 PROCEDURE — G0151 HHCP-SERV OF PT,EA 15 MIN: HCPCS

## 2017-01-30 NOTE — PROGRESS NOTES
Patient's wife called to say [facility] PT has not been reinitiated.  spoke with Escobar Banegas, [facility] representative and she reports it will be reinitiated ASAP.  called patient's wife back to share this information and she reports they called to schedule PT. Will continue to follow the patient and assist as needed.     Maria Luisa Persaud, MSW, LCSW    Clinical    Norman Sims [phone]

## 2017-01-31 ENCOUNTER — TELEPHONE (OUTPATIENT)
Dept: CARDIOLOGY CLINIC | Age: 62
End: 2017-01-31

## 2017-01-31 ENCOUNTER — TELEPHONE ANTICOAG (OUTPATIENT)
Dept: CARDIOLOGY CLINIC | Age: 62
End: 2017-01-31

## 2017-01-31 ENCOUNTER — HOME CARE VISIT (OUTPATIENT)
Dept: SCHEDULING | Facility: HOME HEALTH | Age: 62
End: 2017-01-31
Payer: COMMERCIAL

## 2017-01-31 LAB — INR, EXTERNAL: 2.7

## 2017-01-31 PROCEDURE — G0299 HHS/HOSPICE OF RN EA 15 MIN: HCPCS

## 2017-01-31 NOTE — TELEPHONE ENCOUNTER
Patients wife, Mack Marin called the office to report patient is having increased swelling in his lower extremities. Mack Marin reports the patient has not had >3 lb weight gain. Mack Marin was advised to have the patient weigh in the morning and to contact the office with the reading to adjust his diuretic. Tracey Ga understanding.

## 2017-01-31 NOTE — PROGRESS NOTES
Patients wife (HIPAA verified) was informed to have the patient continue 5 mg of Coumadin on Mondays, 2.5 mg all other days. Patients wife verbalizes understanding. Meenu Buys with [facility] was instructed to repeat labs again on Monday 2/6/17.

## 2017-02-01 ENCOUNTER — OFFICE VISIT (OUTPATIENT)
Dept: CARDIOLOGY CLINIC | Age: 62
End: 2017-02-01

## 2017-02-01 ENCOUNTER — HOME CARE VISIT (OUTPATIENT)
Dept: SCHEDULING | Facility: HOME HEALTH | Age: 62
End: 2017-02-01
Payer: COMMERCIAL

## 2017-02-01 VITALS
BODY MASS INDEX: 22.33 KG/M2 | WEIGHT: 156 LBS | OXYGEN SATURATION: 98 % | HEIGHT: 70 IN | RESPIRATION RATE: 20 BRPM | BODY MASS INDEX: 21.76 KG/M2 | OXYGEN SATURATION: 98 % | RESPIRATION RATE: 20 BRPM | TEMPERATURE: 98.2 F | WEIGHT: 156 LBS | TEMPERATURE: 98.2 F

## 2017-02-01 VITALS — HEART RATE: 67 BPM | OXYGEN SATURATION: 95 % | RESPIRATION RATE: 16 BRPM | TEMPERATURE: 97 F

## 2017-02-01 VITALS
OXYGEN SATURATION: 98 % | WEIGHT: 157 LBS | HEIGHT: 71 IN | TEMPERATURE: 97.2 F | RESPIRATION RATE: 20 BRPM | BODY MASS INDEX: 21.98 KG/M2

## 2017-02-01 DIAGNOSIS — T82.7XXD ICD (IMPLANTABLE CARDIOVERTER-DEFIBRILLATOR) INFECTION, SUBSEQUENT ENCOUNTER: Primary | ICD-10-CM

## 2017-02-01 DIAGNOSIS — I25.10 CAD, MULTIPLE VESSEL: ICD-10-CM

## 2017-02-01 DIAGNOSIS — I25.2 OLD MI (MYOCARDIAL INFARCTION): ICD-10-CM

## 2017-02-01 DIAGNOSIS — I50.22 CHRONIC SYSTOLIC HEART FAILURE (HCC): ICD-10-CM

## 2017-02-01 DIAGNOSIS — I25.5 ISCHEMIC CARDIOMYOPATHY: ICD-10-CM

## 2017-02-01 DIAGNOSIS — Z79.01 CHRONIC ANTICOAGULATION: ICD-10-CM

## 2017-02-01 DIAGNOSIS — Z95.811 LVAD (LEFT VENTRICULAR ASSIST DEVICE) PRESENT (HCC): ICD-10-CM

## 2017-02-01 PROCEDURE — G0157 HHC PT ASSISTANT EA 15: HCPCS

## 2017-02-01 RX ORDER — CHLORHEXIDINE GLUCONATE 4 G/100ML
SOLUTION TOPICAL
Qty: 1 BOTTLE | Refills: 0 | Status: SHIPPED | OUTPATIENT
Start: 2017-02-01 | End: 2017-02-13

## 2017-02-01 RX ORDER — CYCLOBENZAPRINE HCL 5 MG
5 TABLET ORAL AS NEEDED
COMMUNITY
End: 2018-09-22

## 2017-02-01 NOTE — PROGRESS NOTES
Chief Complaint   Patient presents with    Wound Check     Verified medications with the patient. Verified pharmacy with patient.

## 2017-02-01 NOTE — MR AVS SNAPSHOT
Visit Information Date & Time Provider Department Dept. Phone Encounter #  
 2/1/2017  8:00 AM George Connors MD [facility] Arash Atkins [phone] [id number] Your Appointments 2/13/2017  4:20 PM  
ESTABLISHED PATIENT with George Connors MD  
[facility] ([facility]) Appt Note: 2 week wound check [address] [place]  
[address] [address] [facility] [address]  
  
    
 4/6/2017 11:00 AM  
PACEMAKER with Lisa Butler [facility] ([facility] SCHEDUL[facility]) Appt Note: 3 months f/u  
 [address] [place]  
[phone]  
  
   
 [address] [address] [facility] [address]  
  
    
 4/6/2017 11:20 AM  
ESTABLISHED PATIENT with George Connors MD  
[facility] ([address]) Appt Note: 3 months f/u  
 [address] [place]  
[phone] Upcoming Health Maintenance Date Due Pneumococcal 19-64 Medium Risk (1 of 1 - PPSV23) 10/11/1975 DTaP/Tdap/Td series (1 - Tdap) 10/11/1977 ZOSTER VACCINE AGE 60> 10/11/2016 FOBT Q 1 YEAR AGE 50-75 12/20/2017 Allergies as of 2/1/2017  Review Complete On: 1/26/2017 By: Tanesha John No Known Allergies Current Immunizations  Reviewed on 1/20/2017 Name Date Influenza Vaccine (Quad) PF 12/21/2016 12:00 PM  
  
 Not reviewed this visit You Were Diagnosed With   
  
 Codes Comments Chronic systolic heart failure (HCC)    -  Primary ICD-10-CM: H03.38 ICD-9-CM: 428.22   
 CAD, multiple vessel     ICD-10-CM: I25.10 ICD-9-CM: 414.00 ICD (implantable cardioverter-defibrillator) infection, subsequent encounter     ICD-10-CM: T82. 7XXD ICD-9-CM: V58.89 Vitals Smoking Status Former Smoker Preferred Pharmacy Pharmacy Name Phone [facility], [address] [place] [address] Bambi Trujillo [phone] Your Updated Medication List  
  
   
This list is accurate as of: 2/1/17  8:50 AM.  Always use your most recent med list.  
  
  
  
  
 allopurinol 100 mg tablet Commonly known as:  Mcclellan Bless Take 1 Tab by mouth two (2) times a day. ALPRAZolam 0.25 mg tablet Commonly known as:  Ken No Take 1 Tab by mouth every six (6) hours as needed for Anxiety. Max Daily Amount: 1 mg.  
  
 amiodarone 200 mg tablet Commonly known as:  CORDARONE Take 1 Tab by mouth daily. ascorbic acid (vitamin C) 500 mg tablet Commonly known as:  VITAMIN C Take 1 Tab by mouth two (2) times a day. bumetanide 0.5 mg tablet Commonly known as:  Severiano Bue Take 1 Tab by mouth daily. carvedilol 25 mg tablet Commonly known as:  Haskel Scarce Take 1 Tab by mouth two (2) times daily (with meals). chlorhexidine 4 % liquid Commonly known as:  HIBICLENS Apply minimum amount necessary to scrub the upper chest area from shoulder/neck to mid line of chest and to below the nipple each of  5 nights before the day of the procedure  
  
 citalopram 40 mg tablet Commonly known as:  [place] Take 1 Tab by mouth daily. clopidogrel 75 mg Tab Commonly known as:  PLAVIX Take 1 Tab by mouth daily. colchicine 0.6 mg tablet Take 1 Tab by mouth two (2) times a day. Indications: GOUT  
  
 cyclobenzaprine 5 mg tablet Commonly known as:  FLEXERIL Take 5 mg by mouth. ferrous sulfate 325 mg (65 mg iron) tablet Take 1 Tab by mouth two (2) times daily (with meals). folic acid 1 mg tablet Commonly known as:  Google Take 1 Tab by mouth daily. GABAPENTIN (BULK) Take 1 Tab by mouth daily. HYDROcodone-acetaminophen 5-325 mg per tablet Commonly known as:  Wilmer Longo Take  by mouth. lactobacillus (B.ani-L.aci-L.sal-L.plan-L. Shane) 10 billion cell (2 billion ea) Cap capsule Take 1 Cap by mouth daily. levoFLOXacin 750 mg tablet Commonly known as:  Phylicia Schneiders Take 1 Tab by mouth daily for 14 days. magnesium oxide 400 mg tablet Commonly known as:  MAG-OX Take 1 Tab by mouth daily. ondansetron 8 mg disintegrating tablet Commonly known as:  ZOFRAN ODT Take 1 Tab by mouth every eight (8) hours as needed for Nausea. pantoprazole 40 mg tablet Commonly known as:  PROTONIX Take 1 Tab by mouth Daily (before breakfast). POTASSIUM CHLORIDE Take 10 mEq by mouth daily. traMADol 50 mg tablet Commonly known as:  ULTRAM  
Take 1 Tab by mouth every six (6) hours as needed. Max Daily Amount: 200 mg.  
  
 warfarin 2.5 mg tablet Commonly known as:  COUMADIN Take 1 Tab by mouth daily. Prescriptions Printed Refills  
 chlorhexidine (HIBICLENS) 4 % liquid 0 Sig: Apply minimum amount necessary to scrub the upper chest area from shoulder/neck to mid line of chest and to below the nipple each of  5 nights before the day of the procedure Class: Print To-Do List   
 02/01/2017 10:15 AM  
  Appointment with Rafia Godoy at [place] [address]  
  
 02/02/2017 11:00 AM  
  Appointment with Ruben Reese RN at [place] [address]  
  
 02/03/2017 To Be Determined Appointment with Rafia Godoy at [place] [address]  
  
 02/06/2017 To Be Determined Appointment with Ruben Reese RN at [place] [address]  
  
 02/06/2017 To Be Determined Appointment with Rafia Godoy at [place] [address]  
  
 02/08/2017 To Be Determined Appointment with Ruben Reese RN at [place] [address]  
  
 02/08/2017 To Be Determined Appointment with Rafia Godoy at [place] [address]  
  
 02/10/2017 To Be Determined Appointment with Dano Jiang PT at [place] [address]  
  
 02/13/2017 To Be Determined Appointment with Denis Thomas RN at [place] [address]  
  
 02/15/2017 To Be Determined Appointment with Denis Thomas RN at [place] [address] Patient Instructions Hibiclens 4% topical solution and Bactroban nasal ointment ordered Patient it start Hibiclens and Bactroban nasal ointment application 5 days prior to procedure date Directions Hibiclens 4%: Start cleanse 5 days prior to procedure 1. Rinse area (upper chest and upper arms) with water. 2. Apply minimum amount necessary to scrub the upper chest area from shoulder/neck to mid line of chest and to below the nipple each of  5 nights before the day of the procedure 3. Let solution dry. Bactroban nasal ointment (this can be bought over-the-counter): 
Swab both nostrils with clean cotton applicator twice a day, 5 days prior to scheduled procedure. You  will need to follow up in clinic with Dr. Denis Thomas in 2 weeks. Introducing [facility] & HEALTH SERVICES! Melonie Gupta introduces Charity[employer]zz patient portal. Now you can access parts of your medical record, email your doctor's office, and request medication refills online. 1. In your internet browser, go to [email or web address] on the Run 2. Click on the First Time User? Click Here link in the Sign In box. You will see the New Member Sign Up page. 3. Enter your Charitybuzz Access Code exactly as it appears below. You will not need to use this code after youve completed the sign-up process. If you do not sign up before the expiration date, you must request a new code. · Charity[employer]zz Access Code: [email or web address] Expires: 2/20/2017 10:34 AM 
 
4. Enter the last four digits of your Social Security Number (xxxx) and Date of Birth (mm/dd/yyyy) as indicated and click Submit. You will be taken to the next sign-up page. 5. Create a [employer] ID. This will be your [employer] login ID and cannot be changed, so think of one that is secure and easy to remember. 6. Create a [employer] password. You can change your password at any time. 7. Enter your Password Reset Question and Answer. This can be used at a later time if you forget your password. 8. Enter your e-mail address. You will receive e-mail notification when new information is available in [address] 9. Click Sign Up. You can now view and download portions of your medical record. 10. Click the Download Summary menu link to download a portable copy of your medical information. If you have questions, please visit the Frequently Asked Questions section of the [employer] website. Remember, [employer] is NOT to be used for urgent needs. For medical emergencies, dial 911. Now available from your iPhone and Android! Please provide this summary of care documentation to your next provider. Your primary care clinician is listed as Mike Ken. If you have any questions after today's visit, please call [phone].

## 2017-02-01 NOTE — LETTER
2/1/2017 8:56 AM 
 
Mr. [address] [employer] [address] [place] [id number] Mr. Kapadia, Your ICD reimplant procedure has been scheduled for 3/1/2017 at 1:00pm, at [facility]. 
 
Please report to Admitting Department by 11:00am, or 2 hours prior to your scheduled procedure. Please bring a list of your current medications and medication bottles, if able, to the hospital on this day. You will need to have nothing to eat or drink after 7:00am, the day of your procedure. You may have small sips of water, if needed, to take with your medication. You will need labs drawn prior to your procedure. Please go to [facility] to have this done as soon as you are able. You will also need to see Dr. Tosha Tucker in office prior to your procedure. An appointment has been scheduled for 2/13/2017 at 4:20pm. 
 
Information will be given to you at upcoming appointment regarding whether you will need to hold any medications prior to this procedure. After your procedure, you will need to follow up with Dr. Tosha Tucker.  Your follow-up appointment has been scheduled for 3/15/2017 at 9:15am.  
 
 
Sincerely, 
 
 
Maggi Gruber MD

## 2017-02-01 NOTE — PROGRESS NOTES
Cardiac Electrophysiology Wound Check Note      Wound Check s/p ICD explantation 1/20/17. Patient is here for wound check. No fever, drainage   Physical Exam     Constitutional: well-developed and well-nourished. Life vest on. LVAD drive line covered with CDI. Skin: Left side pocket (ICD removed) is healing without redness, drainage, hematoma. Intact with sutures- removed today and derma bond applied to incision    ASSESSMENT and PLAN     ICD-10-CM ICD-9-CM    1. Chronic systolic heart failure (HCC) I50.22 428.22    2. CAD, multiple vessel I25.10 414.00    3. ICD (implantable cardioverter-defibrillator) infection, subsequent encounter T82. 7XXD V58.89        The incision is healing without redness, drainage, hematoma. Sutures removed and this was covered with skin glue. He is still finishing his antibiotic. Discussed when reimplanting the new ICD he will need to scrub the right side of the area with Hibiclens scrub for 5 days prior to procedure and apply Bactroban to nares. Will let Dr Kusum Coates know status of wound healing. Follow-up Disposition:  Follow up in 2 weeks, plan for reimplant of ICD on right side in about 4 weeks provided everything heals and he is cleared of infection.

## 2017-02-01 NOTE — PROGRESS NOTES
Cardiac Electrophysiology Wound Check Note      Wound Check   s/p ICD explantation 1/20/17 due to pocket erosion with enterobacter cloacae  Patient had long hospital course after MI, CABG and LVAD and transient pocket hematoma while on coumadin post ICD  He had dual chamber Medtronic ICD implant for secondary prevention of VF arrest  The leads were + at tip for same bacteria but no bacteremia and endocarditis so it appears to be contamination from pocket on the way out  Patient is here for wound check. Home maribel nurse and his daughter who is a nurse have changed dry to dry dressing once a day  No fever, little drainage   Physical Exam     Constitutional: well-developed and well-nourished. Life vest on. LVAD drive line covered with CDI. Skin: Left side pocket (ICD removed) is healing without redness, drainage, hematoma. Intact with nylon sutures- removed today and derma bond applied to incision    ASSESSMENT and PLAN     ICD-10-CM ICD-9-CM    1. ICD (implantable cardioverter-defibrillator) infection, subsequent encounter T82. 7XXD V58.89 CBC WITH AUTOMATED DIFF      PROTHROMBIN TIME + INR      PTT      METABOLIC PANEL, COMPREHENSIVE   2. Chronic systolic heart failure (HCC) I50.22 428.22 CBC WITH AUTOMATED DIFF      PROTHROMBIN TIME + INR      PTT      METABOLIC PANEL, COMPREHENSIVE   3. CAD, multiple vessel I25.10 414.00 CBC WITH AUTOMATED DIFF      PROTHROMBIN TIME + INR      PTT      METABOLIC PANEL, COMPREHENSIVE   4. LVAD (left ventricular assist device) present ([address] Z95.811 V43.21    5. Ischemic cardiomyopathy I25.5 414.8    6. Chronic anticoagulation Z79.01 V58.61    7. Old MI (myocardial infarction) I25.2 412        The incision is healing without redness, drainage, hematoma. Sutures removed and this was covered with skin glue. He is still finishing his antibiotic.    Discussed when reimplanting the new ICD he will need to scrub the right side of the area with Hibiclens scrub for 5 days prior to procedure and apply Bactroban to nares. Will let Dr Tariq Chao know status of wound healing. Follow-up Disposition:  Follow up in 2 weeks, provided everything heals and he is cleared of infection.

## 2017-02-02 ENCOUNTER — TELEPHONE (OUTPATIENT)
Dept: CARDIOLOGY CLINIC | Age: 62
End: 2017-02-02

## 2017-02-02 RX ORDER — LANOLIN ALCOHOL/MO/W.PET/CERES
325 CREAM (GRAM) TOPICAL 2 TIMES DAILY WITH MEALS
Qty: 60 TAB | Refills: 6 | Status: SHIPPED | OUTPATIENT
Start: 2017-02-02 | End: 2017-06-28

## 2017-02-02 RX ORDER — ASCORBIC ACID 500 MG
500 TABLET ORAL 2 TIMES DAILY
Qty: 60 TAB | Refills: 6 | Status: SHIPPED | OUTPATIENT
Start: 2017-02-02

## 2017-02-02 NOTE — TELEPHONE ENCOUNTER
Patient's wife called regarding needing refills on :    325 mg Iron   500 mg Vitamins    Pharmacy is [facility] on [address]

## 2017-02-03 ENCOUNTER — HOME CARE VISIT (OUTPATIENT)
Dept: SCHEDULING | Facility: HOME HEALTH | Age: 62
End: 2017-02-03
Payer: COMMERCIAL

## 2017-02-03 PROCEDURE — G0157 HHC PT ASSISTANT EA 15: HCPCS

## 2017-02-03 PROCEDURE — G0299 HHS/HOSPICE OF RN EA 15 MIN: HCPCS

## 2017-02-04 ENCOUNTER — TELEPHONE (OUTPATIENT)
Dept: CARDIOLOGY CLINIC | Age: 62
End: 2017-02-04

## 2017-02-04 VITALS — TEMPERATURE: 98 F | RESPIRATION RATE: 20 BRPM

## 2017-02-06 ENCOUNTER — TELEPHONE (OUTPATIENT)
Dept: CARDIOLOGY CLINIC | Age: 62
End: 2017-02-06

## 2017-02-06 ENCOUNTER — TELEPHONE ANTICOAG (OUTPATIENT)
Dept: CARDIOLOGY CLINIC | Age: 62
End: 2017-02-06

## 2017-02-06 ENCOUNTER — HOME CARE VISIT (OUTPATIENT)
Dept: SCHEDULING | Facility: HOME HEALTH | Age: 62
End: 2017-02-06
Payer: COMMERCIAL

## 2017-02-06 VITALS — HEART RATE: 75 BPM | TEMPERATURE: 97.9 F | RESPIRATION RATE: 16 BRPM | HEIGHT: 71 IN

## 2017-02-06 LAB — INR, EXTERNAL: 1.8

## 2017-02-06 PROCEDURE — G0157 HHC PT ASSISTANT EA 15: HCPCS

## 2017-02-06 PROCEDURE — G0299 HHS/HOSPICE OF RN EA 15 MIN: HCPCS

## 2017-02-06 NOTE — PROGRESS NOTES
Called patient, two patient identifiers used. Patient was advised to start taking 5 mg on M/Th, 2.5 mg all other days. Patient verbalizes understanding. Mahesh Braswell with [facility] was advised to recheck patients INR on Monday 2/13/17.

## 2017-02-06 NOTE — TELEPHONE ENCOUNTER
Called patient to follow up on events of the weekend. States he has lost 1 lb overnight and is voiding more with the 1 mg Bumex daily. States he still has LE edema by the end of the day. Advised him to continue 1 mg Bumex today and tomorrow, and we will re-evaluate during his clinic visit tomorrow at 11:00 am.  Reminded him to adhere to a Na+ restricted diet and to weigh himself each morning. He verbalizes understanding.

## 2017-02-06 NOTE — TELEPHONE ENCOUNTER
----- Message from Fatemeh Ruiz [place] sent at 2/4/2017 12:54 PM EST -----  Please f/u with him - I doubled his diuretics (Bumex 1 mg) from Friday to Monday - his weight was increased as had his LE edema. He denies SOB or orthopnea. He said he's coming in next week but appt. Didn't show up in [place] - didn't check outlook.     Thanks  Ricky Pickett

## 2017-02-07 ENCOUNTER — OFFICE VISIT (OUTPATIENT)
Dept: CARDIOLOGY CLINIC | Age: 62
End: 2017-02-07

## 2017-02-07 VITALS
WEIGHT: 168 LBS | BODY MASS INDEX: 23.52 KG/M2 | OXYGEN SATURATION: 98 % | RESPIRATION RATE: 18 BRPM | TEMPERATURE: 97.5 F | HEART RATE: 72 BPM | HEIGHT: 71 IN

## 2017-02-07 VITALS
OXYGEN SATURATION: 91 % | TEMPERATURE: 97.9 F | HEIGHT: 71 IN | RESPIRATION RATE: 16 BRPM | WEIGHT: 161 LBS | BODY MASS INDEX: 22.54 KG/M2

## 2017-02-07 VITALS
BODY MASS INDEX: 23.24 KG/M2 | RESPIRATION RATE: 18 BRPM | TEMPERATURE: 98.2 F | WEIGHT: 162 LBS | OXYGEN SATURATION: 96 %

## 2017-02-07 DIAGNOSIS — I25.10 CAD, MULTIPLE VESSEL: ICD-10-CM

## 2017-02-07 DIAGNOSIS — E87.79 OTHER HYPERVOLEMIA: ICD-10-CM

## 2017-02-07 DIAGNOSIS — R23.1 COOL SKIN: ICD-10-CM

## 2017-02-07 DIAGNOSIS — Z79.01 CHRONIC ANTICOAGULATION: ICD-10-CM

## 2017-02-07 DIAGNOSIS — I10 ESSENTIAL HYPERTENSION: ICD-10-CM

## 2017-02-07 DIAGNOSIS — T82.7XXD ICD (IMPLANTABLE CARDIOVERTER-DEFIBRILLATOR) INFECTION, SUBSEQUENT ENCOUNTER: ICD-10-CM

## 2017-02-07 DIAGNOSIS — Z98.890 S/P LAMINECTOMY: ICD-10-CM

## 2017-02-07 DIAGNOSIS — Z95.811 LVAD (LEFT VENTRICULAR ASSIST DEVICE) PRESENT (HCC): Primary | ICD-10-CM

## 2017-02-07 DIAGNOSIS — M1A.0690 IDIOPATHIC CHRONIC GOUT OF KNEE WITHOUT TOPHUS, UNSPECIFIED LATERALITY: ICD-10-CM

## 2017-02-07 RX ORDER — POTASSIUM CHLORIDE 20 MEQ/1
10 TABLET, EXTENDED RELEASE ORAL DAILY
Qty: 30 TAB | Refills: 5 | Status: SHIPPED | OUTPATIENT
Start: 2017-02-07 | End: 2017-06-28 | Stop reason: SDUPTHER

## 2017-02-07 RX ORDER — PANTOPRAZOLE SODIUM 40 MG/1
40 TABLET, DELAYED RELEASE ORAL
Qty: 30 TAB | Refills: 0 | Status: SHIPPED | OUTPATIENT
Start: 2017-02-07 | End: 2017-02-22 | Stop reason: SDUPTHER

## 2017-02-07 RX ORDER — WARFARIN 2.5 MG/1
5 TABLET ORAL DAILY
Qty: 60 TAB | Refills: 11 | Status: SHIPPED | OUTPATIENT
Start: 2017-02-07 | End: 2017-03-22 | Stop reason: SDUPTHER

## 2017-02-07 NOTE — MR AVS SNAPSHOT
Visit Information Date & Time Provider Department Dept. Phone Encounter #  
 2/7/2017 11:00 AM Tejinder Zamarripa NP [address] [id number] Your Appointments 2/13/2017  4:20 PM  
ESTABLISHED PATIENT with Abbe Adorno MD  
[facility] Appt Note: 2 week wound check [address] [facility] [address]  
  
    
 2/22/2017  3:00 PM  
Follow Up with Tejinder Zamarripa NP [address] [facility] ([facility] [place] [address]  
  
    
 3/15/2017  9:15 AM  
PACEMAKER with Bebe Glass [facility] ([facility] SCHEDULING) Appt Note: 2 wk f/u ICD reimplant wound check [address] [place]  
[address] [facility]  
  
    
 3/15/2017 10:00 AM  
ESTABLISHED PATIENT with Abbe Adorno MD  
[facility] ([facility]) Appt Note: 2 wk f/u ICD reimplant wound check [address] [place] [address]  
[phone]  
  
    
 4/6/2017 11:00 AM  
PACEMAKER with Bebe Glass [facility] ([facility] SCHEDULING) Appt Note: 3 months f/u  
 [address] [place] [address]  
[phone]  
  
    
 4/6/2017 11:20 AM  
ESTABLISHED PATIENT with Abbe Adorno MD  
[facility] ([facility]) Appt Note: 3 months f/u  
 [address]  
[phone] Upcoming Health Maintenance Date Due Pneumococcal 19-64 Medium Risk (1 of 1 - PPSV23) 10/11/1975 DTaP/Tdap/Td series (1 - Tdap) 10/11/1977 ZOSTER VACCINE AGE 60> 10/11/2016 FOBT Q 1 YEAR AGE 50-75 12/20/2017 Allergies as of 2/7/2017  Review Complete On: 2/7/2017 By: Livia Trinh LPN No Known Allergies Current Immunizations  Reviewed on 1/20/2017 Name Date Influenza Vaccine (Quad) PF 12/21/2016 12:00 PM  
  
 Not reviewed this visit Vitals Pulse Temp Resp Height(growth percentile) Weight(growth percentile) SpO2  
 72 97.5 °F (36.4 °C) (Oral) 18 5' 11\" (1.803 m) 168 lb (76.2 kg) 98% BMI Smoking Status 23.43 kg/m2 Former Smoker Vitals History BMI and BSA Data Body Mass Index Body Surface Area  
 23.43 kg/m 2 1.95 m 2 Preferred Pharmacy Pharm[facility] Name Phone [place] [phone] Your Updated Medication List  
  
   
This list is accurate as of: 2/7/17 12:44 PM.  Always use your most recent med list.  
  
  
  
  
 allopurinol 100 mg tablet Commonly known as:  Javier Mamadou Take 1 Tab by mouth two (2) times a day. ALPRAZolam 0.25 mg tablet Commonly known as:  Martha Mannheim Take 1 Tab by mouth every six (6) hours as needed for Anxiety. Max Daily Amount: 1 mg.  
  
 amiodarone 200 mg tablet Commonly known as:  CORDARONE Take 1 Tab by mouth daily. ascorbic acid (vitamin C) 500 mg tablet Commonly known as:  VITAMIN C Take 1 Tab by mouth two (2) times a day. bumetanide 0.5 mg tablet Commonly known as:  Estrella Hendricks Take 1 Tab by mouth daily. carvedilol 25 mg tablet Commonly known as:  Judi Fan Take 1 Tab by mouth two (2) times daily (with meals). chlorhexidine 4 % liquid Commonly known as:  HIBICLENS Apply minimum amount necessary to scrub the upper chest area from shoulder/neck to mid line of chest and to below the nipple each of  5 nights before the day of the procedure  
  
 citalopram 40 mg tablet Commonly known as:  Orbie Curls Take 1 Tab by mouth daily. clopidogrel 75 mg Tab Commonly known as:  PLAVIX Take 1 Tab by mouth daily. colchicine 0.6 mg tablet Take 1 Tab by mouth two (2) times a day. Indications: GOUT  
  
 cyclobenzaprine 5 mg tablet Commonly known as:  FLEXERIL Take 5 mg by mouth. ferrous sulfate 325 mg (65 mg iron) tablet Take 1 Tab by mouth two (2) times daily (with meals). folic acid 1 mg tablet Commonly known as:  Google Take 1 Tab by mouth daily. GABAPENTIN (BULK) Take 1 Tab by mouth daily. HYDROcodone-acetaminophen 5-325 mg per tablet Commonly known as:  Cari Staple Take  by mouth. lactobacillus (B.ani-L.aci-L.sal-L.plan-L. Shane) 10 billion cell (2 billion ea) Cap capsule Take 1 Cap by mouth daily. levoFLOXacin 750 mg tablet Commonly known as:  Rella Gene Take 1 Tab by mouth daily for 14 days. magnesium oxide 400 mg tablet Commonly known as:  MAG-OX Take 1 Tab by mouth daily. ondansetron 8 mg disintegrating tablet Commonly known as:  ZOFRAN ODT Take 1 Tab by mouth every eight (8) hours as needed for Nausea. pantoprazole 40 mg tablet Commonly known as:  PROTONIX Take 1 Tab by mouth Daily (before breakfast). POTASSIUM CHLORIDE Take 10 mEq by mouth daily. traMADol 50 mg tablet Commonly known as:  ULTRAM  
Take 1 Tab by mouth every six (6) hours as needed. Max Daily Amount: 200 mg.  
  
 warfarin 2.5 mg tablet Commonly known as:  COUMADIN Take 1 Tab by mouth daily. To-Do List   
 02/08/2017 To Be Determined Appointment with Kayla Cueto RN at [place] [address]  
  
 02/08/2017 3:00 PM  
  Appointment with Senthil Grissom at [place] [address]  
  
 02/10/2017 To Be Determined Appointment with Emma Ann PT at [place] [address]  
  
 02/13/2017 To Be Determined Appointment with Kayla Cueto RN at [place] [address]  
  
 02/15/2017 To Be Determined Appointment with Risa Murrell RN at [facility]  
  
 03/01/2017 1:15 PM  
  Appointment with CATH ROOM 3 [facility] at [facility] ([phone]) NPO AFTER MIDNIGHT! ROUTINE CASES:  Please arrive 2 hour prior to your scheduled appointment time. If your scheduled appointment is for 0730, 0800, 0815, please arrive by 0645. NON ROUTINE CASES:  PATIENTS WHO REQUIRE LABS, X-RAY, EKG, or MEDS:  PLEASE ARRIVE 3 HOURS PRIOR TO YOUR SCHEDULED APPOINTMENT. If you require hydration prior to your procedure, PLEASE ARRIVE 4 HOURS PRIOR TO YOUR APPOINTMENT  **** IT IS THE OFFICE SCHEDULARS RESPONSBILITY TO NOTIFY THE CATH LAB SCHEDULAR IF THE PATIENT WILL REQUIRE ANY ADDITIONAL TIME FOR PREP FROM ROUTINE CASE ***** Patient Instructions 1. Continue taking medications as prescribed. 2. Contact the VAD/HF team if symptoms develop or worsen despite medical management. 3.   Continue taking 2 tablets of Bumex (0.5 mg x 2 = 1 mg) for the next three days. Remember to weigh yourself daily - every morning, after urinating, without clothes on, before eating or drinking. Please call me with your weight on Friday morning. 4.   Ask Dr. Risa Murrell at your next appointment about showering and driving. 5.  Please notify our office if your drive line develops redness, drainage, bleeding, swelling or pain. Please notify us if you develop a fever. 6.  Follow up in 2 week(s). Introducing [facility] & HEALTH SERVICES! [facility] introduces [employer] patient portal. Now you can access parts of your medical record, email your doctor's office, and request medication refills online. 1. In your internet browser, go to [email or web address] 2. Click on the First Time User? Click Here link in the Sign In box. You will see the New Member Sign Up page. 3. Enter your P[employer] Access Code exactly as it appears below.  You will not need to use this code after youve completed the sign-up process. If you do not sign up before the expiration date, you must request a new code. · RoomActually Access Code: [email or web address] Expires: 2/20/2017 10:34 AM 
 
4. Enter the last four digits of your Social Security Number (xxxx) and Date of Birth (mm/dd/yyyy) as indicated and click Submit. You will be taken to the next sign-up page. 5. Create a RoomActually ID. This will be your RoomActually login ID and cannot be changed, so think of one that is secure and easy to remember. 6. Create a RoomActually password. You can change your password at any time. 7. Enter your Password Reset Question and Answer. This can be used at a later time if you forget your password. 8. Enter your e-mail address. You will receive e-mail notification when new information is available in [address] 9. Click Sign Up. You can now view and download portions of your medical record. 10. Click the Download Summary menu link to download a portable copy of your medical information. If you have questions, please visit the Frequently Asked Questions section of the RoomActually website. Remember, RoomActually is NOT to be used for urgent needs. For medical emergencies, dial 911. Now available from your iPhone and Android! Please provide this summary of care documentation to your next provider. Your primary care clinician is listed as Lizbet Baldwin. If you have any questions after today's visit, please call [phone].

## 2017-02-07 NOTE — PATIENT INSTRUCTIONS
1. Continue taking medications as prescribed. 2. Contact the VAD/HF team if symptoms develop or worsen despite medical management. 3.   Continue taking 2 tablets of Bumex (0.5 mg x 2 = 1 mg) for the next three days. Remember to weigh yourself daily - every morning, after urinating, without clothes on, before eating or drinking. Please call me with your weight on Friday morning. 4.   Ask Dr. Hetty Moritz at your next appointment about showering and driving. 5.  Please notify our office if your drive line develops redness, drainage, bleeding, swelling or pain. Please notify us if you develop a fever. 6.  Follow up in 2 week(s).

## 2017-02-07 NOTE — PROGRESS NOTES
Norman Sims 1721  LVAD Office Visit      Date of VAD implant: 12/1/2016    Cardiologist: GRAHAM    PCP: Alma Delia Mcgregor MD     Consultants: Dr. Rebecca Hardy (EP), Dr. Yuniel Michelle (GI), Dr. Aniya Toledo (Ortho)    HPI: Karoline Armstrong is a 62 y.o. male with a past medical history s/p ICM s/p HM II LVAD as DT, h/o torasades/SVT, HTN, lumbar stenosis s/p lumbar laminectomy, CAD (s/p CABG/sp BMSx2), gout, strobic seizures, ETOH abuse and remote tobacco abuse who presents today for a routine two week follow up appointment. Since his last appointment, he reports increased activity tolerance, and states today is the first day that he has felt really well. He continues to have mild lower extremity edema, but attributes this to dietary indiscretion. He has been walking around the house without his walker, and uses it only for \"balance\" when he's running errands. He is able to sleep on 1 pillow. His appetite is good, and he is feeling more comfortable with his LVAD. He is compliant with his LifeVest and sees Dr. Rebecca Hardy regularly for follow up related to recent ICD pocket infection. Reports: Occasional fatigue, occasional dizziness, lower extremity edema, back pain. Denies: Fevers, chills, headache, lightheadedness, syncope, chest pain, palpitations, LifeVest therapy, dyspnea, sputum production, nausea, vomiting, diarrhea, constipation, bladder changes, easy bruising or bleeding. Assessment / Plan:    Heart Failure Status: NYHA Class II    Ischemic cardiomyopathy, s/p HM II LVAD implant as DT 12/1/16 -  Pt denies alarrms at home. Alarm history reviewed. Please see VAD flow sheet for readings. Occasional PI events but no adverse alarms noted. Settings reviewed, but no changes made. Continue BB. No ACE-1/ARB d/t marginal BP. Increase Bumex to 1 mg PO daily for next three days.   Reminded Mr. Aayush Ritchie to continue daily weights, follow Na+ restricted diet, and increase intake of non-caffeinated, non-carbonated beverages to remain hydrated. Asked him to notify our office for weight gain > 2 lbs in one day or 5 lbs in one week. Hx of VT s/p ICD placement and subsequent pocket infection:  Cx from both leads  (+) enterobacter clocae. ICD removed 1/20/17. Followed regularly by Dr. Karen Crowley. He completed his 14 day course of PO Levaquin today. Remains afebrile, WBC acceptable. Upon assessment, noted to have small opening mid-incision - will notify Dr. Karen Crowley (per patient, he is aware). Asked patient to notify our office immediately for any fever, chills, or flu like symptoms. Continue LifeVest for now - will reassess need for implantation of ICD. Post-operative RV insufficiency: Resolved. Continue to trend labs.       Multivessel CAD/S/p CABG x 2 (SVG to RCA, LIMA to LAD):  S/p RCA BMS x 2. On Plavix, BB. No statin d/t elevated LFTs. No ASA since on Plavix and Coumadin.      Lumbar stenosis, s/p decompressive laminectomy and instrumented fusion: Per patient, no further restrictions. Continue PT/OT.     Post operative anemia d/t acute blood loss: Hgb stable. Continue iron, Vitamin C and folic acid uintil Hg >43      H/o ETOH abuse: No ETOH since 11/22. Reinforced importance of complete abstinence for transplant evaluation.         SVT/A-fib/Torsades post op: S/p AICD 12/16. Cont. Amiodarone per Dr. Karen Crowley. F/u with Dr. Karen Crowley     HTN, MAP goal 70-90: MAP 74 today. Continue carvedilol at 25 BID.      Chronic anticoagulation for LVAD, INR goal 2-2.5. Continue Warfarin and Plavix. Please see anticoagulation tracker for details.       Gout Pain: Resolved, pt to continue Allopurinol and use Colchicine with active gout flare. Cool skin: Fingertips pale, cool. Continue to monitor - consider Raynaud's.      Depression/Anxiety: Controlled with current dose of Celexa, pt using Xanax prn but trying to decrease this.      Deconditioning: Cont to work with home PT/OT. Begin cardiac rehab when discharged from home PT.   Will send referral to begin process. Problem List:  Patient Active Problem List   Diagnosis Code    S/P laminectomy Z98.890    Sinus tachycardia R00.0    Acute respiratory failure with hypoxia ([facility]) J96.01    Essential hypertension I10    Alcohol abuse F10.10    Chronic systolic heart failure ([facility]) I50.22    CAD, multiple vessel I25.10    Ischemic cardiomyopathy I25.5    MI (myocardial infarction) ([facility] I21.3    Sustained VT (ventricular tachycardia) ([facility]) I47.2    S/P ICD (internal cardiac defibrillator) procedure Z95.810    Chronic anticoagulation Z79.01    Left ventricular assist device present ([facility] Z95.811    Gout M10.9    ICD (implantable cardioverter-defibrillator) infection ([facility] T82. Ora.Mera        Past Medical History   Diagnosis Date    Chronic pain      back    Hypertension     Ill-defined condition      gout    Seizures ([facility])      strobic seizures early 19's       Family History   Problem Relation Age of Onset    Diabetes Mother     Dementia Mother     Other Mother      Latex allergy    Heart Disease Father     Stroke Father     No Known Problems Sister     Cancer Brother      brain       Social History     Social History    Marital status:      Spouse name: N/A    Number of children: N/A    Years of education: N/A     Occupational History    Not on file. Social History Main Topics    Smoking status: Former Smoker     Packs/day: 1.50     Years: 30.00     Quit date: 2008    Smokeless tobacco: Never Used    Alcohol use 24.0 oz/week     40 Cans of beer per week    Drug use: No    Sexual activity: Not on file     Other Topics Concern    Not on file     Social History Narrative       Medications:  No Known Allergies     Current Outpatient Prescriptions on File Prior to Visit   Medication Sig    ferrous sulfate 325 mg (65 mg iron) tablet Take 1 Tab by mouth two (2) times daily (with meals).     ascorbic acid, vitamin C, (VITAMIN C) 500 mg tablet Take 1 Tab by mouth two (2) times a day.  lactobacillus, B.ani-L.aci-L.sal-L.plan-L. Shane, 10 billion cell (2 billion ea) cap capsule Take 1 Cap by mouth daily.  cyclobenzaprine (FLEXERIL) 5 mg tablet Take 5 mg by mouth.  GABAPENTIN, BULK, Take 1 Tab by mouth daily.  citalopram (CELEXA) 40 mg tablet Take 1 Tab by mouth daily.  folic acid (FOLVITE) 1 mg tablet Take 1 Tab by mouth daily.  clopidogrel (PLAVIX) 75 mg tab Take 1 Tab by mouth daily.  carvedilol (COREG) 25 mg tablet Take 1 Tab by mouth two (2) times daily (with meals).  levoFLOXacin (LEVAQUIN) 750 mg tablet Take 1 Tab by mouth daily for 14 days.  amiodarone (CORDARONE) 200 mg tablet Take 1 Tab by mouth daily.  POTASSIUM CHLORIDE Take 10 mEq by mouth daily.  allopurinol (ZYLOPRIM) 100 mg tablet Take 1 Tab by mouth two (2) times a day.  bumetanide (BUMEX) 0.5 mg tablet Take 1 Tab by mouth daily. (Patient taking differently: Take 1 mg by mouth daily.)    ALPRAZolam (XANAX) 0.25 mg tablet Take 1 Tab by mouth every six (6) hours as needed for Anxiety. Max Daily Amount: 1 mg.    HYDROcodone-acetaminophen (NORCO) 5-325 mg per tablet Take  by mouth.  ondansetron (ZOFRAN ODT) 8 mg disintegrating tablet Take 1 Tab by mouth every eight (8) hours as needed for Nausea.  colchicine 0.6 mg tablet Take 1 Tab by mouth two (2) times a day. Indications: GOUT (Patient taking differently: Take 0.6 mg by mouth as needed. Indications: GOUT)    magnesium oxide (MAG-OX) 400 mg tablet Take 1 Tab by mouth daily.  traMADol (ULTRAM) 50 mg tablet Take 1 Tab by mouth every six (6) hours as needed. Max Daily Amount: 200 mg.  chlorhexidine (HIBICLENS) 4 % liquid Apply minimum amount necessary to scrub the upper chest area from shoulder/neck to mid line of chest and to below the nipple each of  5 nights before the day of the procedure     No current facility-administered medications on file prior to visit.          Results for orders placed or performed in visit on 02/06/17   AMB PT/INR EXTERNAL   Result Value Ref Range    INR, External 1.8        Recent tests or procedures:   S/p Laminectomy 11/22/16  S/p LVAD implant 12/1/16  S/p AICD implant 12/16/16  S/p Ramp test 12/16/16          Objective:  Physical Exam:  Visit Vitals    Pulse 72    Temp 97.5 °F (36.4 °C) (Oral)    Resp 18    Ht 5' 11\" (1.803 m)    Wt 168 lb (76.2 kg)    SpO2 98%    BMI 23.43 kg/m2      MAP: 74    LVAD (Heartmate)  Pump Speed (RPM): 8800  Pump Flow (LPM): 4.2  PI (Pulsitility Index): 6.7  Power: 4.7     Exam:  Gen:  WA, WN, NAD   CVS:  +LVAD hum  Pul:  CTA b/l (-) r.r,w  Abd:  +BS, soft, NT,ND  Ext: 1+ b/l ankle edema, (-) calf tenderness. Fingertips cool, pale, with cap refill > 5 seconds in bilateral hands. Neuro:  No obvious deficits, mini neuro exam normal  ICD site: incision closed with skin adhesive - small area mid-incision opened with scant yellow drainage noted on dressing. No pain, erythema, or edema noted. Drive Line Exam:  Stabilization device intact: yes  Line inspected for damage: yes    Appearance: no edema, erythema, drainage   Sterile dressing changed per policy: yes  Frequency of dressing change at home: every other day  Frequency of use of stabilization device: 100%    Follow-up Disposition:  Return in about 2 weeks (around 2/21/2017). Thank you for letting us see him with you.      Ame Arreaga, [facility]-[address], [address] [place] [address]  Office: [phone]  24h VAD Pager: [phone]

## 2017-02-08 ENCOUNTER — HOME CARE VISIT (OUTPATIENT)
Dept: SCHEDULING | Facility: HOME HEALTH | Age: 62
End: 2017-02-08
Payer: COMMERCIAL

## 2017-02-08 VITALS — HEART RATE: 72 BPM | TEMPERATURE: 98.3 F | RESPIRATION RATE: 16 BRPM | OXYGEN SATURATION: 95 %

## 2017-02-08 PROCEDURE — G0157 HHC PT ASSISTANT EA 15: HCPCS

## 2017-02-10 ENCOUNTER — HOME CARE VISIT (OUTPATIENT)
Dept: SCHEDULING | Facility: HOME HEALTH | Age: 62
End: 2017-02-10
Payer: COMMERCIAL

## 2017-02-10 VITALS — TEMPERATURE: 98.7 F

## 2017-02-10 PROCEDURE — G0151 HHCP-SERV OF PT,EA 15 MIN: HCPCS

## 2017-02-13 ENCOUNTER — TELEPHONE (OUTPATIENT)
Dept: CARDIOLOGY CLINIC | Age: 62
End: 2017-02-13

## 2017-02-13 ENCOUNTER — HOME CARE VISIT (OUTPATIENT)
Dept: SCHEDULING | Facility: HOME HEALTH | Age: 62
End: 2017-02-13
Payer: COMMERCIAL

## 2017-02-13 ENCOUNTER — OFFICE VISIT (OUTPATIENT)
Dept: CARDIOLOGY CLINIC | Age: 62
End: 2017-02-13

## 2017-02-13 ENCOUNTER — TELEPHONE ANTICOAG (OUTPATIENT)
Dept: CARDIOLOGY CLINIC | Age: 62
End: 2017-02-13

## 2017-02-13 DIAGNOSIS — Z95.811 LVAD (LEFT VENTRICULAR ASSIST DEVICE) PRESENT (HCC): ICD-10-CM

## 2017-02-13 DIAGNOSIS — Z51.89 VISIT FOR WOUND CHECK: Primary | ICD-10-CM

## 2017-02-13 LAB — INR, EXTERNAL: 2.8

## 2017-02-13 PROCEDURE — G0299 HHS/HOSPICE OF RN EA 15 MIN: HCPCS

## 2017-02-13 NOTE — MR AVS SNAPSHOT
Visit Information Date & Time Provider Department Dept. Phone Encounter #  
 2/13/2017  4:20 PM George Connors MD [facility] Arash Atkins [phone] [id number] Your Appointments 2/22/2017  3:00 PM  
Follow Up with Jose A Hardy NP [address] [facility] ([address]) [facility] [address]  
[address] [place] [address] [address] Upcoming Health Maintenance Date Due Pneumococcal 19-64 Medium Risk (1 of 1 - PPSV23) 10/11/1975 DTaP/Tdap/Td series (1 - Tdap) 10/11/1977 ZOSTER VACCINE AGE 60> 10/11/2016 FOBT Q 1 YEAR AGE 50-75 12/20/2017 Allergies as of 2/13/2017  Review Complete On: 2/7/2017 By: Jamaal Petty LPN No Known Allergies Current Immunizations  Reviewed on 1/20/2017 Name Date Influenza Vaccine (Quad) PF 12/21/2016 12:00 PM  
  
 Not reviewed this visit Vitals Smoking Status Former Smoker Preferred Pharmacy Pharmacy Name Phone [facility], [address] [place] [address] Sandie De Leon [phone] Your Updated Medication List  
  
   
This list is accurate as of: 2/13/17  5:29 PM.  Always use your most recent med list.  
  
  
  
  
 allopurinol 100 mg tablet Commonly known as:  Cecy Minor Take 1 Tab by mouth two (2) times a day. ALPRAZolam 0.25 mg tablet Commonly known as:  Kaleta Susan Take 1 Tab by mouth every six (6) hours as needed for Anxiety. Max Daily Amount: 1 mg.  
  
 amiodarone 200 mg tablet Commonly known as:  CORDARONE Take 1 Tab by mouth daily. ascorbic acid (vitamin C) 500 mg tablet Commonly known as:  VITAMIN C Take 1 Tab by mouth two (2) times a day. bumetanide 0.5 mg tablet Commonly known as:  Garlon Salen Take 1 Tab by mouth daily. carvedilol 25 mg tablet Commonly known as:  Gillermina Begun Take 1 Tab by mouth two (2) times daily (with meals). citalopram 40 mg tablet Commonly known as:  Junie Meigs Take 1 Tab by mouth daily. clopidogrel 75 mg Tab Commonly known as:  PLAVIX Take 1 Tab by mouth daily. colchicine 0.6 mg tablet Take 1 Tab by mouth two (2) times a day. Indications: GOUT  
  
 cyclobenzaprine 5 mg tablet Commonly known as:  FLEXERIL Take 5 mg by mouth. ferrous sulfate 325 mg (65 mg iron) tablet Take 1 Tab by mouth two (2) times daily (with meals). folic acid 1 mg tablet Commonly known as:  Google Take 1 Tab by mouth daily. GABAPENTIN (BULK) Take 1 Tab by mouth daily. HYDROcodone-acetaminophen 5-325 mg per tablet Commonly known as:  Iven Marie Take  by mouth.  
  
 magnesium oxide 400 mg tablet Commonly known as:  MAG-OX Take 1 Tab by mouth daily. ondansetron 8 mg disintegrating tablet Commonly known as:  ZOFRAN ODT Take 1 Tab by mouth every eight (8) hours as needed for Nausea. pantoprazole 40 mg tablet Commonly known as:  PROTONIX Take 1 Tab by mouth Daily (before breakfast). potassium chloride 20 mEq tablet Commonly known as:  K-DUR, KLOR-CON Take 0.5 Tabs by mouth daily. traMADol 50 mg tablet Commonly known as:  ULTRAM  
Take 1 Tab by mouth every six (6) hours as needed. Max Daily Amount: 200 mg.  
  
 warfarin 2.5 mg tablet Commonly known as:  COUMADIN Take 2 Tabs by mouth daily. To-Do List   
 02/15/2017 To Be Determined Appointment with Daria Rodriguez RN at [place] [address]  
  
 03/01/2017 1:15 PM  
  Appointment with [facility] ROOM [address] [place] [address] at [address] [place] [address] ([phone]) NPO AFTER MIDNIGHT! ROUTINE CASES:  Please arrive 2 hour prior to your scheduled appointment time. If your scheduled appointment is for 0730, 0800, 0815, please arrive by 0645.   NON ROUTINE CASES:  PATIENTS WHO REQUIRE LABS, X-RAY, EKG, or MEDS:  PLEASE ARRIVE 3 HOURS PRIOR TO YOUR SCHEDULED APPOINTMENT. If you require hydration prior to your procedure, PLEASE ARRIVE 4 HOURS PRIOR TO YOUR APPOINTMENT  **** IT IS THE OFFICE SCHEDULARS RESPONSBILITY TO NOTIFY THE CATH LAB SCHEDULAR IF THE PATIENT WILL REQUIRE ANY ADDITIONAL TIME FOR PREP FROM ROUTINE CASE ***** Patient Instructions Follow up with Dr. Shoshana Albrecht as needed. Introducing [facility] SERVICES! [employer] introduces [facility] patient portal. Now you can access parts of your medical record, email your doctor's office, and request medication refills online. 1. In your internet browser, go to [email or web address] 2. Click on the First Time User? Click Here link in the Sign In box. You will see the New Member Sign Up page. 3. Enter your [facility] Access Code exactly as it appears below. You will not need to use this code after youve completed the sign-up process. If you do not sign up before the expiration date, you must request a new code. · [facility] Access Code: [email or web address] Expires: 2/20/2017 10:34 AM 
 
4. Enter the last four digits of your Social Security Number (xxxx) and Date of Birth (mm/dd/yyyy) as indicated and click Submit. You will be taken to the next sign-up page. 5. Create a [facility] ID. This will be your [facility] login ID and cannot be changed, so think of one that is secure and easy to remember. 6. Create a [facility] password. You can change your password at any time. 7. Enter your Password Reset Question and Answer. This can be used at a later time if you forget your password. 8. Enter your e-mail address. You will receive e-mail notification when new information is available in [address] 9. Click Sign Up. You can now view and download portions of your medical record. 10. Click the Download Summary menu link to download a portable copy of your medical information.  
 
If you have questions, please visit the Frequently Asked Questions section of the [employer]. Remember, [employer] is NOT to be used for urgent needs. For medical emergencies, dial 911. Now available from your iPhone and Android! Please provide this summary of care documentation to your next provider. Your primary care clinician is listed as David Rayo. If you have any questions after today's visit, please call [phone].

## 2017-02-14 NOTE — TELEPHONE ENCOUNTER
Reviewed full labs with patient. All acceptable at baseline. Will repeat in 1 month/PRN. Patient verbalizes understanding.

## 2017-02-14 NOTE — PROGRESS NOTES
Cardiac Electrophysiology Wound Check Note      Wound Check   s/p ICD explantation 1/20/17 due to pocket erosion with enterobacter cloacae  Patient had long hospital course after MI, CABG and LVAD and transient pocket hematoma while on coumadin post ICD  He had dual chamber Medtronic ICD implant for secondary prevention of VF arrest  The leads were + at tip for same bacteria but no bacteremia and endocarditis so it appears to be contamination from pocket on the way out  Patient is here for wound check. Home maribel nurse and his daughter who is a nurse have changed dry to dry dressing once a day  No fever   There is a small hole left in medial corner and it is shallow  Physical Exam     Constitutional: well-developed and well-nourished. Life vest on. LVAD drive line covered with CDI. Skin: Left side pocket (ICD removed) is healing without redness, drainage, hematoma. ASSESSMENT and PLAN     ICD-10-CM ICD-9-CM    1. Visit for wound check Z51.89 V58.89    2. LVAD (left ventricular assist device) present [facility]) Z95.811 V43.21        The incision is healing   Patient and his wife informed me that he has been allowed to shower by advanced CHF team and recommended to not have ICD reimplant  I discussed pro and cons with him and his wife. He had ICD implanted for secondary prevention initially but since then there was infection of pocket and with LVAD he has been more stable hemodynamically and only problem is short term memory loss which his wife attributed to his major MI at [facility] after back surgery  I cannot determine that. I focused on ICD and sudden death primary prevention post LVAD is controversial.  His VF arrest occurred in hospital.  So it was used for secondary prevention rather.   However I do also think that he is more stable now and likely would be able to tolerate VT/VF with LVAD and if this occurs again, he may feel dizzy and will contact me  For now he and his wife do not want to have ICD reimplant    Follow-up Disposition: as needed

## 2017-02-17 VITALS — OXYGEN SATURATION: 98 % | BODY MASS INDEX: 22.45 KG/M2 | TEMPERATURE: 98 F | RESPIRATION RATE: 20 BRPM | WEIGHT: 161 LBS

## 2017-02-20 ENCOUNTER — HOME CARE VISIT (OUTPATIENT)
Dept: SCHEDULING | Facility: HOME HEALTH | Age: 62
End: 2017-02-20
Payer: COMMERCIAL

## 2017-02-20 ENCOUNTER — TELEPHONE ANTICOAG (OUTPATIENT)
Dept: CARDIOLOGY CLINIC | Age: 62
End: 2017-02-20

## 2017-02-20 ENCOUNTER — TELEPHONE (OUTPATIENT)
Dept: CARDIOLOGY CLINIC | Age: 62
End: 2017-02-20

## 2017-02-20 DIAGNOSIS — Z79.01 CHRONIC ANTICOAGULATION: Primary | ICD-10-CM

## 2017-02-20 LAB — INR, EXTERNAL: 1.5

## 2017-02-20 PROCEDURE — G0299 HHS/HOSPICE OF RN EA 15 MIN: HCPCS

## 2017-02-20 RX ORDER — ENOXAPARIN SODIUM 100 MG/ML
80 INJECTION SUBCUTANEOUS EVERY 12 HOURS
Qty: 8 SYRINGE | Refills: 0 | Status: SHIPPED | OUTPATIENT
Start: 2017-02-20 | End: 2017-05-30 | Stop reason: SDUPTHER

## 2017-02-21 NOTE — PROGRESS NOTES
INR 1.5. Increased warfarin dose to 5 mg PO qPM x 3 days. Will also require enoxaparin 1 mg/kg q12 SQ until INR > 2.0. Please see anticoagulation tracker for details.

## 2017-02-22 ENCOUNTER — OFFICE VISIT (OUTPATIENT)
Dept: CARDIOLOGY CLINIC | Age: 62
End: 2017-02-22

## 2017-02-22 VITALS
BODY MASS INDEX: 22.85 KG/M2 | RESPIRATION RATE: 16 BRPM | TEMPERATURE: 97.7 F | OXYGEN SATURATION: 97 % | HEART RATE: 73 BPM | WEIGHT: 163.2 LBS | HEIGHT: 71 IN

## 2017-02-22 DIAGNOSIS — M1A.0690 IDIOPATHIC CHRONIC GOUT OF KNEE WITHOUT TOPHUS, UNSPECIFIED LATERALITY: ICD-10-CM

## 2017-02-22 DIAGNOSIS — Z95.811 LVAD (LEFT VENTRICULAR ASSIST DEVICE) PRESENT (HCC): Primary | ICD-10-CM

## 2017-02-22 DIAGNOSIS — T82.7XXD ICD (IMPLANTABLE CARDIOVERTER-DEFIBRILLATOR) INFECTION, SUBSEQUENT ENCOUNTER: ICD-10-CM

## 2017-02-22 DIAGNOSIS — Z95.811 LEFT VENTRICULAR ASSIST DEVICE PRESENT (HCC): Primary | ICD-10-CM

## 2017-02-22 DIAGNOSIS — I25.10 CAD, MULTIPLE VESSEL: ICD-10-CM

## 2017-02-22 DIAGNOSIS — I10 ESSENTIAL HYPERTENSION: ICD-10-CM

## 2017-02-22 DIAGNOSIS — Z79.01 CHRONIC ANTICOAGULATION: ICD-10-CM

## 2017-02-22 DIAGNOSIS — Z98.890 S/P LAMINECTOMY: ICD-10-CM

## 2017-02-22 DIAGNOSIS — R23.1 COOL SKIN: ICD-10-CM

## 2017-02-22 RX ORDER — CLOPIDOGREL BISULFATE 75 MG/1
75 TABLET ORAL DAILY
Qty: 90 TAB | Refills: 3 | Status: SHIPPED | OUTPATIENT
Start: 2017-02-22 | End: 2017-03-11 | Stop reason: SDUPTHER

## 2017-02-22 RX ORDER — LANOLIN ALCOHOL/MO/W.PET/CERES
400 CREAM (GRAM) TOPICAL DAILY
Qty: 90 TAB | Refills: 3 | Status: SHIPPED | OUTPATIENT
Start: 2017-02-22 | End: 2017-05-03

## 2017-02-22 RX ORDER — PANTOPRAZOLE SODIUM 40 MG/1
40 TABLET, DELAYED RELEASE ORAL
Qty: 90 TAB | Refills: 3 | Status: SHIPPED | OUTPATIENT
Start: 2017-02-22 | End: 2017-03-11 | Stop reason: SDUPTHER

## 2017-02-22 RX ORDER — CITALOPRAM 40 MG/1
40 TABLET, FILM COATED ORAL DAILY
Qty: 90 TAB | Refills: 3 | Status: SHIPPED | OUTPATIENT
Start: 2017-02-22 | End: 2017-03-22 | Stop reason: ALTCHOICE

## 2017-02-22 NOTE — PROGRESS NOTES
Norman Sims 17[address]  LVAD Office Visit      Date of VAD implant: 12/1/2016  Cardiologist: GRAHAM  PCP: Charan Vanessa MD    HPI: Ariana South is a 62 y.o. male with a past medical history s/p HM II LVAD for DT, h/o torasades/SVT, HTN, lumbar stenosis s/p lumbar laminectomy, CAD (s/p CABG/sp BMSx2), gout, strobic seizures, ETOH abuse and remote tobacco abuse who is seen today for routine bi-monthly LVAD follow up. He reports that he is doing well, and today he feels \"great\". He reports about 4 \"great\" days per week, while he continues to struggle with back pain and mobility issues the rest of the week. He reports an increase in his appetite and is sleeping better. He does endorse trace lower extremity edema. He continues to have leg pain, and states that he will see Dr. Mel Taylor next week. Mr. Trisha Rueda denies headache, lightheadedness, dizziness, chest pain, palpitations, dyspnea, syncope, cough, sputum production, nausea, vomiting, easy bruising. Subjective:  Chief Complaint:  Chief Complaint   Patient presents with    Cardiomyopathy    Follow Up Chronic Condition     LVAD      ROS:   Positive for - edema, negative for - chest pain, dyspnea on exertion, irregular heartbeat, loss of consciousness, orthopnea, palpitations, paroxysmal nocturnal dyspnea, rapid heart rate or shortness of breath. Assessment / Plan:    Heart Failure Status: NYHA Class II    Ischemic cardiomyopathy, s/p HM II LVAD implant as DT 12/1/16 -  Alarm history reviewed. VAD interrogation: Please see VAD flow sheet for readings. Adequate clinical perfusion and function of his LVAD. No alarms or adverse events. Continue Coreg, Bumex. No ACE-1 d/t history of renal insufficiency - start when BP tolerates/Cr OK. Continue daily weights, Na+ restricted diet. Bleeding from ICD site - Resolved.   Site continues to heal.  Per Dr. Tosha Tucker, no indication for ICD replacement/LifeVest.      Post-operative RV insufficiency: Resolved, T. Bili in normal range. KLEVER: resolved, monitor labs. Begin ACE-1 when appropriate.      Multivessel CAD/S/p CABG x 2 (SVG to RCA, LIMA to LAD) - s/p- RCA BMS x 2. On Plavix, BB. No statin d/t elevated LFTs. No ASA since on Plavix and Coumadin.     Lumbar stenosis, s/p decompressive laminectomy and instrumented fusion: Continues to have back pain/leg weakness - to see Dr. Jaci Curiel next week. Begin cardiac rehab when cleared by ortho and D/C'd from [facility].      Post operative anemia d/t acute blood loss: Hgb stable - continue iron, Vitamin C and folic acid. Until Hg consistently >10     H/o ETOH abuse:  Pt denies any alcohol use or any urge to drink alcohol. Has not had a drink since prior to his lumbar surgery.      SVT/A-fib/Torsades post op: S/p AICD 12/16. Cont. Amiodarone per Dr. Uday Mandujano.      HTN, MAP goal 70-90: MAP 82 today, continue BB and add ACE-1 when appropriate. Chronic anticoagulation for LVAD, INR goal 2-2.5. Cont. Warfarin and Plavix. Please see anticoagulation tracker for details.      Gout Pain: resolved, pt to continue Allopurinol and use Colchicine with active gout flare.       Depression/Anxiety: controlled on current dose of Celexa. Deconditioning: Start cardiac rehab when cleared by Dr. Jaci Curiel and d/c'd from [facility]. VAD specific education: Greater than 50% of appt time (60 minutes) was spent counseling Mr. Mann and his wife about LVAD management, plan of care, and medication management.           Problem List:  Patient Active Problem List   Diagnosis Code    S/P laminectomy Z98.890    Sinus tachycardia R00.0    Acute respiratory failure with hypoxia ([facility]) J96.01    Essential hypertension I10    Alcohol abuse F10.10    Chronic systolic heart failure (HCC) I50.22    CAD, multiple vessel I25.10    Ischemic cardiomyopathy I25.5    MI (myocardial infarction) ([facility] I21.3    Sustained VT (ventricular tachycardia) ([facility]) I47.2    S/P ICD (internal cardiac defibrillator) procedure Z95.810    Chronic anticoagulation Z79.01    Left ventricular assist device present ([facility] Z95.811    Gout M10.9    ICD (implantable cardioverter-defibrillator) infection ([facility] T82. 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 Social History Main Topics    Smoking status: Former Smoker     Packs/day: 1.50     Years: 30.00     Quit date: 2008    Smokeless tobacco: Never Used    Alcohol use 24.0 oz/week     40 Cans of beer per week    Drug use: No    Sexual activity: Not on file     Other Topics Concern    Not on file     Social History Narrative       Medications:  No Known Allergies     Current Outpatient Prescriptions on File Prior to Visit   Medication Sig    enoxaparin (LOVENOX) 80 mg/0.8 mL injection 80 mg by SubCUTAneous route every twelve (12) hours. (Patient not taking: Reported on 2/26/2017)    warfarin (COUMADIN) 2.5 mg tablet Take 2 Tabs by mouth daily. (Patient taking differently: Take 2 Tabs by mouth daily. Pt to alternate 1 then 2 tabs, so 2.5mg on Sat , then 5mg Sun)    potassium chloride (K-DUR, KLOR-CON) 20 mEq tablet Take 0.5 Tabs by mouth daily.  ferrous sulfate 325 mg (65 mg iron) tablet Take 1 Tab by mouth two (2) times daily (with meals).  ascorbic acid, vitamin C, (VITAMIN C) 500 mg tablet Take 1 Tab by mouth two (2) times a day.  cyclobenzaprine (FLEXERIL) 5 mg tablet Take 5 mg by mouth.     GABAPENTIN, BULK, Take 1 Tab by mouth three (3) times daily.  folic acid (FOLVITE) 1 mg tablet Take 1 Tab by mouth daily.  carvedilol (COREG) 25 mg tablet Take 1 Tab by mouth two (2) times daily (with meals).  amiodarone (CORDARONE) 200 mg tablet Take 1 Tab by mouth daily.  allopurinol (ZYLOPRIM) 100 mg tablet Take 1 Tab by mouth two (2) times a day.  bumetanide (BUMEX) 0.5 mg tablet Take 1 Tab by mouth daily.  ALPRAZolam (XANAX) 0.25 mg tablet Take 1 Tab by mouth every six (6) hours as needed for Anxiety. Max Daily Amount: 1 mg.    HYDROcodone-acetaminophen (NORCO) 5-325 mg per tablet Take  by mouth.  ondansetron (ZOFRAN ODT) 8 mg disintegrating tablet Take 1 Tab by mouth every eight (8) hours as needed for Nausea.  colchicine 0.6 mg tablet Take 1 Tab by mouth two (2) times a day. Indications: GOUT (Patient taking differently: Take 0.6 mg by mouth as needed. Indications: GOUT)    traMADol (ULTRAM) 50 mg tablet Take 1 Tab by mouth every six (6) hours as needed. Max Daily Amount: 200 mg. No current facility-administered medications on file prior to visit.          Results for orders placed or performed in visit on 02/20/17   AMB PT/INR EXTERNAL   Result Value Ref Range    INR, External 1.5        Recent tests or procedures:   S/p Laminectomy 11/22/16  S/p LVAD implant 12/1/16  S/p AICD implant 12/16/16  S/p Ramp test 12/16/16          Objective:  Physical Exam:  Visit Vitals    Pulse 73    Temp 97.7 °F (36.5 °C) (Oral)    Resp 16    Ht 5' 11\" (1.803 m)    Wt 163 lb 3.2 oz (74 kg)    SpO2 97%    BMI 22.76 kg/m2      MAP: 82    VAD data:  LVAD (Heartmate)  Pump Speed (RPM): 8800  Pump Flow (LPM): 3.8  PI (Pulsitility Index): 6.2  Power: 4.5    Exam:  Gen:  WA, WN, NAD   CVS:  +LVAD hum  Pul:  Slightly decreased BS, (-) r,r,w  Abd:  +BS, soft, NT,ND  Ext: 1+ b/l ankle edema, (-) calf tenderness  Neuro:  No obvious deficits  ICD site - healing with small area (0.25 cm) of poor healing at previous incisional site. No drainage, warmth, or pain. Drive Line Exam:  Stabilization device intact: yes  Line inspected for damage: yes    Appearance: no edema, erythema, drainage   Sterile dressing changed per policy: no  Frequency of dressing change at home: every other day after showering. Frequency of use of stabilization device: 100%    Follow-up Disposition:  Return in about 2 weeks (around 3/8/2017). Thank you for letting us see him with you.      Lauren Her, [address] [facility] Coordinator  [address] [place] [address]  Office: [phone]  [facility] Sherlyn Thakkar Pager: [phone]

## 2017-02-22 NOTE — MR AVS SNAPSHOT
Visit Information Date & Time Provider Department Dept. Phone Encounter #  
 2/22/2017 10:30 AM Cheyenne Inman, NP [address] [id number] Upcoming Health Maintenance Date Due Pneumococcal 19-64 Medium Risk (1 of 1 - PPSV23) 10/11/1975 DTaP/Tdap/Td series (1 - Tdap) 10/11/1977 ZOSTER VACCINE AGE 60> 10/11/2016 FOBT Q 1 YEAR AGE 50-75 12/20/2017 Allergies as of 2/22/2017  Review Complete On: 2/13/2017 By: Ashlie Grubbs MD  
 No Known Allergies Current Immunizations  Reviewed on 1/20/2017 Name Date Influenza Vaccine (Quad) PF 12/21/2016 12:00 PM  
  
 Not reviewed this visit Vitals Pulse [facility] BMI and BSA Data Body Mass Index Body Surface Area  
 22.76 kg/m 2 1.93 m 2 Preferred Pharmacy Pharmacy Name Phone [place], [facility] Bambi Trujillo [phone] Your Updated Medication List  
  
   
This list is accurate as of: 2/22/17 11:50 AM.  Always use your most recent med list.  
  
  
  
  
 allopurinol 100 mg tablet Commonly known as:  Mcclellan Bless Take 1 Tab by mouth two (2) times a day. ALPRAZolam 0.25 mg tablet Commonly known as:  Ken No Take 1 Tab by mouth every six (6) hours as needed for Anxiety. Max Daily Amount: 1 mg.  
  
 amiodarone 200 mg tablet Commonly known as:  CORDARONE Take 1 Tab by mouth daily. ascorbic acid (vitamin C) 500 mg tablet Commonly known as:  VITAMIN C Take 1 Tab by mouth two (2) times a day. bumetanide 0.5 mg tablet Commonly known as:  Severiano Bue Take 1 Tab by mouth daily. carvedilol 25 mg tablet Commonly known as:  Haskel Scarce Take 1 Tab by mouth two (2) times daily (with meals). citalopram 40 mg tablet Commonly known as:  [place] Take 1 Tab by mouth daily. clopidogrel 75 mg Tab Commonly known as:  PLAVIX Take 1 Tab by mouth daily. colchicine 0.6 mg tablet Take 1 Tab by mouth two (2) times a day. Indications: GOUT  
  
 cyclobenzaprine 5 mg tablet Commonly known as:  FLEXERIL Take 5 mg by mouth.  
  
 enoxaparin 80 mg/0.8 mL injection Commonly known as:  LOVENOX 80 mg by SubCUTAneous route every twelve (12) hours. ferrous sulfate 325 mg (65 mg iron) tablet Take 1 Tab by mouth two (2) times daily (with meals). folic acid 1 mg tablet Commonly known as:  Google Take 1 Tab by mouth daily. GABAPENTIN (BULK) Take 1 Tab by mouth three (3) times daily. HYDROcodone-acetaminophen 5-325 mg per tablet Commonly known as:  Lenon Gauze Take  by mouth.  
  
 magnesium oxide 400 mg tablet Commonly known as:  MAG-OX Take 1 Tab by mouth daily. ondansetron 8 mg disintegrating tablet Commonly known as:  ZOFRAN ODT Take 1 Tab by mouth every eight (8) hours as needed for Nausea. pantoprazole 40 mg tablet Commonly known as:  PROTONIX Take 1 Tab by mouth Daily (before breakfast). potassium chloride 20 mEq tablet Commonly known as:  K-DUR, KLOR-CON Take 0.5 Tabs by mouth daily. traMADol 50 mg tablet Commonly known as:  ULTRAM  
Take 1 Tab by mouth every six (6) hours as needed. Max Daily Amount: 200 mg.  
  
 warfarin 2.5 mg tablet Commonly known as:  COUMADIN Take 2 Tabs by mouth daily. Prescriptions Sent to Pharmacy Refills  
 clopidogrel (PLAVIX) 75 mg tab 3 Sig: Take 1 Tab by mouth daily. Class: Normal  
 Pharmacy: [address] Ph #: [phone] Route: Oral  
 magnesium oxide (MAG-OX) 400 mg tablet 3 Sig: Take 1 Tab by mouth daily. Class: Normal  
 Pharmacy: [address] Ph #: [phone] Route: Oral  
 citalopram (CELEXA) 40 mg tablet 3 Sig: Take 1 Tab by mouth daily.   
 Class: Normal  
 Pharmacy: [facility] [place] [address], [address] Ph #: [phone] Route: Oral  
 pantoprazole (PROTONIX) 40 mg tablet 3 Sig: Take 1 Tab by mouth Daily (before breakfast). Class: Normal  
 [facility] [address] [address] Ph #: [phone] Route: Oral  
  
To-Do List   
 02/23/2017 9:30 AM  
  Appointment with Elyssa Menjivar RN at [place] [address]  
  
 02/27/2017 To Be Determined Appointment with Elyssa Menjivar RN at [place] [address] Introducing \[facility]\"" & HEALTH SERVICES! [facility] introduces [employer] patient portal. Now you can access parts of your medical record, email your doctor's office, and request medication refills online. 1. In your internet browser, go to [email or web address] 2. Click on the First Time User? Click Here link in the Sign In box. You will see the New Member Sign Up page. 3. Enter your [employer] Access Code exactly as it appears below. You will not need to use this code after youve completed the sign-up process. If you do not sign up before the expiration date, you must request a new code. · [employer] Access Code: [email or web address] Expires: 5/21/2017  8:06 PM 
 
4. Enter the last four digits of your Social Security Number (xxxx) and Date of Birth (mm/dd/yyyy) as indicated and click Submit. You will be taken to the next sign-up page. 5. Create a Karazt ID. This will be your [employer] login ID and cannot be changed, so think of one that is secure and easy to remember. 6. Create a [employer] password. You can change your password at any time. 7. Enter your Password Reset Question and Answer. This can be used at a later time if you forget your password. 8. Enter your e-mail address. You will receive e-mail notification when new information is available in [address] 9. Click Sign Up.  You can now view and download portions of your medical record. 10. Click the Download Summary menu link to download a portable copy of your medical information. If you have questions, please visit the Frequently Asked Questions section of the [employer] website. Remember, [employer] is NOT to be used for urgent needs. For medical emergencies, dial 911. Now available from your iPhone and Android! Please provide this summary of care documentation to your next provider. Your primary care clinician is listed as Dorothea Sheehan. If you have any questions after today's visit, please call [phone].

## 2017-02-23 ENCOUNTER — HOME CARE VISIT (OUTPATIENT)
Dept: SCHEDULING | Facility: HOME HEALTH | Age: 62
End: 2017-02-23
Payer: COMMERCIAL

## 2017-02-23 ENCOUNTER — TELEPHONE ANTICOAG (OUTPATIENT)
Dept: CARDIOLOGY CLINIC | Age: 62
End: 2017-02-23

## 2017-02-23 VITALS
WEIGHT: 162 LBS | TEMPERATURE: 97.2 F | OXYGEN SATURATION: 98 % | BODY MASS INDEX: 22.59 KG/M2 | RESPIRATION RATE: 20 BRPM

## 2017-02-23 LAB — INR, EXTERNAL: 1.7

## 2017-02-23 PROCEDURE — G0299 HHS/HOSPICE OF RN EA 15 MIN: HCPCS

## 2017-02-23 NOTE — PROGRESS NOTES
Pt instructed to continue Lovenox twice a day - will try and get Yue Tamayo nurse to check INR on Saturday

## 2017-02-24 ENCOUNTER — TELEPHONE (OUTPATIENT)
Dept: CARDIOLOGY CLINIC | Age: 62
End: 2017-02-24

## 2017-02-24 NOTE — TELEPHONE ENCOUNTER
Made her aware that Rashaad Gutierrez will draw his INR tomorrow.   He has some irritation under the tape of the driveline - they may use topical steroid cream as long as it's not too close to driveline

## 2017-02-25 ENCOUNTER — HOME CARE VISIT (OUTPATIENT)
Dept: SCHEDULING | Facility: HOME HEALTH | Age: 62
End: 2017-02-25
Payer: COMMERCIAL

## 2017-02-25 ENCOUNTER — TELEPHONE ANTICOAG (OUTPATIENT)
Dept: CARDIOLOGY CLINIC | Age: 62
End: 2017-02-25

## 2017-02-25 LAB — INR, EXTERNAL: 2.2

## 2017-02-26 VITALS
TEMPERATURE: 98.2 F | OXYGEN SATURATION: 98 % | TEMPERATURE: 97.2 F | RESPIRATION RATE: 20 BRPM | BODY MASS INDEX: 22.59 KG/M2 | WEIGHT: 161 LBS | BODY MASS INDEX: 22.54 KG/M2 | HEIGHT: 71 IN | OXYGEN SATURATION: 96 % | WEIGHT: 162 LBS | RESPIRATION RATE: 20 BRPM

## 2017-02-27 NOTE — PATIENT INSTRUCTIONS
1. Continue taking medications as prescribed. 2. Contact the VAD/HF team if symptoms develop or worsen despite medical management. 3. Please continue the enoxaparin until further notice. 4. You will be discharged from [address] [place] [address] next week. We will contact you regarding cardiac rehab.   5. Follow up in 2 weeks. [facility] Fel[facility] [place], [address], [phone] is the closest LVAD center during your vacation. Please contact our office or the emergency pager if you have an issue, and then go to the medical center.

## 2017-02-28 ENCOUNTER — HOME CARE VISIT (OUTPATIENT)
Dept: HOME HEALTH SERVICES | Facility: HOME HEALTH | Age: 62
End: 2017-02-28
Payer: COMMERCIAL

## 2017-03-01 DIAGNOSIS — Z79.01 CHRONIC ANTICOAGULATION: Primary | ICD-10-CM

## 2017-03-01 DIAGNOSIS — Z95.811 LEFT VENTRICULAR ASSIST DEVICE PRESENT (HCC): ICD-10-CM

## 2017-03-02 ENCOUNTER — TELEPHONE ANTICOAG (OUTPATIENT)
Dept: CARDIOLOGY CLINIC | Age: 62
End: 2017-03-02

## 2017-03-02 ENCOUNTER — HOME CARE VISIT (OUTPATIENT)
Dept: SCHEDULING | Facility: HOME HEALTH | Age: 62
End: 2017-03-02
Payer: COMMERCIAL

## 2017-03-02 LAB — INR, EXTERNAL: 2.3

## 2017-03-02 PROCEDURE — G0299 HHS/HOSPICE OF RN EA 15 MIN: HCPCS

## 2017-03-06 VITALS
OXYGEN SATURATION: 98 % | BODY MASS INDEX: 22.32 KG/M2 | RESPIRATION RATE: 20 BRPM | TEMPERATURE: 98.2 F | WEIGHT: 160 LBS

## 2017-03-07 PROCEDURE — A5120 SKIN BARRIER, WIPE OR SWAB: HCPCS

## 2017-03-08 ENCOUNTER — OFFICE VISIT (OUTPATIENT)
Dept: CARDIOLOGY CLINIC | Age: 62
End: 2017-03-08

## 2017-03-08 VITALS
BODY MASS INDEX: 25.49 KG/M2 | HEIGHT: 68 IN | WEIGHT: 168.2 LBS | TEMPERATURE: 97.8 F | HEART RATE: 81 BPM | OXYGEN SATURATION: 96 % | RESPIRATION RATE: 18 BRPM

## 2017-03-08 DIAGNOSIS — Z79.01 CHRONIC ANTICOAGULATION: ICD-10-CM

## 2017-03-08 DIAGNOSIS — J01.90 ACUTE NON-RECURRENT SINUSITIS, UNSPECIFIED LOCATION: ICD-10-CM

## 2017-03-08 DIAGNOSIS — T82.7XXD ICD (IMPLANTABLE CARDIOVERTER-DEFIBRILLATOR) INFECTION, SUBSEQUENT ENCOUNTER: ICD-10-CM

## 2017-03-08 DIAGNOSIS — M1A.0690 IDIOPATHIC CHRONIC GOUT OF KNEE WITHOUT TOPHUS, UNSPECIFIED LATERALITY: ICD-10-CM

## 2017-03-08 DIAGNOSIS — Z95.811 LVAD (LEFT VENTRICULAR ASSIST DEVICE) PRESENT (HCC): Primary | ICD-10-CM

## 2017-03-08 DIAGNOSIS — I25.10 CAD, MULTIPLE VESSEL: ICD-10-CM

## 2017-03-08 DIAGNOSIS — I47.20 SUSTAINED VT (VENTRICULAR TACHYCARDIA): ICD-10-CM

## 2017-03-08 DIAGNOSIS — I10 ESSENTIAL HYPERTENSION: ICD-10-CM

## 2017-03-08 DIAGNOSIS — F51.01 PRIMARY INSOMNIA: ICD-10-CM

## 2017-03-08 RX ORDER — SULFAMETHOXAZOLE AND TRIMETHOPRIM 800; 160 MG/1; MG/1
1 TABLET ORAL 2 TIMES DAILY
Qty: 10 TAB | Refills: 0 | Status: SHIPPED | OUTPATIENT
Start: 2017-03-08 | End: 2017-03-09 | Stop reason: SDUPTHER

## 2017-03-08 NOTE — MR AVS SNAPSHOT
Visit Information Date & Time Provider Department Dept. Phone Encounter #  
 3/8/2017  3:00 PM Reyessudhakar Katie, NP [address] [place] [address] [phone] Upcoming Health Maintenance Date Due Pneumococcal 19-64 Medium Risk (1 of 1 - PPSV23) 10/11/1975 DTaP/Tdap/Td series (1 - Tdap) 10/11/1977 ZOSTER VACCINE AGE 60> 10/11/2016 FOBT Q 1 YEAR AGE 50-75 12/20/2017 Allergies as of 3/8/2017  Review Complete On: 2/13/2017 By: Meryl Gold MD  
 No Known Allergies Current Immunizations  Reviewed on 1/20/2017 Name Date Influenza Vaccine (Quad) PF 12/21/2016 12:00 PM  
  
 Not reviewed this visit You Were Diagnosed With   
  
 Codes Comments Acute non-recurrent sinusitis, unspecified location    -  Primary ICD-10-CM: J01.90 ICD-9-CM: 461.9 Vitals Pulse Temp Resp Height(growth percentile) Weight(growth percentile) SpO2  
 81 97.8 °F (36.6 °C) (Oral) 18 5' 8.2\" (1.732 m) 168 lb 3.2 oz (76.3 kg) 96% BMI Smoking Status 25.42 kg/m2 Former Smoker Vitals History BMI and BSA Data Body Mass Index Body Surface Area  
 25.42 kg/m 2 1.92 m 2 Preferred Pharmacy Pharmacy Name Phone  [address] [phone] Your Updated Medication List  
  
   
This list is accurate as of: 3/8/17  5:13 PM.  Always use your most recent med list.  
  
  
  
  
 allopurinol 100 mg tablet Commonly known as:  Jennifer Velazco Take 1 Tab by mouth two (2) times a day. ALPRAZolam 0.25 mg tablet Commonly known as:  Melene Reels Take 1 Tab by mouth every six (6) hours as needed for Anxiety. Max Daily Amount: 1 mg.  
  
 amiodarone 200 mg tablet Commonly known as:  CORDARONE Take 1 Tab by mouth daily. ascorbic acid (vitamin C) 500 mg tablet Commonly known as:  VITAMIN C Take 1 Tab by mouth two (2) times a day. bumetanide 0.5 mg tablet Commonly known as:  Camila Armstrong Take 1 Tab by mouth daily. carvedilol 25 mg tablet Commonly known as:  Tyrone Stone Take 1 Tab by mouth two (2) times daily (with meals). citalopram 40 mg tablet Commonly known as:  Nena Archuleta Take 1 Tab by mouth daily. clopidogrel 75 mg Tab Commonly known as:  PLAVIX Take 1 Tab by mouth daily. colchicine 0.6 mg tablet Take 1 Tab by mouth two (2) times a day. Indications: GOUT  
  
 cyclobenzaprine 5 mg tablet Commonly known as:  FLEXERIL Take 5 mg by mouth.  
  
 enoxaparin 80 mg/0.8 mL injection Commonly known as:  LOVENOX 80 mg by SubCUTAneous route every twelve (12) hours. ferrous sulfate 325 mg (65 mg iron) tablet Take 1 Tab by mouth two (2) times daily (with meals). folic acid 1 mg tablet Commonly known as:  Google Take 1 Tab by mouth daily. GABAPENTIN (BULK) Take 1 Tab by mouth three (3) times daily. HYDROcodone-acetaminophen 5-325 mg per tablet Commonly known as:  March Castles Take  by mouth.  
  
 magnesium oxide 400 mg tablet Commonly known as:  MAG-OX Take 1 Tab by mouth daily. ondansetron 8 mg disintegrating tablet Commonly known as:  ZOFRAN ODT Take 1 Tab by mouth every eight (8) hours as needed for Nausea. pantoprazole 40 mg tablet Commonly known as:  PROTONIX Take 1 Tab by mouth Daily (before breakfast). potassium chloride 20 mEq tablet Commonly known as:  K-DUR, KLOR-CON Take 0.5 Tabs by mouth daily. traMADol 50 mg tablet Commonly known as:  ULTRAM  
Take 1 Tab by mouth every six (6) hours as needed. Max Daily Amount: 200 mg.  
  
 trimethoprim-sulfamethoxazole 160-800 mg per tablet Commonly known as:  BACTRIM DS, SEPTRA DS Take 1 Tab by mouth two (2) times a day. warfarin 2.5 mg tablet Commonly known as:  COUMADIN Take 2 Tabs by mouth daily. Prescriptions Sent to Pharmacy  Refills  
 trimethoprim-sulfamethoxazole (BACTRIM DS, SEPTRA DS) 160-800 mg per tablet 0 Sig: Take 1 Tab by mouth two (2) times a day. Class: Normal  
 Pharmacy: [facility] [address] Ph #: [phone] Route: Oral  
  
To-Do List   
 03/09/2017 To Be Determined Appointment with Olayinka Pham RN at [place] [address] Patient Instructions 1. Continue taking medications as prescribed. 2. Contact the VAD/HF team if symptoms develop or worsen despite medical management. 3. Please begin taking the Bactrim DS twice daily for 5 days. 4. We will discharge you from [address] [place] [address] at the end of this week. We will contact you regarding dressing supply kits. 5. We will refer you to cardiac rehab if OK with Dr. Dianna Mahan 6. Follow up in 2 week(s). Introducing [facility] & HEALTH SERVICES! [facility] introduces [employer] patient portal. Now you can access parts of your medical record, email your doctor's office, and request medication refills online. 1. In your internet browser, go to [email or web address] 2. Click on the First Time User? Click Here link in the Sign In box. You will see the New Member Sign Up page. 3. Enter your [employer] Access Code exactly as it appears below. You will not need to use this code after youve completed the sign-up process. If you do not sign up before the expiration date, you must request a new code. · [employer] Access Code: [email or web address] Expires: 5/21/2017  8:06 PM 
 
4. Enter the last four digits of your Social Security Number (xxxx) and Date of Birth (mm/dd/yyyy) as indicated and click Submit. You will be taken to the next sign-up page. 5. Create a [employer] ID. This will be your [employer] login ID and cannot be changed, so think of one that is secure and easy to remember. 6. Create a [employer] password. You can change your password at any time. 7. Enter your Password Reset Question and Answer. This can be used at a later time if you forget your password. 8. Enter your e-mail address. You will receive e-mail notification when new information is available in [address] 9. Click Sign Up. You can now view and download portions of your medical record. 10. Click the Download Summary menu link to download a portable copy of your medical information. If you have questions, please visit the Frequently Asked Questions section of the [employer] website. Remember, [employer] is NOT to be used for urgent needs. For medical emergencies, dial 911. Now available from your iPhone and Android! Please provide this summary of care documentation to your next provider. Your primary care clinician is listed as Jame Lundberg. If you have any questions after today's visit, please call [phone].

## 2017-03-08 NOTE — PATIENT INSTRUCTIONS
1. Continue taking medications as prescribed. 2. Contact the VAD/HF team if symptoms develop or worsen despite medical management. 3. Please begin taking the Bactrim DS twice daily for 5 days. 4. We will discharge you from [address] [place] [address] at the end of this week. We will contact you regarding dressing supply kits. 5. We will refer you to cardiac rehab if OK with Dr. Kamilla Laughlin  6. Follow up in 2 week(s).

## 2017-03-09 ENCOUNTER — HOME CARE VISIT (OUTPATIENT)
Dept: SCHEDULING | Facility: HOME HEALTH | Age: 62
End: 2017-03-09
Payer: COMMERCIAL

## 2017-03-09 ENCOUNTER — TELEPHONE ANTICOAG (OUTPATIENT)
Dept: CARDIOLOGY CLINIC | Age: 62
End: 2017-03-09

## 2017-03-09 DIAGNOSIS — J01.90 ACUTE NON-RECURRENT SINUSITIS, UNSPECIFIED LOCATION: ICD-10-CM

## 2017-03-09 LAB — INR, EXTERNAL: 1.9

## 2017-03-09 PROCEDURE — HHS10826

## 2017-03-09 PROCEDURE — A5120 SKIN BARRIER, WIPE OR SWAB: HCPCS

## 2017-03-09 PROCEDURE — G0299 HHS/HOSPICE OF RN EA 15 MIN: HCPCS

## 2017-03-09 RX ORDER — SULFAMETHOXAZOLE AND TRIMETHOPRIM 800; 160 MG/1; MG/1
1 TABLET ORAL 2 TIMES DAILY
Qty: 10 TAB | Refills: 0 | Status: SHIPPED | OUTPATIENT
Start: 2017-03-09 | End: 2017-03-23

## 2017-03-09 NOTE — PROGRESS NOTES
Norman Sims [address]  LVAD Office Visit      Date of VAD implant: 12/1/2016  Cardiologist: GRAHAM  PCP: Kenan Gregg MD    HPI: Faustino Sanchez is a 62 y.o. male with a past medical history s/p HM II LVAD for DT, h/o torasades/SVT, HTN, lumbar stenosis s/p lumbar laminectomy, CAD (s/p CABG/sp BMSx2), gout, strobic seizures, ETOH abuse and remote tobacco abuse who is seen today for routine bi-monthly LVAD follow up. He reports that he is doing well, and today he feels \"great\". He reports about 5 \"great\" days per week, while he continues to struggle with back pain and mobility issues the rest of the week. He reports an increase in his appetite and is sleeping better. He does endorse trace lower extremity edema and \"purple\" feet. He continues to have leg pain, and states that he will see Dr. Esau Nguyen this Friday. Mr. Hernandez Patient denies headache, lightheadedness, dizziness, chest pain, palpitations, dyspnea, syncope, cough, sputum production, nausea, vomiting, easy bruising. Subjective:  Chief Complaint:  Chief Complaint   Patient presents with    Cardiomyopathy    Follow Up Chronic Condition     LVAD      ROS:   Positive for - edema, negative for - chest pain, dyspnea on exertion, irregular heartbeat, loss of consciousness, orthopnea, palpitations, paroxysmal nocturnal dyspnea, rapid heart rate or shortness of breath. Assessment / Plan:    Heart Failure Status: NYHA Class II    Ischemic cardiomyopathy, s/p HM II LVAD implant as DT 12/1/16 -  Alarm history reviewed. VAD interrogation: Please see VAD flow sheet for readings. Adequate clinical perfusion and function of his LVAD. No alarms or adverse events. Continue Coreg, Bumex. No ACE-1 d/t history of renal insufficiency - start when BP tolerates/Cr OK. Continue daily weights, Na+ restricted diet. Bleeding from ICD site - Resolved. Site continues to heal.      Post-operative RV insufficiency: Resolved, T. Bili in normal range.       KLEVER: resolved, monitor labs. Begin ACE-1 when appropriate.      Multivessel CAD/S/p CABG x 2 (SVG to RCA, LIMA to LAD) - s/p- RCA BMS x 2. On Plavix, BB. No statin d/t elevated LFTs. No ASA since on Plavix and Coumadin.     Lumbar stenosis, s/p decompressive laminectomy and instrumented fusion: Continues to have back pain/leg weakness - to see Dr. Zach Juarez this week. Begin cardiac rehab when cleared by ortho and D/C'd from [facility] (this week).       Post operative anemia d/t acute blood loss: Hgb stable - continue iron, Vitamin C and folic acid. Until Hg consistently >10     H/o ETOH abuse:  Pt denies any alcohol use or any urge to drink alcohol. Has not had a drink since prior to his lumbar surgery.      SVT/A-fib/Torsades post op: S/p AICD 12/16. Cont. Amiodarone per Dr. Nimisha Veras.      HTN, MAP goal 70-90: MAP 78 today, continue BB and add ACE-1 when appropriate. Chronic anticoagulation for LVAD, INR goal 2-2.5. Cont. Warfarin and Plavix. Please see anticoagulation tracker for details.      Gout Pain: resolved, pt to continue Allopurinol and use Colchicine with active gout flare.       Depression/Anxiety: controlled on current dose of Celexa. Deconditioning: Start cardiac rehab when cleared by Dr. Zach Juarez and d/c'd from [facility]. Carotid stenosis; Refer to Dr. Preston De Oliveira ey al.      Cool extremities, possible Raynaud's: To see vascular. Encouraged patient to keep feet up. VAD specific education: Greater than 50% of appt time (60 minutes) was spent counseling Mr. Mann and his wife about LVAD management, plan of care, and medication management.           Problem List:  Patient Active Problem List   Diagnosis Code    S/P laminectomy Z98.890    Sinus tachycardia R00.0    Acute respiratory failure with hypoxia (HCC) J96.01    Essential hypertension I10    Alcohol abuse F10.10    Chronic systolic heart failure (HCC) I50.22    CAD, multiple vessel I25.10    Ischemic cardiomyopathy I25.5    MI (myocardial infarction) ([facility] [address] I21.3    Sustained VT (ventricular tachycardia) ([facility]) I47.2    S/P ICD (internal cardiac defibrillator) procedure Z95.810    Chronic anticoagulation Z79.01    Left ventricular assist device present ([facility] [address] Z95.811    Gout M10.9    ICD (implantable cardioverter-defibrillator) infection ([facility] [address] T82. 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 Social History Main Topics    Smoking status: Former Smoker     Packs/day: 1.50     Years: 30.00     Quit date: 2008    Smokeless tobacco: Never Used    Alcohol use 24.0 oz/week     40 Cans of beer per week    Drug use: No    Sexual activity: Not on file     Other Topics Concern    Not on file     Social History Narrative       Medications:  No Known Allergies     Current Outpatient Prescriptions on File Prior to Visit   Medication Sig    clopidogrel (PLAVIX) 75 mg tab Take 1 Tab by mouth daily.  magnesium oxide (MAG-OX) 400 mg tablet Take 1 Tab by mouth daily.  citalopram (CELEXA) 40 mg tablet Take 1 Tab by mouth daily.  pantoprazole (PROTONIX) 40 mg tablet Take 1 Tab by mouth Daily (before breakfast).  warfarin (COUMADIN) 2.5 mg tablet Take 2 Tabs by mouth daily. (Patient taking differently: Take 2 Tabs by mouth daily. Pt to alternate 1 then 2 tabs, so 2.5mg on Sat , then 5mg Sun)    potassium chloride (K-DUR, KLOR-CON) 20 mEq tablet Take 0.5 Tabs by mouth daily.     ferrous sulfate 325 mg (65 mg iron) tablet Take 1 Tab by mouth two (2) times daily (with meals).  ascorbic acid, vitamin C, (VITAMIN C) 500 mg tablet Take 1 Tab by mouth two (2) times a day.  cyclobenzaprine (FLEXERIL) 5 mg tablet Take 5 mg by mouth.  GABAPENTIN, BULK, Take 1 Tab by mouth three (3) times daily.  folic acid (FOLVITE) 1 mg tablet Take 1 Tab by mouth daily.  carvedilol (COREG) 25 mg tablet Take 1 Tab by mouth two (2) times daily (with meals).  amiodarone (CORDARONE) 200 mg tablet Take 1 Tab by mouth daily.  allopurinol (ZYLOPRIM) 100 mg tablet Take 1 Tab by mouth two (2) times a day.  bumetanide (BUMEX) 0.5 mg tablet Take 1 Tab by mouth daily.  ALPRAZolam (XANAX) 0.25 mg tablet Take 1 Tab by mouth every six (6) hours as needed for Anxiety. Max Daily Amount: 1 mg.    HYDROcodone-acetaminophen (NORCO) 5-325 mg per tablet Take  by mouth.  ondansetron (ZOFRAN ODT) 8 mg disintegrating tablet Take 1 Tab by mouth every eight (8) hours as needed for Nausea.  colchicine 0.6 mg tablet Take 1 Tab by mouth two (2) times a day. Indications: GOUT (Patient taking differently: Take 0.6 mg by mouth as needed. Indications: GOUT)    traMADol (ULTRAM) 50 mg tablet Take 1 Tab by mouth every six (6) hours as needed. Max Daily Amount: 200 mg.  enoxaparin (LOVENOX) 80 mg/0.8 mL injection 80 mg by SubCUTAneous route every twelve (12) hours. No current facility-administered medications on file prior to visit.          Results for orders placed or performed in visit on 03/02/17   AMB PT/INR EXTERNAL   Result Value Ref Range    INR, External 2.3        Recent tests or procedures:   S/p Laminectomy 11/22/16  S/p LVAD implant 12/1/16  S/p AICD implant 12/16/16  S/p Ramp test 12/16/16          Objective:  Physical Exam:  Visit Vitals    Pulse 81    Temp 97.8 °F (36.6 °C) (Oral)    Resp 18    Ht 5' 8.2\" (1.732 m)    Wt 168 lb 3.2 oz (76.3 kg)    SpO2 96%    BMI 25.42 kg/m2      MAP: 78    VAD data:  LVAD (Heartmate)  Pump Speed (RPM): 8800  Pump Flow (LPM): 4.5  PI (Pulsitility Index): 7.3  Power: 4.6    Exam:  Gen:  WA, WN, NAD   CVS:  +LVAD hum  Pul:  Slightly decreased BS, (-) r,r,w  Abd:  +BS, soft, NT,ND  Ext: 1+ b/l ankle edema, (-) calf tenderness  Neuro:  No obvious deficits  ICD site - healing with small area (0.25 cm) of poor healing at previous incisional site. No drainage, warmth, or pain. Drive Line Exam:  Stabilization device intact: yes  Line inspected for damage: yes    Appearance: no edema, erythema, drainage   Sterile dressing changed per policy: no  Frequency of dressing change at home: every other day after showering. Frequency of use of stabilization device: 100%    Follow-up Disposition:  Return in about 2 weeks (around 3/22/2017). Thank you for letting us see him with you.      Javier Galvan, [address]  VAD Coordinator  [address] [place]  Office: [phone]  [facility] [address] Melanie Thakkar Pager: [phone]

## 2017-03-11 RX ORDER — ALLOPURINOL 100 MG/1
100 TABLET ORAL 2 TIMES DAILY
Qty: 180 TAB | Refills: 3 | Status: SHIPPED | OUTPATIENT
Start: 2017-03-11 | End: 2017-09-07 | Stop reason: SINTOL

## 2017-03-11 RX ORDER — PANTOPRAZOLE SODIUM 40 MG/1
40 TABLET, DELAYED RELEASE ORAL
Qty: 90 TAB | Refills: 3 | Status: SHIPPED | OUTPATIENT
Start: 2017-03-11 | End: 2017-11-17 | Stop reason: SDUPTHER

## 2017-03-11 RX ORDER — CARVEDILOL 25 MG/1
25 TABLET ORAL 2 TIMES DAILY WITH MEALS
Qty: 60 TAB | Refills: 4 | Status: SHIPPED | OUTPATIENT
Start: 2017-03-11 | End: 2017-04-17 | Stop reason: SDUPTHER

## 2017-03-11 RX ORDER — CLOPIDOGREL BISULFATE 75 MG/1
75 TABLET ORAL DAILY
Qty: 90 TAB | Refills: 3 | Status: SHIPPED | OUTPATIENT
Start: 2017-03-11 | End: 2017-11-17 | Stop reason: SDUPTHER

## 2017-03-11 RX ORDER — AMIODARONE HYDROCHLORIDE 200 MG/1
200 TABLET ORAL DAILY
Qty: 90 TAB | Refills: 3 | Status: SHIPPED | OUTPATIENT
Start: 2017-03-11 | End: 2017-11-17 | Stop reason: SDUPTHER

## 2017-03-12 VITALS
BODY MASS INDEX: 23.88 KG/M2 | RESPIRATION RATE: 20 BRPM | WEIGHT: 158 LBS | OXYGEN SATURATION: 97 % | TEMPERATURE: 98.4 F

## 2017-03-13 ENCOUNTER — TELEPHONE (OUTPATIENT)
Dept: CARDIAC REHAB | Age: 62
End: 2017-03-13

## 2017-03-13 ENCOUNTER — TELEPHONE (OUTPATIENT)
Dept: CARDIOLOGY CLINIC | Age: 62
End: 2017-03-13

## 2017-03-13 NOTE — TELEPHONE ENCOUNTER
3/13/2017 Cardiac Wellness: Called Mr. Suzan Olivera  to discuss participation in the Cardiac Wellness Program following chf/LVAD Implant on 12/20/2016. Spoke with his wife who verified the insurance. Will call back to schedule intake when we have benefits. She and her daughter are available to transport him to appointments.  Alesha Dinh RN

## 2017-03-13 NOTE — TELEPHONE ENCOUNTER
Returned call to Jewel Jacobson - reports that Dallas Gallo fell at home after tripping on his shoelaces. Did not his his head - just scraped his elbow. Advised them to bring him to the ED for any neurological changes, dizziness, SOB, dyspnea. They verbalize understanding.

## 2017-03-13 NOTE — TELEPHONE ENCOUNTER
3/13/2017 Cardiac Wellness: Called Mr. Faustino Sanchez to discuss participation in the Cardiac Wellness Program following chf/LVAD Implant on 12/20/2016. Spoke with his wife who verified the insurance. Will call back to schedule intake when we have benefits. She and her daughter are available to transport him to appointments. Bianca Mujica RN    3/13/2017 Cardiac Wellness: Called Mr. Faustino Sanchez  to discuss participation in the Cardiac Wellness. Intake appt. scheduled. All instructions given.  Bianca Mujica RN

## 2017-03-15 DIAGNOSIS — Z79.01 CHRONIC ANTICOAGULATION: ICD-10-CM

## 2017-03-15 DIAGNOSIS — Z95.811 LEFT VENTRICULAR ASSIST DEVICE PRESENT (HCC): Primary | ICD-10-CM

## 2017-03-16 ENCOUNTER — TELEPHONE ANTICOAG (OUTPATIENT)
Dept: CARDIOLOGY CLINIC | Age: 62
End: 2017-03-16

## 2017-03-16 LAB
INR PPP: 3.2 (ref 0.8–1.2)
PROTHROMBIN TIME: 34.2 SEC (ref 9.1–12)

## 2017-03-21 DIAGNOSIS — Z79.01 CHRONIC ANTICOAGULATION: Primary | ICD-10-CM

## 2017-03-22 ENCOUNTER — OFFICE VISIT (OUTPATIENT)
Dept: CARDIOLOGY CLINIC | Age: 62
End: 2017-03-22

## 2017-03-22 ENCOUNTER — TELEPHONE ANTICOAG (OUTPATIENT)
Dept: CARDIOLOGY CLINIC | Age: 62
End: 2017-03-22

## 2017-03-22 ENCOUNTER — TELEPHONE (OUTPATIENT)
Dept: CARDIAC REHAB | Age: 62
End: 2017-03-22

## 2017-03-22 VITALS
SYSTOLIC BLOOD PRESSURE: 110 MMHG | HEART RATE: 75 BPM | WEIGHT: 163.8 LBS | TEMPERATURE: 97.5 F | BODY MASS INDEX: 24.83 KG/M2 | HEIGHT: 68 IN | RESPIRATION RATE: 18 BRPM | OXYGEN SATURATION: 98 %

## 2017-03-22 DIAGNOSIS — I25.10 CAD, MULTIPLE VESSEL: ICD-10-CM

## 2017-03-22 DIAGNOSIS — F33.1 MODERATE EPISODE OF RECURRENT MAJOR DEPRESSIVE DISORDER (HCC): ICD-10-CM

## 2017-03-22 DIAGNOSIS — Z95.811 LVAD (LEFT VENTRICULAR ASSIST DEVICE) PRESENT (HCC): Primary | ICD-10-CM

## 2017-03-22 DIAGNOSIS — Z98.890 S/P LAMINECTOMY: ICD-10-CM

## 2017-03-22 DIAGNOSIS — I10 ESSENTIAL HYPERTENSION: ICD-10-CM

## 2017-03-22 DIAGNOSIS — Z79.01 CHRONIC ANTICOAGULATION: ICD-10-CM

## 2017-03-22 LAB
INR PPP: 1.9 (ref 0.8–1.2)
PROTHROMBIN TIME: 19.4 SEC (ref 9.1–12)

## 2017-03-22 RX ORDER — WARFARIN 2.5 MG/1
2.5 TABLET ORAL DAILY
Qty: 120 TAB | Refills: 1 | Status: CANCELLED | OUTPATIENT
Start: 2017-03-22

## 2017-03-22 RX ORDER — WARFARIN 2.5 MG/1
5 TABLET ORAL DAILY
Qty: 180 TAB | Refills: 3 | Status: SHIPPED | OUTPATIENT
Start: 2017-03-22 | End: 2017-06-11 | Stop reason: SDUPTHER

## 2017-03-22 RX ORDER — SERTRALINE HYDROCHLORIDE 50 MG/1
50 TABLET, FILM COATED ORAL DAILY
Qty: 30 TAB | Refills: 11 | Status: SHIPPED | OUTPATIENT
Start: 2017-03-22 | End: 2017-05-03

## 2017-03-22 NOTE — MR AVS SNAPSHOT
Visit Information Date & Time Provider Department Dept. Phone Encounter #  
 3/22/2017  2:30 PM Emeterio Runner, MD [address] [id number] Upcoming Health Maintenance Date Due Pneumococcal 19-64 Medium Risk (1 of 1 - PPSV23) 10/11/1975 DTaP/Tdap/Td series (1 - Tdap) 10/11/1977 ZOSTER VACCINE AGE 60> 10/11/2016 FOBT Q 1 YEAR AGE 50-75 12/20/2017 Allergies as of 3/22/2017  Review Complete On: 3/22/2017 By: George Pearce LPN No Known Allergies Current Immunizations  Reviewed on 1/20/2017 Name Date Influenza Vaccine (Quad) PF 12/21/2016 12:00 PM  
  
 Not reviewed this visit You Were Diagnosed With   
  
 Codes Comments Moderate episode of recurrent major depressive disorder ([facility] [address]    -  Primary ICD-10-CM: F33.1 ICD-9-CM: 296.32 Chronic anticoagulation     ICD-10-CM: Z79.01 
ICD-9-CM: V58.61 Vitals BP Pulse Temp Resp Height(growth percentile) Weight(growth percentile) (!) 110/0 75 97.5 °F (36.4 °C) (Oral) 18 5' 8\" (1.727 m) 163 lb 12.8 oz (74.3 kg) SpO2 BMI Smoking Status 98% 24.91 kg/m2 Former Smoker Vitals History BMI and BSA Data Body Mass Index Body Surface Area 24.91 kg/m 2 1.89 m 2 Preferred Pharmacy Pharmacy Name Phone [facility]/PHARMACY #[id number] - [place], [place] - [address] [facility] Prosper Sommers [phone] Your Updated Medication List  
  
   
This list is accurate as of: 3/22/17  4:58 PM.  Always use your most recent med list.  
  
  
  
  
 allopurinol 100 mg tablet Commonly known as:  Chyrel Alken Take 1 Tab by mouth two (2) times a day. ALPRAZolam 0.25 mg tablet Commonly known as:  Yovani Late Take 1 Tab by mouth every six (6) hours as needed for Anxiety. Max Daily Amount: 1 mg.  
  
 amiodarone 200 mg tablet Commonly known as:  CORDARONE Take 1 Tab by mouth daily. ascorbic acid (vitamin C) 500 mg tablet Commonly known as:  VITAMIN C Take 1 Tab by mouth two (2) times a day. bumetanide 0.5 mg tablet Commonly known as:  Lamberto Golds Take 1 Tab by mouth daily. carvedilol 25 mg tablet Commonly known as:  Urban Nicol Take 1 Tab by mouth two (2) times daily (with meals). clopidogrel 75 mg Tab Commonly known as:  PLAVIX Take 1 Tab by mouth daily. colchicine 0.6 mg tablet Take 1 Tab by mouth two (2) times a day. Indications: GOUT  
  
 cyclobenzaprine 5 mg tablet Commonly known as:  FLEXERIL Take 5 mg by mouth as needed. enoxaparin 80 mg/0.8 mL injection Commonly known as:  LOVENOX 80 mg by SubCUTAneous route every twelve (12) hours. ferrous sulfate 325 mg (65 mg iron) tablet Take 1 Tab by mouth two (2) times daily (with meals). folic acid 1 mg tablet Commonly known as:  Google Take 1 Tab by mouth daily. GABAPENTIN (BULK) Take 1 Tab by mouth three (3) times daily. HYDROcodone-acetaminophen 5-325 mg per tablet Commonly known as:  Kriste Kishan Take 1 Tab by mouth every eight (8) hours as needed. magnesium oxide 400 mg tablet Commonly known as:  MAG-OX Take 1 Tab by mouth daily. ondansetron 8 mg disintegrating tablet Commonly known as:  ZOFRAN ODT Take 1 Tab by mouth every eight (8) hours as needed for Nausea. pantoprazole 40 mg tablet Commonly known as:  PROTONIX Take 1 Tab by mouth Daily (before breakfast). potassium chloride 20 mEq tablet Commonly known as:  K-DUR, KLOR-CON Take 0.5 Tabs by mouth daily. sertraline 50 mg tablet Commonly known as:  ZOLOFT Take 1 Tab by mouth daily. traMADol 50 mg tablet Commonly known as:  ULTRAM  
Take 1 Tab by mouth every six (6) hours as needed. Max Daily Amount: 200 mg.  
  
 trimethoprim-sulfamethoxazole 160-800 mg per tablet Commonly known as:  BACTRIM DS, SEPTRA DS Take 1 Tab by mouth two (2) times a day. warfarin 2.5 mg tablet Commonly known as:  COUMADIN Take 2 Tabs by mouth daily. Prescriptions Sent to Pharmacy Refills  
 sertraline (ZOLOFT) 50 mg tablet 11 Sig: Take 1 Tab by mouth daily. Class: Normal  
 Pharmacy: [facility]/pharmacy #[id number] [place], [place] - [address] AT [address] [facility] [address] Ph #: [phone] Route: Oral  
 warfarin (COUMADIN) 2.5 mg tablet 3 Sig: Take 2 Tabs by mouth daily. Class: Normal  
 Pharmacy: [facility]/pharmacy #[id number] [place], [place] - [address] AT [address] Ph #: [phone] Route: Oral  
  
To-Do List   
 03/23/2017 3:00 PM  
  Appointment with Odalis Sotelo RN at [address] [place] [address] ([phone])  
  
 03/23/2017 4:00 PM  
  Appointment with Kae Parisi at [address] [place] [address] ([phone]) Patient Instructions Continue medications as prescribed Stop taking the Celexa tonight, and begin taking the Zoloft (sertraline) tomorrow AM. Continue to take the colchicine as needed for the gout flare. We will scheduled you for a CT of your head and neck and then follow up with a vascular surgery group if needed. Follow up in 2 weeks Introducing [facility] SERVICES! Karey Hernandez introduces [employer] patient portal. Now you can access parts of your medical record, email your doctor's office, and request medication refills online. 1. In your internet browser, go to [email or web address] 2. Click on the First Time User? Click Here link in the Sign In box. You will see the New Member Sign Up page. 3. Enter your [employer] Access Code exactly as it appears below. You will not need to use this code after youve completed the sign-up process. If you do not sign up before the expiration date, you must request a new code. · [employer] Access Code: [email or web address] Expires: 5/21/2017  9:06 PM 
 
4.  Enter the last four digits of your Social Security Number (xxxx) and Date of Birth (mm/dd/yyyy) as indicated and click Submit. You will be taken to the next sign-up page. 5. Create a [employer] ID. This will be your [employer] login ID and cannot be changed, so think of one that is secure and easy to remember. 6. Create a [employer] password. You can change your password at any time. 7. Enter your Password Reset Question and Answer. This can be used at a later time if you forget your password. 8. Enter your e-mail address. You will receive e-mail notification when new information is available in [address] 9. Click Sign Up. You can now view and download portions of your medical record. 10. Click the Download Summary menu link to download a portable copy of your medical information. If you have questions, please visit the Frequently Asked Questions section of the [employer] website. Remember, [employer] is NOT to be used for urgent needs. For medical emergencies, dial 911. Now available from your iPhone and Android! Please provide this summary of care documentation to your next provider. Your primary care clinician is listed as Prachi Little. If you have any questions after today's visit, please call [phone].

## 2017-03-22 NOTE — PATIENT INSTRUCTIONS
Continue medications as prescribed  Stop taking the Celexa tonight, and begin taking the Zoloft (sertraline) tomorrow AM.    Continue to take the colchicine as needed for the gout flare. We will scheduled you for a CT of your head and neck and then follow up with a vascular surgery group if needed.   Follow up in 2 weeks

## 2017-03-23 ENCOUNTER — HOSPITAL ENCOUNTER (OUTPATIENT)
Dept: CARDIAC REHAB | Age: 62
Discharge: HOME | End: 2017-03-23
Payer: COMMERCIAL

## 2017-03-23 VITALS — WEIGHT: 163 LBS | BODY MASS INDEX: 24.71 KG/M2 | HEIGHT: 68 IN

## 2017-03-23 VITALS — BODY MASS INDEX: 24.78 KG/M2 | WEIGHT: 163 LBS

## 2017-03-23 PROCEDURE — 93798 PHYS/QHP OP CAR RHAB W/ECG: CPT

## 2017-03-23 RX ORDER — BUMETANIDE 0.5 MG/1
0.5 TABLET ORAL 2 TIMES DAILY
COMMUNITY
End: 2017-06-28

## 2017-03-23 NOTE — CARDIO/PULMONARY
Anselmo Baez  62 y.o.     Mr. Ross Johnson presented to cardiac wellness for orientation and exercise tolerance test today with a primary diagnosis of an LVAD and CABG x 2. Anselmo Baez has no significant cardiac history. He had just gotten back surgery and had an MI in the hospital. He then had some arrythmias and required surgery for an LVAD and CABG x 2. Four days after this surgery pt received 2 more stents. He received an ICD but this was removed in January d/t an infection. Cardiac risk factors include family history, hypertension and these were reviewed with the patient and his wife. Anselmo Baez is   and lives with his wife. He used to be a  and was on his feet for 12 hour sheets. ZOE-D, depression score, is 12 and this is considered moderate. He was recently taken off Cymbalta and place on Zoloft. Patient denied chest pain or SOB during 6 minute walk and was in NSR with rare PVCs, 1 pair. Anselmo Baez will attend a 60 minute class once a week and exercise 3 days a week in cardiac wellness. EF is 20%.     Anastasiya Freeman RN  3/23/2017

## 2017-03-24 ENCOUNTER — DOCUMENTATION ONLY (OUTPATIENT)
Dept: CARDIOLOGY CLINIC | Age: 62
End: 2017-03-24

## 2017-03-24 DIAGNOSIS — I65.22 STENOSIS OF LEFT CAROTID ARTERY: Primary | ICD-10-CM

## 2017-03-27 ENCOUNTER — HOSPITAL ENCOUNTER (OUTPATIENT)
Dept: VASCULAR SURGERY | Age: 62
Discharge: HOME | End: 2017-03-27
Attending: NURSE PRACTITIONER
Payer: COMMERCIAL

## 2017-03-27 ENCOUNTER — HOSPITAL ENCOUNTER (OUTPATIENT)
Dept: CARDIAC REHAB | Age: 62
Discharge: HOME | End: 2017-03-27
Payer: COMMERCIAL

## 2017-03-27 ENCOUNTER — TELEPHONE (OUTPATIENT)
Dept: CARDIOLOGY CLINIC | Age: 62
End: 2017-03-27

## 2017-03-27 VITALS — WEIGHT: 157 LBS | BODY MASS INDEX: 23.87 KG/M2

## 2017-03-27 DIAGNOSIS — I65.22 STENOSIS OF LEFT CAROTID ARTERY: ICD-10-CM

## 2017-03-27 PROCEDURE — 93798 PHYS/QHP OP CAR RHAB W/ECG: CPT

## 2017-03-27 NOTE — TELEPHONE ENCOUNTER
She wanted to know what company we arranged for Mr. Mann to get his wound care supplies. She stated Lorenzo Katz NP arranged. Will f/u with Aida Leon and let her know.

## 2017-03-27 NOTE — PROCEDURES
[employer]  *** FINAL REPORT ***    Name: Heydi Brody  MRN: [id number]    Outpatient  : 11 Oct 1955  HIS Order #: [id number]  [address] [facility] Visit #: [id number]  Date: 27 Mar 2017    TYPE OF TEST: Cerebrovascular Duplex    REASON FOR TEST  Known carotid stenosis    Right Carotid:-             Proximal               Mid                 Distal  cm/s  Systolic  Diastolic  Systolic  Diastolic  Systolic  Diastolic  CCA:     40.2      23.0                            38.0      23.0  Bulb:  ECA:     38.0      14.0  ICA:    132.0      77.0      116.0      71.0       67.0      33.0  ICA/CCA:  3.5       3.3    ICA Stenosis:    Right Vertebral:-  Finding: Antegrade  Sys:       51.0  Misty:       29.0    Right Subclavian:    Left Carotid:-            Proximal                Mid                 Distal  cm/s  Systolic  Diastolic  Systolic  Diastolic  Systolic  Diastolic  CCA:     90.4      18.0                            25.0      12.0  Bulb:  ECA:     39.0      26.0  ICA:      0.0       0.0        0.0       0.0        0.0       0.0  ICA/CCA:  0.0       0.0    ICA Stenosis:    Left Vertebral:-  Finding: Antegrade  Sys:       92.0  Misty:       61.0    Left Subclavian:    INTERPRETATION/FINDINGS  PROCEDURE:  Color duplex ultrasound imaging of extracranial  cerebrovascular arteries. FINDINGS:       Right:  Internal carotid velocity is elevated to a level  consistent with a 50 to 69 percent stenosis; there is narrowing of the   flow channel on color Doppler imaging and calcific plaque on B-mode  imaging. The common and external carotid arteries are patent and  without evidence of hemodynamically significant stenosis. Left: No flow is detected in the internal carotid artery by  pulsed Doppler or color Doppler imaging and  mixed density plaque is  visualized on B-mode imaging, consistent with occlusion.   The common  and external carotid arteries are patent and without evidence of  hemodynamically significant stenosis. IMPRESSION:  Consistent with 50-69% stenosis of the right internal  carotid and   occluded  left internal carotid. Vertebrals are patent  with antegrade flow. ADDITIONAL COMMENTS    I have personally reviewed the data relevant to the interpretation of  this  study. TECHNOLOGIST: Robert Lovell.  Konrad Cordero  Signed: 03/27/2017 11:01 AM    PHYSICIAN: Emilia Greene MD  Signed: 03/28/2017 06:55 AM

## 2017-03-28 ENCOUNTER — TELEPHONE (OUTPATIENT)
Dept: CARDIOLOGY CLINIC | Age: 62
End: 2017-03-28

## 2017-03-28 ENCOUNTER — HOSPITAL ENCOUNTER (OUTPATIENT)
Dept: CARDIAC REHAB | Age: 62
Discharge: HOME | End: 2017-03-28
Payer: COMMERCIAL

## 2017-03-28 VITALS — BODY MASS INDEX: 24.18 KG/M2 | WEIGHT: 159 LBS

## 2017-03-28 DIAGNOSIS — Z95.811 LEFT VENTRICULAR ASSIST DEVICE PRESENT (HCC): ICD-10-CM

## 2017-03-28 DIAGNOSIS — Z79.01 CHRONIC ANTICOAGULATION: Primary | ICD-10-CM

## 2017-03-28 PROCEDURE — 93797 PHYS/QHP OP CAR RHAB WO ECG: CPT | Performed by: DIETITIAN, REGISTERED

## 2017-03-28 PROCEDURE — 93798 PHYS/QHP OP CAR RHAB W/ECG: CPT | Performed by: DIETITIAN, REGISTERED

## 2017-03-28 NOTE — TELEPHONE ENCOUNTER
MARIA LUZ Dey, LPN       Caller: Unspecified (Yesterday, 11:20 AM)                     Please call and let them know we arranged for his supplies through [facility] [employer].      Thanks   Raheem Irizarry       Wife Verbalized understanding

## 2017-03-28 NOTE — TELEPHONE ENCOUNTER
Telephone Call RE:  Lab Reminder      Outcome:     [x] Patient verbalizes understanding    [] Unable to reach   [] Left message              []     Also, please call Manolo Schofield back regarding supplies.       Ethel David

## 2017-03-29 ENCOUNTER — TELEPHONE ANTICOAG (OUTPATIENT)
Dept: CARDIOLOGY CLINIC | Age: 62
End: 2017-03-29

## 2017-03-29 LAB
INR PPP: 2.5 (ref 0.8–1.2)
PROTHROMBIN TIME: 26.7 SEC (ref 9.1–12)

## 2017-03-30 ENCOUNTER — HOSPITAL ENCOUNTER (OUTPATIENT)
Dept: CARDIAC REHAB | Age: 62
Discharge: HOME | End: 2017-03-30
Payer: COMMERCIAL

## 2017-03-30 ENCOUNTER — APPOINTMENT (OUTPATIENT)
Dept: CARDIAC REHAB | Age: 62
End: 2017-03-30
Payer: COMMERCIAL

## 2017-03-30 VITALS — WEIGHT: 157 LBS | BODY MASS INDEX: 23.87 KG/M2

## 2017-03-30 PROCEDURE — 93798 PHYS/QHP OP CAR RHAB W/ECG: CPT

## 2017-04-03 ENCOUNTER — HOSPITAL ENCOUNTER (OUTPATIENT)
Dept: CARDIAC REHAB | Age: 62
Discharge: HOME | End: 2017-04-03
Payer: COMMERCIAL

## 2017-04-03 VITALS — WEIGHT: 157 LBS | BODY MASS INDEX: 23.87 KG/M2

## 2017-04-03 PROCEDURE — 93798 PHYS/QHP OP CAR RHAB W/ECG: CPT

## 2017-04-03 NOTE — PROGRESS NOTES
Norman Sims [address]  LVAD Office Visit      Date of VAD implant: 12/1/2016  Cardiologist: GRAHAM  PCP: Sandhya Del Cid MD    HPI: Bonnie Hicks is a 62 y.o. male with a past medical history s/p HM II LVAD for DT, h/o torasades/SVT, HTN, lumbar stenosis s/p lumbar laminectomy, CAD (s/p CABG/sp BMSx2), gout, strobic seizures, ETOH abuse and remote tobacco abuse who is seen today for routine bi-monthly LVAD follow up. He reports that he is doing well, and today he feels \"great\". He reports about 5 \"great\" days per week, while he continues to struggle with back pain and mobility issues the rest of the week. He reports an increase in his appetite and is sleeping better. He does endorse trace lower extremity edema and \"purple\" feet. He continues to have leg pain, and states that he will see Dr. Amaya Owusu this Friday. Mr. José Luis Quan denies headache, lightheadedness, dizziness, chest pain, palpitations, dyspnea, syncope, cough, sputum production, nausea, vomiting, easy bruising. Subjective:  Chief Complaint:  Chief Complaint   Patient presents with    Other     LVAD -driveline site irritation    Cardiomyopathy      ROS:   Positive for - edema, negative for - chest pain, dyspnea on exertion, irregular heartbeat, loss of consciousness, orthopnea, palpitations, paroxysmal nocturnal dyspnea, rapid heart rate or shortness of breath. Assessment / Plan:    Heart Failure Status: NYHA Class II    Ischemic cardiomyopathy, s/p HM II LVAD implant as DT 12/1/16 -  Alarm history reviewed. VAD interrogation: Please see VAD flow sheet for readings. Adequate clinical perfusion and function of his LVAD. No alarms or adverse events. Continue Coreg, Bumex. No ACE-1 d/t history of renal insufficiency - start when BP tolerates/Cr OK. Continue daily weights, Na+ restricted diet. Bleeding from ICD site - Resolved. Site continues to heal.      Post-operative RV insufficiency: Resolved, T. Bili in normal range. KLEVER: resolved, monitor labs. Begin ACE-1 when appropriate.      Multivessel CAD/S/p CABG x 2 (SVG to RCA, LIMA to LAD) - s/p- RCA BMS x 2. On Plavix, BB. No statin d/t elevated LFTs. No ASA since on Plavix and Coumadin.     Lumbar stenosis, s/p decompressive laminectomy and instrumented fusion: Continues to have back pain/leg weakness - to see Dr. Shaila Dick this week. Begin cardiac rehab when cleared by ortho and D/C'd from [facility] (this week).       Post operative anemia d/t acute blood loss: Hgb stable - continue iron, Vitamin C and folic acid. Until Hg consistently >10     H/o ETOH abuse:  Pt denies any alcohol use or any urge to drink alcohol. Has not had a drink since prior to his lumbar surgery.      SVT/A-fib/Torsades post op: S/p AICD 12/16. Cont. Amiodarone per Dr. Hetty Moritz.      HTN, MAP goal 70-90: MAP 78 today, continue BB and add ACE-1 when appropriate. Chronic anticoagulation for LVAD, INR goal 2-2.5. Cont. Warfarin and Plavix. Please see anticoagulation tracker for details.      Gout Pain: resolved, pt to continue Allopurinol and use Colchicine with active gout flare.       Depression/Anxiety: controlled on current dose of Celexa. Deconditioning: Start cardiac rehab when cleared by Dr. Shaila Dick and d/c'd from [facility]. Carotid stenosis; Refer to Dr. Ritchie President al.      Cool extremities, possible Raynaud's: To see vascular. Encouraged patient to keep feet up. VAD specific education: Greater than 50% of appt time (60 minutes) was spent counseling Mr. Mann and his wife about LVAD management, plan of care, and medication management.           Problem List:  Patient Active Problem List   Diagnosis Code    S/P laminectomy Z98.890    Sinus tachycardia R00.0    Acute respiratory failure with hypoxia (HCC) J96.01    Essential hypertension I10    Alcohol abuse F10.10    Chronic systolic heart failure (HCC) I50.22    CAD, multiple vessel I25.10    Ischemic cardiomyopathy I25.5    MI (myocardial infarction) ([facility] I21.3    Sustained VT (ventricular tachycardia) ([facility]) I47.2    S/P ICD (internal cardiac defibrillator) procedure Z95.810    Chronic anticoagulation Z79.01    Left ventricular assist device present ([facility] Z95.811    Gout M10.9    ICD (implantable cardioverter-defibrillator) infection ([facility] T82. 7XXA        Past Medical History:   Diagnosis Date    Arrhythmia     SVT    CAD (coronary artery disease)     Chronic pain     back    Gout     Heart failure ([facility])     Hypertension     LVAD (left ventricular assist device) present ([facility] 12/01/2016    Raynaud disease     Seizures ([facility]     strobic seizures early 19's       Family History   Problem Relation Age of Onset    Diabetes Mother     Dementia Mother     Other Mother      carotid artery blockage    Heart Disease Father     Stroke Father     Heart Attack Father      several    Hypertension Father     Elevated Lipids Father     No Known Problems Sister     Cancer Brother      brain    Lung Disease Sister     COPD Sister     Cancer Sister      lung       Social History     Social History    Marital status:      Spouse name: N/A    Number of children: N/A    Years of education: N/A     Occupational History    Not on file. Social History Main Topics    Smoking status: Former Smoker     Packs/day: 1.50     Years: 30.00     Quit date: 2008    Smokeless tobacco: Never Used    Alcohol use No    Drug use: No    Sexual activity: Not on file     Other Topics Concern    Not on file     Social History Narrative       Medications:  No Known Allergies     Current Outpatient Prescriptions on File Prior to Visit   Medication Sig    allopurinol (ZYLOPRIM) 100 mg tablet Take 1 Tab by mouth two (2) times a day.  amiodarone (CORDARONE) 200 mg tablet Take 1 Tab by mouth daily.  clopidogrel (PLAVIX) 75 mg tab Take 1 Tab by mouth daily.     carvedilol (COREG) 25 mg tablet Take 1 Tab by mouth two (2) times daily (with meals).  pantoprazole (PROTONIX) 40 mg tablet Take 1 Tab by mouth Daily (before breakfast).  magnesium oxide (MAG-OX) 400 mg tablet Take 1 Tab by mouth daily.  enoxaparin (LOVENOX) 80 mg/0.8 mL injection 80 mg by SubCUTAneous route every twelve (12) hours. (Patient taking differently: 80 mg by SubCUTAneous route as needed. When INR is low)    potassium chloride (K-DUR, KLOR-CON) 20 mEq tablet Take 0.5 Tabs by mouth daily.  ferrous sulfate 325 mg (65 mg iron) tablet Take 1 Tab by mouth two (2) times daily (with meals).  ascorbic acid, vitamin C, (VITAMIN C) 500 mg tablet Take 1 Tab by mouth two (2) times a day.  cyclobenzaprine (FLEXERIL) 5 mg tablet Take 5 mg by mouth as needed.  folic acid (FOLVITE) 1 mg tablet Take 1 Tab by mouth daily.  HYDROcodone-acetaminophen (NORCO) 5-325 mg per tablet Take 1 Tab by mouth every eight (8) hours as needed.  colchicine 0.6 mg tablet Take 1 Tab by mouth two (2) times a day. Indications: GOUT (Patient taking differently: Take 0.6 mg by mouth as needed. Indications: GOUT)     No current facility-administered medications on file prior to visit.          Results for orders placed or performed in visit on 03/21/17   PROTHROMBIN TIME + INR   Result Value Ref Range    INR 1.9 (H) 0.8 - 1.2    Prothrombin time 19.4 (H) 9.1 - 12.0 sec       Recent tests or procedures:   S/p Laminectomy 11/22/16  S/p LVAD implant 12/1/16  S/p AICD implant 12/16/16  S/p Ramp test 12/16/16          Objective:  Physical Exam:  Visit Vitals    BP (!) 110/0    Pulse 75    Temp 97.5 °F (36.4 °C) (Oral)    Resp 18    Ht 5' 8\" (1.727 m)    Wt 163 lb 12.8 oz (74.3 kg)    SpO2 98%    BMI 24.91 kg/m2      MAP: 78    VAD data:       Exam:  Gen:  WA, WN, NAD   CVS:  +LVAD hum  Pul:  Slightly decreased BS, (-) r,r,w  Abd:  +BS, soft, NT,ND  Ext: 1+ b/l ankle edema, (-) calf tenderness  Neuro:  No obvious deficits  ICD site - healing with small area (0.25 cm) of poor healing at previous incisional site. No drainage, warmth, or pain. Drive Line Exam:  Stabilization device intact: yes  Line inspected for damage: yes    Appearance: no edema, erythema, drainage   Sterile dressing changed per policy: no  Frequency of dressing change at home: every other day after showering. Frequency of use of stabilization device: 100%    Follow-up Disposition:  Return in about 2 weeks (around 4/5/2017). Thank you for letting us see him with you.      Andrzej Gonzalez, [address]  VAD Coordinator  [address] [place]  Office: [phone]  [facility] [address] Pager: [phone]

## 2017-04-04 ENCOUNTER — HOSPITAL ENCOUNTER (OUTPATIENT)
Dept: CARDIAC REHAB | Age: 62
Discharge: HOME | End: 2017-04-04
Payer: COMMERCIAL

## 2017-04-04 ENCOUNTER — TELEPHONE (OUTPATIENT)
Dept: CARDIOLOGY CLINIC | Age: 62
End: 2017-04-04

## 2017-04-04 VITALS — BODY MASS INDEX: 23.87 KG/M2 | WEIGHT: 157 LBS

## 2017-04-04 DIAGNOSIS — Z79.01 CHRONIC ANTICOAGULATION: ICD-10-CM

## 2017-04-04 DIAGNOSIS — Z79.01 CHRONIC ANTICOAGULATION: Primary | ICD-10-CM

## 2017-04-04 DIAGNOSIS — Z95.811 LEFT VENTRICULAR ASSIST DEVICE PRESENT (HCC): ICD-10-CM

## 2017-04-04 DIAGNOSIS — I25.10 CAD, MULTIPLE VESSEL: Primary | ICD-10-CM

## 2017-04-04 PROCEDURE — 93797 PHYS/QHP OP CAR RHAB WO ECG: CPT

## 2017-04-04 PROCEDURE — 93798 PHYS/QHP OP CAR RHAB W/ECG: CPT

## 2017-04-04 NOTE — TELEPHONE ENCOUNTER
Patient's wife left a voice message regarding patient. He is having diarrhea. She wants to know if we want to add any labs to his order for tomorrow.       He is scheduled with us tomorrow at 1pm.

## 2017-04-05 ENCOUNTER — TELEPHONE ANTICOAG (OUTPATIENT)
Dept: CARDIOLOGY CLINIC | Age: 62
End: 2017-04-05

## 2017-04-05 ENCOUNTER — OFFICE VISIT (OUTPATIENT)
Dept: CARDIOLOGY CLINIC | Age: 62
End: 2017-04-05

## 2017-04-05 VITALS
HEIGHT: 68 IN | RESPIRATION RATE: 18 BRPM | OXYGEN SATURATION: 99 % | WEIGHT: 157.6 LBS | BODY MASS INDEX: 23.89 KG/M2 | TEMPERATURE: 97.2 F | SYSTOLIC BLOOD PRESSURE: 80 MMHG | HEART RATE: 87 BPM

## 2017-04-05 DIAGNOSIS — I50.22 CHRONIC SYSTOLIC HEART FAILURE (HCC): Primary | ICD-10-CM

## 2017-04-05 DIAGNOSIS — Z95.810 S/P ICD (INTERNAL CARDIAC DEFIBRILLATOR) PROCEDURE: ICD-10-CM

## 2017-04-05 DIAGNOSIS — Z79.01 CHRONIC ANTICOAGULATION: ICD-10-CM

## 2017-04-05 DIAGNOSIS — I25.5 ISCHEMIC CARDIOMYOPATHY: ICD-10-CM

## 2017-04-05 DIAGNOSIS — Z95.811 LEFT VENTRICULAR ASSIST DEVICE PRESENT (HCC): ICD-10-CM

## 2017-04-05 LAB
ALBUMIN SERPL-MCNC: 4 G/DL (ref 3.6–4.8)
ALBUMIN/GLOB SERPL: 1.5 {RATIO} (ref 1.2–2.2)
ALP SERPL-CCNC: 97 IU/L (ref 39–117)
ALT SERPL-CCNC: 28 IU/L (ref 0–44)
AST SERPL-CCNC: 29 IU/L (ref 0–40)
BILIRUB SERPL-MCNC: 0.2 MG/DL (ref 0–1.2)
BUN SERPL-MCNC: 16 MG/DL (ref 8–27)
BUN/CREAT SERPL: 15 (ref 10–24)
CALCIUM SERPL-MCNC: 9 MG/DL (ref 8.6–10.2)
CHLORIDE SERPL-SCNC: 95 MMOL/L (ref 96–106)
CO2 SERPL-SCNC: 25 MMOL/L (ref 18–29)
CREAT SERPL-MCNC: 1.08 MG/DL (ref 0.76–1.27)
GLOBULIN SER CALC-MCNC: 2.6 G/DL (ref 1.5–4.5)
GLUCOSE SERPL-MCNC: 108 MG/DL (ref 65–99)
INR PPP: 2.2 (ref 0.8–1.2)
POTASSIUM SERPL-SCNC: 4.2 MMOL/L (ref 3.5–5.2)
PROT SERPL-MCNC: 6.6 G/DL (ref 6–8.5)
PROTHROMBIN TIME: 22.8 SEC (ref 9.1–12)
SODIUM SERPL-SCNC: 136 MMOL/L (ref 134–144)

## 2017-04-05 RX ORDER — TRIAMCINOLONE ACETONIDE 0.25 MG/G
CREAM TOPICAL 2 TIMES DAILY
Qty: 15 G | Refills: 4 | Status: SHIPPED | OUTPATIENT
Start: 2017-04-05 | End: 2017-11-17 | Stop reason: SDUPTHER

## 2017-04-05 NOTE — MR AVS SNAPSHOT
Visit Information Date & Time Provider Department Dept. Phone Encounter #  
 4/5/2017  1:00 PM Elba Gupta MD [address] [place] [id number] Follow-up Instructions Return in about 1 month (around 5/5/2017). Upcoming Health Maintenance Date Due Pneumococcal 19-64 Medium Risk (1 of 1 - PPSV23) 10/11/1975 DTaP/Tdap/Td series (1 - Tdap) 10/11/1977 ZOSTER VACCINE AGE 60> 10/11/2016 FOBT Q 1 YEAR AGE 50-75 12/20/2017 Allergies as of 4/5/2017  Review Complete On: 4/5/2017 By: Kwadwo Hood LPN No Known Allergies Current Immunizations  Reviewed on 1/20/2017 Name Date Influenza Vaccine (Quad) PF 12/21/2016 12:00 PM  
  
 Not reviewed this visit You Were Diagnosed With   
  
 Codes Comments Chronic systolic heart failure (HCC)    -  Primary ICD-10-CM: N09.48 ICD-9-CM: 428.22 Ischemic cardiomyopathy     ICD-10-CM: I25.5 ICD-9-CM: 414.8 S/P ICD (internal cardiac defibrillator) procedure     ICD-10-CM: Z95.810 ICD-9-CM: V45.02 Chronic anticoagulation     ICD-10-CM: Z79.01 
ICD-9-CM: V58.61 Left ventricular assist device present [facility])     ICD-10-CM: E45.250 ICD-9-CM: V43.21 Vitals BP Pulse Temp Resp Height(growth percentile) Weight(growth percentile) (!) 80/0 (BP 1 Location: Left arm, BP Patient Position: Sitting) 87 97.2 °F (36.2 °C) (Oral) 18 5' 8\" (1.727 m) 157 lb 9.6 oz (71.5 kg) SpO2 BMI Smoking Status 99% 23.96 kg/m2 Former Smoker Vitals History BMI and BSA Data Body Mass Index Body Surface Area  
 23.96 kg/m 2 1.85 m 2 Preferred Pharmacy Pharmacy Name Phone [id number] [place] - [address] AT [address] [facility] Prosper Sommers [phone] Your Updated Medication List  
  
   
This list is accurate as of: 4/5/17  2:16 PM.  Always use your most recent med list.  
  
  
  
  
 allopurinol 100 mg tablet Commonly known as:  Jarome Marie Take 1 Tab by mouth two (2) times a day. amiodarone 200 mg tablet Commonly known as:  CORDARONE Take 1 Tab by mouth daily. ascorbic acid (vitamin C) 500 mg tablet Commonly known as:  VITAMIN C Take 1 Tab by mouth two (2) times a day. bumetanide 0.5 mg tablet Commonly known as:  Shirleyjolene Millsch Take 0.5 mg by mouth daily. Half of a 1mg tablet  
  
 carvedilol 25 mg tablet Commonly known as:  Diana Houston Take 1 Tab by mouth two (2) times daily (with meals). clopidogrel 75 mg Tab Commonly known as:  PLAVIX Take 1 Tab by mouth daily. colchicine 0.6 mg tablet Take 1 Tab by mouth two (2) times a day. Indications: GOUT  
  
 cyclobenzaprine 5 mg tablet Commonly known as:  FLEXERIL Take 5 mg by mouth as needed. enoxaparin 80 mg/0.8 mL injection Commonly known as:  LOVENOX 80 mg by SubCUTAneous route every twelve (12) hours. ferrous sulfate 325 mg (65 mg iron) tablet Take 1 Tab by mouth two (2) times daily (with meals). folic acid 1 mg tablet Commonly known as:  [employer] Take 1 Tab by mouth daily. HYDROcodone-acetaminophen 5-325 mg per tablet Commonly known as:  [address] Take 1 Tab by mouth every eight (8) hours as needed. magnesium oxide 400 mg tablet Commonly known as:  MAG-OX Take 1 Tab by mouth daily. pantoprazole 40 mg tablet Commonly known as:  PROTONIX Take 1 Tab by mouth Daily (before breakfast). potassium chloride 20 mEq tablet Commonly known as:  K-DUR, KLOR-CON Take 0.5 Tabs by mouth daily. sertraline 50 mg tablet Commonly known as:  ZOLOFT Take 1 Tab by mouth daily. warfarin 2.5 mg tablet Commonly known as:  COUMADIN Take 2 Tabs by mouth daily. We Performed the Following [place] INTERROGATION VAD IN [place] W/PHYS/QHP ANALYSIS [id number] CPT(R)] Follow-up Instructions Return in about 1 month (around 5/5/2017). To-Do List   
 04/06/2017 3:30 PM  
  Appointment with [address] at [address] [place] [address] ([phone])  
  
 04/10/2017 3:30 PM  
  Appointment with [address] at [address] [place] [address] ([phone])  
  
 04/11/2017 2:00 PM  
  Appointment with [address] at [address] [place] [address] ([phone])  
  
 04/11/2017 3:00 PM  
  Appointment with [address] at [address] [place] [address] ([phone])  
  
 04/13/2017 3:30 PM  
  Appointment with [address] at [address] [place] [address] ([phone])  
  
 04/17/2017 3:30 PM  
  Appointment with [address] at [address] [place] [address] ([phone])  
  
 04/18/2017 2:00 PM  
  Appointment with [address] at [address] [place] [address] ([phone])  
  
 04/18/2017 3:00 PM  
  Appointment with [address] at [address] [place] [address] ([phone])  
  
 04/18/2017 3:00 PM  
  Appointment with [address] at [address] [place] [address] ([phone])  
  
 04/20/2017 3:30 PM  
  Appointment with [address] at [address] [place] [address] ([phone])  
  
 04/24/2017 3:30 PM  
  Appointment with [address] at [address] [place] [address] ([phone])  
  
 04/25/2017 2:00 PM  
  Appointment with [address] at [address] [place] [address] ([phone])  
  
 04/25/2017 3:00 PM  
  Appointment with [address] at [address] [place] [address] ([phone])  
  
 04/25/2017 3:00 PM  
  Appointment with [address] at [address] [place] [address] ([phone])  
  
 04/27/2017 3:30 PM  
  Appointment with [address] at [address] [place] [address] ([phone])  
  
 05/01/2017 3:30 PM  
  Appointment with [address] at [address] [place] [address] ([phone])  
  
 05/02/2017 2:00 PM  
  Appointment with [address] at [address] [place] [address] ([phone]) 05/02/2017 3:00 PM  
  Appointment with [address] at [address] [place] [address] ([phone])  
  
 05/02/2017 3:00 PM  
  Appointment with [address] at [address] [place] [address] ([phone])  
  
 05/04/2017 3:30 PM  
  Appointment with [address] at [address] [place] [address] ([phone])  05/08/2017 3:30 PM  
  Appointment with [address] at [address] [place] [address] ([phone])  
  
 05/09/2017 2:00 PM  
  Appointment with [address] at [address] [place] [address] ([phone])  
  
 05/09/2017 3:00 PM  
  Appointment with [address] at [address] [place] [address] ([phone])  
  
 05/09/2017 3:00 PM  
  Appointment with [address] at [address] [place] [address] ([phone])  
  
 05/11/2017 3:30 PM  
  Appointment with [address] at [address] [place] [address] ([phone])  
  
 05/15/2017 3:30 PM  
  Appointment with [address] at [address] [place] [address] ([phone])  
  
 05/16/2017 2:00 PM  
  Appointment with [address] at [address] [place] [address] ([phone])  
  
 05/16/2017 3:00 PM  
  Appointment with [address] at [address] [place] [address] ([phone])  
  
 05/16/2017 3:00 PM  
  Appointment with [address] at [address] [place] [address] ([phone])  
  
 05/18/2017 3:30 PM  
  Appointment with [address] at [address] [place] [address] ([phone])  
  
 05/22/2017 3:30 PM  
  Appointment with [address] at [address] [place] [address] ([phone])  
  
 05/23/2017 2:00 PM  
  Appointment with [address] at [address] [place] [address] ([phone])  
  
 05/23/2017 3:00 PM  
  Appointment with [address] at [address] [place] [address] ([phone])  
  
 05/23/2017 3:00 PM  
  Appointment with [address] at [address] [place] [address] ([phone])  
  
 05/25/2017 3:30 PM  
 Appointment with [address] at [address] [place] [address] ([phone])  
  
 05/30/2017 2:00 PM  
  Appointment with [address] at [address] [place] [address] ([phone])  
  
 05/30/2017 3:00 PM  
  Appointment with [address] at [address] [place] [address] ([phone])  
  
 05/30/2017 3:00 PM  
  Appointment with [address] at [address] [place] [address] ([phone])  
  
 06/01/2017 3:30 PM  
  Appointment with [address] at [address] [place] [address] ([phone])  
  
 06/05/2017 3:30 PM  
  Appointment with [address] at [address] [place] [address] ([phone])  
  
 06/06/2017 2:00 PM  
  Appointment with [address] at [address] [place] [address] ([phone])  
  
 06/06/2017 3:00 PM  
  Appointment with [address] at [address] [place] [address] ([phone])  
  
 06/06/2017 3:00 PM  
  Appointment with [address] at [address] [place] [address] ([phone])  
  
 06/08/2017 3:30 PM  
  Appointment with [address] at [address] [place] [address] ([phone])  
  
 06/12/2017 3:30 PM  
  Appointment with [address] at [address] [place] [address] ([phone])  
  
 06/13/2017 2:00 PM  
  Appointment with [address] at [address] [place] [address] ([phone])  
  
 06/13/2017 3:00 PM  
  Appointment with [address] at [address] [place] [address] ([phone])  
  
 06/13/2017 3:00 PM  
  Appointment with [address] at [address] [place] [address] ([phone])  
  
 06/15/2017 3:30 PM  
  Appointment with [address] at [address] [place] [address] ([phone]) Patient Instructions Decrease bumex to 0.5mg every other day- if you notice your weight going up, more swelling or more trouble breathing please go back to every day.   
 
Stop taking your magnesium for a couple of days to see if this helps your diarrhea, if not we will set you up with a GI specialist 
 
Follow up in 1 month Cont with cardiac rehab Please increase activity as tolerated Introducing [facility]! Dear Erik Wall: Thank you for requesting a [employer] account. Our records indicate that you already have an active [employer] account. You can access your account anytime at [email or web address] Did you know that you can access your hospital and ER discharge instructions at any time in [employer]? You can also review all of your test results from your hospital stay or ER visit. Additional Information If you have questions, please visit the Frequently Asked Questions section of the [employer] website at [email or web address] Remember, [employer] is NOT to be used for urgent needs. For medical emergencies, dial 911. Now available from your iPhone and Android! Please provide this summary of care documentation to your next provider. Your primary care clinician is listed as Usman Pantoja. If you have any questions after today's visit, please call [phone].

## 2017-04-05 NOTE — LETTER
4/6/2017 5:06 PM 
 
Patient:  Rozetta Goodpasture YOB: 1955 Date of Visit: 4/5/2017 Dear Becky Cutler MD 
[address] [address] VIA Facsimile: [phone] Cristina Peace MD 
[place] [address] [address] [facility] [address] VIA In Basket Josephine Crabtree MD 
[address] [address] VIA In Basket 
 : Thank you for referring Mr. Lashanda Bloom to me for evaluation/treatment. Below are the relevant portions of my assessment and plan of care. Norman Sims 1725 LVAD Office Visit Date of VAD implant: 12/1/2016 Cardiologist: GRAHAM 
PCP: Santos Aranda MD 
 
HPI: Rozetta Goodpasture is a 62 y.o. male with a past medical history s/p HM II LVAD for DT, h/o torasades/SVT, HTN, lumbar stenosis s/p lumbar laminectomy, CAD (s/p CABG/sp BMSx2), gout, strobic seizures, ETOH abuse and remote tobacco abuse who is seen today for routine bi-monthly LVAD follow up. He reports that he is doing well, except for diarrhea x 2 days. He reports an increase in his appetite and is sleeping better. He does endorse trace lower extremity edema and \"purple\" feet. He continues to have leg pain but did start cardiac rehab after seeing Dr. Ryann Rowley. He also saw Vascular sx for eval of his carotid stenosis and cool extremities, no intervention needed at this time. Mr. Georges Almaguer denies headache, lightheadedness, dizziness, chest pain, palpitations, dyspnea, syncope, cough, sputum production, nausea, vomiting, easy bruising. Subjective: Chief Complaint: 
Chief Complaint Patient presents with  Follow-up Chronic systolic heart failure, secondary to NICM s/p HMII LVAD as DT  Other  
  driveline change ROS:  
Positive for - diarrhea, back pain (chronic), tingling in his feet.   
Negative for - chest pain, dyspnea on exertion, irregular heartbeat, loss of consciousness, orthopnea, palpitations, paroxysmal nocturnal dyspnea, rapid heart rate or shortness of breath. Assessment / Plan: 
 
Heart Failure Status: NYHA Class II Ischemic cardiomyopathy, s/p HM II LVAD implant as DT 12/1/16 -  Alarm history reviewed. VAD interrogation: Please see VAD flow sheet for readings. Adequate clinical perfusion and function of his LVAD. No alarms or adverse events. Will schedule for a ramp test to optimize LVAD function. Will decrease diuretic to every other day, continue Coreg. No ACE-1 d/t history of renal insufficiency - start when BP tolerates/Cr OK. Continue daily weights, Na+ restricted diet. Bleeding from ICD site - Resolved. Diarrhea: hold mag ox for 3 days, if symptoms don't resolve will refer to GI.  
  
Post-operative RV insufficiency: Resolved, T. Bili in normal range. KLEVER: resolved, monitor labs. Begin ACE-1 when appropriate.  
  
Multivessel CAD/S/p CABG x 2 (SVG to RCA, LIMA to LAD) - s/p- RCA BMS x 2. On Plavix, BB. No statin d/t elevated LFTs. No ASA since on Plavix and Coumadin. 
  
Lumbar stenosis, s/p decompressive laminectomy and instrumented fusion: Continues to have back pain/leg weakness, follow with Dr. Robert Wong.     
  
Post operative anemia d/t acute blood loss: Hgb stable - continue iron, Vitamin C and folic acid. Until Hg consistently >10 H/o ETOH abuse:  Pt denies any alcohol use or any urge to drink alcohol. Has not had a drink since prior to his lumbar surgery.  
  
SVT/A-fib/Torsades post op: S/p AICD 12/16. Cont. Amiodarone per Dr. Carmen Jarquin.  
  
HTN, MAP goal 70-90: MAP 80 today, continue BB. Chronic anticoagulation for LVAD, INR goal 2-2.5. Cont. Warfarin and Plavix. Please see anticoagulation tracker for details.  
  
Gout Pain: resolved, pt to continue Allopurinol and use Colchicine with active gout flare.   
  
Depression/Anxiety: controlled on current dose of Celexa. Deconditioning: Cont cardiac rehab, increase activity as tolerated. Carotid stenosis; Will cont to monitor for now, will follow up with vascular in 6 months. Cool extremities, possible Raynaud's: no intervention per vascular surgery at this time. VAD specific education: Greater than 50% of appt time (60 minutes) was spent counseling Mr. Mann and his wife about LVAD management, plan of care, and medication management. Problem List: 
Patient Active Problem List  
Diagnosis Code  S/P laminectomy Z98.890  
 Sinus tachycardia R00.0  Acute respiratory failure with hypoxia ([facility]) J96.01  
 Essential hypertension I10  
 Alcohol abuse F10.10  Chronic systolic heart failure ([facility]) I50.22  
 CAD, multiple vessel I25.10  
 Ischemic cardiomyopathy I25.5  MI (myocardial infarction) ([address] I21.3  Sustained VT (ventricular tachycardia) ([facility]) I47.2  S/P ICD (internal cardiac defibrillator) procedure Z95.810  Chronic anticoagulation Z79.01  Left ventricular assist device present ([address] Z95.811  Gout M10.9  ICD (implantable cardioverter-defibrillator) infection ([address] T82. 7XXA Past Medical History:  
Diagnosis Date  Arrhythmia SVT  CAD (coronary artery disease)  Chronic pain   
 back  Gout  Heart failure ([address]  Hypertension  LVAD (left ventricular assist device) present ([address] 12/01/2016  Raynaud disease  Seizures ([address] 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 Family History Problem Relation Age of Onset  Diabetes Mother  Dementia Mother  Other Mother   
  carotid artery blockage  Heart Disease Father  Stroke Father  Heart Attack Father   
  several  
 Hypertension Father  Elevated Lipids Father  No Known Problems Sister  Cancer Brother   
  brain  Lung Disease Sister  COPD Sister  Cancer Sister   
  lung Social History Social History  Marital status:    Spouse name: N/A  
  Number of children: N/A  
 Years of education: N/A Occupational History  Not on file. Social History Main Topics  Smoking status: Former Smoker Packs/day: 1.50 Years: 30.00 Quit date: 2008  Smokeless tobacco: Never Used  Alcohol use No  
 Drug use: No  
 Sexual activity: Not on file Other Topics Concern  Not on file Social History Narrative Medications: 
No Known Allergies Current Outpatient Prescriptions on File Prior to Visit Medication Sig  bumetanide (BUMEX) 0.5 mg tablet Take 0.5 mg by mouth daily. Half of a 1mg tablet  sertraline (ZOLOFT) 50 mg tablet Take 1 Tab by mouth daily.  warfarin (COUMADIN) 2.5 mg tablet Take 2 Tabs by mouth daily.  allopurinol (ZYLOPRIM) 100 mg tablet Take 1 Tab by mouth two (2) times a day.  amiodarone (CORDARONE) 200 mg tablet Take 1 Tab by mouth daily.  clopidogrel (PLAVIX) 75 mg tab Take 1 Tab by mouth daily.  carvedilol (COREG) 25 mg tablet Take 1 Tab by mouth two (2) times daily (with meals).  pantoprazole (PROTONIX) 40 mg tablet Take 1 Tab by mouth Daily (before breakfast).  enoxaparin (LOVENOX) 80 mg/0.8 mL injection 80 mg by SubCUTAneous route every twelve (12) hours. (Patient taking differently: 80 mg by SubCUTAneous route as needed. When INR is low)  potassium chloride (K-DUR, KLOR-CON) 20 mEq tablet Take 0.5 Tabs by mouth daily.  ferrous sulfate 325 mg (65 mg iron) tablet Take 1 Tab by mouth two (2) times daily (with meals).  ascorbic acid, vitamin C, (VITAMIN C) 500 mg tablet Take 1 Tab by mouth two (2) times a day.  cyclobenzaprine (FLEXERIL) 5 mg tablet Take 5 mg by mouth as needed.  folic acid (FOLVITE) 1 mg tablet Take 1 Tab by mouth daily.  HYDROcodone-acetaminophen (NORCO) 5-325 mg per tablet Take 1 Tab by mouth every eight (8) hours as needed.  colchicine 0.6 mg tablet Take 1 Tab by mouth two (2) times a day. Indications: GOUT (Patient taking differently: Take 0.6 mg by mouth as needed. Indications: GOUT)  magnesium oxide (MAG-OX) 400 mg tablet Take 1 Tab by mouth daily. No current facility-administered medications on file prior to visit. Results for orders placed or performed in visit on 04/04/17 PROTHROMBIN TIME + INR Result Value Ref Range INR 2.2 (H) 0.8 - 1.2 Prothrombin time 22.8 (H) 9.1 - 12.0 sec METABOLIC PANEL, COMPREHENSIVE Result Value Ref Range Glucose 108 (H) 65 - 99 mg/dL BUN 16 8 - 27 mg/dL Creatinine 1.08 0.76 - 1.27 mg/dL GFR est non-AA 74 >59 mL/min/1.73 GFR est AA 86 >59 mL/min/1.73  
 BUN/Creatinine ratio 15 10 - 24 Sodium 136 134 - 144 mmol/L Potassium 4.2 3.5 - 5.2 mmol/L Chloride 95 (L) 96 - 106 mmol/L  
 CO2 25 18 - 29 mmol/L Calcium 9.0 8.6 - 10.2 mg/dL Protein, total 6.6 6.0 - 8.5 g/dL Albumin 4.0 3.6 - 4.8 g/dL GLOBULIN, TOTAL 2.6 1.5 - 4.5 g/dL A-G Ratio 1.5 1.2 - 2.2 Bilirubin, total 0.2 0.0 - 1.2 mg/dL Alk. phosphatase 97 39 - 117 IU/L  
 AST (SGOT) 29 0 - 40 IU/L  
 ALT (SGPT) 28 0 - 44 IU/L Recent tests or procedures: S/p Laminectomy 11/22/16 S/p LVAD implant 12/1/16 S/p AICD implant 12/16/16 S/p Ramp test 12/16/16 Objective: 
Physical Exam: 
Visit Vitals  BP (!) 80/0 (BP 1 Location: Left arm, BP Patient Position: Sitting)  Pulse 87  Temp 97.2 °F (36.2 °C) (Oral)  Resp 18  Ht 5' 8\" (1.727 m)  Wt 157 lb 9.6 oz (71.5 kg)  SpO2 99%  BMI 23.96 kg/m2 MAP: 80 VAD data: LVAD (Heartmate) Pump Speed (RPM): 8800 Pump Flow (LPM): 4.2 PI (Pulsitility Index): 5 Power: 4.6 Exam: 
Gen:  WA, WN, NAD  
CVS:  +LVAD hum Pul:  Slightly decreased BS, (-) r,r,w 
Abd:  +BS, soft, NT,ND Ext: 1+ b/l ankle edema, (-) calf tenderness Neuro:  No obvious deficits Drive Line Exam: 
Stabilization device intact: yes Line inspected for damage: yes Appearance: no edema, erythema, drainage Sterile dressing changed per policy: no 
Frequency of dressing change at home: every other day after showering. Frequency of use of stabilization device: 100% Follow-up Disposition: 
Return in about 1 month (around 5/5/2017). Thank you for letting us see him with you. Elie Stein NP 
VAD Coordinator [address] [place] [address] Office: [phone] 
[facility] VAD Pager: [phone] If you have questions, please do not hesitate to call me. I look forward to following Mr. Mann along with you. Sincerely, Durga Hawthorne MD

## 2017-04-05 NOTE — PATIENT INSTRUCTIONS
Decrease bumex to 0.5mg every other day- if you notice your weight going up, more swelling or more trouble breathing please go back to every day.      Stop taking your magnesium for a couple of days to see if this helps your diarrhea, if not we will set you up with a GI specialist    Follow up in 1 month    Cont with cardiac rehab     Please increase activity as tolerated

## 2017-04-06 NOTE — COMMUNICATION BODY
Norman Sims 1721  LVAD Office Visit      Date of VAD implant: 12/1/2016  Cardiologist: GRAHAM  PCP: Cydney Gtz MD    HPI: Mckinley Joseph is a 62 y.o. male with a past medical history s/p HM II LVAD for DT, h/o torasades/SVT, HTN, lumbar stenosis s/p lumbar laminectomy, CAD (s/p CABG/sp BMSx2), gout, strobic seizures, ETOH abuse and remote tobacco abuse who is seen today for routine bi-monthly LVAD follow up. He reports that he is doing well, except for diarrhea x 2 days. He reports an increase in his appetite and is sleeping better. He does endorse trace lower extremity edema and \"purple\" feet. He continues to have leg pain but did start cardiac rehab after seeing Dr. Meghann Sousa. He also saw Vascular sx for eval of his carotid stenosis and cool extremities, no intervention needed at this time. Mr. Jovani Torres denies headache, lightheadedness, dizziness, chest pain, palpitations, dyspnea, syncope, cough, sputum production, nausea, vomiting, easy bruising. Subjective:  Chief Complaint:  Chief Complaint   Patient presents with    Follow-up     Chronic systolic heart failure, secondary to NICM s/p HMII LVAD as DT    Other     driveline change      ROS:   Positive for - diarrhea, back pain (chronic), tingling in his feet. Negative for - chest pain, dyspnea on exertion, irregular heartbeat, loss of consciousness, orthopnea, palpitations, paroxysmal nocturnal dyspnea, rapid heart rate or shortness of breath. Assessment / Plan:    Heart Failure Status: NYHA Class II    Ischemic cardiomyopathy, s/p HM II LVAD implant as DT 12/1/16 -  Alarm history reviewed. VAD interrogation: Please see VAD flow sheet for readings. Adequate clinical perfusion and function of his LVAD. No alarms or adverse events. Will schedule for a ramp test to optimize LVAD function. Will decrease diuretic to every other day, continue Coreg. No ACE-1 d/t history of renal insufficiency - start when BP tolerates/Cr OK. Continue daily weights, Na+ restricted diet. Bleeding from ICD site - Resolved. Diarrhea: hold mag ox for 3 days, if symptoms don't resolve will refer to GI.      Post-operative RV insufficiency: Resolved, T. Bili in normal range. KLEVER: resolved, monitor labs. Begin ACE-1 when appropriate.      Multivessel CAD/S/p CABG x 2 (SVG to RCA, LIMA to LAD) - s/p- RCA BMS x 2. On Plavix, BB. No statin d/t elevated LFTs. No ASA since on Plavix and Coumadin.     Lumbar stenosis, s/p decompressive laminectomy and instrumented fusion: Continues to have back pain/leg weakness, follow with Dr. Westley Armendariz.         Post operative anemia d/t acute blood loss: Hgb stable - continue iron, Vitamin C and folic acid. Until Hg consistently >10     H/o ETOH abuse:  Pt denies any alcohol use or any urge to drink alcohol. Has not had a drink since prior to his lumbar surgery.      SVT/A-fib/Torsades post op: S/p AICD 12/16. Cont. Amiodarone per Dr. Kimberlyn Pak.      HTN, MAP goal 70-90: MAP 80 today, continue BB. Chronic anticoagulation for LVAD, INR goal 2-2.5. Cont. Warfarin and Plavix. Please see anticoagulation tracker for details.      Gout Pain: resolved, pt to continue Allopurinol and use Colchicine with active gout flare.       Depression/Anxiety: controlled on current dose of Celexa. Deconditioning: Cont cardiac rehab, increase activity as tolerated. Carotid stenosis; Will cont to monitor for now, will follow up with vascular in 6 months. Cool extremities, possible Raynaud's: no intervention per vascular surgery at this time. VAD specific education: Greater than 50% of appt time (60 minutes) was spent counseling Mr. Mann and his wife about LVAD management, plan of care, and medication management.           Problem List:  Patient Active Problem List   Diagnosis Code    S/P laminectomy Z98.890    Sinus tachycardia R00.0    Acute respiratory failure with hypoxia ([facility]) J96.01    Essential hypertension I10    Alcohol abuse F10.10    Chronic systolic heart failure ([facility]) I50.22    CAD, multiple vessel I25.10    Ischemic cardiomyopathy I25.5    MI (myocardial infarction) ([facility]) I21.3    Sustained VT (ventricular tachycardia) ([facility]) I47.2    S/P ICD (internal cardiac defibrillator) procedure Z95.810    Chronic anticoagulation Z79.01    Left ventricular assist device present ([facility] Z95.811    Gout M10.9    ICD (implantable cardioverter-defibrillator) infection ([facility] T82. 7XXA        Past Medical History:   Diagnosis Date    Arrhythmia     SVT    CAD (coronary artery disease)     Chronic pain     back    Gout     Heart failure ([facility])     Hypertension     LVAD (left ventricular assist device) present ([facility] 12/01/2016    Raynaud disease     Seizures ([facility]     strobic seizures early 19's       Family History   Problem Relation Age of Onset    Diabetes Mother     Dementia Mother     Other Mother      carotid artery blockage    Heart Disease Father     Stroke Father     Heart Attack Father      several    Hypertension Father     Elevated Lipids Father     No Known Problems Sister     Cancer Brother      brain    Lung Disease Sister     COPD Sister     Cancer Sister      lung       Social History     Social History    Marital status:      Spouse name: N/A    Number of children: N/A    Years of education: N/A     Occupational History    Not on file. Social History Main Topics    Smoking status: Former Smoker     Packs/day: 1.50     Years: 30.00     Quit date: 2008    Smokeless tobacco: Never Used    Alcohol use No    Drug use: No    Sexual activity: Not on file     Other Topics Concern    Not on file     Social History Narrative       Medications:  No Known Allergies     Current Outpatient Prescriptions on File Prior to Visit   Medication Sig    bumetanide (BUMEX) 0.5 mg tablet Take 0.5 mg by mouth daily.  Half of a 1mg tablet    sertraline (ZOLOFT) 50 mg tablet Take 1 Tab by mouth daily.    warfarin (COUMADIN) 2.5 mg tablet Take 2 Tabs by mouth daily.  allopurinol (ZYLOPRIM) 100 mg tablet Take 1 Tab by mouth two (2) times a day.  amiodarone (CORDARONE) 200 mg tablet Take 1 Tab by mouth daily.  clopidogrel (PLAVIX) 75 mg tab Take 1 Tab by mouth daily.  carvedilol (COREG) 25 mg tablet Take 1 Tab by mouth two (2) times daily (with meals).  pantoprazole (PROTONIX) 40 mg tablet Take 1 Tab by mouth Daily (before breakfast).  enoxaparin (LOVENOX) 80 mg/0.8 mL injection 80 mg by SubCUTAneous route every twelve (12) hours. (Patient taking differently: 80 mg by SubCUTAneous route as needed. When INR is low)    potassium chloride (K-DUR, KLOR-CON) 20 mEq tablet Take 0.5 Tabs by mouth daily.  ferrous sulfate 325 mg (65 mg iron) tablet Take 1 Tab by mouth two (2) times daily (with meals).  ascorbic acid, vitamin C, (VITAMIN C) 500 mg tablet Take 1 Tab by mouth two (2) times a day.  cyclobenzaprine (FLEXERIL) 5 mg tablet Take 5 mg by mouth as needed.  folic acid (FOLVITE) 1 mg tablet Take 1 Tab by mouth daily.  HYDROcodone-acetaminophen (NORCO) 5-325 mg per tablet Take 1 Tab by mouth every eight (8) hours as needed.  colchicine 0.6 mg tablet Take 1 Tab by mouth two (2) times a day. Indications: GOUT (Patient taking differently: Take 0.6 mg by mouth as needed. Indications: GOUT)    magnesium oxide (MAG-OX) 400 mg tablet Take 1 Tab by mouth daily. No current facility-administered medications on file prior to visit.          Results for orders placed or performed in visit on 04/04/17   PROTHROMBIN TIME + INR   Result Value Ref Range    INR 2.2 (H) 0.8 - 1.2    Prothrombin time 22.8 (H) 9.1 - 68.3 sec   METABOLIC PANEL, COMPREHENSIVE   Result Value Ref Range    Glucose 108 (H) 65 - 99 mg/dL    BUN 16 8 - 27 mg/dL    Creatinine 1.08 0.76 - 1.27 mg/dL    GFR est non-AA 74 >59 mL/min/1.73    GFR est AA 86 >59 mL/min/1.73    BUN/Creatinine ratio 15 10 - 24    Sodium 136 134 - 144 mmol/L    Potassium 4.2 3.5 - 5.2 mmol/L    Chloride 95 (L) 96 - 106 mmol/L    CO2 25 18 - 29 mmol/L    Calcium 9.0 8.6 - 10.2 mg/dL    Protein, total 6.6 6.0 - 8.5 g/dL    Albumin 4.0 3.6 - 4.8 g/dL    GLOBULIN, TOTAL 2.6 1.5 - 4.5 g/dL    A-G Ratio 1.5 1.2 - 2.2    Bilirubin, total 0.2 0.0 - 1.2 mg/dL    Alk. phosphatase 97 39 - 117 IU/L    AST (SGOT) 29 0 - 40 IU/L    ALT (SGPT) 28 0 - 44 IU/L       Recent tests or procedures:   S/p Laminectomy 11/22/16  S/p LVAD implant 12/1/16  S/p AICD implant 12/16/16  S/p Ramp test 12/16/16          Objective:  Physical Exam:  Visit Vitals    BP (!) 80/0 (BP 1 Location: Left arm, BP Patient Position: Sitting)    Pulse 87    Temp 97.2 °F (36.2 °C) (Oral)    Resp 18    Ht 5' 8\" (1.727 m)    Wt 157 lb 9.6 oz (71.5 kg)    SpO2 99%    BMI 23.96 kg/m2      MAP: 80    VAD data:  LVAD (Heartmate)  Pump Speed (RPM): 8800  Pump Flow (LPM): 4.2  PI (Pulsitility Index): 5  Power: 4.6    Exam:  Gen:  WA, WN, NAD   CVS:  +LVAD hum  Pul:  Slightly decreased BS, (-) r,r,w  Abd:  +BS, soft, NT,ND  Ext: 1+ b/l ankle edema, (-) calf tenderness  Neuro:  No obvious deficits         Drive Line Exam:  Stabilization device intact: yes  Line inspected for damage: yes  Appearance: no edema, erythema, drainage   Sterile dressing changed per policy: no  Frequency of dressing change at home: every other day after showering. Frequency of use of stabilization device: 100%    Follow-up Disposition:  Return in about 1 month (around 5/5/2017). Thank you for letting us see him with you.      Rosas Luis NP  VAD Coordinator  [address]  Office: [phone]  [facility] VAD Pager: [phone]

## 2017-04-06 NOTE — PROGRESS NOTES
Norman Sims [address]21  LVAD Office Visit      Date of VAD implant: 12/1/2016  Cardiologist: GRAHAM  PCP: Mauro Duran MD    HPI: Gena Sharp is a 62 y.o. male with a past medical history s/p HM II LVAD for DT, h/o torasades/SVT, HTN, lumbar stenosis s/p lumbar laminectomy, CAD (s/p CABG/sp BMSx2), gout, strobic seizures, ETOH abuse and remote tobacco abuse who is seen today for routine bi-monthly LVAD follow up. He reports that he is doing well, except for diarrhea x 2 days. He reports an increase in his appetite and is sleeping better. He does endorse trace lower extremity edema and \"purple\" feet. He continues to have leg pain but did start cardiac rehab after seeing Dr. Micheal Waite. He also saw Vascular sx for eval of his carotid stenosis and cool extremities, no intervention needed at this time. Mr. Jabier Mehat denies headache, lightheadedness, dizziness, chest pain, palpitations, dyspnea, syncope, cough, sputum production, nausea, vomiting, easy bruising. Subjective:  Chief Complaint:  Chief Complaint   Patient presents with    Follow-up     Chronic systolic heart failure, secondary to NICM s/p HMII LVAD as DT    Other     driveline change      ROS:   Positive for - diarrhea, back pain (chronic), tingling in his feet. Negative for - chest pain, dyspnea on exertion, irregular heartbeat, loss of consciousness, orthopnea, palpitations, paroxysmal nocturnal dyspnea, rapid heart rate or shortness of breath. Assessment / Plan:    Heart Failure Status: NYHA Class II    Ischemic cardiomyopathy, s/p HM II LVAD implant as DT 12/1/16 -  Alarm history reviewed. VAD interrogation: Please see VAD flow sheet for readings. Adequate clinical perfusion and function of his LVAD. No alarms or adverse events. Will schedule for a ramp test to optimize LVAD function. Will decrease diuretic to every other day, continue Coreg. No ACE-1 d/t history of renal insufficiency - start when BP tolerates/Cr OK. Continue daily weights, Na+ restricted diet. Bleeding from ICD site - Resolved. Diarrhea: hold mag ox for 3 days, if symptoms don't resolve will refer to GI.      Post-operative RV insufficiency: Resolved, T. Bili in normal range. KLEVER: resolved, monitor labs. Begin ACE-1 when appropriate.      Multivessel CAD/S/p CABG x 2 (SVG to RCA, LIMA to LAD) - s/p- RCA BMS x 2. On Plavix, BB. No statin d/t elevated LFTs. No ASA since on Plavix and Coumadin.     Lumbar stenosis, s/p decompressive laminectomy and instrumented fusion: Continues to have back pain/leg weakness, follow with Dr. Jd crook.         Post operative anemia d/t acute blood loss: Hgb stable - continue iron, Vitamin C and folic acid. Until Hg consistently >10     H/o ETOH abuse:  Pt denies any alcohol use or any urge to drink alcohol. Has not had a drink since prior to his lumbar surgery.      SVT/A-fib/Torsades post op: S/p AICD 12/16. Cont. Amiodarone per Dr. Lisa Aguilar.      HTN, MAP goal 70-90: MAP 80 today, continue BB. Chronic anticoagulation for LVAD, INR goal 2-2.5. Cont. Warfarin and Plavix. Please see anticoagulation tracker for details.      Gout Pain: resolved, pt to continue Allopurinol and use Colchicine with active gout flare.       Depression/Anxiety: controlled on current dose of Celexa. Deconditioning: Cont cardiac rehab, increase activity as tolerated. Carotid stenosis; Will cont to monitor for now, will follow up with vascular in 6 months. Cool extremities, possible Raynaud's: no intervention per vascular surgery at this time. VAD specific education: Greater than 50% of appt time (60 minutes) was spent counseling Mr. Mann and his wife about LVAD management, plan of care, and medication management.           Problem List:  Patient Active Problem List   Diagnosis Code    S/P laminectomy Z98.890    Sinus tachycardia R00.0    Acute respiratory failure with hypoxia (HCC) J96.01    Essential hypertension I10    Alcohol abuse F10.10    Chronic systolic heart failure ([facility]) I50.22    CAD, multiple vessel I25.10    Ischemic cardiomyopathy I25.5    MI (myocardial infarction) ([facility]) I21.3    Sustained VT (ventricular tachycardia) ([facility]) I47.2    S/P ICD (internal cardiac defibrillator) procedure Z95.810    Chronic anticoagulation Z79.01    Left ventricular assist device present ([facility] Z95.811    Gout M10.9    ICD (implantable cardioverter-defibrillator) infection ([facility] T82. 7XXA        Past Medical History:   Diagnosis Date    Arrhythmia     SVT    CAD (coronary artery disease)     Chronic pain     back    Gout     Heart failure ([facility])     Hypertension     LVAD (left ventricular assist device) present ([facility] 12/01/2016    Raynaud disease     Seizures ([facility]     strobic seizures early 19's       Family History   Problem Relation Age of Onset    Diabetes Mother     Dementia Mother     Other Mother      carotid artery blockage    Heart Disease Father     Stroke Father     Heart Attack Father      several    Hypertension Father     Elevated Lipids Father     No Known Problems Sister     Cancer Brother      brain    Lung Disease Sister     COPD Sister     Cancer Sister      lung       Social History     Social History    Marital status:      Spouse name: N/A    Number of children: N/A    Years of education: N/A     Occupational History    Not on file. Social History Main Topics    Smoking status: Former Smoker     Packs/day: 1.50     Years: 30.00     Quit date: 2008    Smokeless tobacco: Never Used    Alcohol use No    Drug use: No    Sexual activity: Not on file     Other Topics Concern    Not on file     Social History Narrative       Medications:  No Known Allergies     Current Outpatient Prescriptions on File Prior to Visit   Medication Sig    bumetanide (BUMEX) 0.5 mg tablet Take 0.5 mg by mouth daily.  Half of a 1mg tablet    sertraline (ZOLOFT) 50 mg tablet Take 1 Tab by mouth daily.    warfarin (COUMADIN) 2.5 mg tablet Take 2 Tabs by mouth daily.  allopurinol (ZYLOPRIM) 100 mg tablet Take 1 Tab by mouth two (2) times a day.  amiodarone (CORDARONE) 200 mg tablet Take 1 Tab by mouth daily.  clopidogrel (PLAVIX) 75 mg tab Take 1 Tab by mouth daily.  carvedilol (COREG) 25 mg tablet Take 1 Tab by mouth two (2) times daily (with meals).  pantoprazole (PROTONIX) 40 mg tablet Take 1 Tab by mouth Daily (before breakfast).  enoxaparin (LOVENOX) 80 mg/0.8 mL injection 80 mg by SubCUTAneous route every twelve (12) hours. (Patient taking differently: 80 mg by SubCUTAneous route as needed. When INR is low)    potassium chloride (K-DUR, KLOR-CON) 20 mEq tablet Take 0.5 Tabs by mouth daily.  ferrous sulfate 325 mg (65 mg iron) tablet Take 1 Tab by mouth two (2) times daily (with meals).  ascorbic acid, vitamin C, (VITAMIN C) 500 mg tablet Take 1 Tab by mouth two (2) times a day.  cyclobenzaprine (FLEXERIL) 5 mg tablet Take 5 mg by mouth as needed.  folic acid (FOLVITE) 1 mg tablet Take 1 Tab by mouth daily.  HYDROcodone-acetaminophen (NORCO) 5-325 mg per tablet Take 1 Tab by mouth every eight (8) hours as needed.  colchicine 0.6 mg tablet Take 1 Tab by mouth two (2) times a day. Indications: GOUT (Patient taking differently: Take 0.6 mg by mouth as needed. Indications: GOUT)    magnesium oxide (MAG-OX) 400 mg tablet Take 1 Tab by mouth daily. No current facility-administered medications on file prior to visit.          Results for orders placed or performed in visit on 04/04/17   PROTHROMBIN TIME + INR   Result Value Ref Range    INR 2.2 (H) 0.8 - 1.2    Prothrombin time 22.8 (H) 9.1 - 37.0 sec   METABOLIC PANEL, COMPREHENSIVE   Result Value Ref Range    Glucose 108 (H) 65 - 99 mg/dL    BUN 16 8 - 27 mg/dL    Creatinine 1.08 0.76 - 1.27 mg/dL    GFR est non-AA 74 >59 mL/min/1.73    GFR est AA 86 >59 mL/min/1.73    BUN/Creatinine ratio 15 10 - 24    Sodium 136 134 - 144 mmol/L    Potassium 4.2 3.5 - 5.2 mmol/L    Chloride 95 (L) 96 - 106 mmol/L    CO2 25 18 - 29 mmol/L    Calcium 9.0 8.6 - 10.2 mg/dL    Protein, total 6.6 6.0 - 8.5 g/dL    Albumin 4.0 3.6 - 4.8 g/dL    GLOBULIN, TOTAL 2.6 1.5 - 4.5 g/dL    A-G Ratio 1.5 1.2 - 2.2    Bilirubin, total 0.2 0.0 - 1.2 mg/dL    Alk. phosphatase 97 39 - 117 IU/L    AST (SGOT) 29 0 - 40 IU/L    ALT (SGPT) 28 0 - 44 IU/L       Recent tests or procedures:   S/p Laminectomy 11/22/16  S/p LVAD implant 12/1/16  S/p AICD implant 12/16/16  S/p Ramp test 12/16/16          Objective:  Physical Exam:  Visit Vitals    BP (!) 80/0 (BP 1 Location: Left arm, BP Patient Position: Sitting)    Pulse 87    Temp 97.2 °F (36.2 °C) (Oral)    Resp 18    Ht 5' 8\" (1.727 m)    Wt 157 lb 9.6 oz (71.5 kg)    SpO2 99%    BMI 23.96 kg/m2      MAP: 80    VAD data:  LVAD (Heartmate)  Pump Speed (RPM): 8800  Pump Flow (LPM): 4.2  PI (Pulsitility Index): 5  Power: 4.6    Exam:  Gen:  WA, WN, NAD   CVS:  +LVAD hum  Pul:  Slightly decreased BS, (-) r,r,w  Abd:  +BS, soft, NT,ND  Ext: 1+ b/l ankle edema, (-) calf tenderness  Neuro:  No obvious deficits         Drive Line Exam:  Stabilization device intact: yes  Line inspected for damage: yes  Appearance: no edema, erythema, drainage   Sterile dressing changed per policy: no  Frequency of dressing change at home: every other day after showering. Frequency of use of stabilization device: 100%    Follow-up Disposition:  Return in about 1 month (around 5/5/2017). Thank you for letting us see him with you.      Britt Hernandez NP  VAD Coordinator  [address] [place] [address]  Office: [phone]  [facility] VAD Pager: [phone]

## 2017-04-10 ENCOUNTER — HOSPITAL ENCOUNTER (OUTPATIENT)
Dept: CARDIAC REHAB | Age: 62
Discharge: HOME | End: 2017-04-10
Payer: COMMERCIAL

## 2017-04-10 VITALS — WEIGHT: 155 LBS | BODY MASS INDEX: 23.57 KG/M2

## 2017-04-10 PROCEDURE — 93798 PHYS/QHP OP CAR RHAB W/ECG: CPT

## 2017-04-11 ENCOUNTER — HOSPITAL ENCOUNTER (OUTPATIENT)
Dept: CARDIAC REHAB | Age: 62
Discharge: HOME | End: 2017-04-11
Payer: COMMERCIAL

## 2017-04-11 ENCOUNTER — TELEPHONE (OUTPATIENT)
Dept: CARDIOLOGY CLINIC | Age: 62
End: 2017-04-11

## 2017-04-11 VITALS — BODY MASS INDEX: 23.72 KG/M2 | WEIGHT: 156 LBS

## 2017-04-11 DIAGNOSIS — Z79.01 CHRONIC ANTICOAGULATION: Primary | ICD-10-CM

## 2017-04-11 PROCEDURE — 93798 PHYS/QHP OP CAR RHAB W/ECG: CPT

## 2017-04-11 PROCEDURE — 93797 PHYS/QHP OP CAR RHAB WO ECG: CPT

## 2017-04-11 NOTE — TELEPHONE ENCOUNTER
Telephone Call RE:  Lab Reminder      Outcome:     [x] Patient verbalizes understanding    [] Unable to reach   [] Left message              []       Trae Otero

## 2017-04-11 NOTE — TELEPHONE ENCOUNTER
Patient's wife called to report that Mr. Georges Almaguer has developed generalized pruritus after starting the Zoloft. No rash visible. Will D/C Zoloft. Referred them to his PCP for further management of anti-depressants.

## 2017-04-12 ENCOUNTER — TELEPHONE ANTICOAG (OUTPATIENT)
Dept: CARDIOLOGY CLINIC | Age: 62
End: 2017-04-12

## 2017-04-12 LAB
INR PPP: 2.7 (ref 0.8–1.2)
PROTHROMBIN TIME: 28.6 SEC (ref 9.1–12)

## 2017-04-13 ENCOUNTER — HOSPITAL ENCOUNTER (OUTPATIENT)
Dept: CARDIAC REHAB | Age: 62
Discharge: HOME | End: 2017-04-13
Payer: COMMERCIAL

## 2017-04-13 VITALS — BODY MASS INDEX: 23.42 KG/M2 | WEIGHT: 154 LBS

## 2017-04-13 PROCEDURE — 93798 PHYS/QHP OP CAR RHAB W/ECG: CPT

## 2017-04-17 ENCOUNTER — HOSPITAL ENCOUNTER (OUTPATIENT)
Dept: CARDIAC REHAB | Age: 62
Discharge: HOME | End: 2017-04-17
Payer: COMMERCIAL

## 2017-04-17 ENCOUNTER — TELEPHONE (OUTPATIENT)
Dept: CARDIOLOGY CLINIC | Age: 62
End: 2017-04-17

## 2017-04-17 VITALS — BODY MASS INDEX: 23.11 KG/M2 | WEIGHT: 152 LBS

## 2017-04-17 PROCEDURE — 93798 PHYS/QHP OP CAR RHAB W/ECG: CPT

## 2017-04-17 RX ORDER — CARVEDILOL 25 MG/1
25 TABLET ORAL 2 TIMES DAILY WITH MEALS
Qty: 180 TAB | Refills: 3 | Status: SHIPPED | OUTPATIENT
Start: 2017-04-17 | End: 2017-11-17 | Stop reason: SDUPTHER

## 2017-04-18 ENCOUNTER — HOSPITAL ENCOUNTER (OUTPATIENT)
Dept: CARDIAC REHAB | Age: 62
Discharge: HOME | End: 2017-04-18
Payer: COMMERCIAL

## 2017-04-18 VITALS — WEIGHT: 152 LBS | BODY MASS INDEX: 23.11 KG/M2

## 2017-04-18 PROCEDURE — 93798 PHYS/QHP OP CAR RHAB W/ECG: CPT

## 2017-04-18 PROCEDURE — 93797 PHYS/QHP OP CAR RHAB WO ECG: CPT

## 2017-04-20 ENCOUNTER — HOSPITAL ENCOUNTER (OUTPATIENT)
Dept: CARDIAC REHAB | Age: 62
Discharge: HOME | End: 2017-04-20
Payer: COMMERCIAL

## 2017-04-20 VITALS — WEIGHT: 154 LBS | BODY MASS INDEX: 23.42 KG/M2

## 2017-04-20 PROCEDURE — 93798 PHYS/QHP OP CAR RHAB W/ECG: CPT

## 2017-04-21 ENCOUNTER — TELEPHONE (OUTPATIENT)
Dept: CARDIOLOGY CLINIC | Age: 62
End: 2017-04-21

## 2017-04-21 NOTE — TELEPHONE ENCOUNTER
Aakash Browne from patients HR department called. He wants to check to see if patient can go back to work.     Please call him back # [phone]

## 2017-04-24 ENCOUNTER — HOSPITAL ENCOUNTER (OUTPATIENT)
Dept: CARDIAC REHAB | Age: 62
Discharge: HOME | End: 2017-04-24
Payer: COMMERCIAL

## 2017-04-24 VITALS — WEIGHT: 157 LBS | BODY MASS INDEX: 23.87 KG/M2

## 2017-04-24 PROCEDURE — 93798 PHYS/QHP OP CAR RHAB W/ECG: CPT

## 2017-04-25 ENCOUNTER — TELEPHONE (OUTPATIENT)
Dept: CARDIOLOGY CLINIC | Age: 62
End: 2017-04-25

## 2017-04-25 DIAGNOSIS — Z95.811 LEFT VENTRICULAR ASSIST DEVICE PRESENT (HCC): Primary | ICD-10-CM

## 2017-04-25 DIAGNOSIS — Z79.01 CHRONIC ANTICOAGULATION: ICD-10-CM

## 2017-04-26 ENCOUNTER — HOSPITAL ENCOUNTER (OUTPATIENT)
Dept: CARDIAC REHAB | Age: 62
Discharge: HOME | End: 2017-04-26
Payer: COMMERCIAL

## 2017-04-26 ENCOUNTER — TELEPHONE ANTICOAG (OUTPATIENT)
Dept: CARDIOLOGY CLINIC | Age: 62
End: 2017-04-26

## 2017-04-26 VITALS — BODY MASS INDEX: 23.42 KG/M2 | WEIGHT: 154 LBS

## 2017-04-26 LAB
INR PPP: 2.6 (ref 0.8–1.2)
PROTHROMBIN TIME: 27.6 SEC (ref 9.1–12)

## 2017-04-26 PROCEDURE — 93798 PHYS/QHP OP CAR RHAB W/ECG: CPT

## 2017-04-26 NOTE — PROGRESS NOTES
Spoke w/  And Mrs. Mann - they stated he's still having problems with nausea, vomited x 1 and diarrhea. He is going to see the GI doctor tomorrow. She also stated he is still having issues with the rash and his back that continues to [facility] constantly\". She said he is taking Benadryl and it's not helping and was wondering if he should try Prednisone? Recommended he f/u with his PCP to evaluate the rash.

## 2017-04-27 ENCOUNTER — HOSPITAL ENCOUNTER (OUTPATIENT)
Dept: CARDIAC REHAB | Age: 62
Discharge: HOME | End: 2017-04-27
Payer: COMMERCIAL

## 2017-04-27 VITALS — WEIGHT: 158 LBS | BODY MASS INDEX: 24.02 KG/M2

## 2017-04-27 PROCEDURE — 93798 PHYS/QHP OP CAR RHAB W/ECG: CPT

## 2017-05-01 ENCOUNTER — HOSPITAL ENCOUNTER (OUTPATIENT)
Dept: CARDIAC REHAB | Age: 62
Discharge: HOME | End: 2017-05-01
Payer: COMMERCIAL

## 2017-05-01 VITALS — BODY MASS INDEX: 23.72 KG/M2 | WEIGHT: 156 LBS

## 2017-05-01 PROCEDURE — 93798 PHYS/QHP OP CAR RHAB W/ECG: CPT

## 2017-05-02 ENCOUNTER — HOSPITAL ENCOUNTER (OUTPATIENT)
Dept: CARDIAC REHAB | Age: 62
Discharge: HOME | End: 2017-05-02
Payer: COMMERCIAL

## 2017-05-02 ENCOUNTER — HOSPITAL ENCOUNTER (OUTPATIENT)
Dept: NON INVASIVE DIAGNOSTICS | Age: 62
Discharge: HOME | End: 2017-05-02
Payer: COMMERCIAL

## 2017-05-02 ENCOUNTER — TELEPHONE (OUTPATIENT)
Dept: CARDIOLOGY CLINIC | Age: 62
End: 2017-05-02

## 2017-05-02 VITALS — BODY MASS INDEX: 23.72 KG/M2 | WEIGHT: 156 LBS

## 2017-05-02 DIAGNOSIS — Z95.811 LEFT VENTRICULAR ASSIST DEVICE PRESENT (HCC): ICD-10-CM

## 2017-05-02 DIAGNOSIS — I25.5 ISCHEMIC CARDIOMYOPATHY: ICD-10-CM

## 2017-05-02 DIAGNOSIS — Z79.01 CHRONIC ANTICOAGULATION: ICD-10-CM

## 2017-05-02 DIAGNOSIS — I50.22 CHRONIC SYSTOLIC HEART FAILURE (HCC): ICD-10-CM

## 2017-05-02 PROCEDURE — 93798 PHYS/QHP OP CAR RHAB W/ECG: CPT

## 2017-05-02 PROCEDURE — 93797 PHYS/QHP OP CAR RHAB WO ECG: CPT

## 2017-05-02 NOTE — PROGRESS NOTES
LVAD Ramp Echocardiogram      Antwon Aburto   [birth year]  MRN [id number]    Date of service: 17    Baseline speed: 8800 rpm        LVAD speed   8800 rpm   8600 rpm      Flow    4.5    4.5  PI    7.3    6.4  Power    4.8    4.6    LVIDd    4.4    4.5  LVIDs    4.1    4.2  EF    18    15  Septum   Midline    Midline  VAD velocity   0.50    0.49    RVIDd    4.3    4.3  TAPSE   1.4    1.3      AoV opening   1:1    1:1  AI    Trace    trace  MR    Mild    Mild  TR    Mild    Mild  PAPs    23    22        Recommend: Continue LVAD speed at 8800 rpm        Kristi Kramer M.D.  [facility] [place] [facility], [address]  [address] [id number]  Dept: [id number] [facility] [place]: [phone]  70 hour VAD/HF Pager: [phone]

## 2017-05-03 ENCOUNTER — OFFICE VISIT (OUTPATIENT)
Dept: CARDIOLOGY CLINIC | Age: 62
End: 2017-05-03

## 2017-05-03 VITALS
BODY MASS INDEX: 24.67 KG/M2 | RESPIRATION RATE: 16 BRPM | OXYGEN SATURATION: 99 % | HEIGHT: 68 IN | HEART RATE: 72 BPM | SYSTOLIC BLOOD PRESSURE: 110 MMHG | TEMPERATURE: 97.5 F | WEIGHT: 162.8 LBS

## 2017-05-03 DIAGNOSIS — I25.119 CORONARY ARTERY DISEASE INVOLVING NATIVE HEART WITH ANGINA PECTORIS, UNSPECIFIED VESSEL OR LESION TYPE (HCC): ICD-10-CM

## 2017-05-03 DIAGNOSIS — I10 HYPERTENSION, ESSENTIAL: ICD-10-CM

## 2017-05-03 DIAGNOSIS — I47.29 PAROXYSMAL VT: ICD-10-CM

## 2017-05-03 DIAGNOSIS — Z95.811 LVAD (LEFT VENTRICULAR ASSIST DEVICE) PRESENT (HCC): ICD-10-CM

## 2017-05-03 DIAGNOSIS — I50.9 HEART FAILURE, UNSPECIFIED (HCC): Primary | ICD-10-CM

## 2017-05-03 RX ORDER — CITALOPRAM 40 MG/1
TABLET, FILM COATED ORAL
Refills: 0 | COMMUNITY
Start: 2017-02-22 | End: 2017-05-03

## 2017-05-03 RX ORDER — GABAPENTIN 300 MG/1
CAPSULE ORAL
Refills: 0 | COMMUNITY
Start: 2017-02-19 | End: 2017-05-31 | Stop reason: ALTCHOICE

## 2017-05-03 RX ORDER — OSELTAMIVIR PHOSPHATE 75 MG/1
CAPSULE ORAL
Refills: 0 | COMMUNITY
Start: 2017-01-31 | End: 2017-05-03 | Stop reason: ALTCHOICE

## 2017-05-03 RX ORDER — ALPRAZOLAM 0.25 MG/1
TABLET ORAL
Refills: 0 | COMMUNITY
Start: 2017-03-15 | End: 2018-11-01

## 2017-05-03 RX ORDER — CYCLOBENZAPRINE HCL 10 MG
TABLET ORAL
Refills: 0 | COMMUNITY
Start: 2017-01-27 | End: 2017-05-03 | Stop reason: SDUPTHER

## 2017-05-03 NOTE — PATIENT INSTRUCTIONS
Schedule a follow-up visit in 1 month. Continue cardiac rehab. When you finish, continue exercising on your own. Please let us know if your blood pressure is running higher than 100 consistently.

## 2017-05-03 NOTE — PROGRESS NOTES
Dear Vito Vale, and Westley Armendariz,    I had the pleasure of seeing Mr. Mann today in the [address] [place] [address] for a follow-up visit. As you know, he is a 62 y.o. gentleman with heart failure due to ischemic cardiomyopathy, coronary artery disease, hypertension, paroxysmal VT, gout, and lumbar stenosis. He underwent implantation of a HeartMate II LVAD as DT along with bypass surgery 12/1/16. He'd undergone implantation of an ICD in December as well, it was explanted due to pocket infection and has not been replaced. He lives in [place] with his wife, their daughter and her 10 yo son are nearby. Mr. Prudencio Webb quit smoking several years ago and quit drinking in December. Mr. Prudencio Webb was last seen earlier this month by Dr. Ofelia Salmeron. Since then, he's felt well. He's been participating in cardiac rehab for about 6 weeks now and has seen a significant improvement in his functional capacity. He's not had chest pain, dyspnea on exertion, orthopnea, PND, palpitations, lightheadedness, or syncope. He has mild right lower extremity edema in the evening occasionally. He's taking bumex as needed, perhaps once a week. He's not had any alarms from his LVAD or problems with his driveline. He had some trouble with diarrhea earlier in the months, it's resolved with discontinuation of magnesium. His weight has been stable around 155 lbs, prior to December he'd been in the 175 lb range. He's not had any trouble with bleeding and his INR has been consistently therapeutic.       Past Medical History:   Diagnosis Date    Arrhythmia     SVT    CAD (coronary artery disease)     Chronic pain     back    Gout     Heart failure (HCC)     Hypertension     LVAD (left ventricular assist device) present ([facility] 12/01/2016    Raynaud disease     Seizures ([facility]     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     Past Surgical History:   Procedure Laterality Date    CABG, ARTERY-VEIN, TWO  12/01/2016    CARDIAC SURG PROCEDURE UNLIST 12/01/2016    LVAD    HX HEENT      wisdom teeth removed    HX ORTHOPAEDIC  11/22/2016    laminectomy    HX PACEMAKER      ICD - removed 1/2017     Social History     Social History    Marital status:      Spouse name: N/A    Number of children: N/A    Years of education: N/A     Occupational History    Not on file. Social History Main Topics    Smoking status: Former Smoker     Packs/day: 1.50     Years: 30.00     Quit date: 2008    Smokeless tobacco: Never Used    Alcohol use No    Drug use: No    Sexual activity: Not on file     Other Topics Concern    Not on file     Social History Narrative     Family History   Problem Relation Age of Onset    Diabetes Mother     Dementia Mother     Other Mother      carotid artery blockage    Heart Disease Father     Stroke Father     Heart Attack Father      several    Hypertension Father     Elevated Lipids Father     No Known Problems Sister     Cancer Brother      brain    Lung Disease Sister     COPD Sister     Cancer Sister      lung     No Known Allergies     Current Outpatient Prescriptions on File Prior to Visit   Medication Sig Dispense Refill    carvedilol (COREG) 25 mg tablet Take 1 Tab by mouth two (2) times daily (with meals). 180 Tab 3    triamcinolone acetonide (KENALOG) 0.025 % topical cream Apply  to affected area two (2) times a day. use thin layer to affected area 15 g 4    warfarin (COUMADIN) 2.5 mg tablet Take 2 Tabs by mouth daily. 180 Tab 3    allopurinol (ZYLOPRIM) 100 mg tablet Take 1 Tab by mouth two (2) times a day. 180 Tab 3    amiodarone (CORDARONE) 200 mg tablet Take 1 Tab by mouth daily. 90 Tab 3    clopidogrel (PLAVIX) 75 mg tab Take 1 Tab by mouth daily. 90 Tab 3    pantoprazole (PROTONIX) 40 mg tablet Take 1 Tab by mouth Daily (before breakfast). 90 Tab 3       3    potassium chloride (K-DUR, KLOR-CON) 20 mEq tablet Take 0.5 Tabs by mouth daily.  30 Tab 5    ferrous sulfate 325 mg (65 mg iron) tablet Take 1 Tab by mouth two (2) times daily (with meals). 60 Tab 6    ascorbic acid, vitamin C, (VITAMIN C) 500 mg tablet Take 1 Tab by mouth two (2) times a day. 60 Tab 6    folic acid (FOLVITE) 1 mg tablet Take 1 Tab by mouth daily. 30 Tab 6    HYDROcodone-acetaminophen (NORCO) 5-325 mg per tablet Take 1 Tab by mouth every eight (8) hours as needed.  bumetanide (BUMEX) 0.5 mg tablet Take 0.5 mg by mouth daily. Half of a 1mg tablet         11    enoxaparin (LOVENOX) 80 mg/0.8 mL injection 80 mg by SubCUTAneous route every twelve (12) hours. (Patient taking differently: 80 mg by SubCUTAneous route as needed. When INR is low) 8 Syringe 0    cyclobenzaprine (FLEXERIL) 5 mg tablet Take 5 mg by mouth as needed.  colchicine 0.6 mg tablet Take 1 Tab by mouth two (2) times a day. Indications: GOUT (Patient taking differently: Take 0.6 mg by mouth as needed. Indications: GOUT) 60 Tab 4     No current facility-administered medications on file prior to visit. Review of Systems:  Complete 14 point review of systems was performed. It is significant for the findings mentioned in the HPI. All other systems are negative. Physical Exam:  Visit Vitals    BP (!) 110/0    Pulse 72    Temp 97.5 °F (36.4 °C) (Oral)    Resp 16    Ht 5' 8\" (1.727 m)    Wt 162 lb 12.8 oz (73.8 kg)    SpO2 99%    BMI 24.75 kg/m2     Wt Readings from Last 3 Encounters:   05/03/17 162 lb 12.8 oz (73.8 kg)   05/02/17 156 lb (70.8 kg)   05/01/17 156 lb (70.8 kg)   Mr. Aria Gregorio is a well-developed, well-nourished  gentleman appearing stated age. He's alert and oriented times three and in no acute distress. NO scleral icterus. There's no JVD, no carotid bruits. Heart has a regular rhythm with normal S1 and S2, with a mechanical hum heard throughout the precordium. The lungs are clear to auscultation bilaterally. The abdomen is soft, nontender, and nondistended. There is no hepatosplenomegaly.   The extremities are warm and well-perfused. There is no clubbing, cyanosis, or edema. Radial pulses are 1+ bilaterally, pedal faint. There are no rashes or other skin lesions. I interrogated Mr. Soham Garrett LVAD today. Flow 4.2-4.9, speed 8800 rpm, power 4.6-4.9, PI 6.8-8.1. No alarms, power spikes, or suction events. Mr. Soham Garrett LVAD is functioning properly on interrogation today. TTE yesterday, which I reviewed today, found moderate to severely impaired LV function with apical akinesis, enlarged RV with mildly impaired function, aortic valve opens every beat, mild MR and TR, IVC not seen, and estimated PASP 23 plus right atrial pressure. Lab Results   Component Value Date/Time    WBC 7.2 01/23/2017 04:30 AM    HDL Cholesterol 51 11/27/2016 03:58 AM    ALT (SGPT) 28 04/05/2017 09:13 AM    Creatinine 1.08 04/05/2017 09:13 AM    BUN 16 04/05/2017 09:13 AM    CO2 25 04/05/2017 09:13 AM    TSH 2.83 11/28/2016 07:29 AM    Prostate Specific Ag 1.5 11/29/2016 12:57 PM    INR 2.6 04/26/2017 08:57 AM    Hemoglobin A1c 6.6 11/15/2016 12:32 PM     Mr. Deisy Thomas is a 62 y.o. gentleman status post HeartMate II LVAD. 1.  Heart failure. Mr. Deisy Thomas is euvolemic on exam with NYHA class II symptoms. LVAD functioning properly on interrogation today. Continue current regimen of coreg, plavix, bumex as needed, and warfarin with goal INR 2-2.5. I congratulated Mr. Deisy Thomas on his progress with cardiac rehab, and encouraged him to continue exercising on his own once he graduates from cardiac rehab.    2.  Hypertension. Mildly elevated blood pressure today, he says it's been in the 90 range at cardiac rehab. I've asked Mr. Deisy Thomas to let us know if his blood pressure is consistently over 100 so we can adjust his medications. 3.  Coronary artery disease. No symptoms of ischemia, continue coreg and plavix. 4.  Paroxysmal VT. Mr. Deisy Thomas reports occasional PVCs at cardiac rehab but no sustained arrhythmias.   Continue coreg and amiodarone for now, could consider stopping amiodarone in the future if he remains free of arrhythmias. 5.  Transplant candidacy. We talked about the evaluation process. Mr. Yudelka Brennan will complete 6 months of sobriety at the end of May, I suggested we schedule a visit in June at [facility] for formal transplant evaluation if he's interested in proceeding. Thank you very much for allowing me to participate in the care of this very pleasant gentleman. I've asked him to return to clinic in about a month, though of course we'd be happy to see him sooner should the need arise. Please don't hesitate to contact me if you have any questions of concerns.       Sincerely,  John Palmer MD  Cardiology

## 2017-05-03 NOTE — MR AVS SNAPSHOT
Visit Information Date & Time Provider Department Dept. Phone Encounter #  
 5/3/2017  8:30 AM Chris Hussein MD [address] [id number] Follow-up Instructions Return in about 4 weeks (around 5/31/2017). Upcoming Health Maintenance Date Due Pneumococcal 19-64 Medium Risk (1 of 1 - PPSV23) 10/11/1975 DTaP/Tdap/Td series (1 - Tdap) 10/11/1977 ZOSTER VACCINE AGE 60> 10/11/2016 INFLUENZA AGE 9 TO ADULT 8/1/2017 FOBT Q 1 YEAR AGE 50-75 12/20/2017 Allergies as of 5/3/2017  Review Complete On: 5/3/2017 By: Yordan Srivastava No Known Allergies Current Immunizations  Reviewed on 1/20/2017 Name Date Influenza Vaccine (Quad) PF 12/21/2016 12:00 PM  
  
 Not reviewed this visit Vitals Pulse Temp Resp Height(growth percentile) Weight(growth percentile) SpO2  
 72 97.5 °F (36.4 °C) (Oral) 16 5' 8\" (1.727 m) 162 lb 12.8 oz (73.8 kg) 99% BMI Smoking Status 24.75 kg/m2 Former Smoker Vitals History BMI and BSA Data Body Mass Index Body Surface Area 24.75 kg/m 2 1.88 m 2 Preferred Pharmacy Pharmacy Name Phone [facility]/PHARMACY #[id number] - [place], [place] - [address] [address] [phone] Your Updated Medication List  
  
   
This list is accurate as of: 5/3/17  9:21 AM.  Always use your most recent med list.  
  
  
  
  
 allopurinol 100 mg tablet Commonly known as:  Neftali Altamirano Take 1 Tab by mouth two (2) times a day. ALPRAZolam 0.25 mg tablet Commonly known as:  XANAX  
take 1 tablet by mouth once daily if needed  
  
 amiodarone 200 mg tablet Commonly known as:  CORDARONE Take 1 Tab by mouth daily. ascorbic acid (vitamin C) 500 mg tablet Commonly known as:  VITAMIN C Take 1 Tab by mouth two (2) times a day. bumetanide 0.5 mg tablet Commonly known as:  Terressa Haymaker Take 0.5 mg by mouth daily. Half of a 1mg tablet carvedilol 25 mg tablet Commonly known as:  Judy Yesika Take 1 Tab by mouth two (2) times daily (with meals). citalopram 40 mg tablet Commonly known as:  CELEXA  
take 1 tablet by mouth once daily  
  
 clopidogrel 75 mg Tab Commonly known as:  PLAVIX Take 1 Tab by mouth daily. colchicine 0.6 mg tablet Take 1 Tab by mouth two (2) times a day. Indications: GOUT  
  
 * cyclobenzaprine 5 mg tablet Commonly known as:  FLEXERIL Take 5 mg by mouth as needed. * cyclobenzaprine 10 mg tablet Commonly known as:  FLEXERIL  
take 1 tablet by mouth three times a day if needed  
  
 enoxaparin 80 mg/0.8 mL injection Commonly known as:  LOVENOX 80 mg by SubCUTAneous route every twelve (12) hours. ferrous sulfate 325 mg (65 mg iron) tablet Take 1 Tab by mouth two (2) times daily (with meals). folic acid 1 mg tablet Commonly known as:  Google Take 1 Tab by mouth daily. gabapentin 300 mg capsule Commonly known as:  NEURONTIN HYDROcodone-acetaminophen 5-325 mg per tablet Commonly known as:  [facility] Take 1 Tab by mouth every eight (8) hours as needed. magnesium oxide 400 mg tablet Commonly known as:  MAG-OX Take 1 Tab by mouth daily. oseltamivir 75 mg capsule Commonly known as:  TAMIFLU  
take 1 capsule by mouth once daily to PREVENT FLU for 10 days  
  
 pantoprazole 40 mg tablet Commonly known as:  PROTONIX Take 1 Tab by mouth Daily (before breakfast). potassium chloride 20 mEq tablet Commonly known as:  K-DUR, KLOR-CON Take 0.5 Tabs by mouth daily. sertraline 50 mg tablet Commonly known as:  ZOLOFT Take 1 Tab by mouth daily. triamcinolone acetonide 0.025 % topical cream  
Commonly known as:  KENALOG Apply  to affected area two (2) times a day. use thin layer to affected area  
  
 warfarin 2.5 mg tablet Commonly known as:  COUMADIN Take 2 Tabs by mouth daily. * Notice: This list has 2 medication(s) that are the same as other medications prescribed for you. Read the directions carefully, and ask your doctor or other care provider to review them with you. Follow-up Instructions Return in about 4 weeks (around 5/31/2017). To-Do List   
 05/04/2017 3:30 PM  
  Appointment with [address] at [address] [place] [address] ([phone])  05/08/2017 3:30 PM  
  Appointment with [address] at [address] [place] [address] ([phone])  
  
 05/09/2017 2:00 PM  
  Appointment with [address] at [address] [place] [address] ([phone])  
  
 05/09/2017 3:00 PM  
  Appointment with [address] at [address] [place] [address] ([phone])  
  
 05/09/2017 3:00 PM  
  Appointment with [address] at [address] [place] [address] ([phone])  
  
 05/11/2017 3:30 PM  
  Appointment with [address] at [address] [place] [address] ([phone])  
  
 05/15/2017 3:30 PM  
  Appointment with [address] at [address] [place] [address] ([phone])  
  
 05/16/2017 2:00 PM  
  Appointment with [address] at [address] [place] [address] ([phone])  
  
 05/16/2017 3:00 PM  
  Appointment with [address] at [address] [place] [address] ([phone])  
  
 05/16/2017 3:00 PM  
  Appointment with [address] at [address] [place] [address] ([phone])  
  
 05/18/2017 3:30 PM  
  Appointment with [address] at [address] [place] [address] ([phone])  
  
 05/22/2017 3:30 PM  
  Appointment with [address] at [address] [place] [address] ([phone])  
  
 05/23/2017 2:00 PM  
  Appointment with [address] at [address] [place] [address] ([phone])  
  
 05/23/2017 3:00 PM  
  Appointment with [address] at [address] [place] [address] ([phone])  
  
 05/23/2017 3:00 PM  
 Appointment with [address] at [address] [place] [address] ([phone])  
  
 05/25/2017 3:30 PM  
  Appointment with [address] at [address] [place] [address] ([phone])  
  
 05/30/2017 2:00 PM  
  Appointment with [address] at [address] [place] [address] ([phone])  
  
 05/30/2017 3:00 PM  
  Appointment with [address] at [address] [place] [address] ([phone])  
  
 05/30/2017 3:00 PM  
  Appointment with [address] at [address] [place] [address] ([phone])  
  
 06/01/2017 3:30 PM  
  Appointment with [address] at [address] [place] [address] ([phone])  
  
 06/05/2017 3:30 PM  
  Appointment with [address] at [address] [place] [address] ([phone])  
  
 06/06/2017 2:00 PM  
  Appointment with [address] at [address] [place] [address] ([phone])  
  
 06/06/2017 3:00 PM  
  Appointment with Al[address] at [address] [place] [address] ([phone])  
  
 06/06/2017 3:00 PM  
  Appointment with [address] at [address] [place] [address] ([phone])  
  
 06/08/2017 3:30 PM  
  Appointment with [address] at [address] [place] [address] ([phone])  
  
 06/12/2017 3:30 PM  
  Appointment with [address] at [address] [place] [address] ([phone])  
  
 06/13/2017 2:00 PM  
  Appointment with [address] at [address] [place] [address] ([phone])  
  
 06/13/2017 3:00 PM  
  Appointment with Al[address] at [address] [place] [address] ([phone])  
  
 06/13/2017 3:00 PM  
  Appointment with [address] at [address] [place] [address] ([phone])  
  
 06/15/2017 3:30 PM  
  Appointment with [address] at [address] [place] [address] ([phone]) Patient Instructions Schedule a follow-up visit in 1 month. Continue cardiac rehab. When you finish, continue exercising on your own. Please let us know if your blood pressure is running higher than 100 consistently. Introducing [facility] & HEALTH SERVICES! Dear Lauren Choi: Thank you for requesting a [employer] account. Our records indicate that you already have an active [employer] account. You can access your account anytime at [email or web address] Did you know that you can access your hospital and ER discharge instructions at any time in [employer]? You can also review all of your test results from your hospital stay or ER visit. Additional Information If you have questions, please visit the Frequently Asked Questions section of the [employer] website at [email or web address] Remember, [employer] is NOT to be used for urgent needs. For medical emergencies, dial 911. Now available from your iPhone and Android! Please provide this summary of care documentation to your next provider. Your primary care clinician is listed as Benton Bar. If you have any questions after today's visit, please call [phone].

## 2017-05-03 NOTE — PROGRESS NOTES
Chief Complaint   Patient presents with    Follow-up     LVAD follow-up     1. Have you been to the ER, urgent care clinic since your last visit? Hospitalized since your last visit? No    2. Have you seen or consulted any other health care providers outside of the [address] [place] [address] since your last visit? Include any pap smears or colon screening.  No

## 2017-05-04 ENCOUNTER — HOSPITAL ENCOUNTER (OUTPATIENT)
Dept: CARDIAC REHAB | Age: 62
Discharge: HOME | End: 2017-05-04
Payer: COMMERCIAL

## 2017-05-04 VITALS — BODY MASS INDEX: 23.87 KG/M2 | WEIGHT: 157 LBS

## 2017-05-04 PROCEDURE — 93798 PHYS/QHP OP CAR RHAB W/ECG: CPT

## 2017-05-08 ENCOUNTER — HOSPITAL ENCOUNTER (OUTPATIENT)
Dept: CARDIAC REHAB | Age: 62
Discharge: HOME | End: 2017-05-08
Payer: COMMERCIAL

## 2017-05-08 VITALS — WEIGHT: 160 LBS | BODY MASS INDEX: 24.33 KG/M2

## 2017-05-08 PROCEDURE — 93798 PHYS/QHP OP CAR RHAB W/ECG: CPT

## 2017-05-09 ENCOUNTER — HOSPITAL ENCOUNTER (OUTPATIENT)
Dept: CARDIAC REHAB | Age: 62
Discharge: HOME | End: 2017-05-09
Payer: COMMERCIAL

## 2017-05-09 ENCOUNTER — TELEPHONE (OUTPATIENT)
Dept: CARDIOLOGY CLINIC | Age: 62
End: 2017-05-09

## 2017-05-09 VITALS — WEIGHT: 160 LBS | BODY MASS INDEX: 24.33 KG/M2

## 2017-05-09 DIAGNOSIS — R06.02 SHORTNESS OF BREATH: ICD-10-CM

## 2017-05-09 DIAGNOSIS — I25.5 ISCHEMIC CARDIOMYOPATHY: Primary | ICD-10-CM

## 2017-05-09 PROCEDURE — 93798 PHYS/QHP OP CAR RHAB W/ECG: CPT | Performed by: DIETITIAN, REGISTERED

## 2017-05-09 PROCEDURE — 93797 PHYS/QHP OP CAR RHAB WO ECG: CPT | Performed by: DIETITIAN, REGISTERED

## 2017-05-09 NOTE — TELEPHONE ENCOUNTER
Telephone Call RE:  Lab Reminder      Outcome:     [x] Patient verbalizes understanding    [] Unable to reach   [] Left message              []       Mehrdad Guardado

## 2017-05-10 ENCOUNTER — TELEPHONE ANTICOAG (OUTPATIENT)
Dept: CARDIOLOGY CLINIC | Age: 62
End: 2017-05-10

## 2017-05-11 ENCOUNTER — HOSPITAL ENCOUNTER (OUTPATIENT)
Dept: CARDIAC REHAB | Age: 62
Discharge: HOME | End: 2017-05-11
Payer: COMMERCIAL

## 2017-05-11 VITALS — BODY MASS INDEX: 24.18 KG/M2 | WEIGHT: 159 LBS

## 2017-05-11 PROCEDURE — 93798 PHYS/QHP OP CAR RHAB W/ECG: CPT

## 2017-05-12 LAB
ALBUMIN SERPL-MCNC: 3.9 G/DL (ref 3.6–4.8)
ALBUMIN/GLOB SERPL: 1.8 {RATIO} (ref 1.2–2.2)
ALP SERPL-CCNC: 78 IU/L (ref 39–117)
ALT SERPL-CCNC: 24 IU/L (ref 0–44)
AST SERPL-CCNC: 23 IU/L (ref 0–40)
BILIRUB SERPL-MCNC: ABNORMAL MG/DL (ref 0–1.2)
BUN SERPL-MCNC: 12 MG/DL (ref 8–27)
BUN/CREAT SERPL: 11 (ref 10–24)
CALCIUM SERPL-MCNC: 8.8 MG/DL (ref 8.6–10.2)
CHLORIDE SERPL-SCNC: 101 MMOL/L (ref 96–106)
CO2 SERPL-SCNC: 26 MMOL/L (ref 18–29)
CREAT SERPL-MCNC: 1.08 MG/DL (ref 0.76–1.27)
ERYTHROCYTE [DISTWIDTH] IN BLOOD BY AUTOMATED COUNT: 19.2 % (ref 12.3–15.4)
GLOBULIN SER CALC-MCNC: 2.2 G/DL (ref 1.5–4.5)
GLUCOSE SERPL-MCNC: 104 MG/DL (ref 65–99)
HCT VFR BLD AUTO: 31.6 % (ref 37.5–51)
HGB BLD-MCNC: 10.5 G/DL (ref 12.6–17.7)
HGB FREE PLAS-MCNC: <0.2 MG/DL (ref 0–4.9)
INR PPP: 2.1 (ref 0.8–1.2)
LDH SERPL-CCNC: 212 IU/L (ref 121–224)
MCH RBC QN AUTO: 25.7 PG (ref 26.6–33)
MCHC RBC AUTO-ENTMCNC: 33.2 G/DL (ref 31.5–35.7)
MCV RBC AUTO: 78 FL (ref 79–97)
NT-PROBNP SERPL-MCNC: 1941 PG/ML (ref 0–210)
PLATELET # BLD AUTO: 397 X10E3/UL (ref 150–379)
POTASSIUM SERPL-SCNC: 4.2 MMOL/L (ref 3.5–5.2)
PROT SERPL-MCNC: 6.1 G/DL (ref 6–8.5)
PROTHROMBIN TIME: 22.1 SEC (ref 9.1–12)
RBC # BLD AUTO: 4.08 X10E6/UL (ref 4.14–5.8)
SODIUM SERPL-SCNC: 141 MMOL/L (ref 134–144)
WBC # BLD AUTO: 9.1 X10E3/UL (ref 3.4–10.8)

## 2017-05-15 ENCOUNTER — HOSPITAL ENCOUNTER (OUTPATIENT)
Dept: CARDIAC REHAB | Age: 62
Discharge: HOME | End: 2017-05-15
Payer: COMMERCIAL

## 2017-05-15 VITALS — BODY MASS INDEX: 23.87 KG/M2 | WEIGHT: 157 LBS

## 2017-05-15 PROCEDURE — 93798 PHYS/QHP OP CAR RHAB W/ECG: CPT

## 2017-05-16 ENCOUNTER — HOSPITAL ENCOUNTER (OUTPATIENT)
Dept: CARDIAC REHAB | Age: 62
Discharge: HOME | End: 2017-05-16
Payer: COMMERCIAL

## 2017-05-16 VITALS — BODY MASS INDEX: 23.87 KG/M2 | WEIGHT: 157 LBS

## 2017-05-16 PROCEDURE — 93797 PHYS/QHP OP CAR RHAB WO ECG: CPT

## 2017-05-16 PROCEDURE — 93798 PHYS/QHP OP CAR RHAB W/ECG: CPT

## 2017-05-18 ENCOUNTER — HOSPITAL ENCOUNTER (OUTPATIENT)
Dept: CARDIAC REHAB | Age: 62
Discharge: HOME | End: 2017-05-18
Payer: COMMERCIAL

## 2017-05-18 VITALS — WEIGHT: 157 LBS | BODY MASS INDEX: 23.87 KG/M2

## 2017-05-18 PROCEDURE — 93798 PHYS/QHP OP CAR RHAB W/ECG: CPT

## 2017-05-20 NOTE — PROGRESS NOTES
Norman Sims [address]  LVAD Office Visit      Date of VAD implant: 12/1/2016  Cardiologist: GRAHAM  PCP: Belen Cabrera MD  Consultants: Dr. Lindsey Villarreal (EP), Dr. Anmol Earl (GI), Dr. Nayeli Ortega (Ortho)    HPI: Julita Ghosh is a 62 y.o. male with a past medical history significant for HTN, lumbar stenosis, gout, strobic seizures, ETOH abuse and remote tobacco abuse. He suffered an MI after lum-hinkle on 11/25, and underwent LVAD implant on 12/1/16. He was discharged from [facility] and sent to [facility] inpatient rehab. He was discharged home on 12/27/16. He presents today for 1 week office follow up. Subjective:  Chief Complaint:  Chief Complaint   Patient presents with    Cardiomyopathy    Follow Up Chronic Condition     LVAD        ROS:  Positive for: Gout pain, nausea, diarrhea, fatigue. Denies HA, SOB, orthopnea, PND, syncope, dizziness, chest pain, palpitations, dyspnea, melena. Home LVAD Flowsheet reviewed: no- didn't bring- needs copies of sheets  Significant VAD alarms at home: no      Assessment / Plan:    Heart Failure Status: NYHA Class III    Ischemic cardiomyopathy, s/p HM II LVAD implant as DT 12/1/16 -  Alarm history reviewed. VAD interrogation: Please see VAD flow sheet for readings. Occasional PI events noted. Settings reviewed. Adequate clinical perfusion and function of his LVAD. Will schedule for a RAMP test next week.      Post-operative RV insufficiency: Improved, LFTs continue to improve. KLEVER, volume depletion: Cr improved with decreased diuretic dosing and increased PO fluids. Trend. Nausea/vomiting: Encouraged to stay hydrated. Ondansetron PRN. Will refer to GI if persists.      Multivessel CAD/S/p CABG x 2 (SVG to RCA, LIMA to LAD) - s/p- RCA BMS x 2. Continue Plavix, BB.  No statin d/t elevated LFTs.      Lumbar stenosis, s/p decompressive laminectomy and instrumented fusion: Plans per ortho, cont home PT/OT     Post operative anemia d/t acute blood loss: Hgb slowly improving, cont to trend. On iron, Vitamin C, and folic acid until hgb > 10 consistently.      SVT/A-fib/Torsades post op: S/p AICD 12/16. Follows with Dr. Sally Leonardo, cont amiodarone for now.      HTN, MAP goal 70-90: Within goal today, cont current dose of coreg.      Chronic anticoagulation for LVAD, INR goal 2-2.5. INR therapeutic, will cont warfarin, plavix. Please see anticoagulation tracker for details.      Gout Pain: Continue colchicine, allopurinol. Avoid all NSAIDS     Depression/Anxiety: Continue increased dose of Celexa and PRN xanax. Encouraged patient to find counselor. Deconditioning: Cont to work with home PT/OT,  encouraged patient to continue to eat small meals throughout the day.       VAD specific education: Greater than 50% of appt time (60 minutes) was spent counseling Mr. Mann and his wife about LVAD management, plan of care, and medication management. Problem List:  Patient Active Problem List   Diagnosis Code    S/P laminectomy Z98.890    Sinus tachycardia R00.0    Acute respiratory failure with hypoxia ([facility]) J96.01    Essential hypertension I10    Alcohol abuse F10.10    Chronic systolic heart failure (HCC) I50.22    CAD, multiple vessel I25.10    Ischemic cardiomyopathy I25.5    MI (myocardial infarction) ([facility] I21.3    Sustained VT (ventricular tachycardia) ([facility]) I47.2    S/P ICD (internal cardiac defibrillator) procedure Z95.810    Chronic anticoagulation Z79.01    Left ventricular assist device present ([facility] Z95.811    Gout M10.9    ICD (implantable cardioverter-defibrillator) infection ([facility] T82. 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 Social History Main Topics    Smoking status: Former Smoker     Packs/day: 1.50     Years: 30.00     Quit date: 2008    Smokeless tobacco: Never Used    Alcohol use No    Drug use: No    Sexual activity: Not on file     Other Topics Concern    Not on file     Social History Narrative       Medications:  No Known Allergies     Current Outpatient Prescriptions on File Prior to Visit   Medication Sig    HYDROcodone-acetaminophen (NORCO) 5-325 mg per tablet Take 1 Tab by mouth every eight (8) hours as needed.  colchicine 0.6 mg tablet Take 1 Tab by mouth two (2) times a day. Indications: GOUT (Patient taking differently: Take 0.6 mg by mouth as needed. Indications: GOUT)     No current facility-administered medications on file prior to visit.          Results for orders placed or performed in visit on 01/03/17   AMB PT/INR EXTERNAL   Result Value Ref Range    INR, External 3.2        Recent tests or procedures:   S/p Laminectomy 11/22/16  S/p LVAD implant 12/1/16  S/p AICD implant 12/16/16  S/p Ramp test 12/16/16          Objective:  Physical Exam:  Visit Vitals    Pulse 94    Temp 97.7 °F (36.5 °C) (Oral)    Resp 18    Ht 5' 10\" (1.778 m)    Wt 148 lb 3.2 oz (67.2 kg)    SpO2 98%    BMI 21.26 kg/m2      MAP: 86    VAD readings:  LVAD (Heartmate)  Pump Speed (RPM): 8600  Pump Flow (LPM): 5  PI (Pulsitility Index): 6.5  Power: 4.7    Exam:  General appearance: alert, fatigued, cooperative, mild distress, pale  Lungs: clear to auscultation bilaterally, no cough   Heart: regular rate and rhythm, S1, S2 normal, no murmur, + LVAD hum   Abdomen: soft, non-tender. Bowel sounds normal.   Extremities: +tenderness to B feet and R knee, no erythema noted but some mild edema on R Knee. Skin: Skin color, texture, turgor decreased. Neurologic: Grossly normal      Drive Line Exam:  Stabilization device intact: yes  Line inspected for damage: yes    Appearance: no edema, erythema, drainage   Sterile dressing changed per policy: no  Frequency of dressing change at home: 7 times a week  Frequency of use of stabilization device: 100%    Follow-up Disposition:  Return in about 1 week (around 1/12/2017). Thank you for letting us see him with you.      Deneen Cristina NP      [address], [address] [place] [address] Ave  Office: [phone]  [facility] VAD Pager: [phone]

## 2017-05-22 ENCOUNTER — HOSPITAL ENCOUNTER (OUTPATIENT)
Dept: CARDIAC REHAB | Age: 62
Discharge: HOME | End: 2017-05-22
Payer: COMMERCIAL

## 2017-05-22 VITALS — BODY MASS INDEX: 24.33 KG/M2 | WEIGHT: 160 LBS

## 2017-05-22 PROCEDURE — 93798 PHYS/QHP OP CAR RHAB W/ECG: CPT

## 2017-05-23 ENCOUNTER — HOSPITAL ENCOUNTER (OUTPATIENT)
Dept: CARDIAC REHAB | Age: 62
Discharge: HOME | End: 2017-05-23
Payer: COMMERCIAL

## 2017-05-23 VITALS — WEIGHT: 162 LBS | BODY MASS INDEX: 24.63 KG/M2

## 2017-05-23 DIAGNOSIS — Z79.01 CHRONIC ANTICOAGULATION: ICD-10-CM

## 2017-05-23 DIAGNOSIS — Z95.811 LEFT VENTRICULAR ASSIST DEVICE PRESENT (HCC): ICD-10-CM

## 2017-05-23 DIAGNOSIS — I25.5 ISCHEMIC CARDIOMYOPATHY: ICD-10-CM

## 2017-05-23 DIAGNOSIS — I50.22 CHRONIC SYSTOLIC HEART FAILURE (HCC): Primary | ICD-10-CM

## 2017-05-23 PROCEDURE — 93798 PHYS/QHP OP CAR RHAB W/ECG: CPT | Performed by: DIETITIAN, REGISTERED

## 2017-05-23 PROCEDURE — 93797 PHYS/QHP OP CAR RHAB WO ECG: CPT | Performed by: DIETITIAN, REGISTERED

## 2017-05-25 ENCOUNTER — HOSPITAL ENCOUNTER (OUTPATIENT)
Dept: CARDIAC REHAB | Age: 62
Discharge: HOME | End: 2017-05-25
Payer: COMMERCIAL

## 2017-05-25 VITALS — BODY MASS INDEX: 24.18 KG/M2 | WEIGHT: 159 LBS

## 2017-05-25 PROCEDURE — 93798 PHYS/QHP OP CAR RHAB W/ECG: CPT

## 2017-05-26 ENCOUNTER — TELEPHONE (OUTPATIENT)
Dept: CARDIOLOGY CLINIC | Age: 62
End: 2017-05-26

## 2017-05-30 ENCOUNTER — HOSPITAL ENCOUNTER (OUTPATIENT)
Dept: CARDIAC REHAB | Age: 62
Discharge: HOME | End: 2017-05-30
Payer: COMMERCIAL

## 2017-05-30 ENCOUNTER — TELEPHONE ANTICOAG (OUTPATIENT)
Dept: CARDIOLOGY CLINIC | Age: 62
End: 2017-05-30

## 2017-05-30 ENCOUNTER — TELEPHONE (OUTPATIENT)
Dept: CARDIOLOGY CLINIC | Age: 62
End: 2017-05-30

## 2017-05-30 VITALS — WEIGHT: 160 LBS | BODY MASS INDEX: 24.33 KG/M2

## 2017-05-30 DIAGNOSIS — Z79.01 CHRONIC ANTICOAGULATION: ICD-10-CM

## 2017-05-30 LAB
INR PPP: 1.6 (ref 0.8–1.2)
PROTHROMBIN TIME: 16.1 SEC (ref 9.1–12)

## 2017-05-30 RX ORDER — ENOXAPARIN SODIUM 100 MG/ML
80 INJECTION SUBCUTANEOUS EVERY 12 HOURS
Qty: 8 SYRINGE | Refills: 0 | Status: SHIPPED | OUTPATIENT
Start: 2017-05-30 | End: 2017-08-30

## 2017-05-30 NOTE — TELEPHONE ENCOUNTER
Telephone Call RE:  Appointment reminder     Outcome:     [x] Patient confirmed appointment   [] Patient rescheduled appointment for    [] Unable to reach   [] Left message              [] Other:       Mauricio Sjogren

## 2017-05-30 NOTE — PROGRESS NOTES
INR sub-therapeutic. Ordered enoxaparin 80 mg SQ q12h until INR > 2.0. Will repeat INR Friday. Patient verbalizes understanding.

## 2017-05-31 ENCOUNTER — OFFICE VISIT (OUTPATIENT)
Dept: CARDIOLOGY CLINIC | Age: 62
End: 2017-05-31

## 2017-05-31 VITALS
TEMPERATURE: 97.4 F | SYSTOLIC BLOOD PRESSURE: 74 MMHG | OXYGEN SATURATION: 97 % | WEIGHT: 160.4 LBS | BODY MASS INDEX: 24.31 KG/M2 | RESPIRATION RATE: 16 BRPM | HEIGHT: 68 IN | HEART RATE: 76 BPM

## 2017-05-31 DIAGNOSIS — F33.1 MODERATE EPISODE OF RECURRENT MAJOR DEPRESSIVE DISORDER (HCC): ICD-10-CM

## 2017-05-31 DIAGNOSIS — Z98.890 S/P LAMINECTOMY: ICD-10-CM

## 2017-05-31 DIAGNOSIS — Z95.811 LVAD (LEFT VENTRICULAR ASSIST DEVICE) PRESENT (HCC): Primary | ICD-10-CM

## 2017-05-31 DIAGNOSIS — I25.5 ISCHEMIC CARDIOMYOPATHY: ICD-10-CM

## 2017-05-31 DIAGNOSIS — I50.9 HEART FAILURE, UNSPECIFIED (HCC): ICD-10-CM

## 2017-05-31 DIAGNOSIS — I10 HYPERTENSION, ESSENTIAL: ICD-10-CM

## 2017-05-31 DIAGNOSIS — Z79.01 CHRONIC ANTICOAGULATION: ICD-10-CM

## 2017-05-31 NOTE — MR AVS SNAPSHOT
Visit Information Date & Time Provider Department Dept. Phone Encounter #  
 5/31/2017  8:30 AM Mary Ellen Dodd MD [address] [place] [id number] Upcoming Health Maintenance Date Due Pneumococcal 19-64 Medium Risk (1 of 1 - PPSV23) 10/11/1975 DTaP/Tdap/Td series (1 - Tdap) 10/11/1977 ZOSTER VACCINE AGE 60> 10/11/2016 INFLUENZA AGE 9 TO ADULT 8/1/2017 FOBT Q 1 YEAR AGE 50-75 12/20/2017 Allergies as of 5/31/2017  Review Complete On: 5/31/2017 By: Susy Mabry RN No Known Allergies Current Immunizations  Reviewed on 1/20/2017 Name Date Influenza Vaccine (Quad) PF 12/21/2016 12:00 PM  
  
 Not reviewed this visit Vitals BP Pulse Temp Resp Height(growth percentile) Weight(growth percentile) (!) 74/0 (BP 1 Location: Left arm, BP Patient Position: Sitting) 76 97.4 °F (36.3 °C) 16 5' 8\" (1.727 m) 160 lb 6.4 oz (72.8 kg) SpO2 BMI Smoking Status 97% 24.39 kg/m2 Former Smoker Vitals History BMI and BSA Data Body Mass Index Body Surface Area  
 24.39 kg/m 2 1.87 m 2 Preferred Pharmacy Pharmacy Name Phone [facility]/PHARMACY #[id number] [place] - [address] [address] [phone] Your Updated Medication List  
  
   
This list is accurate as of: 5/31/17  9:43 AM.  Always use your most recent med list.  
  
  
  
  
 allopurinol 100 mg tablet Commonly known as:  Javier Mamadou Take 1 Tab by mouth two (2) times a day. ALPRAZolam 0.25 mg tablet Commonly known as:  XANAX  
take 1 tablet by mouth once daily if needed  
  
 amiodarone 200 mg tablet Commonly known as:  CORDARONE Take 1 Tab by mouth daily. ascorbic acid (vitamin C) 500 mg tablet Commonly known as:  VITAMIN C Take 1 Tab by mouth two (2) times a day. bumetanide 0.5 mg tablet Commonly known as:  Estrellaleonel Hernandezett Take 0.5 mg by mouth two (2) times a day. Half of a 1mg tablet carvedilol 25 mg tablet Commonly known as:  Render Blonder Take 1 Tab by mouth two (2) times daily (with meals). clopidogrel 75 mg Tab Commonly known as:  PLAVIX Take 1 Tab by mouth daily. colchicine 0.6 mg tablet Take 1 Tab by mouth two (2) times a day. Indications: GOUT  
  
 cyclobenzaprine 5 mg tablet Commonly known as:  FLEXERIL Take 5 mg by mouth as needed. enoxaparin 80 mg/0.8 mL injection Commonly known as:  LOVENOX 80 mg by SubCUTAneous route every twelve (12) hours. ferrous sulfate 325 mg (65 mg iron) tablet Take 1 Tab by mouth two (2) times daily (with meals). folic acid 1 mg tablet Commonly known as:  Google Take 1 Tab by mouth daily. HYDROcodone-acetaminophen 5-325 mg per tablet Commonly known as:  Matt Nigh Take 1 Tab by mouth every eight (8) hours as needed. pantoprazole 40 mg tablet Commonly known as:  PROTONIX Take 1 Tab by mouth Daily (before breakfast). potassium chloride 20 mEq tablet Commonly known as:  K-DUR, KLOR-CON Take 0.5 Tabs by mouth daily. triamcinolone acetonide 0.025 % topical cream  
Commonly known as:  KENALOG Apply  to affected area two (2) times a day. use thin layer to affected area  
  
 warfarin 2.5 mg tablet Commonly known as:  COUMADIN Take 2 Tabs by mouth daily.   
  
  
  
  
To-Do List   
 06/01/2017 3:30 PM  
  Appointment with [address] [place] [address] St at [address] [place] [address] ([phone])  
  
 06/05/2017 3:30 PM  
  Appointment with [address] [place] [address] at [address] [place] [address] ([phone])  
  
 06/06/2017 2:00 PM  
  Appointment with Saritha FITCH [facility]  at [address] [place] [address] ([phone])  
  
 06/06/2017 3:00 PM  
  Appointment with Denis Kim RD at [address] [place] [address] ([phone])  
  
 06/06/2017 3:00 PM  
  Appointment with [address] [place] [address] St at [address] [place] [address] ([phone])  
  
 06/08/2017 3:30 PM  
 Appointment with [address] at [address] [place] [address] ([phone])  
  
 06/12/2017 3:30 PM  
  Appointment with [address] at [address] [place] [address] ([phone])  
  
 06/13/2017 2:00 PM  
  Appointment with [address] at [address] [place] [address] ([phone])  
  
 06/13/2017 3:00 PM  
  Appointment with Renae Ferreira RD at [address] [place] [address] ([phone])  
  
 06/13/2017 3:00 PM  
  Appointment with [address] at [address] [place] [address] ([phone])  
  
 06/15/2017 3:30 PM  
  Appointment with [address] at [address] [place] [address] ([phone]) Patient Instructions Please continue your current medications. Try and decrease your Bumex to once daily x 3 days, and then only every other day. Continue to weigh yourself daily and notify our office for weight gain > 2 lbs in 1 day or > 5 lbs in 1 week. Continue cardiac rehab. Continue your Lovenox injections until we notify you otherwise. Follow up in 1 month. Introducing [facility] & HEALTH SERVICES! Dear Pratima Urrutia: Thank you for requesting a [employer] account. Our records indicate that you already have an active [employer] account. You can access your account anytime at [email or web address] Did you know that you can access your hospital and ER discharge instructions at any time in [employer]? You can also review all of your test results from your hospital stay or ER visit. Additional Information If you have questions, please visit the Frequently Asked Questions section of the [employer] website at [email or web address] Remember, [employer] is NOT to be used for urgent needs. For medical emergencies, dial 911. Now available from your iPhone and Android! Please provide this summary of care documentation to your next provider. Your primary care clinician is listed as Gemma Merrill. If you have any questions after today's visit, please call [phone].

## 2017-05-31 NOTE — PATIENT INSTRUCTIONS
Please continue your current medications. Try and decrease your Bumex to once daily x 3 days, and then only every other day. Continue to weigh yourself daily and notify our office for weight gain > 2 lbs in 1 day or > 5 lbs in 1 week. Continue cardiac rehab. Continue your Lovenox injections until we notify you otherwise. Follow up in 1 month.

## 2017-05-31 NOTE — PROGRESS NOTES
Norman Sims [address]  LVAD Office Visit      Date of VAD implant: 12/1/2016  Cardiologist: GRAHAM  PCP: Lizbet Baldwin MD    HPI: Krish Sandoval is a 62 y.o. male with a past medical history s/p HM II LVAD for DT, h/o torasades/SVT, HTN, lumbar stenosis s/p lumbar laminectomy, CAD (s/p CABG/sp BMSx2), gout, strobic seizures, ETOH abuse and remote tobacco abuse who is seen today for routine monthly LVAD follow up. Since his last visit, Mr. Pedro Robert has been doing well. He has been attending cardiac rehab 3 times weekly and reports a huge improvement in his gait stability. His appetite has improved and he is walking daily. Subjective:  Chief Complaint:  Chief Complaint   Patient presents with    Leg Swelling     \" a little bit on the right side\"    Follow-up     a little bit of pain on sternum and left side of chest when coughs      ROS:   Positive for - RLE edema, improved appetite. Negative for - chest pain, dyspnea on exertion, irregular heartbeat, loss of consciousness, orthopnea, palpitations, paroxysmal nocturnal dyspnea, rapid heart rate or shortness of breath. Assessment / Plan:    Heart Failure Status: NYHA Class II    Ischemic cardiomyopathy, s/p HM II LVAD implant as DT 12/1/16 -  Alarm history reviewed. VAD interrogation: Please see VAD flow sheet for readings. Adequate clinical perfusion and function of his LVAD. No alarms or adverse events. Decrease diuretic to daily x 3 days, then every other day. Continue Coreg. No ACE-1 d/t history of renal insufficiency - start when BP tolerates/Cr OK. Continue daily weights, Na+ restricted diet. Bleeding from ICD site - Resolved. Diarrhea - Resolved     Post-operative RV insufficiency: Resolved, T. Bili in normal range. KLEVER: resolved, monitor labs. Begin ACE-1 when appropriate.      Multivessel CAD/S/p CABG x 2 (SVG to RCA, LIMA to LAD) - s/p- RCA BMS x 2. On Plavix, BB. No statin d/t elevated LFTs.  No ASA since on Plavix and Coumadin.     Lumbar stenosis, s/p decompressive laminectomy and instrumented fusion: Improving     Post operative anemia d/t acute blood loss: Hgb stable - continue iron, Vitamin C and folic acid. Until Hg consistently >10     H/o ETOH abuse:  Pt denies any alcohol use or any urge to drink alcohol. Has not had a drink since prior to his lumbar surgery.      SVT/A-fib/Torsades post op: S/p AICD 12/16. Cont. Amiodarone per Dr. Rebecca Hardy.      HTN, MAP goal 70-90: MAP 74 today, continue BB. Chronic anticoagulation for LVAD, INR goal 2-2.5. INR sub-therapeutic. Continue enoxaparin, warfarin and Plavix. Please see anticoagulation tracker for details.      Gout Pain: resolved, pt to continue Allopurinol and use Colchicine with active gout flare.       Depression/Anxiety: controlled on current dose of Celexa. Deconditioning: Cont cardiac rehab, increase activity as tolerated. Carotid stenosis; Will cont to monitor for now, will follow up with vascular in 6 months. Cool extremities, possible Raynaud's: no intervention per vascular surgery at this time. VAD specific education: Greater than 50% of appt time (60 minutes) was spent counseling Mr. Mann and his wife about LVAD management, plan of care, and medication management.           Problem List:  Patient Active Problem List   Diagnosis Code    S/P laminectomy Z98.890    Sinus tachycardia R00.0    Acute respiratory failure with hypoxia ([facility]) J96.01    Essential hypertension I10    Alcohol abuse F10.10    Chronic systolic heart failure (HCC) I50.22    CAD, multiple vessel I25.10    Ischemic cardiomyopathy I25.5    MI (myocardial infarction) ([facility] I21.3    Sustained VT (ventricular tachycardia) ([facility]) I47.2    S/P ICD (internal cardiac defibrillator) procedure Z95.810    Chronic anticoagulation Z79.01    Left ventricular assist device present ([facility] Z95.811    Gout M10.9    ICD (implantable cardioverter-defibrillator) infection ([facility] T82. 7XXA        Past Medical History:   Diagnosis Date    Arrhythmia     SVT    CAD (coronary artery disease)     Chronic pain     back    Gout     Heart failure (HCC)     Hypertension     LVAD (left ventricular assist device) present ([facility] 12/01/2016    Raynaud disease     Seizures ([facility]     strobic seizures early 19's       Family History   Problem Relation Age of Onset    Diabetes Mother     Dementia Mother     Other Mother      carotid artery blockage    Heart Disease Father     Stroke Father     Heart Attack Father      several    Hypertension Father     Elevated Lipids Father     No Known Problems Sister     Cancer Brother      brain    Lung Disease Sister     COPD Sister     Cancer Sister      lung       Social History     Social History    Marital status:      Spouse name: N/A    Number of children: N/A    Years of education: N/A     Occupational History    Not on file. Social History Main Topics    Smoking status: Former Smoker     Packs/day: 1.50     Years: 30.00     Quit date: 2008    Smokeless tobacco: Never Used    Alcohol use No    Drug use: No    Sexual activity: Not on file     Other Topics Concern    Not on file     Social History Narrative       Medications:  No Known Allergies     Current Outpatient Prescriptions on File Prior to Visit   Medication Sig    enoxaparin (LOVENOX) 80 mg/0.8 mL injection 80 mg by SubCUTAneous route every twelve (12) hours.  carvedilol (COREG) 25 mg tablet Take 1 Tab by mouth two (2) times daily (with meals).  triamcinolone acetonide (KENALOG) 0.025 % topical cream Apply  to affected area two (2) times a day. use thin layer to affected area    bumetanide (BUMEX) 0.5 mg tablet Take 0.5 mg by mouth two (2) times a day. Half of a 1mg tablet     warfarin (COUMADIN) 2.5 mg tablet Take 2 Tabs by mouth daily.  allopurinol (ZYLOPRIM) 100 mg tablet Take 1 Tab by mouth two (2) times a day.     amiodarone (CORDARONE) 200 mg tablet Take 1 Tab by mouth daily.  clopidogrel (PLAVIX) 75 mg tab Take 1 Tab by mouth daily.  pantoprazole (PROTONIX) 40 mg tablet Take 1 Tab by mouth Daily (before breakfast).  potassium chloride (K-DUR, KLOR-CON) 20 mEq tablet Take 0.5 Tabs by mouth daily.  ferrous sulfate 325 mg (65 mg iron) tablet Take 1 Tab by mouth two (2) times daily (with meals).  ascorbic acid, vitamin C, (VITAMIN C) 500 mg tablet Take 1 Tab by mouth two (2) times a day.  cyclobenzaprine (FLEXERIL) 5 mg tablet Take 5 mg by mouth as needed.  folic acid (FOLVITE) 1 mg tablet Take 1 Tab by mouth daily.  HYDROcodone-acetaminophen (NORCO) 5-325 mg per tablet Take 1 Tab by mouth every eight (8) hours as needed.  colchicine 0.6 mg tablet Take 1 Tab by mouth two (2) times a day. Indications: GOUT (Patient taking differently: Take 0.6 mg by mouth as needed. Indications: GOUT)    ALPRAZolam (XANAX) 0.25 mg tablet take 1 tablet by mouth once daily if needed     No current facility-administered medications on file prior to visit.          Results for orders placed or performed in visit on 05/23/17   PROTHROMBIN TIME + INR   Result Value Ref Range    INR 1.6 (H) 0.8 - 1.2    Prothrombin time 16.1 (H) 9.1 - 12.0 sec       Recent tests or procedures:   S/p Laminectomy 11/22/16  S/p LVAD implant 12/1/16  S/p AICD implant 12/16/16  S/p Ramp test 12/16/16          Objective:  Physical Exam:  Visit Vitals    BP (!) 74/0 (BP 1 Location: Left arm, BP Patient Position: Sitting)    Pulse 76    Temp 97.4 °F (36.3 °C)    Resp 16    Ht 5' 8\" (1.727 m)    Wt 160 lb 6.4 oz (72.8 kg)    SpO2 97%    BMI 24.39 kg/m2      MAP: 80    VAD data:  LVAD (Heartmate)  Pump Speed (RPM): 8800  Pump Flow (LPM): 4.8  PI (Pulsitility Index): 6.1  Power: 5.9    Exam:  Gen:  WA, WN, NAD   CVS:  +LVAD hum  Pul:  Slightly decreased BS, (-) r,r,w  Abd:  +BS, soft, NT,ND  Ext: 1+ b/l ankle edema, (-) calf tenderness  Neuro:  No obvious deficits         Drive Line Exam:  Stabilization device intact: yes  Line inspected for damage: yes  Appearance: no edema, erythema, drainage   Sterile dressing changed per policy: no  Frequency of dressing change at home: every other day after showering. Frequency of use of stabilization device: 100%    Follow-up Disposition: Not on File    Thank you for letting us see him with you.      Sherry Shore NP  VAD Coordinator  [facility] -[address], [address] [place] [address] Ave  Office: [phone]  \[facility] [place] [facility]\"" VAD Pager: [phone]

## 2017-06-01 ENCOUNTER — TELEPHONE (OUTPATIENT)
Dept: CARDIOLOGY CLINIC | Age: 62
End: 2017-06-01

## 2017-06-01 ENCOUNTER — HOSPITAL ENCOUNTER (OUTPATIENT)
Dept: CARDIAC REHAB | Age: 62
Discharge: HOME | End: 2017-06-01
Payer: COMMERCIAL

## 2017-06-01 VITALS — BODY MASS INDEX: 24.94 KG/M2 | WEIGHT: 164 LBS

## 2017-06-01 DIAGNOSIS — Z79.01 CHRONIC ANTICOAGULATION: ICD-10-CM

## 2017-06-01 DIAGNOSIS — Z95.811 LEFT VENTRICULAR ASSIST DEVICE PRESENT (HCC): Primary | ICD-10-CM

## 2017-06-01 NOTE — TELEPHONE ENCOUNTER
Telephone Call RE:  Lab Reminder      Outcome:     [] Patient verbalizes understanding    [] Unable to reach   [x] Left message              []       Mellie Barthel

## 2017-06-02 ENCOUNTER — TELEPHONE ANTICOAG (OUTPATIENT)
Dept: CARDIOLOGY CLINIC | Age: 62
End: 2017-06-02

## 2017-06-02 LAB
INR PPP: 2 (ref 0.8–1.2)
PROTHROMBIN TIME: 20.9 SEC (ref 9.1–12)

## 2017-06-05 ENCOUNTER — HOSPITAL ENCOUNTER (OUTPATIENT)
Dept: CARDIAC REHAB | Age: 62
Discharge: HOME | End: 2017-06-05
Payer: COMMERCIAL

## 2017-06-05 VITALS — WEIGHT: 164 LBS | BODY MASS INDEX: 24.94 KG/M2

## 2017-06-05 PROCEDURE — 93798 PHYS/QHP OP CAR RHAB W/ECG: CPT

## 2017-06-06 ENCOUNTER — HOSPITAL ENCOUNTER (OUTPATIENT)
Dept: CARDIAC REHAB | Age: 62
Discharge: HOME | End: 2017-06-06
Payer: COMMERCIAL

## 2017-06-06 VITALS — BODY MASS INDEX: 24.94 KG/M2 | WEIGHT: 164 LBS

## 2017-06-08 ENCOUNTER — TELEPHONE (OUTPATIENT)
Dept: CARDIOLOGY CLINIC | Age: 62
End: 2017-06-08

## 2017-06-08 ENCOUNTER — HOSPITAL ENCOUNTER (OUTPATIENT)
Dept: CARDIAC REHAB | Age: 62
Discharge: HOME | End: 2017-06-08
Payer: COMMERCIAL

## 2017-06-08 VITALS — BODY MASS INDEX: 24.94 KG/M2 | WEIGHT: 164 LBS

## 2017-06-08 DIAGNOSIS — Z95.811 LEFT VENTRICULAR ASSIST DEVICE PRESENT (HCC): ICD-10-CM

## 2017-06-08 DIAGNOSIS — I50.22 CHRONIC SYSTOLIC HEART FAILURE (HCC): Primary | ICD-10-CM

## 2017-06-08 DIAGNOSIS — R06.02 SHORTNESS OF BREATH: ICD-10-CM

## 2017-06-08 NOTE — TELEPHONE ENCOUNTER
Telephone Call RE:  Lab Reminder      Outcome:     [x] Patient verbalizes understanding    [] Unable to reach   [] Left message              []       Armand Patterson

## 2017-06-09 ENCOUNTER — TELEPHONE ANTICOAG (OUTPATIENT)
Dept: CARDIOLOGY CLINIC | Age: 62
End: 2017-06-09

## 2017-06-09 ENCOUNTER — DOCUMENTATION ONLY (OUTPATIENT)
Dept: CARDIOLOGY CLINIC | Age: 62
End: 2017-06-09

## 2017-06-09 NOTE — PROGRESS NOTES
INR 1.3. Please see tracker for details. He is to begin enoxaparin 80 mg SQ q12h until INR > 2.0. Repeat labs Monday.

## 2017-06-11 DIAGNOSIS — Z79.01 CHRONIC ANTICOAGULATION: Primary | ICD-10-CM

## 2017-06-11 RX ORDER — ENOXAPARIN SODIUM 100 MG/ML
80 INJECTION SUBCUTANEOUS EVERY 12 HOURS
Qty: 8 SYRINGE | Refills: 3 | Status: SHIPPED | OUTPATIENT
Start: 2017-06-11 | End: 2017-06-28 | Stop reason: SDUPTHER

## 2017-06-11 RX ORDER — WARFARIN 2.5 MG/1
5 TABLET ORAL DAILY
Qty: 180 TAB | Refills: 3 | Status: SHIPPED | OUTPATIENT
Start: 2017-06-11 | End: 2017-08-01 | Stop reason: SDUPTHER

## 2017-06-12 ENCOUNTER — TELEPHONE ANTICOAG (OUTPATIENT)
Dept: CARDIOLOGY CLINIC | Age: 62
End: 2017-06-12

## 2017-06-12 DIAGNOSIS — Z79.01 CHRONIC ANTICOAGULATION: Primary | ICD-10-CM

## 2017-06-12 LAB
ALBUMIN SERPL-MCNC: 4.1 G/DL (ref 3.6–4.8)
ALBUMIN/GLOB SERPL: 1.5 {RATIO} (ref 1.2–2.2)
ALP SERPL-CCNC: 83 IU/L (ref 39–117)
ALT SERPL-CCNC: 24 IU/L (ref 0–44)
AST SERPL-CCNC: 22 IU/L (ref 0–40)
BILIRUB SERPL-MCNC: 0.3 MG/DL (ref 0–1.2)
BUN SERPL-MCNC: 17 MG/DL (ref 8–27)
BUN/CREAT SERPL: 14 (ref 10–24)
CALCIUM SERPL-MCNC: 9.1 MG/DL (ref 8.6–10.2)
CHLORIDE SERPL-SCNC: 99 MMOL/L (ref 96–106)
CO2 SERPL-SCNC: 22 MMOL/L (ref 18–29)
CREAT SERPL-MCNC: 1.19 MG/DL (ref 0.76–1.27)
ERYTHROCYTE [DISTWIDTH] IN BLOOD BY AUTOMATED COUNT: 18.7 % (ref 12.3–15.4)
GLOBULIN SER CALC-MCNC: 2.7 G/DL (ref 1.5–4.5)
GLUCOSE SERPL-MCNC: 165 MG/DL (ref 65–99)
HCT VFR BLD AUTO: 32.9 % (ref 37.5–51)
HGB BLD-MCNC: 10.9 G/DL (ref 12.6–17.7)
HGB FREE PLAS-MCNC: <0.2 MG/DL (ref 0–4.9)
INR PPP: 1.3 (ref 0.8–1.2)
INR PPP: 2.1 (ref 0.8–1.2)
LDH SERPL-CCNC: 261 IU/L (ref 121–224)
MCH RBC QN AUTO: 26.7 PG (ref 26.6–33)
MCHC RBC AUTO-ENTMCNC: 33.1 G/DL (ref 31.5–35.7)
MCV RBC AUTO: 81 FL (ref 79–97)
NT-PROBNP SERPL-MCNC: 1641 PG/ML (ref 0–210)
PLATELET # BLD AUTO: 377 X10E3/UL (ref 150–379)
POTASSIUM SERPL-SCNC: 5 MMOL/L (ref 3.5–5.2)
PROT SERPL-MCNC: 6.8 G/DL (ref 6–8.5)
PROTHROMBIN TIME: 13.7 SEC (ref 9.1–12)
PROTHROMBIN TIME: 21.7 SEC (ref 9.1–12)
RBC # BLD AUTO: 4.08 X10E6/UL (ref 4.14–5.8)
SODIUM SERPL-SCNC: 138 MMOL/L (ref 134–144)
WBC # BLD AUTO: 8.4 X10E3/UL (ref 3.4–10.8)

## 2017-06-13 ENCOUNTER — HOSPITAL ENCOUNTER (OUTPATIENT)
Dept: CARDIAC REHAB | Age: 62
Discharge: HOME | End: 2017-06-13
Payer: COMMERCIAL

## 2017-06-13 VITALS — BODY MASS INDEX: 24.63 KG/M2 | WEIGHT: 162 LBS

## 2017-06-15 ENCOUNTER — HOSPITAL ENCOUNTER (OUTPATIENT)
Dept: CARDIAC REHAB | Age: 62
Discharge: HOME | End: 2017-06-15
Payer: COMMERCIAL

## 2017-06-15 ENCOUNTER — TELEPHONE (OUTPATIENT)
Dept: CARDIOLOGY CLINIC | Age: 62
End: 2017-06-15

## 2017-06-15 VITALS — BODY MASS INDEX: 24.94 KG/M2 | WEIGHT: 164 LBS

## 2017-06-15 DIAGNOSIS — Z95.811 LEFT VENTRICULAR ASSIST DEVICE PRESENT (HCC): Primary | ICD-10-CM

## 2017-06-15 DIAGNOSIS — Z79.01 CHRONIC ANTICOAGULATION: ICD-10-CM

## 2017-06-16 ENCOUNTER — TELEPHONE ANTICOAG (OUTPATIENT)
Dept: CARDIOLOGY CLINIC | Age: 62
End: 2017-06-16

## 2017-06-16 LAB
INR PPP: 2.9 (ref 0.8–1.2)
PROTHROMBIN TIME: 30.8 SEC (ref 9.1–12)

## 2017-06-19 ENCOUNTER — HOSPITAL ENCOUNTER (OUTPATIENT)
Dept: CARDIAC REHAB | Age: 62
Discharge: HOME | End: 2017-06-19
Payer: COMMERCIAL

## 2017-06-19 VITALS — WEIGHT: 165 LBS | BODY MASS INDEX: 25.09 KG/M2

## 2017-06-21 ENCOUNTER — TELEPHONE (OUTPATIENT)
Dept: CARDIOLOGY CLINIC | Age: 62
End: 2017-06-21

## 2017-06-21 DIAGNOSIS — Z79.01 CHRONIC ANTICOAGULATION: ICD-10-CM

## 2017-06-21 DIAGNOSIS — Z95.811 LEFT VENTRICULAR ASSIST DEVICE PRESENT (HCC): Primary | ICD-10-CM

## 2017-06-21 NOTE — TELEPHONE ENCOUNTER
Telephone Call RE:  Lab Reminder      Outcome:     [x] Patient verbalizes understanding    [] Unable to reach   [] Left message              []       Adam Raphael RN

## 2017-06-22 ENCOUNTER — TELEPHONE ANTICOAG (OUTPATIENT)
Dept: CARDIOLOGY CLINIC | Age: 62
End: 2017-06-22

## 2017-06-22 LAB
INR PPP: 3.3 (ref 0.8–1.2)
PROTHROMBIN TIME: 34.3 SEC (ref 9.1–12)

## 2017-06-23 ENCOUNTER — HOSPITAL ENCOUNTER (OUTPATIENT)
Dept: CARDIAC REHAB | Age: 62
Discharge: HOME | End: 2017-06-23
Payer: COMMERCIAL

## 2017-06-23 VITALS — BODY MASS INDEX: 25.24 KG/M2 | WEIGHT: 166 LBS

## 2017-06-23 PROCEDURE — 93798 PHYS/QHP OP CAR RHAB W/ECG: CPT

## 2017-06-26 LAB — INR, EXTERNAL: 4.6

## 2017-06-27 ENCOUNTER — TELEPHONE (OUTPATIENT)
Dept: CARDIOLOGY CLINIC | Age: 62
End: 2017-06-27

## 2017-06-27 NOTE — TELEPHONE ENCOUNTER
Telephone Call RE:  Appointment reminder     Outcome:     [x] Patient confirmed appointment   [] Patient rescheduled appointment for    [] Unable to reach   [] Left message              [] Other:       Twila Christy

## 2017-06-28 ENCOUNTER — TELEPHONE (OUTPATIENT)
Dept: CARDIOLOGY CLINIC | Age: 62
End: 2017-06-28

## 2017-06-28 ENCOUNTER — OFFICE VISIT (OUTPATIENT)
Dept: CARDIOLOGY CLINIC | Age: 62
End: 2017-06-28

## 2017-06-28 ENCOUNTER — TELEPHONE ANTICOAG (OUTPATIENT)
Dept: CARDIOLOGY CLINIC | Age: 62
End: 2017-06-28

## 2017-06-28 VITALS
BODY MASS INDEX: 25.52 KG/M2 | RESPIRATION RATE: 16 BRPM | OXYGEN SATURATION: 98 % | HEIGHT: 68 IN | TEMPERATURE: 97.6 F | HEART RATE: 79 BPM | SYSTOLIC BLOOD PRESSURE: 78 MMHG | WEIGHT: 168.4 LBS

## 2017-06-28 DIAGNOSIS — Z79.01 CHRONIC ANTICOAGULATION: ICD-10-CM

## 2017-06-28 DIAGNOSIS — R06.02 SHORTNESS OF BREATH: ICD-10-CM

## 2017-06-28 DIAGNOSIS — Z95.811 LEFT VENTRICULAR ASSIST DEVICE PRESENT (HCC): Primary | ICD-10-CM

## 2017-06-28 DIAGNOSIS — I50.22 CHRONIC SYSTOLIC HEART FAILURE (HCC): ICD-10-CM

## 2017-06-28 DIAGNOSIS — D64.9 CHRONIC ANEMIA: ICD-10-CM

## 2017-06-28 DIAGNOSIS — I50.22 CHRONIC SYSTOLIC HEART FAILURE (HCC): Primary | ICD-10-CM

## 2017-06-28 DIAGNOSIS — Z95.811 LEFT VENTRICULAR ASSIST DEVICE PRESENT (HCC): ICD-10-CM

## 2017-06-28 DIAGNOSIS — I25.5 ISCHEMIC CARDIOMYOPATHY: ICD-10-CM

## 2017-06-28 RX ORDER — POTASSIUM CHLORIDE 20 MEQ/1
10 TABLET, EXTENDED RELEASE ORAL AS NEEDED
Qty: 30 TAB | Refills: 5 | Status: ON HOLD | OUTPATIENT
Start: 2017-06-28 | End: 2017-08-30

## 2017-06-28 NOTE — PROGRESS NOTES
Norman Sims [facility]  LVAD Office Visit    Date of VAD implant: 12/1/2016  Cardiologist: GRAHAM  PCP: Vin Mcmahon MD    HPI: Dolores Ortega is a 62 y.o. male w/ PMH s/p HM II LVAD for DT, h/o torasades/SVT, HTN, lumbar stenosis s/p lumbar laminectomy, CAD (s/p CABG/sp BMSx2), gout, h/o strobic seizures, ETOH abuse and remote tobacco abuse who is seen today for routine monthly LVAD follow up. He reports overall he is doing well. He is not taking his Bumex and is still taking the potassium every day. He is also taking the iron. He denies any VAD alarms. He is doing his driveline dressing every other day. He denies any erythema, drainage or issues with the site. He is using his anchor 100%. He admits to one nose bleed during the night that lasted a few hours but it resolved without issues. 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 He joined [employer]. He finished cardiac rehab last week. He denies any alcohol use. He denies any tobacco use. He admits to rare use of the Norco for back pain and states this will be once a week every other week. This is managed by orthopedic surgeon. ROS  Pt c/o nausea, diarrhea, slight decreased in appetite, early satiety, epistaxis x1    Pt denies fatigue, fevers, chills, diaphoresis, headaches, lightheadedness, dizziness, syncope, visual changes, vomiting, chest pain, palpitations, SOB, cough, constipation, edema, depression, anxiety, BRBPR, melena. Subjective:  Chief Complaint:  Chief Complaint   Patient presents with    Follow Up Chronic Condition     no complaints    CHF      Assessment / Plan:    Heart Failure Status: NYHA Class II    Ischemic cardiomyopathy, s/p HM II LVAD implant as DT 12/1/16 -  Alarm history reviewed. VAD interrogation: Please see VAD flow sheet for readings. Adequate clinical perfusion and function of his LVAD. No alarms or adverse events - rare PI events noted. Continue Coreg at current dose.   Pt undergoing transplant evaluation at [facility]. Will return to [facility] in 4 weeks. Chronic anticoagulation for LVAD, INR goal 2.0-3.0 - Last INR supra-therapeutic. Pt held Coumadin last night, instructed to take half of a 2.5 mg tablet tonight and have PT/INR drawn tomorrow. Please see anticoagulation tracker for details. Epistaxis x 1 - resolved, will check CBC tomorrow. Pt instructed to try Afrin in the future.     Post-operative RV insufficiency - Resolved, trend T. bili    KLEVER - resolved, trend labs.     Multivessel CAD/S/p CABG x 2 (SVG to RCA, LIMA to LAD) - s/p- RCA BMS x 2. On Plavix and Coreg. No statin d/t history of elevated LFTs. No ASA  - pt in Plavix and Coumadin.     Lumbar stenosis, s/p decompressive laminectomy and instrumented fusion - managed by orthopedic surgeon. Pt w/ occ. Back pain requiring prn Norco.     Post operative anemia d/t acute blood loss - Hg stable - will stop iron. Check iron studies tomorrow with lab draw. H/o ETOH abuse - Pt denies any alcohol use.     SVT/A-fib/Torsades post op - S/p AICD 12/16. On Amiodarone per Dr. Raudel Griffith.      HTN, MAP goal 70-90 - MAP 78 today, continue Coreg at current dose. Consider adding ACEi in future if MAP will tolerate     Gout Pain - no recent flares, pt to continue Allopurinol     Depression/Anxiety - resolved, pt not taking Celexa    Deconditioning - Pt completed Cardiac Rehab and is continuing exercise at [facility]    Carotid stenosis - plan for CTA by [facility] in future for further work up of vascular disease for transplant eval.     Cool extremities, possible Raynaud's - Denies issues at this time. VAD specific education - reinforced LVAD management, plan of care and medication management.       Problem List:  Patient Active Problem List   Diagnosis Code    S/P laminectomy Z98.890    Sinus tachycardia R00.0    Acute respiratory failure with hypoxia (HCC) J96.01    Essential hypertension I10    Alcohol abuse F10.10    Chronic systolic heart failure ([facility]) I50.22    CAD, multiple vessel I25.10    Ischemic cardiomyopathy I25.5    MI (myocardial infarction) ([facility]) I21.3    Sustained VT (ventricular tachycardia) ([facility]) I47.2    S/P ICD (internal cardiac defibrillator) procedure Z95.810    Chronic anticoagulation Z79.01    Left ventricular assist device present ([facility] Z95.811    Gout M10.9    ICD (implantable cardioverter-defibrillator) infection ([facility] T82. 7XXA        Past Medical History:   Diagnosis Date    Arrhythmia     SVT    CAD (coronary artery disease)     Chronic pain     back    Gout     Heart failure ([facility])     Hypertension     LVAD (left ventricular assist device) present ([facility] 12/01/2016    Raynaud disease     Seizures ([facility]     strobic seizures early 19's       Family History   Problem Relation Age of Onset    Diabetes Mother     Dementia Mother     Other Mother      carotid artery blockage    Heart Disease Father     Stroke Father     Heart Attack Father      several    Hypertension Father     Elevated Lipids Father     No Known Problems Sister     Cancer Brother      brain    Lung Disease Sister     COPD Sister     Cancer Sister      lung       Social History     Social History    Marital status:      Spouse name: N/A    Number of children: N/A    Years of education: N/A     Occupational History    Not on file. Social History Main Topics    Smoking status: Former Smoker     Packs/day: 1.50     Years: 30.00     Quit date: 2008    Smokeless tobacco: Never Used    Alcohol use No    Drug use: No    Sexual activity: Not on file     Other Topics Concern    Not on file     Social History Narrative     Medications:  No Known Allergies     Current Outpatient Prescriptions on File Prior to Visit   Medication Sig    warfarin (COUMADIN) 2.5 mg tablet Take 2 Tabs by mouth daily.     ALPRAZolam (XANAX) 0.25 mg tablet take 1 tablet by mouth once daily if needed    carvedilol (COREG) 25 mg tablet Take 1 Tab by mouth two (2) times daily (with meals).  triamcinolone acetonide (KENALOG) 0.025 % topical cream Apply  to affected area two (2) times a day. use thin layer to affected area    allopurinol (ZYLOPRIM) 100 mg tablet Take 1 Tab by mouth two (2) times a day.  amiodarone (CORDARONE) 200 mg tablet Take 1 Tab by mouth daily.  clopidogrel (PLAVIX) 75 mg tab Take 1 Tab by mouth daily.  pantoprazole (PROTONIX) 40 mg tablet Take 1 Tab by mouth Daily (before breakfast).  ascorbic acid, vitamin C, (VITAMIN C) 500 mg tablet Take 1 Tab by mouth two (2) times a day.  cyclobenzaprine (FLEXERIL) 5 mg tablet Take 5 mg by mouth as needed.  folic acid (FOLVITE) 1 mg tablet Take 1 Tab by mouth daily.  HYDROcodone-acetaminophen (NORCO) 5-325 mg per tablet Take 1 Tab by mouth every eight (8) hours as needed.  colchicine 0.6 mg tablet Take 1 Tab by mouth two (2) times a day. Indications: GOUT (Patient taking differently: Take 0.6 mg by mouth as needed. Indications: GOUT)    enoxaparin (LOVENOX) 80 mg/0.8 mL injection 80 mg by SubCUTAneous route every twelve (12) hours. No current facility-administered medications on file prior to visit.          Results for orders placed or performed in visit on 06/21/17   PROTHROMBIN TIME + INR   Result Value Ref Range    INR 3.3 (H) 0.8 - 1.2    Prothrombin time 34.3 (H) 9.1 - 12.0 sec     Recent tests or procedures:   S/p Laminectomy 11/22/16  S/p LVAD implant 12/1/16  S/p AICD implant 12/16/16  S/p Ramp test 12/16/16     Objective:  Physical Exam:  Visit Vitals    BP (!) 78/0 (BP 1 Location: Left arm, BP Patient Position: Sitting)    Pulse 79    Temp 97.6 °F (36.4 °C)    Resp 16    Ht 5' 8\" (1.727 m)    Wt 168 lb 6.4 oz (76.4 kg)    SpO2 98%    BMI 25.61 kg/m2      MAP: 78    VAD data:  LVAD (Heartmate)  Pump Speed (RPM): 8800  Pump Flow (LPM): 4.1  PI (Pulsitility Index): 7.2  Power: 4.7    Exam:  Gen:  WA, WN, NAD   CVS:  +LVAD hum, S1/S2  Pul:  CTA b/l (-) r,r,w  Abd:  +BS, soft, NT,ND  Ext: trace b/l LE edema  Neuro:  No obvious deficits     Drive Line Exam:  Stabilization device intact: yes  Line inspected for damage: yes  Appearance: no edema, erythema, drainage   Sterile dressing changed per policy: no  Frequency of dressing change at home: every other day  Frequency of use of stabilization device: 100%    Follow-up Disposition:  Return in about 4 weeks (around 7/26/2017). Pt seen under the direct supervision of Dr. Godfrey Carmona. Thank you for letting us see him with you.      Matt Espinal PA-C  VAD Coordinator  [facility] -[address], [address]  Office: [phone]  [facility] VAD Pager: [phone]

## 2017-06-28 NOTE — LETTER
6/28/2017 12:00 PM 
 
Patient:  Bonnie Hicks YOB: 1955 Date of Visit: 6/28/2017 Dear Scharlene Libman, MD 
[address] [address] VIA Facsimile: [phone] Virginia Jacobs MD 
[place] [address] [address] [facility] 7 [address] VIA In Basket Jess Atkins MD 
[address] [address] VIA In Basket 
 : Thank you for referring Mr. Araceli Hampton to me for evaluation/treatment. Below are the relevant portions of my assessment and plan of care. [facility] [address] LVAD Office Visit Date of VAD implant: 12/1/2016 Cardiologist: GRAHAM 
PCP: Sandhya Del Cid MD 
 
HPI: Bonnie Hicks is a 62 y.o. male w/ PMH s/p HM II LVAD for DT, h/o torasades/SVT, HTN, lumbar stenosis s/p lumbar laminectomy, CAD (s/p CABG/sp BMSx2), gout, h/o strobic seizures, ETOH abuse and remote tobacco abuse who is seen today for routine monthly LVAD follow up. He reports overall he is doing well. He is not taking his Bumex and is still taking the potassium every day. He is also taking the iron. He denies any VAD alarms. He is doing his driveline dressing every other day. He denies any erythema, drainage or issues with the site. He is using his anchor 100%. He admits to one nose bleed during the night that lasted a few hours but it resolved without issues. 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 He joined [employer]. He finished cardiac rehab last week. He denies any alcohol use. He denies any tobacco use. He admits to rare use of the Norco for back pain and states this will be once a week every other week. This is managed by orthopedic surgeon. ROS Pt c/o nausea, diarrhea, slight decreased in appetite, early satiety, epistaxis x1 Pt denies fatigue, fevers, chills, diaphoresis, headaches, lightheadedness, dizziness, syncope, visual changes, vomiting, chest pain, palpitations, SOB, cough, constipation, edema, depression, anxiety, BRBPR, melena. Subjective: Chief Complaint: 
Chief Complaint Patient presents with  Follow Up Chronic Condition  
  no complaints  CHF Assessment / Plan: 
 
Heart Failure Status: NYHA Class II Ischemic cardiomyopathy, s/p HM II LVAD implant as DT 12/1/16 -  Alarm history reviewed. VAD interrogation: Please see VAD flow sheet for readings. Adequate clinical perfusion and function of his LVAD. No alarms or adverse events. Continue Coreg at current dose. Pt undergoing transplant evaluation at [facility]. Chronic anticoagulation for LVAD, INR goal 2.0-3.0 - Last INR supra-therapeutic. Pt held Coumadin last night, instructed to take half of a 2.5 mg tablet tonight and have PT/INR drawn tomorrow. Please see anticoagulation tracker for details. Epistaxis x 1 - resolved, will check CBC tomorrow. Pt instructed to try Afrin in the future. 
  
Post-operative RV insufficiency - Resolved, trend T. bili KLEVER - resolved, trend labs. 
  
Multivessel CAD/S/p CABG x 2 (SVG to RCA, LIMA to LAD) - s/p- RCA BMS x 2. On Plavix and Coreg. No statin d/t history of elevated LFTs. No ASA  - pt in Plavix and Coumadin. 
  
Lumbar stenosis, s/p decompressive laminectomy and instrumented fusion - managed by orthopedic surgeon. Pt w/ occ. Back pain requiring prn Norco. 
  
Post operative anemia d/t acute blood loss - Hg stable - will stop iron. Check iron studies tomorrow with lab draw. H/o ETOH abuse - Pt denies any alcohol use. 
  
SVT/A-fib/Torsades post op - S/p AICD 12/16. On Amiodarone per Dr. Karen Crowley.  
  
HTN, MAP goal 70-90 - MAP 78 today, continue Coreg at current dose. Consider adding ACEi in future if MAP will tolerate Gout Pain - no recent flares, pt to continue Allopurinol 
  
Depression/Anxiety - resolved, pt not taking Celexa Deconditioning - Pt completed Cardiac Rehab and is continuing exercise at [facility] Carotid stenosis - plan for CTA by [facility] in future for further work up of vascular disease for transplant eval.  
 
Cool extremities, possible Raynaud's - Denies issues at this time. VAD specific education - reinforced LVAD management, plan of care and medication management. Problem List: 
Patient Active Problem List  
Diagnosis Code  S/P laminectomy Z98.890  
 Sinus tachycardia R00.0  Acute respiratory failure with hypoxia ([facility]) J96.01  
 Essential hypertension I10  
 Alcohol abuse F10.10  Chronic systolic heart failure ([facility]) I50.22  
 CAD, multiple vessel I25.10  
 Ischemic cardiomyopathy I25.5  MI (myocardial infarction) ([address] I21.3  Sustained VT (ventricular tachycardia) ([facility]) I47.2  S/P ICD (internal cardiac defibrillator) procedure Z95.810  Chronic anticoagulation Z79.01  Left ventricular assist device present ([address] Z95.811  Gout M10.9  ICD (implantable cardioverter-defibrillator) infection ([address] T82. 7XXA Past Medical History:  
Diagnosis Date  Arrhythmia SVT  CAD (coronary artery disease)  Chronic pain   
 back  Gout  Heart failure ([address]  Hypertension  LVAD (left ventricular assist device) present ([address] 12/01/2016  Raynaud disease  Seizures ([address] 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 Family History Problem Relation Age of Onset  Diabetes Mother  Dementia Mother  Other Mother   
  carotid artery blockage  Heart Disease Father  Stroke Father  Heart Attack Father   
  several  
 Hypertension Father  Elevated Lipids Father  No Known Problems Sister  Cancer Brother   
  brain  Lung Disease Sister  COPD Sister  Cancer Sister   
  lung Social History Social History  Marital status:  Spouse name: N/A  
 Number of children: N/A  
 Years of education: N/A Occupational History  Not on file. Social History Main Topics  Smoking status: Former Smoker Packs/day: 1.50 Years: 30.00 Quit date: 2008  Smokeless tobacco: Never Used  Alcohol use No  
 Drug use: No  
 Sexual activity: Not on file Other Topics Concern  Not on file Social History Narrative Medications: 
No Known Allergies Current Outpatient Prescriptions on File Prior to Visit Medication Sig  warfarin (COUMADIN) 2.5 mg tablet Take 2 Tabs by mouth daily.  ALPRAZolam (XANAX) 0.25 mg tablet take 1 tablet by mouth once daily if needed  carvedilol (COREG) 25 mg tablet Take 1 Tab by mouth two (2) times daily (with meals).  triamcinolone acetonide (KENALOG) 0.025 % topical cream Apply  to affected area two (2) times a day. use thin layer to affected area  allopurinol (ZYLOPRIM) 100 mg tablet Take 1 Tab by mouth two (2) times a day.  amiodarone (CORDARONE) 200 mg tablet Take 1 Tab by mouth daily.  clopidogrel (PLAVIX) 75 mg tab Take 1 Tab by mouth daily.  pantoprazole (PROTONIX) 40 mg tablet Take 1 Tab by mouth Daily (before breakfast).  ascorbic acid, vitamin C, (VITAMIN C) 500 mg tablet Take 1 Tab by mouth two (2) times a day.  cyclobenzaprine (FLEXERIL) 5 mg tablet Take 5 mg by mouth as needed.  folic acid (FOLVITE) 1 mg tablet Take 1 Tab by mouth daily.  HYDROcodone-acetaminophen (NORCO) 5-325 mg per tablet Take 1 Tab by mouth every eight (8) hours as needed.  colchicine 0.6 mg tablet Take 1 Tab by mouth two (2) times a day. Indications: GOUT (Patient taking differently: Take 0.6 mg by mouth as needed. Indications: GOUT)  enoxaparin (LOVENOX) 80 mg/0.8 mL injection 80 mg by SubCUTAneous route every twelve (12) hours. No current facility-administered medications on file prior to visit. Results for orders placed or performed in visit on 06/21/17 PROTHROMBIN TIME + INR Result Value Ref Range INR 3.3 (H) 0.8 - 1.2 Prothrombin time 34.3 (H) 9.1 - 12.0 sec Recent tests or procedures: S/p Laminectomy 11/22/16 S/p LVAD implant 12/1/16 S/p AICD implant 12/16/16 S/p Ramp test 12/16/16 Objective: 
Physical Exam: 
Visit Vitals  BP (!) 78/0 (BP 1 Location: Left arm, BP Patient Position: Sitting)  Pulse 79  Temp 97.6 °F (36.4 °C)  Resp 16  
 Ht 5' 8\" (1.727 m)  Wt 168 lb 6.4 oz (76.4 kg)  SpO2 98%  BMI 25.61 kg/m2 MAP: 78 VAD data: LVAD (Heartmate) Pump Speed (RPM): 8800 Pump Flow (LPM): 4.1 PI (Pulsitility Index): 7.2 Power: 4.7 Exam: 
Gen:  WA, WN, NAD  
CVS:  +LVAD hum, S1/S2 Pul:  CTA b/l (-) r,r,w 
Abd:  +BS, soft, NT,ND Ext: trace b/l LE edema Neuro:  No obvious deficits Drive Line Exam: 
Stabilization device intact: yes Line inspected for damage: yes Appearance: no edema, erythema, drainage Sterile dressing changed per policy: no 
Frequency of dressing change at home: every other day Frequency of use of stabilization device: 100% Follow-up Disposition: 
Return in about 4 weeks (around 7/26/2017). Pt seen under the direct supervision of Dr. García Costello. Thank you for letting us see him with you. Kelsy Lindquist PA-C 
VAD Coordinator [address] [place] [address] Office: [phone] 
[facility] VAD Pager: [phone] If you have questions, please do not hesitate to call me. I look forward to following Mr. Mann along with you. Sincerely, Kemal Del Rio MD

## 2017-06-28 NOTE — PATIENT INSTRUCTIONS
1.  Stop potassium. Only take potassium if we instruct you to take the Bumex    2. Stop iron. 3.  Will check labs tomorrow     4. Continue Coreg. 5.  Daily exercise add strength training. 6.  Return to [facility] 1721 in 4 weeks. 7.  Take half of a Coumadin tablet today - will check PT/INR tomorrow.

## 2017-06-28 NOTE — COMMUNICATION BODY
[facility] [address]  LVAD Office Visit    Date of VAD implant: 12/1/2016  Cardiologist: GRAHAM  PCP: Adrián Bhat MD    HPI: Percy Esquivel is a 62 y.o. male w/ PMH s/p HM II LVAD for DT, h/o torasades/SVT, HTN, lumbar stenosis s/p lumbar laminectomy, CAD (s/p CABG/sp BMSx2), gout, h/o strobic seizures, ETOH abuse and remote tobacco abuse who is seen today for routine monthly LVAD follow up. He reports overall he is doing well. He is not taking his Bumex and is still taking the potassium every day. He is also taking the iron. He denies any VAD alarms. He is doing his driveline dressing every other day. He denies any erythema, drainage or issues with the site. He is using his anchor 100%. He admits to one nose bleed during the night that lasted a few hours but it resolved without issues. 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 He joined [employer]. He finished cardiac rehab last week. He denies any alcohol use. He denies any tobacco use. He admits to rare use of the Norco for back pain and states this will be once a week every other week. This is managed by orthopedic surgeon. ROS  Pt c/o nausea, diarrhea, slight decreased in appetite, early satiety, epistaxis x1    Pt denies fatigue, fevers, chills, diaphoresis, headaches, lightheadedness, dizziness, syncope, visual changes, vomiting, chest pain, palpitations, SOB, cough, constipation, edema, depression, anxiety, BRBPR, melena. Subjective:  Chief Complaint:  Chief Complaint   Patient presents with    Follow Up Chronic Condition     no complaints    CHF      Assessment / Plan:    Heart Failure Status: NYHA Class II    Ischemic cardiomyopathy, s/p HM II LVAD implant as DT 12/1/16 -  Alarm history reviewed. VAD interrogation: Please see VAD flow sheet for readings. Adequate clinical perfusion and function of his LVAD. No alarms or adverse events. Continue Coreg at current dose.   Pt undergoing transplant evaluation at [facility]. Chronic anticoagulation for LVAD, INR goal 2.0-3.0 - Last INR supra-therapeutic. Pt held Coumadin last night, instructed to take half of a 2.5 mg tablet tonight and have PT/INR drawn tomorrow. Please see anticoagulation tracker for details. Epistaxis x 1 - resolved, will check CBC tomorrow. Pt instructed to try Afrin in the future.     Post-operative RV insufficiency - Resolved, trend T. bili    KLEVER - resolved, trend labs.     Multivessel CAD/S/p CABG x 2 (SVG to RCA, LIMA to LAD) - s/p- RCA BMS x 2. On Plavix and Coreg. No statin d/t history of elevated LFTs. No ASA  - pt in Plavix and Coumadin.     Lumbar stenosis, s/p decompressive laminectomy and instrumented fusion - managed by orthopedic surgeon. Pt w/ occ. Back pain requiring prn Norco.     Post operative anemia d/t acute blood loss - Hg stable - will stop iron. Check iron studies tomorrow with lab draw. H/o ETOH abuse - Pt denies any alcohol use.     SVT/A-fib/Torsades post op - S/p AICD 12/16. On Amiodarone per Dr. Stefan Glass.      HTN, MAP goal 70-90 - MAP 78 today, continue Coreg at current dose. Consider adding ACEi in future if MAP will tolerate     Gout Pain - no recent flares, pt to continue Allopurinol     Depression/Anxiety - resolved, pt not taking Celexa    Deconditioning - Pt completed Cardiac Rehab and is continuing exercise at [facility]    Carotid stenosis - plan for CTA by [facility] in future for further work up of vascular disease for transplant eval.     Cool extremities, possible Raynaud's - Denies issues at this time. VAD specific education - reinforced LVAD management, plan of care and medication management.       Problem List:  Patient Active Problem List   Diagnosis Code    S/P laminectomy Z98.890    Sinus tachycardia R00.0    Acute respiratory failure with hypoxia ([facility]) J96.01    Essential hypertension I10    Alcohol abuse F10.10    Chronic systolic heart failure ([facility]) I50.22    CAD, multiple vessel I25.10    Ischemic cardiomyopathy I25.5    MI (myocardial infarction) ([facility]) I21.3    Sustained VT (ventricular tachycardia) ([facility]) I47.2    S/P ICD (internal cardiac defibrillator) procedure Z95.810    Chronic anticoagulation Z79.01    Left ventricular assist device present ([facility] Z95.811    Gout M10.9    ICD (implantable cardioverter-defibrillator) infection ([facility] T82. 7XXA        Past Medical History:   Diagnosis Date    Arrhythmia     SVT    CAD (coronary artery disease)     Chronic pain     back    Gout     Heart failure ([facility])     Hypertension     LVAD (left ventricular assist device) present ([facility] 12/01/2016    Raynaud disease     Seizures ([facility]     strobic seizures early 19's       Family History   Problem Relation Age of Onset    Diabetes Mother     Dementia Mother     Other Mother      carotid artery blockage    Heart Disease Father     Stroke Father     Heart Attack Father      several    Hypertension Father     Elevated Lipids Father     No Known Problems Sister     Cancer Brother      brain    Lung Disease Sister     COPD Sister     Cancer Sister      lung       Social History     Social History    Marital status:      Spouse name: N/A    Number of children: N/A    Years of education: N/A     Occupational History    Not on file. Social History Main Topics    Smoking status: Former Smoker     Packs/day: 1.50     Years: 30.00     Quit date: 2008    Smokeless tobacco: Never Used    Alcohol use No    Drug use: No    Sexual activity: Not on file     Other Topics Concern    Not on file     Social History Narrative     Medications:  No Known Allergies     Current Outpatient Prescriptions on File Prior to Visit   Medication Sig    warfarin (COUMADIN) 2.5 mg tablet Take 2 Tabs by mouth daily.     ALPRAZolam (XANAX) 0.25 mg tablet take 1 tablet by mouth once daily if needed    carvedilol (COREG) 25 mg tablet Take 1 Tab by mouth two (2) times daily (with meals).  triamcinolone acetonide (KENALOG) 0.025 % topical cream Apply  to affected area two (2) times a day. use thin layer to affected area    allopurinol (ZYLOPRIM) 100 mg tablet Take 1 Tab by mouth two (2) times a day.  amiodarone (CORDARONE) 200 mg tablet Take 1 Tab by mouth daily.  clopidogrel (PLAVIX) 75 mg tab Take 1 Tab by mouth daily.  pantoprazole (PROTONIX) 40 mg tablet Take 1 Tab by mouth Daily (before breakfast).  ascorbic acid, vitamin C, (VITAMIN C) 500 mg tablet Take 1 Tab by mouth two (2) times a day.  cyclobenzaprine (FLEXERIL) 5 mg tablet Take 5 mg by mouth as needed.  folic acid (FOLVITE) 1 mg tablet Take 1 Tab by mouth daily.  HYDROcodone-acetaminophen (NORCO) 5-325 mg per tablet Take 1 Tab by mouth every eight (8) hours as needed.  colchicine 0.6 mg tablet Take 1 Tab by mouth two (2) times a day. Indications: GOUT (Patient taking differently: Take 0.6 mg by mouth as needed. Indications: GOUT)    enoxaparin (LOVENOX) 80 mg/0.8 mL injection 80 mg by SubCUTAneous route every twelve (12) hours. No current facility-administered medications on file prior to visit.          Results for orders placed or performed in visit on 06/21/17   PROTHROMBIN TIME + INR   Result Value Ref Range    INR 3.3 (H) 0.8 - 1.2    Prothrombin time 34.3 (H) 9.1 - 12.0 sec     Recent tests or procedures:   S/p Laminectomy 11/22/16  S/p LVAD implant 12/1/16  S/p AICD implant 12/16/16  S/p Ramp test 12/16/16     Objective:  Physical Exam:  Visit Vitals    BP (!) 78/0 (BP 1 Location: Left arm, BP Patient Position: Sitting)    Pulse 79    Temp 97.6 °F (36.4 °C)    Resp 16    Ht 5' 8\" (1.727 m)    Wt 168 lb 6.4 oz (76.4 kg)    SpO2 98%    BMI 25.61 kg/m2      MAP: 78    VAD data:  LVAD (Heartmate)  Pump Speed (RPM): 8800  Pump Flow (LPM): 4.1  PI (Pulsitility Index): 7.2  Power: 4.7    Exam:  Gen:  WA, WN, NAD   CVS:  +LVAD hum, S1/S2  Pul:  CTA b/l (-) r,r,w  Abd:  +BS, soft, NT,ND  Ext: trace b/l LE edema  Neuro:  No obvious deficits     Drive Line Exam:  Stabilization device intact: yes  Line inspected for damage: yes  Appearance: no edema, erythema, drainage   Sterile dressing changed per policy: no  Frequency of dressing change at home: every other day  Frequency of use of stabilization device: 100%    Follow-up Disposition:  Return in about 4 weeks (around 7/26/2017). Pt seen under the direct supervision of Dr. Brooks Gottlieb. Thank you for letting us see him with you.      Crescencio Baeza PA-C  VAD Coordinator  [address] [place] [address]  Office: [phone]  [facility] VAD Pager: [phone]

## 2017-06-28 NOTE — MR AVS SNAPSHOT
Visit Information Date & Time Provider Department Dept. Phone Encounter #  
 6/28/2017  9:30 AM Keo Mcallister MD [address] [id number] Your Appointments 7/26/2017 10:00 AM  
Follow Up with Keo Mcallister MD  
[address] [facility] [address]  
[address] [place] [address] [address] Upcoming Health Maintenance Date Due Pneumococcal 19-64 Medium Risk (1 of 1 - PPSV23) 10/11/1975 DTaP/Tdap/Td series (1 - Tdap) 10/11/1977 ZOSTER VACCINE AGE 60> 10/11/2016 INFLUENZA AGE 9 TO ADULT 8/1/2017 FOBT Q 1 YEAR AGE 50-75 12/20/2017 Allergies as of 6/28/2017  Review Complete On: 6/28/2017 By: Sonya Boxer, RN No Known Allergies Current Immunizations  Reviewed on 1/20/2017 Name Date Influenza Vaccine (Quad) PF 12/21/2016 12:00 PM  
  
 Not reviewed this visit Vitals BP Pulse Temp Resp Height(growth percentile) Weight(growth percentile) (!) 78/0 (BP 1 Location: Left arm, BP Patient Position: Sitting) 79 97.6 °F (36.4 °C) 16 5' 8\" (1.727 m) 168 lb 6.4 oz (76.4 kg) SpO2 BMI Smoking Status 98% 25.61 kg/m2 Former Smoker Vitals History BMI and BSA Data Body Mass Index Body Surface Area  
 25.61 kg/m 2 1.91 m 2 Preferred Pharmacy Pharmacy Name Phone [place], [address] [place], [place] [address]ale [facility] [phone] Your Updated Medication List  
  
   
This list is accurate as of: 6/28/17 10:03 AM.  Always use your most recent med list.  
  
  
  
  
 allopurinol 100 mg tablet Commonly known as:  Fernandez Chimera Take 1 Tab by mouth two (2) times a day. ALPRAZolam 0.25 mg tablet Commonly known as:  XANAX  
take 1 tablet by mouth once daily if needed  
  
 amiodarone 200 mg tablet Commonly known as:  CORDARONE  
 Take 1 Tab by mouth daily. ascorbic acid (vitamin C) 500 mg tablet Commonly known as:  VITAMIN C Take 1 Tab by mouth two (2) times a day. carvedilol 25 mg tablet Commonly known as:  Urban Nicol Take 1 Tab by mouth two (2) times daily (with meals). clopidogrel 75 mg Tab Commonly known as:  PLAVIX Take 1 Tab by mouth daily. colchicine 0.6 mg tablet Take 1 Tab by mouth two (2) times a day. Indications: GOUT  
  
 cyclobenzaprine 5 mg tablet Commonly known as:  FLEXERIL Take 5 mg by mouth as needed. enoxaparin 80 mg/0.8 mL injection Commonly known as:  LOVENOX 80 mg by SubCUTAneous route every twelve (12) hours. folic acid 1 mg tablet Commonly known as:  Google Take 1 Tab by mouth daily. HYDROcodone-acetaminophen 5-325 mg per tablet Commonly known as:  Kriste Kishan Take 1 Tab by mouth every eight (8) hours as needed. pantoprazole 40 mg tablet Commonly known as:  PROTONIX Take 1 Tab by mouth Daily (before breakfast). potassium chloride 20 mEq tablet Commonly known as:  K-DUR, KLOR-CON Take 0.5 Tabs by mouth as needed. triamcinolone acetonide 0.025 % topical cream  
Commonly known as:  KENALOG Apply  to affected area two (2) times a day. use thin layer to affected area  
  
 warfarin 2.5 mg tablet Commonly known as:  COUMADIN Take 2 Tabs by mouth daily. Prescriptions Sent to Pharmacy Refills  
 potassium chloride (K-DUR, KLOR-CON) 20 mEq tablet 5 Sig: Take 0.5 Tabs by mouth as needed. Class: Normal  
 Pharmacy: [address] #: [phone] Route: Oral  
  
Patient Instructions 1. Stop potassium. Only take potassium if we instruct you to take the Bumex 2. Stop iron. 3.  Will check labs tomorrow 4. Continue Coreg. 5.  Daily exercise add strength training. 6.  Return to [facility] 172 in 4 weeks. 7.  Take half of a Coumadin tablet today - will check PT/INR tomorrow. Introducing \[facility]\"" & HEALTH SERVICES! Dear Sabine Mitchell: Thank you for requesting a Rew[employer] account. Our records indicate that you already have an active Rewardpod account. You can access your account anytime at [email or web address] Did you know that you can access your hospital and ER discharge instructions at any time in Rewardpod? You can also review all of your test results from your hospital stay or ER visit. Additional Information If you have questions, please visit the Frequently Asked Questions section of the Rewardpod website at [email or web address] Remember, Rewardpod is NOT to be used for urgent needs. For medical emergencies, dial 911. Now available from your iPhone and Android! Please provide this summary of care documentation to your next provider. Your primary care clinician is listed as Teetee Griffin. If you have any questions after today's visit, please call [phone].

## 2017-06-29 ENCOUNTER — TELEPHONE ANTICOAG (OUTPATIENT)
Dept: CARDIOLOGY CLINIC | Age: 62
End: 2017-06-29

## 2017-06-29 NOTE — PROGRESS NOTES
Reviewed full labs with patient. All acceptable at baseline. Will repeat in 1 month/PRN. Recent Results (from the past 24 hour(s))   IRON PROFILE    Collection Time: 06/29/17 11:12 AM   Result Value Ref Range    TIBC WILL FOLLOW     UIBC WILL FOLLOW     Iron WILL FOLLOW     Iron % saturation WILL FOLLOW    METABOLIC PANEL, COMPREHENSIVE    Collection Time: 06/29/17 11:12 AM   Result Value Ref Range    Glucose 67 65 - 99 mg/dL    BUN 21 8 - 27 mg/dL    Creatinine 1.18 0.76 - 1.27 mg/dL    GFR est non-AA 67 >59 mL/min/1.73    GFR est AA 77 >59 mL/min/1.73    BUN/Creatinine ratio 18 10 - 24    Sodium 139 134 - 144 mmol/L    Potassium 4.8 3.5 - 5.2 mmol/L    Chloride 100 96 - 106 mmol/L    CO2 23 18 - 29 mmol/L    Calcium 9.2 8.6 - 10.2 mg/dL    Protein, total 6.7 6.0 - 8.5 g/dL    Albumin 4.3 3.6 - 4.8 g/dL    GLOBULIN, TOTAL 2.4 1.5 - 4.5 g/dL    A-G Ratio 1.8 1.2 - 2.2    Bilirubin, total 0.4 0.0 - 1.2 mg/dL    Alk.  phosphatase 86 39 - 117 IU/L    AST (SGOT) 21 0 - 40 IU/L    ALT (SGPT) 22 0 - 44 IU/L   CBC W/O DIFF    Collection Time: 06/29/17 11:12 AM   Result Value Ref Range    WBC 7.9 3.4 - 10.8 x10E3/uL    RBC 4.04 (L) 4.14 - 5.80 x10E6/uL    HGB 11.1 (L) 12.6 - 17.7 g/dL    HCT 32.9 (L) 37.5 - 51.0 %    MCV 81 79 - 97 fL    MCH 27.5 26.6 - 33.0 pg    MCHC 33.7 31.5 - 35.7 g/dL    RDW 17.5 (H) 12.3 - 15.4 %    PLATELET 045 108 - 252 x10E3/uL   PROTHROMBIN TIME + INR    Collection Time: 06/29/17 11:12 AM   Result Value Ref Range    INR 1.8 (H) 0.8 - 1.2    Prothrombin time 18.9 (H) 9.1 - 12.0 sec   HEMOGLOBIN, FREE, PLASMA    Collection Time: 06/29/17 11:12 AM   Result Value Ref Range    Hemoglobin, Free, Plasma WILL FOLLOW    PRO-BNP    Collection Time: 06/29/17 11:12 AM   Result Value Ref Range    proBNP WILL FOLLOW    LD    Collection Time: 06/29/17 11:12 AM   Result Value Ref Range     (H) 121 - 224 IU/L

## 2017-07-03 ENCOUNTER — TELEPHONE (OUTPATIENT)
Dept: CARDIOLOGY CLINIC | Age: 62
End: 2017-07-03

## 2017-07-03 DIAGNOSIS — I50.22 CHRONIC SYSTOLIC HEART FAILURE (HCC): ICD-10-CM

## 2017-07-03 DIAGNOSIS — Z95.811 LEFT VENTRICULAR ASSIST DEVICE PRESENT (HCC): Primary | ICD-10-CM

## 2017-07-03 DIAGNOSIS — Z79.01 CHRONIC ANTICOAGULATION: ICD-10-CM

## 2017-07-03 LAB
ALBUMIN SERPL-MCNC: 4.3 G/DL (ref 3.6–4.8)
ALBUMIN/GLOB SERPL: 1.8 {RATIO} (ref 1.2–2.2)
ALP SERPL-CCNC: 86 IU/L (ref 39–117)
ALT SERPL-CCNC: 22 IU/L (ref 0–44)
AST SERPL-CCNC: 21 IU/L (ref 0–40)
BILIRUB SERPL-MCNC: 0.4 MG/DL (ref 0–1.2)
BUN SERPL-MCNC: 21 MG/DL (ref 8–27)
BUN/CREAT SERPL: 18 (ref 10–24)
CALCIUM SERPL-MCNC: 9.2 MG/DL (ref 8.6–10.2)
CHLORIDE SERPL-SCNC: 100 MMOL/L (ref 96–106)
CO2 SERPL-SCNC: 23 MMOL/L (ref 18–29)
CREAT SERPL-MCNC: 1.18 MG/DL (ref 0.76–1.27)
ERYTHROCYTE [DISTWIDTH] IN BLOOD BY AUTOMATED COUNT: 17.5 % (ref 12.3–15.4)
GLOBULIN SER CALC-MCNC: 2.4 G/DL (ref 1.5–4.5)
GLUCOSE SERPL-MCNC: 67 MG/DL (ref 65–99)
HCT VFR BLD AUTO: 32.9 % (ref 37.5–51)
HGB BLD-MCNC: 11.1 G/DL (ref 12.6–17.7)
HGB FREE PLAS-MCNC: <0.2 MG/DL (ref 0–4.9)
INR PPP: 1.8 (ref 0.8–1.2)
IRON SATN MFR SERPL: 27 % (ref 15–55)
IRON SERPL-MCNC: 61 UG/DL (ref 38–169)
LDH SERPL-CCNC: 249 IU/L (ref 121–224)
MCH RBC QN AUTO: 27.5 PG (ref 26.6–33)
MCHC RBC AUTO-ENTMCNC: 33.7 G/DL (ref 31.5–35.7)
MCV RBC AUTO: 81 FL (ref 79–97)
NT-PROBNP SERPL-MCNC: 1817 PG/ML (ref 0–210)
PLATELET # BLD AUTO: 336 X10E3/UL (ref 150–379)
POTASSIUM SERPL-SCNC: 4.8 MMOL/L (ref 3.5–5.2)
PROT SERPL-MCNC: 6.7 G/DL (ref 6–8.5)
PROTHROMBIN TIME: 18.9 SEC (ref 9.1–12)
RBC # BLD AUTO: 4.04 X10E6/UL (ref 4.14–5.8)
SODIUM SERPL-SCNC: 139 MMOL/L (ref 134–144)
TIBC SERPL-MCNC: 225 UG/DL (ref 250–450)
UIBC SERPL-MCNC: 164 UG/DL (ref 111–343)
WBC # BLD AUTO: 7.9 X10E3/UL (ref 3.4–10.8)

## 2017-07-03 NOTE — TELEPHONE ENCOUNTER
Telephone Call RE:  Lab Reminder      Outcome:     [] Patient verbalizes understanding    [] Unable to reach   [x] Left message              []       Herve Cyr RN

## 2017-07-05 ENCOUNTER — TELEPHONE ANTICOAG (OUTPATIENT)
Dept: CARDIOLOGY CLINIC | Age: 62
End: 2017-07-05

## 2017-07-05 LAB
INR PPP: 1.9 (ref 0.8–1.2)
PROTHROMBIN TIME: 19.8 SEC (ref 9.1–12)

## 2017-07-10 ENCOUNTER — TELEPHONE (OUTPATIENT)
Dept: CARDIOLOGY CLINIC | Age: 62
End: 2017-07-10

## 2017-07-10 DIAGNOSIS — Z95.811 LEFT VENTRICULAR ASSIST DEVICE PRESENT (HCC): ICD-10-CM

## 2017-07-10 DIAGNOSIS — I50.22 CHRONIC SYSTOLIC HEART FAILURE (HCC): Primary | ICD-10-CM

## 2017-07-10 DIAGNOSIS — I25.5 ISCHEMIC CARDIOMYOPATHY: ICD-10-CM

## 2017-07-10 DIAGNOSIS — Z79.01 CHRONIC ANTICOAGULATION: ICD-10-CM

## 2017-07-10 NOTE — TELEPHONE ENCOUNTER
Telephone Call RE:  Lab Reminder      Outcome:     [] Patient verbalizes understanding    [] Unable to reach   [x] Left message              []       Yogi Mcpherson RN

## 2017-07-11 ENCOUNTER — TELEPHONE ANTICOAG (OUTPATIENT)
Dept: CARDIOLOGY CLINIC | Age: 62
End: 2017-07-11

## 2017-07-11 LAB
INR PPP: 2.7 (ref 0.8–1.2)
PROTHROMBIN TIME: 28.3 SEC (ref 9.1–12)

## 2017-07-13 DIAGNOSIS — Z95.811 LEFT VENTRICULAR ASSIST DEVICE PRESENT (HCC): ICD-10-CM

## 2017-07-13 DIAGNOSIS — Z79.01 CHRONIC ANTICOAGULATION: Primary | ICD-10-CM

## 2017-07-13 DIAGNOSIS — I50.22 CHRONIC SYSTOLIC HEART FAILURE (HCC): ICD-10-CM

## 2017-07-17 ENCOUNTER — TELEPHONE (OUTPATIENT)
Dept: CARDIOLOGY CLINIC | Age: 62
End: 2017-07-17

## 2017-07-17 NOTE — TELEPHONE ENCOUNTER
Telephone Call RE:  Lab Reminder      Outcome:     [x] Patient verbalizes understanding    [] Unable to reach   [] Left message              []     Spoke with Melanie Valentine

## 2017-07-18 ENCOUNTER — TELEPHONE ANTICOAG (OUTPATIENT)
Dept: CARDIOLOGY CLINIC | Age: 62
End: 2017-07-18

## 2017-07-18 LAB
INR PPP: 3 (ref 0.8–1.2)
PROTHROMBIN TIME: 31.8 SEC (ref 9.1–12)

## 2017-07-19 NOTE — CARDIO/PULMONARY
Dolores Ortega  Completed phase II cardiac rehab and attended 36 sessions from 3-23-17 through 6-23-17. Dolores Ortega is interested in maintaining optimal health and will work with Godfrey Carmona, at the [place] [id number]. Dolores Ortega has a LVAD. His MAP,BP was 78/0, he improved his endurance and stamina through regular exercise, obtained his short term goal in weight. Due to his LVAD, a waist measurement was not obtained. He has also improved his nutrition, [employer] and depression scores and these were reviewed with patient. Dolores Ortega plans to continue exercising at the [facility] 5 times per week for 45 mins.   Chris Varela RN  7/19/2017

## 2017-07-25 ENCOUNTER — TELEPHONE (OUTPATIENT)
Dept: CARDIOLOGY CLINIC | Age: 62
End: 2017-07-25

## 2017-07-25 DIAGNOSIS — Z79.01 CHRONIC ANTICOAGULATION: ICD-10-CM

## 2017-07-25 DIAGNOSIS — R06.02 SHORTNESS OF BREATH: ICD-10-CM

## 2017-07-25 DIAGNOSIS — Z95.811 LEFT VENTRICULAR ASSIST DEVICE PRESENT (HCC): Primary | ICD-10-CM

## 2017-07-25 DIAGNOSIS — I25.5 ISCHEMIC CARDIOMYOPATHY: ICD-10-CM

## 2017-07-25 DIAGNOSIS — I50.22 CHRONIC SYSTOLIC HEART FAILURE (HCC): ICD-10-CM

## 2017-07-25 NOTE — TELEPHONE ENCOUNTER
Telephone Call RE:  Appointment reminder     Outcome:     [] Patient confirmed appointment   [] Patient rescheduled appointment for    [] Unable to reach   [x] Left message              [] Other:     Confirming what day patient is coming in      [facility]

## 2017-07-31 ENCOUNTER — TELEPHONE (OUTPATIENT)
Dept: CARDIOLOGY CLINIC | Age: 62
End: 2017-07-31

## 2017-08-01 ENCOUNTER — TELEPHONE ANTICOAG (OUTPATIENT)
Dept: CARDIOLOGY CLINIC | Age: 62
End: 2017-08-01

## 2017-08-01 ENCOUNTER — OFFICE VISIT (OUTPATIENT)
Dept: CARDIOLOGY CLINIC | Age: 62
End: 2017-08-01

## 2017-08-01 VITALS
WEIGHT: 166.8 LBS | BODY MASS INDEX: 25.28 KG/M2 | TEMPERATURE: 97.8 F | HEART RATE: 62 BPM | OXYGEN SATURATION: 98 % | SYSTOLIC BLOOD PRESSURE: 84 MMHG | HEIGHT: 68 IN | RESPIRATION RATE: 16 BRPM

## 2017-08-01 DIAGNOSIS — I25.5 ISCHEMIC CARDIOMYOPATHY: ICD-10-CM

## 2017-08-01 DIAGNOSIS — I25.10 CAD, MULTIPLE VESSEL: ICD-10-CM

## 2017-08-01 DIAGNOSIS — T82.7XXD ICD (IMPLANTABLE CARDIOVERTER-DEFIBRILLATOR) INFECTION, SUBSEQUENT ENCOUNTER: ICD-10-CM

## 2017-08-01 DIAGNOSIS — Z95.811 LEFT VENTRICULAR ASSIST DEVICE PRESENT (HCC): ICD-10-CM

## 2017-08-01 DIAGNOSIS — Z79.01 CHRONIC ANTICOAGULATION: ICD-10-CM

## 2017-08-01 DIAGNOSIS — I50.22 CHRONIC SYSTOLIC HEART FAILURE (HCC): Primary | ICD-10-CM

## 2017-08-01 RX ORDER — PREGABALIN 25 MG/1
25 CAPSULE ORAL 2 TIMES DAILY
Qty: 60 CAP | Refills: 0 | Status: ON HOLD | OUTPATIENT
Start: 2017-08-01 | End: 2017-08-30

## 2017-08-01 RX ORDER — WARFARIN 2.5 MG/1
5 TABLET ORAL DAILY
Qty: 180 TAB | Refills: 5 | Status: SHIPPED | OUTPATIENT
Start: 2017-08-01 | End: 2017-11-17 | Stop reason: SDUPTHER

## 2017-08-01 RX ORDER — VALSARTAN 40 MG/1
40 TABLET ORAL DAILY
Qty: 30 TAB | Refills: 2 | Status: SHIPPED | OUTPATIENT
Start: 2017-08-01 | End: 2017-10-20 | Stop reason: SDUPTHER

## 2017-08-01 NOTE — MR AVS SNAPSHOT
Visit Information Date & Time Provider Department Dept. Phone Encounter #  
 8/1/2017 10:00 AM Kahlil Sauceda MD [address] [id number] Follow-up Instructions Return in about 1 month (around 9/1/2017). Upcoming Health Maintenance Date Due Pneumococcal 19-64 Medium Risk (1 of 1 - PPSV23) 10/11/1975 DTaP/Tdap/Td series (1 - Tdap) 10/11/1977 ZOSTER VACCINE AGE 60> 8/11/2016 INFLUENZA AGE 9 TO ADULT 8/1/2017 FOBT Q 1 YEAR AGE 50-75 12/20/2017 Allergies as of 8/1/2017  Review Complete On: 8/1/2017 By: Zay Cho NP No Known Allergies Current Immunizations  Reviewed on 1/20/2017 Name Date Influenza Vaccine (Quad) PF 12/21/2016 12:00 PM  
  
 Not reviewed this visit You Were Diagnosed With   
  
 Codes Comments Chronic systolic heart failure (HCC)    -  Primary ICD-10-CM: H08.55 ICD-9-CM: 428.22   
 CAD, multiple vessel     ICD-10-CM: I25.10 ICD-9-CM: 414.00 Ischemic cardiomyopathy     ICD-10-CM: I25.5 ICD-9-CM: 414.8 ICD (implantable cardioverter-defibrillator) infection, subsequent encounter     ICD-10-CM: T82. 7XXD ICD-9-CM: V58.89 Chronic anticoagulation     ICD-10-CM: Z79.01 
ICD-9-CM: V58.61 Left ventricular assist device present [facility])     ICD-10-CM: X00.284 ICD-9-CM: V43.21 Vitals BP Pulse Temp Resp Height(growth percentile) Weight(growth percentile) (!) 84/0 (BP 1 Location: Left arm, BP Patient Position: Sitting) 62 97.8 °F (36.6 °C) (Oral) 16 5' 8\" (1.727 m) 166 lb 12.8 oz (75.7 kg) SpO2 BMI Smoking Status 98% 25.36 kg/m2 Former Smoker Vitals History BMI and BSA Data Body Mass Index Body Surface Area  
 25.36 kg/m 2 1.91 m 2 Preferred Pharmacy Pharmacy Name Phone  [facility] [phone] Your Updated Medication List  
  
   
 This list is accurate as of: 8/1/17 10:51 AM.  Always use your most recent med list.  
  
  
  
  
 allopurinol 100 mg tablet Commonly known as:  Shelvy Sisseton Take 1 Tab by mouth two (2) times a day. ALPRAZolam 0.25 mg tablet Commonly known as:  XANAX  
take 1 tablet by mouth once daily if needed  
  
 amiodarone 200 mg tablet Commonly known as:  CORDARONE Take 1 Tab by mouth daily. ascorbic acid (vitamin C) 500 mg tablet Commonly known as:  VITAMIN C Take 1 Tab by mouth two (2) times a day. carvedilol 25 mg tablet Commonly known as:  Bboby Irwin Take 1 Tab by mouth two (2) times daily (with meals). clopidogrel 75 mg Tab Commonly known as:  PLAVIX Take 1 Tab by mouth daily. colchicine 0.6 mg tablet Take 1 Tab by mouth two (2) times a day. Indications: GOUT  
  
 cyclobenzaprine 5 mg tablet Commonly known as:  FLEXERIL Take 5 mg by mouth as needed. enoxaparin 80 mg/0.8 mL injection Commonly known as:  LOVENOX 80 mg by SubCUTAneous route every twelve (12) hours. folic acid 1 mg tablet Commonly known as:  Google Take 1 Tab by mouth daily. HYDROcodone-acetaminophen 5-325 mg per tablet Commonly known as:  [place] Collar Take 1 Tab by mouth every eight (8) hours as needed. pantoprazole 40 mg tablet Commonly known as:  PROTONIX Take 1 Tab by mouth Daily (before breakfast). potassium chloride 20 mEq tablet Commonly known as:  K-DUR, KLOR-CON Take 0.5 Tabs by mouth as needed. pregabalin 25 mg capsule Commonly known as:  Lorrene Ruffini Take 1 Cap by mouth two (2) times a day. Max Daily Amount: 50 mg.  
  
 triamcinolone acetonide 0.025 % topical cream  
Commonly known as:  KENALOG Apply  to affected area two (2) times a day. use thin layer to affected area  
  
 warfarin 2.5 mg tablet Commonly known as:  COUMADIN Take 2 Tabs by mouth daily. Prescriptions Printed Refills pregabalin (LYRICA) 25 mg capsule 0 Sig: Take 1 Cap by mouth two (2) times a day. Max Daily Amount: 50 mg.  
 Class: Print Route: Oral  
  
Prescriptions Sent to Pharmacy Refills  
 warfarin (COUMADIN) 2.5 mg tablet 5 Sig: Take 2 Tabs by mouth daily. Class: Normal  
 Pharmacy: [facility] [address] Ph #: [phone] Route: Oral  
  
We Performed the Following NH INTERROGATION VAD IN [place] W/PHYS/QHP ANALYSIS [id number] CPT(R)] Follow-up Instructions Return in about 1 month (around 9/1/2017). Patient Instructions Please start Lyrica but make an appointment with Dr. Wendy Neal to follow up and help manage your neuropathy. Follow up in 1 month We will call you with your lab results this afternoon Introducing \[facility]\"" & [facility]! Dear Nimisha Polanco: Thank you for requesting a [facility] ([facility] [place] account. Our records indicate that you already have an active [employer] [facility] [place] account. You can access your account anytime at [email or web address] Did you know that you can access your hospital and ER discharge instructions at any time in [employer] ([facility] [place])? You can also review all of your test results from your hospital stay or ER visit. Additional Information If you have questions, please visit the Frequently Asked Questions section of the [employer] [employer] ([facility] [place] website at [email or web address] Remember, [facility] [employer] [facility] ([facility] [place]) is NOT to be used for urgent needs. For medical emergencies, dial 911. Now available from your iPhone and Android! Please provide this summary of care documentation to your next provider. Your primary care clinician is listed as Elis Hammond. If you have any questions after today's visit, please call [phone].

## 2017-08-01 NOTE — PATIENT INSTRUCTIONS
Please start Lyrica but make an appointment with Dr. Olya De La Vega to follow up and help manage your neuropathy.      Follow up in 1 month    We will call you with your lab results this afternoon

## 2017-08-01 NOTE — PROGRESS NOTES
Norman Sims [address]  LVAD Office Visit    Date of VAD implant: 12/1/2016  Cardiologist: GRAHAM  PCP: Cristela Rojas MD    HPI: Roney Stevens is a 62 y.o. male w/ PMH s/p HM II LVAD for DT, h/o torasades/SVT, HTN, lumbar stenosis s/p lumbar laminectomy, CAD (s/p CABG/sp BMSx2), gout, h/o strobic seizures, ETOH abuse and remote tobacco abuse who is seen today for monthly LVAD follow up. Since his last visit, he has been doing well, his only complaint is his bilateral feet pain. He just returned from the beach and continues to exercise at the gym 3x/week     He denies any alcohol use. He denies any tobacco use. He admits to rare use of the Norco for back pain and states this will be once a week every other week. This is managed by orthopedic surgeon. ROS  Pt c/o pain in B feet especially at night    Pt denies fatigue, fevers, chills, diaphoresis, headaches, lightheadedness, dizziness, syncope, visual changes, vomiting, chest pain, palpitations, SOB, cough, constipation, edema, depression, anxiety, BRBPR, melena. Subjective:  Chief Complaint:  Chief Complaint   Patient presents with    Follow-up      Assessment / Plan:    Heart Failure Status: NYHA Class II    Ischemic cardiomyopathy, s/p HM II LVAD implant as DT 12/1/16 -  Alarm history reviewed. VAD interrogation: Please see VAD flow sheet for readings. Adequate clinical perfusion and function of his LVAD. No alarms or adverse events - rare PI events noted. Continue Coreg at current dose. Pt undergoing transplant evaluation at [facility]. Will return to [facility] in 4 weeks. Chronic anticoagulation for LVAD, INR goal 2.0-3.0 - INR pending today. Please see anticoagulation tracker for details. Epistaxis x 1 - resolved     Post-operative RV insufficiency - Resolved, trend T. bili    KLEVER - resolved, trend labs.     Multivessel CAD/S/p CABG x 2 (SVG to RCA, LIMA to LAD) - s/p- RCA BMS x 2. On Plavix and Coreg.  No statin d/t history of elevated LFTs. No ASA  - pt on Plavix and Coumadin.     Lumbar stenosis, s/p decompressive laminectomy and instrumented fusion - managed by orthopedic surgeon.       Post operative anemia d/t acute blood loss - Hg stable, cont to monitor     H/o ETOH abuse - Pt denies any alcohol use.     SVT/A-fib/Torsades post op - S/p AICD 12/16. On Amiodarone per Dr. Yeison Abel.      HTN, MAP goal 70-90 - MAP 84 today, continue Coreg at current dose. Will add low dose entresto if renal function is at baseline. Gout Pain - no recent flares, pt to continue Allopurinol     Depression/Anxiety - resolved, pt not taking Celexa    Deconditioning - Pt completed Cardiac Rehab and is continuing exercise at [facility]    Carotid stenosis - plan for CTA by [facility] in future for further work up of vascular disease for transplant eval. Saw vascular at [facility], will request note. Neuropathy in feet- gabapentin ineffective in the past, will try low dose lyrica. Will follow up with PCP. VAD specific education - reinforced LVAD management, plan of care and medication management. Problem List:  Patient Active Problem List   Diagnosis Code    S/P laminectomy Z98.890    Sinus tachycardia R00.0    Acute respiratory failure with hypoxia ([facility]) J96.01    Essential hypertension I10    Alcohol abuse F10.10    Chronic systolic heart failure (HCC) I50.22    CAD, multiple vessel I25.10    Ischemic cardiomyopathy I25.5    MI (myocardial infarction) ([address] I21.3    Sustained VT (ventricular tachycardia) ([facility]) I47.2    S/P ICD (internal cardiac defibrillator) procedure Z95.810    Chronic anticoagulation Z79.01    Left ventricular assist device present ([address] Z95.811    Gout M10.9    ICD (implantable cardioverter-defibrillator) infection ([address] T82. 5XXA        Past Medical History:   Diagnosis Date    Arrhythmia     SVT    CAD (coronary artery disease)     Chronic pain     back    Gout     Heart failure ([address]     Hypertension  LVAD (left ventricular assist device) present ([facility] 12/01/2016    Raynaud disease     Seizures [facility]     strobic seizures early 19's       Family History   Problem Relation Age of Onset    Diabetes Mother     Dementia Mother     Other Mother      carotid artery blockage    Heart Disease Father     Stroke Father     Heart Attack Father      several    Hypertension Father     Elevated Lipids Father     No Known Problems Sister     Cancer Brother      brain    Lung Disease Sister     COPD Sister     Cancer Sister      lung       Social History     Social History    Marital status:      Spouse name: N/A    Number of children: N/A    Years of education: N/A     Occupational History    Not on file. Social History Main Topics    Smoking status: Former Smoker     Packs/day: 1.50     Years: 30.00     Quit date: 2008    Smokeless tobacco: Never Used    Alcohol use No    Drug use: No    Sexual activity: Not on file     Other Topics Concern    Not on file     Social History Narrative     Medications:  No Known Allergies     Current Outpatient Prescriptions on File Prior to Visit   Medication Sig    enoxaparin (LOVENOX) 80 mg/0.8 mL injection 80 mg by SubCUTAneous route every twelve (12) hours.  ALPRAZolam (XANAX) 0.25 mg tablet take 1 tablet by mouth once daily if needed    carvedilol (COREG) 25 mg tablet Take 1 Tab by mouth two (2) times daily (with meals).  triamcinolone acetonide (KENALOG) 0.025 % topical cream Apply  to affected area two (2) times a day. use thin layer to affected area    allopurinol (ZYLOPRIM) 100 mg tablet Take 1 Tab by mouth two (2) times a day.  amiodarone (CORDARONE) 200 mg tablet Take 1 Tab by mouth daily.  clopidogrel (PLAVIX) 75 mg tab Take 1 Tab by mouth daily.  pantoprazole (PROTONIX) 40 mg tablet Take 1 Tab by mouth Daily (before breakfast).  ascorbic acid, vitamin C, (VITAMIN C) 500 mg tablet Take 1 Tab by mouth two (2) times a day.  cyclobenzaprine (FLEXERIL) 5 mg tablet Take 5 mg by mouth as needed.  folic acid (FOLVITE) 1 mg tablet Take 1 Tab by mouth daily.  HYDROcodone-acetaminophen (NORCO) 5-325 mg per tablet Take 1 Tab by mouth every eight (8) hours as needed.  colchicine 0.6 mg tablet Take 1 Tab by mouth two (2) times a day. Indications: GOUT (Patient taking differently: Take 0.6 mg by mouth as needed. Indications: GOUT)    potassium chloride (K-DUR, KLOR-CON) 20 mEq tablet Take 0.5 Tabs by mouth as needed. No current facility-administered medications on file prior to visit. Results for orders placed or performed in visit on 07/13/17   PROTHROMBIN TIME + INR   Result Value Ref Range    INR 3.0 (H) 0.8 - 1.2    Prothrombin time 31.8 (H) 9.1 - 12.0 sec     Recent tests or procedures:   S/p Laminectomy 11/22/16  S/p LVAD implant 12/1/16  S/p AICD implant 12/16/16  S/p Ramp test 5/2017    Objective:  Physical Exam:  Visit Vitals    BP (!) 84/0 (BP 1 Location: Left arm, BP Patient Position: Sitting)    Pulse 62    Temp 97.8 °F (36.6 °C) (Oral)    Resp 16    Ht 5' 8\" (1.727 m)    Wt 166 lb 12.8 oz (75.7 kg)    SpO2 98%    BMI 25.36 kg/m2      MAP: 84    VAD data:  LVAD (Heartmate)  Pump Speed (RPM): 8800  Pump Flow (LPM): 4  PI (Pulsitility Index): 7.6  Power: 4.4    Exam:  Gen:  WA, WN, NAD   CVS:  +LVAD hum, S1/S2  Pul:  CTA b/l (-) r,r,w  Abd:  +BS, soft, NT,ND  Ext: trace b/l LE edema, cool extremities   Neuro:  No obvious deficits     Drive Line Exam:  Stabilization device intact: yes  Line inspected for damage: yes  Appearance: no edema, erythema, drainage   Sterile dressing changed per policy: no  Frequency of dressing change at home: every other day  Frequency of use of stabilization device: 100%    Follow-up Disposition:  Return in about 1 month (around 9/1/2017). Thank you for letting us see him with you.      Akosua Grange, NP  VAD Coordinator  [facility] [address], [facility]  Office: [phone]  \[facility]\"" VAD Pager: [phone]

## 2017-08-01 NOTE — LETTER
8/1/2017 11:17 AM 
 
Patient:  Jung Fan YOB: 1955 Date of Visit: 8/1/2017 Dear Addi Molina MD 
[address] [address] VIA Facsimile: [phone] Paz Armendariz MD 
[place] [address] [address] [facility] 7 [phone] VIA In Basket Frank Santos MD 
[address] [address] VIA In Basket 
 : Thank you for referring Mr. Edwin Reinoso to me for evaluation/treatment. Below are the relevant portions of my assessment and plan of care. Norman Sims 1721 LVAD Office Visit Date of VAD implant: 12/1/2016 Cardiologist: GRAHAM 
PCP: Neftaly Chang MD 
 
HPI: Jung Fan is a 62 y.o. male w/ PMH s/p HM II LVAD for DT, h/o torasades/SVT, HTN, lumbar stenosis s/p lumbar laminectomy, CAD (s/p CABG/sp BMSx2), gout, h/o strobic seizures, ETOH abuse and remote tobacco abuse who is seen today for monthly LVAD follow up. Since his last visit, he has been doing well, his only complaint is his bilateral feet pain. He just returned from the beach and continues to exercise at the gym 3x/week He denies any alcohol use. He denies any tobacco use. He admits to rare use of the Norco for back pain and states this will be once a week every other week. This is managed by orthopedic surgeon. ROS Pt c/o pain in B feet especially at night Pt denies fatigue, fevers, chills, diaphoresis, headaches, lightheadedness, dizziness, syncope, visual changes, vomiting, chest pain, palpitations, SOB, cough, constipation, edema, depression, anxiety, BRBPR, melena. Subjective: Chief Complaint: 
Chief Complaint Patient presents with  Follow-up Assessment / Plan: 
 
Heart Failure Status: NYHA Class II Ischemic cardiomyopathy, s/p HM II LVAD implant as DT 12/1/16 -  Alarm history reviewed.  VAD interrogation: Please see VAD flow sheet for readings. Adequate clinical perfusion and function of his LVAD. No alarms or adverse events - rare PI events noted. Continue Coreg at current dose. Pt undergoing transplant evaluation at [facility]. Will return to [facility] in 4 weeks. Chronic anticoagulation for LVAD, INR goal 2.0-3.0 - INR pending today. Please see anticoagulation tracker for details. Epistaxis x 1 - resolved 
  
Post-operative RV insufficiency - Resolved, trend T. bili KLEVER - resolved, trend labs. 
  
Multivessel CAD/S/p CABG x 2 (SVG to RCA, LIMA to LAD) - s/p- RCA BMS x 2. On Plavix and Coreg. No statin d/t history of elevated LFTs. No ASA  - pt on Plavix and Coumadin. 
  
Lumbar stenosis, s/p decompressive laminectomy and instrumented fusion - managed by orthopedic surgeon.   
  
Post operative anemia d/t acute blood loss - Hg stable, cont to monitor H/o ETOH abuse - Pt denies any alcohol use. 
  
SVT/A-fib/Torsades post op - S/p AICD 12/16. On Amiodarone per Dr. Regino Baires.  
  
HTN, MAP goal 70-90 - MAP 84 today, continue Coreg at current dose. Will add low dose entresto if renal function is at baseline. Gout Pain - no recent flares, pt to continue Allopurinol 
  
Depression/Anxiety - resolved, pt not taking Celexa Deconditioning - Pt completed Cardiac Rehab and is continuing exercise at [facility] Carotid stenosis - plan for CTA by [facility] in future for further work up of vascular disease for transplant eval. Saw vascular at [facility], will request note. Neuropathy in feet- gabapentin ineffective in the past, will try low dose lyrica. Will follow up with PCP. VAD specific education - reinforced LVAD management, plan of care and medication management. Problem List: 
Patient Active Problem List  
Diagnosis Code  S/P laminectomy Z98.890  
 Sinus tachycardia R00.0  Acute respiratory failure with hypoxia (HCC) J96.01  
 Essential hypertension I10  
 Alcohol abuse F10.10  Chronic systolic heart failure (HCC) I50.22  
 CAD, multiple vessel I25.10  
 Ischemic cardiomyopathy I25.5  [facility] I21.3  Sustained VT (ventricular tachycardia) ([facility]) I47.2  S/P ICD (internal cardiac defibrillator) procedure Z95.810  Chronic anticoagulation Z79.01  Left ventricular assist device present ([facility] Z95.811  Gout M10.9  ICD (implantable cardioverter-defibrillator) infection ([facility] T82. 7XXA Past Medical History:  
Diagnosis Date  Arrhythmia SVT  CAD (coronary artery disease)  Chronic pain   
 back  Gout  Heart failure ([facility]  Hypertension  LVAD (left ventricular assist device) present ([facility] 12/01/2016  Raynaud disease  Seizures ([facility] 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 Family History Problem Relation Age of Onset  Diabetes Mother  Dementia Mother  Other Mother   
  carotid artery blockage  Heart Disease Father  Stroke Father  Heart Attack Father   
  several  
 Hypertension Father  Elevated Lipids Father  No Known Problems Sister  Cancer Brother   
  brain  Lung Disease Sister  COPD Sister  Cancer Sister   
  lung Social History Social History  Marital status:  Spouse name: N/A  
 Number of children: N/A  
 Years of education: N/A Occupational History  Not on file. Social History Main Topics  Smoking status: Former Smoker Packs/day: 1.50 Years: 30.00 Quit date: 2008  Smokeless tobacco: Never Used  Alcohol use No  
 Drug use: No  
 Sexual activity: Not on file Other Topics Concern  Not on file Social History Narrative Medications: 
No Known Allergies Current Outpatient Prescriptions on File Prior to Visit Medication Sig  
 enoxaparin (LOVENOX) 80 mg/0.8 mL injection 80 mg by SubCUTAneous route every twelve (12) hours.  ALPRAZolam (XANAX) 0.25 mg tablet take 1 tablet by mouth once daily if needed  carvedilol (COREG) 25 mg tablet Take 1 Tab by mouth two (2) times daily (with meals).  triamcinolone acetonide (KENALOG) 0.025 % topical cream Apply  to affected area two (2) times a day. use thin layer to affected area  allopurinol (ZYLOPRIM) 100 mg tablet Take 1 Tab by mouth two (2) times a day.  amiodarone (CORDARONE) 200 mg tablet Take 1 Tab by mouth daily.  clopidogrel (PLAVIX) 75 mg tab Take 1 Tab by mouth daily.  pantoprazole (PROTONIX) 40 mg tablet Take 1 Tab by mouth Daily (before breakfast).  ascorbic acid, vitamin C, (VITAMIN C) 500 mg tablet Take 1 Tab by mouth two (2) times a day.  cyclobenzaprine (FLEXERIL) 5 mg tablet Take 5 mg by mouth as needed.  folic acid (FOLVITE) 1 mg tablet Take 1 Tab by mouth daily.  HYDROcodone-acetaminophen (NORCO) 5-325 mg per tablet Take 1 Tab by mouth every eight (8) hours as needed.  colchicine 0.6 mg tablet Take 1 Tab by mouth two (2) times a day. Indications: GOUT (Patient taking differently: Take 0.6 mg by mouth as needed. Indications: GOUT)  potassium chloride (K-DUR, KLOR-CON) 20 mEq tablet Take 0.5 Tabs by mouth as needed. No current facility-administered medications on file prior to visit. Results for orders placed or performed in visit on 07/13/17 PROTHROMBIN TIME + INR Result Value Ref Range INR 3.0 (H) 0.8 - 1.2 Prothrombin time 31.8 (H) 9.1 - 12.0 sec Recent tests or procedures: S/p Laminectomy 11/22/16 S/p LVAD implant 12/1/16 S/p AICD implant 12/16/16 S/p Ramp test 5/2017 Objective: 
Physical Exam: 
Visit Vitals  BP (!) 84/0 (BP 1 Location: Left arm, BP Patient Position: Sitting)  Pulse 62  Temp 97.8 °F (36.6 °C) (Oral)  Resp 16  
 Ht 5' 8\" (1.727 m)  Wt 166 lb 12.8 oz (75.7 kg)  SpO2 98%  BMI 25.36 kg/m2 MAP: 84 VAD data: LVAD (Heartmate) Pump Speed (RPM): 8800 Pump Flow (LPM): 4 PI (Pulsitility Index): 7.6 Power: 4.4 Exam: 
Gen:  WA, WN, NAD  
CVS:  +LVAD hum, S1/S2 Pul:  CTA b/l (-) r,r,w 
Abd:  +BS, soft, NT,ND Ext: trace b/l LE edema, cool extremities Neuro:  No obvious deficits Drive Line Exam: 
Stabilization device intact: yes Line inspected for damage: yes Appearance: no edema, erythema, drainage Sterile dressing changed per policy: no 
Frequency of dressing change at home: every other day Frequency of use of stabilization device: 100% Follow-up Disposition: 
Return in about 1 month (around 9/1/2017). Thank you for letting us see him with you. Carmen Garcia NP 
VAD Coordinator [address] [place] [address] Office: [phone] 
[facility] [place] VAD Pager: [phone] If you have questions, please do not hesitate to call me. I look forward to following Mr. Mann along with you. Sincerely, Paula Romeo MD

## 2017-08-01 NOTE — COMMUNICATION BODY
Norman Sims 1721  LVAD Office Visit    Date of VAD implant: 12/1/2016  Cardiologist: GRAHAM  PCP: Mayte Garcia MD    HPI: Manolo Herrera is a 62 y.o. male w/ PMH s/p HM II LVAD for DT, h/o torasades/SVT, HTN, lumbar stenosis s/p lumbar laminectomy, CAD (s/p CABG/sp BMSx2), gout, h/o strobic seizures, ETOH abuse and remote tobacco abuse who is seen today for monthly LVAD follow up. Since his last visit, he has been doing well, his only complaint is his bilateral feet pain. He just returned from the beach and continues to exercise at the gym 3x/week     He denies any alcohol use. He denies any tobacco use. He admits to rare use of the Norco for back pain and states this will be once a week every other week. This is managed by orthopedic surgeon. ROS  Pt c/o pain in B feet especially at night    Pt denies fatigue, fevers, chills, diaphoresis, headaches, lightheadedness, dizziness, syncope, visual changes, vomiting, chest pain, palpitations, SOB, cough, constipation, edema, depression, anxiety, BRBPR, melena. Subjective:  Chief Complaint:  Chief Complaint   Patient presents with    Follow-up      Assessment / Plan:    Heart Failure Status: NYHA Class II    Ischemic cardiomyopathy, s/p HM II LVAD implant as DT 12/1/16 -  Alarm history reviewed. VAD interrogation: Please see VAD flow sheet for readings. Adequate clinical perfusion and function of his LVAD. No alarms or adverse events - rare PI events noted. Continue Coreg at current dose. Pt undergoing transplant evaluation at [facility]. Will return to [facility] in 4 weeks. Chronic anticoagulation for LVAD, INR goal 2.0-3.0 - INR pending today. Please see anticoagulation tracker for details. Epistaxis x 1 - resolved     Post-operative RV insufficiency - Resolved, trend T. bili    KLEVER - resolved, trend labs.     Multivessel CAD/S/p CABG x 2 (SVG to RCA, LIMA to LAD) - s/p- RCA BMS x 2. On Plavix and Coreg.  No statin d/t history of elevated LFTs. No ASA  - pt on Plavix and Coumadin.     Lumbar stenosis, s/p decompressive laminectomy and instrumented fusion - managed by orthopedic surgeon.       Post operative anemia d/t acute blood loss - Hg stable, cont to monitor     H/o ETOH abuse - Pt denies any alcohol use.     SVT/A-fib/Torsades post op - S/p AICD 12/16. On Amiodarone per Dr. Duckworth.      HTN, MAP goal 70-90 - MAP 84 today, continue Coreg at current dose. Will add low dose entresto if renal function is at baseline. Gout Pain - no recent flares, pt to continue Allopurinol     Depression/Anxiety - resolved, pt not taking Celexa    Deconditioning - Pt completed Cardiac Rehab and is continuing exercise at [facility]    Carotid stenosis - plan for CTA by [facility] in future for further work up of vascular disease for transplant eval. Saw vascular at [facility], will request note. Neuropathy in feet- gabapentin ineffective in the past, will try low dose lyrica. Will follow up with PCP. VAD specific education - reinforced LVAD management, plan of care and medication management. Problem List:  Patient Active Problem List   Diagnosis Code    S/P laminectomy Z98.890    Sinus tachycardia R00.0    Acute respiratory failure with hypoxia (HCC) J96.01    Essential hypertension I10    Alcohol abuse F10.10    Chronic systolic heart failure (HCC) I50.22    CAD, multiple vessel I25.10    Ischemic cardiomyopathy I25.5    MI (myocardial infarction) ([address] I21.3    Sustained VT (ventricular tachycardia) ([facility]) I47.2    S/P ICD (internal cardiac defibrillator) procedure Z95.810    Chronic anticoagulation Z79.01    Left ventricular assist device present ([address] Z95.811    Gout M10.9    ICD (implantable cardioverter-defibrillator) infection ([address] T82. 5XXA        Past Medical History:   Diagnosis Date    Arrhythmia     SVT    CAD (coronary artery disease)     Chronic pain     back    Gout     Heart failure ([address]     Hypertension  LVAD (left ventricular assist device) present ([facility] 12/01/2016    Raynaud disease     Seizures ([facility]     strobic seizures early 19's       Family History   Problem Relation Age of Onset    Diabetes Mother     Dementia Mother     Other Mother      carotid artery blockage    Heart Disease Father     Stroke Father     Heart Attack Father      several    Hypertension Father     Elevated Lipids Father     No Known Problems Sister     Cancer Brother      brain    Lung Disease Sister     COPD Sister     Cancer Sister      lung       Social History     Social History    Marital status:      Spouse name: N/A    Number of children: N/A    Years of education: N/A     Occupational History    Not on file. Social History Main Topics    Smoking status: Former Smoker     Packs/day: 1.50     Years: 30.00     Quit date: 2008    Smokeless tobacco: Never Used    Alcohol use No    Drug use: No    Sexual activity: Not on file     Other Topics Concern    Not on file     Social History Narrative     Medications:  No Known Allergies     Current Outpatient Prescriptions on File Prior to Visit   Medication Sig    enoxaparin (LOVENOX) 80 mg/0.8 mL injection 80 mg by SubCUTAneous route every twelve (12) hours.  ALPRAZolam (XANAX) 0.25 mg tablet take 1 tablet by mouth once daily if needed    carvedilol (COREG) 25 mg tablet Take 1 Tab by mouth two (2) times daily (with meals).  triamcinolone acetonide (KENALOG) 0.025 % topical cream Apply  to affected area two (2) times a day. use thin layer to affected area    allopurinol (ZYLOPRIM) 100 mg tablet Take 1 Tab by mouth two (2) times a day.  amiodarone (CORDARONE) 200 mg tablet Take 1 Tab by mouth daily.  clopidogrel (PLAVIX) 75 mg tab Take 1 Tab by mouth daily.  pantoprazole (PROTONIX) 40 mg tablet Take 1 Tab by mouth Daily (before breakfast).  ascorbic acid, vitamin C, (VITAMIN C) 500 mg tablet Take 1 Tab by mouth two (2) times a day.  cyclobenzaprine (FLEXERIL) 5 mg tablet Take 5 mg by mouth as needed.  folic acid (FOLVITE) 1 mg tablet Take 1 Tab by mouth daily.  HYDROcodone-acetaminophen (NORCO) 5-325 mg per tablet Take 1 Tab by mouth every eight (8) hours as needed.  colchicine 0.6 mg tablet Take 1 Tab by mouth two (2) times a day. Indications: GOUT (Patient taking differently: Take 0.6 mg by mouth as needed. Indications: GOUT)    potassium chloride (K-DUR, KLOR-CON) 20 mEq tablet Take 0.5 Tabs by mouth as needed. No current facility-administered medications on file prior to visit. Results for orders placed or performed in visit on 07/13/17   PROTHROMBIN TIME + INR   Result Value Ref Range    INR 3.0 (H) 0.8 - 1.2    Prothrombin time 31.8 (H) 9.1 - 12.0 sec     Recent tests or procedures:   S/p Laminectomy 11/22/16  S/p LVAD implant 12/1/16  S/p AICD implant 12/16/16  S/p Ramp test 5/2017    Objective:  Physical Exam:  Visit Vitals    BP (!) 84/0 (BP 1 Location: Left arm, BP Patient Position: Sitting)    Pulse 62    Temp 97.8 °F (36.6 °C) (Oral)    Resp 16    Ht 5' 8\" (1.727 m)    Wt 166 lb 12.8 oz (75.7 kg)    SpO2 98%    BMI 25.36 kg/m2      MAP: 84    VAD data:  LVAD (Heartmate)  Pump Speed (RPM): 8800  Pump Flow (LPM): 4  PI (Pulsitility Index): 7.6  Power: 4.4    Exam:  Gen:  WA, WN, NAD   CVS:  +LVAD hum, S1/S2  Pul:  CTA b/l (-) r,r,w  Abd:  +BS, soft, NT,ND  Ext: trace b/l LE edema, cool extremities   Neuro:  No obvious deficits     Drive Line Exam:  Stabilization device intact: yes  Line inspected for damage: yes  Appearance: no edema, erythema, drainage   Sterile dressing changed per policy: no  Frequency of dressing change at home: every other day  Frequency of use of stabilization device: 100%    Follow-up Disposition:  Return in about 1 month (around 9/1/2017). Thank you for letting us see him with you.      Earna Ronde, NP  VAD Coordinator  [facility] [address], [address] [place] [address] Ave  Office: [phone]  [facility] VAD Pager: [phone]

## 2017-08-02 LAB
ALBUMIN SERPL-MCNC: 4.4 G/DL (ref 3.6–4.8)
ALBUMIN/GLOB SERPL: 1.8 {RATIO} (ref 1.2–2.2)
ALP SERPL-CCNC: 84 IU/L (ref 39–117)
ALT SERPL-CCNC: 20 IU/L (ref 0–44)
AST SERPL-CCNC: 22 IU/L (ref 0–40)
BILIRUB SERPL-MCNC: 0.3 MG/DL (ref 0–1.2)
BUN SERPL-MCNC: 17 MG/DL (ref 8–27)
BUN/CREAT SERPL: 14 (ref 10–24)
CALCIUM SERPL-MCNC: 9.1 MG/DL (ref 8.6–10.2)
CHLORIDE SERPL-SCNC: 101 MMOL/L (ref 96–106)
CO2 SERPL-SCNC: 24 MMOL/L (ref 18–29)
CREAT SERPL-MCNC: 1.18 MG/DL (ref 0.76–1.27)
ERYTHROCYTE [DISTWIDTH] IN BLOOD BY AUTOMATED COUNT: 14.8 % (ref 12.3–15.4)
GLOBULIN SER CALC-MCNC: 2.4 G/DL (ref 1.5–4.5)
GLUCOSE SERPL-MCNC: 118 MG/DL (ref 65–99)
HCT VFR BLD AUTO: 34.7 % (ref 37.5–51)
HGB BLD-MCNC: 11.5 G/DL (ref 12.6–17.7)
HGB FREE PLAS-MCNC: 1.9 MG/DL (ref 0–4.9)
INR PPP: 3.1 (ref 0.8–1.2)
LDH SERPL-CCNC: 271 IU/L (ref 121–224)
MCH RBC QN AUTO: 27.7 PG (ref 26.6–33)
MCHC RBC AUTO-ENTMCNC: 33.1 G/DL (ref 31.5–35.7)
MCV RBC AUTO: 84 FL (ref 79–97)
NT-PROBNP SERPL-MCNC: 1161 PG/ML (ref 0–210)
PLATELET # BLD AUTO: 378 X10E3/UL (ref 150–379)
POTASSIUM SERPL-SCNC: 4.6 MMOL/L (ref 3.5–5.2)
PROT SERPL-MCNC: 6.8 G/DL (ref 6–8.5)
PROTHROMBIN TIME: 32.4 SEC (ref 9.1–12)
RBC # BLD AUTO: 4.15 X10E6/UL (ref 4.14–5.8)
SODIUM SERPL-SCNC: 140 MMOL/L (ref 134–144)
WBC # BLD AUTO: 8.1 X10E3/UL (ref 3.4–10.8)

## 2017-08-07 ENCOUNTER — TELEPHONE (OUTPATIENT)
Dept: CARDIOLOGY CLINIC | Age: 62
End: 2017-08-07

## 2017-08-07 NOTE — TELEPHONE ENCOUNTER
Spoke with patient to scheduled R Heart Cath with Dr. Sulaiman Riley.     Patient is rescheduled to Wednesday August 30th at 9:30am.    Patient informed to arrive at outpatient registration at [address] [place] [address].

## 2017-08-08 ENCOUNTER — TELEPHONE (OUTPATIENT)
Dept: CARDIOLOGY CLINIC | Age: 62
End: 2017-08-08

## 2017-08-11 DIAGNOSIS — Z79.01 CHRONIC ANTICOAGULATION: ICD-10-CM

## 2017-08-11 DIAGNOSIS — I50.22 CHRONIC SYSTOLIC HEART FAILURE (HCC): ICD-10-CM

## 2017-08-11 DIAGNOSIS — I25.5 ISCHEMIC CARDIOMYOPATHY: ICD-10-CM

## 2017-08-11 DIAGNOSIS — R06.02 SHORTNESS OF BREATH: ICD-10-CM

## 2017-08-11 DIAGNOSIS — Z95.811 LEFT VENTRICULAR ASSIST DEVICE PRESENT (HCC): Primary | ICD-10-CM

## 2017-08-14 ENCOUNTER — TELEPHONE (OUTPATIENT)
Dept: CARDIOLOGY CLINIC | Age: 62
End: 2017-08-14

## 2017-08-15 ENCOUNTER — TELEPHONE ANTICOAG (OUTPATIENT)
Dept: CARDIOLOGY CLINIC | Age: 62
End: 2017-08-15

## 2017-08-15 NOTE — PROGRESS NOTES
Reviewed full lab results with patient. K+ mildly elevated - educated patient on importance of avoiding high K+ foods. Will repeat BMP with next INR on 8/28. Patient verbalizes understanding. Recent Results (from the past 24 hour(s))   METABOLIC PANEL, COMPREHENSIVE    Collection Time: 08/15/17 11:43 AM   Result Value Ref Range    Glucose 113 (H) 65 - 99 mg/dL    BUN 18 8 - 27 mg/dL    Creatinine 1.25 0.76 - 1.27 mg/dL    GFR est non-AA 62 >59 mL/min/1.73    GFR est AA 72 >59 mL/min/1.73    BUN/Creatinine ratio 14 10 - 24    Sodium 142 134 - 144 mmol/L    Potassium 5.3 (H) 3.5 - 5.2 mmol/L    Chloride 104 96 - 106 mmol/L    CO2 24 18 - 29 mmol/L    Calcium 9.2 8.6 - 10.2 mg/dL    Protein, total 6.9 6.0 - 8.5 g/dL    Albumin 4.4 3.6 - 4.8 g/dL    GLOBULIN, TOTAL 2.5 1.5 - 4.5 g/dL    A-G Ratio 1.8 1.2 - 2.2    Bilirubin, total 0.3 0.0 - 1.2 mg/dL    Alk.  phosphatase 81 39 - 117 IU/L    AST (SGOT) 19 0 - 40 IU/L    ALT (SGPT) 16 0 - 44 IU/L   PROTHROMBIN TIME + INR    Collection Time: 08/15/17 11:43 AM   Result Value Ref Range    INR 3.0 (H) 0.8 - 1.2    Prothrombin time 31.2 (H) 9.1 - 12.0 sec   CBC W/O DIFF    Collection Time: 08/15/17 11:43 AM   Result Value Ref Range    WBC 8.7 3.4 - 10.8 x10E3/uL    RBC 4.06 (L) 4.14 - 5.80 x10E6/uL    HGB 11.4 (L) 12.6 - 17.7 g/dL    HCT 34.0 (L) 37.5 - 51.0 %    MCV 84 79 - 97 fL    MCH 28.1 26.6 - 33.0 pg    MCHC 33.5 31.5 - 35.7 g/dL    RDW 14.5 12.3 - 15.4 %    PLATELET 959 137 - 679 x10E3/uL   LD    Collection Time: 08/15/17 11:43 AM   Result Value Ref Range     (H) 121 - 224 IU/L   HEMOGLOBIN, FREE, PLASMA    Collection Time: 08/15/17 11:43 AM   Result Value Ref Range    Hemoglobin, Free, Plasma WILL FOLLOW    NT-PRO BNP    Collection Time: 08/15/17 11:43 AM   Result Value Ref Range    PROBNP WILL FOLLOW    SPECIMEN STATUS REPORT    Collection Time: 08/15/17 11:43 AM   Result Value Ref Range    SPECIMEN STATUS REPORT COMMENT

## 2017-08-16 LAB
ALBUMIN SERPL-MCNC: 4.4 G/DL (ref 3.6–4.8)
ALBUMIN/GLOB SERPL: 1.8 {RATIO} (ref 1.2–2.2)
ALP SERPL-CCNC: 81 IU/L (ref 39–117)
ALT SERPL-CCNC: 16 IU/L (ref 0–44)
AST SERPL-CCNC: 19 IU/L (ref 0–40)
BILIRUB SERPL-MCNC: 0.3 MG/DL (ref 0–1.2)
BUN SERPL-MCNC: 18 MG/DL (ref 8–27)
BUN/CREAT SERPL: 14 (ref 10–24)
CALCIUM SERPL-MCNC: 9.2 MG/DL (ref 8.6–10.2)
CHLORIDE SERPL-SCNC: 104 MMOL/L (ref 96–106)
CO2 SERPL-SCNC: 24 MMOL/L (ref 18–29)
CREAT SERPL-MCNC: 1.25 MG/DL (ref 0.76–1.27)
ERYTHROCYTE [DISTWIDTH] IN BLOOD BY AUTOMATED COUNT: 14.5 % (ref 12.3–15.4)
GLOBULIN SER CALC-MCNC: 2.5 G/DL (ref 1.5–4.5)
GLUCOSE SERPL-MCNC: 113 MG/DL (ref 65–99)
HCT VFR BLD AUTO: 34 % (ref 37.5–51)
HGB BLD-MCNC: 11.4 G/DL (ref 12.6–17.7)
HGB FREE PLAS-MCNC: <0.2 MG/DL (ref 0–4.9)
INR PPP: 3 (ref 0.8–1.2)
LDH SERPL-CCNC: 256 IU/L (ref 121–224)
MCH RBC QN AUTO: 28.1 PG (ref 26.6–33)
MCHC RBC AUTO-ENTMCNC: 33.5 G/DL (ref 31.5–35.7)
MCV RBC AUTO: 84 FL (ref 79–97)
NT-PROBNP SERPL-MCNC: 1138 PG/ML (ref 0–210)
PLATELET # BLD AUTO: 348 X10E3/UL (ref 150–379)
POTASSIUM SERPL-SCNC: 5.3 MMOL/L (ref 3.5–5.2)
PROT SERPL-MCNC: 6.9 G/DL (ref 6–8.5)
PROTHROMBIN TIME: 31.2 SEC (ref 9.1–12)
RBC # BLD AUTO: 4.06 X10E6/UL (ref 4.14–5.8)
SODIUM SERPL-SCNC: 142 MMOL/L (ref 134–144)
SPECIMEN STATUS REPORT, ROLRST: NORMAL
WBC # BLD AUTO: 8.7 X10E3/UL (ref 3.4–10.8)

## 2017-08-25 ENCOUNTER — TELEPHONE (OUTPATIENT)
Dept: CARDIOLOGY CLINIC | Age: 62
End: 2017-08-25

## 2017-08-25 DIAGNOSIS — I50.22 CHRONIC SYSTOLIC HEART FAILURE (HCC): ICD-10-CM

## 2017-08-25 DIAGNOSIS — Z79.01 CHRONIC ANTICOAGULATION: ICD-10-CM

## 2017-08-25 DIAGNOSIS — Z95.811 LEFT VENTRICULAR ASSIST DEVICE PRESENT (HCC): Primary | ICD-10-CM

## 2017-08-26 RX ORDER — FOLIC ACID 1 MG/1
TABLET ORAL
Qty: 30 TAB | Refills: 6 | Status: SHIPPED | OUTPATIENT
Start: 2017-08-26 | End: 2017-11-17 | Stop reason: SDUPTHER

## 2017-08-28 ENCOUNTER — TELEPHONE ANTICOAG (OUTPATIENT)
Dept: CARDIOLOGY CLINIC | Age: 62
End: 2017-08-28

## 2017-08-28 ENCOUNTER — TELEPHONE (OUTPATIENT)
Dept: CARDIOLOGY CLINIC | Age: 62
End: 2017-08-28

## 2017-08-28 LAB
INR PPP: 2.2 (ref 0.8–1.2)
PROTHROMBIN TIME: 23.4 SEC (ref 9.1–12)

## 2017-08-28 NOTE — TELEPHONE ENCOUNTER
Telephone Call RE:  Appointment reminder     Outcome:     [] Patient confirmed appointment   [] Patient rescheduled appointment for    [] Unable to reach   [] Left message              [x] Other:     Patient does not need to be seen tomorrow since he has a R heart cath on Wednesday. Per our NP he can be seen next week.       Mary Garland

## 2017-08-30 ENCOUNTER — HOSPITAL ENCOUNTER (OUTPATIENT)
Dept: CARDIAC CATH/INVASIVE PROCEDURES | Age: 62
Discharge: HOME | End: 2017-08-30
Attending: INTERNAL MEDICINE | Admitting: INTERNAL MEDICINE
Payer: COMMERCIAL

## 2017-08-30 VITALS
RESPIRATION RATE: 18 BRPM | OXYGEN SATURATION: 99 % | BODY MASS INDEX: 25.16 KG/M2 | HEIGHT: 68 IN | WEIGHT: 166 LBS | TEMPERATURE: 97.7 F | HEART RATE: 66 BPM

## 2017-08-30 DIAGNOSIS — I25.10 CAD, MULTIPLE VESSEL: ICD-10-CM

## 2017-08-30 DIAGNOSIS — Z79.01 CHRONIC ANTICOAGULATION: ICD-10-CM

## 2017-08-30 DIAGNOSIS — I50.22 CHRONIC SYSTOLIC HEART FAILURE (HCC): ICD-10-CM

## 2017-08-30 DIAGNOSIS — Z95.811 LVAD (LEFT VENTRICULAR ASSIST DEVICE) PRESENT (HCC): ICD-10-CM

## 2017-08-30 PROCEDURE — 93451 RIGHT HEART CATH: CPT | Performed by: INTERNAL MEDICINE

## 2017-08-30 PROCEDURE — 74011250636 HC RX REV CODE- 250/636: Performed by: INTERNAL MEDICINE

## 2017-08-30 PROCEDURE — C1751 CATH, INF, PER/CENT/MIDLINE: HCPCS

## 2017-08-30 PROCEDURE — C1894 INTRO/SHEATH, NON-LASER: HCPCS

## 2017-08-30 PROCEDURE — 74011000250 HC RX REV CODE- 250: Performed by: INTERNAL MEDICINE

## 2017-08-30 PROCEDURE — 77030013744

## 2017-08-30 PROCEDURE — 93451 RIGHT HEART CATH: CPT

## 2017-08-30 RX ORDER — FENTANYL CITRATE 50 UG/ML
25-200 INJECTION, SOLUTION INTRAMUSCULAR; INTRAVENOUS
Status: DISCONTINUED | OUTPATIENT
Start: 2017-08-30 | End: 2017-08-30

## 2017-08-30 RX ORDER — LIDOCAINE HYDROCHLORIDE 10 MG/ML
10-30 INJECTION INFILTRATION; PERINEURAL
Status: DISCONTINUED | OUTPATIENT
Start: 2017-08-30 | End: 2017-08-30

## 2017-08-30 RX ORDER — SODIUM CHLORIDE 9 MG/ML
25 INJECTION, SOLUTION INTRAVENOUS CONTINUOUS
Status: DISCONTINUED | OUTPATIENT
Start: 2017-08-30 | End: 2017-08-30 | Stop reason: HOSPADM

## 2017-08-30 RX ORDER — HEPARIN SODIUM 200 [USP'U]/100ML
1000 INJECTION, SOLUTION INTRAVENOUS CONTINUOUS
Status: DISCONTINUED | OUTPATIENT
Start: 2017-08-30 | End: 2017-08-30

## 2017-08-30 RX ORDER — MIDAZOLAM HYDROCHLORIDE 1 MG/ML
.5-1 INJECTION, SOLUTION INTRAMUSCULAR; INTRAVENOUS
Status: DISCONTINUED | OUTPATIENT
Start: 2017-08-30 | End: 2017-08-30

## 2017-08-30 RX ORDER — ATROPINE SULFATE 0.1 MG/ML
1 INJECTION INTRAVENOUS AS NEEDED
Status: DISCONTINUED | OUTPATIENT
Start: 2017-08-30 | End: 2017-08-30

## 2017-08-30 RX ORDER — SODIUM CHLORIDE 0.9 % (FLUSH) 0.9 %
10 SYRINGE (ML) INJECTION AS NEEDED
Status: DISCONTINUED | OUTPATIENT
Start: 2017-08-30 | End: 2017-08-30

## 2017-08-30 RX ADMIN — FENTANYL CITRATE 25 MCG: 50 INJECTION, SOLUTION INTRAMUSCULAR; INTRAVENOUS at 09:46

## 2017-08-30 RX ADMIN — LIDOCAINE HYDROCHLORIDE 5 ML: 10 INJECTION, SOLUTION INFILTRATION; PERINEURAL at 09:47

## 2017-08-30 RX ADMIN — MIDAZOLAM HYDROCHLORIDE 2 MG: 1 INJECTION, SOLUTION INTRAMUSCULAR; INTRAVENOUS at 09:43

## 2017-08-30 RX ADMIN — SODIUM CHLORIDE 25 ML/HR: 900 INJECTION, SOLUTION INTRAVENOUS at 08:50

## 2017-08-30 RX ADMIN — HEPARIN SODIUM 2000 UNITS: 200 INJECTION, SOLUTION INTRAVENOUS at 09:39

## 2017-08-30 RX ADMIN — FENTANYL CITRATE 25 MCG: 50 INJECTION, SOLUTION INTRAMUSCULAR; INTRAVENOUS at 09:43

## 2017-08-30 NOTE — PROGRESS NOTES
Cardiac Cath Lab Recovery Arrival Note:      Diane Queen arrived to Cardiac Cath Lab, Recovery Area. Staff introduced to patient. Patient identifiers verified with NAME and DATE OF BIRTH. Procedure verified with patient. Consent forms reviewed and signed by patient or authorized representative and verified. Allergies verified. Patient and family oriented to department. Patient and family informed of procedure and plan of care. Questions answered with review. Patient prepped for procedure, per orders from physician, prior to arrival.    Patient on cardiac monitor, non-invasive blood pressure, SPO2 monitor. On room air. Patient is A&Ox 4. Patient reports no complaints. Patient in stretcher, in low position, with side rails up, call bell within reach, patient instructed to call if assistance as needed. Patient prep in: [address], [place] [address].    Patient family has pager # [phone]  Family in: wife in hospital.   Prep by: Genoveva Severance RN

## 2017-08-30 NOTE — IP AVS SNAPSHOT
[address] 
[phone] Patient: Matias Soriano MRN: [id number] :1955 You are allergic to the following No active allergies Recent Documentation Height Weight BMI Smoking Status 1.727 m 75.3 kg 25.24 kg/m2 Former Smoker Emergency Contacts Name Discharge Info Relation Home Work Mobile Damon  67. CAREGIVER [3] Spouse [3] [phone] About your hospitalization You were admitted on:  2017 You last received care in the:  [address] CATH LAB You were discharged on:  2017 Unit phone number:  [phone] Why you were hospitalized Your primary diagnosis was:  Not on File Providers Seen During Your Hospitalizations Provider Role Specialty Primary office phone Ashly Medina MD Attending Provider Cardiology [phone] Your Primary Care Physician (PCP) Primary Care Physician Office Phone Office Fax Arianna Ovalle [phone] Follow-up Information Follow up With Details Comments Contact Info Portillo Tovar MD    [address] [place] [address] 
[phone] [facility] Schedule an appointment as soon as possible for a visit in 1 week appt 1-2 wks  [address] [place] [address] [facility] [address] 
[phone] Your Appointments   3:30 PM EDT Follow Up with Ashly Medina MD  
1229 [facility] ([address]) [address]  
[phone] Current Discharge Medication List  
  
CONTINUE these medications which have CHANGED Dose & Instructions Dispensing Information Comments Morning Noon Evening Bedtime  
 colchicine 0.6 mg tablet What changed:   
- when to take this - reasons to take this Your last dose was: Your next dose is:    
   
   
 Dose:  0.6 mg Take 1 Tab by mouth two (2) times a day. Indications: GOUT  
 Quantity:  60 Tab Refills:  4 CONTINUE these medications which have NOT CHANGED Dose & Instructions Dispensing Information Comments Morning Noon Evening Bedtime  
 allopurinol 100 mg tablet Commonly known as:  Darius Quach Your last dose was: Your next dose is:    
   
   
 Dose:  100 mg Take 1 Tab by mouth two (2) times a day. Quantity:  180 Tab Refills:  3 ALPRAZolam 0.25 mg tablet Commonly known as:  Paco Carias Your last dose was: Your next dose is:    
   
   
 take 1 tablet by mouth once daily if needed Refills:  0  
     
   
   
   
  
 amiodarone 200 mg tablet Commonly known as:  CORDARONE Your last dose was: Your next dose is:    
   
   
 Dose:  200 mg Take 1 Tab by mouth daily. Quantity:  90 Tab Refills:  3  
     
   
   
   
  
 ascorbic acid (vitamin C) 500 mg tablet Commonly known as:  VITAMIN C Your last dose was: Your next dose is:    
   
   
 Dose:  500 mg Take 1 Tab by mouth two (2) times a day. Quantity:  60 Tab Refills:  6  
     
   
   
   
  
 carvedilol 25 mg tablet Commonly known as:  Hipolito Del Valle Your last dose was: Your next dose is:    
   
   
 Dose:  25 mg Take 1 Tab by mouth two (2) times daily (with meals). Quantity:  180 Tab Refills:  3  
     
   
   
   
  
 clopidogrel 75 mg Tab Commonly known as:  PLAVIX Your last dose was: Your next dose is:    
   
   
 Dose:  75 mg Take 1 Tab by mouth daily. Quantity:  90 Tab Refills:  3  
     
   
   
   
  
 cyclobenzaprine 5 mg tablet Commonly known as:  FLEXERIL Your last dose was: Your next dose is:    
   
   
 Dose:  5 mg Take 5 mg by mouth as needed. Refills:  0 folic acid 1 mg tablet Commonly known as:  Google Your last dose was: Your next dose is:    
   
   
 take 1 tablet by mouth once daily Quantity:  30 Tab Refills:  6 HYDROcodone-acetaminophen 5-325 mg per tablet Commonly known as:  Reina Iron Your last dose was: Your next dose is:    
   
   
 Dose:  1 Tab Take 1 Tab by mouth every eight (8) hours as needed. Refills:  0  
     
   
   
   
  
 pantoprazole 40 mg tablet Commonly known as:  PROTONIX Your last dose was: Your next dose is:    
   
   
 Dose:  40 mg Take 1 Tab by mouth Daily (before breakfast). Quantity:  90 Tab Refills:  3  
     
   
   
   
  
 triamcinolone acetonide 0.025 % topical cream  
Commonly known as:  KENALOG Your last dose was: Your next dose is:    
   
   
 Apply  to affected area two (2) times a day. use thin layer to affected area Quantity:  15 g Refills:  4  
     
   
   
   
  
 valsartan 40 mg tablet Commonly known as:  DIOVAN Your last dose was: Your next dose is:    
   
   
 Dose:  40 mg Take 1 Tab by mouth daily. Quantity:  30 Tab Refills:  2  
     
   
   
   
  
 warfarin 2.5 mg tablet Commonly known as:  COUMADIN Your last dose was: Your next dose is:    
   
   
 Dose:  5 mg Take 2 Tabs by mouth daily. Quantity:  180 Tab Refills:  5 Requires brand name Coumadin. STOP taking these medications   
 enoxaparin 80 mg/0.8 mL injection Commonly known as:  LOVENOX Discharge Instructions None Discharge Orders None Introducing [facility]! Dear Sanjuana Barragan: Thank you for requesting a [employer] account. Our records indicate that you already have an active [employer] account. You can access your account anytime at [email or web address] Did you know that you can access your hospital and ER discharge instructions at any time in [employer]? You can also review all of your test results from your hospital stay or ER visit. Additional Information If you have questions, please visit the Frequently Asked Questions section of the [employer] website at [email or web address] Remember, exozethart is NOT to be used for urgent needs. For medical emergencies, dial 911. Now available from your iPhone and Android! General Information Please provide this summary of care documentation to your next provider. Patient Signature:  ____________________________________________________________ Date:  ____________________________________________________________  
  
Chad Jacobs Provider Signature:  ____________________________________________________________ Date:  ____________________________________________________________

## 2017-08-30 NOTE — PROGRESS NOTES
TRANSFER - IN REPORT:    Verbal report received from [address] CVT on Nery Rueda  being received from procedure for routine progression of care. Report [id number] of patients Situation, Background, Assessment and Recommendations(SBAR). Information from the following report(s) Procedure Summary, MAR and Recent Results was reviewed with the receiving clinician. Opportunity for questions and clarification was provided. Assessment completed upon patients arrival to [address] [place] [address] and care assumed. Cardiac Cath Lab Recovery Arrival Note:    Nery Rueda arrived to [facility] recovery area. Patient procedure= RHC. Patient on cardiac monitor, non-invasive blood pressure, SPO2 monitor. On O2 @ 2 lpm via n/c. IV  of nacl on pump at 25 ml/hr. Patient status doing well without problems. Patient is A&Ox 4. Patient reports no complaints. PROCEDURE SITE CHECK:    Procedure site:without any bleeding and or hematoma, no pain/discomfort reported at procedure site. No change in patient status. Continue to monitor patient and status.

## 2017-08-30 NOTE — PROGRESS NOTES
Discharge instructions reviewed with pt and wife. Hard copy given to pt for home care,  1200 discharged via w/c with wife, and belongings.

## 2017-08-30 NOTE — PROCEDURES
POST PROCEDURE NOTE     Christina Mar  1955  [id number]    Date of Procedure: 2017    Preoperative diagnosis: Ischemic Cardiomyopathy    Postoperative diagnosis: Ischemic Cardiomyopathy    Procedure:   After informed consent was obtained, the patient was brought to the cath lab, prepped and draped in the usual sterile fashion. Time out was performed. The patient was given 2 mg IV versed and 50 mcgs of IV fentanyl for sedation. 2% lidocaine was used for local anesthesia around an indwelling right brachial 20 gauge Gelco IV catheter, which was exchanged over a guidewire for a 6 F sheath. A balloon tip catheter was advanced through the 6 F sheath under fluoroscopic guidance to the pulmonary artery. Pressures were recorded and cardiac output obtained using the Yesika technique. The balloon tip catheter was removed as well as the 6F sheath. Hemostasis was achieved with manual compression. The patient was transferred to the cath lab recover in stable condition.       Hemodynamics:   RA:  15/15 14 mmHg   RV:  21/13 14 mmHg   PAP Systolic:  23 mmHG   PAP Diastolic:  18 mmHg   PA mean: 20 mmHg   PCWP: 13/13 13 mmHg   TP mmHg   PVR:  1.67 wood units   PAsat:  74.3 %   Aosat:  99 %     CO:  5.67 L/min   CI: 3.0 L/min/m2  :  Dr. Acacia Lazo MD    Assistant(s):  PANDA Rodriguez    EBL: None     Specimens: none    Complications: None    Dr. Acacia Lazo MD  2017  10:09 AM

## 2017-08-30 NOTE — PROGRESS NOTES
Cardiac Cath Lab Procedure Area Arrival Note:    Betzy Peace arrived to Cardiac Cath Lab, Procedure Area. Patient identifiers verified with NAME and DATE OF BIRTH. Procedure verified with patient. Consent forms verified. Allergies verified. Patient informed of procedure and plan of care. Questions answered with review. Patient voiced understanding of procedure and plan of care. Patient on cardiac monitor, non-invasive blood pressure, SPO2 monitor. On room air then placed on O2 @ 2 lpm via NC.  IV of normal saline on pump at 25 ml/hr. Patient status doing well without problems. Patient is A&Ox 4. Patient reports no pain. Patient medicated during procedure with orders obtained and verified by Dr. Eric Maurer. Refer to patients Cardiac Cath Lab PROCEDURE REPORT for vital signs, assessment, status, and response during procedure, printed at end of case. Printed report on chart or scanned into chart.

## 2017-08-31 ENCOUNTER — TELEPHONE (OUTPATIENT)
Dept: CARDIOLOGY CLINIC | Age: 62
End: 2017-08-31

## 2017-08-31 ENCOUNTER — TELEPHONE (OUTPATIENT)
Dept: CARDIAC CATH/INVASIVE PROCEDURES | Age: 62
End: 2017-08-31

## 2017-08-31 NOTE — TELEPHONE ENCOUNTER
Returned call to wife, she states temperature is down to 97.6, but she gave patient tylenol at noon. Only pain is in neck today, slightly relieved by tylenol. No redness or pain at LVAD site. She does state that he has been resting all day, has not moved around-I asked if he has an incentive spirometer to use and she said no. I asked her to have him walk around some, take some deep breaths, cough. Will call back for further advice. Called patient back and advised him per Marcy Wadsworth to walk and exercise his lungs, call office with increased fever despite tylenol or increased pain. Patient states understanding and has no further questions.

## 2017-08-31 NOTE — TELEPHONE ENCOUNTER
Laray Gleason called to report that patient has a small fever of 99.5   Also, is expercing a pain in his neck.       Please call him back to discuss

## 2017-09-07 ENCOUNTER — OFFICE VISIT (OUTPATIENT)
Dept: CARDIOLOGY CLINIC | Age: 62
End: 2017-09-07

## 2017-09-07 VITALS
RESPIRATION RATE: 16 BRPM | OXYGEN SATURATION: 97 % | HEART RATE: 76 BPM | BODY MASS INDEX: 25.25 KG/M2 | SYSTOLIC BLOOD PRESSURE: 84 MMHG | WEIGHT: 166.6 LBS | HEIGHT: 68 IN | TEMPERATURE: 97.6 F

## 2017-09-07 DIAGNOSIS — I50.22 CHRONIC SYSTOLIC HEART FAILURE (HCC): ICD-10-CM

## 2017-09-07 DIAGNOSIS — Z95.811 LEFT VENTRICULAR ASSIST DEVICE PRESENT (HCC): Primary | ICD-10-CM

## 2017-09-07 DIAGNOSIS — Z79.01 CHRONIC ANTICOAGULATION: ICD-10-CM

## 2017-09-07 DIAGNOSIS — R06.02 SHORTNESS OF BREATH: ICD-10-CM

## 2017-09-07 DIAGNOSIS — I50.22 CHRONIC SYSTOLIC HEART FAILURE (HCC): Primary | ICD-10-CM

## 2017-09-07 DIAGNOSIS — Z98.890 S/P LAMINECTOMY: ICD-10-CM

## 2017-09-07 DIAGNOSIS — Z95.811 LEFT VENTRICULAR ASSIST DEVICE PRESENT (HCC): ICD-10-CM

## 2017-09-07 DIAGNOSIS — N52.9 ERECTILE DYSFUNCTION, UNSPECIFIED ERECTILE DYSFUNCTION TYPE: ICD-10-CM

## 2017-09-07 DIAGNOSIS — I25.5 ISCHEMIC CARDIOMYOPATHY: ICD-10-CM

## 2017-09-07 DIAGNOSIS — I25.10 CAD, MULTIPLE VESSEL: ICD-10-CM

## 2017-09-07 DIAGNOSIS — T82.7XXD ICD (IMPLANTABLE CARDIOVERTER-DEFIBRILLATOR) INFECTION, SUBSEQUENT ENCOUNTER: ICD-10-CM

## 2017-09-07 RX ORDER — SILDENAFIL 50 MG/1
50 TABLET, FILM COATED ORAL AS NEEDED
Qty: 30 TAB | Refills: 0 | Status: SHIPPED | OUTPATIENT
Start: 2017-09-07 | End: 2017-09-12 | Stop reason: CLARIF

## 2017-09-07 RX ORDER — FEBUXOSTAT 40 MG/1
40 TABLET, FILM COATED ORAL DAILY
Qty: 30 TAB | Refills: 11 | Status: SHIPPED | OUTPATIENT
Start: 2017-09-07 | End: 2017-11-17 | Stop reason: SDUPTHER

## 2017-09-07 NOTE — MR AVS SNAPSHOT
Visit Information Date & Time Provider Department Dept. Phone Encounter #  
 9/7/2017  3:30 PM Acacia Lazo MD [address] [id number] Upcoming Health Maintenance Date Due Pneumococcal 19-64 Medium Risk (1 of 1 - PPSV23) 10/11/1975 DTaP/Tdap/Td series (1 - Tdap) 10/11/1977 ZOSTER VACCINE AGE 60> 8/11/2016 INFLUENZA AGE 9 TO ADULT 8/1/2017 FOBT Q 1 YEAR AGE 50-75 12/20/2017 Allergies as of 9/7/2017  Review Complete On: 9/7/2017 By: Arnaldo Chase No Known Allergies Current Immunizations  Reviewed on 1/20/2017 Name Date Influenza Vaccine (Quad) PF 12/21/2016 12:00 PM  
  
 Not reviewed this visit Vitals BP Pulse Temp Resp Height(growth percentile) Weight(growth percentile) (!) 84/0 (BP 1 Location: Left arm, BP Patient Position: Sitting) 76 97.6 °F (36.4 °C) (Oral) 16 5' 8\" (1.727 m) 166 lb 9.6 oz (75.6 kg) SpO2 BMI Smoking Status 97% 25.33 kg/m2 Former Smoker Vitals History BMI and BSA Data Body Mass Index Body Surface Area  
 25.33 kg/m 2 1.9 m 2 Preferred Pharmacy Pharmacy Name Phone [place] [address] [phone] Your Updated Medication List  
  
   
This list is accurate as of: 9/7/17  5:08 PM.  Always use your most recent med list.  
  
  
  
  
 ALPRAZolam 0.25 mg tablet Commonly known as:  XANAX  
take 1 tablet by mouth once daily if needed  
  
 amiodarone 200 mg tablet Commonly known as:  CORDARONE Take 1 Tab by mouth daily. ascorbic acid (vitamin C) 500 mg tablet Commonly known as:  VITAMIN C Take 1 Tab by mouth two (2) times a day. carvedilol 25 mg tablet Commonly known as:  Bobby Irwin Take 1 Tab by mouth two (2) times daily (with meals). clopidogrel 75 mg Tab Commonly known as:  PLAVIX Take 1 Tab by mouth daily. colchicine 0.6 mg tablet Take 1 Tab by mouth two (2) times a day. Indications: GOUT  
  
 cyclobenzaprine 5 mg tablet Commonly known as:  FLEXERIL Take 5 mg by mouth as needed. febuxostat 40 mg Tab tablet Commonly known as:  Arlyce Villalba Take 1 Tab by mouth daily. folic acid 1 mg tablet Commonly known as:  FOLVITE  
take 1 tablet by mouth once daily HYDROcodone-acetaminophen 5-325 mg per tablet Commonly known as:  Mehnaz Martensdale Take 1 Tab by mouth every eight (8) hours as needed. pantoprazole 40 mg tablet Commonly known as:  PROTONIX Take 1 Tab by mouth Daily (before breakfast). sildenafil citrate 50 mg tablet Commonly known as:  VIAGRA Take 1 Tab by mouth as needed. triamcinolone acetonide 0.025 % topical cream  
Commonly known as:  KENALOG Apply  to affected area two (2) times a day. use thin layer to affected area  
  
 valsartan 40 mg tablet Commonly known as:  DIOVAN Take 1 Tab by mouth daily. warfarin 2.5 mg tablet Commonly known as:  COUMADIN Take 2 Tabs by mouth daily. Prescriptions Sent to Pharmacy Refills  
 febuxostat (ULORIC) 40 mg tab tablet 11 Sig: Take 1 Tab by mouth daily. Class: Normal  
 Pharmacy: [address] Ph #: [phone] Route: Oral  
 sildenafil citrate (VIAGRA) 50 mg tablet 0 Sig: Take 1 Tab by mouth as needed. Class: Normal  
 Pharmacy: [address] Ph #: [phone] Route: Oral  
  
Patient Instructions Please stop taking the allopurinol. Begin taking Uloric (febuxostat) 40 mg once daily. Take Viagra as directed. It may cause dizziness - please be careful. We will check your bloodwork on Monday and if your kidney function and potassium are OK, we will begin eplerenone (Inspra), 1 tablet daily. We will ask [facility] to evaluate you for heart transplant. Follow up in 1 month. Introducing [facility]! Dear Javier López: Thank you for requesting a [employer] account. Our records indicate that you already have an active [employer] account. You can access your account anytime at [email or web address] ([email or web address]). [email or web address] ([email or web address]) Did you know that you can access your hospital and ER discharge instructions at any time in [employer]? You can also review all of your test results from your hospital stay or ER visit. Additional Information If you have questions, please visit the Frequently Asked Questions section of the [employer] website at [email or web address]). [email or web address] ([email or web address]. Remember, [employer] is NOT to be used for urgent needs. For medical emergencies, dial 911. Now available from your iPhone and Android! Please provide this summary of care documentation to your next provider. Your primary care clinician is listed as Virgen Prater. If you have any questions after today's visit, please call [phone].

## 2017-09-07 NOTE — PROGRESS NOTES
Norman Sims 1721  LVAD Office Visit    Date of VAD implant: 12/1/2016  Cardiologist: GRAHAM  PCP: Guy Carrillo MD    HPI: Christina Yoo is a 62 y.o. male w/ PMH s/p HM II LVAD for DT, h/o torasades/SVT, HTN, lumbar stenosis s/p lumbar laminectomy, CAD (s/p CABG/sp BMSx2), gout, h/o strobic seizures, ETOH abuse and remote tobacco abuse who is seen today for monthly LVAD follow up. Since his last visit, he has been doing well. He was seen at [facility] for heart transplant evaluation. While excited, he is also concerned re: his carotid artery stenosis. He has completed cardiac rehab, but continues to exercise at the gym 3x/week     He denies any alcohol use. He denies any tobacco use. He admits to rare use of the Norco for back pain and states this will be once a week every other week. This is managed by orthopedic surgeon. ROS  Pt c/o erectile dysfunction, occasional diarrhea    Pt denies fatigue, fevers, chills, diaphoresis, headaches, lightheadedness, dizziness, syncope, visual changes, vomiting, chest pain, palpitations, SOB, cough, constipation, edema, depression, anxiety, BRBPR, melena. Subjective:  Chief Complaint:  Chief Complaint   Patient presents with    Follow-up     LVAD follow up    Diarrhea      Assessment / Plan:    Heart Failure Status: NYHA Class II    Ischemic cardiomyopathy, s/p HM II LVAD implant as DT 12/1/16 -  Alarm history reviewed. VAD interrogation: Please see VAD flow sheet for readings. Adequate clinical perfusion and function of his LVAD. No alarms or adverse events - rare PI events noted. Continue Coreg at current dose. Pt undergoing transplant evaluation at [facility]. Will also refer for evaluation at [facility]. Return to [facility] in 4 weeks. Chronic anticoagulation for LVAD, INR goal 2.0-3.0 - INR therapeutic - next due Monday. Please see anticoagulation tracker for details.     Epistaxis x 1 - resolved     Post-operative RV insufficiency - Resolved, trend T. bili    KLEVER - resolved, trend labs.     Multivessel CAD/S/p CABG x 2 (SVG to RCA, LIMA to LAD) - s/p- RCA BMS x 2. On Plavix and Coreg. No statin d/t history of elevated LFTs. No ASA  - pt on Plavix and Coumadin.     Lumbar stenosis, s/p decompressive laminectomy and instrumented fusion - managed by orthopedic surgeon.       Post operative anemia d/t acute blood loss - Hg stable, cont to monitor     H/o ETOH abuse - Pt denies any alcohol use.     SVT/A-fib/Torsades post op - S/p AICD 12/16. On Amiodarone per Dr. Denise Dalton.      HTN, MAP goal 70-90 - MAP 84 today, continue Coreg at current dose. Will add low dose entresto if renal function is at baseline. Gout Pain - no recent flares. D/C allopurinol d/t diarrhea and begin Uloric 80 mg PO daily.      Depression/Anxiety - resolved, pt not taking Celexa    Deconditioning - Pt completed Cardiac Rehab and is continuing exercise at [facility]    Carotid stenosis - plan for CTA by [facility] in future for further work up of vascular disease for transplant eval. Saw vascular at [facility], will request note. Neuropathy in feet- gabapentin ineffective in the past, lyrica too expensive. Will follow up with PCP. Erectile dysfunction - Begin sildenafil 50 mg PO PRN. Counseled on dangers of hypotension. VAD specific education - reinforced LVAD management, plan of care and medication management.       Problem List:  Patient Active Problem List   Diagnosis Code    S/P laminectomy Z98.890    Sinus tachycardia R00.0    Acute respiratory failure with hypoxia ([facility]) J96.01    Essential hypertension I10    Alcohol abuse F10.10    Chronic systolic heart failure ([facility]) I50.22    CAD, multiple vessel I25.10    Ischemic cardiomyopathy I25.5    MI (myocardial infarction) ([facility] I21.3    Sustained VT (ventricular tachycardia) ([facility]) I47.2    S/P ICD (internal cardiac defibrillator) procedure Z95.810    Chronic anticoagulation Z79.01    Left ventricular assist device present ([facility] Z95.811    Gout M10.9    ICD (implantable cardioverter-defibrillator) infection ([facility] T82. 7XXA        Past Medical History:   Diagnosis Date    Arrhythmia     SVT    CAD (coronary artery disease)     Chronic pain     back    Gout     Heart failure (HCC)     Hypertension     LVAD (left ventricular assist device) present ([facility] 12/01/2016    Raynaud disease     Seizures ([facility]     strobic seizures early 19's       Family History   Problem Relation Age of Onset    Diabetes Mother     Dementia Mother     Other Mother      carotid artery blockage    Heart Disease Father     Stroke Father     Heart Attack Father      several    Hypertension Father     Elevated Lipids Father     No Known Problems Sister     Cancer Brother      brain    Lung Disease Sister     COPD Sister     Cancer Sister      lung       Social History     Social History    Marital status:      Spouse name: N/A    Number of children: N/A    Years of education: N/A     Occupational History    Not on file. Social History Main Topics    Smoking status: Former Smoker     Packs/day: 1.50     Years: 30.00     Quit date: 2008    Smokeless tobacco: Never Used    Alcohol use No    Drug use: No    Sexual activity: Not on file     Other Topics Concern    Not on file     Social History Narrative     Medications:  No Known Allergies     Current Outpatient Prescriptions on File Prior to Visit   Medication Sig    folic acid (FOLVITE) 1 mg tablet take 1 tablet by mouth once daily    warfarin (COUMADIN) 2.5 mg tablet Take 2 Tabs by mouth daily.  valsartan (DIOVAN) 40 mg tablet Take 1 Tab by mouth daily.  carvedilol (COREG) 25 mg tablet Take 1 Tab by mouth two (2) times daily (with meals).  triamcinolone acetonide (KENALOG) 0.025 % topical cream Apply  to affected area two (2) times a day.  use thin layer to affected area    allopurinol (ZYLOPRIM) 100 mg tablet Take 1 Tab by mouth two (2) times a day.  amiodarone (CORDARONE) 200 mg tablet Take 1 Tab by mouth daily.  clopidogrel (PLAVIX) 75 mg tab Take 1 Tab by mouth daily.  pantoprazole (PROTONIX) 40 mg tablet Take 1 Tab by mouth Daily (before breakfast).  ascorbic acid, vitamin C, (VITAMIN C) 500 mg tablet Take 1 Tab by mouth two (2) times a day.  cyclobenzaprine (FLEXERIL) 5 mg tablet Take 5 mg by mouth as needed.  HYDROcodone-acetaminophen (NORCO) 5-325 mg per tablet Take 1 Tab by mouth every eight (8) hours as needed.  colchicine 0.6 mg tablet Take 1 Tab by mouth two (2) times a day. Indications: GOUT (Patient taking differently: Take 0.6 mg by mouth as needed. Indications: GOUT)    ALPRAZolam (XANAX) 0.25 mg tablet take 1 tablet by mouth once daily if needed     No current facility-administered medications on file prior to visit.          Results for orders placed or performed in visit on 08/25/17   PROTHROMBIN TIME + INR   Result Value Ref Range    INR 2.2 (H) 0.8 - 1.2    Prothrombin time 23.4 (H) 9.1 - 12.0 sec     Recent tests or procedures:   S/p Laminectomy 11/22/16  S/p LVAD implant 12/1/16  S/p AICD implant 12/16/16  S/p Ramp test 5/2017    Objective:  Physical Exam:  Visit Vitals    BP (!) 84/0 (BP 1 Location: Left arm, BP Patient Position: Sitting)    Pulse 76    Temp 97.6 °F (36.4 °C) (Oral)    Resp 16    Ht 5' 8\" (1.727 m)    Wt 166 lb 9.6 oz (75.6 kg)    SpO2 97%    BMI 25.33 kg/m2      MAP: 84    VAD data:      Visit Vitals    BP (!) 84/0 (BP 1 Location: Left arm, BP Patient Position: Sitting)    Pulse 76    Temp 97.6 °F (36.4 °C) (Oral)    Resp 16    Ht 5' 8\" (1.727 m)    Wt 166 lb 9.6 oz (75.6 kg)    SpO2 97%    BMI 25.33 kg/m2        LVAD (Heartmate)  Pump Speed (RPM): 8800  Pump Flow (LPM): 4.3  PI (Pulsitility Index): 7.2  Power: 4.6       Exam:  Gen:  WA, WN, NAD   CVS:  +LVAD hum, S1/S2  Pul:  CTA b/l (-) r,r,w  Abd:  +BS, soft, NT,ND  Ext: trace b/l LE edema, cool extremities   Neuro: No obvious deficits     Drive Line Exam:  Stabilization device intact: yes  Line inspected for damage: yes  Appearance: no edema, erythema, drainage   Sterile dressing changed per policy: no  Frequency of dressing change at home: every other day  Frequency of use of stabilization device: 100%    Follow-up Disposition:  Return in about 1 month (around 10/7/2017). Thank you for letting us see him with you.      Stefani Hendricks NP  VAD Coordinator  [address]  Office: [phone]  [facility] VAD Pager: [phone]

## 2017-09-07 NOTE — PATIENT INSTRUCTIONS
Please stop taking the allopurinol. Begin taking Uloric (febuxostat) 40 mg once daily. Take Viagra as directed. It may cause dizziness - please be careful. We will check your bloodwork on Monday and if your kidney function and potassium are OK, we will begin eplerenone (Inspra), 1 tablet daily. We will ask [facility] to evaluate you for heart transplant. Follow up in 1 month.

## 2017-09-08 ENCOUNTER — TELEPHONE (OUTPATIENT)
Dept: CARDIOLOGY CLINIC | Age: 62
End: 2017-09-08

## 2017-09-08 PROBLEM — N52.9 ERECTILE DYSFUNCTION: Status: ACTIVE | Noted: 2017-09-08

## 2017-09-11 ENCOUNTER — TELEPHONE (OUTPATIENT)
Dept: CARDIOLOGY CLINIC | Age: 62
End: 2017-09-11

## 2017-09-11 NOTE — TELEPHONE ENCOUNTER
PA in process for patient for viagra-insurance company wants him to try cialis-please advise, thank you. Per Dr. Evelina Galvan, will order Cialis instead of viagra because unable to get prior auth for viagra. Patient given instructions, e-script sent to [facility] aid for cialis.

## 2017-09-12 ENCOUNTER — TELEPHONE ANTICOAG (OUTPATIENT)
Dept: CARDIOLOGY CLINIC | Age: 62
End: 2017-09-12

## 2017-09-12 RX ORDER — TADALAFIL 10 MG/1
10 TABLET ORAL AS NEEDED
Qty: 30 TAB | Refills: 1 | Status: SHIPPED | OUTPATIENT
Start: 2017-09-12 | End: 2017-10-12 | Stop reason: SDUPTHER

## 2017-09-13 LAB
ALBUMIN SERPL-MCNC: 4.2 G/DL (ref 3.6–4.8)
ALBUMIN/GLOB SERPL: 1.6 {RATIO} (ref 1.2–2.2)
ALP SERPL-CCNC: 78 IU/L (ref 39–117)
ALT SERPL-CCNC: 19 IU/L (ref 0–44)
AST SERPL-CCNC: 19 IU/L (ref 0–40)
BILIRUB SERPL-MCNC: 0.3 MG/DL (ref 0–1.2)
BUN SERPL-MCNC: 16 MG/DL (ref 8–27)
BUN/CREAT SERPL: 14 (ref 10–24)
CALCIUM SERPL-MCNC: 9.1 MG/DL (ref 8.6–10.2)
CHLORIDE SERPL-SCNC: 97 MMOL/L (ref 96–106)
CO2 SERPL-SCNC: 26 MMOL/L (ref 18–29)
CREAT SERPL-MCNC: 1.16 MG/DL (ref 0.76–1.27)
ERYTHROCYTE [DISTWIDTH] IN BLOOD BY AUTOMATED COUNT: 14.8 % (ref 12.3–15.4)
GLOBULIN SER CALC-MCNC: 2.7 G/DL (ref 1.5–4.5)
GLUCOSE SERPL-MCNC: 79 MG/DL (ref 65–99)
HCT VFR BLD AUTO: 35.1 % (ref 37.5–51)
HGB BLD-MCNC: 11.1 G/DL (ref 12.6–17.7)
HGB FREE PLAS-MCNC: <0.2 MG/DL (ref 0–4.9)
INR PPP: 3 (ref 0.8–1.2)
LDH SERPL-CCNC: 260 IU/L (ref 121–224)
MCH RBC QN AUTO: 27.4 PG (ref 26.6–33)
MCHC RBC AUTO-ENTMCNC: 31.6 G/DL (ref 31.5–35.7)
MCV RBC AUTO: 87 FL (ref 79–97)
NT-PROBNP SERPL-MCNC: 1138 PG/ML (ref 0–210)
PLATELET # BLD AUTO: 484 X10E3/UL (ref 150–379)
POTASSIUM SERPL-SCNC: 5.4 MMOL/L (ref 3.5–5.2)
PROT SERPL-MCNC: 6.9 G/DL (ref 6–8.5)
PROTHROMBIN TIME: 29.8 SEC (ref 9.1–12)
RBC # BLD AUTO: 4.05 X10E6/UL (ref 4.14–5.8)
SODIUM SERPL-SCNC: 137 MMOL/L (ref 134–144)
WBC # BLD AUTO: 11.7 X10E3/UL (ref 3.4–10.8)

## 2017-09-21 DIAGNOSIS — I50.22 CHRONIC SYSTOLIC HEART FAILURE (HCC): Primary | ICD-10-CM

## 2017-09-21 DIAGNOSIS — Z95.811 LEFT VENTRICULAR ASSIST DEVICE PRESENT (HCC): ICD-10-CM

## 2017-09-21 DIAGNOSIS — Z79.01 CHRONIC ANTICOAGULATION: ICD-10-CM

## 2017-09-22 ENCOUNTER — TELEPHONE (OUTPATIENT)
Dept: CARDIOLOGY CLINIC | Age: 62
End: 2017-09-22

## 2017-09-22 NOTE — TELEPHONE ENCOUNTER
I spoke with patient reminding him to have blood drawn on Monday. He wanted to know if he can wait another week since his INRs have been good the last 3 times. I sent this to Linh Dumont for review and instructions. Per [facility], it is ok to wait until next week. Patient was notified.

## 2017-09-29 ENCOUNTER — TELEPHONE (OUTPATIENT)
Dept: CARDIOLOGY CLINIC | Age: 62
End: 2017-09-29

## 2017-09-29 NOTE — TELEPHONE ENCOUNTER
Spoke with patient to scheduled his next follow up appointment.     Scheduled for Thursday October 12th at 1pm

## 2017-09-29 NOTE — TELEPHONE ENCOUNTER
Telephone Call RE:  Lab Reminder      Outcome:     [x] Patient verbalizes understanding    [] Unable to reach   [] Left message              []       Tiana Ron RN

## 2017-10-02 ENCOUNTER — TELEPHONE ANTICOAG (OUTPATIENT)
Dept: CARDIOLOGY CLINIC | Age: 62
End: 2017-10-02

## 2017-10-02 LAB
INR PPP: 3.3 (ref 0.8–1.2)
PROTHROMBIN TIME: 32.8 SEC (ref 9.1–12)

## 2017-10-04 ENCOUNTER — TELEPHONE ANTICOAG (OUTPATIENT)
Dept: CARDIOLOGY CLINIC | Age: 62
End: 2017-10-04

## 2017-10-05 DIAGNOSIS — Z95.811 LEFT VENTRICULAR ASSIST DEVICE PRESENT (HCC): ICD-10-CM

## 2017-10-05 DIAGNOSIS — Z79.01 CHRONIC ANTICOAGULATION: ICD-10-CM

## 2017-10-05 DIAGNOSIS — I25.5 ISCHEMIC CARDIOMYOPATHY: ICD-10-CM

## 2017-10-05 DIAGNOSIS — R06.02 SHORTNESS OF BREATH: ICD-10-CM

## 2017-10-05 DIAGNOSIS — I50.22 CHRONIC SYSTOLIC HEART FAILURE (HCC): Primary | ICD-10-CM

## 2017-10-06 ENCOUNTER — TELEPHONE (OUTPATIENT)
Dept: CARDIOLOGY CLINIC | Age: 62
End: 2017-10-06

## 2017-10-09 ENCOUNTER — TELEPHONE ANTICOAG (OUTPATIENT)
Dept: CARDIOLOGY CLINIC | Age: 62
End: 2017-10-09

## 2017-10-11 ENCOUNTER — TELEPHONE (OUTPATIENT)
Dept: CARDIOLOGY CLINIC | Age: 62
End: 2017-10-11

## 2017-10-11 LAB
ALBUMIN SERPL-MCNC: 4.3 G/DL (ref 3.6–4.8)
ALBUMIN/GLOB SERPL: 1.7 {RATIO} (ref 1.2–2.2)
ALP SERPL-CCNC: 80 IU/L (ref 39–117)
ALT SERPL-CCNC: 21 IU/L (ref 0–44)
AST SERPL-CCNC: 22 IU/L (ref 0–40)
BILIRUB SERPL-MCNC: 0.3 MG/DL (ref 0–1.2)
BUN SERPL-MCNC: 23 MG/DL (ref 8–27)
BUN/CREAT SERPL: 18 (ref 10–24)
CALCIUM SERPL-MCNC: 9 MG/DL (ref 8.6–10.2)
CHLORIDE SERPL-SCNC: 103 MMOL/L (ref 96–106)
CO2 SERPL-SCNC: 23 MMOL/L (ref 18–29)
CREAT SERPL-MCNC: 1.29 MG/DL (ref 0.76–1.27)
ERYTHROCYTE [DISTWIDTH] IN BLOOD BY AUTOMATED COUNT: 14.7 % (ref 12.3–15.4)
GLOBULIN SER CALC-MCNC: 2.6 G/DL (ref 1.5–4.5)
GLUCOSE SERPL-MCNC: 146 MG/DL (ref 65–99)
HCT VFR BLD AUTO: 34.4 % (ref 37.5–51)
HGB BLD-MCNC: 11.5 G/DL (ref 12.6–17.7)
HGB FREE PLAS-MCNC: 0.8 MG/DL (ref 0–4.9)
INR PPP: 3.5 (ref 0.8–1.2)
LDH SERPL-CCNC: 262 IU/L (ref 121–224)
MCH RBC QN AUTO: 27.6 PG (ref 26.6–33)
MCHC RBC AUTO-ENTMCNC: 33.4 G/DL (ref 31.5–35.7)
MCV RBC AUTO: 83 FL (ref 79–97)
NT-PROBNP SERPL-MCNC: 898 PG/ML (ref 0–210)
PLATELET # BLD AUTO: 356 X10E3/UL (ref 150–379)
POTASSIUM SERPL-SCNC: 4.5 MMOL/L (ref 3.5–5.2)
PROT SERPL-MCNC: 6.9 G/DL (ref 6–8.5)
PROTHROMBIN TIME: 34.4 SEC (ref 9.1–12)
RBC # BLD AUTO: 4.16 X10E6/UL (ref 4.14–5.8)
SODIUM SERPL-SCNC: 140 MMOL/L (ref 134–144)
WBC # BLD AUTO: 8.5 X10E3/UL (ref 3.4–10.8)

## 2017-10-11 NOTE — TELEPHONE ENCOUNTER
Telephone Call RE:  Appointment reminder     Outcome:     [x] Patient confirmed appointment   [] Patient rescheduled appointment for    [] Unable to reach   [] Left message              [] Other:       Eulice Seen

## 2017-10-12 ENCOUNTER — OFFICE VISIT (OUTPATIENT)
Dept: CARDIOLOGY CLINIC | Age: 62
End: 2017-10-12

## 2017-10-12 ENCOUNTER — TELEPHONE (OUTPATIENT)
Dept: CARDIOLOGY CLINIC | Age: 62
End: 2017-10-12

## 2017-10-12 VITALS
WEIGHT: 168 LBS | HEIGHT: 68 IN | OXYGEN SATURATION: 99 % | SYSTOLIC BLOOD PRESSURE: 98 MMHG | BODY MASS INDEX: 25.46 KG/M2 | RESPIRATION RATE: 16 BRPM | TEMPERATURE: 97.7 F | HEART RATE: 64 BPM

## 2017-10-12 DIAGNOSIS — I50.22 CHRONIC SYSTOLIC HEART FAILURE (HCC): ICD-10-CM

## 2017-10-12 DIAGNOSIS — Z95.811 LEFT VENTRICULAR ASSIST DEVICE PRESENT (HCC): ICD-10-CM

## 2017-10-12 DIAGNOSIS — I25.10 CAD, MULTIPLE VESSEL: ICD-10-CM

## 2017-10-12 DIAGNOSIS — Z79.01 CHRONIC ANTICOAGULATION: ICD-10-CM

## 2017-10-12 DIAGNOSIS — T82.7XXD INFECTION INVOLVING IMPLANTABLE CARDIOVERTER-DEFIBRILLATOR (ICD), SUBSEQUENT ENCOUNTER: Primary | ICD-10-CM

## 2017-10-12 DIAGNOSIS — I25.5 ISCHEMIC CARDIOMYOPATHY: ICD-10-CM

## 2017-10-12 RX ORDER — TADALAFIL 10 MG/1
10 TABLET ORAL AS NEEDED
Qty: 30 TAB | Refills: 2 | Status: SHIPPED | OUTPATIENT
Start: 2017-10-12 | End: 2017-11-17 | Stop reason: SDUPTHER

## 2017-10-12 NOTE — PROGRESS NOTES
Norman Sims [address]  LVAD Office Visit    Date of VAD implant: 12/1/2016  Cardiologist: GRAHAM  PCP: Eneida Simmons MD    HPI: Ella Dumont is a 65 y.o. male w/ PMH s/p HM II LVAD for DT, h/o torasades/SVT, HTN, lumbar stenosis s/p lumbar laminectomy, CAD (s/p CABG/sp BMSx2), gout, h/o strobic seizures, ETOH abuse and remote tobacco abuse who is seen today for monthly LVAD follow up. Since his last visit, he has been doing well. He has been listed 1B at [facility], has continued to exercise, been compliant with his medications and follows a low sodium diet. He has no complaints today. He denies any alcohol use. He denies any tobacco use. He admits to rare use of the Norco for back pain and states this will be once a week every other week. This is managed by orthopedic surgeon. ROS  Pt c/o no complaints today     Pt denies fatigue, fevers, chills, diaphoresis, headaches, lightheadedness, dizziness, syncope, visual changes, vomiting, chest pain, palpitations, SOB, cough, constipation, edema, depression, anxiety, BRBPR, melena. Subjective:  Chief Complaint:  Chief Complaint   Patient presents with    Follow-up     denies complaints      Assessment / Plan:    Heart Failure Status: NYHA Class II    Ischemic cardiomyopathy, s/p HM II LVAD implant as DT 12/1/16 -  Alarm history reviewed. VAD interrogation: Please see VAD flow sheet for readings. Adequate clinical perfusion and function of his LVAD. No alarms or adverse events - rare PI events noted. Continue Coreg at current dose. Cont Valsartan 40mg  Currently listed 1B at [facility], needs Hep B series. Chronic anticoagulation for LVAD, INR goal 2.0-3.0 - INR therapeutic - next due Monday. Please see anticoagulation tracker for details.     Multivessel CAD/S/p CABG x 2 (SVG to RCA, LIMA to LAD) - s/p- RCA BMS x 2. On Plavix and Coreg. No statin d/t history of elevated LFTs.  No ASA  - pt on Plavix and Coumadin.     Lumbar stenosis, s/p decompressive laminectomy and instrumented fusion - managed by orthopedic surgeon.       H/o ETOH abuse - Pt denies any alcohol use.     SVT/A-fib/Torsades post op - S/p AICD 12/16. On Amiodarone per Dr. Daria Rodriguez.      HTN, MAP goal 70-90 - MAP 98 today- unusual for patient no changes to meds today, will repeat MAP in 2 weeks, Cont Coreg and Valsartan. Gout Pain - no recent flares. Uloric 80 mg PO daily.      Depression/Anxiety - resolved, pt not taking Celexa    Deconditioning - continued exercise at the gym     Carotid stenosis - asymptomatic, no interventions. Neuropathy in feet- improved, not on current therapy, will follow up with PCP     Erectile dysfunction - Cont sildenafil 50 mg PO PRN. Counseled on dangers of hypotension. VAD specific education - reinforced LVAD management, plan of care and medication management. Problem List:  Patient Active Problem List   Diagnosis Code    S/P laminectomy Z98.890    Sinus tachycardia R00.0    Acute respiratory failure with hypoxia ([facility]) J96.01    Essential hypertension I10    Alcohol abuse F10.10    Chronic systolic heart failure ([facility]) I50.22    CAD, multiple vessel I25.10    Ischemic cardiomyopathy I25.5    MI (myocardial infarction) I21.9    Sustained VT (ventricular tachycardia) ([facility]) I47.2    S/P ICD (internal cardiac defibrillator) procedure Z95.810    Chronic anticoagulation Z79.01    Left ventricular assist device present ([address] Z95.811    Gout M10.9    ICD (implantable cardioverter-defibrillator) infection ([address] T82. 7XXA    Erectile dysfunction N52.9        Past Medical History:   Diagnosis Date    Arrhythmia     SVT    CAD (coronary artery disease)     Chronic pain     back    Gout     Heart failure (HCC)     Hypertension     LVAD (left ventricular assist device) present ([address] 12/01/2016    Raynaud disease     Seizures (HCC)     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       Family History   Problem Relation Age of Onset    Diabetes Mother     Dementia Mother    [facility] Other Mother      carotid artery blockage    Heart Disease Father     Stroke Father     Heart Attack Father      several    Hypertension Father     Elevated Lipids Father     No Known Problems Sister     Cancer Brother      brain    Lung Disease Sister     COPD Sister     Cancer Sister      lung       Social History     Social History    Marital status:      Spouse name: N/A    Number of children: N/A    Years of education: N/A     Occupational History    Not on file. Social History Main Topics    Smoking status: Former Smoker     Packs/day: 1.50     Years: 30.00     Quit date: 2008    Smokeless tobacco: Never Used    Alcohol use No    Drug use: No    Sexual activity: Not on file     Other Topics Concern    Not on file     Social History Narrative     Medications:  No Known Allergies     Current Outpatient Prescriptions on File Prior to Visit   Medication Sig    febuxostat (ULORIC) 40 mg tab tablet Take 1 Tab by mouth daily.  folic acid (FOLVITE) 1 mg tablet take 1 tablet by mouth once daily    warfarin (COUMADIN) 2.5 mg tablet Take 2 Tabs by mouth daily.  valsartan (DIOVAN) 40 mg tablet Take 1 Tab by mouth daily.  ALPRAZolam (XANAX) 0.25 mg tablet take 1 tablet by mouth once daily if needed    carvedilol (COREG) 25 mg tablet Take 1 Tab by mouth two (2) times daily (with meals).  triamcinolone acetonide (KENALOG) 0.025 % topical cream Apply  to affected area two (2) times a day. use thin layer to affected area    amiodarone (CORDARONE) 200 mg tablet Take 1 Tab by mouth daily.  clopidogrel (PLAVIX) 75 mg tab Take 1 Tab by mouth daily.  pantoprazole (PROTONIX) 40 mg tablet Take 1 Tab by mouth Daily (before breakfast).  ascorbic acid, vitamin C, (VITAMIN C) 500 mg tablet Take 1 Tab by mouth two (2) times a day.  cyclobenzaprine (FLEXERIL) 5 mg tablet Take 5 mg by mouth as needed.     HYDROcodone-acetaminophen (Samina Fothergill) 5-325 mg per tablet Take 1 Tab by mouth every eight (8) hours as needed.  colchicine 0.6 mg tablet Take 1 Tab by mouth two (2) times a day. Indications: GOUT (Patient taking differently: Take 0.6 mg by mouth as needed. Indications: GOUT)     No current facility-administered medications on file prior to visit. Results for orders placed or performed in visit on 10/05/17   CBC W/O DIFF   Result Value Ref Range    WBC 8.5 3.4 - 10.8 x10E3/uL    RBC 4.16 4.14 - 5.80 x10E6/uL    HGB 11.5 (L) 12.6 - 17.7 g/dL    HCT 34.4 (L) 37.5 - 51.0 %    MCV 83 79 - 97 fL    MCH 27.6 26.6 - 33.0 pg    MCHC 33.4 31.5 - 35.7 g/dL    RDW 14.7 12.3 - 15.4 %    PLATELET 895 318 - 429 x10E3/uL   LD   Result Value Ref Range     (H) 121 - 224 IU/L   HEMOGLOBIN, FREE, PLASMA   Result Value Ref Range    Hemoglobin, Free, Plasma 0.8 0.0 - 4.9 mg/dL   METABOLIC PANEL, COMPREHENSIVE   Result Value Ref Range    Glucose 146 (H) 65 - 99 mg/dL    BUN 23 8 - 27 mg/dL    Creatinine 1.29 (H) 0.76 - 1.27 mg/dL    GFR est non-AA 60 >59 mL/min/1.73    GFR est AA 69 >59 mL/min/1.73    BUN/Creatinine ratio 18 10 - 24    Sodium 140 134 - 144 mmol/L    Potassium 4.5 3.5 - 5.2 mmol/L    Chloride 103 96 - 106 mmol/L    CO2 23 18 - 29 mmol/L    Calcium 9.0 8.6 - 10.2 mg/dL    Protein, total 6.9 6.0 - 8.5 g/dL    Albumin 4.3 3.6 - 4.8 g/dL    GLOBULIN, TOTAL 2.6 1.5 - 4.5 g/dL    A-G Ratio 1.7 1.2 - 2.2    Bilirubin, total 0.3 0.0 - 1.2 mg/dL    Alk.  phosphatase 80 39 - 117 IU/L    AST (SGOT) 22 0 - 40 IU/L    ALT (SGPT) 21 0 - 44 IU/L   NT-PRO BNP   Result Value Ref Range    PROBNP 898 (H) 0 - 210 pg/mL   PROTHROMBIN TIME + INR   Result Value Ref Range    INR 3.5 (H) 0.8 - 1.2    Prothrombin time 34.4 (H) 9.1 - 12.0 sec     Recent tests or procedures:   S/p Laminectomy 11/22/16  S/p LVAD implant 12/1/16  S/p AICD implant 12/16/16  S/p Ramp test 5/2017    Objective:  Physical Exam:  Visit Vitals    BP (!) 98/0 (BP 1 Location: Right arm, BP Patient Position: Sitting)    Pulse 64    Temp 97.7 °F (36.5 °C)    Resp 16    Ht 5' 8\" (1.727 m)    Wt 168 lb (76.2 kg)    SpO2 99%    BMI 25.54 kg/m2      MAP: 98    VAD data:  LVAD (Heartmate)  Pump Speed (RPM): 8800  Pump Flow (LPM): 4.3  PI (Pulsitility Index): 7.7  Power: 4.5     Testing  Alarms Reviewed: Yes  Back up SC speed: 8800  Back up Low Speed Limit: 8400  Emergency Equipment with Patient?: Yes  Emergency procedures reviewed?: Yes  Drive line site inspected?: Yes  Drive line intergrity inspected?: Yes  Drive line dressing changed?: No    Exam:  Gen:  WA, WN, NAD   HEENT: trachea mideline, no adenopathy   CVS:  +LVAD hum, S1/S2  Pul:  CTA b/l (-) r,r,w  Abd:  +BS, soft, NT,ND  Ext: trace b/l LE edema, cool extremities   Neuro:  No obvious deficits     Drive Line Exam:  Stabilization device intact: yes  Line inspected for damage: yes  Appearance: no edema, erythema, drainage   Sterile dressing changed per policy: no, taking a shower this evening   Frequency of dressing change at home: every other day  Frequency of use of stabilization device: 100%    Follow-up Disposition:  Return in about 1 month (around 11/12/2017). Thank you for letting us see him with you.      Jean Figueroa NP  VAD Coordinator  [address]  Office: [phone]  24h VAD Pager: [phone]

## 2017-10-12 NOTE — COMMUNICATION BODY
Norman Sims [address]  LVAD Office Visit    Date of VAD implant: 12/1/2016  Cardiologist: GRAHAM  PCP: Araceli Combs MD    HPI: Suzan Olivera is a 65 y.o. male w/ PMH s/p HM II LVAD for DT, h/o torasades/SVT, HTN, lumbar stenosis s/p lumbar laminectomy, CAD (s/p CABG/sp BMSx2), gout, h/o strobic seizures, ETOH abuse and remote tobacco abuse who is seen today for monthly LVAD follow up. Since his last visit, he has been doing well. He has been listed 1B at [facility], has continued to exercise, been compliant with his medications and follows a low sodium diet. He has no complaints today. He denies any alcohol use. He denies any tobacco use. He admits to rare use of the Norco for back pain and states this will be once a week every other week. This is managed by orthopedic surgeon. ROS  Pt c/o no complaints today     Pt denies fatigue, fevers, chills, diaphoresis, headaches, lightheadedness, dizziness, syncope, visual changes, vomiting, chest pain, palpitations, SOB, cough, constipation, edema, depression, anxiety, BRBPR, melena. Subjective:  Chief Complaint:  Chief Complaint   Patient presents with    Follow-up     denies complaints      Assessment / Plan:    Heart Failure Status: NYHA Class II    Ischemic cardiomyopathy, s/p HM II LVAD implant as DT 12/1/16 -  Alarm history reviewed. VAD interrogation: Please see VAD flow sheet for readings. Adequate clinical perfusion and function of his LVAD. No alarms or adverse events - rare PI events noted. Continue Coreg at current dose. Cont Valsartan 40mg  Currently listed 1B at [facility], needs Hep B series. Chronic anticoagulation for LVAD, INR goal 2.0-3.0 - INR therapeutic - next due Monday. Please see anticoagulation tracker for details.     Multivessel CAD/S/p CABG x 2 (SVG to RCA, LIMA to LAD) - s/p- RCA BMS x 2. On Plavix and Coreg. No statin d/t history of elevated LFTs.  No ASA  - pt on Plavix and Coumadin.     Lumbar stenosis, s/p decompressive laminectomy and instrumented fusion - managed by orthopedic surgeon.       H/o ETOH abuse - Pt denies any alcohol use.     SVT/A-fib/Torsades post op - S/p AICD 12/16. On Amiodarone per Dr. Jerica Chang.      HTN, MAP goal 70-90 - MAP 98 today- unusual for patient no changes to meds today, will repeat MAP in 2 weeks, Cont Coreg and Valsartan. Gout Pain - no recent flares. Uloric 80 mg PO daily.      Depression/Anxiety - resolved, pt not taking Celexa    Deconditioning - continued exercise at the gym     Carotid stenosis - asymptomatic, no interventions. Neuropathy in feet- improved, not on current therapy, will follow up with PCP     Erectile dysfunction - Cont sildenafil 50 mg PO PRN. Counseled on dangers of hypotension. VAD specific education - reinforced LVAD management, plan of care and medication management. Problem List:  Patient Active Problem List   Diagnosis Code    S/P laminectomy Z98.890    Sinus tachycardia R00.0    Acute respiratory failure with hypoxia ([facility]) J96.01    Essential hypertension I10    Alcohol abuse F10.10    Chronic systolic heart failure ([facility]) I50.22    CAD, multiple vessel I25.10    Ischemic cardiomyopathy I25.5    MI (myocardial infarction) I21.9    Sustained VT (ventricular tachycardia) ([facility]) I47.2    S/P ICD (internal cardiac defibrillator) procedure Z95.810    Chronic anticoagulation Z79.01    Left ventricular assist device present ([address] Z95.811    Gout M10.9    ICD (implantable cardioverter-defibrillator) infection ([address] T82. 7XXA    Erectile dysfunction N52.9        Past Medical History:   Diagnosis Date    Arrhythmia     SVT    CAD (coronary artery disease)     Chronic pain     back    Gout     Heart failure ([facility])     Hypertension     [facility] ([facility] ventricular [facility] device) [facility] 12/01/2016    Raynaud disease     Seizures (HCC)     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       Family History   Problem Relation Age of Onset    Diabetes Mother     Dementia Mother    [facility] Other Mother      carotid artery blockage    Heart Disease Father     Stroke Father     Heart Attack Father      several    Hypertension Father     Elevated Lipids Father     No Known Problems Sister     Cancer Brother      brain    Lung Disease Sister     COPD Sister     Cancer Sister      lung       Social History     Social History    Marital status:      Spouse name: N/A    Number of children: N/A    Years of education: N/A     Occupational History    Not on file. Social History Main Topics    Smoking status: Former Smoker     Packs/day: 1.50     Years: 30.00     Quit date: 2008    Smokeless tobacco: Never Used    Alcohol use No    Drug use: No    Sexual activity: Not on file     Other Topics Concern    Not on file     Social History Narrative     Medications:  No Known Allergies     Current Outpatient Prescriptions on File Prior to Visit   Medication Sig    febuxostat (ULORIC) 40 mg tab tablet Take 1 Tab by mouth daily.  folic acid (FOLVITE) 1 mg tablet take 1 tablet by mouth once daily    warfarin (COUMADIN) 2.5 mg tablet Take 2 Tabs by mouth daily.  valsartan (DIOVAN) 40 mg tablet Take 1 Tab by mouth daily.  ALPRAZolam (XANAX) 0.25 mg tablet take 1 tablet by mouth once daily if needed    carvedilol (COREG) 25 mg tablet Take 1 Tab by mouth two (2) times daily (with meals).  triamcinolone acetonide (KENALOG) 0.025 % topical cream Apply  to affected area two (2) times a day. use thin layer to affected area    amiodarone (CORDARONE) 200 mg tablet Take 1 Tab by mouth daily.  clopidogrel (PLAVIX) 75 mg tab Take 1 Tab by mouth daily.  pantoprazole (PROTONIX) 40 mg tablet Take 1 Tab by mouth Daily (before breakfast).  ascorbic acid, vitamin C, (VITAMIN C) 500 mg tablet Take 1 Tab by mouth two (2) times a day.  cyclobenzaprine (FLEXERIL) 5 mg tablet Take 5 mg by mouth as needed.     HYDROcodone-acetaminophen (Leanna Mura) 5-325 mg per tablet Take 1 Tab by mouth every eight (8) hours as needed.  colchicine 0.6 mg tablet Take 1 Tab by mouth two (2) times a day. Indications: GOUT (Patient taking differently: Take 0.6 mg by mouth as needed. Indications: GOUT)     No current facility-administered medications on file prior to visit. Results for orders placed or performed in visit on 10/05/17   CBC W/O DIFF   Result Value Ref Range    WBC 8.5 3.4 - 10.8 x10E3/uL    RBC 4.16 4.14 - 5.80 x10E6/uL    HGB 11.5 (L) 12.6 - 17.7 g/dL    HCT 34.4 (L) 37.5 - 51.0 %    MCV 83 79 - 97 fL    MCH 27.6 26.6 - 33.0 pg    MCHC 33.4 31.5 - 35.7 g/dL    RDW 14.7 12.3 - 15.4 %    PLATELET 569 218 - 335 x10E3/uL   LD   Result Value Ref Range     (H) 121 - 224 IU/L   HEMOGLOBIN, FREE, PLASMA   Result Value Ref Range    Hemoglobin, Free, Plasma 0.8 0.0 - 4.9 mg/dL   METABOLIC PANEL, COMPREHENSIVE   Result Value Ref Range    Glucose 146 (H) 65 - 99 mg/dL    BUN 23 8 - 27 mg/dL    Creatinine 1.29 (H) 0.76 - 1.27 mg/dL    GFR est non-AA 60 >59 mL/min/1.73    GFR est AA 69 >59 mL/min/1.73    BUN/Creatinine ratio 18 10 - 24    Sodium 140 134 - 144 mmol/L    Potassium 4.5 3.5 - 5.2 mmol/L    Chloride 103 96 - 106 mmol/L    CO2 23 18 - 29 mmol/L    Calcium 9.0 8.6 - 10.2 mg/dL    Protein, total 6.9 6.0 - 8.5 g/dL    Albumin 4.3 3.6 - 4.8 g/dL    GLOBULIN, TOTAL 2.6 1.5 - 4.5 g/dL    A-G Ratio 1.7 1.2 - 2.2    Bilirubin, total 0.3 0.0 - 1.2 mg/dL    Alk.  phosphatase 80 39 - 117 IU/L    AST (SGOT) 22 0 - 40 IU/L    ALT (SGPT) 21 0 - 44 IU/L   NT-PRO BNP   Result Value Ref Range    PROBNP 898 (H) 0 - 210 pg/mL   PROTHROMBIN TIME + INR   Result Value Ref Range    INR 3.5 (H) 0.8 - 1.2    Prothrombin time 34.4 (H) 9.1 - 12.0 sec     Recent tests or procedures:   S/p Laminectomy 11/22/16  S/p LVAD implant 12/1/16  S/p AICD implant 12/16/16  S/p Ramp test 5/2017    Objective:  Physical Exam:  Visit Vitals    BP (!) 98/0 (BP 1 Location: Right arm, BP Patient Position: Sitting)    Pulse 64    Temp 97.7 °F (36.5 °C)    Resp 16    Ht 5' 8\" (1.727 m)    Wt 168 lb (76.2 kg)    SpO2 99%    BMI 25.54 kg/m2      MAP: 98    VAD data:  LVAD (Heartmate)  Pump Speed (RPM): 8800  Pump Flow (LPM): 4.3  PI (Pulsitility Index): 7.7  Power: 4.5     Testing  Alarms Reviewed: Yes  Back up SC speed: 8800  Back up Low Speed Limit: 8400  Emergency Equipment with Patient?: Yes  Emergency procedures reviewed?: Yes  Drive line site inspected?: Yes  Drive line intergrity inspected?: Yes  Drive line dressing changed?: No    Exam:  Gen:  WA, WN, NAD   HEENT: trachea mideline, no adenopathy   CVS:  +LVAD hum, S1/S2  Pul:  CTA b/l (-) r,r,w  Abd:  +BS, soft, NT,ND  Ext: trace b/l LE edema, cool extremities   Neuro:  No obvious deficits     Drive Line Exam:  Stabilization device intact: yes  Line inspected for damage: yes  Appearance: no edema, erythema, drainage   Sterile dressing changed per policy: no, taking a shower this evening   Frequency of dressing change at home: every other day  Frequency of use of stabilization device: 100%    Follow-up Disposition:  Return in about 1 month (around 11/12/2017). Thank you for letting us see him with you.      Bere Henley NP  VAD Coordinator  [address]  Office: [phone]  24h VAD Pager: [phone]

## 2017-10-12 NOTE — LETTER
10/12/2017 2:07 PM 
 
Patient:  Cookie Negron YOB: 1955 Date of Visit: 10/12/2017 Dear Jennifer Rosales MD 
[facility] [address] [address] VIA Facsimile: [phone] Shaggy Steinberg MD 
[facility] [address] [facility] [address] VIA In Basket Ruth Hi MD 
[address] [facility] [address] [address] VIA In Basket 
 : Thank you for referring Mr. Shiloh Strange to me for evaluation/treatment. Below are the relevant portions of my assessment and plan of care. Norman Sims 1721 LVAD Office Visit Date of VAD implant: 12/1/2016 Cardiologist: GRAHAM 
PCP: Taj Schneider MD 
 
HPI: Cookie Negron is a 65 y.o. male w/ PMH s/p HM II LVAD for DT, h/o torasades/SVT, HTN, lumbar stenosis s/p lumbar laminectomy, CAD (s/p CABG/sp BMSx2), gout, h/o strobic seizures, ETOH abuse and remote tobacco abuse who is seen today for monthly LVAD follow up. Since his last visit, he has been doing well. He has been listed 1B at [facility], has continued to exercise, been compliant with his medications and follows a low sodium diet. He has no complaints today. He denies any alcohol use. He denies any tobacco use. He admits to rare use of the Norco for back pain and states this will be once a week every other week. This is managed by orthopedic surgeon. ROS Pt c/o no complaints today Pt denies fatigue, fevers, chills, diaphoresis, headaches, lightheadedness, dizziness, syncope, visual changes, vomiting, chest pain, palpitations, SOB, cough, constipation, edema, depression, anxiety, BRBPR, melena. Subjective: Chief Complaint: 
Chief Complaint Patient presents with  Follow-up  
  denies complaints Assessment / Plan: 
 
Heart Failure Status: NYHA Class II Ischemic cardiomyopathy, s/p HM II LVAD implant as DT 12/1/16 -  Alarm history reviewed.  VAD interrogation: Please see VAD flow sheet for readings. Adequate clinical perfusion and function of his LVAD. No alarms or adverse events - rare PI events noted. Continue Coreg at current dose. Cont Valsartan 40mg Currently listed 1B at [facility], needs Hep B series. Chronic anticoagulation for LVAD, INR goal 2.0-3.0 - INR therapeutic - next due Monday. Please see anticoagulation tracker for details. 
  
Multivessel CAD/S/p CABG x 2 (SVG to RCA, LIMA to LAD) - s/p- RCA BMS x 2. On Plavix and Coreg. No statin d/t history of elevated LFTs. No ASA  - pt on Plavix and Coumadin. 
  
Lumbar stenosis, s/p decompressive laminectomy and instrumented fusion - managed by orthopedic surgeon.   
  
H/o ETOH abuse - Pt denies any alcohol use. 
  
SVT/A-fib/Torsades post op - S/p AICD 12/16. On Amiodarone per Dr. Ruben Reese.  
  
HTN, MAP goal 70-90 - MAP 98 today- unusual for patient no changes to meds today, will repeat MAP in 2 weeks, Cont Coreg and Valsartan. Gout Pain - no recent flares. Uloric 80 mg PO daily.  
  
Depression/Anxiety - resolved, pt not taking Celexa Deconditioning - continued exercise at the gym Carotid stenosis - asymptomatic, no interventions. Neuropathy in feet- improved, not on current therapy, will follow up with PCP Erectile dysfunction - Cont sildenafil 50 mg PO PRN. Counseled on dangers of hypotension. VAD specific education - reinforced LVAD management, plan of care and medication management. Problem List: 
Patient Active Problem List  
Diagnosis Code  S/P laminectomy Z98.890  
 Sinus tachycardia R00.0  Acute respiratory failure with hypoxia ([facility]) J96.01  
 Essential hypertension I10  
 Alcohol abuse F10.10  Chronic systolic heart failure (HCC) I50.22  
 CAD, multiple vessel I25.10  
 Ischemic cardiomyopathy I25.5  MI (myocardial infarction) I21.9  Sustained VT (ventricular tachycardia) ([facility]) I47.2  S/P ICD (internal cardiac defibrillator) procedure Z95.810  Chronic anticoagulation Z79.01  
  Left ventricular assist device present ([facility] Z95.811  Gout M10.9  ICD (implantable cardioverter-defibrillator) infection ([facility] T82. 7XXA  Erectile dysfunction N52.9 Past Medical History:  
Diagnosis Date  Arrhythmia SVT  CAD (coronary artery disease)  Chronic pain   
 back  Gout  Heart failure ([facility]  Hypertension  LVAD (left ventricular assist device) present ([place] [address] 12/01/2016  Raynaud disease  Seizures ([place] [address].) 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 Family History Problem Relation Age of Onset  Diabetes Mother  Dementia Mother  Other Mother   
  carotid artery blockage  Heart Disease Father  Stroke Father  Heart Attack Father   
  several  
 Hypertension Father  Elevated Lipids Father  No Known Problems Sister  Cancer Brother   
  brain  Lung Disease Sister  COPD Sister  Cancer Sister   
  lung Social History Social History  Marital status:  Spouse name: N/A  
 Number of children: N/A  
 Years of education: N/A Occupational History  Not on file. Social History Main Topics  Smoking status: Former Smoker Packs/day: 1.50 Years: 30.00 Quit date: 2008  Smokeless tobacco: Never Used  Alcohol use No  
 Drug use: No  
 Sexual activity: Not on file Other Topics Concern  Not on file Social History Narrative Medications: 
No Known Allergies Current Outpatient Prescriptions on File Prior to Visit Medication Sig  
 febuxostat (ULORIC) 40 mg tab tablet Take 1 Tab by mouth daily.  folic acid (FOLVITE) 1 mg tablet take 1 tablet by mouth once daily  warfarin (COUMADIN) 2.5 mg tablet Take 2 Tabs by mouth daily.  valsartan (DIOVAN) 40 mg tablet Take 1 Tab by mouth daily.  ALPRAZolam (XANAX) 0.25 mg tablet take 1 tablet by mouth once daily if needed  carvedilol (COREG) 25 mg tablet Take 1 Tab by mouth two (2) times daily (with meals).  triamcinolone acetonide (KENALOG) 0.025 % topical cream Apply  to affected area two (2) times a day. use thin layer to affected area  amiodarone (CORDARONE) 200 mg tablet Take 1 Tab by mouth daily.  clopidogrel (PLAVIX) 75 mg tab Take 1 Tab by mouth daily.  pantoprazole (PROTONIX) 40 mg tablet Take 1 Tab by mouth Daily (before breakfast).  ascorbic acid, vitamin C, (VITAMIN C) 500 mg tablet Take 1 Tab by mouth two (2) times a day.  cyclobenzaprine (FLEXERIL) 5 mg tablet Take 5 mg by mouth as needed.  HYDROcodone-acetaminophen (NORCO) 5-325 mg per tablet Take 1 Tab by mouth every eight (8) hours as needed.  colchicine 0.6 mg tablet Take 1 Tab by mouth two (2) times a day. Indications: GOUT (Patient taking differently: Take 0.6 mg by mouth as needed. Indications: GOUT) No current facility-administered medications on file prior to visit. Results for orders placed or performed in visit on 10/05/17 CBC W/O DIFF Result Value Ref Range WBC 8.5 3.4 - 10.8 x10E3/uL  
 RBC 4.16 4.14 - 5.80 x10E6/uL HGB 11.5 (L) 12.6 - 17.7 g/dL HCT 34.4 (L) 37.5 - 51.0 % MCV 83 79 - 97 fL  
 MCH 27.6 26.6 - 33.0 pg  
 MCHC 33.4 31.5 - 35.7 g/dL  
 RDW 14.7 12.3 - 15.4 % PLATELET 493 114 - 174 x10E3/uL LD Result Value Ref Range  (H) 121 - 224 IU/L HEMOGLOBIN, FREE, PLASMA Result Value Ref Range Hemoglobin, Free, Plasma 0.8 0.0 - 4.9 mg/dL METABOLIC PANEL, COMPREHENSIVE Result Value Ref Range Glucose 146 (H) 65 - 99 mg/dL BUN 23 8 - 27 mg/dL Creatinine 1.29 (H) 0.76 - 1.27 mg/dL GFR est non-AA 60 >59 mL/min/1.73 GFR est AA 69 >59 mL/min/1.73  
 BUN/Creatinine ratio 18 10 - 24 Sodium 140 134 - 144 mmol/L Potassium 4.5 3.5 - 5.2 mmol/L Chloride 103 96 - 106 mmol/L  
 CO2 23 18 - 29 mmol/L Calcium 9.0 8.6 - 10.2 mg/dL Protein, total 6.9 6.0 - 8.5 g/dL Albumin 4.3 3.6 - 4.8 g/dL GLOBULIN, TOTAL 2.6 1.5 - 4.5 g/dL A-G Ratio 1.7 1.2 - 2.2 Bilirubin, total 0.3 0.0 - 1.2 mg/dL Alk. phosphatase 80 39 - 117 IU/L  
 AST (SGOT) 22 0 - 40 IU/L  
 ALT (SGPT) 21 0 - 44 IU/L  
NT-PRO BNP Result Value Ref Range PROBNP 898 (H) 0 - 210 pg/mL PROTHROMBIN TIME + INR Result Value Ref Range INR 3.5 (H) 0.8 - 1.2 Prothrombin time 34.4 (H) 9.1 - 12.0 sec Recent tests or procedures: S/p Laminectomy 11/22/16 S/p LVAD implant 12/1/16 S/p AICD implant 12/16/16 S/p Ramp test 5/2017 Objective: 
Physical Exam: 
Visit Vitals  BP (!) 98/0 (BP 1 Location: Right arm, BP Patient Position: Sitting)  Pulse 64  Temp 97.7 °F (36.5 °C)  Resp 16  
 Ht 5' 8\" (1.727 m)  Wt 168 lb (76.2 kg)  SpO2 99%  BMI 25.54 kg/m2 MAP: 98 VAD data: LVAD (Heartmate) Pump Speed (RPM): 8800 Pump Flow (LPM): 4.3 PI (Pulsitility Index): 7.7 Power: 4.5 Testing Alarms Reviewed: Yes 
Back up SC speed: 8800 Back up Low Speed Limit: 8400 Emergency Equipment with Patient?: Yes Emergency procedures reviewed?: Yes Drive line site inspected?: Yes Drive line intergrity inspected?: Yes Drive line dressing changed?: No 
 
Exam: 
Gen:  WA, WN, NAD HEENT: trachea mideline, no adenopathy CVS:  +LVAD hum, S1/S2 Pul:  CTA b/l (-) r,r,w 
Abd:  +BS, soft, NT,ND Ext: trace b/l LE edema, cool extremities Neuro:  No obvious deficits Drive Line Exam: 
Stabilization device intact: yes Line inspected for damage: yes Appearance: no edema, erythema, drainage Sterile dressing changed per policy: no, taking a shower this evening Frequency of dressing change at home: every other day Frequency of use of stabilization device: 100% Follow-up Disposition: 
Return in about 1 month (around 11/12/2017). Thank you for letting us see him with you. Garrison Amador NP 
VAD Coordinator [address] [place] [address] Office: [phone] [facility] [place] VAD Pager: [phone] If you have questions, please do not hesitate to call me. I look forward to following Mr. Mann along with you. Sincerely, Kamilla Joseph MD

## 2017-10-12 NOTE — MR AVS SNAPSHOT
Visit Information Date & Time Provider Department Dept. Phone Encounter #  
 10/12/2017  1:00 PM Gabe Desai MD [address] [id number] Follow-up Instructions Return in about 1 month (around 11/12/2017). Upcoming Health Maintenance Date Due Pneumococcal 19-64 Medium Risk (1 of 1 - PPSV23) 10/11/1975 DTaP/Tdap/Td series (1 - Tdap) 10/11/1977 ZOSTER VACCINE AGE 60> 8/11/2016 INFLUENZA AGE 9 TO ADULT 8/1/2017 FOBT Q 1 YEAR AGE 50-75 12/20/2017 Allergies as of 10/12/2017  Review Complete On: 10/12/2017 By: Olya Pereira NP No Known Allergies Current Immunizations  Reviewed on 1/20/2017 Name Date Influenza Vaccine (Quad) PF 12/21/2016 12:00 PM  
  
 Not reviewed this visit You Were Diagnosed With   
  
 Codes Comments Infection involving implantable cardioverter-defibrillator (ICD), subsequent encounter    -  Primary ICD-10-CM: T82. 7XXD ICD-9-CM: V58.89 Chronic systolic heart failure (HCC)     ICD-10-CM: I50.22 ICD-9-CM: 428.22   
 CAD, multiple vessel     ICD-10-CM: I25.10 ICD-9-CM: 414.00 Ischemic cardiomyopathy     ICD-10-CM: I25.5 ICD-9-CM: 414.8 Chronic anticoagulation     ICD-10-CM: Z79.01 
ICD-9-CM: V58.61 Left ventricular assist device present [facility])     ICD-10-CM: R00.765 ICD-9-CM: V43.21 Vitals BP Pulse Temp Resp Height(growth percentile) Weight(growth percentile) (!) 98/0 (BP 1 Location: Right arm, BP Patient Position: Sitting) 64 97.7 °F (36.5 °C) 16 5' 8\" (1.727 m) 168 lb (76.2 kg) SpO2 BMI Smoking Status 99% 25.54 kg/m2 Former Smoker Vitals History BMI and BSA Data Body Mass Index Body Surface Area 25.54 kg/m 2 1.91 m 2 Preferred Pharmacy Pharmacy Name Phone [facility] [place], [address] [place] [address] Bambi Cassandra [phone] Your Updated Medication List  
  
   
 This list is accurate as of: 10/12/17  1:31 PM.  Always use your most recent med list.  
  
  
  
  
 ALPRAZolam 0.25 mg tablet Commonly known as:  XANAX  
take 1 tablet by mouth once daily if needed  
  
 amiodarone 200 mg tablet Commonly known as:  CORDARONE Take 1 Tab by mouth daily. ascorbic acid (vitamin C) 500 mg tablet Commonly known as:  VITAMIN C Take 1 Tab by mouth two (2) times a day. carvedilol 25 mg tablet Commonly known as:  Antonia Chew Take 1 Tab by mouth two (2) times daily (with meals). clopidogrel 75 mg Tab Commonly known as:  PLAVIX Take 1 Tab by mouth daily. colchicine 0.6 mg tablet Take 1 Tab by mouth two (2) times a day. Indications: GOUT  
  
 cyclobenzaprine 5 mg tablet Commonly known as:  FLEXERIL Take 5 mg by mouth as needed. febuxostat 40 mg Tab tablet Commonly known as:  Marlo Motts Take 1 Tab by mouth daily. folic acid 1 mg tablet Commonly known as:  FOLVITE  
take 1 tablet by mouth once daily HYDROcodone-acetaminophen 5-325 mg per tablet Commonly known as:  Lenon Gauze Take 1 Tab by mouth every eight (8) hours as needed. pantoprazole 40 mg tablet Commonly known as:  PROTONIX Take 1 Tab by mouth Daily (before breakfast). tadalafil 10 mg tablet Commonly known as:  CIALIS Take 1 Tab by mouth as needed. Indications: Erectile Dysfunction  
  
 triamcinolone acetonide 0.025 % topical cream  
Commonly known as:  KENALOG Apply  to affected area two (2) times a day. use thin layer to affected area  
  
 valsartan 40 mg tablet Commonly known as:  DIOVAN Take 1 Tab by mouth daily. warfarin 2.5 mg tablet Commonly known as:  COUMADIN Take 2 Tabs by mouth daily. We Performed the Following [place] INTERROGATION VAD IN [place] W/PHYS/QHP ANALYSIS [id number] CPT(R)] Follow-up Instructions Return in about 1 month (around 11/12/2017). Patient Instructions No changes to medications today Follow up for MAP check in 2 weeks VAD visit in 1 month Cont dressing changes 3x/week and prn We will set up the Hep B vaccine series for you Introducing [address]! Dear Marii Hdz: Thank you for requesting a [employer] account. Our records indicate that you already have an active [employer] account. You can access your account anytime at [email or web address] Did you know that you can access your hospital and ER discharge instructions at any time in [employer]? You can also review all of your test results from your hospital stay or ER visit. Additional Information If you have questions, please visit the Frequently Asked Questions section of the [employer] website at [email or web address] Remember, [employer] is NOT to be used for urgent needs. For medical emergencies, dial 911. Now available from your iPhone and Android! Please provide this summary of care documentation to your next provider. Your primary care clinician is listed as Cesar Font. If you have any questions after today's visit, please call [phone].

## 2017-10-12 NOTE — PATIENT INSTRUCTIONS
No changes to medications today     Follow up for MAP check in 2 weeks    VAD visit in 1 month     Cont dressing changes 3x/week and prn    We will set up the Hep B vaccine series for you

## 2017-10-16 ENCOUNTER — TELEPHONE ANTICOAG (OUTPATIENT)
Dept: CARDIOLOGY CLINIC | Age: 62
End: 2017-10-16

## 2017-10-16 LAB
INR PPP: 2.6 (ref 0.8–1.2)
PROTHROMBIN TIME: 25.9 SEC (ref 9.1–12)

## 2017-10-20 ENCOUNTER — TELEPHONE (OUTPATIENT)
Dept: CARDIOLOGY CLINIC | Age: 62
End: 2017-10-20

## 2017-10-20 DIAGNOSIS — Z95.811 LEFT VENTRICULAR ASSIST DEVICE PRESENT (HCC): ICD-10-CM

## 2017-10-20 DIAGNOSIS — Z79.01 CHRONIC ANTICOAGULATION: ICD-10-CM

## 2017-10-20 DIAGNOSIS — I50.22 CHRONIC SYSTOLIC HEART FAILURE (HCC): Primary | ICD-10-CM

## 2017-10-20 RX ORDER — VALSARTAN 40 MG/1
40 TABLET ORAL DAILY
Qty: 30 TAB | Refills: 2 | Status: SHIPPED | OUTPATIENT
Start: 2017-10-20 | End: 2017-11-17 | Stop reason: SDUPTHER

## 2017-10-23 ENCOUNTER — TELEPHONE (OUTPATIENT)
Dept: CARDIOLOGY CLINIC | Age: 62
End: 2017-10-23

## 2017-10-23 ENCOUNTER — TELEPHONE ANTICOAG (OUTPATIENT)
Dept: CARDIOLOGY CLINIC | Age: 62
End: 2017-10-23

## 2017-10-23 LAB
INR PPP: 3.4 (ref 0.8–1.2)
PROTHROMBIN TIME: 33.7 SEC (ref 9.1–12)

## 2017-10-25 ENCOUNTER — TELEPHONE (OUTPATIENT)
Dept: CARDIOLOGY CLINIC | Age: 62
End: 2017-10-25

## 2017-10-25 NOTE — TELEPHONE ENCOUNTER
Telephone Call RE:  Appointment reminder     Outcome:     [x] Patient confirmed appointment   [] Patient rescheduled appointment for    [] Unable to reach   [] Left message              [] Other:       Lu Soliman

## 2017-10-25 NOTE — TELEPHONE ENCOUNTER
Spoke with patient and reminded him of blood draw due tomorrow. Patient understands.
25-Oct-2017 17:40

## 2017-10-26 ENCOUNTER — OFFICE VISIT (OUTPATIENT)
Dept: CARDIOLOGY CLINIC | Age: 62
End: 2017-10-26

## 2017-10-26 ENCOUNTER — TELEPHONE (OUTPATIENT)
Dept: CARDIOLOGY CLINIC | Age: 62
End: 2017-10-26

## 2017-10-26 VITALS
SYSTOLIC BLOOD PRESSURE: 90 MMHG | OXYGEN SATURATION: 99 % | HEIGHT: 68 IN | HEART RATE: 66 BPM | RESPIRATION RATE: 20 BRPM | WEIGHT: 170 LBS | TEMPERATURE: 97.7 F | BODY MASS INDEX: 25.76 KG/M2

## 2017-10-26 DIAGNOSIS — Z95.811 LEFT VENTRICULAR ASSIST DEVICE PRESENT (HCC): Primary | ICD-10-CM

## 2017-10-26 DIAGNOSIS — I10 HYPERTENSION, ESSENTIAL: ICD-10-CM

## 2017-10-26 NOTE — PROGRESS NOTES
Norman Sims 1721  LVAD Office Visit    Date of VAD implant: 12/1/2016  Cardiologist: GRAHAM  PCP: Preet Barksdale MD    HPI: Pete Medina is a 65 y.o. male w/ PMH s/p HM II LVAD for DT, h/o torasades/SVT, HTN, lumbar stenosis s/p lumbar laminectomy, CAD (s/p CABG/sp BMSx2), gout, h/o strobic seizures, ETOH abuse and remote tobacco abuse who is seen today for MAP check. He was seen for routine office follow up 2 weeks ago at which time his MAP was noted to be 98 mmHg. He has always been normotensive, and attributes this to \"white coat syndrome\". He denies any increase in caffeine, sodium, or stress. He does note that he has not been exercising as regularly as he had been. He feels well, denies any HA, blurred vision, CP, palpitations, or other associated symptoms. He takes his medications regularly. ROS  Pt c/o no complaints today     Pt denies fatigue, fevers, chills, diaphoresis, headaches, lightheadedness, dizziness, syncope, visual changes, vomiting, chest pain, palpitations, SOB, cough, constipation, edema, depression, anxiety, BRBPR, melena. Subjective:  Chief Complaint:  Chief Complaint   Patient presents with    Follow-up     LVAD follow up, no complaints      Assessment / Plan:    Heart Failure Status: NYHA Class II    Ischemic cardiomyopathy, s/p HM II LVAD implant as DT 12/1/16:  Alarm history reviewed. VAD interrogation: Please see VAD flow sheet for readings. Adequate clinical perfusion and function of his LVAD. One NO EXTERNAL ALARM related to power surge last week - immediately switched to batteries without issue  Continue Coreg at current dose. Cont Valsartan 40mg    HTN, MAP goal 70-90 mmHg:   MAP 90 mmHg today. No medication changes - would avoid hypotension d/t KENDRA occlusion. Continue current medications, but return in 2 weeks for MAP check and routine visit.     If MAP remains elevated at that time, will consider increasing medications Encouraged to increase physical activity and perform stress relieving methods I.e meditation. VAD specific education - reinforced LVAD management, plan of care and medication management. Problem List:  Patient Active Problem List   Diagnosis Code    S/P laminectomy Z98.890    Sinus tachycardia R00.0    Acute respiratory failure with hypoxia ([facility]) J96.01    Essential hypertension I10    Alcohol abuse F10.10    Chronic systolic heart failure ([facility]) I50.22    CAD, multiple vessel I25.10    Ischemic cardiomyopathy I25.5    MI (myocardial infarction) I21.9    Sustained VT (ventricular tachycardia) ([facility]) I47.2    S/P ICD (internal cardiac defibrillator) procedure Z95.810    Chronic anticoagulation Z79.01    Left ventricular assist device present ([facility] Z95.811    Gout M10.9    ICD (implantable cardioverter-defibrillator) infection ([facility] T82. 7XXA    Erectile dysfunction N52.9        Past Medical History:   Diagnosis Date    Arrhythmia     SVT    CAD (coronary artery disease)     Chronic pain     back    Gout     Heart failure ([facility]     Hypertension     LVAD (left ventricular assist device) present ([facility] 12/01/2016    Raynaud disease     Seizures ([facility]     strobic seizures early 19's       Family History   Problem Relation Age of Onset    Diabetes Mother     Dementia Mother     Other Mother      carotid artery blockage    Heart Disease Father     Stroke Father     Heart Attack Father      several    Hypertension Father     Elevated Lipids Father     No Known Problems Sister     Cancer Brother      brain    Lung Disease Sister     COPD Sister     Cancer Sister      lung       Social History     Social History    Marital status:      Spouse name: N/A    Number of children: N/A    Years of education: N/A     Occupational History    Not on file.      Social History Main Topics    Smoking status: Former Smoker     Packs/day: 1.50     Years: 30.00     Quit date: 2008    Smokeless tobacco: Never Used    Alcohol use No    Drug use: No    Sexual activity: Not on file     Other Topics Concern    Not on file     Social History Narrative     Medications:  No Known Allergies     Current Outpatient Prescriptions on File Prior to Visit   Medication Sig    valsartan (DIOVAN) 40 mg tablet Take 1 Tab by mouth daily.  tadalafil (CIALIS) 10 mg tablet Take 1 Tab by mouth as needed. Indications: Erectile Dysfunction    febuxostat (ULORIC) 40 mg tab tablet Take 1 Tab by mouth daily.  folic acid (FOLVITE) 1 mg tablet take 1 tablet by mouth once daily    warfarin (COUMADIN) 2.5 mg tablet Take 2 Tabs by mouth daily.  carvedilol (COREG) 25 mg tablet Take 1 Tab by mouth two (2) times daily (with meals).  triamcinolone acetonide (KENALOG) 0.025 % topical cream Apply  to affected area two (2) times a day. use thin layer to affected area    amiodarone (CORDARONE) 200 mg tablet Take 1 Tab by mouth daily.  clopidogrel (PLAVIX) 75 mg tab Take 1 Tab by mouth daily.  pantoprazole (PROTONIX) 40 mg tablet Take 1 Tab by mouth Daily (before breakfast).  ascorbic acid, vitamin C, (VITAMIN C) 500 mg tablet Take 1 Tab by mouth two (2) times a day.  cyclobenzaprine (FLEXERIL) 5 mg tablet Take 5 mg by mouth as needed.  HYDROcodone-acetaminophen (NORCO) 5-325 mg per tablet Take 1 Tab by mouth every eight (8) hours as needed.  colchicine 0.6 mg tablet Take 1 Tab by mouth two (2) times a day. Indications: GOUT (Patient taking differently: Take 0.6 mg by mouth as needed. Indications: GOUT)    ALPRAZolam (XANAX) 0.25 mg tablet take 1 tablet by mouth once daily if needed     No current facility-administered medications on file prior to visit.          Results for orders placed or performed in visit on 10/20/17   PROTHROMBIN TIME + INR   Result Value Ref Range    INR 3.4 (H) 0.8 - 1.2    Prothrombin time 33.7 (H) 9.1 - 12.0 sec     Recent tests or procedures:   S/p Laminectomy 11/22/16  S/p LVAD implant 12/1/16  S/p AICD implant 12/16/16  S/p Ramp test 5/2017    Objective:  Physical Exam:  Visit Vitals    BP (!) 90/0 (BP 1 Location: Left arm, BP Patient Position: Sitting)    Pulse 66    Temp 97.7 °F (36.5 °C) (Oral)    Resp 20    Ht 5' 8\" (1.727 m)    Wt 170 lb (77.1 kg)    SpO2 99%    BMI 25.85 kg/m2      MAP: 90 mmHg     VAD data:        LVAD (Heartmate)  Pump Speed (RPM): 8800  Pump Flow (LPM): 4.6  PI (Pulsitility Index): 6.1  Power: 5.5          Exam:  Gen:  WA, WN, NAD   HEENT: trachea mideline, no adenopathy   CVS:  +LVAD hum, S1/S2  Pul:  CTA b/l (-) r,r,w  Abd:  +BS, soft, NT,ND  Ext: trace b/l LE edema, cool extremities   Neuro:  No obvious deficits     Follow-up Disposition:  Return in about 2 weeks (around 11/9/2017). Thank you for letting us see him with you.      Salina Parmar NP  VAD Coordinator  [address]  Office: [phone]  24h VAD Pager: [phone]

## 2017-10-26 NOTE — MR AVS SNAPSHOT
Visit Information Date & Time Provider Department Dept. Phone Encounter #  
 10/26/2017  1:00 PM Erin Montague MD [address] [id number] Your Appointments 11/9/2017  1:00 PM  
Follow Up with Erin Montague MD  
[address] [facility] [address]  
[address] [place] [address] [address] Upcoming Health Maintenance Date Due Pneumococcal 19-64 Medium Risk (1 of 1 - PPSV23) 10/11/1975 DTaP/Tdap/Td series (1 - Tdap) 10/11/1977 ZOSTER VACCINE AGE 60> 8/11/2016 INFLUENZA AGE 9 TO ADULT 8/1/2017 FOBT Q 1 YEAR AGE 50-75 12/20/2017 Allergies as of 10/26/2017  Review Complete On: 10/26/2017 By: Walker Frey No Known Allergies Current Immunizations  Reviewed on 1/20/2017 Name Date Influenza Vaccine (Quad) PF 12/21/2016 12:00 PM  
  
 Not reviewed this visit Vitals BP Pulse Temp Resp Height(growth percentile) Weight(growth percentile) (!) 90/0 (BP 1 Location: Left arm, BP Patient Position: Sitting) 66 97.7 °F (36.5 °C) (Oral) 20 5' 8\" (1.727 m) 170 lb (77.1 kg) SpO2 BMI Smoking Status 99% 25.85 kg/m2 Former Smoker Vitals History BMI and BSA Data Body Mass Index Body Surface Area  
 25.85 kg/m 2 1.92 m 2 Preferred Pharmacy Pharmacy Name Phone [place], [address] [place] [address] Miranda Jhonny [phone] Your Updated Medication List  
  
   
This list is accurate as of: 10/26/17  2:06 PM.  Always use your most recent med list.  
  
  
  
  
 ALPRAZolam 0.25 mg tablet Commonly known as:  XANAX  
take 1 tablet by mouth once daily if needed  
  
 amiodarone 200 mg tablet Commonly known as:  CORDARONE Take 1 Tab by mouth daily. ascorbic acid (vitamin C) 500 mg tablet Commonly known as:  VITAMIN C  
 Take 1 Tab by mouth two (2) times a day. carvedilol 25 mg tablet Commonly known as:  Mar Walton Take 1 Tab by mouth two (2) times daily (with meals). clopidogrel 75 mg Tab Commonly known as:  PLAVIX Take 1 Tab by mouth daily. colchicine 0.6 mg tablet Take 1 Tab by mouth two (2) times a day. Indications: GOUT  
  
 cyclobenzaprine 5 mg tablet Commonly known as:  FLEXERIL Take 5 mg by mouth as needed. febuxostat 40 mg Tab tablet Commonly known as:  Cassandra Prader Take 1 Tab by mouth daily. folic acid 1 mg tablet Commonly known as:  FOLVITE  
take 1 tablet by mouth once daily HYDROcodone-acetaminophen 5-325 mg per tablet Commonly known as:  Viva Fermin Take 1 Tab by mouth every eight (8) hours as needed. pantoprazole 40 mg tablet Commonly known as:  PROTONIX Take 1 Tab by mouth Daily (before breakfast). tadalafil 10 mg tablet Commonly known as:  CIALIS Take 1 Tab by mouth as needed. Indications: Erectile Dysfunction  
  
 triamcinolone acetonide 0.025 % topical cream  
Commonly known as:  KENALOG Apply  to affected area two (2) times a day. use thin layer to affected area  
  
 valsartan 40 mg tablet Commonly known as:  DIOVAN Take 1 Tab by mouth daily. warfarin 2.5 mg tablet Commonly known as:  COUMADIN Take 2 Tabs by mouth daily. Patient Instructions We will set you up with a home INR tracker. Please increase your exercise. Return in 2 weeks. Introducing [facility]! Dear Kurt Zazueta: Thank you for requesting a [employer] account. Our records indicate that you already have an active [employer] account. You can access your account anytime at [email or web address]. "[email or web address]entry Did you know that you can access your hospital and ER discharge instructions at any time in [employer]icted? You can also review all of your test results from your hospital stay or ER visit. Additional Information If you have questions, please visit the Frequently Asked Questions section of the [employer] website at [email or web address] Remember, Monstrous is NOT to be used for urgent needs. For medical emergencies, dial 911. Now available from your iPhone and Android! Please provide this summary of care documentation to your next provider. Your primary care clinician is listed as David Rayo. If you have any questions after today's visit, please call [phone].

## 2017-10-27 ENCOUNTER — TELEPHONE (OUTPATIENT)
Dept: CARDIOLOGY CLINIC | Age: 62
End: 2017-10-27

## 2017-10-27 DIAGNOSIS — Z79.01 CHRONIC ANTICOAGULATION: ICD-10-CM

## 2017-10-27 DIAGNOSIS — I50.22 CHRONIC SYSTOLIC HEART FAILURE (HCC): Primary | ICD-10-CM

## 2017-10-27 DIAGNOSIS — Z95.811 LEFT VENTRICULAR ASSIST DEVICE PRESENT (HCC): ICD-10-CM

## 2017-10-30 ENCOUNTER — TELEPHONE ANTICOAG (OUTPATIENT)
Dept: CARDIOLOGY CLINIC | Age: 62
End: 2017-10-30

## 2017-10-30 LAB
INR PPP: 1.9 (ref 0.8–1.2)
PROTHROMBIN TIME: 19 SEC (ref 9.1–12)

## 2017-11-03 ENCOUNTER — TELEPHONE (OUTPATIENT)
Dept: CARDIOLOGY CLINIC | Age: 62
End: 2017-11-03

## 2017-11-03 DIAGNOSIS — Z79.01 CHRONIC ANTICOAGULATION: ICD-10-CM

## 2017-11-03 DIAGNOSIS — I50.22 CHRONIC SYSTOLIC HEART FAILURE (HCC): Primary | ICD-10-CM

## 2017-11-03 DIAGNOSIS — Z95.811 LEFT VENTRICULAR ASSIST DEVICE PRESENT (HCC): ICD-10-CM

## 2017-11-03 NOTE — TELEPHONE ENCOUNTER
I spoke with patient's wife reminding her to have patient get blood drawn on Monday. She understands.

## 2017-11-08 ENCOUNTER — TELEPHONE (OUTPATIENT)
Dept: CARDIOLOGY CLINIC | Age: 62
End: 2017-11-08

## 2017-11-08 ENCOUNTER — TELEPHONE ANTICOAG (OUTPATIENT)
Dept: CARDIOLOGY CLINIC | Age: 62
End: 2017-11-08

## 2017-11-08 LAB
INR PPP: 2.6 (ref 0.8–1.2)
PROTHROMBIN TIME: 25.7 SEC (ref 9.1–12)

## 2017-11-08 NOTE — TELEPHONE ENCOUNTER
Telephone Call RE:  Appointment reminder     Outcome:     [x] Patient confirmed appointment   [] Patient rescheduled appointment for    [] Unable to reach   [] Left message              [] Other:       Dallin Louis

## 2017-11-09 ENCOUNTER — TELEPHONE (OUTPATIENT)
Dept: CARDIOLOGY CLINIC | Age: 62
End: 2017-11-09

## 2017-11-09 DIAGNOSIS — I50.22 CHRONIC SYSTOLIC HEART FAILURE (HCC): Primary | ICD-10-CM

## 2017-11-09 DIAGNOSIS — Z79.01 CHRONIC ANTICOAGULATION: ICD-10-CM

## 2017-11-09 DIAGNOSIS — R06.02 SHORTNESS OF BREATH: ICD-10-CM

## 2017-11-09 DIAGNOSIS — Z95.811 LEFT VENTRICULAR ASSIST DEVICE PRESENT (HCC): ICD-10-CM

## 2017-11-09 NOTE — TELEPHONE ENCOUNTER
Called [facility] to follow up on order-they state they need office notes and recent lab results of PT/INR-I faxed them to [facility]. I also called [facility] to follow up on orders for Hep B series. They cannot find the orders-I re-faxed them.

## 2017-11-09 NOTE — TELEPHONE ENCOUNTER
Patient rescheduled his follow up appointment due to his grandson who he is watching is sick.     Appointment rescheduled for Wednesday November 22nd at 11am.

## 2017-11-10 ENCOUNTER — TELEPHONE (OUTPATIENT)
Dept: CARDIOLOGY CLINIC | Age: 62
End: 2017-11-10

## 2017-11-10 NOTE — TELEPHONE ENCOUNTER
Spoke with patient about hepatitis vaccine required by [facility].   Will bring in the documentation and will complete the prescribed dosing at

## 2017-11-13 ENCOUNTER — TELEPHONE (OUTPATIENT)
Dept: CARDIOLOGY CLINIC | Age: 62
End: 2017-11-13

## 2017-11-14 ENCOUNTER — TELEPHONE (OUTPATIENT)
Dept: CARDIOLOGY CLINIC | Age: 62
End: 2017-11-14

## 2017-11-14 ENCOUNTER — TELEPHONE ANTICOAG (OUTPATIENT)
Dept: CARDIOLOGY CLINIC | Age: 62
End: 2017-11-14

## 2017-11-14 NOTE — TELEPHONE ENCOUNTER
Patient is waiting to be advised regarding hepatitis dosing. He states his wife emailed Cecilhui Avalos and she forwarded email to you. Please advise, thank you. I called patient per Muriel Keane, will have him call \[facility]\"" and schedule hep B series, as what he sent was not adequate. He states understanding and will call.

## 2017-11-15 ENCOUNTER — TELEPHONE (OUTPATIENT)
Dept: CARDIOLOGY CLINIC | Age: 62
End: 2017-11-15

## 2017-11-15 LAB
BUN SERPL-MCNC: 22 MG/DL (ref 8–27)
BUN/CREAT SERPL: 19 (ref 10–24)
CALCIUM SERPL-MCNC: 9 MG/DL (ref 8.6–10.2)
CHLORIDE SERPL-SCNC: 96 MMOL/L (ref 96–106)
CO2 SERPL-SCNC: 23 MMOL/L (ref 18–29)
CREAT SERPL-MCNC: 1.13 MG/DL (ref 0.76–1.27)
ERYTHROCYTE [DISTWIDTH] IN BLOOD BY AUTOMATED COUNT: 14.8 % (ref 12.3–15.4)
GFR SERPLBLD CREATININE-BSD FMLA CKD-EPI: 70 ML/MIN/1.73
GFR SERPLBLD CREATININE-BSD FMLA CKD-EPI: 81 ML/MIN/1.73
GLUCOSE SERPL-MCNC: 81 MG/DL (ref 65–99)
HCT VFR BLD AUTO: 35.6 % (ref 37.5–51)
HGB BLD-MCNC: 12.2 G/DL (ref 12.6–17.7)
HGB FREE PLAS-MCNC: <0.2 MG/DL (ref 0–4.9)
INR PPP: 1.9 (ref 0.8–1.2)
LDH SERPL-CCNC: 301 IU/L (ref 121–224)
MCH RBC QN AUTO: 27.9 PG (ref 26.6–33)
MCHC RBC AUTO-ENTMCNC: 34.3 G/DL (ref 31.5–35.7)
MCV RBC AUTO: 82 FL (ref 79–97)
NT-PROBNP SERPL-MCNC: 1256 PG/ML (ref 0–210)
PLATELET # BLD AUTO: 390 X10E3/UL (ref 150–379)
POTASSIUM SERPL-SCNC: 5.1 MMOL/L (ref 3.5–5.2)
PROTHROMBIN TIME: 18.8 SEC (ref 9.1–12)
RBC # BLD AUTO: 4.37 X10E6/UL (ref 4.14–5.8)
SODIUM SERPL-SCNC: 133 MMOL/L (ref 134–144)
WBC # BLD AUTO: 10.4 X10E3/UL (ref 3.4–10.8)

## 2017-11-17 DIAGNOSIS — I50.22 CHRONIC SYSTOLIC HEART FAILURE (HCC): Primary | ICD-10-CM

## 2017-11-17 DIAGNOSIS — Z79.01 CHRONIC ANTICOAGULATION: ICD-10-CM

## 2017-11-17 DIAGNOSIS — Z95.811 LEFT VENTRICULAR ASSIST DEVICE PRESENT (HCC): ICD-10-CM

## 2017-11-17 RX ORDER — CLOPIDOGREL BISULFATE 75 MG/1
75 TABLET ORAL DAILY
Qty: 90 TAB | Refills: 3 | Status: SHIPPED | OUTPATIENT
Start: 2017-11-17 | End: 2018-11-01

## 2017-11-17 RX ORDER — WARFARIN 2.5 MG/1
5 TABLET ORAL DAILY
Qty: 180 TAB | Refills: 5 | Status: SHIPPED | OUTPATIENT
Start: 2017-11-17 | End: 2018-08-13 | Stop reason: SDUPTHER

## 2017-11-17 RX ORDER — FEBUXOSTAT 40 MG/1
40 TABLET, FILM COATED ORAL DAILY
Qty: 90 TAB | Refills: 3 | Status: SHIPPED | OUTPATIENT
Start: 2017-11-17 | End: 2018-11-01

## 2017-11-17 RX ORDER — VALSARTAN 40 MG/1
40 TABLET ORAL DAILY
Qty: 90 TAB | Refills: 3 | Status: SHIPPED | OUTPATIENT
Start: 2017-11-17 | End: 2018-11-01

## 2017-11-17 RX ORDER — TRIAMCINOLONE ACETONIDE 0.25 MG/G
CREAM TOPICAL 2 TIMES DAILY
Qty: 15 G | Refills: 4 | Status: SHIPPED | OUTPATIENT
Start: 2017-11-17 | End: 2018-11-01

## 2017-11-17 RX ORDER — CARVEDILOL 25 MG/1
25 TABLET ORAL 2 TIMES DAILY WITH MEALS
Qty: 180 TAB | Refills: 3 | Status: SHIPPED | OUTPATIENT
Start: 2017-11-17 | End: 2018-11-01 | Stop reason: DRUGHIGH

## 2017-11-17 RX ORDER — TADALAFIL 10 MG/1
10 TABLET ORAL AS NEEDED
Qty: 30 TAB | Refills: 2 | Status: SHIPPED | OUTPATIENT
Start: 2017-11-17 | End: 2019-06-26 | Stop reason: DRUGHIGH

## 2017-11-17 RX ORDER — COLCHICINE 0.6 MG/1
0.6 TABLET ORAL AS NEEDED
Qty: 30 TAB | Refills: 3 | Status: SHIPPED | OUTPATIENT
Start: 2017-11-17 | End: 2018-11-01 | Stop reason: SDUPTHER

## 2017-11-17 RX ORDER — PANTOPRAZOLE SODIUM 40 MG/1
40 TABLET, DELAYED RELEASE ORAL
Qty: 90 TAB | Refills: 3 | Status: SHIPPED | OUTPATIENT
Start: 2017-11-17 | End: 2018-11-01

## 2017-11-17 RX ORDER — FOLIC ACID 1 MG/1
TABLET ORAL
Qty: 90 TAB | Refills: 3 | Status: SHIPPED | OUTPATIENT
Start: 2017-11-17 | End: 2017-11-22 | Stop reason: ALTCHOICE

## 2017-11-17 RX ORDER — AMIODARONE HYDROCHLORIDE 200 MG/1
200 TABLET ORAL DAILY
Qty: 90 TAB | Refills: 3 | Status: SHIPPED | OUTPATIENT
Start: 2017-11-17 | End: 2018-11-01

## 2017-11-20 ENCOUNTER — TELEPHONE (OUTPATIENT)
Dept: CARDIOLOGY CLINIC | Age: 62
End: 2017-11-20

## 2017-11-20 NOTE — TELEPHONE ENCOUNTER
I spoke with patient's wife reminding her to have patient get his blood drawn tomorrow. She understands.

## 2017-11-21 ENCOUNTER — TELEPHONE (OUTPATIENT)
Dept: CARDIOLOGY CLINIC | Age: 62
End: 2017-11-21

## 2017-11-21 ENCOUNTER — APPOINTMENT (OUTPATIENT)
Dept: INFUSION THERAPY | Age: 62
End: 2017-11-21
Payer: COMMERCIAL

## 2017-11-21 ENCOUNTER — TELEPHONE ANTICOAG (OUTPATIENT)
Dept: CARDIOLOGY CLINIC | Age: 62
End: 2017-11-21

## 2017-11-21 LAB
INR PPP: 2.5 (ref 0.8–1.2)
PROTHROMBIN TIME: 25 SEC (ref 9.1–12)

## 2017-11-22 ENCOUNTER — OFFICE VISIT (OUTPATIENT)
Dept: CARDIOLOGY CLINIC | Age: 62
End: 2017-11-22

## 2017-11-22 VITALS
RESPIRATION RATE: 20 BRPM | BODY MASS INDEX: 26.52 KG/M2 | SYSTOLIC BLOOD PRESSURE: 92 MMHG | WEIGHT: 175 LBS | HEART RATE: 64 BPM | HEIGHT: 68 IN | OXYGEN SATURATION: 96 % | TEMPERATURE: 98 F

## 2017-11-22 DIAGNOSIS — I10 ESSENTIAL HYPERTENSION: Primary | ICD-10-CM

## 2017-11-22 DIAGNOSIS — Z79.01 CHRONIC ANTICOAGULATION: ICD-10-CM

## 2017-11-22 DIAGNOSIS — I25.10 CAD, MULTIPLE VESSEL: ICD-10-CM

## 2017-11-22 DIAGNOSIS — M1A.0690 IDIOPATHIC CHRONIC GOUT OF KNEE WITHOUT TOPHUS, UNSPECIFIED LATERALITY: Chronic | ICD-10-CM

## 2017-11-22 DIAGNOSIS — I25.5 ISCHEMIC CARDIOMYOPATHY: ICD-10-CM

## 2017-11-22 DIAGNOSIS — I50.22 CHRONIC SYSTOLIC HEART FAILURE (HCC): ICD-10-CM

## 2017-11-22 DIAGNOSIS — Z95.811 LEFT VENTRICULAR ASSIST DEVICE PRESENT (HCC): ICD-10-CM

## 2017-11-22 NOTE — COMMUNICATION BODY
Norman Sims [address]  LVAD Office Visit    Date of VAD implant: 12/1/2016  Cardiologist: GRAHAM  PCP: Sylvia Best MD    HPI: Mili De León is a 65 y.o. male w/ PMH s/p HM II LVAD for DT, h/o torasades/SVT, HTN, lumbar stenosis s/p lumbar laminectomy, CAD (s/p CABG/sp BMSx2), gout, h/o strobic seizures, ETOH abuse and remote tobacco abuse who is seen today for follow up. Since his last visit, he has been feeling well, no acute complaints, compliant with medications, active as tolerated, following a low sodium diet. ROS  Pt c/o no complaints today     Pt denies fatigue, fevers, chills, diaphoresis, headaches, lightheadedness, dizziness, syncope, visual changes, vomiting, chest pain, palpitations, SOB, cough, constipation, edema, depression, anxiety, BRBPR, melena. Subjective:  Chief Complaint:  Chief Complaint   Patient presents with    Follow-up      Assessment / Plan:    Heart Failure Status: NYHA Class II    Ischemic cardiomyopathy, s/p HM II LVAD implant as DT 12/1/16, listed 1B at [facility]  Alarm history reviewed. VAD interrogation: Please see VAD flow sheet for readings. Adequate clinical perfusion and function of his LVAD. Continue Coreg at current dose. Cont Valsartan 40mg  Labs as directed  Follow up in 2 months since [facility] visit is in December     HTN, MAP goal 70-90 mmHg:   MAP 92 mmHg today. No medication changes - would avoid hypotension d/t KENDRA occlusion. Continue current medications  Encouraged to increase physical activity and perform stress relieving methods I.e meditation. VAD specific education - reinforced LVAD management, plan of care and medication management.       Problem List:  Patient Active Problem List   Diagnosis Code    S/P laminectomy Z98.890    Sinus tachycardia R00.0    Acute respiratory failure with hypoxia (HCC) J96.01    Essential hypertension I10    Alcohol abuse F10.10    Chronic systolic heart failure (HCC) I50.22    CAD, multiple vessel I25.10    Ischemic cardiomyopathy I25.5    MI (myocardial infarction) I21.9    Sustained VT (ventricular tachycardia) ([facility]) I47.2    S/P ICD (internal cardiac defibrillator) procedure Z95.810    Chronic anticoagulation Z79.01    Left ventricular assist device present ([facility] Z95.811    Gout M10.9    ICD (implantable cardioverter-defibrillator) infection ([facility] T82. 7XXA    Erectile dysfunction N52.9        Past Medical History:   Diagnosis Date    Arrhythmia     SVT    CAD (coronary artery disease)     Chronic pain     back    Gout     Heart failure ([facility])     Hypertension     LVAD (left ventricular assist device) present ([facility] 12/01/2016    Raynaud disease     [facility]     strobic seizures early 19's       Family History   Problem Relation Age of Onset    Diabetes Mother     Dementia Mother     Other Mother      carotid artery blockage    Heart Disease Father     Stroke Father     Heart Attack Father      several    Hypertension Father     Elevated Lipids Father     No Known Problems Sister     Cancer Brother      brain    Lung Disease Sister     COPD Sister     Cancer Sister      lung       Social History     Social History    Marital status:      Spouse name: N/A    Number of children: N/A    Years of education: N/A     Occupational History    Not on file. Social History Main Topics    Smoking status: Former Smoker     Packs/day: 1.50     Years: 30.00     Quit date: 2008    Smokeless tobacco: Never Used    Alcohol use No    Drug use: No    Sexual activity: Not on file     Other Topics Concern    Not on file     Social History Narrative     Medications:  No Known Allergies     Current Outpatient Prescriptions on File Prior to Visit   Medication Sig    valsartan (DIOVAN) 40 mg tablet Take 1 Tab by mouth daily.  tadalafil (CIALIS) 10 mg tablet Take 1 Tab by mouth as needed.  Indications: Erectile Dysfunction    febuxostat (ULORIC) 40 mg tab tablet Take 1 Tab by mouth daily.  colchicine 0.6 mg tablet Take 1 Tab by mouth as needed. Indications: Gout    pantoprazole (PROTONIX) 40 mg tablet Take 1 Tab by mouth Daily (before breakfast).  clopidogrel (PLAVIX) 75 mg tab Take 1 Tab by mouth daily.  amiodarone (CORDARONE) 200 mg tablet Take 1 Tab by mouth daily.  triamcinolone acetonide (KENALOG) 0.025 % topical cream Apply  to affected area two (2) times a day. use thin layer to affected area    carvedilol (COREG) 25 mg tablet Take 1 Tab by mouth two (2) times daily (with meals).  warfarin (COUMADIN) 2.5 mg tablet Take 2 Tabs by mouth daily.  ascorbic acid, vitamin C, (VITAMIN C) 500 mg tablet Take 1 Tab by mouth two (2) times a day.  HYDROcodone-acetaminophen (NORCO) 5-325 mg per tablet Take 1 Tab by mouth every eight (8) hours as needed.  ALPRAZolam (XANAX) 0.25 mg tablet take 1 tablet by mouth once daily if needed    cyclobenzaprine (FLEXERIL) 5 mg tablet Take 5 mg by mouth as needed. No current facility-administered medications on file prior to visit.          Results for orders placed or performed in visit on 11/17/17   PROTHROMBIN TIME + INR   Result Value Ref Range    INR 2.5 (H) 0.8 - 1.2    Prothrombin time 25.0 (H) 9.1 - 12.0 sec     Recent tests or procedures:   S/p Laminectomy 11/22/16  S/p LVAD implant 12/1/16  S/p AICD implant 12/16/16  S/p Ramp test 5/2017    Objective:  Physical Exam:  Visit Vitals    BP (!) 92/0 (BP 1 Location: Right arm, BP Patient Position: Sitting)    Pulse 64    Temp 98 °F (36.7 °C) (Oral)    Resp 20    Ht 5' 8\" (1.727 m)    Wt 175 lb (79.4 kg)    SpO2 96%    BMI 26.61 kg/m2      MAP: 92 mmHg     VAD data:  LVAD (Heartmate)  Pump Speed (RPM): 8800  Pump Flow (LPM): 4.9  PI (Pulsitility Index): 6.6  Power: 4.9     LVAD (Heartmate)  Pump Speed (RPM): 8800  Pump Flow (LPM): 4.9  PI (Pulsitility Index): 6.6  Power: 4.9  Testing  Alarms Reviewed: Yes  Back up SC speed: 8800  Back up Low Speed Limit: [id number]  Emergency Equipment with Patient?: Yes  Emergency procedures reviewed?: Yes  Drive line site inspected?: Yes  Drive line intergrity inspected?: Yes  Drive line dressing changed?: No  Testing  Alarms Reviewed: Yes  Back up SC speed: 8800  Back up Low Speed Limit: 8400  Emergency Equipment with Patient?: Yes  Emergency procedures reviewed?: Yes  Drive line site inspected?: Yes  Drive line intergrity inspected?: Yes  Drive line dressing changed?: No    Results for orders placed or performed in visit on 11/22/17   TX INTERROGATION VAD IN PRSON W/PHYS/QHP ANALYSIS    Narrative    Alarm history reviewed. Please see VAD flow sheet for readings. No PI events or adverse alarms noted. Settings reviewed, but no changes made. Exam:  Gen:  WA, WN, NAD   HEENT: trachea mideline, no adenopathy   CVS:  +LVAD hum, S1/S2  Pul:  CTA b/l (-) r,r,w  Abd:  +BS, soft, NT,ND  Ext: trace b/l LE edema, cool extremities   Neuro:  No obvious deficits     Follow-up Disposition:  Return in about 2 months (around 1/22/2018). Thank you for letting us see him with you.      Jp Bloom NP  VAD Coordinator  [facility] -[address]  Office: [phone]  24h VAD Pager: [phone]

## 2017-11-22 NOTE — PROGRESS NOTES
Norman Sims [address]  LVAD Office Visit    Date of VAD implant: 12/1/2016  Cardiologist: GRAHAM  PCP: Liam Montesinos MD    HPI: Roxanna Gottron is a 65 y.o. male w/ PMH s/p HM II LVAD for DT, h/o torasades/SVT, HTN, lumbar stenosis s/p lumbar laminectomy, CAD (s/p CABG/sp BMSx2), gout, h/o strobic seizures, ETOH abuse and remote tobacco abuse who is seen today for follow up. Since his last visit, he has been feeling well, no acute complaints, compliant with medications, active as tolerated, following a low sodium diet. ROS  Pt c/o no complaints today     Pt denies fatigue, fevers, chills, diaphoresis, headaches, lightheadedness, dizziness, syncope, visual changes, vomiting, chest pain, palpitations, SOB, cough, constipation, edema, depression, anxiety, BRBPR, melena. Subjective:  Chief Complaint:  Chief Complaint   Patient presents with    Follow-up      Assessment / Plan:    Heart Failure Status: NYHA Class II    Ischemic cardiomyopathy, s/p HM II LVAD implant as DT 12/1/16, listed 1B at [facility] Needs  Alarm history reviewed. VAD interrogation: Please see VAD flow sheet for readings. Adequate clinical perfusion and function of his LVAD. Continue Coreg at current dose. Cont Valsartan 40mg  Labs as directed  Follow up in 2 months since [facility] visit is in December     HTN, MAP goal 70-90 mmHg:   MAP 92 mmHg today. No medication changes - would avoid hypotension d/t KENDRA occlusion. Continue current medications  Encouraged to increase physical activity and perform stress relieving methods I.e meditation. VAD specific education - reinforced LVAD management, plan of care and medication management.       Problem List:  Patient Active Problem List   Diagnosis Code    S/P laminectomy Z98.890    Sinus tachycardia R00.0    Acute respiratory failure with hypoxia (HCC) J96.01    Essential hypertension I10    Alcohol abuse F10.10    Chronic systolic heart failure (HCC) I50.22    CAD, multiple vessel I25.10    Ischemic cardiomyopathy I25.5    MI (myocardial infarction) I21.9    Sustained VT (ventricular tachycardia) ([facility]) I47.2    S/P ICD (internal cardiac defibrillator) procedure Z95.810    Chronic anticoagulation Z79.01    Left ventricular assist device present ([facility] Z95.811    Gout M10.9    ICD (implantable cardioverter-defibrillator) infection ([facility] T82. 7XXA    Erectile dysfunction N52.9        Past Medical History:   Diagnosis Date    Arrhythmia     SVT    CAD (coronary artery disease)     Chronic pain     back    Gout     Heart failure ([facility])     Hypertension     LVAD (left ventricular assist device) present ([facility] 12/01/2016    Raynaud disease     [facility]     strobic seizures early 19's       Family History   Problem Relation Age of Onset    Diabetes Mother     Dementia Mother     Other Mother      carotid artery blockage    Heart Disease Father     Stroke Father     Heart Attack Father      several    Hypertension Father     Elevated Lipids Father     No Known Problems Sister     Cancer Brother      brain    Lung Disease Sister     COPD Sister     Cancer Sister      lung       Social History     Social History    Marital status:      Spouse name: N/A    Number of children: N/A    Years of education: N/A     Occupational History    Not on file. Social History Main Topics    Smoking status: Former Smoker     Packs/day: 1.50     Years: 30.00     Quit date: 2008    Smokeless tobacco: Never Used    Alcohol use No    Drug use: No    Sexual activity: Not on file     Other Topics Concern    Not on file     Social History Narrative     Medications:  No Known Allergies     Current Outpatient Prescriptions on File Prior to Visit   Medication Sig    valsartan (DIOVAN) 40 mg tablet Take 1 Tab by mouth daily.  tadalafil (CIALIS) 10 mg tablet Take 1 Tab by mouth as needed.  Indications: Erectile Dysfunction    febuxostat (ULORIC) 40 mg tab tablet Take 1 Tab by mouth daily.  colchicine 0.6 mg tablet Take 1 Tab by mouth as needed. Indications: Gout    pantoprazole (PROTONIX) 40 mg tablet Take 1 Tab by mouth Daily (before breakfast).  clopidogrel (PLAVIX) 75 mg tab Take 1 Tab by mouth daily.  amiodarone (CORDARONE) 200 mg tablet Take 1 Tab by mouth daily.  triamcinolone acetonide (KENALOG) 0.025 % topical cream Apply  to affected area two (2) times a day. use thin layer to affected area    carvedilol (COREG) 25 mg tablet Take 1 Tab by mouth two (2) times daily (with meals).  warfarin (COUMADIN) 2.5 mg tablet Take 2 Tabs by mouth daily.  ascorbic acid, vitamin C, (VITAMIN C) 500 mg tablet Take 1 Tab by mouth two (2) times a day.  HYDROcodone-acetaminophen (NORCO) 5-325 mg per tablet Take 1 Tab by mouth every eight (8) hours as needed.  ALPRAZolam (XANAX) 0.25 mg tablet take 1 tablet by mouth once daily if needed    cyclobenzaprine (FLEXERIL) 5 mg tablet Take 5 mg by mouth as needed. No current facility-administered medications on file prior to visit.          Results for orders placed or performed in visit on 11/17/17   PROTHROMBIN TIME + INR   Result Value Ref Range    INR 2.5 (H) 0.8 - 1.2    Prothrombin time 25.0 (H) 9.1 - 12.0 sec     Recent tests or procedures:   S/p Laminectomy 11/22/16  S/p LVAD implant 12/1/16  S/p AICD implant 12/16/16  S/p Ramp test 5/2017    Objective:  Physical Exam:  Visit Vitals    BP (!) 92/0 (BP 1 Location: Right arm, BP Patient Position: Sitting)    Pulse 64    Temp 98 °F (36.7 °C) (Oral)    Resp 20    Ht 5' 8\" (1.727 m)    Wt 175 lb (79.4 kg)    SpO2 96%    BMI 26.61 kg/m2      MAP: 92 mmHg     VAD data:  LVAD (Heartmate)  Pump Speed (RPM): 8800  Pump Flow (LPM): 4.9  PI (Pulsitility Index): 6.6  Power: 4.9     LVAD (Heartmate)  Pump Speed (RPM): 8800  Pump Flow (LPM): 4.9  PI (Pulsitility Index): 6.6  Power: 4.9  Testing  Alarms Reviewed: Yes  Back up SC speed: 8800  Back up Low Speed Limit: [id number]  Emergency Equipment with Patient?: Yes  Emergency procedures reviewed?: Yes  Drive line site inspected?: Yes  Drive line intergrity inspected?: Yes  Drive line dressing changed?: No  Testing  Alarms Reviewed: Yes  Back up SC speed: 8800  Back up Low Speed Limit: 8400  Emergency Equipment with Patient?: Yes  Emergency procedures reviewed?: Yes  Drive line site inspected?: Yes  Drive line intergrity inspected?: Yes  Drive line dressing changed?: No    Results for orders placed or performed in visit on 11/22/17   UT INTERROGATION VAD IN PRSON W/PHYS/QHP ANALYSIS    Narrative    Alarm history reviewed. Please see VAD flow sheet for readings. No PI events or adverse alarms noted. Settings reviewed, but no changes made. Exam:  Gen:  WA, WN, NAD   HEENT: trachea mideline, no adenopathy   CVS:  +LVAD hum, S1/S2  Pul:  CTA b/l (-) r,r,w  Abd:  +BS, soft, NT,ND  Ext: trace b/l LE edema, cool extremities   Neuro:  No obvious deficits     Follow-up Disposition:  Return in about 2 months (around 1/22/2018). Thank you for letting us see him with you.      Alexander Vega NP  VAD Coordinator  [facility] -[address]  Office: [phone]  24h VAD Pager: [phone]

## 2017-11-22 NOTE — PATIENT INSTRUCTIONS
No changes to your medication today     Labs as directed    Follow up in 2 months     Please watch the salt for thanksgiving

## 2017-11-22 NOTE — MR AVS SNAPSHOT
Visit Information Date & Time Provider Department Dept. Phone Encounter #  
 11/22/2017 11:00 AM Nelly Hair MD [address] [id number] Follow-up Instructions Return in about 2 months (around 1/22/2018). Upcoming Health Maintenance Date Due Pneumococcal 19-64 Medium Risk (1 of 1 - PPSV23) 10/11/1975 DTaP/Tdap/Td series (1 - Tdap) 10/11/1977 ZOSTER VACCINE AGE 60> 8/11/2016 Influenza Age 5 to Adult 8/1/2017 FOBT Q 1 YEAR AGE 50-75 12/20/2017 Allergies as of 11/22/2017  Review Complete On: 11/22/2017 By: Garrison Amador NP No Known Allergies Current Immunizations  Reviewed on 1/20/2017 Name Date Hep B Vaccine (Dialysis)  Incomplete Influenza Vaccine ([facility]) PF 12/21/2016 12:00 PM  
  
 Not reviewed this visit You Were Diagnosed With   
  
 Codes Comments Essential hypertension    -  Primary ICD-10-CM: I10 
ICD-9-CM: 401.9 Chronic systolic heart failure (HCC)     ICD-10-CM: I50.22 ICD-9-CM: 428.22   
 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 
ICD-9-CM: V58.61 Left ventricular assist device present [facility])     ICD-10-CM: Y59.054 ICD-9-CM: V43.21 Vitals BP Pulse Temp Resp Height(growth percentile) Weight(growth percentile) (!) 92/0 (BP 1 Location: Right arm, BP Patient Position: Sitting) 64 98 °F (36.7 °C) (Oral) 20 5' 8\" (1.727 m) 175 lb (79.4 kg) SpO2 BMI Smoking Status 96% 26.61 kg/m2 Former Smoker Vitals History BMI and BSA Data Body Mass Index Body Surface Area  
 26.61 kg/m 2 1.95 m 2 Preferred Pharmacy Pharmacy Name Phone [facility]/PHARMACY #[id number] [place], [place] - [address] [address] [phone] Your Updated Medication List  
  
   
This list is accurate as of: 11/22/17 12:01 PM.  Always use your most recent med list.  
  
  
  
  
 ALPRAZolam 0.25 mg tablet Commonly known as:  XANAX  
take 1 tablet by mouth once daily if needed  
  
 amiodarone 200 mg tablet Commonly known as:  CORDARONE Take 1 Tab by mouth daily. ascorbic acid (vitamin C) 500 mg tablet Commonly known as:  VITAMIN C Take 1 Tab by mouth two (2) times a day. carvedilol 25 mg tablet Commonly known as:  Carrie Spry Take 1 Tab by mouth two (2) times daily (with meals). clopidogrel 75 mg Tab Commonly known as:  PLAVIX Take 1 Tab by mouth daily. colchicine 0.6 mg tablet Take 1 Tab by mouth as needed. Indications: Gout  
  
 cyclobenzaprine 5 mg tablet Commonly known as:  FLEXERIL Take 5 mg by mouth as needed. febuxostat 40 mg Tab tablet Commonly known as:  Janeane Dines Take 1 Tab by mouth daily. HYDROcodone-acetaminophen 5-325 mg per tablet Commonly known as:  Thelbert Mayfair Take 1 Tab by mouth every eight (8) hours as needed. pantoprazole 40 mg tablet Commonly known as:  PROTONIX Take 1 Tab by mouth Daily (before breakfast). tadalafil 10 mg tablet Commonly known as:  CIALIS Take 1 Tab by mouth as needed. Indications: Erectile Dysfunction  
  
 triamcinolone acetonide 0.025 % topical cream  
Commonly known as:  KENALOG Apply  to affected area two (2) times a day. use thin layer to affected area  
  
 valsartan 40 mg tablet Commonly known as:  DIOVAN Take 1 Tab by mouth daily. warfarin 2.5 mg tablet Commonly known as:  COUMADIN Take 2 Tabs by mouth daily. We Performed the Following [place] INTERROGATION VAD IN [place] W/PHYS/QHP ANALYSIS [id number] CPT(R)] Follow-up Instructions Return in about 2 months (around 1/22/2018). To-Do List   
 11/28/2017  3:00 PM  
  Appointment with [address] at [place] [address] ([phone]) 12/05/2017 3:00 PM  
  Appointment with [address] at [place] [address] ([phone])  
  
 12/19/2017 3:00 PM  
  Appointment with [address] at [place] [address] ([phone]) Patient Instructions No changes to your medication today Labs as directed Follow up in 2 months Please watch the salt for thanksgiving Introducing [facility]! Dear Urbano: Thank you for requesting a Cinpost account. Our records indicate that you already have an active Cinpost account. You can access your account anytime at [email or web address] Did you know that you can access your hospital and ER discharge instructions at any time in Cinpost? You can also review all of your test results from your hospital stay or ER visit. Additional Information If you have questions, please visit the Frequently Asked Questions section of the Cinpost website at [email or web address] Remember, Cinpost is NOT to be used for urgent needs. For medical emergencies, dial 911. Now available from your iPhone and Android! Please provide this summary of care documentation to your next provider. Your primary care clinician is listed as Sylvia Best. If you have any questions after today's visit, please call [phone].

## 2017-11-22 NOTE — LETTER
11/22/2017 2:20 PM 
 
Patient:  Tutu Escobedo YOB: 1955 Date of Visit: 11/22/2017 [address] MD 
[place] [address] [address] [address] VIA Facsimile: [phone] Jayleen Aguilar MD 
[facility] [address] [facility] [address] VIA In Basket 
 : Thank you for referring Mr. Evelyn Cabrera to me for evaluation/treatment. Below are the relevant portions of my assessment and plan of care. Norman Sims 1725 LVAD Office Visit Date of VAD implant: 12/1/2016 Cardiologist: GRAHAM 
PCP: Nicolasa Nageotte, MD 
 
HPI: Tutu Escobedo is a 65 y.o. male w/ PMH s/p HM II LVAD for DT, h/o torasades/SVT, HTN, lumbar stenosis s/p lumbar laminectomy, CAD (s/p CABG/sp BMSx2), gout, h/o strobic seizures, ETOH abuse and remote tobacco abuse who is seen today for follow up. Since his last visit, he has been feeling well, no acute complaints, compliant with medications, active as tolerated, following a low sodium diet. ROS Pt c/o no complaints today Pt denies fatigue, fevers, chills, diaphoresis, headaches, lightheadedness, dizziness, syncope, visual changes, vomiting, chest pain, palpitations, SOB, cough, constipation, edema, depression, anxiety, BRBPR, melena. Subjective: Chief Complaint: 
Chief Complaint Patient presents with  Follow-up Assessment / Plan: 
 
Heart Failure Status: NYHA Class II Ischemic cardiomyopathy, s/p HM II LVAD implant as DT 12/1/16, listed 1B at [facility] Alarm history reviewed. VAD interrogation: Please see VAD flow sheet for readings. Adequate clinical perfusion and function of his LVAD. Continue Coreg at current dose. Cont Valsartan 40mg Labs as directed Follow up in 2 months since [facility] visit is in December HTN, MAP goal 70-90 mmHg: MAP 92 mmHg today. No medication changes - would avoid hypotension d/t KENDRA occlusion. Continue current medications Encouraged to increase physical activity and perform stress relieving methods I.e meditation. VAD specific education - reinforced LVAD management, plan of care and medication management. Problem List: 
Patient Active Problem List  
Diagnosis Code  S/P laminectomy Z98.890  
 Sinus tachycardia R00.0  Acute respiratory failure with hypoxia ([facility]) J96.01  
 Essential hypertension I10  
 Alcohol abuse F10.10  Chronic systolic heart failure ([facility]) I50.22  
 CAD, multiple vessel I25.10  
 Ischemic cardiomyopathy I25.5  MI (myocardial infarction) I21.9  Sustained VT (ventricular tachycardia) ([facility]) I47.2  S/P ICD (internal cardiac defibrillator) procedure Z95.810  Chronic anticoagulation Z79.01  Left ventricular assist device present ([facility] Z95.811  Gout M10.9  ICD (implantable cardioverter-defibrillator) infection ([address] T82. 7XXA  Erectile dysfunction N52.9 Past Medical History:  
Diagnosis Date  Arrhythmia SVT  CAD (coronary artery disease)  Chronic pain   
 back  Gout  Heart failure ([address]  Hypertension  LVAD (left ventricular assist device) present ([address] 12/01/2016  Raynaud disease  Seizures ([address] 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 Family History Problem Relation Age of Onset  Diabetes Mother  Dementia Mother  Other Mother   
  carotid artery blockage  Heart Disease Father  Stroke Father  Heart Attack Father   
  several  
 Hypertension Father  Elevated Lipids Father  No Known Problems Sister  Cancer Brother   
  brain  Lung Disease Sister  COPD Sister  Cancer Sister   
  lung Social History Social History  Marital status:  Spouse name: N/A  
 Number of children: N/A  
 Years of education: N/A Occupational History  Not on file. Social History Main Topics  Smoking status: Former Smoker Packs/day: 1.50 Years: 30.00 Quit date: 2008  Smokeless tobacco: Never Used  Alcohol use No  
 Drug use: No  
 Sexual activity: Not on file Other Topics Concern  Not on file Social History Narrative Medications: 
No Known Allergies Current Outpatient Prescriptions on File Prior to Visit Medication Sig  
 valsartan (DIOVAN) 40 mg tablet Take 1 Tab by mouth daily.  tadalafil (CIALIS) 10 mg tablet Take 1 Tab by mouth as needed. Indications: Erectile Dysfunction  febuxostat (ULORIC) 40 mg tab tablet Take 1 Tab by mouth daily.  colchicine 0.6 mg tablet Take 1 Tab by mouth as needed. Indications: Gout  pantoprazole (PROTONIX) 40 mg tablet Take 1 Tab by mouth Daily (before breakfast).  clopidogrel (PLAVIX) 75 mg tab Take 1 Tab by mouth daily.  amiodarone (CORDARONE) 200 mg tablet Take 1 Tab by mouth daily.  triamcinolone acetonide (KENALOG) 0.025 % topical cream Apply  to affected area two (2) times a day. use thin layer to affected area  carvedilol (COREG) 25 mg tablet Take 1 Tab by mouth two (2) times daily (with meals).  warfarin (COUMADIN) 2.5 mg tablet Take 2 Tabs by mouth daily.  ascorbic acid, vitamin C, (VITAMIN C) 500 mg tablet Take 1 Tab by mouth two (2) times a day.  HYDROcodone-acetaminophen (NORCO) 5-325 mg per tablet Take 1 Tab by mouth every eight (8) hours as needed.  ALPRAZolam (XANAX) 0.25 mg tablet take 1 tablet by mouth once daily if needed  cyclobenzaprine (FLEXERIL) 5 mg tablet Take 5 mg by mouth as needed. No current facility-administered medications on file prior to visit. Results for orders placed or performed in visit on 11/17/17 PROTHROMBIN TIME + INR Result Value Ref Range INR 2.5 (H) 0.8 - 1.2 Prothrombin time 25.0 (H) 9.1 - 12.0 sec Recent tests or procedures: S/p Laminectomy 11/22/16 S/p LVAD implant 12/1/16 S/p AICD implant 12/16/16 S/p Ramp test 5/2017 Objective: 
Physical Exam: 
Visit Vitals  BP (!) 92/0 (BP 1 Location: Right arm, BP Patient Position: Sitting)  Pulse 64  Temp 98 °F (36.7 °C) (Oral)  Resp 20  
 Ht 5' 8\" (1.727 m)  Wt 175 lb (79.4 kg)  SpO2 96%  BMI 26.61 kg/m2 MAP: 92 mmHg VAD data: LVAD (Heartmate) Pump Speed (RPM): 8800 Pump Flow (LPM): 4.9 PI (Pulsitility Index): 6.6 Power: 4.9 LVAD (Heartmate) Pump Speed (RPM): 8800 Pump Flow (LPM): 4.9 PI (Pulsitility Index): 6.6 Power: 4.9 Testing Alarms Reviewed: Yes 
Back up SC speed: 8800 Back up Low Speed Limit: 8400 Emergency Equipment with Patient?: Yes Emergency procedures reviewed?: Yes Drive line site inspected?: Yes Drive line intergrity inspected?: Yes Drive line dressing changed?: No 
Testing Alarms Reviewed: Yes 
Back up SC speed: 8800 Back up Low Speed Limit: 8400 Emergency Equipment with Patient?: Yes Emergency procedures reviewed?: Yes Drive line site inspected?: Yes Drive line intergrity inspected?: Yes Drive line dressing changed?: No 
 
Results for orders placed or performed in visit on 11/22/17 [place] INTERROGATION VAD IN [place] W/PHYS/QHP ANALYSIS Narrative Alarm history reviewed. Please see VAD flow sheet for readings. No PI events or adverse alarms noted. Settings reviewed, but no changes made. Exam: 
Gen:  WA, WN, NAD HEENT: trachea mideline, no adenopathy CVS:  +LVAD hum, S1/S2 Pul:  CTA b/l (-) r,r,w 
Abd:  +BS, soft, NT,ND Ext: trace b/l LE edema, cool extremities Neuro:  No obvious deficits Follow-up Disposition: 
Return in about 2 months (around 1/22/2018). Thank you for letting us see him with you. Venice Tellez NP 
VAD Coordinator [address] [place] [address] Office: [phone] 
[facility] VAD Pager: [phone] If you have questions, please do not hesitate to call me. I look forward to following Mr. Mann along with you.  
 
 
 
Sincerely, 
 
 
 Sylvie Salinas MD

## 2017-11-28 ENCOUNTER — HOSPITAL ENCOUNTER (OUTPATIENT)
Dept: INFUSION THERAPY | Age: 62
Discharge: HOME | End: 2017-11-28
Payer: COMMERCIAL

## 2017-11-28 VITALS
SYSTOLIC BLOOD PRESSURE: 126 MMHG | TEMPERATURE: 98.1 F | RESPIRATION RATE: 18 BRPM | DIASTOLIC BLOOD PRESSURE: 86 MMHG | HEART RATE: 71 BPM

## 2017-11-28 PROCEDURE — 90471 IMMUNIZATION ADMIN: CPT

## 2017-11-28 PROCEDURE — 74011250636 HC RX REV CODE- 250/636: Performed by: NURSE PRACTITIONER

## 2017-11-28 PROCEDURE — 96372 THER/PROPH/DIAG INJ SC/IM: CPT

## 2017-11-28 PROCEDURE — 90746 HEPB VACCINE 3 DOSE ADULT IM: CPT | Performed by: NURSE PRACTITIONER

## 2017-11-28 RX ADMIN — HEPATITIS B VACCINE (RECOMBINANT) 40 MCG: 40 INJECTION, SUSPENSION INTRAMUSCULAR; SUBCUTANEOUS at 15:46

## 2017-11-28 NOTE — PROGRESS NOTES
[facility] VISIT NOTE    8122  Pt arrived at [facility] ambulatory and in no distress for Week 0 of Hep B Vaccine. Assessment completed, no new complaints at this time. Medications received:  Hep B Vaccine IM    Patient Vitals for the past 12 hrs:   Temp Pulse Resp BP   11/28/17 1543 98.1 °F (36.7 °C) 71 18 126/86     Tolerated treatment well, no adverse reaction noted. Pt declined 30 minute observation. 1550  D/C'd from [facility] ambulatory and in no distress. Next appointment is 12/5/17 at 1500.

## 2017-12-01 ENCOUNTER — TELEPHONE (OUTPATIENT)
Dept: CARDIOLOGY CLINIC | Age: 62
End: 2017-12-01

## 2017-12-01 DIAGNOSIS — Z79.01 CHRONIC ANTICOAGULATION: ICD-10-CM

## 2017-12-01 DIAGNOSIS — Z95.811 LEFT VENTRICULAR ASSIST DEVICE PRESENT (HCC): ICD-10-CM

## 2017-12-01 DIAGNOSIS — I50.22 CHRONIC SYSTOLIC HEART FAILURE (HCC): Primary | ICD-10-CM

## 2017-12-01 NOTE — TELEPHONE ENCOUNTER
Patient called to reschedule his appointment for January.     Appointment is rescheduled to Tuesday February 6th

## 2017-12-04 ENCOUNTER — TELEPHONE (OUTPATIENT)
Dept: CARDIOLOGY CLINIC | Age: 62
End: 2017-12-04

## 2017-12-05 ENCOUNTER — HOSPITAL ENCOUNTER (OUTPATIENT)
Dept: INFUSION THERAPY | Age: 62
Discharge: HOME | End: 2017-12-05
Payer: COMMERCIAL

## 2017-12-05 ENCOUNTER — TELEPHONE ANTICOAG (OUTPATIENT)
Dept: CARDIOLOGY CLINIC | Age: 62
End: 2017-12-05

## 2017-12-05 VITALS
OXYGEN SATURATION: 99 % | HEART RATE: 71 BPM | DIASTOLIC BLOOD PRESSURE: 72 MMHG | RESPIRATION RATE: 18 BRPM | TEMPERATURE: 97 F | SYSTOLIC BLOOD PRESSURE: 105 MMHG

## 2017-12-05 LAB
INR PPP: 2.8 (ref 0.8–1.2)
PROTHROMBIN TIME: 27.5 SEC (ref 9.1–12)

## 2017-12-05 PROCEDURE — 90746 HEPB VACCINE 3 DOSE ADULT IM: CPT | Performed by: NURSE PRACTITIONER

## 2017-12-05 PROCEDURE — 74011250636 HC RX REV CODE- 250/636: Performed by: NURSE PRACTITIONER

## 2017-12-05 PROCEDURE — 96372 THER/PROPH/DIAG INJ SC/IM: CPT

## 2017-12-05 RX ADMIN — HEPATITIS B VACCINE (RECOMBINANT) 40 MCG: 40 INJECTION, SUSPENSION INTRAMUSCULAR; SUBCUTANEOUS at 15:10

## 2017-12-05 NOTE — PROGRESS NOTES
Problem: Knowledge Deficit  Goal: *Verbalizes understanding of procedures and medications  Outcome: Progressing Towards Goal  Pt here for second hep B injection

## 2017-12-05 NOTE — PROGRESS NOTES
[facility] VISIT NOTE    Pt arrived at [facility] ambulatory and in no distress for Day 7 Hepatitis B Vaccine. Assessment completed, pt had no complaints. Patient Vitals for the past 12 hrs:   Temp Pulse Resp BP SpO2   12/05/17 1501 97 °F (36.1 °C) 71 18 105/72 99 %     Medications received:  Hepatitis B Vaccine IM (left arm)    Tolerated treatment well, no adverse reaction noted. D/C'd from [facility] ambulatory and in no distress accompanied by  Next appointment is 12/19/17 at 3:00.

## 2017-12-08 DIAGNOSIS — I50.22 CHRONIC SYSTOLIC HEART FAILURE (HCC): Primary | ICD-10-CM

## 2017-12-08 DIAGNOSIS — Z79.01 CHRONIC ANTICOAGULATION: ICD-10-CM

## 2017-12-08 DIAGNOSIS — Z95.811 LEFT VENTRICULAR ASSIST DEVICE PRESENT (HCC): ICD-10-CM

## 2017-12-11 ENCOUNTER — TELEPHONE (OUTPATIENT)
Dept: CARDIOLOGY CLINIC | Age: 62
End: 2017-12-11

## 2017-12-12 ENCOUNTER — APPOINTMENT (OUTPATIENT)
Dept: INFUSION THERAPY | Age: 62
End: 2017-12-12
Payer: COMMERCIAL

## 2017-12-12 ENCOUNTER — TELEPHONE ANTICOAG (OUTPATIENT)
Dept: CARDIOLOGY CLINIC | Age: 62
End: 2017-12-12

## 2017-12-12 LAB
INR PPP: 2.6 (ref 0.8–1.2)
PROTHROMBIN TIME: 26.3 SEC (ref 9.1–12)

## 2017-12-19 ENCOUNTER — HOSPITAL ENCOUNTER (OUTPATIENT)
Dept: INFUSION THERAPY | Age: 62
Discharge: HOME | End: 2017-12-19
Payer: COMMERCIAL

## 2017-12-19 VITALS
DIASTOLIC BLOOD PRESSURE: 75 MMHG | OXYGEN SATURATION: 99 % | RESPIRATION RATE: 18 BRPM | TEMPERATURE: 97.3 F | HEART RATE: 87 BPM | SYSTOLIC BLOOD PRESSURE: 117 MMHG

## 2017-12-19 PROCEDURE — 90746 HEPB VACCINE 3 DOSE ADULT IM: CPT | Performed by: NURSE PRACTITIONER

## 2017-12-19 PROCEDURE — 74011250636 HC RX REV CODE- 250/636: Performed by: NURSE PRACTITIONER

## 2017-12-19 PROCEDURE — 90471 IMMUNIZATION ADMIN: CPT

## 2017-12-19 RX ADMIN — HEPATITIS B VACCINE (RECOMBINANT) 40 MCG: 40 INJECTION, SUSPENSION INTRAMUSCULAR; SUBCUTANEOUS at 15:21

## 2017-12-19 NOTE — PROGRESS NOTES
[facility] Short Visit Progress Note    1500 Pt admit to [facility] for D 21 Hepatitis B ambulatory in stable condition. Assessment completed. No new concerns voiced. Pt is LVAD patient external leads/heart in place/ back up in car. Visit Vitals    /75    Pulse 87    Temp 97.3 °F (36.3 °C)    Resp 18    SpO2 99%           Medications:  Hepatitis B vaccine    1530 Pt tolerated treatment well. D/c home ambulatory in no distress. Pt aware no additional appointments scheduled at this time.

## 2017-12-20 ENCOUNTER — TELEPHONE (OUTPATIENT)
Dept: CARDIOLOGY CLINIC | Age: 62
End: 2017-12-20

## 2017-12-20 NOTE — TELEPHONE ENCOUNTER
Received letter from [employer] [facility] stating patient is not using his machine. I called them back and verified that he used it on 12/5 and 12/12-they confirmed this and will disregard the letter.

## 2017-12-26 ENCOUNTER — TELEPHONE ANTICOAG (OUTPATIENT)
Dept: CARDIOLOGY CLINIC | Age: 62
End: 2017-12-26

## 2017-12-26 LAB — INR, EXTERNAL: 4.4

## 2017-12-28 ENCOUNTER — TELEPHONE ANTICOAG (OUTPATIENT)
Dept: CARDIOLOGY CLINIC | Age: 62
End: 2017-12-28

## 2017-12-28 LAB — INR, EXTERNAL: 2.1

## 2018-01-04 LAB — INR, EXTERNAL: 2.6

## 2018-01-05 ENCOUNTER — TELEPHONE ANTICOAG (OUTPATIENT)
Dept: CARDIOLOGY CLINIC | Age: 63
End: 2018-01-05

## 2018-01-11 ENCOUNTER — TELEPHONE ANTICOAG (OUTPATIENT)
Dept: CARDIOLOGY CLINIC | Age: 63
End: 2018-01-11

## 2018-01-11 LAB — INR, EXTERNAL: 3.9

## 2018-01-16 ENCOUNTER — TELEPHONE ANTICOAG (OUTPATIENT)
Dept: CARDIOLOGY CLINIC | Age: 63
End: 2018-01-16

## 2018-01-16 LAB — INR, EXTERNAL: 2.4

## 2018-01-19 ENCOUNTER — TELEPHONE (OUTPATIENT)
Dept: CARDIOLOGY CLINIC | Age: 63
End: 2018-01-19

## 2018-01-23 ENCOUNTER — TELEPHONE ANTICOAG (OUTPATIENT)
Dept: CARDIOLOGY CLINIC | Age: 63
End: 2018-01-23

## 2018-01-23 LAB — INR, EXTERNAL: 3.6

## 2018-01-30 ENCOUNTER — TELEPHONE ANTICOAG (OUTPATIENT)
Dept: CARDIOLOGY CLINIC | Age: 63
End: 2018-01-30

## 2018-01-30 LAB — INR, EXTERNAL: 2.8

## 2018-01-31 ENCOUNTER — TELEPHONE (OUTPATIENT)
Dept: CARDIOLOGY CLINIC | Age: 63
End: 2018-01-31

## 2018-02-07 ENCOUNTER — OFFICE VISIT (OUTPATIENT)
Dept: CARDIOLOGY CLINIC | Age: 63
End: 2018-02-07

## 2018-02-07 VITALS
TEMPERATURE: 97.9 F | WEIGHT: 181.4 LBS | SYSTOLIC BLOOD PRESSURE: 96 MMHG | BODY MASS INDEX: 27.49 KG/M2 | HEART RATE: 90 BPM | RESPIRATION RATE: 20 BRPM | HEIGHT: 68 IN | OXYGEN SATURATION: 95 %

## 2018-02-07 DIAGNOSIS — Z79.01 CHRONIC ANTICOAGULATION: ICD-10-CM

## 2018-02-07 DIAGNOSIS — Z95.811 LVAD (LEFT VENTRICULAR ASSIST DEVICE) PRESENT (HCC): ICD-10-CM

## 2018-02-07 DIAGNOSIS — I50.22 CHRONIC SYSTOLIC HEART FAILURE (HCC): Primary | ICD-10-CM

## 2018-02-07 NOTE — PATIENT INSTRUCTIONS
No changes made today to your LVAD setting or medications. You can try magnesium malate 400mg by mouth at night, can take twice per day.       Follow up [facility] clinic in 2 months

## 2018-02-07 NOTE — COMMUNICATION BODY
Norman Sims 1721  LVAD Office Visit      Date of VAD implant: 12/1/2016    Cardiologist: GRAHAM  PCP: Jason Ruiz MD        Subjective:    HPI: Isa Joya is a 65 y.o. male with a past history of chronic systolic heart failure secondary to ICM s/p LVAD implantation with HeartMate II as destination therapy, h/o torasades/SVT, HTN, lumbar stenosis s/p lumbar laminectomy, CAD (s/p CABG/sp BMSx2), gout, h/o strobic seizures, ETOH abuse and remote tobacco abuse. He is currently completing evaluation for consideration of cardiac transplant at [facility]. He presents to clinic today for routine follow up. He has no acute complaints or concerns. He denies dizziness, chest pain, palpitations, dyspnea, orthopnea, PND, melena, hematochezia, diarrhea, constipation, pain. Home LVAD Flowsheet reviewed: no  Significant VAD alarms at home: no    Chief Complaint:     Chief Complaint   Patient presents with    Follow-up         ROS:  Review of Systems   Constitutional: Negative. HENT: Negative. Eyes: Negative. Respiratory: Negative. Cardiovascular: Negative for chest pain, palpitations, orthopnea, leg swelling and PND. Gastrointestinal: Negative. Genitourinary: Negative. Musculoskeletal: Negative. Neurological: Negative.           Problem List:  Patient Active Problem List   Diagnosis Code    S/P laminectomy Z98.890    Sinus tachycardia R00.0    Acute respiratory failure with hypoxia ([facility]) J96.01    Essential hypertension I10    Alcohol abuse F10.10    Chronic systolic heart failure (HCC) I50.22    CAD, multiple vessel I25.10    Ischemic cardiomyopathy I25.5    MI (myocardial infarction) I21.9    Sustained VT (ventricular tachycardia) ([facility]) I47.2    S/P ICD (internal cardiac defibrillator) procedure Z95.810    Chronic anticoagulation Z79.01    Left ventricular assist device present ([facility] Z95.811    Gout M10.9    ICD (implantable cardioverter-defibrillator) infection ([facility] T82. 7XXA    Erectile dysfunction N52.9        Medications:  No Known Allergies     Current Outpatient Prescriptions on File Prior to Visit   Medication Sig    valsartan (DIOVAN) 40 mg tablet Take 1 Tab by mouth daily.  tadalafil (CIALIS) 10 mg tablet Take 1 Tab by mouth as needed. Indications: Erectile Dysfunction    febuxostat (ULORIC) 40 mg tab tablet Take 1 Tab by mouth daily.  colchicine 0.6 mg tablet Take 1 Tab by mouth as needed. Indications: Gout    clopidogrel (PLAVIX) 75 mg tab Take 1 Tab by mouth daily.  amiodarone (CORDARONE) 200 mg tablet Take 1 Tab by mouth daily.  triamcinolone acetonide (KENALOG) 0.025 % topical cream Apply  to affected area two (2) times a day. use thin layer to affected area    carvedilol (COREG) 25 mg tablet Take 1 Tab by mouth two (2) times daily (with meals).  warfarin (COUMADIN) 2.5 mg tablet Take 2 Tabs by mouth daily.  ascorbic acid, vitamin C, (VITAMIN C) 500 mg tablet Take 1 Tab by mouth two (2) times a day.  cyclobenzaprine (FLEXERIL) 5 mg tablet Take 5 mg by mouth as needed.  HYDROcodone-acetaminophen (NORCO) 5-325 mg per tablet Take 1 Tab by mouth every eight (8) hours as needed.  pantoprazole (PROTONIX) 40 mg tablet Take 1 Tab by mouth Daily (before breakfast).  ALPRAZolam (XANAX) 0.25 mg tablet take 1 tablet by mouth once daily if needed     No current facility-administered medications on file prior to visit. Results for orders placed or performed in visit on 01/30/18   AMB PT/INR EXTERNAL   Result Value Ref Range    INR, External 2.8            Objective:    Visit Vitals    BP (!) 96/0 (BP 1 Location: Left arm, BP Patient Position: Sitting)    Pulse 90    Temp 97.9 °F (36.6 °C) (Oral)    Resp 20    Ht 5' 8\" (1.727 m)    Wt 181 lb 6.4 oz (82.3 kg)    SpO2 95%    BMI 27.58 kg/m2      \"Pulse\" reflects auscultated HR  \"BP\" reflects mean opening pressure by doppler.      Physical Exam:   Pulm: Respirations even and nonlabored. Lung sounds CTA throughout. CV: LVAD humm noted on auscultation with typical undulation, also note a soft S1, S2 with regular rhythm and normal rate. No JVD appreciated, negative hepatojugular reflux. Radial pulses palpable, +1 bilaterally. Periph/Skin: Extremities pink, warm and dry. No edema noted. Driveline exit site: LVAD driveline site with dressing C/D/I, no erythema noted, no drainage. VAD Interrogation:  Results for orders placed or performed in visit on 02/07/18   [place] INTERROGATION VAD IN PR[place] W/PHYS/QHP ANALYSIS    Impression    LVAD (Heartmate)  Pump Speed (RPM): 8800  Pump Flow (LPM): 4.8  PI (Pulsitility Index): 7.2  Power: 4.8     Heartmate II:     Primary Controller:   Speed: 8800         Low Speed Limit: 8200      Backup Battery Replace 13 mos   Abnormal Alarms: none     Back-up Controller:   Speed: 8800     Low Speed Limit: 8200     Backup Battery Replace 14 mos       Drive Line Exam:  Stabilization device intact: yes  Line inspected for damage: yes  Sterile dressing changed per policy: no  Frequency of dressing change at home: 3 times a week  Frequency of use of stabilization device: 100%      Assessment / Plan:    Heart Failure Status: NYHA Class II    Ischemic cardiomyopathy, s/p HM II LVAD implant as DT 12/1/16:  Appears well supported on LVAD at current settings. Adequate flows and no significant alarms noted on interrogation. Settings reviewed, no changes made. Continue Coreg 25mg BID  Cont Valsartan 40mg  Currently being evaluated by [facility] for consideration of cardiac transplant     HTN, MAP goal 70-90 mmHg:   Dopplered opening pressure 96 today, however, he does have a palpable radial pulse, so I believe this to relfect a systolic pressure. No medication changes - would avoid hypotension d/t KENDRA occlusion.      Health Maintenance  Pt due for pneumococcal vaccine and Tdap, will review with [facility] and help pt coordinate getting these from PCP or at local pharmacy. VAD specific education - discussed need to change internal batteries in his primary and backup controller in the next several months. Talked about exercise with LVAD. Thank you for the opportunity to participate in Quan's care with you. If you have any questions or concerns, please do not hesitate to contact our office.       Willis Mosqueda NP    [address] [facility]  [facility], [address] [place] [address], [address] [place] [address]  Office [phone]  Fax [phone]  24h VAD Pager: [phone]

## 2018-02-07 NOTE — MR AVS SNAPSHOT
[address] Obi [place] 13 
[phone] Patient: Terrence Cheadle MRN: [id number] Visit Information Date & Time Provider Department Dept. Phone Encounter #  
 2018 11:00 AM Padmini Rico MD [address] [id number] Follow-up Instructions Return in about 2 months (around 2018) for LVAD Follow-up. Your Appointments 2018 11:00 AM  
Follow Up with Padmini Rico MD  
[address] [facility], [facility] ([facility]) Appt Note: LVAD f/u  
 [facility] [place] 13  
411 [facility] [address] [place] 13 Upcoming Health Maintenance Date Due Pneumococcal 19-64 Medium Risk (1 of 1 - PPSV23) 10/11/1975 DTaP/Tdap/Td series (1 - Tdap) 10/11/1977 ZOSTER VACCINE AGE 60> 2016 FOBT Q 1 YEAR AGE 50-75 2017 Allergies as of 2018  Review Complete On: 2018 By: Will Jerez NP No Known Allergies Current Immunizations  Reviewed on 2017 Name Date Hep B Vaccine 2017 Hep B Vaccine (Dialysis) 2017  3:21 PM, 2017  3:10 PM, 2017  3:46 PM  
 Influenza Vaccine 10/17/2017 Influenza Vaccine (Quad) PF 2016 12:00 PM  
  
 Not reviewed this visit You Were Diagnosed With   
  
 Codes Comments Chronic systolic heart failure (HCC)    -  Primary ICD-10-CM: I09.64 ICD-9-CM: 428.22 LVAD (left ventricular assist device) present [facility])     ICD-10-CM: Y15.649 ICD-9-CM: V43.21 Chronic anticoagulation     ICD-10-CM: Z79.01 
ICD-9-CM: V58.61 Vitals BP Pulse Temp Resp Height(growth percentile) Weight(growth percentile) (!) 96/0 (BP 1 Location: Left arm, BP Patient Position: Sitting) 90 97.9 °F (36.6 °C) (Oral) 20 5' 8\" (1.727 m) 181 lb 6.4 oz (82.3 kg) SpO2 BMI Smoking Status 95% 27.58 kg/m2 Former Smoker Vitals History BMI and BSA Data Body Mass Index Body Surface Area  
 27.58 kg/m 2 1.99 m 2 Preferred Pharmacy Pharmacy Name Phone [facility] #[id number] - [place], [place] - [address] [phone] Your Updated Medication List  
  
   
This list is accurate as of: 2/7/18 12:44 PM.  Always use your most recent med list.  
  
  
  
  
 ALPRAZolam 0.25 mg tablet Commonly known as:  XANAX  
take 1 tablet by mouth once daily if needed  
  
 amiodarone 200 mg tablet Commonly known as:  CORDARONE Take 1 Tab by mouth daily. ascorbic acid (vitamin C) 500 mg tablet Commonly known as:  VITAMIN C Take 1 Tab by mouth two (2) times a day. carvedilol 25 mg tablet Commonly known as:  Vikram Si Take 1 Tab by mouth two (2) times daily (with meals). clopidogrel 75 mg Tab Commonly known as:  PLAVIX Take 1 Tab by mouth daily. colchicine 0.6 mg tablet Take 1 Tab by mouth as needed. Indications: Gout  
  
 cyclobenzaprine 5 mg tablet Commonly known as:  FLEXERIL Take 5 mg by mouth as needed. febuxostat 40 mg Tab tablet Commonly known as:  Genice Solian Take 1 Tab by mouth daily. HYDROcodone-acetaminophen 5-325 mg per tablet Commonly known as:  Ingrid Gut Take 1 Tab by mouth every eight (8) hours as needed. pantoprazole 40 mg tablet Commonly known as:  PROTONIX Take 1 Tab by mouth Daily (before breakfast). tadalafil 10 mg tablet Commonly known as:  CIALIS Take 1 Tab by mouth as needed. Indications: Erectile Dysfunction  
  
 triamcinolone acetonide 0.025 % topical cream  
Commonly known as:  KENALOG Apply  to affected area two (2) times a day. use thin layer to affected area  
  
 valsartan 40 mg tablet Commonly known as:  DIOVAN Take 1 Tab by mouth daily. warfarin 2.5 mg tablet Commonly known as:  COUMADIN Take 2 Tabs by mouth daily. We Performed the Following CBC W/O DIFF [70227 CPT(R)]   
 LD [85324 CPT(R)] METABOLIC PANEL, COMPREHENSIVE [09979 CPT(R)] NT-PRO BNP [id number] CPT(R)] TN INTERROGATION VAD IN PRSON W/PHYS/QHP ANALYSIS [id number] CPT(R)] PROTHROMBIN TIME + INR [51362 CPT(R)] Follow-up Instructions Return in about 2 months (around 4/7/2018) for LVAD Follow-up. Patient Instructions No changes made today to your LVAD setting or medications. You can try magnesium malate 400mg by mouth at night, can take twice per day. Follow up [facility] clinic in 2 months Introducing [facility] SERVICES! Dear Edouard Crandall: Thank you for requesting a [employer] account. Our records indicate that you already have an active [employer] account. You can access your account anytime at [email or web address] Did you know that you can access your hospital and ER discharge instructions at any time in [employer]? You can also review all of your test results from your hospital stay or ER visit. Additional Information If you have questions, please visit the Frequently Asked Questions section of the [employer] website at [email or web address] Remember, [employer] is NOT to be used for urgent needs. For medical emergencies, dial 911. Now available from your iPhone and Android! Please provide this summary of care documentation to your next provider. Your primary care clinician is listed as Antwon Mcgee. If you have any questions after today's visit, please call [phone].

## 2018-02-07 NOTE — PROGRESS NOTES
Norman Sims 1721  LVAD Office Visit      Date of VAD implant: 12/1/2016    Cardiologist: GRAHAM  PCP: Soraya Egan MD        Subjective:    HPI: Jase Quevedo is a 65 y.o. male with a past history of chronic systolic heart failure secondary to ICM s/p LVAD implantation with HeartMate II as destination therapy, h/o torasades/SVT, HTN, lumbar stenosis s/p lumbar laminectomy, CAD (s/p CABG/sp BMSx2), gout, h/o strobic seizures, ETOH abuse and remote tobacco abuse. He is currently completing evaluation for consideration of cardiac transplant at [facility]. He presents to clinic today for routine follow up. He has no acute complaints or concerns. He denies dizziness, chest pain, palpitations, dyspnea, orthopnea, PND, melena, hematochezia, diarrhea, constipation, pain. Home LVAD Flowsheet reviewed: no  Significant VAD alarms at home: no    Chief Complaint:     Chief Complaint   Patient presents with    Follow-up         ROS:  Review of Systems   Constitutional: Negative. HENT: Negative. Eyes: Negative. Respiratory: Negative. Cardiovascular: Negative for chest pain, palpitations, orthopnea, leg swelling and PND. Gastrointestinal: Negative. Genitourinary: Negative. Musculoskeletal: Negative. Neurological: Negative.           Problem List:  Patient Active Problem List   Diagnosis Code    S/P laminectomy Z98.890    Sinus tachycardia R00.0    Acute respiratory failure with hypoxia ([facility]) J96.01    Essential hypertension I10    Alcohol abuse F10.10    Chronic systolic heart failure (HCC) I50.22    CAD, multiple vessel I25.10    Ischemic cardiomyopathy I25.5    MI (myocardial infarction) I21.9    Sustained VT (ventricular tachycardia) ([facility]) I47.2    S/P ICD (internal cardiac defibrillator) procedure Z95.810    Chronic anticoagulation Z79.01    Left ventricular assist device present ([facility] Z95.811    Gout M10.9    ICD (implantable cardioverter-defibrillator) infection ([facility] T82. 7XXA    Erectile dysfunction N52.9        Medications:  No Known Allergies     Current Outpatient Prescriptions on File Prior to Visit   Medication Sig    valsartan (DIOVAN) 40 mg tablet Take 1 Tab by mouth daily.  tadalafil (CIALIS) 10 mg tablet Take 1 Tab by mouth as needed. Indications: Erectile Dysfunction    febuxostat (ULORIC) 40 mg tab tablet Take 1 Tab by mouth daily.  colchicine 0.6 mg tablet Take 1 Tab by mouth as needed. Indications: Gout    clopidogrel (PLAVIX) 75 mg tab Take 1 Tab by mouth daily.  amiodarone (CORDARONE) 200 mg tablet Take 1 Tab by mouth daily.  triamcinolone acetonide (KENALOG) 0.025 % topical cream Apply  to affected area two (2) times a day. use thin layer to affected area    carvedilol (COREG) 25 mg tablet Take 1 Tab by mouth two (2) times daily (with meals).  warfarin (COUMADIN) 2.5 mg tablet Take 2 Tabs by mouth daily.  ascorbic acid, vitamin C, (VITAMIN C) 500 mg tablet Take 1 Tab by mouth two (2) times a day.  cyclobenzaprine (FLEXERIL) 5 mg tablet Take 5 mg by mouth as needed.  HYDROcodone-acetaminophen (NORCO) 5-325 mg per tablet Take 1 Tab by mouth every eight (8) hours as needed.  pantoprazole (PROTONIX) 40 mg tablet Take 1 Tab by mouth Daily (before breakfast).  ALPRAZolam (XANAX) 0.25 mg tablet take 1 tablet by mouth once daily if needed     No current facility-administered medications on file prior to visit. Results for orders placed or performed in visit on 01/30/18   AMB PT/INR EXTERNAL   Result Value Ref Range    INR, External 2.8            Objective:    Visit Vitals    BP (!) 96/0 (BP 1 Location: Left arm, BP Patient Position: Sitting)    Pulse 90    Temp 97.9 °F (36.6 °C) (Oral)    Resp 20    Ht 5' 8\" (1.727 m)    Wt 181 lb 6.4 oz (82.3 kg)    SpO2 95%    BMI 27.58 kg/m2      \"Pulse\" reflects auscultated HR  \"BP\" reflects mean opening pressure by doppler.      Physical Exam:   Pulm: Respirations even and nonlabored. Lung sounds CTA throughout. CV: LVAD humm noted on auscultation with typical undulation, also note a soft S1, S2 with regular rhythm and normal rate. No JVD appreciated, negative hepatojugular reflux. Radial pulses palpable, +1 bilaterally. Periph/Skin: Extremities pink, warm and dry. No edema noted. Driveline exit site: LVAD driveline site with dressing C/D/I, no erythema noted, no drainage. VAD Interrogation:  Results for orders placed or performed in visit on 02/07/18   [place] INTERROGATION VAD IN PRSON W/PHYS/QHP ANALYSIS    Impression    LVAD (Heartmate)  Pump Speed (RPM): 8800  Pump Flow (LPM): 4.8  PI (Pulsitility Index): 7.2  Power: 4.8     Heartmate II:     Primary Controller:   Speed: 8800         Low Speed Limit: 8200      Backup Battery Replace 13 mos   Abnormal Alarms: none     Back-up Controller:   Speed: 8800     Low Speed Limit: 8200     Backup Battery Replace 14 mos       Drive Line Exam:  Stabilization device intact: yes  Line inspected for damage: yes  Sterile dressing changed per policy: no  Frequency of dressing change at home: 3 times a week  Frequency of use of stabilization device: 100%      Assessment / Plan:    Heart Failure Status: NYHA Class II    Ischemic cardiomyopathy, s/p HM II LVAD implant as DT 12/1/16:  Appears well supported on LVAD at current settings. Adequate flows and no significant alarms noted on interrogation. Settings reviewed, no changes made. Continue Coreg 25mg BID  Cont Valsartan 40mg  Currently being evaluated by [facility] for consideration of cardiac transplant     HTN, MAP goal 70-90 mmHg:   Dopplered opening pressure 96 today, however, he does have a palpable radial pulse, so I believe this to relfect a systolic pressure. No medication changes - would avoid hypotension d/t KENDRA occlusion.      Health Maintenance  Pt due for pneumococcal vaccine and Tdap, will review with [facility] and help pt coordinate getting these from PCP or at local pharmacy. VAD specific education - discussed need to change internal batteries in his primary and backup controller in the next several months. Talked about exercise with LVAD. Thank you for the opportunity to participate in Quan's care with you. If you have any questions or concerns, please do not hesitate to contact our office.       Irma Greer NP    [address] [facility]  200 [facility], [address] [place] [address], [address] [place] [address]  Office [phone]  Fax [phone]  [facility] VAD Pager: [phone]

## 2018-02-08 LAB
ALBUMIN SERPL-MCNC: 4.6 G/DL (ref 3.6–4.8)
ALBUMIN/GLOB SERPL: 1.7 {RATIO} (ref 1.2–2.2)
ALP SERPL-CCNC: 83 IU/L (ref 39–117)
ALT SERPL-CCNC: 26 IU/L (ref 0–44)
AST SERPL-CCNC: 30 IU/L (ref 0–40)
BILIRUB SERPL-MCNC: 0.4 MG/DL (ref 0–1.2)
BUN SERPL-MCNC: 20 MG/DL (ref 8–27)
BUN/CREAT SERPL: 16 (ref 10–24)
CALCIUM SERPL-MCNC: 9.4 MG/DL (ref 8.6–10.2)
CHLORIDE SERPL-SCNC: 102 MMOL/L (ref 96–106)
CO2 SERPL-SCNC: 20 MMOL/L (ref 18–29)
CREAT SERPL-MCNC: 1.28 MG/DL (ref 0.76–1.27)
ERYTHROCYTE [DISTWIDTH] IN BLOOD BY AUTOMATED COUNT: 14.6 % (ref 12.3–15.4)
GFR SERPLBLD CREATININE-BSD FMLA CKD-EPI: 60 ML/MIN/1.73
GFR SERPLBLD CREATININE-BSD FMLA CKD-EPI: 69 ML/MIN/1.73
GLOBULIN SER CALC-MCNC: 2.7 G/DL (ref 1.5–4.5)
GLUCOSE SERPL-MCNC: 103 MG/DL (ref 65–99)
HCT VFR BLD AUTO: 38 % (ref 37.5–51)
HGB BLD-MCNC: 12.2 G/DL (ref 13–17.7)
INR PPP: 2.2 (ref 0.8–1.2)
LDH SERPL-CCNC: 344 IU/L (ref 121–224)
MCH RBC QN AUTO: 27.9 PG (ref 26.6–33)
MCHC RBC AUTO-ENTMCNC: 32.1 G/DL (ref 31.5–35.7)
MCV RBC AUTO: 87 FL (ref 79–97)
NT-PROBNP SERPL-MCNC: 1010 PG/ML (ref 0–210)
PLATELET # BLD AUTO: 327 X10E3/UL (ref 150–379)
POTASSIUM SERPL-SCNC: 5.6 MMOL/L (ref 3.5–5.2)
PROT SERPL-MCNC: 7.3 G/DL (ref 6–8.5)
PROTHROMBIN TIME: 22.2 SEC (ref 9.1–12)
RBC # BLD AUTO: 4.37 X10E6/UL (ref 4.14–5.8)
SODIUM SERPL-SCNC: 139 MMOL/L (ref 134–144)
WBC # BLD AUTO: 9.9 X10E3/UL (ref 3.4–10.8)

## 2018-02-13 ENCOUNTER — TELEPHONE ANTICOAG (OUTPATIENT)
Dept: CARDIOLOGY CLINIC | Age: 63
End: 2018-02-13

## 2018-02-20 ENCOUNTER — TELEPHONE ANTICOAG (OUTPATIENT)
Dept: CARDIOLOGY CLINIC | Age: 63
End: 2018-02-20

## 2018-02-20 LAB — INR, EXTERNAL: 1.7

## 2018-02-23 LAB — INR, EXTERNAL: 2

## 2018-02-26 ENCOUNTER — TELEPHONE ANTICOAG (OUTPATIENT)
Dept: CARDIOLOGY CLINIC | Age: 63
End: 2018-02-26

## 2018-02-27 ENCOUNTER — TELEPHONE ANTICOAG (OUTPATIENT)
Dept: CARDIOLOGY CLINIC | Age: 63
End: 2018-02-27

## 2018-02-27 ENCOUNTER — TELEPHONE (OUTPATIENT)
Dept: CARDIOLOGY CLINIC | Age: 63
End: 2018-02-27

## 2018-02-27 LAB — INR, EXTERNAL: 1.7

## 2018-02-27 NOTE — TELEPHONE ENCOUNTER
Patient calling because his Uloric now costing $619 for 3 months. I called pharmacy and it does not need PA-not sure if it is deductible. He would like to just go back to allopurinol. Please advise, thank you. I called patient and left voice mail. Called wife and gave her message per Jhon Stevenson:  Allopurinol is fine from a HF standpoint.  Please ask him to go through his PCP for this  She states the PCP will probably charge for a visit for this-I asked her to try, but she will call back if they have to go in for a visit.

## 2018-03-02 ENCOUNTER — TELEPHONE ANTICOAG (OUTPATIENT)
Dept: CARDIOLOGY CLINIC | Age: 63
End: 2018-03-02

## 2018-03-02 LAB — INR, EXTERNAL: 1.1

## 2018-03-05 ENCOUNTER — TELEPHONE ANTICOAG (OUTPATIENT)
Dept: CARDIOLOGY CLINIC | Age: 63
End: 2018-03-05

## 2018-03-05 DIAGNOSIS — I50.22 CHRONIC SYSTOLIC HEART FAILURE (HCC): Primary | ICD-10-CM

## 2018-03-05 DIAGNOSIS — Z79.01 CHRONIC ANTICOAGULATION: ICD-10-CM

## 2018-03-05 DIAGNOSIS — Z95.811 LEFT VENTRICULAR ASSIST DEVICE PRESENT (HCC): ICD-10-CM

## 2018-03-05 LAB
INR PPP: 6.2 (ref 0.8–1.2)
INR, EXTERNAL: 7.6
PROTHROMBIN TIME: 59.7 SEC (ref 9.1–12)

## 2018-03-07 ENCOUNTER — TELEPHONE ANTICOAG (OUTPATIENT)
Dept: CARDIOLOGY CLINIC | Age: 63
End: 2018-03-07

## 2018-03-07 LAB — INR, EXTERNAL: 4.4

## 2018-03-09 ENCOUNTER — TELEPHONE ANTICOAG (OUTPATIENT)
Dept: CARDIOLOGY CLINIC | Age: 63
End: 2018-03-09

## 2018-03-09 LAB — INR, EXTERNAL: 2.1

## 2018-03-12 DIAGNOSIS — Z95.811 LEFT VENTRICULAR ASSIST DEVICE PRESENT (HCC): ICD-10-CM

## 2018-03-12 DIAGNOSIS — Z79.01 CHRONIC ANTICOAGULATION: ICD-10-CM

## 2018-03-12 DIAGNOSIS — I50.22 CHRONIC SYSTOLIC HEART FAILURE (HCC): Primary | ICD-10-CM

## 2018-03-13 ENCOUNTER — TELEPHONE ANTICOAG (OUTPATIENT)
Dept: CARDIOLOGY CLINIC | Age: 63
End: 2018-03-13

## 2018-03-13 DIAGNOSIS — Z95.811 LEFT VENTRICULAR ASSIST DEVICE PRESENT (HCC): ICD-10-CM

## 2018-03-13 DIAGNOSIS — Z79.01 CHRONIC ANTICOAGULATION: ICD-10-CM

## 2018-03-13 DIAGNOSIS — I50.22 CHRONIC SYSTOLIC HEART FAILURE (HCC): Primary | ICD-10-CM

## 2018-03-13 LAB
INR PPP: 1.7 (ref 0.8–1.2)
PROTHROMBIN TIME: 17.2 SEC (ref 9.1–12)

## 2018-03-13 RX ORDER — ENOXAPARIN SODIUM 100 MG/ML
80 INJECTION SUBCUTANEOUS EVERY 12 HOURS
Qty: 10 SYRINGE | Refills: 0 | Status: SHIPPED | OUTPATIENT
Start: 2018-03-13 | End: 2018-11-01

## 2018-03-13 RX ORDER — ENOXAPARIN SODIUM 100 MG/ML
80 INJECTION SUBCUTANEOUS EVERY 12 HOURS
Qty: 4.8 ML | Refills: 0 | Status: SHIPPED | OUTPATIENT
Start: 2018-03-13 | End: 2018-03-13 | Stop reason: SDUPTHER

## 2018-03-15 ENCOUNTER — TELEPHONE (OUTPATIENT)
Dept: CARDIOLOGY CLINIC | Age: 63
End: 2018-03-15

## 2018-03-16 ENCOUNTER — TELEPHONE ANTICOAG (OUTPATIENT)
Dept: CARDIOLOGY CLINIC | Age: 63
End: 2018-03-16

## 2018-03-16 LAB
INR PPP: 2.5 (ref 0.8–1.2)
PROTHROMBIN TIME: 25.5 SEC (ref 9.1–12)

## 2018-03-19 DIAGNOSIS — I25.5 ISCHEMIC CARDIOMYOPATHY: Primary | ICD-10-CM

## 2018-03-19 DIAGNOSIS — Z79.01 CHRONIC ANTICOAGULATION: ICD-10-CM

## 2018-03-19 DIAGNOSIS — I50.22 CHRONIC SYSTOLIC HEART FAILURE (HCC): ICD-10-CM

## 2018-03-19 DIAGNOSIS — Z95.811 LEFT VENTRICULAR ASSIST DEVICE PRESENT (HCC): ICD-10-CM

## 2018-03-20 ENCOUNTER — TELEPHONE ANTICOAG (OUTPATIENT)
Dept: CARDIOLOGY CLINIC | Age: 63
End: 2018-03-20

## 2018-03-20 LAB
INR PPP: 3.2 (ref 0.8–1.2)
PROTHROMBIN TIME: 31.5 SEC (ref 9.1–12)

## 2018-03-27 ENCOUNTER — TELEPHONE ANTICOAG (OUTPATIENT)
Dept: CARDIOLOGY CLINIC | Age: 63
End: 2018-03-27

## 2018-03-27 LAB
INR, EXTERNAL: 3.1
INR, EXTERNAL: 3.1

## 2018-03-29 DIAGNOSIS — Z79.01 CHRONIC ANTICOAGULATION: ICD-10-CM

## 2018-03-29 DIAGNOSIS — Z95.811 LEFT VENTRICULAR ASSIST DEVICE PRESENT (HCC): ICD-10-CM

## 2018-03-29 DIAGNOSIS — I50.22 CHRONIC SYSTOLIC HEART FAILURE (HCC): Primary | ICD-10-CM

## 2018-03-29 DIAGNOSIS — R06.02 SHORTNESS OF BREATH: ICD-10-CM

## 2018-04-02 ENCOUNTER — TELEPHONE (OUTPATIENT)
Dept: CARDIOLOGY CLINIC | Age: 63
End: 2018-04-02

## 2018-04-03 ENCOUNTER — TELEPHONE (OUTPATIENT)
Dept: CARDIOLOGY CLINIC | Age: 63
End: 2018-04-03

## 2018-04-03 NOTE — TELEPHONE ENCOUNTER
Telephone Call RE:  Appointment reminder     Outcome:     [x] Patient confirmed appointment   [] Patient rescheduled appointment for    [] Unable to reach   [x] Left message              [] Other:       Don Speck

## 2018-04-04 ENCOUNTER — HOSPITAL ENCOUNTER (OUTPATIENT)
Dept: GENERAL RADIOLOGY | Age: 63
Discharge: HOME | End: 2018-04-04
Payer: COMMERCIAL

## 2018-04-04 ENCOUNTER — TELEPHONE ANTICOAG (OUTPATIENT)
Dept: CARDIOLOGY CLINIC | Age: 63
End: 2018-04-04

## 2018-04-04 ENCOUNTER — OFFICE VISIT (OUTPATIENT)
Dept: CARDIOLOGY CLINIC | Age: 63
End: 2018-04-04

## 2018-04-04 VITALS
RESPIRATION RATE: 20 BRPM | SYSTOLIC BLOOD PRESSURE: 98 MMHG | OXYGEN SATURATION: 96 % | BODY MASS INDEX: 27.74 KG/M2 | TEMPERATURE: 97.9 F | HEIGHT: 68 IN | HEART RATE: 64 BPM | WEIGHT: 183 LBS

## 2018-04-04 DIAGNOSIS — I50.22 CHRONIC SYSTOLIC HEART FAILURE (HCC): ICD-10-CM

## 2018-04-04 DIAGNOSIS — Z79.01 CHRONIC ANTICOAGULATION: ICD-10-CM

## 2018-04-04 DIAGNOSIS — Z95.811 LEFT VENTRICULAR ASSIST DEVICE PRESENT (HCC): ICD-10-CM

## 2018-04-04 DIAGNOSIS — T82.9XXA: ICD-10-CM

## 2018-04-04 DIAGNOSIS — I50.22 CHRONIC SYSTOLIC HEART FAILURE (HCC): Primary | ICD-10-CM

## 2018-04-04 LAB — INR, EXTERNAL: 2.4

## 2018-04-04 PROCEDURE — 74018 RADEX ABDOMEN 1 VIEW: CPT

## 2018-04-04 RX ORDER — ALLOPURINOL 100 MG/1
TABLET ORAL 2 TIMES DAILY
COMMUNITY
End: 2018-09-25

## 2018-04-04 NOTE — COMMUNICATION BODY
Norman Sims 1721  LVAD Office Visit      Date of VAD implant: 12/1/2016     Cardiologist: GRAHAM  PCP: Virgen Prater MD  Consultants: Ashley Spence MD      Subjective:    HPI: Flory Knight is a 65 y.o. male with a past history of chronic systolic heart failure secondary to ICM s/p LVAD implantation with HeartMate II as destination therapy, h/o torasades/SVT, HTN, lumbar stenosis s/p lumbar laminectomy, CAD (s/p CABG/sp BMSx2), gout, h/o strobic seizures, ETOH abuse and remote tobacco abuse. He has completed evaluation for cardiac transplant at [facility] and is now listed as status 1B. He presents to clinic today for routine follow up. He has no acute complaints or concerns. He denies dizziness, chest pain, palpitations, dyspnea, orthopnea, PND, melena, hematochezia, diarrhea, constipation, pain. Home LVAD Flowsheet reviewed: no  Significant VAD alarms at home: yes    Chief Complaint:     Chief Complaint   Patient presents with    Follow-up          History:  Past Medical History:   Diagnosis Date    Arrhythmia     SVT    CAD (coronary artery disease)     Chronic pain     back    Gout     Heart failure ([address]     Hypertension     LVAD (left ventricular assist device) present ([address] 12/01/2016    Raynaud disease     Seizures ([facility]     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     Past Surgical History:   Procedure Laterality Date    CABG, ARTERY-VEIN, TWO  12/01/2016    CARDIAC SURG PROCEDURE UNLIST  12/01/2016    LVAD    HX HEENT      wisdom teeth removed    HX ORTHOPAEDIC  11/22/2016    laminectomy    HX PACEMAKER      ICD - removed 1/2017     Social History     Social History    Marital status:      Spouse name: N/A    Number of children: N/A    Years of education: N/A     Occupational History    Not on file.      Social History Main Topics    Smoking status: Former Smoker     Packs/day: 1.50     Years: 30.00     Quit date: 2008    Smokeless tobacco: Never Used    Alcohol use No  Drug use: No    Sexual activity: Not on file     Other Topics Concern    Not on file     Social History Narrative     Family History   Problem Relation Age of Onset    Diabetes Mother     Dementia Mother     Other Mother      carotid artery blockage    Heart Disease Father     Stroke Father     Heart Attack Father      several    Hypertension Father     Elevated Lipids Father     No Known Problems Sister     Cancer Brother      brain    Lung Disease Sister     COPD Sister    [facility] Cancer Sister      lung       Problem List:  Patient Active Problem List   Diagnosis Code    S/P laminectomy Z98.890    Sinus tachycardia R00.0    Acute respiratory failure with hypoxia ([facility]) J96.01    Essential hypertension I10    Alcohol abuse F10.10    Chronic systolic heart failure ([facility]) I50.22    CAD, multiple vessel I25.10    Ischemic cardiomyopathy I25.5    MI (myocardial infarction) ([facility] I21.9    Sustained VT (ventricular tachycardia) ([facility]) I47.2    S/P ICD (internal cardiac defibrillator) procedure Z95.810    Chronic anticoagulation Z79.01    Left ventricular assist device present ([facility] Z95.811    Gout M10.9    ICD (implantable cardioverter-defibrillator) infection ([facility] T82. 7XXA    Erectile dysfunction N52.9        ROS:  Review of Systems   Constitutional: Negative. HENT: Negative. Eyes: Negative. Respiratory: Negative. Negative for cough and shortness of breath. Cardiovascular: Negative. Negative for chest pain, palpitations, orthopnea, leg swelling and PND. Gastrointestinal: Negative. Genitourinary: Negative. Musculoskeletal: Negative. Skin: Negative. Neurological: Negative. Psychiatric/Behavioral: Negative. Medications:  No Known Allergies     Current Outpatient Prescriptions on File Prior to Visit   Medication Sig    enoxaparin (LOVENOX) 80 mg/0.8 mL injection 80 mg by SubCUTAneous route every twelve (12) hours.     valsartan (DIOVAN) 40 mg tablet Take 1 Tab by mouth daily.  tadalafil (CIALIS) 10 mg tablet Take 1 Tab by mouth as needed. Indications: Erectile Dysfunction    colchicine 0.6 mg tablet Take 1 Tab by mouth as needed. Indications: Gout    pantoprazole (PROTONIX) 40 mg tablet Take 1 Tab by mouth Daily (before breakfast).  clopidogrel (PLAVIX) 75 mg tab Take 1 Tab by mouth daily.  amiodarone (CORDARONE) 200 mg tablet Take 1 Tab by mouth daily.  triamcinolone acetonide (KENALOG) 0.025 % topical cream Apply  to affected area two (2) times a day. use thin layer to affected area    carvedilol (COREG) 25 mg tablet Take 1 Tab by mouth two (2) times daily (with meals).  warfarin (COUMADIN) 2.5 mg tablet Take 2 Tabs by mouth daily.  ascorbic acid, vitamin C, (VITAMIN C) 500 mg tablet Take 1 Tab by mouth two (2) times a day.  cyclobenzaprine (FLEXERIL) 5 mg tablet Take 5 mg by mouth as needed.  HYDROcodone-acetaminophen (NORCO) 5-325 mg per tablet Take 1 Tab by mouth every eight (8) hours as needed.  febuxostat (ULORIC) 40 mg tab tablet Take 1 Tab by mouth daily.  ALPRAZolam (XANAX) 0.25 mg tablet take 1 tablet by mouth once daily if needed     No current facility-administered medications on file prior to visit. Results for orders placed or performed in visit on 03/27/18   AMB PT/INR EXTERNAL   Result Value Ref Range    INR, External 3.1              Objective:    Visit Vitals    BP (!) 98/0 (BP 1 Location: Right arm, BP Patient Position: Sitting)    Pulse 64    Temp 97.9 °F (36.6 °C) (Oral)    Resp 20    Ht 5' 8\" (1.727 m)    Wt 183 lb (83 kg)    SpO2 96%    BMI 27.83 kg/m2      \"Pulse\" reflects auscultated HR  \"BP\" reflects mean opening pressure by doppler. Physical Exam:   Physical Exam   Constitutional: He is oriented to person, place, and time. He appears well-developed and well-nourished. HENT:   Head: Normocephalic and atraumatic. Eyes: EOM are normal. Pupils are equal, round, and reactive to light. Neck: Neck supple. No hepatojugular reflux and no JVD present. Cardiovascular: Regular rhythm. Bradycardia present. LVAD Humm noted on auscultation. Radial pulses non-palpable. Pulmonary/Chest: Effort normal and breath sounds normal. No respiratory distress. Abdominal: Soft. Bowel sounds are normal. He exhibits no distension. There is no tenderness. Musculoskeletal: He exhibits no edema. Neurological: He is alert and oriented to person, place, and time. Skin: Skin is warm and dry. Psychiatric: He has a normal mood and affect. His behavior is normal. Judgment and thought content normal.   Vitals reviewed. VAD Interrogation:  Results for orders placed or performed during the hospital encounter of 04/04/18   XR ABD (KUB)    Narrative    EXAM:  XR ABD (KUB)    INDICATION:  Concern for LVAD driveline discontinuity. COMPARISON: 12.8.2016. FINDINGS: A supine radiograph of the abdomen at 1203 hours (2 images) shows a  normal bowel gas pattern. No soft tissue masses or pathologic calcifications are  identified. The bones and soft tissues are within normal limits. The metallic  lead from the external device to the left ventricular assist device appears  normal at the skin entrance site. Portions of the lead overlying the medial left  ventricular assist device are not well visualized because of the single image  plane projection. No clear disruption is identified. Lumbar hardware is in place  without evidence for hardware loosening. Impression    IMPRESSION: No evidence for left ventricular cyst device lead fracture at the  skin entrance site. The lead in the epigastric region is not fully evaluated the  in one plane as described above.          Results for orders placed or performed in visit on 04/04/18   AMB PT/INR EXTERNAL   Result Value Ref Range    INR, External 2.4    Results for orders placed or performed in visit on 04/04/18   [place] INTERROGATION VAD IN PRSON W/PHYS/QHP ANALYSIS Impression    LVAD (Heartmate)  Pump Speed (RPM): 8800  Pump Flow (LPM): 4.2  PI (Pulsitility Index): 7.4  Power: 4.6     Heartmate II:     Primary Controller:   Speed: 8800         Low Speed Limit: 8200      Backup Battery Replace 11 mos   Abnormal Alarms: on 2/28 at 00:49 pt had low speed advisory --> PI event --> low flow --> pump off, lasted 2 seconds. No other alarms. Back-up Controller:   Speed: 8800     Low Speed Limit: 8200     Backup Battery Replace _ mos       Drive Line Exam:  Stabilization device intact: no  Line inspected for damage: yes    Appearance: no edema, erythema, drainage   Sterile dressing changed per policy: no  Frequency of dressing change at home: 3 times a week  Frequency of use of stabilization device: 25%      Assessment / Plan:    Heart Failure Status: NYHA Class II    Ischemic cardiomyopathy, s/p HM II LVAD implant as DT 12/1/16:  Listed UNOS status 1B at [facility] for [address] [place] [address] well supported on LVAD at current settings. Adequate flows and no s/s heart failure. Pt was noted to have a low speed advisory and pump off alarm on 2/28, consistent with a short-to-shield issue. Pt sent for X-ray of the driveline, log files downloaded and info sent to [facility] engineers and clinical specialist for review. Pt issues a loaner Power Module and Ungrounded (orange) cable for use at home and advised to remain on ungrounded circuit. Will notify [facility] as this could affect his transplant listing status. LVAD settings reviewed, no changes made.   Continue Coreg 25mg BID  Cont Valsartan 40mg      HTN, MAP goal 70-90 mmHg:   Dopplered opening pressure 98 today, could not appreciate a palpable radial pulse  No medication changes today, will continue to monitor  Caution to avoid hypotension d/t KENDRA occlusion.      VAD specific education - Explained short to shield, discussed proper use of power module and ungrounded cable    Greater than 50% of appt time (60 minutes) was spent face to face counseling [place] about LVAD management, plan of care, and medication management. Thank you for the opportunity to participate in Quan's care with you. If you have any questions or concerns, please do not hesitate to contact our office.       Bobby Shepherd NP    [address], [address] [place] [address], [address] [place] [address]  Office [phone]  Fax [phone]  24h VAD Pager: [phone]

## 2018-04-04 NOTE — PATIENT INSTRUCTIONS
We are concerned that you may have a damaged area in your driveline due to an alarm we saw when we reviewed the history on your device. We are sending the x-ray of your driveline and the log files to the device company and we will let you know what the determination is. For now, you will need to stay on an ungrounded circuit. This means, stay on your batteries or on the Power Module with the [facility] cable only. Do not use your Mobile Power Unit for now. No changes were made to your medications or LVAD settings. We will notify you of your lab results from today. Follow up in our clinic again in 2 weeks.

## 2018-04-04 NOTE — MR AVS SNAPSHOT
[address] [place] [address] [address] 
[phone] Patient: Nery Rueda MRN: [id number] Visit Information Date & Time Provider Department Dept. Phone Encounter #  
 2018 11:00 AM Trey Potts MD [address] [id number] Follow-up Instructions Return in about 2 weeks (around 2018) for LVAD Follow-up. Upcoming Health Maintenance Date Due Pneumococcal 19-64 Medium Risk (1 of 1 - PPSV23) 10/11/1975 DTaP/Tdap/Td series (1 - Tdap) 10/11/1977 ZOSTER VACCINE AGE 60> 2016 FOBT Q 1 YEAR AGE 50-75 2017 Allergies as of 2018  Review Complete On: 2018 By: Shannan Sellers NP No Known Allergies Current Immunizations  Reviewed on 2017 Name Date Hep B Vaccine 2017 Hep B Vaccine (Dialysis) 2017  3:21 PM, 2017  3:10 PM, 2017  3:46 PM  
 Influenza Vaccine 10/17/2017 Influenza Vaccine (Quad) PF 2016 12:00 PM  
  
 Not reviewed this visit You Were Diagnosed With   
  
 Codes Comments Chronic systolic heart failure (HCC)    -  Primary ICD-10-CM: F04.19 ICD-9-CM: 428.22 Left ventricular assist device present [facility])     ICD-10-CM: T88.188 ICD-9-CM: V43.21 Chronic anticoagulation     ICD-10-CM: Z79.01 
ICD-9-CM: V58.61 Left ventricular assist device complication, initial encounter     ICD-10-CM: T82. 9XXA ICD-9-CM: 996.72 Vitals BP Pulse Temp Resp Height(growth percentile) Weight(growth percentile) (!) 98/0 (BP 1 Location: Right arm, BP Patient Position: Sitting) 64 97.9 °F (36.6 °C) (Oral) 20 5' 8\" (1.727 m) 183 lb (83 kg) SpO2 BMI Smoking Status 96% 27.83 kg/m2 Former Smoker Vitals History BMI and BSA Data Body Mass Index Body Surface Area  
 27.83 kg/m 2 2 m 2 Preferred Pharmacy Pharmacy Name Phone [id number] [place], [place] - [address] [address] [phone] Your Updated Medication List  
  
   
This list is accurate as of 4/4/18 12:52 PM.  Always use your most recent med list.  
  
  
  
  
 allopurinol 100 mg tablet Commonly known as:  Marny Isai Take  by mouth two (2) times a day. ALPRAZolam 0.25 mg tablet Commonly known as:  XANAX  
take 1 tablet by mouth once daily if needed  
  
 amiodarone 200 mg tablet Commonly known as:  CORDARONE Take 1 Tab by mouth daily. ascorbic acid (vitamin C) 500 mg tablet Commonly known as:  VITAMIN C Take 1 Tab by mouth two (2) times a day. carvedilol 25 mg tablet Commonly known as:  Williamson Pennant Take 1 Tab by mouth two (2) times daily (with meals). clopidogrel 75 mg Tab Commonly known as:  PLAVIX Take 1 Tab by mouth daily. colchicine 0.6 mg tablet Take 1 Tab by mouth as needed. Indications: Gout  
  
 cyclobenzaprine 5 mg tablet Commonly known as:  FLEXERIL Take 5 mg by mouth as needed. enoxaparin 80 mg/0.8 mL injection Commonly known as:  LOVENOX 80 mg by SubCUTAneous route every twelve (12) hours. febuxostat 40 mg Tab tablet Commonly known as:  Nicky Scot Take 1 Tab by mouth daily. HYDROcodone-acetaminophen 5-325 mg per tablet Commonly known as:  Ish Pare Take 1 Tab by mouth every eight (8) hours as needed. pantoprazole 40 mg tablet Commonly known as:  PROTONIX Take 1 Tab by mouth Daily (before breakfast). tadalafil 10 mg tablet Commonly known as:  CIALIS Take 1 Tab by mouth as needed. Indications: Erectile Dysfunction  
  
 triamcinolone acetonide 0.025 % topical cream  
Commonly known as:  KENALOG Apply  to affected area two (2) times a day. use thin layer to affected area  
  
 valsartan 40 mg tablet Commonly known as:  DIOVAN Take 1 Tab by mouth daily. warfarin 2.5 mg tablet Commonly known as:  COUMADIN  
 Take 2 Tabs by mouth daily. We Performed the Following NV INTERROGATION VAD IN PRSON W/PHYS/QHP ANALYSIS [id number] CPT(R)] Follow-up Instructions Return in about 2 weeks (around 4/18/2018) for LVAD Follow-up. To-Do List   
 04/04/2018 Imaging:  XR ABD (KUB) Patient Instructions We are concerned that you may have a damaged area in your driveline due to an alarm we saw when we reviewed the history on your device. We are sending the x-ray of your driveline and the log files to the device company and we will let you know what the determination is. For now, you will need to stay on an ungrounded circuit. This means, stay on your batteries or on the Power Module with the [facility] cable only. Do not use your Mobile Power Unit for now. No changes were made to your medications or LVAD settings. We will notify you of your lab results from today. Follow up in our clinic again in 2 weeks. Introducing [facility]! Dear Halley Hoyos: Thank you for requesting a [email or web address] account. Our records indicate that you already have an active [email or web address] account. You can access your account anytime at [email or web address] Did you know that you can access your hospital and ER discharge instructions at any time in [email or web address]? You can also review all of your test results from your hospital stay or ER visit. Additional Information If you have questions, please visit the Frequently Asked Questions section of the [email or web address] website at [email or web address] Remember, [email or web address] is NOT to be used for urgent needs. For medical emergencies, dial 911. Now available from your iPhone and Android! Please provide this summary of care documentation to your next provider. Your primary care clinician is listed as Hodan Guzman. If you have any questions after today's visit, please call [phone].

## 2018-04-04 NOTE — PROGRESS NOTES
Norman Sims [address]  LVAD Office Visit      Date of VAD implant: 12/1/2016     Cardiologist: GRAHAM  PCP: Martin Hurst MD  Consultants: Gaby Woodard MD      Subjective:    HPI: Steve Jane is a 65 y.o. male with a past history of chronic systolic heart failure secondary to ICM s/p LVAD implantation with HeartMate II as destination therapy, h/o torasades/SVT, HTN, lumbar stenosis s/p lumbar laminectomy, CAD (s/p CABG/sp BMSx2), gout, h/o strobic seizures, ETOH abuse and remote tobacco abuse. He has completed evaluation for cardiac transplant at [facility] and is now listed as status 1B. He presents to clinic today for routine follow up. He has no acute complaints or concerns. He denies dizziness, chest pain, palpitations, dyspnea, orthopnea, PND, melena, hematochezia, diarrhea, constipation, pain. Home LVAD Flowsheet reviewed: no  Significant VAD alarms at home: yes    Chief Complaint:     Chief Complaint   Patient presents with    Follow-up          History:  Past Medical History:   Diagnosis Date    Arrhythmia     SVT    CAD (coronary artery disease)     Chronic pain     back    Gout     Heart failure ([address]     Hypertension     LVAD (left ventricular assist device) present ([address] 12/01/2016    Raynaud disease     Seizures ([address]     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     Past Surgical History:   Procedure Laterality Date    CABG, ARTERY-VEIN, TWO  12/01/2016    CARDIAC SURG PROCEDURE UNLIST  12/01/2016    LVAD    HX HEENT      wisdom teeth removed    HX ORTHOPAEDIC  11/22/2016    laminectomy    HX PACEMAKER      ICD - removed 1/2017     Social History     Social History    Marital status:      Spouse name: N/A    Number of children: N/A    Years of education: N/A     Occupational History    Not on file.      Social History Main Topics    Smoking status: Former Smoker     Packs/day: 1.50     Years: 30.00     Quit date: 2008    Smokeless tobacco: Never Used    Alcohol use No  Drug use: No    Sexual activity: Not on file     Other Topics Concern    Not on file     Social History Narrative     Family History   Problem Relation Age of Onset    Diabetes Mother     Dementia Mother     Other Mother      carotid artery blockage    Heart Disease Father     Stroke Father     Heart Attack Father      several    Hypertension Father     Elevated Lipids Father     No Known Problems Sister     Cancer Brother      brain    Lung Disease Sister     COPD Sister    Rosario Jarquin Cancer Sister      lung       Problem List:  Patient Active Problem List   Diagnosis Code    S/P laminectomy Z98.890    Sinus tachycardia R00.0    Acute respiratory failure with hypoxia ([facility]) J96.01    Essential hypertension I10    Alcohol abuse F10.10    Chronic systolic heart failure ([facility]) I50.22    CAD, multiple vessel I25.10    Ischemic cardiomyopathy I25.5    MI (myocardial infarction) ([facility] I21.9    Sustained VT (ventricular tachycardia) ([facility]) I47.2    S/P ICD (internal cardiac defibrillator) procedure Z95.810    Chronic anticoagulation Z79.01    Left ventricular assist device present ([facility] Z95.811    Gout M10.9    ICD (implantable cardioverter-defibrillator) infection ([facility] T82. 7XXA    Erectile dysfunction N52.9        ROS:  Review of Systems   Constitutional: Negative. HENT: Negative. Eyes: Negative. Respiratory: Negative. Negative for cough and shortness of breath. Cardiovascular: Negative. Negative for chest pain, palpitations, orthopnea, leg swelling and PND. Gastrointestinal: Negative. Genitourinary: Negative. Musculoskeletal: Negative. Skin: Negative. Neurological: Negative. Psychiatric/Behavioral: Negative. Medications:  No Known Allergies     Current Outpatient Prescriptions on File Prior to Visit   Medication Sig    enoxaparin (LOVENOX) 80 mg/0.8 mL injection 80 mg by SubCUTAneous route every twelve (12) hours.     valsartan (DIOVAN) 40 mg tablet Take 1 Tab by mouth daily.  tadalafil (CIALIS) 10 mg tablet Take 1 Tab by mouth as needed. Indications: Erectile Dysfunction    colchicine 0.6 mg tablet Take 1 Tab by mouth as needed. Indications: Gout    pantoprazole (PROTONIX) 40 mg tablet Take 1 Tab by mouth Daily (before breakfast).  clopidogrel (PLAVIX) 75 mg tab Take 1 Tab by mouth daily.  amiodarone (CORDARONE) 200 mg tablet Take 1 Tab by mouth daily.  triamcinolone acetonide (KENALOG) 0.025 % topical cream Apply  to affected area two (2) times a day. use thin layer to affected area    carvedilol (COREG) 25 mg tablet Take 1 Tab by mouth two (2) times daily (with meals).  warfarin (COUMADIN) 2.5 mg tablet Take 2 Tabs by mouth daily.  ascorbic acid, vitamin C, (VITAMIN C) 500 mg tablet Take 1 Tab by mouth two (2) times a day.  cyclobenzaprine (FLEXERIL) 5 mg tablet Take 5 mg by mouth as needed.  HYDROcodone-acetaminophen (NORCO) 5-325 mg per tablet Take 1 Tab by mouth every eight (8) hours as needed.  febuxostat (ULORIC) 40 mg tab tablet Take 1 Tab by mouth daily.  ALPRAZolam (XANAX) 0.25 mg tablet take 1 tablet by mouth once daily if needed     No current facility-administered medications on file prior to visit. Results for orders placed or performed in visit on 03/27/18   AMB PT/INR EXTERNAL   Result Value Ref Range    INR, External 3.1              Objective:    Visit Vitals    BP (!) 98/0 (BP 1 Location: Right arm, BP Patient Position: Sitting)    Pulse 64    Temp 97.9 °F (36.6 °C) (Oral)    Resp 20    Ht 5' 8\" (1.727 m)    Wt 183 lb (83 kg)    SpO2 96%    BMI 27.83 kg/m2      \"Pulse\" reflects auscultated HR  \"BP\" reflects mean opening pressure by doppler. Physical Exam:   Physical Exam   Constitutional: He is oriented to person, place, and time. He appears well-developed and well-nourished. HENT:   Head: Normocephalic and atraumatic. Eyes: EOM are normal. Pupils are equal, round, and reactive to light. Neck: Neck supple. No hepatojugular reflux and no JVD present. Cardiovascular: Regular rhythm. Bradycardia present. LVAD Humm noted on auscultation. Radial pulses non-palpable. Pulmonary/Chest: Effort normal and breath sounds normal. No respiratory distress. Abdominal: Soft. Bowel sounds are normal. He exhibits no distension. There is no tenderness. Musculoskeletal: He exhibits no edema. Neurological: He is alert and oriented to person, place, and time. Skin: Skin is warm and dry. Psychiatric: He has a normal mood and affect. His behavior is normal. Judgment and thought content normal.   Vitals reviewed. VAD Interrogation:  Results for orders placed or performed during the hospital encounter of 04/04/18   XR ABD (KUB)    Narrative    EXAM:  XR ABD (KUB)    INDICATION:  Concern for LVAD driveline discontinuity. COMPARISON: 12.8.2016. FINDINGS: A supine radiograph of the abdomen at 1203 hours (2 images) shows a  normal bowel gas pattern. No soft tissue masses or pathologic calcifications are  identified. The bones and soft tissues are within normal limits. The metallic  lead from the external device to the left ventricular assist device appears  normal at the skin entrance site. Portions of the lead overlying the medial left  ventricular assist device are not well visualized because of the single image  plane projection. No clear disruption is identified. Lumbar hardware is in place  without evidence for hardware loosening. Impression    IMPRESSION: No evidence for left ventricular cyst device lead fracture at the  skin entrance site. The lead in the epigastric region is not fully evaluated the  in one plane as described above.          Results for orders placed or performed in visit on 04/04/18   AMB PT/INR EXTERNAL   Result Value Ref Range    INR, External 2.4    Results for orders placed or performed in visit on 04/04/18   AK INTERROGATION VAD IN PRSON W/PHYS/QHP ANALYSIS Impression    LVAD (Heartmate)  Pump Speed (RPM): 8800  Pump Flow (LPM): 4.2  PI (Pulsitility Index): 7.4  Power: 4.6     Heartmate II:     Primary Controller:   Speed: 8800         Low Speed Limit: 8200      Backup Battery Replace 11 mos   Abnormal Alarms: on 2/28 at 00:49 pt had low speed advisory --> PI event --> low flow --> pump off, lasted 2 seconds. No other alarms. Back-up Controller:   Speed: 8800     Low Speed Limit: 8200     Backup Battery Replace _ mos       Drive Line Exam:  Stabilization device intact: no  Line inspected for damage: yes    Appearance: no edema, erythema, drainage   Sterile dressing changed per policy: no  Frequency of dressing change at home: 3 times a week  Frequency of use of stabilization device: 25%      Assessment / Plan:    Heart Failure Status: NYHA Class II    Ischemic cardiomyopathy, s/p HM II LVAD implant as DT 12/1/16:  Listed UNOS status 1B at [facility] for [address] [place] [address] well supported on LVAD at current settings. Adequate flows and no s/s heart failure. Pt was noted to have a low speed advisory and pump off alarm on 2/28, consistent with a short-to-shield issue. Pt sent for X-ray of the driveline, log files downloaded and info sent to [facility] engineers and clinical specialist for review. Pt issues a loaner Power Module and Ungrounded (orange) cable for use at home and advised to remain on ungrounded circuit. Will notify [facility] as this could affect his transplant listing status. LVAD settings reviewed, no changes made.   Continue Coreg 25mg BID  Cont Valsartan 40mg      HTN, MAP goal 70-90 mmHg:   Dopplered opening pressure 98 today, could not appreciate a palpable radial pulse  No medication changes today, will continue to monitor  Caution to avoid hypotension d/t KENDRA occlusion.      VAD specific education - Explained short to shield, discussed proper use of power module and ungrounded cable    Greater than 50% of appt time (60 minutes) was spent face to face counseling Jude Peacock about LVAD management, plan of care, and medication management. Thank you for the opportunity to participate in Quan's care with you. If you have any questions or concerns, please do not hesitate to contact our office.       Deb Fernandez NP    [address] [place] [address], [address] [place] [address], [address] [place] [address]  Office [phone]  Fax [phone]  24h VAD Pager: [phone]

## 2018-04-04 NOTE — LETTER
4/4/2018 1:46 PM 
 
Patient:  Shayne Holstein YOB: 1955 Date of Visit: 4/4/2018 Dear An Romeo MD 
[address] [address] VIA Facsimile: [phone] 
 : 
 
 
Thank you for referring Mr. Penny Menon to me for evaluation/treatment. Below are the relevant portions of my assessment and plan of care. Norman Sims 1721 LVAD Office Visit Date of VAD implant: 12/1/2016 
  
Cardiologist: GRAHAM 
PCP: Darylene Mems, MD 
Consultants: Levora Runner, MD 
 
 
Subjective: HPI: Shayne Holstein is a 65 y.o. male with a past history of chronic systolic heart failure secondary to ICM s/p LVAD implantation with HeartMate II as destination therapy, h/o torasades/SVT, HTN, lumbar stenosis s/p lumbar laminectomy, CAD (s/p CABG/sp BMSx2), gout, h/o strobic seizures, ETOH abuse and remote tobacco abuse. He has completed evaluation for cardiac transplant at [facility] and is now listed as status 1B. He presents to clinic today for routine follow up. He has no acute complaints or concerns. He denies dizziness, chest pain, palpitations, dyspnea, orthopnea, PND, melena, hematochezia, diarrhea, constipation, pain. Home LVAD Flowsheet reviewed: no 
Significant VAD alarms at home: yes Chief Complaint: 
  
Chief Complaint Patient presents with  Follow-up History: 
Past Medical History:  
Diagnosis Date  Arrhythmia SVT  CAD (coronary artery disease)  Chronic pain   
 back  Gout  Heart failure ([address]  Hypertension  LVAD (left ventricular assist device) present ([address] 12/01/2016  Raynaud disease  Seizures ([facility] 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 Past Surgical History:  
Procedure Laterality Date  CABG, ARTERY-VEIN, TWO  12/01/2016  CARDIAC SURG PROCEDURE UNLIST  12/01/2016 LVAD  HX HEENT    
 wisdom teeth removed  HX ORTHOPAEDIC  11/22/2016  
 laminectomy  HX PACEMAKER    
 ICD - removed 1/2017 Social History Social History  Marital status:  Spouse name: N/A  
 Number of children: N/A  
 Years of education: N/A Occupational History  Not on file. Social History Main Topics  Smoking status: Former Smoker Packs/day: 1.50 Years: 30.00 Quit date: 2008  Smokeless tobacco: Never Used  Alcohol use No  
 Drug use: No  
 Sexual activity: Not on file Other Topics Concern  Not on file Social History Narrative Family History Problem Relation Age of Onset  Diabetes Mother  Dementia Mother  Other Mother   
  carotid artery blockage  Heart Disease Father  Stroke Father  Heart Attack Father   
  several  
 Hypertension Father  Elevated Lipids Father  No Known Problems Sister  Cancer Brother   
  brain  Lung Disease Sister  COPD Sister  Cancer Sister   
  lung Problem List: 
Patient Active Problem List  
Diagnosis Code  S/P laminectomy Z98.890  
 Sinus tachycardia R00.0  Acute respiratory failure with hypoxia ([facility]) J96.01  
 Essential hypertension I10  
 Alcohol abuse F10.10  Chronic systolic heart failure ([facility]) I50.22  
 CAD, multiple vessel I25.10  
 Ischemic cardiomyopathy I25.5  MI (myocardial infarction) ([facility] I21.9  Sustained VT (ventricular tachycardia) ([facility]) I47.2  S/P ICD (internal cardiac defibrillator) procedure Z95.810  Chronic anticoagulation Z79.01  Left ventricular assist device present ([facility] Z95.811  Gout M10.9  ICD (implantable cardioverter-defibrillator) [facility] T82. 7XXA  Erectile dysfunction N52.9  
  
 
ROS: 
Review of Systems Constitutional: Negative. HENT: Negative. Eyes: Negative. Respiratory: Negative. Negative for cough and shortness of breath. Cardiovascular: Negative. Negative for chest pain, palpitations, orthopnea, leg swelling and PND. Gastrointestinal: Negative. Genitourinary: Negative. Musculoskeletal: Negative. Skin: Negative. Neurological: Negative. Psychiatric/Behavioral: Negative. Medications: 
No Known Allergies Current Outpatient Prescriptions on File Prior to Visit Medication Sig  
 enoxaparin (LOVENOX) 80 mg/0.8 mL injection 80 mg by SubCUTAneous route every twelve (12) hours.  valsartan (DIOVAN) 40 mg tablet Take 1 Tab by mouth daily.  tadalafil (CIALIS) 10 mg tablet Take 1 Tab by mouth as needed. Indications: Erectile Dysfunction  colchicine 0.6 mg tablet Take 1 Tab by mouth as needed. Indications: Gout  pantoprazole (PROTONIX) 40 mg tablet Take 1 Tab by mouth Daily (before breakfast).  clopidogrel (PLAVIX) 75 mg tab Take 1 Tab by mouth daily.  amiodarone (CORDARONE) 200 mg tablet Take 1 Tab by mouth daily.  triamcinolone acetonide (KENALOG) 0.025 % topical cream Apply  to affected area two (2) times a day. use thin layer to affected area  carvedilol (COREG) 25 mg tablet Take 1 Tab by mouth two (2) times daily (with meals).  warfarin (COUMADIN) 2.5 mg tablet Take 2 Tabs by mouth daily.  ascorbic acid, vitamin C, (VITAMIN C) 500 mg tablet Take 1 Tab by mouth two (2) times a day.  cyclobenzaprine (FLEXERIL) 5 mg tablet Take 5 mg by mouth as needed.  HYDROcodone-acetaminophen (NORCO) 5-325 mg per tablet Take 1 Tab by mouth every eight (8) hours as needed.  febuxostat (ULORIC) 40 mg tab tablet Take 1 Tab by mouth daily.  ALPRAZolam (XANAX) 0.25 mg tablet take 1 tablet by mouth once daily if needed No current facility-administered medications on file prior to visit. Results for orders placed or performed in visit on 03/27/18 AMB PT/INR EXTERNAL Result Value Ref Range INR, External 3.1 Objective: 
 
Visit Vitals  BP (!) 98/0 (BP 1 Location: Right arm, BP Patient Position: Sitting)  Pulse 64  Temp 97.9 °F (36.6 °C) (Oral)  Resp 20  
 Ht 5' 8\" (1.727 m)  Wt 183 lb (83 kg)  SpO2 96%  BMI 27.83 kg/m2 \"Pulse\" reflects auscultated HR \"BP\" reflects mean opening pressure by doppler. Physical Exam:  
Physical Exam  
Constitutional: He is oriented to person, place, and time. He appears well-developed and well-nourished. HENT:  
Head: Normocephalic and atraumatic. Eyes: EOM are normal. Pupils are equal, round, and reactive to light. Neck: Neck supple. No hepatojugular reflux and no JVD present. Cardiovascular: Regular rhythm. Bradycardia present. LVAD Humm noted on auscultation. Radial pulses non-palpable. Pulmonary/Chest: Effort normal and breath sounds normal. No respiratory distress. Abdominal: Soft. Bowel sounds are normal. He exhibits no distension. There is no tenderness. Musculoskeletal: He exhibits no edema. Neurological: He is alert and oriented to person, place, and time. Skin: Skin is warm and dry. Psychiatric: He has a normal mood and affect. His behavior is normal. Judgment and thought content normal.  
Vitals reviewed. VAD Interrogation: 
Results for orders placed or performed during the hospital encounter of 04/04/18 XR ABD (KUB) Narrative EXAM:  XR ABD (KUB) INDICATION:  Concern for LVAD driveline discontinuity. COMPARISON: 12.8.2016. FINDINGS: A supine radiograph of the abdomen at 1203 hours (2 images) shows a 
normal bowel gas pattern. No soft tissue masses or pathologic calcifications are 
identified. The bones and soft tissues are within normal limits. The metallic 
lead from the external device to the left ventricular assist device appears 
normal at the skin entrance site. Portions of the lead overlying the medial left 
ventricular assist device are not well visualized because of the single image 
plane projection. No clear disruption is identified. Lumbar hardware is in place 
without evidence for hardware loosening. Impression IMPRESSION: No evidence for left ventricular cyst device lead fracture at the 
skin entrance site. The lead in the epigastric region is not fully evaluated the 
in one plane as described above. Results for orders placed or performed in visit on 04/04/18 AMB PT/INR EXTERNAL Result Value Ref Range INR, External 2.4 Results for orders placed or performed in visit on 04/04/18 ID INTERROGATION VAD IN PRSON W/PHYS/QHP ANALYSIS Impression LVAD (Heartmate) Pump Speed (RPM): 8800 Pump Flow (LPM): 4.2 PI (Pulsitility Index): 7.4 Power: 4.6 Heartmate II:  
  Primary Controller: 
 Speed: 8800         Low Speed Limit: 8200      Backup Battery Replace 11 mos Abnormal Alarms: on 2/28 at 00:49 pt had low speed advisory --> PI event --> low flow --> pump off, lasted 2 seconds. No other alarms. Back-up Controller: 
 Speed: 8800     Low Speed Limit: 8200 Backup Battery Replace _ mos Drive Line Exam: 
Stabilization device intact: no 
Line inspected for damage: yes Appearance: no edema, erythema, drainage Sterile dressing changed per policy: no 
Frequency of dressing change at home: 3 times a week Frequency of use of stabilization device: 25% Assessment / Plan: 
 
Heart Failure Status: NYHA Class II Ischemic cardiomyopathy, s/p HM II LVAD implant as DT 12/1/16: 
Listed UNOS status 1B at [facility] for OHT Appears well supported on LVAD at current settings. Adequate flows and no s/s heart failure. Pt was noted to have a low speed advisory and pump off alarm on 2/28, consistent with a short-to-shield issue. Pt sent for X-ray of the driveline, log files downloaded and info sent to [facility] engineers and clinical specialist for review. Pt issues a loaner Power Module and Ungrounded (orange) cable for use at home and advised to remain on ungrounded circuit. Will notify [facility] as this could affect his transplant listing status. LVAD settings reviewed, no changes made. Continue Coreg 25mg BID Cont Valsartan 40mg 
   
HTN, MAP goal 70-90 mmHg: Dopplered opening pressure 98 today, could not appreciate a palpable radial pulse No medication changes today, will continue to monitor Caution to avoid hypotension d/t KENDRA occlusion.  
  
VAD specific education - Explained short to shield, discussed proper use of power module and ungrounded cable Greater than 50% of appt time (60 minutes) was spent face to face counseling Ivanna Barillas about LVAD management, plan of care, and medication management. Thank you for the opportunity to participate in Quan's care with you. If you have any questions or concerns, please do not hesitate to contact our office. MARY Covarrubiasa Loc Sims [address], [place] [address] [employer] [facility] Office [phone] Fax [phone] 
24h VAD Pager: [phone] If you have questions, please do not hesitate to call me. I look forward to following Mr. Mann along with you. Sincerely, Christopher Garcia MD

## 2018-04-06 LAB
ALBUMIN SERPL-MCNC: 4.4 G/DL (ref 3.6–4.8)
ALBUMIN/GLOB SERPL: 1.8 {RATIO} (ref 1.2–2.2)
ALP SERPL-CCNC: 73 IU/L (ref 39–117)
ALT SERPL-CCNC: 23 IU/L (ref 0–44)
AST SERPL-CCNC: 28 IU/L (ref 0–40)
BILIRUB SERPL-MCNC: 0.4 MG/DL (ref 0–1.2)
BUN SERPL-MCNC: 20 MG/DL (ref 8–27)
BUN/CREAT SERPL: 16 (ref 10–24)
CALCIUM SERPL-MCNC: 8.8 MG/DL (ref 8.6–10.2)
CHLORIDE SERPL-SCNC: 101 MMOL/L (ref 96–106)
CO2 SERPL-SCNC: 23 MMOL/L (ref 18–29)
CREAT SERPL-MCNC: 1.27 MG/DL (ref 0.76–1.27)
ERYTHROCYTE [DISTWIDTH] IN BLOOD BY AUTOMATED COUNT: 14.2 % (ref 12.3–15.4)
GFR SERPLBLD CREATININE-BSD FMLA CKD-EPI: 61 ML/MIN/1.73
GFR SERPLBLD CREATININE-BSD FMLA CKD-EPI: 70 ML/MIN/1.73
GLOBULIN SER CALC-MCNC: 2.5 G/DL (ref 1.5–4.5)
GLUCOSE SERPL-MCNC: 91 MG/DL (ref 65–99)
HCT VFR BLD AUTO: 35 % (ref 37.5–51)
HGB BLD-MCNC: 12 G/DL (ref 13–17.7)
HGB FREE PLAS-MCNC: 1.9 MG/DL (ref 0–4.9)
LDH SERPL-CCNC: 341 IU/L (ref 121–224)
Lab: NORMAL
MCH RBC QN AUTO: 29 PG (ref 26.6–33)
MCHC RBC AUTO-ENTMCNC: 34.3 G/DL (ref 31.5–35.7)
MCV RBC AUTO: 85 FL (ref 79–97)
NT-PROBNP SERPL-MCNC: 1427 PG/ML (ref 0–210)
PLATELET # BLD AUTO: 322 X10E3/UL (ref 150–379)
POTASSIUM SERPL-SCNC: 5.4 MMOL/L (ref 3.5–5.2)
PROT SERPL-MCNC: 6.9 G/DL (ref 6–8.5)
RBC # BLD AUTO: 4.14 X10E6/UL (ref 4.14–5.8)
SODIUM SERPL-SCNC: 139 MMOL/L (ref 134–144)
WBC # BLD AUTO: 8.8 X10E3/UL (ref 3.4–10.8)

## 2018-04-11 ENCOUNTER — TELEPHONE (OUTPATIENT)
Dept: CARDIOLOGY CLINIC | Age: 63
End: 2018-04-11

## 2018-04-11 NOTE — TELEPHONE ENCOUNTER
Received a letter from [employer] stating patient has not been compliant with [employer]. I advised them that he had run out of testing strips, he is now compliant now that he has testing strips. No further questions at this time.

## 2018-04-13 ENCOUNTER — TELEPHONE ANTICOAG (OUTPATIENT)
Dept: CARDIOLOGY CLINIC | Age: 63
End: 2018-04-13

## 2018-04-13 LAB — INR, EXTERNAL: 3.5

## 2018-04-18 ENCOUNTER — OFFICE VISIT (OUTPATIENT)
Dept: CARDIOLOGY CLINIC | Age: 63
End: 2018-04-18

## 2018-04-18 VITALS
OXYGEN SATURATION: 96 % | SYSTOLIC BLOOD PRESSURE: 110 MMHG | TEMPERATURE: 97.7 F | WEIGHT: 185.2 LBS | BODY MASS INDEX: 28.16 KG/M2 | HEART RATE: 64 BPM

## 2018-04-18 DIAGNOSIS — Z79.01 CHRONIC ANTICOAGULATION: ICD-10-CM

## 2018-04-18 DIAGNOSIS — Z95.811 LEFT VENTRICULAR ASSIST DEVICE PRESENT (HCC): ICD-10-CM

## 2018-04-18 DIAGNOSIS — I50.22 CHRONIC SYSTOLIC HEART FAILURE (HCC): Primary | ICD-10-CM

## 2018-04-18 DIAGNOSIS — T82.9XXD LEFT VENTRICULAR ASSIST DEVICE (LVAD) COMPLICATION, SUBSEQUENT ENCOUNTER: ICD-10-CM

## 2018-04-18 LAB
INR PPP: 3 (ref 0.8–1.2)
PROTHROMBIN TIME: 30.2 SEC (ref 9.1–12)

## 2018-04-18 NOTE — LETTER
4/19/2018 9:20 AM 
 
Patient:  Manolo Herrera YOB: 1955 Date of Visit: 4/18/2018 Dear Samia Cobb MD 
[address] [address] VIA Facsimile: [phone] 
 : 
 
 
Thank you for referring Mr. Jeff Silva to me for evaluation/treatment. Below are the relevant portions of my assessment and plan of care. Norman Sims 1722 LVAD Office Visit Date of VAD implant: 12/1/2016 
  
Cardiologist: GRAHAM 
PCP: Mayte Garcia MD 
Consultants: Laron Pierce MD 
 
 
Subjective: HPI: Manolo Herrera is a 65 y.o. male with a past history of chronic systolic heart failure secondary to ICM s/p LVAD implantation with HeartMate II as destination therapy, h/o torasades/SVT, HTN, lumbar stenosis s/p lumbar laminectomy, CAD (s/p CABG/sp BMSx2), gout, h/o strobic seizures, ETOH abuse and remote tobacco abuse. He has completed evaluation for cardiac transplant at [facility] and is now listed as status 1B. 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 There was no visible fracture or damage externally or by xray. He was sent home with an ungrounded cable and PM and [facility] was notified of the LVAD complication and are considering changing his transplant listing status to 1A due to the LVAD complication. He presents to clinic today for close follow up. He has no acute complaints or concerns. He has not had any alarms on his LVAD. He denies dizziness, chest pain, palpitations, dyspnea, orthopnea, PND, melena, hematochezia, diarrhea, constipation, pain. Home LVAD Flowsheet reviewed: no 
Significant VAD alarms at home: yes Chief Complaint: 
  
Chief Complaint Patient presents with  Follow-up History: 
Past Medical History:  
Diagnosis Date  Arrhythmia SVT  CAD (coronary artery disease)  Chronic pain   
 back  Gout  Heart failure ([facility]  Hypertension  [facility] 12/01/2016  Raynaud disease  Seizures ([facility] 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 Past Surgical History:  
Procedure Laterality Date  CABG, ARTERY-VEIN, TWO  12/01/2016  CARDIAC SURG PROCEDURE UNLIST  12/01/2016 LVAD  HX HEENT    
 wisdom teeth removed  HX ORTHOPAEDIC  11/22/2016  
 laminectomy  HX PACEMAKER    
 ICD - removed 1/2017 Social History Social History  Marital status:  Spouse name: N/A  
 Number of children: N/A  
 Years of education: N/A Occupational History  Not on file. Social History Main Topics  Smoking status: Former Smoker Packs/day: 1.50 Years: 30.00 Quit date: 2008  Smokeless tobacco: Never Used  Alcohol use No  
 Drug use: No  
 Sexual activity: Not on file Other Topics Concern  Not on file Social History Narrative Family History Problem Relation Age of Onset  Diabetes Mother  Dementia Mother  Other Mother   
  carotid artery blockage  Heart Disease Father  Stroke Father  Heart Attack Father   
  several  
 Hypertension Father  Elevated Lipids Father  No Known Problems Sister  Cancer Brother   
  brain  Lung Disease Sister  COPD Sister  Cancer Sister   
  lung Problem List: 
Patient Active Problem List  
Diagnosis Code  S/P laminectomy Z98.890  
 Sinus tachycardia R00.0  Acute respiratory failure with hypoxia ([facility]) J96.01  
 Essential hypertension I10  
 Alcohol abuse F10.10  Chronic systolic heart failure ([facility]) I50.22  
 CAD, multiple vessel I25.10  
 Ischemic cardiomyopathy I25.5  MI (myocardial infarction) ([facility] I21.9  Sustained VT (ventricular tachycardia) ([facility]) I47.2  S/P ICD (internal cardiac defibrillator) procedure Z95.810  Chronic anticoagulation Z79.01  Left ventricular assist device present ([facility] Z95.811  Gout M10.9  ICD (implantable cardioverter-defibrillator) infection ([facility] T82. 7XXA  Erectile dysfunction N52.9  
  
 
ROS: 
Review of Systems Constitutional: Negative. HENT: Negative. Eyes: Negative. Respiratory: Negative. Negative for cough and shortness of breath. Cardiovascular: Negative. Negative for chest pain, palpitations, orthopnea, leg swelling and PND. Gastrointestinal: Negative. Genitourinary: Negative. Musculoskeletal: Negative. Skin: Negative. Neurological: Negative. Psychiatric/Behavioral: Negative. Medications: 
No Known Allergies Current Outpatient Prescriptions on File Prior to Visit Medication Sig  
 allopurinol (ZYLOPRIM) 100 mg tablet Take  by mouth two (2) times a day.  valsartan (DIOVAN) 40 mg tablet Take 1 Tab by mouth daily.  tadalafil (CIALIS) 10 mg tablet Take 1 Tab by mouth as needed. Indications: Erectile Dysfunction  colchicine 0.6 mg tablet Take 1 Tab by mouth as needed. Indications: Gout  pantoprazole (PROTONIX) 40 mg tablet Take 1 Tab by mouth Daily (before breakfast).  clopidogrel (PLAVIX) 75 mg tab Take 1 Tab by mouth daily.  amiodarone (CORDARONE) 200 mg tablet Take 1 Tab by mouth daily.  triamcinolone acetonide (KENALOG) 0.025 % topical cream Apply  to affected area two (2) times a day. use thin layer to affected area  carvedilol (COREG) 25 mg tablet Take 1 Tab by mouth two (2) times daily (with meals).  warfarin (COUMADIN) 2.5 mg tablet Take 2 Tabs by mouth daily.  ascorbic acid, vitamin C, (VITAMIN C) 500 mg tablet Take 1 Tab by mouth two (2) times a day.  cyclobenzaprine (FLEXERIL) 5 mg tablet Take 5 mg by mouth as needed.  HYDROcodone-acetaminophen (NORCO) 5-325 mg per tablet Take 1 Tab by mouth every eight (8) hours as needed.  enoxaparin (LOVENOX) 80 mg/0.8 mL injection 80 mg by SubCUTAneous route every twelve (12) hours.  febuxostat (ULORIC) 40 mg tab tablet Take 1 Tab by mouth daily.  ALPRAZolam (XANAX) 0.25 mg tablet take 1 tablet by mouth once daily if needed No current facility-administered medications on file prior to visit. Results for orders placed or performed in visit on 04/18/18 PROTHROMBIN TIME + INR Result Value Ref Range INR 3.0 (H) 0.8 - 1.2 Prothrombin time 30.2 (H) 9.1 - 12.0 sec Objective: 
 
Visit Vitals  BP (!) 110/0  Pulse 64  Temp 97.7 °F (36.5 °C)  Wt 185 lb 3.2 oz (84 kg)  SpO2 96%  BMI 28.16 kg/m2 \"Pulse\" reflects auscultated HR \"BP\" reflects mean opening pressure by doppler. Physical Exam:  
Physical Exam  
Constitutional: He is oriented to person, place, and time. He appears well-developed and well-nourished. HENT:  
Head: Normocephalic and atraumatic. Eyes: EOM are normal. Pupils are equal, round, and reactive to light. Neck: Neck supple. No hepatojugular reflux and no JVD present. Cardiovascular: Normal rate, regular rhythm and intact distal pulses. LVAD Humm noted on auscultation. Radial pulses palpable. Pulmonary/Chest: Effort normal and breath sounds normal. No respiratory distress. Abdominal: Soft. Bowel sounds are normal. He exhibits no distension. There is no tenderness. Musculoskeletal: He exhibits no edema. Neurological: He is alert and oriented to person, place, and time. Skin: Skin is warm and dry. Psychiatric: He has a normal mood and affect. His behavior is normal. Judgment and thought content normal.  
Vitals reviewed. VAD Interrogation: 
Results for orders placed or performed in visit on 04/18/18 PROTHROMBIN TIME + INR Result Value Ref Range INR 3.0 (H) 0.8 - 1.2 Prothrombin time 30.2 (H) 9.1 - 12.0 sec Narrative Specimen Comment: RESULTS SENT @ 2:06PM. 4/18/18. [facility] Performed at:  [address] [place] [address]  [id number] : Ronn Lindquist MD, Phone:  [phone] SC INTERROGATION VAD IN PRSON W/PHYS/QHP ANALYSIS Impression LVAD (Heartmate) Pump Speed (RPM): 8800 Pump Flow (LPM): 4.3 PI (Pulsitility Index): 7.4 Power: 4.8 Heartmate II:  
  Primary Controller: 
 Speed: 8800         Low Speed Limit: 8200      Backup Battery Replace 11 mos Abnormal Alarms: rare PI events Back-up Controller: 
 Speed: 8800     Low Speed Limit: 8200 Backup Battery Replace 12 mos Drive Line Exam: 
Stabilization device intact: no 
Line inspected for damage: yes Appearance: no edema, erythema, drainage Sterile dressing changed per policy: no 
Frequency of dressing change at home: 3 times a week Frequency of use of stabilization device: 25% Assessment / Plan: 
 
Heart Failure Status: NYHA Class II Chronic Systolic Heart Failure d/t Ischemic cardiomyopathy, s/p HM II LVAD implant as DT 12/1/16: 
Listed UNOS status 1B at [facility] for OHT, being evaluated for upgrade to status 1A due to LVAD complication Appears well supported on LVAD at current settings. Adequate flows and no s/s heart failure. No further alarms since being on ungrounded circuit. Continue to use [employer]RA Power Module and Ungrounded (orange) cable for use at home and advised to remain on ungrounded circuit. LVAD settings reviewed, no changes made. Continue Coreg 25mg BID Cont Valsartan 40mg Pt has tubes to have PRA drawn- we markos blood, packaged and mailed sample to [facility].  
   
HTN, MAP goal 70-90 mmHg: Dopplered opening pressure 110 today, easily palpable radial pulse, so I suspect this more closely reflects a systolic BP No medication changes today, will continue to monitor Caution to avoid hypotension d/t KENDRA occlusion.  
  
Chronic Anticoagulation for LVAD (INR Goal 2-3) No ASA, pt on plavix for CAD with BMS Continue warfarin See anticoag Tracker for details Multivessel CAD/S/p CABG x 2 (SVG to RCA, LIMA to LAD) - s/p- RCA BMS x 2 Continue beta blocker, plavix No statin d/t transaminitis Chronic Back Pain Pt takes flexoril and norco for this Pt reported previous success in improving pain with TENS/Electric stim therapy, but thought he could not have it now d/t LVAD 
TENS will not interfere with LVAD, pt was cleared to pursue this to help manage his back pain. Gout No recent flares Recently changed from uloric back to allopurinol d/t medication cost 
 
Erectile dysfunction On cialis prn Pt aware to be cautious of hypotension VAD specific education - Reinforced proper use of power module and ungrounded cable. Greater than 50% of appt time (60 minutes) was spent face to face counseling Christiane Giron about LVAD management, plan of care, and medication management. Thank you for the opportunity to participate in Quan's care with you. If you have any questions or concerns, please do not hesitate to contact our office. MARY Ross [address] [place] [address], [address] [place] [address] Office [phone] Fax [phone] 
24h VAD Pager: [phone] If you have questions, please do not hesitate to call me. I look forward to following Mr. Mann along with you.  
 
 
 
Sincerely, 
 
 
Cece Pack NP

## 2018-04-18 NOTE — MR AVS SNAPSHOT
[address] [place] [address] [address] [place] [address] 
[phone] Patient: Radhames Mendez MRN: [id number] Visit Information Date & Time Provider Department Dept. Phone Encounter #  
 2018 10:30 AM Mary Martinez NP [address] [id number] Follow-up Instructions Return in about 2 months (around 2018) for LVAD Follow-up. Upcoming Health Maintenance Date Due Pneumococcal 19-64 Medium Risk (1 of 1 - PPSV23) 10/11/1975 DTaP/Tdap/Td series (1 - Tdap) 10/11/1977 ZOSTER VACCINE AGE 60> 2016 FOBT Q 1 YEAR AGE 50-75 2017 Allergies as of 2018  Review Complete On: 2018 By: Mary Martinez NP No Known Allergies Current Immunizations  Reviewed on 2017 Name Date Hep B Vaccine 2017 Hep B Vaccine (Dialysis) 2017  3:21 PM, 2017  3:10 PM, 2017  3:46 PM  
 Influenza Vaccine 10/17/2017 Influenza Vaccine (Quad) PF 2016 12:00 PM  
  
 Not reviewed this visit You Were Diagnosed With   
  
 Codes Comments Chronic systolic heart failure (HCC)    -  Primary ICD-10-CM: Y57.60 ICD-9-CM: 428.22 Left ventricular assist device present [facility])     ICD-10-CM: X78.197 ICD-9-CM: V43.21 Left ventricular assist device (LVAD) complication, subsequent encounter     ICD-10-CM: T82. 9XXD ICD-9-CM: V58.89 Chronic anticoagulation     ICD-10-CM: Z79.01 
ICD-9-CM: V58.61 Vitals BP Pulse Temp Weight(growth percentile) SpO2 BMI  
 (!) 110/0 64 97.7 °F (36.5 °C) 185 lb 3.2 oz (84 kg) 96% 28.16 kg/m2 Smoking Status Former Smoker BMI and BSA Data Body Mass Index Body Surface Area  
 28.16 kg/m 2 2.01 m 2 Preferred Pharmacy Pharmacy Name Phone [id number] [place], [place] - [address] [facility] Geno Cheng [facility] [phone] Your Updated Medication List  
  
   
This list is accurate as of 4/18/18 11:09 AM.  Always use your most recent med list.  
  
  
  
  
 allopurinol 100 mg tablet Commonly known as:  Acquanetta Highman Take  by mouth two (2) times a day. ALPRAZolam 0.25 mg tablet Commonly known as:  XANAX  
take 1 tablet by mouth once daily if needed  
  
 amiodarone 200 mg tablet Commonly known as:  CORDARONE Take 1 Tab by mouth daily. ascorbic acid (vitamin C) 500 mg tablet Commonly known as:  VITAMIN C Take 1 Tab by mouth two (2) times a day. carvedilol 25 mg tablet Commonly known as:  Fransisco Fritter Take 1 Tab by mouth two (2) times daily (with meals). clopidogrel 75 mg Tab Commonly known as:  PLAVIX Take 1 Tab by mouth daily. colchicine 0.6 mg tablet Take 1 Tab by mouth as needed. Indications: Gout  
  
 cyclobenzaprine 5 mg tablet Commonly known as:  FLEXERIL Take 5 mg by mouth as needed. enoxaparin 80 mg/0.8 mL injection Commonly known as:  LOVENOX 80 mg by SubCUTAneous route every twelve (12) hours. febuxostat 40 mg Tab tablet Commonly known as:  Brynda Wicho Take 1 Tab by mouth daily. HYDROcodone-acetaminophen 5-325 mg per tablet Commonly known as:  Piero Santa Rosa Take 1 Tab by mouth every eight (8) hours as needed. pantoprazole 40 mg tablet Commonly known as:  PROTONIX Take 1 Tab by mouth Daily (before breakfast). tadalafil 10 mg tablet Commonly known as:  CIALIS Take 1 Tab by mouth as needed. Indications: Erectile Dysfunction  
  
 triamcinolone acetonide 0.025 % topical cream  
Commonly known as:  KENALOG Apply  to affected area two (2) times a day. use thin layer to affected area  
  
 valsartan 40 mg tablet Commonly known as:  DIOVAN Take 1 Tab by mouth daily. warfarin 2.5 mg tablet Commonly known as:  COUMADIN Take 2 Tabs by mouth daily. We Performed the Following OR INTERROGATION VAD IN PRSON W/PHYS/QHP ANALYSIS [id number] CPT(R)] PROTHROMBIN TIME + INR [93552 CPT(R)] Follow-up Instructions Return in about 2 months (around 6/18/2018) for LVAD Follow-up. Patient Instructions 1. No changes were made today to your LVAD settings or medications. 2. Continue to use the ungrounded (orange) cable with the Power Module or your batteries for power. DO NOT use the Mobile Power Unit (MPU). 3. We are re-checking your INR today. We will call you with the results. 4. You can have TENS therapy/electric stim therapy for your back pain. 5. If you have any alarms or new dizziness, lightheadedness or shortness of breath, please give us a call. 6. Follow up in our clinic in 2 months. Introducing [facility]! Dear Javier López: Thank you for requesting a [employer] account. Our records indicate that you already have an active [employer] account. You can access your account anytime at [email or web address] Did you know that you can access your hospital and ER discharge instructions at any time in [employer]? You can also review all of your test results from your hospital stay or ER visit. Additional Information If you have questions, please visit the Frequently Asked Questions section of the [employer] website at [email or web address] Remember, [employer] is NOT to be used for urgent needs. For medical emergencies, dial 911. Now available from your iPhone and Android! Please provide this summary of care documentation to your next provider. Your primary care clinician is listed as Virgen Prater. If you have any questions after today's visit, please call [phone].

## 2018-04-18 NOTE — PATIENT INSTRUCTIONS
1. No changes were made today to your LVAD settings or medications. 2. Continue to use the ungrounded (orange) cable with the Power Module or your batteries for power. DO NOT use the Mobile Power Unit (MPU). 3. We are re-checking your INR today. We will call you with the results. 4. You can have TENS therapy/electric stim therapy for your back pain. 5. If you have any alarms or new dizziness, lightheadedness or shortness of breath, please give us a call. 6. Follow up in our clinic in 2 months.

## 2018-04-19 ENCOUNTER — TELEPHONE ANTICOAG (OUTPATIENT)
Dept: CARDIOLOGY CLINIC | Age: 63
End: 2018-04-19

## 2018-04-19 ENCOUNTER — DOCUMENTATION ONLY (OUTPATIENT)
Dept: CARDIOLOGY CLINIC | Age: 63
End: 2018-04-19

## 2018-04-19 DIAGNOSIS — I50.22 CHRONIC SYSTOLIC HEART FAILURE (HCC): Primary | ICD-10-CM

## 2018-04-19 DIAGNOSIS — Z95.811 LEFT VENTRICULAR ASSIST DEVICE PRESENT (HCC): ICD-10-CM

## 2018-04-19 DIAGNOSIS — Z79.01 CHRONIC ANTICOAGULATION: ICD-10-CM

## 2018-04-19 NOTE — PROGRESS NOTES
Norman Sims 1721  LVAD Office Visit      Date of VAD implant: 12/1/2016     Cardiologist: GRAHAM  PCP: Erika Samano MD  Consultants: Yara Jimenez MD      Subjective:    HPI: Josemanuel Garcia is a 65 y.o. male with a past history of chronic systolic heart failure secondary to ICM s/p LVAD implantation with HeartMate II as destination therapy, h/o torasades/SVT, HTN, lumbar stenosis s/p lumbar laminectomy, CAD (s/p CABG/sp BMSx2), gout, h/o strobic seizures, ETOH abuse and remote tobacco abuse. He has completed evaluation for cardiac transplant at [facility] and is now listed as status 1B. 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 There was no visible fracture or damage externally or by xray. He was sent home with an ungrounded cable and PM and [facility] was notified of the LVAD complication and are considering changing his transplant listing status to 1A due to the LVAD complication. He presents to clinic today for close follow up. He has no acute complaints or concerns. He has not had any alarms on his LVAD. He denies dizziness, chest pain, palpitations, dyspnea, orthopnea, PND, melena, hematochezia, diarrhea, constipation, pain.     Home LVAD Flowsheet reviewed: no  Significant VAD alarms at home: yes    Chief Complaint:     Chief Complaint   Patient presents with    Follow-up          History:  Past Medical History:   Diagnosis Date    Arrhythmia     SVT    CAD (coronary artery disease)     Chronic pain     back    Gout     Heart failure ([address]     Hypertension     LVAD (left ventricular assist device) present ([address] 12/01/2016    Raynaud disease     Seizures 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     Past Surgical History:   Procedure Laterality Date    CABG, ARTERY-VEIN, TWO  12/01/2016    CARDIAC SURG PROCEDURE UNLIST  12/01/2016    LVAD    HX HEENT      wisdom teeth removed    HX ORTHOPAEDIC  11/22/2016    laminectomy    HX PACEMAKER      ICD - removed 1/2017     Social History     Social History    Marital status:      Spouse name: N/A    Number of children: N/A    Years of education: N/A     Occupational History    Not on file. Social History Main Topics    Smoking status: Former Smoker     Packs/day: 1.50     Years: 30.00     Quit date: 2008    Smokeless tobacco: Never Used    Alcohol use No    Drug use: No    Sexual activity: Not on file     Other Topics Concern    Not on file     Social History Narrative     Family History   Problem Relation Age of Onset    Diabetes Mother     Dementia Mother     Other Mother      carotid artery blockage    Heart Disease Father     Stroke Father     Heart Attack Father      several    Hypertension Father     Elevated Lipids Father     No Known Problems Sister     Cancer Brother      brain    Lung Disease Sister     COPD Sister    [facility] Cancer Sister      lung       Problem List:  Patient Active Problem List   Diagnosis Code    S/P laminectomy Z98.890    Sinus tachycardia R00.0    Acute respiratory failure with hypoxia ([address] J96.01    Essential hypertension I10    Alcohol abuse F10.10    Chronic systolic heart failure (HCC) I50.22    CAD, multiple vessel I25.10    Ischemic cardiomyopathy I25.5    MI (myocardial infarction) ([address] I21.9    Sustained VT (ventricular tachycardia) ([facility]) I47.2    S/P ICD (internal cardiac defibrillator) procedure Z95.810    Chronic anticoagulation Z79.01    Left ventricular assist device present ([address] Z95.811    Gout M10.9    ICD (implantable cardioverter-defibrillator) infection ([address] T82. 7XXA    Erectile dysfunction N52.9        ROS:  Review of Systems   Constitutional: Negative. HENT: Negative. Eyes: Negative. Respiratory: Negative. Negative for cough and shortness of breath. Cardiovascular: Negative.   Negative for chest pain, palpitations, orthopnea, leg swelling and PND. Gastrointestinal: Negative. Genitourinary: Negative. Musculoskeletal: Negative. Skin: Negative. Neurological: Negative. Psychiatric/Behavioral: Negative. Medications:  No Known Allergies     Current Outpatient Prescriptions on File Prior to Visit   Medication Sig    allopurinol (ZYLOPRIM) 100 mg tablet Take  by mouth two (2) times a day.  valsartan (DIOVAN) 40 mg tablet Take 1 Tab by mouth daily.  tadalafil (CIALIS) 10 mg tablet Take 1 Tab by mouth as needed. Indications: Erectile Dysfunction    colchicine 0.6 mg tablet Take 1 Tab by mouth as needed. Indications: Gout    pantoprazole (PROTONIX) 40 mg tablet Take 1 Tab by mouth Daily (before breakfast).  clopidogrel (PLAVIX) 75 mg tab Take 1 Tab by mouth daily.  amiodarone (CORDARONE) 200 mg tablet Take 1 Tab by mouth daily.  triamcinolone acetonide (KENALOG) 0.025 % topical cream Apply  to affected area two (2) times a day. use thin layer to affected area    carvedilol (COREG) 25 mg tablet Take 1 Tab by mouth two (2) times daily (with meals).  warfarin (COUMADIN) 2.5 mg tablet Take 2 Tabs by mouth daily.  ascorbic acid, vitamin C, (VITAMIN C) 500 mg tablet Take 1 Tab by mouth two (2) times a day.  cyclobenzaprine (FLEXERIL) 5 mg tablet Take 5 mg by mouth as needed.  HYDROcodone-acetaminophen (NORCO) 5-325 mg per tablet Take 1 Tab by mouth every eight (8) hours as needed.  enoxaparin (LOVENOX) 80 mg/0.8 mL injection 80 mg by SubCUTAneous route every twelve (12) hours.  febuxostat (ULORIC) 40 mg tab tablet Take 1 Tab by mouth daily.  ALPRAZolam (XANAX) 0.25 mg tablet take 1 tablet by mouth once daily if needed     No current facility-administered medications on file prior to visit.          Results for orders placed or performed in visit on 04/18/18   PROTHROMBIN TIME + INR   Result Value Ref Range    INR 3.0 (H) 0.8 - 1.2    Prothrombin time 30.2 (H) 9.1 - 12.0 sec Objective:    Visit Vitals    BP (!) 110/0    Pulse 64    Temp 97.7 °F (36.5 °C)    Wt 185 lb 3.2 oz (84 kg)    SpO2 96%    BMI 28.16 kg/m2      \"Pulse\" reflects auscultated HR  \"BP\" reflects mean opening pressure by doppler. Physical Exam:   Physical Exam   Constitutional: He is oriented to person, place, and time. He appears well-developed and well-nourished. HENT:   Head: Normocephalic and atraumatic. Eyes: EOM are normal. Pupils are equal, round, and reactive to light. Neck: Neck supple. No hepatojugular reflux and no JVD present. Cardiovascular: Normal rate, regular rhythm and intact distal pulses. LVAD Humm noted on auscultation. Radial pulses palpable. Pulmonary/Chest: Effort normal and breath sounds normal. No respiratory distress. Abdominal: Soft. Bowel sounds are normal. He exhibits no distension. There is no tenderness. Musculoskeletal: He exhibits no edema. Neurological: He is alert and oriented to person, place, and time. Skin: Skin is warm and dry. Psychiatric: He has a normal mood and affect. His behavior is normal. Judgment and thought content normal.   Vitals reviewed. VAD Interrogation:  Results for orders placed or performed in visit on 04/18/18   PROTHROMBIN TIME + INR   Result Value Ref Range    INR 3.0 (H) 0.8 - 1.2    Prothrombin time 30.2 (H) 9.1 - 12.0 sec    Narrative    Specimen Comment: RESULTS SENT @ 2:06PM. 4/18/18.  [facility]  Performed at:  [address] [place] [address]  [id number]  : Rafael Barney MD, Phone:  [phone]   MO INTERROGATION VAD IN PRSON W/PHYS/QHP ANALYSIS    Impression    LVAD (Heartmate)  Pump Speed (RPM): 8800  Pump Flow (LPM): 4.3  PI (Pulsitility Index): 7.4  Power: 4.8     Heartmate II:     Primary Controller:   Speed: 8800         Low Speed Limit: 8200      Backup Battery Replace 11 mos   Abnormal Alarms: rare PI events     Back-up Controller:   Speed: 8800     Low Speed Limit: 8200     Backup Battery Replace 12 mos       Drive Line Exam:  Stabilization device intact: no  Line inspected for damage: yes    Appearance: no edema, erythema, drainage   Sterile dressing changed per policy: no  Frequency of dressing change at home: 3 times a week  Frequency of use of stabilization device: 25%      Assessment / Plan:    Heart Failure Status: NYHA Class II    Chronic Systolic Heart Failure d/t Ischemic cardiomyopathy, s/p HM II LVAD implant as DT 12/1/16:  Listed UNOS status 1B at [facility] for OHT, being evaluated for upgrade to status 1A due to LVAD complication  Appears well supported on LVAD at current settings. Adequate flows and no s/s heart failure. No further alarms since being on ungrounded circuit. Continue to use loaner Power Module and Ungrounded (orange) cable for use at home and advised to remain on ungrounded circuit. LVAD settings reviewed, no changes made. Continue Coreg 25mg BID  Cont Valsartan 40mg  Pt has tubes to have PRA drawn- we markos blood, packaged and mailed sample to [facility].       HTN, MAP goal 70-90 mmHg:   Dopplered opening pressure 110 today, easily palpable radial pulse, so I suspect this more closely reflects a systolic BP  No medication changes today, will continue to monitor  Caution to avoid hypotension d/t KENDRA occlusion.      Chronic Anticoagulation for LVAD (INR Goal 2-3)  No ASA, pt on plavix for CAD with BMS  Continue warfarin  See anticoag Tracker for details    Multivessel CAD/S/p CABG x 2 (SVG to RCA, LIMA to LAD) - s/p- RCA BMS x 2  Continue beta blocker, plavix  No statin d/t transaminitis    Chronic Back Pain  Pt takes flexoril and norco for this  Pt reported previous success in improving pain with TENS/Electric stim therapy, but thought he could not have it now d/t LVAD  TENS will not interfere with LVAD, pt was cleared to pursue this to help manage his back pain.      Gout  No recent flares  Recently changed from uloric back to allopurinol d/t medication cost    Erectile dysfunction  On cialis prn  Pt aware to be cautious of hypotension    VAD specific education - Reinforced proper use of power module and ungrounded cable. Greater than 50% of appt time (60 minutes) was spent face to face counseling Ana Lilia Repress about LVAD management, plan of care, and medication management. Thank you for the opportunity to participate in Quan's care with you. If you have any questions or concerns, please do not hesitate to contact our office.       Salvador Schulz NP    [facility]  [address] [facility], [address] [place] [address]  Office [phone]  Fax [phone]  24h VAD Pager: [phone]

## 2018-04-19 NOTE — PROGRESS NOTES
I spoke with Jorge Luis German NP VAD Coordinator from [facility] and confirmed that Cyril Spatz was upgraded to status 1A on 4/13/18 due to LVAD complication.

## 2018-04-19 NOTE — COMMUNICATION BODY
Norman Sims 1721  LVAD Office Visit      Date of VAD implant: 12/1/2016     Cardiologist: GRAHAM  PCP: Kirit Pelaez MD  Consultants: Dada Titus MD      Subjective:    HPI: Narendra Reyes is a 65 y.o. male with a past history of chronic systolic heart failure secondary to ICM s/p LVAD implantation with HeartMate II as destination therapy, h/o torasades/SVT, HTN, lumbar stenosis s/p lumbar laminectomy, CAD (s/p CABG/sp BMSx2), gout, h/o strobic seizures, ETOH abuse and remote tobacco abuse. He has completed evaluation for cardiac transplant at [facility] and is now listed as status 1B. 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 There was no visible fracture or damage externally or by xray. He was sent home with an ungrounded cable and PM and [facility] was notified of the LVAD complication and are considering changing his transplant listing status to 1A due to the LVAD complication. He presents to clinic today for close follow up. He has no acute complaints or concerns. He has not had any alarms on his LVAD. He denies dizziness, chest pain, palpitations, dyspnea, orthopnea, PND, melena, hematochezia, diarrhea, constipation, pain.     Home LVAD Flowsheet reviewed: no  Significant VAD alarms at home: yes    Chief Complaint:     Chief Complaint   Patient presents with    Follow-up          History:  Past Medical History:   Diagnosis Date    Arrhythmia     SVT    CAD (coronary artery disease)     Chronic pain     back    Gout     Heart failure ([address]     Hypertension     LVAD (left ventricular assist device) present ([address] 12/01/2016    Raynaud disease     Seizures ([facility]     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     Past Surgical History:   Procedure Laterality Date    CABG, ARTERY-VEIN, TWO  12/01/2016    CARDIAC SURG PROCEDURE UNLIST  12/01/2016    LVAD    HX HEENT      wisdom teeth removed    HX ORTHOPAEDIC  11/22/2016    laminectomy    HX PACEMAKER      ICD - removed 1/2017     Social History     Social History    Marital status:      Spouse name: N/A    Number of children: N/A    Years of education: N/A     Occupational History    Not on file. Social History Main Topics    Smoking status: Former Smoker     Packs/day: 1.50     Years: 30.00     Quit date: 2008    Smokeless tobacco: Never Used    Alcohol use No    Drug use: No    Sexual activity: Not on file     Other Topics Concern    Not on file     Social History Narrative     Family History   Problem Relation Age of Onset    Diabetes Mother     Dementia Mother     Other Mother      carotid artery blockage    Heart Disease Father     Stroke Father     Heart Attack Father      several    Hypertension Father     Elevated Lipids Father     No Known Problems Sister     Cancer Brother      brain    Lung Disease Sister     COPD Sister    [facility] Cancer Sister      lung       Problem List:  Patient Active Problem List   Diagnosis Code    S/P laminectomy Z98.890    Sinus tachycardia R00.0    Acute respiratory failure with hypoxia ([facility] J96.01    Essential hypertension I10    Alcohol abuse F10.10    Chronic systolic heart failure (HCC) I50.22    CAD, multiple vessel I25.10    Ischemic cardiomyopathy I25.5    MI (myocardial infarction) ([facility] I21.9    Sustained VT (ventricular tachycardia) ([facility] I47.2    S/P ICD (internal cardiac defibrillator) procedure Z95.810    Chronic anticoagulation Z79.01    Left ventricular assist device present ([address] Z95.811    Gout M10.9    ICD (implantable cardioverter-defibrillator) infection ([facility] T82. 7XXA    Erectile dysfunction N52.9        ROS:  Review of Systems   Constitutional: Negative. HENT: Negative. Eyes: Negative. Respiratory: Negative. Negative for cough and shortness of breath. Cardiovascular: Negative.   Negative for chest pain, palpitations, orthopnea, leg swelling and PND. Gastrointestinal: Negative. Genitourinary: Negative. Musculoskeletal: Negative. Skin: Negative. Neurological: Negative. Psychiatric/Behavioral: Negative. Medications:  No Known Allergies     Current Outpatient Prescriptions on File Prior to Visit   Medication Sig    allopurinol (ZYLOPRIM) 100 mg tablet Take  by mouth two (2) times a day.  valsartan (DIOVAN) 40 mg tablet Take 1 Tab by mouth daily.  tadalafil (CIALIS) 10 mg tablet Take 1 Tab by mouth as needed. Indications: Erectile Dysfunction    colchicine 0.6 mg tablet Take 1 Tab by mouth as needed. Indications: Gout    pantoprazole (PROTONIX) 40 mg tablet Take 1 Tab by mouth Daily (before breakfast).  clopidogrel (PLAVIX) 75 mg tab Take 1 Tab by mouth daily.  amiodarone (CORDARONE) 200 mg tablet Take 1 Tab by mouth daily.  triamcinolone acetonide (KENALOG) 0.025 % topical cream Apply  to affected area two (2) times a day. use thin layer to affected area    carvedilol (COREG) 25 mg tablet Take 1 Tab by mouth two (2) times daily (with meals).  warfarin (COUMADIN) 2.5 mg tablet Take 2 Tabs by mouth daily.  ascorbic acid, vitamin C, (VITAMIN C) 500 mg tablet Take 1 Tab by mouth two (2) times a day.  cyclobenzaprine (FLEXERIL) 5 mg tablet Take 5 mg by mouth as needed.  HYDROcodone-acetaminophen (NORCO) 5-325 mg per tablet Take 1 Tab by mouth every eight (8) hours as needed.  enoxaparin (LOVENOX) 80 mg/0.8 mL injection 80 mg by SubCUTAneous route every twelve (12) hours.  febuxostat (ULORIC) 40 mg tab tablet Take 1 Tab by mouth daily.  ALPRAZolam (XANAX) 0.25 mg tablet take 1 tablet by mouth once daily if needed     No current facility-administered medications on file prior to visit.          Results for orders placed or performed in visit on 04/18/18   PROTHROMBIN TIME + INR   Result Value Ref Range    INR 3.0 (H) 0.8 - 1.2    Prothrombin time 30.2 (H) 9.1 - 12.0 sec Objective:    Visit Vitals    BP (!) 110/0    Pulse 64    Temp 97.7 °F (36.5 °C)    Wt 185 lb 3.2 oz (84 kg)    SpO2 96%    BMI 28.16 kg/m2      \"Pulse\" reflects auscultated HR  \"BP\" reflects mean opening pressure by doppler. Physical Exam:   Physical Exam   Constitutional: He is oriented to person, place, and time. He appears well-developed and well-nourished. HENT:   Head: Normocephalic and atraumatic. Eyes: EOM are normal. Pupils are equal, round, and reactive to light. Neck: Neck supple. No hepatojugular reflux and no JVD present. Cardiovascular: Normal rate, regular rhythm and intact distal pulses. LVAD Humm noted on auscultation. Radial pulses palpable. Pulmonary/Chest: Effort normal and breath sounds normal. No respiratory distress. Abdominal: Soft. Bowel sounds are normal. He exhibits no distension. There is no tenderness. Musculoskeletal: He exhibits no edema. Neurological: He is alert and oriented to person, place, and time. Skin: Skin is warm and dry. Psychiatric: He has a normal mood and affect. His behavior is normal. Judgment and thought content normal.   Vitals reviewed. VAD Interrogation:  Results for orders placed or performed in visit on 04/18/18   PROTHROMBIN TIME + INR   Result Value Ref Range    INR 3.0 (H) 0.8 - 1.2    Prothrombin time 30.2 (H) 9.1 - 12.0 sec    Narrative    Specimen Comment: RESULTS SENT @ 2:06PM. 4/18/18.  [facility]  Performed at:  [address] [place] [address]  [id number]  : Rosetta Andrew MD, Phone:  [phone]   [place] INTERROGATION VAD IN PR[place] W/PHYS/QHP ANALYSIS    Impression    LVAD (Heartmate)  Pump Speed (RPM): 8800  Pump Flow (LPM): 4.3  PI (Pulsitility Index): 7.4  Power: 4.8     Heartmate II:     Primary Controller:   Speed: 8800         Low Speed Limit: 8200      Backup Battery Replace 11 mos   Abnormal Alarms: rare PI events     Back-up Controller:   Speed: 8800     Low Speed Limit: 8200     Backup Battery Replace 12 mos       Drive Line Exam:  Stabilization device intact: no  Line inspected for damage: yes    Appearance: no edema, erythema, drainage   Sterile dressing changed per policy: no  Frequency of dressing change at home: 3 times a week  Frequency of use of stabilization device: 25%      Assessment / Plan:    Heart Failure Status: NYHA Class II    Chronic Systolic Heart Failure d/t Ischemic cardiomyopathy, s/p HM II LVAD implant as DT 12/1/16:  Listed UNOS status 1B at [facility] for OHT, being evaluated for upgrade to status 1A due to LVAD complication  Appears well supported on LVAD at current settings. Adequate flows and no s/s heart failure. No further alarms since being on ungrounded circuit. Continue to use loaner Power Module and Ungrounded (orange) cable for use at home and advised to remain on ungrounded circuit. LVAD settings reviewed, no changes made. Continue Coreg 25mg BID  Cont Valsartan 40mg  Pt has tubes to have PRA drawn- we markos blood, packaged and mailed sample to [facility].       HTN, MAP goal 70-90 mmHg:   Dopplered opening pressure 110 today, easily palpable radial pulse, so I suspect this more closely reflects a systolic BP  No medication changes today, will continue to monitor  Caution to avoid hypotension d/t KENDRA occlusion.      Chronic Anticoagulation for LVAD (INR Goal 2-3)  No ASA, pt on plavix for CAD with BMS  Continue warfarin  See anticoag Tracker for details    Multivessel CAD/S/p CABG x 2 (SVG to RCA, LIMA to LAD) - s/p- RCA BMS x 2  Continue beta blocker, plavix  No statin d/t transaminitis    Chronic Back Pain  Pt takes flexoril and norco for this  Pt reported previous success in improving pain with TENS/Electric stim therapy, but thought he could not have it now d/t LVAD  TENS will not interfere with LVAD, pt was cleared to pursue this to help manage his back pain.      Gout  No recent flares  Recently changed from uloric back to allopurinol d/t medication cost    Erectile dysfunction  On cialis prn  Pt aware to be cautious of hypotension    VAD specific education - Reinforced proper use of power module and ungrounded cable. Greater than 50% of appt time (60 minutes) was spent face to face counseling Kartik Santos about LVAD management, plan of care, and medication management. Thank you for the opportunity to participate in Quan's care with you. If you have any questions or concerns, please do not hesitate to contact our office.       Roe Zaragoza, MARY    [address], [address] [place] [address], [address] [place] [address]  Office [phone]  Fax [phone]  24h VAD Pager: [phone]

## 2018-04-24 ENCOUNTER — TELEPHONE (OUTPATIENT)
Dept: CARDIOLOGY CLINIC | Age: 63
End: 2018-04-24

## 2018-04-26 DIAGNOSIS — R06.02 SHORTNESS OF BREATH: ICD-10-CM

## 2018-04-26 DIAGNOSIS — Z79.01 CHRONIC ANTICOAGULATION: ICD-10-CM

## 2018-04-26 DIAGNOSIS — Z95.811 LEFT VENTRICULAR ASSIST DEVICE PRESENT (HCC): ICD-10-CM

## 2018-04-26 DIAGNOSIS — I50.22 CHRONIC SYSTOLIC HEART FAILURE (HCC): Primary | ICD-10-CM

## 2018-05-01 ENCOUNTER — TELEPHONE (OUTPATIENT)
Dept: CARDIOLOGY CLINIC | Age: 63
End: 2018-05-01

## 2018-05-01 ENCOUNTER — HOSPITAL ENCOUNTER (OUTPATIENT)
Dept: INFUSION THERAPY | Age: 63
Discharge: HOME | End: 2018-05-01
Payer: COMMERCIAL

## 2018-05-01 VITALS
RESPIRATION RATE: 18 BRPM | HEART RATE: 51 BPM | TEMPERATURE: 97.9 F | SYSTOLIC BLOOD PRESSURE: 115 MMHG | DIASTOLIC BLOOD PRESSURE: 78 MMHG

## 2018-05-01 PROCEDURE — 90746 HEPB VACCINE 3 DOSE ADULT IM: CPT | Performed by: NURSE PRACTITIONER

## 2018-05-01 PROCEDURE — 96372 THER/PROPH/DIAG INJ SC/IM: CPT

## 2018-05-01 PROCEDURE — 74011250636 HC RX REV CODE- 250/636: Performed by: NURSE PRACTITIONER

## 2018-05-01 RX ADMIN — HEPATITIS B VACCINE (RECOMBINANT) 40 MCG: 40 INJECTION, SUSPENSION INTRAMUSCULAR; SUBCUTANEOUS at 13:56

## 2018-05-01 NOTE — TELEPHONE ENCOUNTER
I spoke with patient reminding him to have blood work done tomorrow. He understands. He states that he is scheduled to have an injection today, thinks it is Hepatitis B series. He says he thinks he had the Hepatitis B injections in the past.  He would like for me to check with Shmuel Carranza to see if he needs the injection today. Per Shoshana Saravia, NP - patient needs to have the repeat series. He was notified and understands, will get his first one today.

## 2018-05-01 NOTE — PROGRESS NOTES
Outpatient Infusion Center Short Visit Progress Note    1330 Pt admit to [facility] for Hep B vaccine ambulatory in stable condition. Assessment completed. No new concerns voiced. Vaccine given in left deltoid per pt request.    Visit Vitals    /78    Pulse (!) 51    Temp 97.9 °F (36.6 °C)    Resp 18     Medications:  Hep B serum IM    1400 Pt tolerated treatment well. D/c home ambulatory in no distress.  Pt aware of next appointment scheduled for 5/8 @ 1pm.

## 2018-05-02 ENCOUNTER — TELEPHONE ANTICOAG (OUTPATIENT)
Dept: CARDIOLOGY CLINIC | Age: 63
End: 2018-05-02

## 2018-05-02 DIAGNOSIS — Z95.811 LEFT VENTRICULAR ASSIST DEVICE PRESENT (HCC): ICD-10-CM

## 2018-05-02 DIAGNOSIS — I50.22 CHRONIC SYSTOLIC HEART FAILURE (HCC): ICD-10-CM

## 2018-05-02 DIAGNOSIS — Z79.01 CHRONIC ANTICOAGULATION: ICD-10-CM

## 2018-05-02 LAB — INR, EXTERNAL: 4.8

## 2018-05-04 ENCOUNTER — TELEPHONE ANTICOAG (OUTPATIENT)
Dept: CARDIOLOGY CLINIC | Age: 63
End: 2018-05-04

## 2018-05-04 LAB — INR, EXTERNAL: 3.1

## 2018-05-08 ENCOUNTER — HOSPITAL ENCOUNTER (OUTPATIENT)
Dept: INFUSION THERAPY | Age: 63
Discharge: HOME | End: 2018-05-08
Payer: COMMERCIAL

## 2018-05-08 VITALS
SYSTOLIC BLOOD PRESSURE: 105 MMHG | HEART RATE: 55 BPM | RESPIRATION RATE: 18 BRPM | TEMPERATURE: 97 F | DIASTOLIC BLOOD PRESSURE: 70 MMHG | OXYGEN SATURATION: 96 %

## 2018-05-08 PROCEDURE — 96372 THER/PROPH/DIAG INJ SC/IM: CPT

## 2018-05-08 PROCEDURE — 74011250636 HC RX REV CODE- 250/636: Performed by: NURSE PRACTITIONER

## 2018-05-08 PROCEDURE — 90746 HEPB VACCINE 3 DOSE ADULT IM: CPT | Performed by: NURSE PRACTITIONER

## 2018-05-08 RX ADMIN — HEPATITIS B VACCINE (RECOMBINANT) 40 MCG: 40 INJECTION, SUSPENSION INTRAMUSCULAR; SUBCUTANEOUS at 13:49

## 2018-05-08 NOTE — PROGRESS NOTES
Problem: Knowledge Deficit  Goal: *Verbalizes understanding of procedures and medications  Outcome: Progressing Towards Goal  Pt here for Hep B injection

## 2018-05-08 NOTE — PROGRESS NOTES
[facility] VISIT NOTE    Pt arrived at [facility] ambulatory and in no distress for Day 7 Hepatitis B vaccine. Assessment completed, pt had no complaints. Patient Vitals for the past 12 hrs:   Temp Pulse Resp BP SpO2   05/08/18 1345 97 °F (36.1 °C) (!) 55 18 105/70 96 %     Vaccine given in without difficulty in Right deltoid. .    Medications received:  Hepatitis B vaccine IM    Tolerated treatment well, no adverse reaction noted. D/C'd from [facility] ambulatory and in no distress. Next appointment is 5/22/18 at 1:00. Lesvia Rivera

## 2018-05-10 ENCOUNTER — TELEPHONE ANTICOAG (OUTPATIENT)
Dept: CARDIOLOGY CLINIC | Age: 63
End: 2018-05-10

## 2018-05-10 LAB — INR, EXTERNAL: 3.6

## 2018-05-14 ENCOUNTER — TELEPHONE ANTICOAG (OUTPATIENT)
Dept: CARDIOLOGY CLINIC | Age: 63
End: 2018-05-14

## 2018-05-14 LAB — INR, EXTERNAL: 3.1

## 2018-05-22 ENCOUNTER — HOSPITAL ENCOUNTER (OUTPATIENT)
Dept: INFUSION THERAPY | Age: 63
Discharge: HOME | End: 2018-05-22
Payer: COMMERCIAL

## 2018-05-22 ENCOUNTER — TELEPHONE ANTICOAG (OUTPATIENT)
Dept: CARDIOLOGY CLINIC | Age: 63
End: 2018-05-22

## 2018-05-22 VITALS — RESPIRATION RATE: 18 BRPM | DIASTOLIC BLOOD PRESSURE: 69 MMHG | SYSTOLIC BLOOD PRESSURE: 98 MMHG | HEART RATE: 56 BPM

## 2018-05-22 LAB — INR, EXTERNAL: 3.2

## 2018-05-22 PROCEDURE — 74011250636 HC RX REV CODE- 250/636: Performed by: NURSE PRACTITIONER

## 2018-05-22 PROCEDURE — 90746 HEPB VACCINE 3 DOSE ADULT IM: CPT | Performed by: NURSE PRACTITIONER

## 2018-05-22 PROCEDURE — 90471 IMMUNIZATION ADMIN: CPT

## 2018-05-22 RX ADMIN — HEPATITIS B VACCINE (RECOMBINANT) 40 MCG: 40 INJECTION, SUSPENSION INTRAMUSCULAR; SUBCUTANEOUS at 13:41

## 2018-05-22 NOTE — PROGRESS NOTES
Outpatient Infusion Center Short Visit Progress Note    3853  Pt admit to [facility] for day 21 Hepatitis B vaccine ambulatory in stable condition. Assessment completed. No new concerns voiced. Patient Vitals for the past 12 hrs:   Pulse Resp BP   05/22/18 1326 (!) 56 18 98/69     Medications:  Hepatitis B vaccine in right deltoid    1345  Pt tolerated treatment well. D/c home ambulatory in no distress.  Pt to follow up with MD.

## 2018-05-30 ENCOUNTER — TELEPHONE ANTICOAG (OUTPATIENT)
Dept: CARDIOLOGY CLINIC | Age: 63
End: 2018-05-30

## 2018-05-30 ENCOUNTER — TELEPHONE (OUTPATIENT)
Dept: CARDIOLOGY CLINIC | Age: 63
End: 2018-05-30

## 2018-05-30 LAB — INR, EXTERNAL: 2.7

## 2018-06-07 ENCOUNTER — TELEPHONE ANTICOAG (OUTPATIENT)
Dept: CARDIOLOGY CLINIC | Age: 63
End: 2018-06-07

## 2018-06-07 LAB — INR, EXTERNAL: 2.8

## 2018-06-20 ENCOUNTER — TELEPHONE ANTICOAG (OUTPATIENT)
Dept: CARDIOLOGY CLINIC | Age: 63
End: 2018-06-20

## 2018-06-20 LAB — INR, EXTERNAL: 2.4

## 2018-07-03 ENCOUNTER — TELEPHONE ANTICOAG (OUTPATIENT)
Dept: CARDIOLOGY CLINIC | Age: 63
End: 2018-07-03

## 2018-07-03 LAB — INR, EXTERNAL: 2.5

## 2018-07-24 ENCOUNTER — TELEPHONE (OUTPATIENT)
Dept: CARDIOLOGY CLINIC | Age: 63
End: 2018-07-24

## 2018-07-24 LAB — INR, EXTERNAL: 2.6

## 2018-07-24 NOTE — TELEPHONE ENCOUNTER
Telephone Call RE:  Appointment reminder     Outcome:     [x] Patient confirmed appointment   [] Patient rescheduled appointment for    [] Unable to reach   [] Left message              [] Other:       Eva Boyer

## 2018-07-25 ENCOUNTER — OFFICE VISIT (OUTPATIENT)
Dept: CARDIOLOGY CLINIC | Age: 63
End: 2018-07-25

## 2018-07-25 VITALS
RESPIRATION RATE: 20 BRPM | WEIGHT: 184 LBS | OXYGEN SATURATION: 91 % | SYSTOLIC BLOOD PRESSURE: 82 MMHG | HEIGHT: 68 IN | TEMPERATURE: 97.7 F | BODY MASS INDEX: 27.89 KG/M2 | HEART RATE: 54 BPM

## 2018-07-25 DIAGNOSIS — I50.22 CHRONIC SYSTOLIC HEART FAILURE (HCC): ICD-10-CM

## 2018-07-25 DIAGNOSIS — Z95.811 LEFT VENTRICULAR ASSIST DEVICE PRESENT (HCC): Primary | ICD-10-CM

## 2018-07-25 DIAGNOSIS — Z79.01 CHRONIC ANTICOAGULATION: ICD-10-CM

## 2018-07-25 RX ORDER — ASCORBIC ACID 500 MG
TABLET ORAL
COMMUNITY
Start: 2018-07-16 | End: 2018-07-25 | Stop reason: ALTCHOICE

## 2018-07-25 RX ORDER — ATORVASTATIN CALCIUM 40 MG/1
TABLET, FILM COATED ORAL DAILY
COMMUNITY
End: 2018-09-22

## 2018-07-25 RX ORDER — DULOXETIN HYDROCHLORIDE 60 MG/1
CAPSULE, DELAYED RELEASE ORAL
COMMUNITY
Start: 2018-06-20 | End: 2018-09-22

## 2018-07-25 RX ORDER — GUAIFENESIN 100 MG/5ML
81 LIQUID (ML) ORAL DAILY
COMMUNITY

## 2018-07-25 RX ORDER — DULOXETIN HYDROCHLORIDE 30 MG/1
30 CAPSULE, DELAYED RELEASE ORAL DAILY
COMMUNITY
End: 2018-07-25 | Stop reason: ALTCHOICE

## 2018-07-25 NOTE — PATIENT INSTRUCTIONS
- No changes were made today to your medications or LVAD settings. - Follow up as scheduled with [facility] on Aug 8th for your exercise testing and echocargiogram.      - If you are explanted, that will be done at [facility]. At this time, Dr. Froylan Aguillon is planning on assisting with the surgery.      - You can try to get a blood pressure reading with your cuff at home. - Keep an eye on your daily weights. Call us if you have any dizziness, lightheadedness, shortness of breath or fatigue.     - Follow up in our office in 1 month.

## 2018-07-25 NOTE — MR AVS SNAPSHOT
[address] [place] [address] [address] [place] [address] 
[phone] Patient: Suzan Olivera MRN: [id number] :1955 Visit Information Date & Time Provider Department Dept. Phone Encounter #  
 2018 10:30 AM Jc Tate NP [address] [id number] Follow-up Instructions Return in about 1 month (around 2018) for LVAD Follow-up. Upcoming Health Maintenance Date Due Pneumococcal 19-64 Medium Risk (1 of 1 - PPSV23) 10/11/1975 DTaP/Tdap/Td series (1 - Tdap) 10/11/1977 ZOSTER VACCINE AGE 60> 2016 FOBT Q 1 YEAR AGE 50-75 2017 Influenza Age 5 to Adult 2018 Allergies as of 2018  Review Complete On: 2018 By: Jc Tate NP No Known Allergies Current Immunizations  Reviewed on 2018 Name Date Hep B Vaccine 2017 Hep B Vaccine (Dialysis) 2018  1:41 PM, 2018  1:49 PM, 2018  1:56 PM, 2017  3:21 PM, 2017  3:10 PM, 2017  3:46 PM  
 Influenza Vaccine 10/17/2017, 10/1/2017 12:00 AM  
 Influenza Vaccine (Quad) PF 2016 12:00 PM  
  
 Not reviewed this visit You Were Diagnosed With   
  
 Codes Comments Left ventricular assist device present ([facility]    -  Primary ICD-10-CM: N72.705 ICD-9-CM: V43.21 Chronic systolic heart failure (HCC)     ICD-10-CM: I50.22 ICD-9-CM: 428.22 Chronic anticoagulation     ICD-10-CM: Z79.01 
ICD-9-CM: V58.61 Vitals BP Pulse Temp Resp Height(growth percentile) Weight(growth percentile) (!) 82/0 (BP 1 Location: Right arm, BP Patient Position: Sitting) (!) 54 97.7 °F (36.5 °C) (Oral) 20 5' 8\" (1.727 m) 184 lb (83.5 kg) SpO2 BMI Smoking Status 91% 27.98 kg/m2 Former Smoker Vitals History BMI and BSA Data Body Mass Index Body Surface Area  
 27.98 kg/m 2 2 m 2 Preferred Pharmacy Pharmacy Name Phone [id number] - [place], [place] - [address] [address] [phone] Your Updated Medication List  
  
   
This list is accurate as of 7/25/18 11:23 AM.  Always use your most recent med list.  
  
  
  
  
 allopurinol 100 mg tablet Commonly known as:  Clementeen Cade Take  by mouth two (2) times a day. ALPRAZolam 0.25 mg tablet Commonly known as:  XANAX  
take 1 tablet by mouth once daily if needed  
  
 amiodarone 200 mg tablet Commonly known as:  CORDARONE Take 1 Tab by mouth daily. ascorbic acid (vitamin C) 500 mg tablet Commonly known as:  VITAMIN C Take 1 Tab by mouth two (2) times a day. aspirin 81 mg chewable tablet Take 81 mg by mouth daily. atorvastatin 40 mg tablet Commonly known as:  LIPITOR Take  by mouth daily. carvedilol 25 mg tablet Commonly known as:  Carrie Spry Take 1 Tab by mouth two (2) times daily (with meals). clopidogrel 75 mg Tab Commonly known as:  PLAVIX Take 1 Tab by mouth daily. colchicine 0.6 mg tablet Take 1 Tab by mouth as needed. Indications: Gout  
  
 cyclobenzaprine 5 mg tablet Commonly known as:  FLEXERIL Take 5 mg by mouth as needed. DULoxetine 60 mg capsule Commonly known as:  CYMBALTA  
  
 enoxaparin 80 mg/0.8 mL injection Commonly known as:  LOVENOX 80 mg by SubCUTAneous route every twelve (12) hours. febuxostat 40 mg Tab tablet Commonly known as:  Janeane Dines Take 1 Tab by mouth daily. HYDROcodone-acetaminophen 5-325 mg per tablet Commonly known as:  Thelbert Hollymead Take 1 Tab by mouth every eight (8) hours as needed. pantoprazole 40 mg tablet Commonly known as:  PROTONIX Take 1 Tab by mouth Daily (before breakfast). tadalafil 10 mg tablet Commonly known as:  CIALIS Take 1 Tab by mouth as needed. Indications: Erectile Dysfunction  
  
 triamcinolone acetonide 0.025 % topical cream  
Commonly known as:  KENALOG Apply  to affected area two (2) times a day. use thin layer to affected area  
  
 valsartan 40 mg tablet Commonly known as:  DIOVAN Take 1 Tab by mouth daily. warfarin 2.5 mg tablet Commonly known as:  COUMADIN Take 2 Tabs by mouth daily. We Performed the Following [place] INTERROGATION VAD IN [place] W/PHYS/QHP ANALYSIS [id number] CPT(R)] Follow-up Instructions Return in about 1 month (around 8/25/2018) for LVAD Follow-up. Patient Instructions - No changes were made today to your medications or LVAD settings. - Follow up as scheduled with [facility] on Aug 8[facility] for your exercise testing and echocargiogram.   
 
- If you are explanted, that will be done at [facility]. At this time, Dr. Evelio Villatoro is planning on assisting with the surgery.   
 
- You can try to get a blood pressure reading with your cuff at home. - Keep an eye on your daily weights. Call us if you have any dizziness, lightheadedness, shortness of breath or fatigue.  
 
- Follow up in our office in 1 month. Introducing [facility]! Dear Sam Stout: Thank you for requesting a [employer] account. Our records indicate that you already have an active [employer] account. You can access your account anytime at [email or web address] "[email or web address]/[email or web address] Did you know that you can access your hospital and ER discharge instructions at any time in [employer]? You can also review all of your test results from your hospital stay or ER visit. Additional Information If you have questions, please visit the Frequently Asked Questions section of the [employer] website at [email or web address] "[email or web address] Remember, [employer] is NOT to be used for urgent needs. For medical emergencies, dial 911. Now available from your iPhone and Android! Please provide this summary of care documentation to your next provider. Your primary care clinician is listed as Sirena Eugene.  If you have any questions after today's visit, please call [phone].

## 2018-07-25 NOTE — PROGRESS NOTES
Norman Sims [address]  LVAD Office Visit      Date of VAD implant: 12/1/2016     Cardiologist: GRAHAM  PCP: Kayla Allen MD  Consultants: Zach Louie MD      Subjective:    HPI: Ilda Hancock is a 65 y.o. male with a past history of chronic systolic heart failure secondary to ICM s/p LVAD implantation with HeartMate II as destination therapy, h/o torasades/SVT, HTN, lumbar stenosis s/p lumbar laminectomy, CAD (s/p CABG/sp BMSx2), gout, h/o strobic seizures, ETOH abuse and remote tobacco abuse. He has completed evaluation for cardiac transplant at [facility] and is now listed as status 1B. 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 There was no visible fracture or damage externally or by xray. He was sent home with an ungrounded cable and PM and [facility] changed his transplant listing status to 1A due to the LVAD complication. On 7/13/18 he was called in for heart transplant. When they did the DELLA in the OR, he was found to have an EF of 65% and the transplant was cancelled. They decreased his pump speed from 8800 to 8000 and plan to do an outpatient CPET and RHC to evaluate for LVAD explant. He presents to clinic today for follow up. He has no acute complaints or concerns, is just very excited about the prospect of explant. He has not had any alarms on his LVAD. He denies dizziness, chest pain, palpitations, dyspnea, orthopnea, PND, melena, hematochezia, diarrhea, constipation, pain.     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 Social History Main Topics    Smoking status: Former Smoker     Packs/day: 1.50     Years: 30.00     Quit date: 2008    Smokeless tobacco: Never Used    Alcohol use No    Drug use: No    Sexual activity: Not on file     Other Topics Concern    Not on file     Social History Narrative     Family History   Problem Relation Age of Onset    Diabetes Mother     Dementia Mother     Other Mother      carotid artery blockage    Heart Disease Father     Stroke Father     Heart Attack Father      several    Hypertension Father     Elevated Lipids Father     No Known Problems Sister     Cancer Brother      brain    Lung Disease Sister     COPD Sister    [facility] Cancer Sister      lung       Problem List:  Patient Active Problem List   Diagnosis Code    S/P laminectomy Z98.890    Sinus tachycardia R00.0    Acute respiratory failure with hypoxia ([address] J96.01    Essential hypertension I10    Alcohol abuse F10.10    Chronic systolic heart failure (HCC) I50.22    CAD, multiple vessel I25.10    Ischemic cardiomyopathy I25.5    MI (myocardial infarction) ([address] I21.9    Sustained VT (ventricular tachycardia) ([facility]) I47.2    S/P ICD (internal cardiac defibrillator) procedure Z95.810    Chronic anticoagulation Z79.01    Left ventricular assist device present ([address] Z95.811    Gout M10.9    ICD (implantable cardioverter-defibrillator) infection ([address] T82. 7XXA    Erectile dysfunction N52.9        ROS:  Review of Systems   Constitutional: Negative. HENT: Negative. Eyes: Negative. Respiratory: Negative. Negative for cough and shortness of breath. Cardiovascular: Negative. Negative for chest pain, palpitations, orthopnea, leg swelling and PND. Gastrointestinal: Negative. Genitourinary: Negative. Musculoskeletal: Negative. Skin: Negative. Neurological: Negative. Psychiatric/Behavioral: Negative. Medications:  No Known Allergies     Current Outpatient Prescriptions on File Prior to Visit   Medication Sig    allopurinol (ZYLOPRIM) 100 mg tablet Take  by mouth two (2) times a day.  enoxaparin (LOVENOX) 80 mg/0.8 mL injection 80 mg by SubCUTAneous route every twelve (12) hours.  valsartan (DIOVAN) 40 mg tablet Take 1 Tab by mouth daily.  tadalafil (CIALIS) 10 mg tablet Take 1 Tab by mouth as needed. Indications: Erectile Dysfunction    colchicine 0.6 mg tablet Take 1 Tab by mouth as needed. Indications: Gout    amiodarone (CORDARONE) 200 mg tablet Take 1 Tab by mouth daily.  triamcinolone acetonide (KENALOG) 0.025 % topical cream Apply  to affected area two (2) times a day. use thin layer to affected area    carvedilol (COREG) 25 mg tablet Take 1 Tab by mouth two (2) times daily (with meals).  warfarin (COUMADIN) 2.5 mg tablet Take 2 Tabs by mouth daily.  ascorbic acid, vitamin C, (VITAMIN C) 500 mg tablet Take 1 Tab by mouth two (2) times a day.  HYDROcodone-acetaminophen (NORCO) 5-325 mg per tablet Take 1 Tab by mouth every eight (8) hours as needed.  febuxostat (ULORIC) 40 mg tab tablet Take 1 Tab by mouth daily.  pantoprazole (PROTONIX) 40 mg tablet Take 1 Tab by mouth Daily (before breakfast).  clopidogrel (PLAVIX) 75 mg tab Take 1 Tab by mouth daily.  ALPRAZolam (XANAX) 0.25 mg tablet take 1 tablet by mouth once daily if needed    cyclobenzaprine (FLEXERIL) 5 mg tablet Take 5 mg by mouth as needed.      No current facility-administered medications on file prior to visit. Results for orders placed or performed in visit on 07/25/18   AMB PT/INR EXTERNAL   Result Value Ref Range    INR, External 2.6              Objective:    Visit Vitals    BP (!) 82/0 (BP 1 Location: Right arm, BP Patient Position: Sitting)    Pulse (!) 54    Temp 97.7 °F (36.5 °C) (Oral)    Resp 20    Ht 5' 8\" (1.727 m)    Wt 184 lb (83.5 kg)    SpO2 91%    BMI 27.98 kg/m2      \"Pulse\" reflects auscultated HR  \"BP\" reflects mean opening pressure by doppler. Physical Exam:   Physical Exam   Constitutional: He is oriented to person, place, and time. He appears well-developed and well-nourished. HENT:   Head: Normocephalic and atraumatic. Eyes: EOM are normal. Pupils are equal, round, and reactive to light. Neck: Neck supple. No hepatojugular reflux and no JVD present. Cardiovascular: Normal rate, regular rhythm and intact distal pulses. LVAD Humm noted on auscultation. Radial pulses palpable. Pulmonary/Chest: Effort normal and breath sounds normal. No respiratory distress. Abdominal: Soft. Bowel sounds are normal. He exhibits no distension. There is no tenderness. Musculoskeletal: He exhibits no edema. Neurological: He is alert and oriented to person, place, and time. Skin: Skin is warm and dry. Psychiatric: He has a normal mood and affect. His behavior is normal. Judgment and thought content normal.   Vitals reviewed.       VAD Interrogation:  Results for orders placed or performed in visit on 07/25/18   AMB PT/INR EXTERNAL   Result Value Ref Range    INR, External 2.6    VT INTERROGATION VAD IN [place]SON W/PHYS/QHP ANALYSIS    Impression    LVAD (Heartmate)  Pump Speed (RPM): 8000  Pump Flow (LPM): 3.6  PI (Pulsitility Index): 6.5  Power: 3.7     Heartmate II:     Primary Controller:   Speed: 8000         Low Speed Limit: _      Backup Battery Replace _ mos   Abnormal Alarms: none     Back-up Controller:   Speed: 8000   Low Speed Limit: 8000     Backup Battery Replace 9 mos         Drive Line Exam:  Stabilization device intact: no  Line inspected for damage: yes    Appearance: no edema, erythema, drainage   Sterile dressing changed per policy: no  Frequency of dressing change at home: 3 times a week  Frequency of use of stabilization device: 25%      Assessment / Plan:    Heart Failure Status: NYHA Class II    Chronic Systolic Heart Failure d/t Ischemic cardiomyopathy, s/p HM II LVAD:  Has demonstrated cardiac recovery with last EF 65%. Being considered for explant. Appears clinically well perfused with LVAD at current settings- speed 8000rpms. Adequate flows and no s/s heart failure. No further alarms since being on ungrounded circuit. Continue to use loaner Power Module and Ungrounded (orange) cable for use at home and advised to remain on ungrounded circuit. LVAD settings reviewed, no changes made. Plan for RHC and CPET at [facility] on 8/7/17  If he is approved for explant, this will be done at [facility]  Continue Coreg 25mg BID  Cont Valsartan 40mg      HTN, MAP goal 70-90 mmHg:   MAP 82 today  No medication changes today, will continue to monitor  Caution to avoid hypotension d/t KENDRA occlusion.      Chronic Anticoagulation for LVAD (INR Goal 2-3)  On ASA  Continue warfarin  See anticoag Tracker for details    Multivessel CAD/S/p CABG x 2 (SVG to RCA, LIMA to LAD) - s/p- RCA BMS x 2  Continue beta blocker, aspirin  Statin started at [facility]    Chronic Back Pain  Pt takes flexoril and norco for this prn    Gout  No recent flares  Recently changed from uloric back to allopurinol d/t medication cost    Erectile dysfunction  On cialis prn  Pt aware to be cautious of hypotension    VAD specific education - Reinforced proper use of power module and ungrounded cable. Discussed process of weaning LVAD and consideration for explant.       Griselda Dance, MARY    [address] [facility]  [facility], [address] [place] [address], [address] [place] [address]  Office [phone]  Fax [phone]  [address] [place] [address] Pager: [phone]

## 2018-08-02 ENCOUNTER — TELEPHONE ANTICOAG (OUTPATIENT)
Dept: CARDIOLOGY CLINIC | Age: 63
End: 2018-08-02

## 2018-08-02 LAB — INR, EXTERNAL: 3.3

## 2018-08-06 LAB — INR, EXTERNAL: 2.7

## 2018-08-07 ENCOUNTER — TELEPHONE ANTICOAG (OUTPATIENT)
Dept: CARDIOLOGY CLINIC | Age: 63
End: 2018-08-07

## 2018-08-13 DIAGNOSIS — Z79.01 CHRONIC ANTICOAGULATION: ICD-10-CM

## 2018-08-13 RX ORDER — WARFARIN 2.5 MG/1
5 TABLET ORAL DAILY
Qty: 180 TAB | Refills: 3 | Status: SHIPPED | OUTPATIENT
Start: 2018-08-13 | End: 2018-08-17 | Stop reason: SDUPTHER

## 2018-08-14 ENCOUNTER — TELEPHONE ANTICOAG (OUTPATIENT)
Dept: CARDIOLOGY CLINIC | Age: 63
End: 2018-08-14

## 2018-08-14 LAB — INR, EXTERNAL: 3.4

## 2018-08-16 DIAGNOSIS — Z79.01 CHRONIC ANTICOAGULATION: ICD-10-CM

## 2018-08-17 DIAGNOSIS — Z79.01 CHRONIC ANTICOAGULATION: ICD-10-CM

## 2018-08-17 RX ORDER — WARFARIN 2.5 MG/1
5 TABLET ORAL DAILY
Qty: 180 TAB | Refills: 3 | Status: SHIPPED | OUTPATIENT
Start: 2018-08-17 | End: 2018-11-01 | Stop reason: ALTCHOICE

## 2018-08-21 ENCOUNTER — TELEPHONE ANTICOAG (OUTPATIENT)
Dept: CARDIOLOGY CLINIC | Age: 63
End: 2018-08-21

## 2018-08-21 LAB — INR, EXTERNAL: 2.8

## 2018-08-23 DIAGNOSIS — I50.22 CHRONIC SYSTOLIC HEART FAILURE (HCC): Primary | ICD-10-CM

## 2018-08-23 DIAGNOSIS — Z79.01 CHRONIC ANTICOAGULATION: ICD-10-CM

## 2018-08-23 DIAGNOSIS — Z95.811 LEFT VENTRICULAR ASSIST DEVICE PRESENT (HCC): ICD-10-CM

## 2018-08-27 ENCOUNTER — TELEPHONE (OUTPATIENT)
Dept: CARDIOLOGY CLINIC | Age: 63
End: 2018-08-27

## 2018-08-27 ENCOUNTER — TELEPHONE ANTICOAG (OUTPATIENT)
Dept: CARDIOLOGY CLINIC | Age: 63
End: 2018-08-27

## 2018-08-27 LAB
INR PPP: 2.6 (ref 0.8–1.2)
PROTHROMBIN TIME: 26.1 SEC (ref 9.1–12)

## 2018-08-27 NOTE — TELEPHONE ENCOUNTER
Telephone Call RE:  Lab Reminder      Outcome:     [x] Patient verbalizes understanding    [] Unable to reach   [] Left message              []       Denis Stone

## 2018-09-21 ENCOUNTER — HOME HEALTH ADMISSION (OUTPATIENT)
Dept: HOME HEALTH SERVICES | Facility: HOME HEALTH | Age: 63
End: 2018-09-21
Payer: COMMERCIAL

## 2018-09-22 ENCOUNTER — HOME CARE VISIT (OUTPATIENT)
Dept: SCHEDULING | Facility: HOME HEALTH | Age: 63
End: 2018-09-22
Payer: COMMERCIAL

## 2018-09-22 VITALS
RESPIRATION RATE: 20 BRPM | DIASTOLIC BLOOD PRESSURE: 58 MMHG | BODY MASS INDEX: 25.6 KG/M2 | HEIGHT: 70 IN | TEMPERATURE: 98.4 F | OXYGEN SATURATION: 99 % | HEART RATE: 61 BPM | WEIGHT: 178.8 LBS | SYSTOLIC BLOOD PRESSURE: 100 MMHG

## 2018-09-22 PROCEDURE — G0299 HHS/HOSPICE OF RN EA 15 MIN: HCPCS

## 2018-09-23 ENCOUNTER — HOME CARE VISIT (OUTPATIENT)
Dept: SCHEDULING | Facility: HOME HEALTH | Age: 63
End: 2018-09-23
Payer: COMMERCIAL

## 2018-09-23 VITALS
HEART RATE: 69 BPM | DIASTOLIC BLOOD PRESSURE: 78 MMHG | OXYGEN SATURATION: 97 % | RESPIRATION RATE: 16 BRPM | SYSTOLIC BLOOD PRESSURE: 120 MMHG | TEMPERATURE: 99.3 F

## 2018-09-23 PROCEDURE — G0151 HHCP-SERV OF PT,EA 15 MIN: HCPCS

## 2018-09-24 ENCOUNTER — HOME CARE VISIT (OUTPATIENT)
Dept: SCHEDULING | Facility: HOME HEALTH | Age: 63
End: 2018-09-24
Payer: COMMERCIAL

## 2018-09-24 PROCEDURE — G0300 HHS/HOSPICE OF LPN EA 15 MIN: HCPCS

## 2018-09-26 ENCOUNTER — HOME CARE VISIT (OUTPATIENT)
Dept: HOME HEALTH SERVICES | Facility: HOME HEALTH | Age: 63
End: 2018-09-26
Payer: COMMERCIAL

## 2018-09-26 ENCOUNTER — HOME CARE VISIT (OUTPATIENT)
Dept: SCHEDULING | Facility: HOME HEALTH | Age: 63
End: 2018-09-26
Payer: COMMERCIAL

## 2018-09-26 PROCEDURE — G0299 HHS/HOSPICE OF RN EA 15 MIN: HCPCS

## 2018-09-27 VITALS
WEIGHT: 177 LBS | TEMPERATURE: 99.3 F | DIASTOLIC BLOOD PRESSURE: 62 MMHG | OXYGEN SATURATION: 95 % | BODY MASS INDEX: 25.4 KG/M2 | HEART RATE: 68 BPM | SYSTOLIC BLOOD PRESSURE: 110 MMHG

## 2018-09-28 ENCOUNTER — HOME CARE VISIT (OUTPATIENT)
Dept: SCHEDULING | Facility: HOME HEALTH | Age: 63
End: 2018-09-28
Payer: COMMERCIAL

## 2018-09-28 VITALS
SYSTOLIC BLOOD PRESSURE: 110 MMHG | TEMPERATURE: 98.2 F | RESPIRATION RATE: 20 BRPM | HEART RATE: 66 BPM | BODY MASS INDEX: 25.54 KG/M2 | WEIGHT: 178 LBS | DIASTOLIC BLOOD PRESSURE: 68 MMHG | OXYGEN SATURATION: 97 %

## 2018-09-28 PROCEDURE — G0300 HHS/HOSPICE OF LPN EA 15 MIN: HCPCS

## 2018-10-01 ENCOUNTER — HOME CARE VISIT (OUTPATIENT)
Dept: SCHEDULING | Facility: HOME HEALTH | Age: 63
End: 2018-10-01
Payer: COMMERCIAL

## 2018-10-01 VITALS
WEIGHT: 177 LBS | OXYGEN SATURATION: 94 % | DIASTOLIC BLOOD PRESSURE: 70 MMHG | TEMPERATURE: 98.1 F | HEART RATE: 71 BPM | SYSTOLIC BLOOD PRESSURE: 112 MMHG | BODY MASS INDEX: 25.4 KG/M2

## 2018-10-01 PROCEDURE — G0300 HHS/HOSPICE OF LPN EA 15 MIN: HCPCS

## 2018-10-03 ENCOUNTER — HOME CARE VISIT (OUTPATIENT)
Dept: SCHEDULING | Facility: HOME HEALTH | Age: 63
End: 2018-10-03
Payer: COMMERCIAL

## 2018-10-03 VITALS
TEMPERATURE: 98.2 F | RESPIRATION RATE: 20 BRPM | SYSTOLIC BLOOD PRESSURE: 110 MMHG | BODY MASS INDEX: 25.4 KG/M2 | DIASTOLIC BLOOD PRESSURE: 76 MMHG | OXYGEN SATURATION: 98 % | WEIGHT: 177 LBS

## 2018-10-03 PROCEDURE — G0299 HHS/HOSPICE OF RN EA 15 MIN: HCPCS

## 2018-10-04 PROCEDURE — A4456 ADHESIVE REMOVER, WIPES: HCPCS

## 2018-10-04 PROCEDURE — A6216 NON-STERILE GAUZE<=16 SQ IN: HCPCS

## 2018-10-04 PROCEDURE — A4216 STERILE WATER/SALINE, 10 ML: HCPCS

## 2018-10-04 PROCEDURE — A9270 NON-COVERED ITEM OR SERVICE: HCPCS

## 2018-10-04 PROCEDURE — A5120 SKIN BARRIER, WIPE OR SWAB: HCPCS

## 2018-10-05 ENCOUNTER — HOME CARE VISIT (OUTPATIENT)
Dept: SCHEDULING | Facility: HOME HEALTH | Age: 63
End: 2018-10-05
Payer: COMMERCIAL

## 2018-10-05 PROCEDURE — G0300 HHS/HOSPICE OF LPN EA 15 MIN: HCPCS

## 2018-10-07 VITALS
DIASTOLIC BLOOD PRESSURE: 70 MMHG | SYSTOLIC BLOOD PRESSURE: 120 MMHG | BODY MASS INDEX: 25.25 KG/M2 | OXYGEN SATURATION: 93 % | TEMPERATURE: 99 F | WEIGHT: 176 LBS | SYSTOLIC BLOOD PRESSURE: 120 MMHG | TEMPERATURE: 98.2 F | HEART RATE: 78 BPM | DIASTOLIC BLOOD PRESSURE: 70 MMHG | OXYGEN SATURATION: 92 % | HEART RATE: 69 BPM

## 2018-10-08 ENCOUNTER — HOME CARE VISIT (OUTPATIENT)
Dept: SCHEDULING | Facility: HOME HEALTH | Age: 63
End: 2018-10-08
Payer: COMMERCIAL

## 2018-10-08 PROCEDURE — G0300 HHS/HOSPICE OF LPN EA 15 MIN: HCPCS

## 2018-10-10 ENCOUNTER — HOME CARE VISIT (OUTPATIENT)
Dept: SCHEDULING | Facility: HOME HEALTH | Age: 63
End: 2018-10-10
Payer: COMMERCIAL

## 2018-10-10 ENCOUNTER — HOME CARE VISIT (OUTPATIENT)
Dept: HOME HEALTH SERVICES | Facility: HOME HEALTH | Age: 63
End: 2018-10-10
Payer: COMMERCIAL

## 2018-10-10 VITALS
OXYGEN SATURATION: 98 % | WEIGHT: 175 LBS | DIASTOLIC BLOOD PRESSURE: 66 MMHG | RESPIRATION RATE: 20 BRPM | HEART RATE: 66 BPM | TEMPERATURE: 98.2 F | BODY MASS INDEX: 25.11 KG/M2 | SYSTOLIC BLOOD PRESSURE: 96 MMHG

## 2018-10-10 PROCEDURE — G0299 HHS/HOSPICE OF RN EA 15 MIN: HCPCS

## 2018-10-12 ENCOUNTER — HOME CARE VISIT (OUTPATIENT)
Dept: SCHEDULING | Facility: HOME HEALTH | Age: 63
End: 2018-10-12
Payer: COMMERCIAL

## 2018-10-12 PROCEDURE — G0300 HHS/HOSPICE OF LPN EA 15 MIN: HCPCS

## 2018-10-15 ENCOUNTER — HOME CARE VISIT (OUTPATIENT)
Dept: HOME HEALTH SERVICES | Facility: HOME HEALTH | Age: 63
End: 2018-10-15
Payer: COMMERCIAL

## 2018-10-16 ENCOUNTER — HOME CARE VISIT (OUTPATIENT)
Dept: SCHEDULING | Facility: HOME HEALTH | Age: 63
End: 2018-10-16
Payer: COMMERCIAL

## 2018-10-16 VITALS
BODY MASS INDEX: 25.25 KG/M2 | TEMPERATURE: 98.5 F | HEART RATE: 75 BPM | WEIGHT: 176 LBS | OXYGEN SATURATION: 95 % | DIASTOLIC BLOOD PRESSURE: 72 MMHG | SYSTOLIC BLOOD PRESSURE: 122 MMHG

## 2018-10-16 PROCEDURE — G0299 HHS/HOSPICE OF RN EA 15 MIN: HCPCS

## 2018-10-17 VITALS
TEMPERATURE: 98.9 F | DIASTOLIC BLOOD PRESSURE: 72 MMHG | SYSTOLIC BLOOD PRESSURE: 118 MMHG | HEART RATE: 76 BPM | OXYGEN SATURATION: 95 %

## 2018-10-17 PROCEDURE — A6199 ALGINATE DRSG WOUND FILLER: HCPCS

## 2018-10-17 PROCEDURE — A4452 WATERPROOF TAPE: HCPCS

## 2018-10-19 ENCOUNTER — HOME CARE VISIT (OUTPATIENT)
Dept: SCHEDULING | Facility: HOME HEALTH | Age: 63
End: 2018-10-19
Payer: COMMERCIAL

## 2018-10-19 PROCEDURE — G0300 HHS/HOSPICE OF LPN EA 15 MIN: HCPCS

## 2018-10-21 VITALS
WEIGHT: 176 LBS | BODY MASS INDEX: 25.25 KG/M2 | HEART RATE: 64 BPM | TEMPERATURE: 98.2 F | OXYGEN SATURATION: 98 % | DIASTOLIC BLOOD PRESSURE: 70 MMHG | RESPIRATION RATE: 20 BRPM | SYSTOLIC BLOOD PRESSURE: 110 MMHG

## 2018-10-22 ENCOUNTER — HOME CARE VISIT (OUTPATIENT)
Dept: SCHEDULING | Facility: HOME HEALTH | Age: 63
End: 2018-10-22
Payer: COMMERCIAL

## 2018-10-22 PROCEDURE — G0300 HHS/HOSPICE OF LPN EA 15 MIN: HCPCS

## 2018-10-24 ENCOUNTER — HOME CARE VISIT (OUTPATIENT)
Dept: SCHEDULING | Facility: HOME HEALTH | Age: 63
End: 2018-10-24
Payer: COMMERCIAL

## 2018-10-24 DIAGNOSIS — Z79.01 CHRONIC ANTICOAGULATION: ICD-10-CM

## 2018-10-24 DIAGNOSIS — I50.22 CHRONIC SYSTOLIC HEART FAILURE (HCC): Primary | ICD-10-CM

## 2018-10-24 DIAGNOSIS — R06.02 SHORTNESS OF BREATH: ICD-10-CM

## 2018-10-24 PROCEDURE — G0299 HHS/HOSPICE OF RN EA 15 MIN: HCPCS

## 2018-10-26 ENCOUNTER — HOME CARE VISIT (OUTPATIENT)
Dept: SCHEDULING | Facility: HOME HEALTH | Age: 63
End: 2018-10-26
Payer: COMMERCIAL

## 2018-10-26 PROCEDURE — G0300 HHS/HOSPICE OF LPN EA 15 MIN: HCPCS

## 2018-10-27 VITALS
SYSTOLIC BLOOD PRESSURE: 100 MMHG | TEMPERATURE: 98.2 F | OXYGEN SATURATION: 98 % | DIASTOLIC BLOOD PRESSURE: 66 MMHG | BODY MASS INDEX: 24.97 KG/M2 | RESPIRATION RATE: 20 BRPM | WEIGHT: 174 LBS

## 2018-10-28 VITALS
DIASTOLIC BLOOD PRESSURE: 72 MMHG | SYSTOLIC BLOOD PRESSURE: 100 MMHG | OXYGEN SATURATION: 98 % | HEART RATE: 72 BPM | WEIGHT: 173 LBS | TEMPERATURE: 98.7 F | DIASTOLIC BLOOD PRESSURE: 70 MMHG | SYSTOLIC BLOOD PRESSURE: 110 MMHG | BODY MASS INDEX: 24.82 KG/M2 | OXYGEN SATURATION: 98 % | HEART RATE: 61 BPM | SYSTOLIC BLOOD PRESSURE: 122 MMHG | TEMPERATURE: 97 F | HEART RATE: 63 BPM | BODY MASS INDEX: 24.68 KG/M2 | DIASTOLIC BLOOD PRESSURE: 70 MMHG | WEIGHT: 172 LBS | TEMPERATURE: 99.3 F | OXYGEN SATURATION: 94 %

## 2018-10-29 ENCOUNTER — HOME CARE VISIT (OUTPATIENT)
Dept: SCHEDULING | Facility: HOME HEALTH | Age: 63
End: 2018-10-29
Payer: COMMERCIAL

## 2018-10-29 ENCOUNTER — TELEPHONE (OUTPATIENT)
Dept: CARDIOLOGY CLINIC | Age: 63
End: 2018-10-29

## 2018-10-29 PROCEDURE — G0300 HHS/HOSPICE OF LPN EA 15 MIN: HCPCS

## 2018-10-29 NOTE — TELEPHONE ENCOUNTER
Telephone Call RE:  Lab Reminder      Outcome:     [x] Patient verbalizes understanding    [] Unable to reach   [] Left message              []     Patient states he is still with home health - will have labs drawn by them.     Heather Caceres

## 2018-10-30 ENCOUNTER — TELEPHONE ANTICOAG (OUTPATIENT)
Dept: CARDIOLOGY CLINIC | Age: 63
End: 2018-10-30

## 2018-10-30 ENCOUNTER — TELEPHONE (OUTPATIENT)
Dept: CARDIOLOGY CLINIC | Age: 63
End: 2018-10-30

## 2018-10-30 LAB — INR, EXTERNAL: 1.8

## 2018-10-30 NOTE — TELEPHONE ENCOUNTER
Spoke with patient regarding moving up his appointment.     Patient is now scheduled with Dr. Víctor Hammond on Wednesday October 31st at 1pm

## 2018-10-31 ENCOUNTER — HOME CARE VISIT (OUTPATIENT)
Dept: SCHEDULING | Facility: HOME HEALTH | Age: 63
End: 2018-10-31
Payer: COMMERCIAL

## 2018-10-31 ENCOUNTER — HOSPITAL ENCOUNTER (OUTPATIENT)
Dept: NON INVASIVE DIAGNOSTICS | Age: 63
Discharge: HOME | End: 2018-10-31
Attending: INTERNAL MEDICINE
Payer: COMMERCIAL

## 2018-10-31 ENCOUNTER — OFFICE VISIT (OUTPATIENT)
Dept: CARDIOLOGY CLINIC | Age: 63
End: 2018-10-31

## 2018-10-31 ENCOUNTER — HOSPITAL ENCOUNTER (OUTPATIENT)
Dept: GENERAL RADIOLOGY | Age: 63
Discharge: HOME | End: 2018-10-31
Attending: INTERNAL MEDICINE
Payer: COMMERCIAL

## 2018-10-31 VITALS
DIASTOLIC BLOOD PRESSURE: 58 MMHG | TEMPERATURE: 98.5 F | HEART RATE: 64 BPM | HEIGHT: 70 IN | WEIGHT: 178.6 LBS | RESPIRATION RATE: 20 BRPM | OXYGEN SATURATION: 97 % | SYSTOLIC BLOOD PRESSURE: 94 MMHG | BODY MASS INDEX: 25.57 KG/M2

## 2018-10-31 DIAGNOSIS — R06.02 SHORTNESS OF BREATH: ICD-10-CM

## 2018-10-31 DIAGNOSIS — I50.22 CHRONIC SYSTOLIC HEART FAILURE (HCC): ICD-10-CM

## 2018-10-31 DIAGNOSIS — I50.22 CHRONIC SYSTOLIC HEART FAILURE (HCC): Primary | ICD-10-CM

## 2018-10-31 PROCEDURE — G0299 HHS/HOSPICE OF RN EA 15 MIN: HCPCS

## 2018-10-31 PROCEDURE — 71046 X-RAY EXAM CHEST 2 VIEWS: CPT

## 2018-10-31 PROCEDURE — 93306 TTE W/DOPPLER COMPLETE: CPT

## 2018-10-31 NOTE — PROGRESS NOTES
[address] [facility] [place] [address] [facility] [place] in [address] [facility] Note Patient name: Darron Ecehvarria Patient : 1955 Patient MRN: [id number] Date of service: 10/31/18 Primary care physician: Tanesha Abreu MD 
Primary cardiologist: [facility] CHIEF COMPLAINT: 
Chronic systolic heart failure PLAN: 
Echo and EKG today due to marginal hemodynamics and worsening LVEF/MR on post-explant echos Patient to stay in echo labs until results read; admit if further worsening of LVEF/MR Check CXR due to shortness of breath to follow on bilateral pleural effusions Hold all hypertensives tonight and call us with BP and med list at home Obtain arm blood pressure cuff; have home health nurse call us tonight with BP Continue current dose of diuretic Continue allopurinol Continue amiodarone, check thyroid profile every 3 months and PFT/DLCO every year Chronic anticoagulation with eliquis Does not have ICD Retrieve lab results obtained yesterday by [facility] Reinforced low sal diet Reinforced fluid restriction to 6 x 8oz glasses per day ACP present on the chart Immunizations: flu annually and pneumonia every 5 years Follow-up with [facility] cardiologist 
Follow-up with PCP Return to AHF Clinic in one week with MD, Dr. Gerard Segundo or Dr. Jessie Clemente IMPRESSION: 
Fatigue Shortness of breath Coronary artery disease S/p CABG 
S/p HeartMate 2 LVAD implant as BTTon 2016 with concomitant CABG (SVG to the RCA, LIMA to LAD). S/p HeartMate 2 LVAD explant S/p ICD placement and removal in  due to hematoma Chronic systolic heart failure Stage C, NYHA class II symptoms Ischemic cardiomyopathy, LVEF 35-40% RV dysfunction, moderate Bilateral pleural effusions, L>R by CXR  PAD Gout H/o tobacco abuse, remote H/o alcohol abuse, quit 2017 Seizure disorder HISTORY OF PRESENT ILLNESS: 
I had the pleasure to see Mr. Darron Echevarria in Advanced [facility] Failure Clinic at [facility] in [place]. Mr. Eleazar Stokes is a 60 y/o man with a h/o remote tobacco use and alcohol abuse (quit 11/2016), CAD, PVD and ischemic cardiomyopathy. He presented 11/22/2016 for decompressive laminectomy at G7-V2 complicated by myocardial infarction. The cardiac catheterization on 11/25 revealed severe left main and 3 vessel disease along with a 60% right common iliac stenosis. His LVEF was 10%. He subsequently underwent placement of an IABP followed by placement of a HeartMate2 LVAD on 12/1/2016 with concomitant CABG (SVG to the RCA, LIMA to LAD). His postoperative course was complicated by RV failure and an ileus requiring TPN resulting and a transaminitis. He had multiple episodes of Torsade on 12/4/2016 prompting repeat catheterization revealing an occluded RCA vein graft. Two BMS were placed in the RCA graft. He also had SVT requiring cardioversion. He had a single lead ICD placed on 77/58 complicated by a hematoma. The ICD was later removed on 1/20/17. Following his LVAD surgery he did require inpatient rehab. Mr. Eleazar Stokes was listed for heart transplantation at [facility]. He was called in for transplantation on 7/14/2018 but on pre-op DELLA was noted to have normal LV function. He subsequently was admitted on 9/5/2018 for LVAD explant complicated buy pneumonia and bilateral pleural effusions. He was discharged on home O2 which he discontinued on his own. He has noted malaise, fatigue and wheezing. After device explant, his subsequent echocardiograms showed deterioration of LVEF and worsening mitral regurgitation from 40-45% with moderate to severe MR on 9/19/18 to 35-40% with moderate-to-severe MR on 10/15/18. INTERVAL HISTORY: 
Today, patient presents for his initial visit after LVAD explant. Patient is not feeling well, states probably 70% of how well he felt with LVAD.   
 
Patient walked to our clinic from parking garage without having to slow down or stop, but he did walk slower. Patient states he could walk more than one block without symptoms of fatigue or shortness of breath, although his wife states this is not accurate. Patient can perform home activities as usual but is more short of breath and tired. Patient denies symptoms of volume overload, abdominal bloating or leg edema. Patient denies irregular heart rate or palpitations. Patient denies other cardiac symptoms such as chest pain or leg pain with walking. Patient is compliant with fluid restriction and taking medications as prescribed. Patient manages medications himself. CARDIAC IMAGING: 
Echo LVEF 35-40%, abnormal septal motion, moderate diastolic dysfunction, elevated left atrial pressure, LVAD cannula is visualized in LV apex. Right ventricle normal size and moderately reduced function, LVEDD 4.99cm CXR (9/20/18) L>R pleural effusion Echo (9/19/18 Left Ventricle: Normal cavity size and wall thickness. Ejection fraction is 40 - 45%. Left ventricular systolic function is mild-to-moderately decreased. Abnormal septal motion. Moderate diastolic dysfunction. Elevated left atrial pressure. Right Ventricle: Ventricle not well visualized. Cavity is mildly dilated. The ejection fraction is moderately reduced. Tricuspid Valve: Normal valve structure. Moderate to severe regurgitation. There is evidence of moderate pulmonary artery systolic pressure elevation. Echo 9/7/2018 Low normal overall left ventricular systolic function. Estimated left ventricular ejection fraction 50- 55%. Moderately reduced right ventricular systolic function. Mild mitral regurgitation. Moderate tricuspid regurgitation. PASP 46 mmHg No significant change compared to the prior study dated 9/6/2018. Echo 9/6/2018 Technically difficult study. Left Ventricle: Normal cavity size and wall thickness. Ejection fraction is 50 - 55%.  Left ventricular systolic function is low normal. 
 Right Ventricle: Ventricle not well visualized. Probably mildly dilated with mildly reduced function. PASP 24 mmHg. Echo 8/30/2018 Left Ventricle: Normal cavity size and wall thickness. Ejection fraction is 40 - 45%. Left ventricular systolic function is moderately decreased. Ventricular assist device is present. Cannula not well visualized. The aortic valve opens normally, which is consistent with presence of a left ventricular assist device. Right Ventricle: Ventricle not well visualized. Cavity is moderately dilated. The ejection fraction is moderately reduced. Echo 8/8/2018 Left Ventricle: Normal cavity size and wall thickness. Ejection fraction is 40 - 45%. Left ventricular systolic segmental function is mildly decreased. Diastolic function evaluation is inconclusive. Inconclusive left atrial pressure. Ventricular assist device is present. Well visualized cannula. Flow through the cannula is laminar. Right Ventricle: Ventricle not well visualized. Cavity is moderately dilated. The ejection fraction is mildly reduced. Abnormal septal motion. Systolic flattening of interventricular septum consistent with right ventricle pressure overload.   
EF appears similar to slightly lower then the prior study. The LV is better visualized on today's study. Echo 7/16/2018 Technically difficult study. Left Ventricle: Left ventricle not well visualized. Normal cavity size. There is mild concentric hypertrophy present. Ejection fraction is probably around 50% in limited visualization. Ventricular assist device is present. Cannula not well visualized. The aortic valve opens normally Compared to the prior study from 7/14/2018, no significant change. Echo 7/14/2018 Heartmate left ventricular assist device. Mildly reduced left ventricular systolic function. Estimated left ventricular ejection fraction 45- 50%.  
Right ventricle is not optimally visualized, but appears mildly dilated with mildly reduced systolic function. Mild mitral regurgitation. Mild tricuspid regurgitation. TEEcho 7/14/2018 Left Ventricle: Normal cavity size and wall thickness. Calculated ejection fraction is 65 - 70%. The septal wall is hypokinetic. Ventricular assist device is present. Well visualized cannula. Flow through the cannula is laminar. The interventricular septum appears midline. Right Ventricle: Cavity is mildly dilated. The ejection fraction is mildly reduced. Mitral Valve: Mild regurgitation. There is a small left pleural effusion. Echo 5/2/2017 SUMMARY: 
Left ventricle: 9533 Septum midline, mild MR, AoV opens 1:1 
LVIDd 4.4, RVIDd 4.3, LVIDs 4.1, mod TR, PAP 30 
8600 Septum slightly rightward, mild MR, AoV 1:1 
RVIDd 4.3, LVIDd 4.5, mod TR, PAP 30 DELLA 1/24/2017 SUMMARY: 
Left ventricle: The ventricle was mildly dilated. Systolic function was moderately to markedly reduced by visual assessment. Ejection fraction was estimated to be 25% in the range of 20% to 30%. There was moderate diffuse hypokinesis. Right ventricle: The ventricle was mildly dilated. Left atrium: The atrium was mildly dilated. Left atrial appendage: The appendage was mildly dilated. Mitral valve: There was mild regurgitation. No obvious mass, vegetation or thrombus noted. Aortic valve: No obvious mass, vegetation or thrombus noted. Tricuspid valve: No obvious mass, vegetation or thrombus noted. Pulmonic valve: No obvious mass, vegetation or thrombus noted. Pericardium: There was a left pleural effusion. Echo 12/16/2017 SUMMARY: 
Left ventricle: Ventricular septum is midline with significant bounce motion. LVAD settings adjusted to 8600 with mild RV expansion and relatively preserved RV function. RVSP 32mmHg. Aov opens 1:1 
 
Echo 12/6/2016 SUMMARY: 
Ventricular septum: There was paradoxical motion. These changes are consistent with a post-thoracotomy state. Right ventricle:  The size was at the upper limits of normal. Systolic function was moderately reduced. There is hypokinesis of the basal-mid free wall. Tricuspid valve: There was mild regurgitation. Echo 12/5/2016 SUMMARY: 
Left ventricle: LVAD visible at LV apex. 9000 LVAD visible at LV apex, significant bounce motion of ventricular septum with rightward position. Mild TR. RV not well seen. RV appears small with moderately reduced RV function. RVSP 90HKYP Systolic function was severely reduced. DELLA 12/1/2016 AORTA ASCENDING AORTA: The ascending aorta is normal in size with no dissection. There is intimal thickening. AORTIC ARCH: The aortic arch is normal in size with no dissection. There is less than 3 mm plaque that is nonmobile. DESCENDING AORTA: The descending aorta is normal in size with no dissection. There is less than 3 mm plaque that is nonmobile. VALVES  
AORTIC VALVE: The aortic annulus is normal with no stenosis, no regurgitation. The leaflets are normal with normal leaflet motion. MITRAL VALVE: The mitral annulus is dilated with no stenosis. There is 2 to 3+ central mitral insufficiency. The vena contracta measures 4 mm. The leaflets are normal with normal leaflet motion. TRICUSPID VALVE: The tricuspid annulus is normal with no stenosis. There is 1 to 2+ tricuspid insufficiency. The leaflets are normal with normal leaflet motion. ATRIA  
RIGHT ATRIUM: The right atrium is normal in size. There is no smoke, thrombus or tumor. Catheters are visualized in the right atrium. LEFT ATRIUM: The left atrium is mildly dilated with no thrombus or tumor. There is no clot in the left atrial appendage, but reduced velocities are seen. INTERATRIAL SEPTUM: There is no PFO seen via color or bubble study. INTERVENTRICULAR SEPTUM: Normal. 
VENTRICLES  
RIGHT VENTRICLE: The right ventricle is normal in size with no hypertrophy or thrombus.  The global function of the right ventricle is normal.  
 LEFT VENTRICLE: The left ventricle is dilated, but is not hypertrophied. There is no thrombus. The estimated ejection fraction is 10% to 15%. The septal wall was dyskinetic in the basal and mid segments. The inferior and anterior walls are akinetic. The lateral wall in the basal and mid segments is the only wall that appears to be moving. PERICARDIUM: Normal. 
PLEURA: Normal. 
 
POST-BYPASS TRANSESOPHAGEAL ECHOCARDIOGRAM:  
The ejection fraction could not be estimated, as there is a left ventricular assist device in place. The inflow cannula is visualized in the apex of the left ventricle with laminar flow. The outflow cannula is visualized in the ascending aorta with laminar flow. There is no aortic insufficiency. There is no  
dissection in the aorta. The mitral regurgitation is decreased at 2+. All other findings are unchanged from previous. Echo 11/27/2016 SUMMARY: 
Left ventricle: Size was at the upper limits of normal. Systolic function was severely reduced. Ejection fraction was estimated in the range of 15% to 20%. There was severe diffuse hypokinesis. There was severe hypokinesis of the mid-apical anterior, basal-mid anteroseptal, apical lateral, and apical wall(s). Right ventricle: A pacing wire was present. Mitral valve: There was mild regurgitation. Aortic valve: The valve was trileaflet. Leaflets exhibited mildly to moderately increased thickness, normal cuspal separation, and sclerosis. Echo 11/24/2016 SUMMARY: 
Left ventricle: All walls hypokinetic with exception of the septum best appreciated in the parasternal short axis views. When definity given appears to globally reduced. Ejection fraction was estimated to be 15%. Right ventricle: Systolic function was reduced. Mitral valve: There was mild to moderate regurgitation. Aortic valve: Leaflets exhibited sclerosis. Stress VO2 8/8/2018 · Peak VO2 11.2 ml/kg/min; RER 1.01. 
 · Cardiopulmonary functional class: C (11-15 ml/kg/min); moderate to severe impairment. · ECG is non-conclusive due to failure to achieve 85% MAPHR. Baseline Peak Stress Exercise data HR 50 HR 65 Target  %HR 41% /79 BP 98/44 Exercise duration 3'17\" Rest AT Max Predicted SPO2 94 90 92 VO2 ml/kg/min 2.7 8.8 11.2 35.9 RER 0.97 0.84 1.01  
METs 3.2 VE/VCO2 45 36 35 27 Cath 8/30/2018 Baseline(8000 RPMs):  
RA 10/14/10 RV 33/12 PA 36/12/22 [facility] 9/16/12 Ao LV Yesika CO/CI 3.91/1.93 
TD CO/CI 4.47/2.2 Art sat 97% PA sat 60.8% RA10 , PA 36/12/22 , PCWP12 , Yesika CO 3.91 and CI 1.93, Thermal CO 4.47 and CI 2.2 , TPG10 , PVR , PA Saturation 60.8% 
  
Baseline (6000 RPMs): PA 42/16/25 , PCWP 18, PA Saturation 58.1% (Thermal output were not markedly different 6000 and 8000 RPMS but were unfortunately not stored) 
  Immediate Post Exercise:  
RA 20/22/17 RV  
PA 48/20/31 PC 26/25/19 Ao LV Yesika CO/CI 3.24/1.6 TD CO/CI 4.1/2.02 Art sat 97% PA sat 52.7% Recovery (20 minutes post):  
PA 48/20/31 PC 19 Yesika CO/CI 3.24/1.6 TD CO/CI 4.1/2.02  
PA sat 52.7 % Cath 8/30/2017 RA 14 
PA 21/13/14 PC 13 Yesika CO/CI 5.67/3.0 PA sat 74.3%, Cath 12/4/2016 SUMMARY: 
CORONARY CIRCULATION: 
RCA: There was a tubular 60 % stenosis in the middle third of the vessel segment. Proximal RCA: There was a 90 % stenosis at the ostium of the vessel segment. There was LAMINE grade 2 flow through the vessel (partial perfusion). Distal RCA: There was a 100 % stenosis. There was LAMINE grade 0 flow through the vessel (no flow). 1ST LESION INTERVENTIONS: 
The RCA was presumed to have occluded very recently at the site probable SVG insertion. There was also severe ostial stenosis as identified before CABG.  The occlusion was easily crossed with a guidewire and predilated with a 2.5mm balloon, restoring antegrade flow and revealing a fairly large codominant minimally irregular distal vessel with narrowing where previously occluded. A stent was advanced into the vessel but could not progress beyond the proximal segment. Finally with the addition of a gracia wire, a 2.5x18mm BMS was advanced, with some difficulty, to the target site of previous occlusion and deployed with a 12 atms inflation. Result was excellent with excellent flow. It seemed best to to treat the long area of disease in the mid RCA. A 2.5x12mm BMS was positioned across the ostium and deployed at 12 atms, with more proximal postdilatation to 14 atms. Final result was excellent although there was slight hazziness beyond the stent that did not appear to need further attention. -- A stent was performed on the 100 % lesion in the distal RCA. Following intervention there was a 0 % residual stenosis. -- A 2.5mm x 18mm RX Integrity Coronary Stent bare-metal stent was placed across the lesion and successfully deployed at a maximum inflation pressure of 12 boni. 2ND LESION INTERVENTIONS: 
A stent was performed on the 90 % lesion in the proximal RCA. Following intervention there was a 0 % residual stenosis. -- A RX Mini Vision 2.5 x 12 bare-metal stent was placed across the lesion and successfully deployed at a maximum inflation pressure of 14 boni. Cath 11/29/2016 Intra-aortic balloon pump placement. The left femoral artery was cannulated percutaneously and a sheath was placed. The 9.5 Fr balloon catheter was advanced into the aorta and the tip was fluoroscopically positioned prior to the origin of the left subclavian artery. Balloon pumping was begun, adjusting inflation and deflation times to maximize diastolic augmentation and minimize presystolic LV afterload. The intra-aortic balloon catheter was sutured in place. Cath 11/25/2016 CORONARY CIRCULATION: 1. LM 1. Left system is highly calcified 
-ostial 95% 2.  LAD 
 -moderate caliber and courses around the apex. There is a 90% lesion prox. 
-small diagonals diffuse disease 3. LCX 
-diffusely diseased with 100% lesion at OM1 4. RCA 
-ostial 90%, dominant Carotid Duplex 3/28/2017 FINDINGS: 
Right: Internal carotid velocity is elevated to a level consistent with a 50 to 69 percent stenosis; there is narrowing of the flow channel on color Doppler imaging and calcific plaque on B-mode imaging. The common and external carotid arteries are patent and without evidence of hemodynamically significant stenosis. Left: No flow is detected in the internal carotid artery by pulsed Doppler or color Doppler imaging and mixed density plaque is visualized on B-mode imaging, consistent with occlusion. The common and external carotid arteries are patent and without evidence of hemodynamically significant stenosis. IMPRESSION: Consistent with 50-69% stenosis of the right internal carotid and occluded left internal carotid. Vertebrals are patent with antegrade flow. HECTOR 11/30/2016 Right Leg PVR: 
Ankle/Brachial: 1.27 Left Leg PVR: 
Ankle/Brachial: 1.07 IMPRESSION: No evidence of hemodynamically significant lower extremity arterial obstruction. Patient has a heart pump. PFT 6/21/2017 FEV1/FVC 65; FEV1 74%; FVC 85%; TLC 92%; DLCO 45%. CTA head/ neck (7/19/2017) 1. 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 2. Moderate (50%) stenosis of the right proximal cervical ICA, moderate stenosis of the right paraclinoid ICA, moderate stenosis of the right vertebral artery origin, mild stenosis of the left V2 segment, and moderate stenosis of the right PICA origin. 3. No acute intracranial abnormality. Small regions of encephalomalacia involving the right and left frontoparietal regions.  
4. Severe multilevel cervical spondylosis including severe spinal canal stenosis at C4-C5, C5-C6 and C6-C7. Consider cervical MRI as clinically indicated. CTA chest/abdomen/pelvis 6/21/2017 Vascular: 1. 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 
2. Severe stenosis of the ARLEEN. 3. Severe stenosis of the right external iliac artery. 4. Complete occlusion of the left superficial femoral artery.  
Chest: 1. Status post LVAD with severe three-vessel coronary artery atherosclerotic calcifications 2. Apical predominant centrilobular emphysema. 3. Prominent mediastinal lymph nodes are favored reactive in nature.  
Abdomen/Pelvis: 1. Cholelithiasis. 2. Diverticulosis. 3. Status post posterior fusion and decompression at L4-L5. CT head 1/26/2017 FINDINGS: 
The ventricles and sulci are normal in size, shape and configuration and midline. There is no significant white matter disease. There is no intracranial hemorrhage, extra-axial collection, mass, mass effect or midline shift. The basilar cisterns are open. No acute infarct is identified. The bone windows demonstrate no abnormalities. The visualized portions of the paranasal sinuses and mastoid air cells are clear. Vascular calcification is noted. CT Abd/Pel 12/5/2016 FINDINGS: There is artifact from the left ventricular assist device. There is mild bibasilar atelectasis and small bilateral pleural effusions, likely. There are multiple loops of slightly prominent appearing small bowel but none are frankly dilated. There is extensive stool throughout the proximal two thirds of the colon relatively less stool in the rectosigmoid. There is no free air. The liver spleen pancreas and kidneys are unremarkable given limitations of lack of IV contrast. There are bilateral nephrograms previous contrast 
 
CTA Chest 11/24/2016 FINDINGS: 
There is airspace process in the right upper lobe as well as in both lung bases. Small bilateral pleural effusions right greater than left noted. The pulmonary arteries are well enhanced and no pulmonary emboli are identified. There is no mediastinal or hilar adenopathy or mass. The aorta enhances normally without evidence of aneurysm or dissection. The visualized portions of the upper abdominal organs are normal. 
 
Abd US 12/14/2016 Real-time sonographic imaging of the right upper quadrant was performed. The left hepatic lobe is not well evaluated due to the large bandage, however no focal abnormality is identified. The gallbladder is unremarkable. Common bile duct is normal in size. The right kidney is normal in size and appearance without evidence of mass lesion, hydronephrosis, or calcification. The pancreas is not well-visualized due to bowel gas. No free fluid. There is a small right pleural effusion. REVIEW OF SYSTEMS: 
General: Denies fever, night sweats. Ear, nose and throat: Denies difficulty hearing, sinus problems, runny nose, post-nasal drip, ringing in ears, mouth sores, loose teeth, ear pain, nosebleeds, sore throate, facial pain or numbess Cardiovascular: see above in the interval history Respiratory: Denies cough, wheezing, sputum production, hemoptysis. Gastrointestinal: Denies heartburn, constipation, intolerance to certain foods, diarrhea, abdominal pain, nausea, vomiting, difficulty swallowing, blood in stool Kidney and bladder: Denies painful urination, frequent urination, urgency, prostate problems and impotence Musculoskeletal: Denies joint pain, muscle weakness Skin and hair: Denies change in existing skin lesions, hair loss or increase, breast changes PHYSICAL EXAM: 
Vital signs:  
Visit Vitals BP 94/58 (BP 1 Location: Left arm, BP Patient Position: Sitting) Pulse 64 Temp 98.5 °F (36.9 °C) (Oral) Resp 20 Ht 5' 10\" (1.778 m) Wt 178 lb 9.6 oz (81 kg) SpO2 97% BMI 25.63 kg/m² General: Patient is well developed, well-nourished in no acute distress HEENT: Normocephalic and atraumatic. No scleral icterus. Pupils are equal, round and reactive to light and accomodation. No conjunctival injection. Oropharynx is clear. Neck: Supple. No evidence of thyroid enlargements or lymphadenopathy. JVD: Cannot be appreciated Lungs: Breath sounds are equal and clear bilaterally. No wheezes, rhonchi, or rales. Heart: Regular rate and rhythm with normal S1 and S2. No murmurs, gallops or rubs. Abdomen: Soft, no mass or tenderness. No organomegaly or hernia. Bowel sounds present. Genitourinary and rectal: deferred Extremities: No cyanosis, clubbing, or edema. Neurologic: No focal sensory or motor deficits are noted. Grossly intact. Psychiatric: Awake, alert an doriented x 3. Appropriate mood and affect. Skin: Warm, dry and well perfused. No lesions, nodules or rashes are noted. PAST MEDICAL HISTORY: 
Past Medical History:  
Diagnosis Date  Arrhythmia SVT  CAD (coronary artery disease)  Chronic pain   
 back  Gout  Heart failure ([address]  Hypertension  LVAD (left ventricular assist device) present ([address] 12/01/2016  Raynaud disease  Seizures ([facility] [facility] [address] 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 PAST SURGICAL HISTORY: 
Past Surgical History:  
Procedure Laterality Date  CABG, ARTERY-VEIN, TWO  12/01/2016  CARDIAC SURG PROCEDURE UNLIST  12/01/2016 [facility]  HX HEENT    
 wisdom teeth removed  HX ORTHOPAEDIC  11/22/2016  
 laminectomy  HX PACEMAKER    
 ICD - removed 1/2017 FAMILY HISTORY: 
Family History Problem Relation Age of Onset  Diabetes Mother  Dementia Mother  Other Mother   
     carotid artery blockage  Heart Disease Father  Stroke Father  Heart Attack Father   
     several  
 Hypertension Father  Elevated Lipids Father  No Known Problems Sister  Cancer Brother   
     brain  Lung Disease Sister  COPD Sister  Cancer Sister   
     lung SOCIAL HISTORY: 
Social History Socioeconomic History  Marital status:  Spouse name: Not on file  Number of children: Not on file  Years of education: Not on file  Highest education level: Not on file Social Needs  Financial resource strain: Not on file  Food insecurity - worry: Not on file  Food insecurity - inability: Not on file  Transportation needs - medical: Not on file  Transportation needs - non-medical: Not on file Occupational History  Not on file Tobacco Use  Smoking status: Former Smoker Packs/day: 1.50 Years: 30.00 Pack years: 45.00 Last attempt to quit: 2008 Years since quitting: 10.8  Smokeless tobacco: Never Used Substance and Sexual Activity  Alcohol use: No  
 Drug use: No  
 Sexual activity: Not on file Other Topics Concern  Not on file Social History Narrative  Not on file LABORATORY RESULTS: 
 No flowsheet data found. Lab Results Component Value Date/Time TSH 2.83 11/28/2016 07:29 AM  
 
 
CURRENT MEDICATIONS: 
 
Current Outpatient Medications:   METOPROLOL SUCCINATE PO, Take 25 mg by mouth daily. , Disp: , Rfl:  
  apixaban (ELIQUIS) 5 mg tablet, Take 1 Tab by mouth two (2) times a day., Disp: 60 Tab, Rfl: 11 
  allopurinol (ZYLOPRIM) 100 mg tablet, Take 100 mg by mouth two (2) times a day., Disp: , Rfl:  
  HYDROcodone-acetaminophen (NORCO) 5-325 mg per tablet, Take 1 Tab by mouth every eight (8) hours as needed for Pain., Disp: , Rfl:  
  atorvastatin (LIPITOR) 40 mg tablet, Take 40 mg by mouth daily. , Disp: , Rfl:  
  colchicine 0.6 mg tablet, Take 0.6 mg by mouth every other day., Disp: , Rfl:  
  cyclobenzaprine (FLEXERIL) 5 mg tablet, Take 5 mg by mouth daily as needed for Muscle Spasm(s). , Disp: , Rfl:  
  DULoxetine (CYMBALTA) 60 mg capsule, Take 60 mg by mouth daily. , Disp: , Rfl:  
   furosemide (LASIX) 40 mg tablet, Take 40 mg by mouth daily as needed (weight gain 3lbs in 24hrs). , Disp: , Rfl:  
  metoprolol tartrate (LOPRESSOR) 25 mg tablet, Take 12.5 mg by mouth two (2) times a day., Disp: , Rfl:  
  oxyCODONE IR (ROXICODONE) 5 mg immediate release tablet, Take 5 mg by mouth every eight (8) hours as needed for Pain., Disp: , Rfl:  
  OXYGEN-AIR DELIVERY SYSTEMS, Take 2 L/min by inhalation nightly., Disp: , Rfl:  
  warfarin (COUMADIN) 2.5 mg tablet, Take 2 Tabs by mouth daily. Or as directed by [facility] team, Disp: 180 Tab, Rfl: 3 
  aspirin 81 mg chewable tablet, Take 81 mg by mouth daily. , Disp: , Rfl:  
  enoxaparin (LOVENOX) 80 mg/0.8 mL injection, 80 mg by SubCUTAneous route every twelve (12) hours. , Disp: 10 Syringe, Rfl: 0 
  valsartan (DIOVAN) 40 mg tablet, Take 1 Tab by mouth daily. , Disp: 90 Tab, Rfl: 3 
  tadalafil (CIALIS) 10 mg tablet, Take 1 Tab by mouth as needed. Indications: Erectile Dysfunction, Disp: 30 Tab, Rfl: 2 
  febuxostat (ULORIC) 40 mg tab tablet, Take 1 Tab by mouth daily. , Disp: 90 Tab, Rfl: 3 
  colchicine 0.6 mg tablet, Take 1 Tab by mouth as needed. Indications: Gout, Disp: 30 Tab, Rfl: 3 
  pantoprazole (PROTONIX) 40 mg tablet, Take 1 Tab by mouth Daily (before breakfast). , Disp: 90 Tab, Rfl: 3 
  clopidogrel (PLAVIX) 75 mg tab, Take 1 Tab by mouth daily. , Disp: 90 Tab, Rfl: 3 
  amiodarone (CORDARONE) 200 mg tablet, Take 1 Tab by mouth daily. , Disp: 90 Tab, Rfl: 3 
  triamcinolone acetonide (KENALOG) 0.025 % topical cream, Apply  to affected area two (2) times a day. use thin layer to affected area, Disp: 15 g, Rfl: 4   carvedilol (COREG) 25 mg tablet, Take 1 Tab by mouth two (2) times daily (with meals). , Disp: 180 Tab, Rfl: 3 
  ALPRAZolam (XANAX) 0.25 mg tablet, take 1 tablet by mouth once daily if needed, Disp: , Rfl: 0 
  ascorbic acid, vitamin C, (VITAMIN C) 500 mg tablet, Take 1 Tab by mouth two (2) times a day., Disp: 60 Tab, Rfl: 6 Thank you for allowing me to participate in this patient's care. Ezio Hooker MD PhD 
[facility] [address], [address] Phone: [phone] Fax: [phone]

## 2018-11-01 ENCOUNTER — TELEPHONE (OUTPATIENT)
Dept: CARDIOLOGY CLINIC | Age: 63
End: 2018-11-01

## 2018-11-01 VITALS
HEART RATE: 66 BPM | DIASTOLIC BLOOD PRESSURE: 60 MMHG | OXYGEN SATURATION: 98 % | TEMPERATURE: 98.9 F | SYSTOLIC BLOOD PRESSURE: 96 MMHG | BODY MASS INDEX: 24.97 KG/M2 | WEIGHT: 174 LBS | RESPIRATION RATE: 20 BRPM

## 2018-11-01 RX ORDER — FUROSEMIDE 40 MG/1
40 TABLET ORAL DAILY
Qty: 30 TAB | Refills: 3 | Status: SHIPPED | OUTPATIENT
Start: 2018-11-01 | End: 2018-12-04

## 2018-11-01 RX ORDER — CARVEDILOL 3.12 MG/1
3.12 TABLET ORAL 2 TIMES DAILY WITH MEALS
Qty: 60 TAB | Refills: 3 | Status: SHIPPED | OUTPATIENT
Start: 2018-11-01 | End: 2019-02-13 | Stop reason: SDUPTHER

## 2018-11-01 NOTE — TELEPHONE ENCOUNTER
I called patient and advised him per Dr Shante Duggan to stop metoprolol and start carvedilol. Also reviewed all medications, advised him to take BP and HR before and after taking medication. He states understanding.

## 2018-11-02 ENCOUNTER — HOME CARE VISIT (OUTPATIENT)
Dept: HOME HEALTH SERVICES | Facility: HOME HEALTH | Age: 63
End: 2018-11-02
Payer: COMMERCIAL

## 2018-11-02 PROCEDURE — G0300 HHS/HOSPICE OF LPN EA 15 MIN: HCPCS

## 2018-11-05 ENCOUNTER — HOME CARE VISIT (OUTPATIENT)
Dept: HOME HEALTH SERVICES | Facility: HOME HEALTH | Age: 63
End: 2018-11-05
Payer: COMMERCIAL

## 2018-11-05 PROCEDURE — G0300 HHS/HOSPICE OF LPN EA 15 MIN: HCPCS

## 2018-11-07 ENCOUNTER — HOME CARE VISIT (OUTPATIENT)
Dept: SCHEDULING | Facility: HOME HEALTH | Age: 63
End: 2018-11-07
Payer: COMMERCIAL

## 2018-11-07 ENCOUNTER — TELEPHONE (OUTPATIENT)
Dept: CARDIOLOGY CLINIC | Age: 63
End: 2018-11-07

## 2018-11-07 ENCOUNTER — OFFICE VISIT (OUTPATIENT)
Dept: CARDIOLOGY CLINIC | Age: 63
End: 2018-11-07

## 2018-11-07 VITALS
RESPIRATION RATE: 20 BRPM | DIASTOLIC BLOOD PRESSURE: 58 MMHG | HEART RATE: 77 BPM | SYSTOLIC BLOOD PRESSURE: 92 MMHG | WEIGHT: 177 LBS | OXYGEN SATURATION: 98 % | HEIGHT: 70 IN | TEMPERATURE: 98.1 F | BODY MASS INDEX: 25.34 KG/M2

## 2018-11-07 DIAGNOSIS — I25.5 ISCHEMIC CARDIOMYOPATHY: Primary | ICD-10-CM

## 2018-11-07 PROCEDURE — G0299 HHS/HOSPICE OF RN EA 15 MIN: HCPCS

## 2018-11-07 NOTE — TELEPHONE ENCOUNTER
Prior auth submitted for entresto via covermymeds. Awaiting response. Prior auth approved, I notified pharmacy.   copay will be $0

## 2018-11-07 NOTE — PROGRESS NOTES
[id number] [facility] [address] [facility] in [place] Outpatient Clinic Note Patient name: Boyd Pinon Patient : 1955 Patient MRN: [id number] Date of service: 18 Primary care physician: Lawrence Schulz MD 
Primary cardiologist: Zahraa Mott MD 
 
CHIEF COMPLAINT: 
Chronic systolic heart failure IMPRESSION/PLAN: 
1. CAD s/p CABG (SVG to RCA and LIMA to LAD) on 16 On ASA, BB, statin Stable 2. ICM s/p HMII as BTT on 16 and explant, NYHA Class III, LVEF 30-35%, mild RV dysfunction On carvedilol 3.125 mg twice daily Discontinue lasix 40 mg Take lasix 20 mg  (1/2 of a 40 mg tablet) only as needed for shortness of breath Start entresto 24/26 mg, 1 tablet twice daily Recent lab results reviewed Follow up with [facility] Follow up in the [facility] in 2 weeks 3. High Risk of SCD - s/p AICD in 2017 with removal d/t hematoma, LVEF 30-35% Recommend LifeVest 
 
4. PAD - difficult femoral access with LVAD explant No symptoms of claudication 5. Chronic Anticoagulation s/p LVAD explant On eliquis 6. Gout On colchicine 0.6 mg qod 6. H/o tobacco abuse, remote 7. H/o alcohol abuse, quit 2017 8. Seizure disorder - early  No recent seizure activity 9. Chronic Lower back pain On oxycodone, flexeril 10. Prevention Influenza annually Pneumonia vaccine every 5 years HISTORY OF PRESENT ILLNESS: 
I had the pleasure to see Mr. Boyd Pinon in [address] [facility] in [place]. Mr. Alissa William is a 60 y/o man with a h/o remote tobacco use and alcohol abuse (quit 2016), CAD, PVD and ischemic cardiomyopathy. He presented 2016 for decompressive laminectomy at M2-V0 complicated by myocardial infarction.  The cardiac catheterization on  revealed severe left main and 3 vessel disease along with a 60% right common iliac stenosis. His LVEF was 10%. He subsequently underwent placement of an IABP followed by placement of a HeartMate2 LVAD on 12/1/2016 with concomitant CABG (SVG to the RCA, LIMA to LAD). His postoperative course was complicated by RV failure and an ileus requiring TPN resulting and a transaminitis. He had multiple episodes of Torsade on 12/4/2016 prompting repeat catheterization revealing an occluded RCA vein graft. Two BMS were placed in the RCA graft. He also had SVT requiring cardioversion. He had a single lead ICD placed on 28/02 complicated by a hematoma. The ICD was later removed on 1/20/17. Following his LVAD surgery he did require inpatient rehab. Mr. Mady Caldwell was listed for heart transplantation at [facility]. He was called in for transplantation on 7/14/2018 but on pre-op DELLA was noted to have normal LV function. He subsequently was admitted on 9/5/2018 for LVAD explant complicated buy pneumonia and bilateral pleural effusions. He was discharged on home O2 which he discontinued on his own. He has noted malaise, fatigue and wheezing. After device explant, his subsequent echocardiograms showed deterioration of LVEF and worsening mitral regurgitation from 40-45% with moderate to severe MR on 9/19/18 to 35-40% with moderate-to-severe MR on 10/15/18. INTERVAL HISTORY: 
Today, patient presents for follow up after a repeat echo at [facility] on 10/31/18. He feels better than at his last clinic visit, more energy and less dyspnea. He denies symptoms of volume overload, abdominal bloating or leg edema. Patient denies irregular heart rate or palpitations. Patient denies other cardiac symptoms such as chest pain or leg pain with walking. Patient does not feel that he needs to take lasix daily but is currently taking his medications as prescribed. Patient manages medications himself. CARDIAC IMAGING: 
Echo (10/31/18) Procedure information: Image quality was fair. Echocardiographic views were limited by poor acoustic window availability. LVIDd 5.5 cm Left ventricle: Systolic function was moderately to markedly reduced. Ejection fraction was estimated in the range of 30 % to 35 %. Suboptimal 
endocardial visualization limits wall motion analysis. Right ventricle: Systolic function was probably mildly reduced. Tapse 1.6 Left atrium: The atrium was dilated. Mitral valve: There was mild to moderate regurgitation. Tricuspid valve: There was mild to moderate regurgitation. There was mild 
to moderate pulmonary hypertension. Pericardium: A trivial pericardial effusion was identified. There was no 
evidence of hemodynamic compromise. There was a moderate-sized left 
pleural effusion. Echo (9/19/18 Left Ventricle: Normal cavity size and wall thickness. Ejection fraction is 40 - 45%. Left ventricular systolic function is mild-to-moderately decreased. Abnormal septal motion. Moderate diastolic dysfunction. Elevated left atrial pressure. Right Ventricle: Ventricle not well visualized. Cavity is mildly dilated. The ejection fraction is moderately reduced. Tricuspid Valve: Normal valve structure. Moderate to severe regurgitation. There is evidence of moderate pulmonary artery systolic pressure elevation. Echo 9/7/2018 Low normal overall left ventricular systolic function. Estimated left ventricular ejection fraction 50- 55%. Moderately reduced right ventricular systolic function. Mild mitral regurgitation. Moderate tricuspid regurgitation. PASP 46 mmHg No significant change compared to the prior study dated 9/6/2018. Echo 9/6/2018 Technically difficult study. Left Ventricle: Normal cavity size and wall thickness. Ejection fraction is 50 - 55%. Left ventricular systolic function is low normal. 
Right Ventricle: Ventricle not well visualized. Probably mildly dilated with mildly reduced function. PASP 24 mmHg. Echo 8/30/2018 Left Ventricle: Normal cavity size and wall thickness. Ejection fraction is 40 - 45%. Left ventricular systolic function is moderately decreased. Ventricular assist device is present. Cannula not well visualized. The aortic valve opens normally, which is consistent with presence of a left ventricular assist device. Right Ventricle: Ventricle not well visualized. Cavity is moderately dilated. The ejection fraction is moderately reduced. Echo 8/8/2018 Left Ventricle: Normal cavity size and wall thickness. Ejection fraction is 40 - 45%. Left ventricular systolic segmental function is mildly decreased. Diastolic function evaluation is inconclusive. Inconclusive left atrial pressure. Ventricular assist device is present. Well visualized cannula. Flow through the cannula is laminar. Right Ventricle: Ventricle not well visualized. Cavity is moderately dilated. The ejection fraction is mildly reduced. Abnormal septal motion. Systolic flattening of interventricular septum consistent with right ventricle pressure overload.   
EF appears similar to slightly lower then the prior study. The LV is better visualized on today's study. Echo 7/16/2018 Technically difficult study. Left Ventricle: Left ventricle not well visualized. Normal cavity size. There is mild concentric hypertrophy present. Ejection fraction is probably around 50% in limited visualization. Ventricular assist device is present. Cannula not well visualized. The aortic valve opens normally Compared to the prior study from 7/14/2018, no significant change. Echo 7/14/2018 Heartmate left ventricular assist device. Mildly reduced left ventricular systolic function. Estimated left ventricular ejection fraction 45- 50%. Right ventricle is not optimally visualized, but appears mildly dilated with mildly reduced systolic function. Mild mitral regurgitation. Mild tricuspid regurgitation. TEEcho 7/14/2018 Left Ventricle: Normal cavity size and wall thickness. Calculated ejection fraction is 65 - 70%. The septal wall is hypokinetic. Ventricular assist device is present. Well visualized cannula. Flow through the cannula is laminar. The interventricular septum appears midline. Right Ventricle: Cavity is mildly dilated. The ejection fraction is mildly reduced. Mitral Valve: Mild regurgitation. There is a small left pleural effusion. Echo 5/2/2017 SUMMARY: 
Left ventricle: 0636 Septum midline, mild MR, AoV opens 1:1 
LVIDd 4.4, RVIDd 4.3, LVIDs 4.1, mod TR, PAP 30 
8600 Septum slightly rightward, mild MR, AoV 1:1 
RVIDd 4.3, LVIDd 4.5, mod TR, PAP 30 DELLA 1/24/2017 SUMMARY: 
Left ventricle: The ventricle was mildly dilated. Systolic function was moderately to markedly reduced by visual assessment. Ejection fraction was estimated to be 25% in the range of 20% to 30%. There was moderate diffuse hypokinesis. Right ventricle: The ventricle was mildly dilated. Left atrium: The atrium was mildly dilated. Left atrial appendage: The appendage was mildly dilated. Mitral valve: There was mild regurgitation. No obvious mass, vegetation or thrombus noted. Aortic valve: No obvious mass, vegetation or thrombus noted. Tricuspid valve: No obvious mass, vegetation or thrombus noted. Pulmonic valve: No obvious mass, vegetation or thrombus noted. Pericardium: There was a left pleural effusion. Echo 12/16/2017 SUMMARY: 
Left ventricle: Ventricular septum is midline with significant bounce motion. LVAD settings adjusted to 8600 with mild RV expansion and relatively preserved RV function. RVSP 32mmHg. Aov opens 1:1 
 
Echo 12/6/2016 SUMMARY: 
Ventricular septum: There was paradoxical motion. These changes are consistent with a post-thoracotomy state. Right ventricle: The size was at the upper limits of normal. Systolic function was moderately reduced. There is hypokinesis of the basal-mid free wall. Tricuspid valve: There was mild regurgitation. Echo 12/5/2016 SUMMARY: 
Left ventricle: LVAD visible at LV apex. 9000 LVAD visible at LV apex, significant bounce motion of ventricular septum with rightward position. Mild TR. RV not well seen. RV appears small with moderately reduced RV function. RVSP [id number] Systolic function was severely reduced. DELLA 12/1/2016 AORTA ASCENDING AORTA: The ascending aorta is normal in size with no dissection. There is intimal thickening. AORTIC ARCH: The aortic arch is normal in size with no dissection. There is less than 3 mm plaque that is nonmobile. DESCENDING AORTA: The descending aorta is normal in size with no dissection. There is less than 3 mm plaque that is nonmobile. VALVES  
AORTIC VALVE: The aortic annulus is normal with no stenosis, no regurgitation. The leaflets are normal with normal leaflet motion. MITRAL VALVE: The mitral annulus is dilated with no stenosis. There is 2 to 3+ central mitral insufficiency. The vena contracta measures 4 mm. The leaflets are normal with normal leaflet motion. TRICUSPID VALVE: The tricuspid annulus is normal with no stenosis. There is 1 to 2+ tricuspid insufficiency. The leaflets are normal with normal leaflet motion. ATRIA  
RIGHT ATRIUM: The right atrium is normal in size. There is no smoke, thrombus or tumor. Catheters are visualized in the right atrium. LEFT ATRIUM: The left atrium is mildly dilated with no thrombus or tumor. There is no clot in the left atrial appendage, but reduced velocities are seen. INTERATRIAL SEPTUM: There is no PFO seen via color or bubble study. INTERVENTRICULAR SEPTUM: Normal. 
VENTRICLES  
RIGHT VENTRICLE: The right ventricle is normal in size with no hypertrophy or thrombus. The global function of the right ventricle is normal.  
LEFT VENTRICLE: The left ventricle is dilated, but is not hypertrophied. There is no thrombus. The estimated ejection fraction is 10% to 15%.  The septal wall was dyskinetic in the basal and mid segments. The inferior and anterior walls are akinetic. The lateral wall in the basal and mid segments is the only wall that appears to be moving. PERICARDIUM: Normal. 
PLEURA: Normal. 
 
POST-BYPASS TRANSESOPHAGEAL ECHOCARDIOGRAM:  
The ejection fraction could not be estimated, as there is a left ventricular assist device in place. The inflow cannula is visualized in the apex of the left ventricle with laminar flow. The outflow cannula is visualized in the ascending aorta with laminar flow. There is no aortic insufficiency. There is no  
dissection in the aorta. The mitral regurgitation is decreased at 2+. All other findings are unchanged from previous. Echo 11/27/2016 SUMMARY: 
Left ventricle: Size was at the upper limits of normal. Systolic function was severely reduced. Ejection fraction was estimated in the range of 15% to 20%. There was severe diffuse hypokinesis. There was severe hypokinesis of the mid-apical anterior, basal-mid anteroseptal, apical lateral, and apical wall(s). Right ventricle: A pacing wire was present. Mitral valve: There was mild regurgitation. Aortic valve: The valve was trileaflet. Leaflets exhibited mildly to moderately increased thickness, normal cuspal separation, and sclerosis. Echo 11/24/2016 SUMMARY: 
Left ventricle: All walls hypokinetic with exception of the septum best appreciated in the parasternal short axis views. When definity given appears to globally reduced. Ejection fraction was estimated to be 15%. Right ventricle: Systolic function was reduced. Mitral valve: There was mild to moderate regurgitation. Aortic valve: Leaflets exhibited sclerosis. Stress VO2 8/8/2018 · Peak VO2 11.2 ml/kg/min; RER 1.01. 
· Cardiopulmonary functional class: C (11-15 ml/kg/min); moderate to severe impairment. · ECG is non-conclusive due to failure to achieve 85% MAPHR. Baseline Peak Stress Exercise data HR 50 HR 65 Target  %HR 41% /79 BP 98/44 Exercise duration 3'17\" Rest AT Max Predicted SPO2 94 90 92 VO2 ml/kg/min 2.7 8.8 11.2 35.9 RER 0.97 0.84 1.01  
METs 3.2 VE/VCO2 45 36 35 27 Cath 8/30/2018 Baseline(8000 RPMs):  
RA 10/14/10 RV 33/12 PA 36/12/22 [facility] 9/16/12 Ao LV Yesika CO/CI 3.91/1.93 
TD CO/CI 4.47/2.2 Art sat 97% PA sat 60.8% RA10 , PA 36/12/22 , PCWP12 , Yesika CO 3.91 and CI 1.93, Thermal CO 4.47 and CI 2.2 , TPG10 , PVR , PA Saturation 60.8% 
  
Baseline (6000 RPMs): PA 42/16/25 , PCWP 18, PA Saturation 58.1% (Thermal output were not markedly different 6000 and 8000 RPMS but were unfortunately not stored) 
  Immediate Post Exercise:  
RA 20/22/17 RV  
PA 48/20/31 PC 26/25/19 Ao LV Yesika CO/CI 3.24/1.6 TD CO/CI 4.1/2.02 Art sat 97% PA sat 52.7% Recovery (20 minutes post):  
PA 48/20/31 PC 19 Yesika CO/CI 3.24/1.6 TD CO/CI 4.1/2.02  
PA sat 52.7 % Cath 8/30/2017 RA 14 
PA 21/13/14 PC 13 Yesika CO/CI 5.67/3.0 PA sat 74.3%, Cath 12/4/2016 SUMMARY: 
CORONARY CIRCULATION: 
RCA: There was a tubular 60 % stenosis in the middle third of the vessel segment. Proximal RCA: There was a 90 % stenosis at the ostium of the vessel segment. There was LAMINE grade 2 flow through the vessel (partial perfusion). Distal RCA: There was a 100 % stenosis. There was LAMINE grade 0 flow through the vessel (no flow). 1ST LESION INTERVENTIONS: 
The RCA was presumed to have occluded very recently at the site probable SVG insertion. There was also severe ostial stenosis as identified before CABG. The occlusion was easily crossed with a guidewire and predilated with a 2.5mm balloon, restoring antegrade flow and revealing a fairly large codominant minimally irregular distal vessel with narrowing where previously occluded. A stent was advanced into the vessel but could not progress beyond the proximal segment.  Finally with the addition of a gracia wire, a 2.5x18mm BMS was advanced, with some difficulty, to the target site of previous occlusion and deployed with a 12 atms inflation. Result was excellent with excellent flow. It seemed best to to treat the long area of disease in the mid RCA. A 2.5x12mm BMS was positioned across the ostium and deployed at 12 atms, with more proximal postdilatation to 14 atms. Final result was excellent although there was slight hazziness beyond the stent that did not appear to need further attention. -- A stent was performed on the 100 % lesion in the distal RCA. Following intervention there was a 0 % residual stenosis. -- A 2.5mm x 18mm RX Integrity Coronary Stent bare-metal stent was placed across the lesion and successfully deployed at a maximum inflation pressure of 12 boni. 2ND LESION INTERVENTIONS: 
A stent was performed on the 90 % lesion in the proximal RCA. Following intervention there was a 0 % residual stenosis. -- A RX Mini Vision 2.5 x 12 bare-metal stent was placed across the lesion and successfully deployed at a maximum inflation pressure of 14 boni. Cath 11/29/2016 Intra-aortic balloon pump placement. The left femoral artery was cannulated percutaneously and a sheath was placed. The 9.5 Fr balloon catheter was advanced into the aorta and the tip was fluoroscopically positioned prior to the origin of the left subclavian artery. Balloon pumping was begun, adjusting inflation and deflation times to maximize diastolic augmentation and minimize presystolic LV afterload. The intra-aortic balloon catheter was sutured in place. Cath 11/25/2016 CORONARY CIRCULATION: 1. LM 1. Left system is highly calcified 
-ostial 95% 2. LAD 
-moderate caliber and courses around the apex. There is a 90% lesion prox. 
-small diagonals diffuse disease 3. LCX 
-diffusely diseased with 100% lesion at OM1 4. RCA 
-ostial 90%, dominant Carotid Duplex 3/28/2017 FINDINGS: 
 Right: Internal carotid velocity is elevated to a level consistent with a 50 to 69 percent stenosis; there is narrowing of the flow channel on color Doppler imaging and calcific plaque on B-mode imaging. The common and external carotid arteries are patent and without evidence of hemodynamically significant stenosis. Left: No flow is detected in the internal carotid artery by pulsed Doppler or color Doppler imaging and mixed density plaque is visualized on B-mode imaging, consistent with occlusion. The common and external carotid arteries are patent and without evidence of hemodynamically significant stenosis. IMPRESSION: Consistent with 50-69% stenosis of the right internal carotid and occluded left internal carotid. Vertebrals are patent with antegrade flow. HECTOR 11/30/2016 Right Leg PVR: 
Ankle/Brachial: 1.27 Left Leg PVR: 
Ankle/Brachial: 1.07 IMPRESSION: No evidence of hemodynamically significant lower extremity arterial obstruction. Patient has a heart pump. PFT 6/21/2017 FEV1/FVC 65; FEV1 74%; FVC 85%; TLC 92%; DLCO 45%. CTA head/ neck (7/19/2017) 1. 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 2. Moderate (50%) stenosis of the right proximal cervical ICA, moderate stenosis of the right paraclinoid ICA, moderate stenosis of the right vertebral artery origin, mild stenosis of the left V2 segment, and moderate stenosis of the right PICA origin. 3. No acute intracranial abnormality. Small regions of encephalomalacia involving the right and left frontoparietal regions. 4. Severe multilevel cervical spondylosis including severe spinal canal stenosis at C4-C5, C5-C6 and C6-C7. Consider cervical MRI as clinically indicated. CTA chest/abdomen/pelvis 6/21/2017 Vascular: 1. 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 2. Severe stenosis of the ARLEEN. 3. Severe stenosis of the right external iliac artery. 4. Complete occlusion of the left superficial femoral artery.  
Chest: 1. Status post LVAD with severe three-vessel coronary artery atherosclerotic calcifications 2. Apical predominant centrilobular emphysema. 3. Prominent mediastinal lymph nodes are favored reactive in nature.  
Abdomen/Pelvis: 1. Cholelithiasis. 2. Diverticulosis. 3. Status post posterior fusion and decompression at L4-L5. CT head 1/26/2017 FINDINGS: 
The ventricles and sulci are normal in size, shape and configuration and midline. There is no significant white matter disease. There is no intracranial hemorrhage, extra-axial collection, mass, mass effect or midline shift. The basilar cisterns are open. No acute infarct is identified. The bone windows demonstrate no abnormalities. The visualized portions of the paranasal sinuses and mastoid air cells are clear. Vascular calcification is noted. CT Abd/Pel 12/5/2016 FINDINGS: There is artifact from the left ventricular assist device. There is mild bibasilar atelectasis and small bilateral pleural effusions, likely. There are multiple loops of slightly prominent appearing small bowel but none are frankly dilated. There is extensive stool throughout the proximal two thirds of the colon relatively less stool in the rectosigmoid. There is no free air. The liver spleen pancreas and kidneys are unremarkable given limitations of lack of IV contrast. There are bilateral nephrograms previous contrast 
 
CTA Chest 11/24/2016 FINDINGS: 
There is airspace process in the right upper lobe as well as in both lung bases. Small bilateral pleural effusions right greater than left noted. The pulmonary arteries are well enhanced and no pulmonary emboli are identified. There is no mediastinal or hilar adenopathy or mass. The aorta enhances normally without evidence of aneurysm or dissection. The visualized portions of the upper abdominal organs are normal. 
 
Abd US 12/14/2016 Real-time sonographic imaging of the right upper quadrant was performed. The left hepatic lobe is not well evaluated due to the large bandage, however no focal abnormality is identified. The gallbladder is unremarkable. Common bile duct is normal in size. The right kidney is normal in size and appearance without evidence of mass lesion, hydronephrosis, or calcification. The pancreas is not well-visualized due to bowel gas. No free fluid. There is a small right pleural effusion. REVIEW OF SYSTEMS: 
General: Denies fever, night sweats. Ear, nose and throat: Denies difficulty hearing, sinus problems, runny nose, post-nasal drip, ringing in ears, mouth sores, loose teeth, ear pain, nosebleeds, sore throate, facial pain or numbess Cardiovascular: see above in the interval history Respiratory: Denies cough, wheezing, sputum production, hemoptysis. Gastrointestinal: Denies heartburn, constipation, intolerance to certain foods, diarrhea, abdominal pain, nausea, vomiting, difficulty swallowing, blood in stool Kidney and bladder: Denies painful urination, frequent urination, urgency, prostate problems and impotence Musculoskeletal: Denies joint pain, muscle weakness Skin and hair: Denies change in existing skin lesions, hair loss or increase, breast changes PHYSICAL EXAM: 
Vital signs:  
Visit Vitals BP 92/58 (BP 1 Location: Right arm, BP Patient Position: Sitting) Pulse 77 Temp 98.1 °F (36.7 °C) (Oral) Resp 20 Ht 5' 10\" (1.778 m) Wt 177 lb (80.3 kg) SpO2 98% BMI 25.40 kg/m² General: Patient is well developed, well-nourished in no acute distress HEENT: Normocephalic and atraumatic. No scleral icterus. Pupils are equal, round and reactive to light and accomodation. No conjunctival injection. Oropharynx is clear. Neck: Supple. No evidence of thyroid enlargements or lymphadenopathy. JVD: Cannot be appreciated Lungs: Breath sounds are equal and clear bilaterally. No wheezes, rhonchi, or rales. Heart: Regular rate and rhythm with normal S1 and S2, S4 gallop, No murmurs, gallops or rubs. Abdomen: Soft, no mass or tenderness. No organomegaly or hernia. Bowel sounds present. Genitourinary and rectal: deferred Extremities: No cyanosis, clubbing, or edema. Neurologic: No focal sensory or motor deficits are noted. Grossly intact. Psychiatric: Awake, alert an doriented x 3. Appropriate mood and affect. Skin: Warm, dry and well perfused. No lesions, nodules or rashes are noted. PAST MEDICAL HISTORY: 
Past Medical History:  
Diagnosis Date  Arrhythmia SVT  CAD (coronary artery disease)  Chronic pain   
 back  Gout  Heart failure ([address]  Hypertension  LVAD (left ventricular assist device) present ([facility] [address] 12/01/2016  Raynaud disease  Seizures ([facility] [address] 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 PAST SURGICAL HISTORY: 
Past Surgical History:  
Procedure Laterality Date  CABG, ARTERY-VEIN, TWO  12/01/2016  CARDIAC SURG PROCEDURE UNLIST  12/01/2016 LVAD  HX HEENT    
 wisdom teeth removed  HX ORTHOPAEDIC  11/22/2016  
 laminectomy  HX PACEMAKER    
 ICD - removed 1/2017 FAMILY HISTORY: 
Family History Problem Relation Age of Onset  Diabetes Mother  Dementia Mother  Other Mother   
     carotid artery blockage  Heart Disease Father  Stroke Father  Heart Attack Father   
     several  
 Hypertension Father  Elevated Lipids Father  No Known Problems Sister  Cancer Brother   
     brain  Lung Disease Sister  COPD Sister  Cancer Sister   
     lung SOCIAL HISTORY: 
Social History Socioeconomic History  Marital status:  Spouse name: Not on file  Number of children: Not on file  Years of education: Not on file  Highest education level: Not on file Social Needs  Financial resource strain: Not on file  Food insecurity - worry: Not on file  Food insecurity - inability: Not on file  Transportation needs - medical: Not on file  Transportation needs - non-medical: Not on file Occupational History  Not on file Tobacco Use  Smoking status: Former Smoker Packs/day: 1.50 Years: 30.00 Pack years: 45.00 Last attempt to quit: 2008 Years since quitting: 10.8  Smokeless tobacco: Never Used Substance and Sexual Activity  Alcohol use: No  
 Drug use: No  
 Sexual activity: Not on file Other Topics Concern  Not on file Social History Narrative  Not on file LABORATORY RESULTS: 
 No flowsheet data found. Lab Results Component Value Date/Time TSH 2.83 11/28/2016 07:29 AM  
 
 
CURRENT MEDICATIONS: 
 
Current Outpatient Medications:  
  tamsulosin (FLOMAX) 0.4 mg capsule, Take 0.4 mg by mouth daily. , Disp: , Rfl:  
  furosemide (LASIX) 40 mg tablet, Take 1 Tab by mouth daily. , Disp: 30 Tab, Rfl: 3 
  carvedilol (COREG) 3.125 mg tablet, Take 1 Tab by mouth two (2) times daily (with meals). , Disp: 60 Tab, Rfl: 3 
  apixaban (ELIQUIS) 5 mg tablet, Take 1 Tab by mouth two (2) times a day., Disp: 60 Tab, Rfl: 11 
  atorvastatin (LIPITOR) 40 mg tablet, Take 40 mg by mouth daily. , Disp: , Rfl:  
  colchicine 0.6 mg tablet, Take 0.6 mg by mouth every other day., Disp: , Rfl:  
  cyclobenzaprine (FLEXERIL) 5 mg tablet, Take 5 mg by mouth daily as needed for Muscle Spasm(s). , Disp: , Rfl:  
  oxyCODONE IR (ROXICODONE) 5 mg immediate release tablet, Take 5 mg by mouth every eight (8) hours as needed for Pain., Disp: , Rfl:  
  aspirin 81 mg chewable tablet, Take 81 mg by mouth daily. , Disp: , Rfl:  
  tadalafil (CIALIS) 10 mg tablet, Take 1 Tab by mouth as needed.  Indications: Erectile Dysfunction, Disp: 30 Tab, Rfl: 2 
  ascorbic acid, vitamin C, (VITAMIN C) 500 mg tablet, Take 1 Tab by mouth two (2) times a day., Disp: 60 Tab, Rfl: 6 
  OXYGEN-AIR DELIVERY SYSTEMS, Take 2 L/min by inhalation nightly., Disp: , Rfl:  
 
 
 
Thank you for letting us see him with you, Kristi Hinton MD, Eran Redmond Chief of Cardiology, [facility] Director Norman Sims [address], [address] Office [phone] Fax [phone]

## 2018-11-07 NOTE — TELEPHONE ENCOUNTER
I faxed all paperwork for lifevest to [facility] and notified representative of need for lifevest.

## 2018-11-07 NOTE — PATIENT INSTRUCTIONS
1. Discontinue furosemide 2. Start entresto 24/26mg, 1 tablet twice daily 3. Recommend LifeVest 
4. Follow up in the [facility] in 2 weeks 5. Follow up with [facility]

## 2018-11-07 NOTE — LETTER
11/8/2018 8:48 AM 
 
Patient:  Bolivar Minor YOB: 1955 Date of Visit: 11/7/2018 Dear Jay[address] MD 
[address] [address] VIA Facsimile: [phone] Herman White MD 
[address] [address] VIA Facsimile: [phone] 
 : Thank you for referring Mr. Bala Gray to me for evaluation/treatment. Below are the relevant portions of my assessment and plan of care. If you have questions, please do not hesitate to call me. I look forward to following Mr. Mann along with you. Sincerely, Addie Blanco MD

## 2018-11-09 ENCOUNTER — HOME CARE VISIT (OUTPATIENT)
Dept: HOME HEALTH SERVICES | Facility: HOME HEALTH | Age: 63
End: 2018-11-09
Payer: COMMERCIAL

## 2018-11-09 VITALS
HEART RATE: 66 BPM | WEIGHT: 174 LBS | BODY MASS INDEX: 24.97 KG/M2 | DIASTOLIC BLOOD PRESSURE: 62 MMHG | OXYGEN SATURATION: 98 % | SYSTOLIC BLOOD PRESSURE: 96 MMHG | TEMPERATURE: 98.2 F | RESPIRATION RATE: 20 BRPM

## 2018-11-13 ENCOUNTER — HOME CARE VISIT (OUTPATIENT)
Dept: SCHEDULING | Facility: HOME HEALTH | Age: 63
End: 2018-11-13
Payer: COMMERCIAL

## 2018-11-13 PROCEDURE — G0299 HHS/HOSPICE OF RN EA 15 MIN: HCPCS

## 2018-11-15 VITALS
WEIGHT: 176 LBS | HEART RATE: 70 BPM | RESPIRATION RATE: 20 BRPM | TEMPERATURE: 98.2 F | BODY MASS INDEX: 25.25 KG/M2 | OXYGEN SATURATION: 98 % | DIASTOLIC BLOOD PRESSURE: 70 MMHG | SYSTOLIC BLOOD PRESSURE: 100 MMHG

## 2018-11-17 VITALS
OXYGEN SATURATION: 96 % | WEIGHT: 172 LBS | TEMPERATURE: 98.6 F | SYSTOLIC BLOOD PRESSURE: 112 MMHG | SYSTOLIC BLOOD PRESSURE: 102 MMHG | HEART RATE: 71 BPM | TEMPERATURE: 98.4 F | OXYGEN SATURATION: 96 % | BODY MASS INDEX: 25.11 KG/M2 | BODY MASS INDEX: 24.68 KG/M2 | WEIGHT: 175 LBS | DIASTOLIC BLOOD PRESSURE: 64 MMHG | HEART RATE: 67 BPM | DIASTOLIC BLOOD PRESSURE: 70 MMHG

## 2018-11-18 VITALS
OXYGEN SATURATION: 96 % | SYSTOLIC BLOOD PRESSURE: 118 MMHG | TEMPERATURE: 98.9 F | DIASTOLIC BLOOD PRESSURE: 72 MMHG | HEART RATE: 78 BPM

## 2018-11-21 ENCOUNTER — OFFICE VISIT (OUTPATIENT)
Dept: CARDIOLOGY CLINIC | Age: 63
End: 2018-11-21

## 2018-11-21 VITALS
TEMPERATURE: 97.5 F | BODY MASS INDEX: 26.17 KG/M2 | SYSTOLIC BLOOD PRESSURE: 96 MMHG | DIASTOLIC BLOOD PRESSURE: 58 MMHG | OXYGEN SATURATION: 98 % | WEIGHT: 182.8 LBS | HEART RATE: 78 BPM | RESPIRATION RATE: 20 BRPM | HEIGHT: 70 IN

## 2018-11-21 DIAGNOSIS — I25.5 ISCHEMIC CARDIOMYOPATHY: Primary | ICD-10-CM

## 2018-11-21 DIAGNOSIS — I50.22 CHRONIC SYSTOLIC HEART FAILURE (HCC): Primary | ICD-10-CM

## 2018-11-21 RX ORDER — HYDROCODONE BITARTRATE AND ACETAMINOPHEN 5; 325 MG/1; MG/1
TABLET ORAL
Refills: 0 | COMMUNITY
Start: 2018-11-02

## 2018-11-21 RX ORDER — PANTOPRAZOLE SODIUM 40 MG/1
TABLET, DELAYED RELEASE ORAL
Refills: 3 | COMMUNITY
Start: 2018-11-13 | End: 2019-02-13 | Stop reason: SDUPTHER

## 2018-11-21 RX ORDER — DULOXETIN HYDROCHLORIDE 60 MG/1
60 CAPSULE, DELAYED RELEASE ORAL DAILY
COMMUNITY

## 2018-11-21 RX ORDER — ALLOPURINOL 100 MG/1
100 TABLET ORAL DAILY
COMMUNITY

## 2018-11-21 NOTE — PROGRESS NOTES
[address] [facility] in [place] Outpatient Clinic Note Patient name: Shiloh Phillips Patient : 1955 Patient MRN: [id number] Date of service: 18 Primary care physician: Miley Dos Santos MD 
Primary cardiologist: Dion Garcia MD 
 
CHIEF COMPLAINT: 
Chronic systolic heart failure IMPRESSION/PLAN: 
1. CAD s/p CABG (SVG to RCA and LIMA to LAD) on 16 On ASA, BB, statin Stable 2. ICM s/p HMII as BTT on 16 and explant, NYHA Class III, LVEF 30-35%, mild RV dysfunction On carvedilol 3.125 mg twice daily Take lasix 20 mg  (1/2 of a 40 mg tablet) only as needed for shortness of breath Increase entresto to 49/51 mg, 1 tablet twice daily Recent lab results reviewed at [facility] Referred to cardiac rehab at [facility] Follow up in the [facility] in 2 weeks 3. High Risk of SCD - s/p AICD in 2017 with removal d/t hematoma, LVEF 30-35% Recommend LifeVest 
 
4. PAD - difficult femoral access with LVAD explant No symptoms of claudication 5. Chronic Anticoagulation s/p LVAD explant On eliquis 6. Gout On colchicine 0.6 mg every other day On allopurinol 100 mg daily 6. H/o tobacco abuse, remote 7. H/o alcohol abuse, quit 2017 8. Seizure disorder - early  No recent seizure activity 9. Chronic Lower back pain On oxycodone, flexeril 10. Peripheral neuropathy On cymbalta 11. Prevention Influenza annually Pneumonia vaccine every 5 years HISTORY OF PRESENT ILLNESS: 
I had the pleasure to see Mr. Shiloh Phillips in [address] [facility] in [place]. Mr. Vicki Arriola is a 58 y/o man with a h/o remote tobacco use and alcohol abuse (quit 2016), CAD, PVD and ischemic cardiomyopathy. He presented 2016 for decompressive laminectomy at [facility] complicated by myocardial infarction.  The cardiac catheterization on  revealed severe left main and 3 vessel disease along with a 60% right common iliac stenosis. His LVEF was 10%. He subsequently underwent placement of an IABP followed by placement of a HeartMate2 LVAD on 12/1/2016 with concomitant CABG (SVG to the RCA, LIMA to LAD). His postoperative course was complicated by RV failure and an ileus requiring TPN resulting and a transaminitis. He had multiple episodes of Torsade on 12/4/2016 prompting repeat catheterization revealing an occluded RCA vein graft. Two BMS were placed in the RCA graft. He also had SVT requiring cardioversion. He had a single lead ICD placed on 09/89 complicated by a hematoma. The ICD was later removed on 1/20/17. Following his LVAD surgery he did require inpatient rehab. Mr. Vladimir Crawford was listed for heart transplantation at [facility]. He was called in for transplantation on 7/14/2018 but on pre-op DELLA was noted to have normal LV function. He subsequently was admitted on 9/5/2018 for LVAD explant complicated buy pneumonia and bilateral pleural effusions. He was discharged on home O2 which he discontinued on his own. He has noted malaise, fatigue and wheezing. After device explant, his subsequent echocardiograms showed deterioration of LVEF and worsening mitral regurgitation from 40-45% with moderate to severe MR on 9/19/18 to 35-40% with moderate-to-severe MR on 10/15/18. INTERVAL HISTORY: 
Today, patient presents for follow up. He feels better on entresto with more energy and less dyspnea. He denies symptoms of volume overload, abdominal bloating or leg edema. Patient denies irregular heart rate or palpitations. Patient denies other cardiac symptoms such as chest pain or leg pain with walking. He takes lasix only if his weight is up and he experiences edema or worsening shortness of breath. CARDIAC IMAGING: 
Echo (10/31/18) Procedure information: Image quality was fair. Echocardiographic views were limited by poor acoustic window availability. LVIDd 5.5 cm Left ventricle: Systolic function was moderately to markedly reduced. Ejection fraction was estimated in the range of 30 % to 35 %. Suboptimal 
endocardial visualization limits wall motion analysis. Right ventricle: Systolic function was probably mildly reduced. Tapse 1.6 Left atrium: The atrium was dilated. Mitral valve: There was mild to moderate regurgitation. Tricuspid valve: There was mild to moderate regurgitation. There was mild 
to moderate pulmonary hypertension. Pericardium: A trivial pericardial effusion was identified. There was no 
evidence of hemodynamic compromise. There was a moderate-sized left 
pleural effusion. Echo (9/19/18 Left Ventricle: Normal cavity size and wall thickness. Ejection fraction is 40 - 45%. Left ventricular systolic function is mild-to-moderately decreased. Abnormal septal motion. Moderate diastolic dysfunction. Elevated left atrial pressure. Right Ventricle: Ventricle not well visualized. Cavity is mildly dilated. The ejection fraction is moderately reduced. Tricuspid Valve: Normal valve structure. Moderate to severe regurgitation. There is evidence of moderate pulmonary artery systolic pressure elevation. Echo 9/7/2018 Low normal overall left ventricular systolic function. Estimated left ventricular ejection fraction 50- 55%. Moderately reduced right ventricular systolic function. Mild mitral regurgitation. Moderate tricuspid regurgitation. PASP 46 mmHg No significant change compared to the prior study dated 9/6/2018. Echo 9/6/2018 Technically difficult study. Left Ventricle: Normal cavity size and wall thickness. Ejection fraction is 50 - 55%. Left ventricular systolic function is low normal. 
Right Ventricle: Ventricle not well visualized. Probably mildly dilated with mildly reduced function. PASP 24 mmHg. Echo 8/30/2018 Left Ventricle: Normal cavity size and wall thickness. Ejection fraction is 40 - 45%. Left ventricular systolic function is moderately decreased. Ventricular assist device is present. Cannula not well visualized. The aortic valve opens normally, which is consistent with presence of a left ventricular assist device. Right Ventricle: Ventricle not well visualized. Cavity is moderately dilated. The ejection fraction is moderately reduced. Echo 8/8/2018 Left Ventricle: Normal cavity size and wall thickness. Ejection fraction is 40 - 45%. Left ventricular systolic segmental function is mildly decreased. Diastolic function evaluation is inconclusive. Inconclusive left atrial pressure. Ventricular assist device is present. Well visualized cannula. Flow through the cannula is laminar. Right Ventricle: Ventricle not well visualized. Cavity is moderately dilated. The ejection fraction is mildly reduced. Abnormal septal motion. Systolic flattening of interventricular septum consistent with right ventricle pressure overload.   
EF appears similar to slightly lower then the prior study. The LV is better visualized on today's study. Echo 7/16/2018 Technically difficult study. Left Ventricle: Left ventricle not well visualized. Normal cavity size. There is mild concentric hypertrophy present. Ejection fraction is probably around 50% in limited visualization. Ventricular assist device is present. Cannula not well visualized. The aortic valve opens normally Compared to the prior study from 7/14/2018, no significant change. Echo 7/14/2018 Heartmate left ventricular assist device. Mildly reduced left ventricular systolic function. Estimated left ventricular ejection fraction 45- 50%. Right ventricle is not optimally visualized, but appears mildly dilated with mildly reduced systolic function. Mild mitral regurgitation. Mild tricuspid regurgitation. TEEcho 7/14/2018 Left Ventricle: Normal cavity size and wall thickness. Calculated ejection fraction is 65 - 70%. The septal wall is hypokinetic. Ventricular assist device is present. Well visualized cannula. Flow through the cannula is laminar. The interventricular septum appears midline. Right Ventricle: Cavity is mildly dilated. The ejection fraction is mildly reduced. Mitral Valve: Mild regurgitation. There is a small left pleural effusion. Echo 5/2/2017 SUMMARY: 
Left ventricle: 5117 Septum midline, mild MR, AoV opens 1:1 
LVIDd 4.4, RVIDd 4.3, LVIDs 4.1, mod TR, PAP 30 
8600 Septum slightly rightward, mild MR, AoV 1:1 
RVIDd 4.3, LVIDd 4.5, mod TR, PAP 30 DELLA 1/24/2017 SUMMARY: 
Left ventricle: The ventricle was mildly dilated. Systolic function was moderately to markedly reduced by visual assessment. Ejection fraction was estimated to be 25% in the range of 20% to 30%. There was moderate diffuse hypokinesis. Right ventricle: The ventricle was mildly dilated. Left atrium: The atrium was mildly dilated. Left atrial appendage: The appendage was mildly dilated. Mitral valve: There was mild regurgitation. No obvious mass, vegetation or thrombus noted. Aortic valve: No obvious mass, vegetation or thrombus noted. Tricuspid valve: No obvious mass, vegetation or thrombus noted. Pulmonic valve: No obvious mass, vegetation or thrombus noted. Pericardium: There was a left pleural effusion. Echo 12/16/2017 SUMMARY: 
Left ventricle: Ventricular septum is midline with significant bounce motion. LVAD settings adjusted to 8600 with mild RV expansion and relatively preserved RV function. RVSP 32mmHg. Aov opens 1:1 
 
Echo 12/6/2016 SUMMARY: 
Ventricular septum: There was paradoxical motion. These changes are consistent with a post-thoracotomy state. Right ventricle: The size was at the upper limits of normal. Systolic function was moderately reduced. There is hypokinesis of the basal-mid free wall. Tricuspid valve: There was mild regurgitation. Echo 12/5/2016 SUMMARY: 
Left ventricle: LVAD visible at LV apex. 9000 LVAD visible at LV apex, significant bounce motion of ventricular septum with rightward position. Mild TR. RV not well seen. RV appears small with moderately reduced RV function. RVSP 39VCIO Systolic function was severely reduced. DELLA 12/1/2016 AORTA ASCENDING AORTA: The ascending aorta is normal in size with no dissection. There is intimal thickening. AORTIC ARCH: The aortic arch is normal in size with no dissection. There is less than 3 mm plaque that is nonmobile. DESCENDING AORTA: The descending aorta is normal in size with no dissection. There is less than 3 mm plaque that is nonmobile. VALVES  
AORTIC VALVE: The aortic annulus is normal with no stenosis, no regurgitation. The leaflets are normal with normal leaflet motion. MITRAL VALVE: The mitral annulus is dilated with no stenosis. There is 2 to 3+ central mitral insufficiency. The vena contracta measures 4 mm. The leaflets are normal with normal leaflet motion. TRICUSPID VALVE: The tricuspid annulus is normal with no stenosis. There is 1 to 2+ tricuspid insufficiency. The leaflets are normal with normal leaflet motion. ATRIA  
RIGHT ATRIUM: The right atrium is normal in size. There is no smoke, thrombus or tumor. Catheters are visualized in the right atrium. LEFT ATRIUM: The left atrium is mildly dilated with no thrombus or tumor. There is no clot in the left atrial appendage, but reduced velocities are seen. INTERATRIAL SEPTUM: There is no PFO seen via color or bubble study. INTERVENTRICULAR SEPTUM: Normal. 
VENTRICLES  
RIGHT VENTRICLE: The right ventricle is normal in size with no hypertrophy or thrombus. The global function of the right ventricle is normal.  
LEFT VENTRICLE: The left ventricle is dilated, but is not hypertrophied. There is no thrombus. The estimated ejection fraction is 10% to 15%.  The septal wall was dyskinetic in the basal and mid segments. The inferior and anterior walls are akinetic. The lateral wall in the basal and mid segments is the only wall that appears to be moving. PERICARDIUM: Normal. 
PLEURA: Normal. 
 
POST-BYPASS TRANSESOPHAGEAL ECHOCARDIOGRAM:  
The ejection fraction could not be estimated, as there is a left ventricular assist device in place. The inflow cannula is visualized in the apex of the left ventricle with laminar flow. The outflow cannula is visualized in the ascending aorta with laminar flow. There is no aortic insufficiency. There is no  
dissection in the aorta. The mitral regurgitation is decreased at 2+. All other findings are unchanged from previous. Echo 11/27/2016 SUMMARY: 
Left ventricle: Size was at the upper limits of normal. Systolic function was severely reduced. Ejection fraction was estimated in the range of 15% to 20%. There was severe diffuse hypokinesis. There was severe hypokinesis of the mid-apical anterior, basal-mid anteroseptal, apical lateral, and apical wall(s). Right ventricle: A pacing wire was present. Mitral valve: There was mild regurgitation. Aortic valve: The valve was trileaflet. Leaflets exhibited mildly to moderately increased thickness, normal cuspal separation, and sclerosis. Echo 11/24/2016 SUMMARY: 
Left ventricle: All walls hypokinetic with exception of the septum best appreciated in the parasternal short axis views. When definity given appears to globally reduced. Ejection fraction was estimated to be 15%. Right ventricle: Systolic function was reduced. Mitral valve: There was mild to moderate regurgitation. Aortic valve: Leaflets exhibited sclerosis. Stress VO2 8/8/2018 · Peak VO2 11.2 ml/kg/min; RER 1.01. 
· Cardiopulmonary functional class: C (11-15 ml/kg/min); moderate to severe impairment. · ECG is non-conclusive due to failure to achieve 85% MAPHR. Baseline Peak Stress Exercise data HR 50 HR 65 Target  %HR 41% /79 BP 98/44 Exercise duration 3'17\" Rest AT Max Predicted SPO2 94 90 92 VO2 ml/kg/min 2.7 8.8 11.2 35.9 RER 0.97 0.84 1.01  
METs 3.2 VE/VCO2 45 36 35 27 Cath 8/30/2018 Baseline(8000 RPMs):  
RA 10/14/10 RV 33/12 PA 36/12/22 [facility] 9/16/12 Ao LV Yesika CO/CI 3.91/1.93 
TD CO/CI 4.47/2.2 Art sat 97% PA sat 60.8% RA10 , PA 36/12/22 , PCWP12 , Yesika CO 3.91 and CI 1.93, Thermal CO 4.47 and CI 2.2 , TPG10 , PVR , PA Saturation 60.8% 
  
Baseline (6000 RPMs): PA 42/16/25 , PCWP 18, PA Saturation 58.1% (Thermal output were not markedly different 6000 and 8000 RPMS but were unfortunately not stored) 
  Immediate Post Exercise:  
RA 20/22/17 RV  
PA 48/20/31 PC 26/25/19 Ao LV Yesika CO/CI 3.24/1.6 TD CO/CI 4.1/2.02 Art sat 97% PA sat 52.7% Recovery (20 minutes post):  
PA 48/20/31 PC 19 Yesika CO/CI 3.24/1.6 TD CO/CI 4.1/2.02  
PA sat 52.7 % Cath 8/30/2017 RA 14 
PA 21/13/14 PC 13 Yesika CO/CI 5.67/3.0 PA sat 74.3%, Cath 12/4/2016 SUMMARY: 
CORONARY CIRCULATION: 
RCA: There was a tubular 60 % stenosis in the middle third of the vessel segment. Proximal RCA: There was a 90 % stenosis at the ostium of the vessel segment. There was LAMINE grade 2 flow through the vessel (partial perfusion). Distal RCA: There was a 100 % stenosis. There was LAMINE grade 0 flow through the vessel (no flow). 1ST LESION INTERVENTIONS: 
The RCA was presumed to have occluded very recently at the site probable SVG insertion. There was also severe ostial stenosis as identified before CABG. The occlusion was easily crossed with a guidewire and predilated with a 2.5mm balloon, restoring antegrade flow and revealing a fairly large codominant minimally irregular distal vessel with narrowing where previously occluded. A stent was advanced into the vessel but could not progress beyond the proximal segment.  Finally with the addition of a gracia wire, a 2.5x18mm BMS was advanced, with some difficulty, to the target site of previous occlusion and deployed with a 12 atms inflation. Result was excellent with excellent flow. It seemed best to to treat the long area of disease in the mid RCA. A 2.5x12mm BMS was positioned across the ostium and deployed at 12 atms, with more proximal postdilatation to 14 atms. Final result was excellent although there was slight hazziness beyond the stent that did not appear to need further attention. -- A stent was performed on the 100 % lesion in the distal RCA. Following intervention there was a 0 % residual stenosis. -- A 2.5mm x 18mm RX Integrity Coronary Stent bare-metal stent was placed across the lesion and successfully deployed at a maximum inflation pressure of 12 boni. 2ND LESION INTERVENTIONS: 
A stent was performed on the 90 % lesion in the proximal RCA. Following intervention there was a 0 % residual stenosis. -- A RX Mini Vision 2.5 x 12 bare-metal stent was placed across the lesion and successfully deployed at a maximum inflation pressure of 14 boni. Cath 11/29/2016 Intra-aortic balloon pump placement. The left femoral artery was cannulated percutaneously and a sheath was placed. The 9.5 Fr balloon catheter was advanced into the aorta and the tip was fluoroscopically positioned prior to the origin of the left subclavian artery. Balloon pumping was begun, adjusting inflation and deflation times to maximize diastolic augmentation and minimize presystolic LV afterload. The intra-aortic balloon catheter was sutured in place. Cath 11/25/2016 CORONARY CIRCULATION: 1. LM 1. Left system is highly calcified 
-ostial 95% 2. LAD 
-moderate caliber and courses around the apex. There is a 90% lesion prox. 
-small diagonals diffuse disease 3. LCX 
-diffusely diseased with 100% lesion at OM1 4. RCA 
-ostial 90%, dominant Carotid Duplex 3/28/2017 FINDINGS: 
 Right: Internal carotid velocity is elevated to a level consistent with a 50 to 69 percent stenosis; there is narrowing of the flow channel on color Doppler imaging and calcific plaque on B-mode imaging. The common and external carotid arteries are patent and without evidence of hemodynamically significant stenosis. Left: No flow is detected in the internal carotid artery by pulsed Doppler or color Doppler imaging and mixed density plaque is visualized on B-mode imaging, consistent with occlusion. The common and external carotid arteries are patent and without evidence of hemodynamically significant stenosis. IMPRESSION: Consistent with 50-69% stenosis of the right internal carotid and occluded left internal carotid. Vertebrals are patent with antegrade flow. HECTOR 11/30/2016 Right Leg PVR: 
Ankle/Brachial: 1.27 Left Leg PVR: 
Ankle/Brachial: 1.07 IMPRESSION: No evidence of hemodynamically significant lower extremity arterial obstruction. Patient has a heart pump. PFT 6/21/2017 FEV1/FVC 65; FEV1 74%; FVC 85%; TLC 92%; DLCO 45%. CTA head/ neck (7/19/2017) 1. 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 2. Moderate (50%) stenosis of the right proximal cervical ICA, moderate stenosis of the right paraclinoid ICA, moderate stenosis of the right vertebral artery origin, mild stenosis of the left V2 segment, and moderate stenosis of the right PICA origin. 3. No acute intracranial abnormality. Small regions of encephalomalacia involving the right and left frontoparietal regions. 4. Severe multilevel cervical spondylosis including severe spinal canal stenosis at C4-C5, C5-C6 and C6-C7. Consider cervical MRI as clinically indicated. CTA chest/abdomen/pelvis 6/21/2017 Vascular: 1. 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 2. Severe stenosis of the ARLEEN. 3. Severe stenosis of the right external iliac artery. 4. Complete occlusion of the left superficial femoral artery.  
Chest: 1. Status post LVAD with severe three-vessel coronary artery atherosclerotic calcifications 2. Apical predominant centrilobular emphysema. 3. Prominent mediastinal lymph nodes are favored reactive in nature.  
Abdomen/Pelvis: 1. Cholelithiasis. 2. Diverticulosis. 3. Status post posterior fusion and decompression at L4-L5. CT head 1/26/2017 FINDINGS: 
The ventricles and sulci are normal in size, shape and configuration and midline. There is no significant white matter disease. There is no intracranial hemorrhage, extra-axial collection, mass, mass effect or midline shift. The basilar cisterns are open. No acute infarct is identified. The bone windows demonstrate no abnormalities. The visualized portions of the paranasal sinuses and mastoid air cells are clear. Vascular calcification is noted. CT Abd/Pel 12/5/2016 FINDINGS: There is artifact from the left ventricular assist device. There is mild bibasilar atelectasis and small bilateral pleural effusions, likely. There are multiple loops of slightly prominent appearing small bowel but none are frankly dilated. There is extensive stool throughout the proximal two thirds of the colon relatively less stool in the rectosigmoid. There is no free air. The liver spleen pancreas and kidneys are unremarkable given limitations of lack of IV contrast. There are bilateral nephrograms previous contrast 
 
CTA Chest 11/24/2016 FINDINGS: 
There is airspace process in the right upper lobe as well as in both lung bases. Small bilateral pleural effusions right greater than left noted. The pulmonary arteries are well enhanced and no pulmonary emboli are identified. There is no mediastinal or hilar adenopathy or mass. The aorta enhances normally without evidence of aneurysm or dissection. The visualized portions of the upper abdominal organs are normal. 
 
Abd US 12/14/2016 Real-time sonographic imaging of the right upper quadrant was performed. The left hepatic lobe is not well evaluated due to the large bandage, however no focal abnormality is identified. The gallbladder is unremarkable. Common bile duct is normal in size. The right kidney is normal in size and appearance without evidence of mass lesion, hydronephrosis, or calcification. The pancreas is not well-visualized due to bowel gas. No free fluid. There is a small right pleural effusion. REVIEW OF SYSTEMS: 
General: Denies fever, night sweats. Ear, nose and throat: Denies difficulty hearing, sinus problems, runny nose, post-nasal drip, ringing in ears, mouth sores, loose teeth, ear pain, nosebleeds, sore throate, facial pain or numbess Cardiovascular: see above in the interval history Respiratory: Denies cough, wheezing, sputum production, hemoptysis. Gastrointestinal: Denies heartburn, constipation, intolerance to certain foods, diarrhea, abdominal pain, nausea, vomiting, difficulty swallowing, blood in stool Kidney and bladder: Denies painful urination, frequent urination, urgency, prostate problems and impotence Musculoskeletal: Denies joint pain, muscle weakness Skin and hair: Denies change in existing skin lesions, hair loss or increase, breast changes PHYSICAL EXAM: 
Vital signs:  
Visit Vitals BP 96/58 (BP 1 Location: Right arm, BP Patient Position: Sitting) Pulse 78 Temp 97.5 °F (36.4 °C) (Oral) Resp 20 Ht 5' 10\" (1.778 m) Wt 182 lb 12.8 oz (82.9 kg) SpO2 98% BMI 26.23 kg/m² General: Patient is well developed, well-nourished in no acute distress HEENT: Normocephalic and atraumatic. No scleral icterus. Pupils are equal, round and reactive to light and accomodation. No conjunctival injection. Oropharynx is clear. Neck: Supple. No evidence of thyroid enlargements or lymphadenopathy. JVD: Cannot be appreciated Lungs: Breath sounds are equal and clear bilaterally. No wheezes, rhonchi, or rales. Heart: Regular rate and rhythm with normal S1 and S2, S4 gallop, No murmurs, gallops or rubs. Abdomen: Soft, no mass or tenderness. No organomegaly or hernia. Bowel sounds present. Genitourinary and rectal: deferred Extremities: No cyanosis, clubbing, or edema. Neurologic: No focal sensory or motor deficits are noted. Grossly intact. Psychiatric: Awake, alert an doriented x 3. Appropriate mood and affect. Skin: Warm, dry and well perfused. No lesions, nodules or rashes are noted. PAST MEDICAL HISTORY: 
Past Medical History:  
Diagnosis Date  Arrhythmia SVT  CAD (coronary artery disease)  Chronic pain   
 back  Gout  Heart failure ([address]  Hypertension  LVAD (left ventricular assist device) present ([facility] [address] 12/01/2016  Raynaud disease  Seizures ([facility] [address] 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 PAST SURGICAL HISTORY: 
Past Surgical History:  
Procedure Laterality Date  CABG, ARTERY-VEIN, TWO  12/01/2016  CARDIAC SURG PROCEDURE UNLIST  12/01/2016 LVAD  HX HEENT    
 wisdom teeth removed  HX ORTHOPAEDIC  11/22/2016  
 laminectomy  HX PACEMAKER    
 ICD - removed 1/2017 FAMILY HISTORY: 
Family History Problem Relation Age of Onset  Diabetes Mother  Dementia Mother  Other Mother   
     carotid artery blockage  Heart Disease Father  Stroke Father  Heart Attack Father   
     several  
 Hypertension Father  Elevated Lipids Father  No Known Problems Sister  Cancer Brother   
     brain  Lung Disease Sister  COPD Sister  Cancer Sister   
     lung SOCIAL HISTORY: 
Social History Socioeconomic History  Marital status:  Spouse name: Not on file  Number of children: Not on file  Years of education: Not on file  Highest education level: Not on file Tobacco Use  
  Smoking status: Former Smoker Packs/day: 1.50 Years: 30.00 Pack years: 45.00 Last attempt to quit: 2008 Years since quitting: 10.8  Smokeless tobacco: Never Used Substance and Sexual Activity  Alcohol use: No  
 Drug use: No  
 
 
LABORATORY RESULTS: 
 No flowsheet data found. Lab Results Component Value Date/Time TSH 2.83 11/28/2016 07:29 AM  
 
 
CURRENT MEDICATIONS: 
 
Current Outpatient Medications:  
  sacubitril-valsartan (ENTRESTO) 49 mg/51 mg tablet, Take 1 Tab by mouth two (2) times a day., Disp: 60 Tab, Rfl: 3 
  DULoxetine (CYMBALTA) 60 mg capsule, Take 60 mg by mouth daily. , Disp: , Rfl:  
  allopurinol (ZYLOPRIM) 100 mg tablet, Take  by mouth daily. , Disp: , Rfl:  
  tamsulosin (FLOMAX) 0.4 mg capsule, Take 0.4 mg by mouth daily. , Disp: , Rfl:  
  furosemide (LASIX) 40 mg tablet, Take 1 Tab by mouth daily. , Disp: 30 Tab, Rfl: 3 
  carvedilol (COREG) 3.125 mg tablet, Take 1 Tab by mouth two (2) times daily (with meals). , Disp: 60 Tab, Rfl: 3 
  apixaban (ELIQUIS) 5 mg tablet, Take 1 Tab by mouth two (2) times a day., Disp: 60 Tab, Rfl: 11 
  atorvastatin (LIPITOR) 40 mg tablet, Take 40 mg by mouth daily. , Disp: , Rfl:  
  colchicine 0.6 mg tablet, Take 0.6 mg by mouth every other day., Disp: , Rfl:  
  cyclobenzaprine (FLEXERIL) 5 mg tablet, Take 5 mg by mouth daily as needed for Muscle Spasm(s). , Disp: , Rfl:  
  OXYGEN-AIR DELIVERY SYSTEMS, Take 2 L/min by inhalation nightly., Disp: , Rfl:  
  aspirin 81 mg chewable tablet, Take 81 mg by mouth daily. , Disp: , Rfl:  
  tadalafil (CIALIS) 10 mg tablet, Take 1 Tab by mouth as needed. Indications: Erectile Dysfunction, Disp: 30 Tab, Rfl: 2 
  ascorbic acid, vitamin C, (VITAMIN C) 500 mg tablet, Take 1 Tab by mouth two (2) times a day., Disp: 60 Tab, Rfl: 6 
  pantoprazole (PROTONIX) 40 mg tablet, TAKE 1 TABLET BY MOUTH DAILY BEFORE BREAKFAST, Disp: , Rfl: 3   HYDROcodone-acetaminophen (NORCO) 5-325 mg per tablet, TAKE 1 TABLET EVERY 6 HOURS AS NEEDED, Disp: , Rfl: 0 Thank you for letting us see him with you, Kristi Segundo MD, Jazmyn Ro Chief of Cardiology, [facility] Medical Director [facility]s [address], [address] Office [phone] Fax [phone]

## 2018-11-21 NOTE — LETTER
11/21/2018 12:00 PM 
 
Patient:  Guerita Johns YOB: 1955 Date of Visit: 11/21/2018 Dear Jada Hogue MD 
[address] [address] VIA Facsimile: [phone] Kirk Samuels MD 
[address] [address] VIA Facsimile: [phone] 
 : Thank you for referring Mr. Renell Riedel to me for evaluation/treatment. Below are the relevant portions of my assessment and plan of care. If you have questions, please do not hesitate to call me. I look forward to following Mr. Mann along with you. Sincerely, Preston Daigle MD

## 2018-11-21 NOTE — PATIENT INSTRUCTIONS
1. Increase entresto to 49/51 mg, 1 tablet twice daily 2. Take furosemide 20 mg daily as needed for shortness of breath 3. Follow up in the [facility] in 2 weeks

## 2018-11-26 ENCOUNTER — TELEPHONE (OUTPATIENT)
Dept: CARDIAC REHAB | Age: 63
End: 2018-11-26

## 2018-11-26 NOTE — TELEPHONE ENCOUNTER
11/26/2018 Cardiac Rehab: Called Mr. Manasa Perez to discuss participation in the Cardiac Rehab Program . Confirmed intake appointment on 12/4/2018 @ 10am. Mr. Manasa Perez is without questions or concerns. Advised patient we would mail out the new  Patient paperwork to his home. We asked that he bring in the completed paperwork and his medications listed. We advised the patient to wear comfortable clothing  And where we are located in [facility]. Thank you for the Referral. . Sonia OconnellDr. [facility]

## 2018-12-04 ENCOUNTER — HOSPITAL ENCOUNTER (OUTPATIENT)
Dept: CARDIAC REHAB | Age: 63
Discharge: HOME | End: 2018-12-04
Payer: COMMERCIAL

## 2018-12-04 VITALS — WEIGHT: 184 LBS | HEIGHT: 70 IN | BODY MASS INDEX: 26.34 KG/M2

## 2018-12-04 PROCEDURE — 93798 PHYS/QHP OP CAR RHAB W/ECG: CPT

## 2018-12-04 RX ORDER — AMIODARONE HYDROCHLORIDE 200 MG/1
200 TABLET ORAL DAILY
COMMUNITY
End: 2019-02-13 | Stop reason: SDUPTHER

## 2018-12-04 NOTE — CARDIO/PULMONARY
Arsen Alston 59 y.o. Presented to cardiac wellness for orientation and exercise tolerance test today with a primary diagnosis of systolic heart failure. He has completed a full program with [facility] cardiac rehab after his CABG/LVAD and his last session was 6/23/17. Arsen Alston has a history of MI, PCI, CABG, and LVAD. His LVAD was explanted on 9/5/2018. He is wearing a lifevest at intake today. Cardiac risk factors include hypertension. Arsen Alston is  and lives with his wife. He is retired. PHQ9 depression score, is 3 and this is considered normal. He states he is very grateful and has no worries after being through so much medical procedures. Patient denied chest pain or SOB during 6 minute walk and was in [address] [place] [address] with isolated PAC. Mr. Keyla Ovalle will attend exercise 2- 3 days a week in cardiac wellness. EF is 30-35%.

## 2018-12-05 ENCOUNTER — OFFICE VISIT (OUTPATIENT)
Dept: CARDIOLOGY CLINIC | Age: 63
End: 2018-12-05

## 2018-12-05 VITALS
TEMPERATURE: 97.5 F | WEIGHT: 185.8 LBS | OXYGEN SATURATION: 97 % | BODY MASS INDEX: 26.6 KG/M2 | SYSTOLIC BLOOD PRESSURE: 102 MMHG | RESPIRATION RATE: 20 BRPM | HEART RATE: 73 BPM | DIASTOLIC BLOOD PRESSURE: 62 MMHG | HEIGHT: 70 IN

## 2018-12-05 DIAGNOSIS — I25.5 ISCHEMIC CARDIOMYOPATHY: Primary | ICD-10-CM

## 2018-12-05 DIAGNOSIS — I50.22 CHRONIC SYSTOLIC HEART FAILURE (HCC): ICD-10-CM

## 2018-12-05 DIAGNOSIS — R06.02 SHORTNESS OF BREATH: ICD-10-CM

## 2018-12-05 DIAGNOSIS — Z79.01 CHRONIC ANTICOAGULATION: ICD-10-CM

## 2018-12-05 DIAGNOSIS — E78.5 HYPERLIPIDEMIA, UNSPECIFIED HYPERLIPIDEMIA TYPE: ICD-10-CM

## 2018-12-05 NOTE — PROGRESS NOTES
[facility] Progress Note      DOS:  12/5/2018    PRIMARY CARE PHYSICIAN: Spring Flores MD      HPI: 59y.o. year old male with a history of remote tobacco use and alcohol abuse (quit 11/2016), CAD, PVD and ischemic cardiomyopathy. He presented 11/22/2016 for decompressive laminectomy at -Q5 complicated by myocardial infarction. The cardiac catheterization on 11/25/16 revealed severe left main and 3 vessel disease along with a 60% right common iliac stenosis. His LVEF was 10%. He subsequently underwent placement of an IABP followed by placement of a HeartMate2 LVAD on 12/1/2016 with concomitant CABG (SVG to the RCA, LIMA to LAD). His postoperative course was complicated by RV failure and an ileus requiring TPN resulting and a transaminitis. He had multiple episodes of Torsade on 12/4/2016 prompting repeat catheterization revealing an occluded RCA vein graft. Two BMS were placed in the RCA graft. He also had SVT requiring cardioversion. He had a single lead ICD placed on 98/98/15 complicated by a hematoma. The ICD was later removed on 1/20/17. Following his LVAD surgery he did require inpatient rehab.      Mr. Eleazar Stokes was listed for heart transplantation at [facility]. He was called in for transplantation on 7/14/2018 but on pre-op DELLA was noted to have normal LV function and the transplant surgery was aborted. He subsequently was admitted on 9/5/2018 for LVAD explant- his post-operative course was complicated by pneumonia and bilateral pleural effusions. He was discharged on home O2 which he discontinued on his own.       After device explant, his subsequent echocardiograms showed deterioration of LVEF and worsening mitral regurgitation from 40-45% with moderate to severe MR on 9/19/18 to 35-40% with moderate-to-severe MR on 10/15/18. He has been removed from the transplant list at [facility] and is not interested in pursuing transplant evaluation at another center at this time.   He has been followed in clinic here with Dr. Scott Brown and Dr. Tasia Conteh, working on optimization of GDMT and he has been placed on a LifeVest.      He was last seen 2 weeks ago and had his Entresto dose increased to 49/51mg BID. He presents to [facility] today for close follow up. He reports feeling well today, and has no acute concerns or complaints. He denies dizziness, lightheadedness, palpitations, chest pain, shortness of breath or swelling. He and his wife are planning a vacation in April and he wants to be off the LifeVest for that trip. Chief Complaint   Patient presents with    Follow-up       Review of Systems   Constitutional: Negative. Negative for chills, fever, malaise/fatigue and weight loss. HENT: Negative. Eyes: Negative. Respiratory: Negative for cough and shortness of breath. Cardiovascular: Negative. Negative for chest pain, palpitations, orthopnea, leg swelling and PND. Gastrointestinal: Negative. Genitourinary: Negative. Musculoskeletal: Positive for back pain. Skin: Negative. Neurological: Negative. Negative for dizziness. Endo/Heme/Allergies: Negative. Psychiatric/Behavioral: Negative.           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     Past Surgical History:   Procedure Laterality Date    CABG, ARTERY-VEIN, TWO  12/01/2016    CARDIAC SURG PROCEDURE UNLIST  12/01/2016    LVAD    HX HEENT      wisdom teeth removed    HX ORTHOPAEDIC  11/22/2016    laminectomy    HX PACEMAKER      ICD - removed 1/2017     Social History     Socioeconomic History    Marital status:      Spouse name: Zoila Marshall    Number of children: 2    Years of education: Not on file    Highest education level: Some college, no degree   Social Needs    Financial resource strain: Not hard at all   Epi[employer]Blair insecurity - worry: Never true    Food insecurity - inability: Never true   [employer] needs - medical: No    Transportation needs - non-medical: No   Occupational History    Not on file   Tobacco Use    Smoking status: Former Smoker     Packs/day: 1.50     Years: 30.00     Pack years: 45.00     Last attempt to quit: 2008     Years since quitting: 10.9    Smokeless tobacco: Never Used   Substance and Sexual Activity    Alcohol use: No    Drug use: No    Sexual activity: Not on file   Other Topics Concern     Service Not Asked    Blood Transfusions Not Asked    Caffeine Concern Not Asked    Occupational Exposure Not Asked    Hobby Hazards Not Asked    Sleep Concern Not Asked    Stress Concern Not Asked    Weight Concern Not Asked    Special Diet Not Asked    Back Care Not Asked    Exercise Not Asked    Bike Helmet Not Asked    Seat Belt Not Asked    Self-Exams Not Asked   Social History Narrative    Not on file     Family History   Problem Relation Age of Onset    Diabetes Mother     Dementia Mother     Other Mother         carotid artery blockage    Heart Disease Father     Stroke Father     Heart Attack Father         several    Hypertension Father     Elevated Lipids Father     No Known Problems Sister     Cancer Brother         brain    Lung Disease Sister     COPD Sister     Cancer Sister         lung       Current Medications:   Current Outpatient Medications   Medication Sig Dispense Refill    [START ON 12/10/2018] sacubitril-valsartan (ENTRESTO) 97 mg/103 mg tablet Take 1 Tab by mouth two (2) times a day. 60 Tab 5    amiodarone (CORDARONE) 200 mg tablet Take 200 mg by mouth.       pantoprazole (PROTONIX) 40 mg tablet TAKE 1 TABLET BY MOUTH DAILY BEFORE BREAKFAST  3    HYDROcodone-acetaminophen (NORCO) 5-325 mg per tablet TAKE 1 TABLET EVERY 6 HOURS AS NEEDED  0    DULoxetine (CYMBALTA) 60 mg capsule Take 60 mg by mouth daily.  allopurinol (ZYLOPRIM) 100 mg tablet Take  by mouth daily.  tamsulosin (FLOMAX) 0.4 mg capsule Take 0.4 mg by mouth daily.  carvedilol (COREG) 3.125 mg tablet Take 1 Tab by mouth two (2) times daily (with meals). 60 Tab 3    apixaban (ELIQUIS) 5 mg tablet Take 1 Tab by mouth two (2) times a day. 60 Tab 11    atorvastatin (LIPITOR) 40 mg tablet Take 40 mg by mouth daily.  colchicine 0.6 mg tablet Take 0.6 mg by mouth every other day.  cyclobenzaprine (FLEXERIL) 5 mg tablet Take 5 mg by mouth daily as needed for Muscle Spasm(s).  aspirin 81 mg chewable tablet Take 81 mg by mouth daily.  tadalafil (CIALIS) 10 mg tablet Take 1 Tab by mouth as needed. Indications: Erectile Dysfunction 30 Tab 2    ascorbic acid, vitamin C, (VITAMIN C) 500 mg tablet Take 1 Tab by mouth two (2) times a day. 60 Tab 6       Allergies: Allergies   Allergen Reactions    Morphine Other (comments)     Hallucinations. Confusion. Impaired judgement    Chlorhexidine Rash           Physical Exam:   Physical Exam   Constitutional: He is oriented to person, place, and time. He appears well-developed and well-nourished. No distress. HENT:   Head: Normocephalic and atraumatic. Eyes: EOM are normal. Pupils are equal, round, and reactive to light. Neck: Neck supple. No JVD present. Cardiovascular: Normal rate, regular rhythm, normal heart sounds and intact distal pulses. Pulmonary/Chest: Effort normal and breath sounds normal. No respiratory distress. Abdominal: Soft. Bowel sounds are normal. He exhibits no distension. There is no tenderness. Musculoskeletal: Normal range of motion. He exhibits no edema. Neurological: He is alert and oriented to person, place, and time. Skin: Skin is warm and dry. Psychiatric: He has a normal mood and affect.  His behavior is normal.   Nursing note and vitals reviewed. Vitals:    Visit Vitals  /62 (BP 1 Location: Left arm, BP Patient Position: Sitting)   Pulse 73   Temp 97.5 °F (36.4 °C) (Oral)   Resp 20   Ht 5' 10\" (1.778 m)   Wt 185 lb 12.8 oz (84.3 kg)   SpO2 97%   BMI 26.66 kg/m²           EKG:   10/31/18 NSR    Echocardiogram:  10/31/18  SUMMARY:  Procedure information: Image quality was fair. Echocardiographic views  were limited by poor acoustic window availability. Left ventricle: Systolic function was moderately to markedly reduced. Ejection fraction was estimated in the range of 30 % to 35 %. Suboptimal  endocardial visualization limits wall motion analysis. Right ventricle: Systolic function was probably mildly reduced. Tapse 1.6    Left atrium: The atrium was dilated. Mitral valve: There was mild to moderate regurgitation. Tricuspid valve: There was mild to moderate regurgitation. There was mild  to moderate pulmonary hypertension. Pericardium: A trivial pericardial effusion was identified. There was no  evidence of hemodynamic compromise. There was a moderate-sized left  pleural effusion.         Impression / Plan:   Heart Failure Status: NYHA Class III    CAD s/p CABG (SVG to RCA, LIMA to LAD) on 12/1/16  On ASA, BB, Statin  Stable    ICM s/p HMII as BTT on 12/1/16 and explant, NYHA Class III, LVEF 30-35%, mild RV dysfunction  Continue Coreg 3.125mg BID  Increase Entresto to 97/103mg in the evening, 49/51mg in the morning for 5 days, then to 97/103mg BID starting Monday  Lasix 20mg PO as needed for shortness of breath or weight gain  Full set of labs today (CBC, CMP, NT Pro BNP)  Continue cardiac rehab at [facility]  Follow up in 2 weeks in office, likely with Feliberto Victoria DNP    High Risk of SCD- s/p AICD 2017 w/removal d/t hematoma, LVEF 30-35%  Continue to wear Life Vest    PAD  No current problems with claudication  On ASA    Chronic Anticoagulation s/p LVAD explant  Continue eliquis    Gout  Continue allopurinol 100mg daily  Colchicine 0.6mg every other day    Chronic Lower Back Pain  Managed by PCP  On oxycodone, flexeril    Peripheral Neuropathy  On Cymbalta    Prevention  Influenza vaccine annually  Pneumonia vaccine every 5 years     H/o tobacco abuse, remote     H/o alcohol abuse, quit 1/2017            Jeanne Jorge NP    [address] [facility] [place]  [address] [facility], [address]  [address]  Office [phone]  Fax [phone]  24 hour VAD/HF Pager: [phone]

## 2018-12-05 NOTE — PATIENT INSTRUCTIONS
1. Increase evening dose of Entresto, starting tonight, for 5 days. Morning dose will be 49/51mg, evening dose 97/103mg. 2. Starting on Monday 12/10/18, increase the morning dose of Entresto also, so this will be 97/103mg twice per day. 3. Continue to monitor your blood pressures at home. 4. If you develop dizziness, lightheadedness or if your blood pressure drop below 90/60, call and let us know. 5. Continue with cardiac rehab. 6. Follow up in our office in 2 weeks with Feliberto Victoria DNP.

## 2018-12-06 ENCOUNTER — TELEPHONE (OUTPATIENT)
Dept: CARDIOLOGY CLINIC | Age: 63
End: 2018-12-06

## 2018-12-06 ENCOUNTER — HOSPITAL ENCOUNTER (OUTPATIENT)
Dept: CARDIAC REHAB | Age: 63
Discharge: HOME | End: 2018-12-06
Payer: COMMERCIAL

## 2018-12-06 VITALS — BODY MASS INDEX: 26.4 KG/M2 | WEIGHT: 184 LBS

## 2018-12-06 DIAGNOSIS — R06.02 SHORTNESS OF BREATH: ICD-10-CM

## 2018-12-06 DIAGNOSIS — I50.22 CHRONIC SYSTOLIC HEART FAILURE (HCC): Primary | ICD-10-CM

## 2018-12-06 LAB
ALBUMIN SERPL-MCNC: 4.2 G/DL (ref 3.6–4.8)
ALBUMIN/GLOB SERPL: 1.9 {RATIO} (ref 1.2–2.2)
ALP SERPL-CCNC: 97 IU/L (ref 39–117)
ALT SERPL-CCNC: 15 IU/L (ref 0–44)
APO B SERPL-MCNC: 55 MG/DL (ref 52–135)
AST SERPL-CCNC: 20 IU/L (ref 0–40)
BILIRUB SERPL-MCNC: 0.5 MG/DL (ref 0–1.2)
BUN SERPL-MCNC: 19 MG/DL (ref 8–27)
BUN/CREAT SERPL: 16 (ref 10–24)
CALCIUM SERPL-MCNC: 9 MG/DL (ref 8.6–10.2)
CHLORIDE SERPL-SCNC: 103 MMOL/L (ref 96–106)
CHOLEST SERPL-MCNC: 128 MG/DL (ref 100–199)
CO2 SERPL-SCNC: 20 MMOL/L (ref 20–29)
CREAT SERPL-MCNC: 1.16 MG/DL (ref 0.76–1.27)
ERYTHROCYTE [DISTWIDTH] IN BLOOD BY AUTOMATED COUNT: 16.4 % (ref 12.3–15.4)
GLOBULIN SER CALC-MCNC: 2.2 G/DL (ref 1.5–4.5)
GLUCOSE SERPL-MCNC: 65 MG/DL (ref 65–99)
HCT VFR BLD AUTO: 32.8 % (ref 37.5–51)
HDLC SERPL-MCNC: 77 MG/DL
HGB BLD-MCNC: 10.2 G/DL (ref 13–17.7)
LDLC SERPL CALC-MCNC: 45 MG/DL (ref 0–99)
MCH RBC QN AUTO: 26.8 PG (ref 26.6–33)
MCHC RBC AUTO-ENTMCNC: 31.1 G/DL (ref 31.5–35.7)
MCV RBC AUTO: 86 FL (ref 79–97)
NT-PROBNP SERPL-MCNC: 4226 PG/ML (ref 0–210)
PLATELET # BLD AUTO: 372 X10E3/UL (ref 150–379)
POTASSIUM SERPL-SCNC: 5.6 MMOL/L (ref 3.5–5.2)
PROT SERPL-MCNC: 6.4 G/DL (ref 6–8.5)
RBC # BLD AUTO: 3.81 X10E6/UL (ref 4.14–5.8)
SODIUM SERPL-SCNC: 140 MMOL/L (ref 134–144)
TRIGL SERPL-MCNC: 29 MG/DL (ref 0–149)
VLDLC SERPL CALC-MCNC: 6 MG/DL (ref 5–40)
WBC # BLD AUTO: 8.5 X10E3/UL (ref 3.4–10.8)

## 2018-12-06 PROCEDURE — 93798 PHYS/QHP OP CAR RHAB W/ECG: CPT

## 2018-12-06 NOTE — TELEPHONE ENCOUNTER
I called patient and requested that he have labs redrawn tomorrow-he agrees and will go in am-lab orders placed.

## 2018-12-07 ENCOUNTER — HOSPITAL ENCOUNTER (OUTPATIENT)
Dept: CARDIAC REHAB | Age: 63
Discharge: HOME | End: 2018-12-07
Payer: COMMERCIAL

## 2018-12-07 VITALS — BODY MASS INDEX: 26.4 KG/M2 | WEIGHT: 184 LBS

## 2018-12-07 LAB
BUN SERPL-MCNC: 19 MG/DL (ref 8–27)
BUN/CREAT SERPL: 14 (ref 10–24)
CALCIUM SERPL-MCNC: 9.3 MG/DL (ref 8.6–10.2)
CHLORIDE SERPL-SCNC: 100 MMOL/L (ref 96–106)
CO2 SERPL-SCNC: 20 MMOL/L (ref 20–29)
CREAT SERPL-MCNC: 1.33 MG/DL (ref 0.76–1.27)
GLUCOSE SERPL-MCNC: 89 MG/DL (ref 65–99)
NT-PROBNP SERPL-MCNC: 3751 PG/ML (ref 0–210)
POTASSIUM SERPL-SCNC: 5.3 MMOL/L (ref 3.5–5.2)
SODIUM SERPL-SCNC: 136 MMOL/L (ref 134–144)

## 2018-12-07 PROCEDURE — 93798 PHYS/QHP OP CAR RHAB W/ECG: CPT

## 2018-12-10 ENCOUNTER — TELEPHONE (OUTPATIENT)
Dept: CARDIOLOGY CLINIC | Age: 63
End: 2018-12-10

## 2018-12-10 NOTE — TELEPHONE ENCOUNTER
----- Message from Petr Rendon NP sent at 12/10/2018 11:56 AM EST -----  K slightly elevated and Cr has increased slightly with the increased evening dose of Entresto. Will have pt return to 49/51mg PO BID and will repeat labs at next clinic visit next week. Can consider trying low dose AA instead, or work on low potassium diet education prior to further increase in [place]. I called patient, reviewed lab results. I reminded him to drink plenty of water, he states he probably does not drink 6-7 glasses water per day. I will also email list of high potassium foods. Placed him on lab list for 12/19. He will also cut back to entresto 49/51 one pill twice per day. He states understanding of instructions and has no further questions.

## 2018-12-10 NOTE — TELEPHONE ENCOUNTER
K slightly elevated and Cr has increased slightly with the increased evening dose of Entresto. Will have pt return to 49/51mg PO BID and will repeat labs at next clinic visit next week. Can consider trying low dose AA instead, or work on low potassium diet education prior to further increase in [place].

## 2018-12-12 ENCOUNTER — HOSPITAL ENCOUNTER (OUTPATIENT)
Dept: CARDIAC REHAB | Age: 63
Discharge: HOME | End: 2018-12-12
Payer: COMMERCIAL

## 2018-12-12 VITALS — BODY MASS INDEX: 26.69 KG/M2 | WEIGHT: 186 LBS

## 2018-12-12 PROCEDURE — 93798 PHYS/QHP OP CAR RHAB W/ECG: CPT

## 2018-12-14 ENCOUNTER — HOSPITAL ENCOUNTER (OUTPATIENT)
Dept: CARDIAC REHAB | Age: 63
Discharge: HOME | End: 2018-12-14
Payer: COMMERCIAL

## 2018-12-14 VITALS — WEIGHT: 185 LBS | BODY MASS INDEX: 26.54 KG/M2

## 2018-12-14 PROCEDURE — 93798 PHYS/QHP OP CAR RHAB W/ECG: CPT

## 2018-12-17 ENCOUNTER — HOSPITAL ENCOUNTER (OUTPATIENT)
Dept: CARDIAC REHAB | Age: 63
Discharge: HOME | End: 2018-12-17
Payer: COMMERCIAL

## 2018-12-17 VITALS — BODY MASS INDEX: 26.54 KG/M2 | WEIGHT: 185 LBS

## 2018-12-17 PROCEDURE — 93798 PHYS/QHP OP CAR RHAB W/ECG: CPT

## 2018-12-18 ENCOUNTER — TELEPHONE (OUTPATIENT)
Dept: CARDIOLOGY CLINIC | Age: 63
End: 2018-12-18

## 2018-12-18 NOTE — TELEPHONE ENCOUNTER
Telephone Call RE:  Appointment reminder     Outcome:     [] Patient confirmed appointment   [] Patient rescheduled appointment for    [] Unable to reach   [x] Left message              [] Other:       Елена Rodriguez

## 2018-12-19 ENCOUNTER — HOSPITAL ENCOUNTER (OUTPATIENT)
Dept: CARDIAC REHAB | Age: 63
Discharge: HOME | End: 2018-12-19
Payer: COMMERCIAL

## 2018-12-19 ENCOUNTER — OFFICE VISIT (OUTPATIENT)
Dept: CARDIOLOGY CLINIC | Age: 63
End: 2018-12-19

## 2018-12-19 VITALS
OXYGEN SATURATION: 96 % | BODY MASS INDEX: 26.26 KG/M2 | HEIGHT: 70 IN | TEMPERATURE: 97.6 F | SYSTOLIC BLOOD PRESSURE: 122 MMHG | HEART RATE: 82 BPM | WEIGHT: 183.4 LBS | DIASTOLIC BLOOD PRESSURE: 64 MMHG | RESPIRATION RATE: 19 BRPM

## 2018-12-19 VITALS — BODY MASS INDEX: 26.54 KG/M2 | WEIGHT: 185 LBS

## 2018-12-19 DIAGNOSIS — I25.5 ISCHEMIC CARDIOMYOPATHY: ICD-10-CM

## 2018-12-19 DIAGNOSIS — I25.10 CAD, MULTIPLE VESSEL: ICD-10-CM

## 2018-12-19 DIAGNOSIS — I50.22 CHRONIC SYSTOLIC HEART FAILURE (HCC): Primary | ICD-10-CM

## 2018-12-19 DIAGNOSIS — R06.09 DOE (DYSPNEA ON EXERTION): ICD-10-CM

## 2018-12-19 DIAGNOSIS — I47.20 SUSTAINED VT (VENTRICULAR TACHYCARDIA): ICD-10-CM

## 2018-12-19 DIAGNOSIS — Z95.810 S/P ICD (INTERNAL CARDIAC DEFIBRILLATOR) PROCEDURE: ICD-10-CM

## 2018-12-19 PROCEDURE — 93798 PHYS/QHP OP CAR RHAB W/ECG: CPT

## 2018-12-19 NOTE — LETTER
12/20/2018 3:14 PM 
 
Patient:  Kalee Renner YOB: 1955 Date of Visit: 12/19/2018 Dear MD [facility] [address] [address] VIA Facsimile: [phone] Desi Atkins MD 
[address] [place] [address] [phone] [address] VIA In Basket Candelario Negrete MD 
[facility] [address] [facility] [address] [address] VIA In Basket 
 : Thank you for referring Mr. Vikram Chairez to me for evaluation/treatment. Below are the relevant portions of my assessment and plan of care. If you have questions, please do not hesitate to call me. I look forward to following Mr. Mann along with you. Sincerely, PASQUALE Kaufman

## 2018-12-19 NOTE — PATIENT INSTRUCTIONS
You are doing well     CONTINUE CURRENT medications    Take twice a day medications 12 hours apart with food    Labs today BMP, proBNP     Cardiac rehab     continue low potassium diet   remain hydrated     Walk more    Keep a positive attitude       HF Education: Continue daily weights (in the morning, after voiding). Notify HF team of overnight weight gains > 2 lbs or weekly >5 lbs or if any of the following Sx. Continue to limit sodium intake & monitor your fluid intake .

## 2018-12-19 NOTE — PROGRESS NOTES
Health Maintenance Due   Topic Date Due    Pneumococcal 19-64 Medium Risk (1 of 1 - PPSV23) 10/11/1975    DTaP/Tdap/Td series (1 - Tdap) 10/11/1977    Shingrix Vaccine Age 50> (1 of 2) 10/11/2006    FOBT Q 1 YEAR AGE 50-75  12/20/2017    Influenza Age 5 to Adult  08/01/2018       Chief Complaint   Patient presents with    Follow-up       1. Have you been to the ER, urgent care clinic since your last visit? Hospitalized since your last visit? No    2. Have you seen or consulted any other health care providers outside of the [address] [place] [address] since your last visit? Include any pap smears or colon screening. No    3) Do you have an Advance Directive on file? yes    Patient is accompanied by self I have received verbal consent from Sharon Whitten to discuss any/all medical information while they are present in the room.

## 2018-12-19 NOTE — PROGRESS NOTES
[facility] Clinic Note      DOS:  12/19/2018  REFERRING PROVIDER: Kimberly Bernal MD  PRIMARY CARE PHYSICIAN: Va Del Cid MD  PRIMARY CARDIOLOGIST:  Mendez Beckwith MD   EP: Lexx Christian MD   PULMONARY: Lisa Young MD       IMPRESSION/PLAN:   ICM s/p HMII as BTT on 12/1/16 and explant, NYHA Class II, LVEF 30-35%, mild RV dysfunction              Check labs and increase  Entresto to 97/103 if K and CR OK            Also can consider adding low dose aldactone 12.5 mg  If labs remains stable            Continue carvedilol 3.125 mg twice daily             PRN  lasix 20 mg  (1/2 of a 40 mg tablet) only as needed for shortness of breath- he has not required        Continue cardiac rehab               Recent lab results reviewed at [facility]              Referred to cardiac rehab at [facility]              Follow up in the [facility] in 2 weeks      CAD s/p CABG (SVG to RCA and LIMA to LAD) on 12/1/16 s/p BMS x2 -> SVG-RCA graft              Continue ASA, BB, statin      High Risk of SCD - s/p AICD in 2017 with removal d/t hematoma, LVEF 30-35%             Continue  LifeVest     PAD - difficult femoral access with LVAD explant              No symptoms of claudication      Chronic Anticoagulation s/p LVAD explant              On eliquis- no bleeding      Hyperkalemia       Ck labs today and watch closely with adjustment of meds      Consider valtessa if needed    Gout              On colchicine 0.6 mg every other day              On allopurinol 100 mg daily      Chronic Lower back pain              On oxycodone, flexeril    H/o tobacco abuse, remote     H/o alcohol abuse, quit 1/2017      Seizure disorder - early 20s              No recent seizure activity     Peripheral neuropathy              On cymbalta     Prevention              Influenza annually              Pneumonia vaccine every 5 years       Thank you for allowing me to participate in the care of this delightful  Gentleman.   Please do not hesitate to call with any questions. Geno Salmeron RN, ACNP-BC, [facility]             Chief Complaint   Patient presents with    Follow-up         HPI: Abeba Schafer is a 59y.o. year old male  with a history of HFrEF in the setting of ICM s/p LVAD and recent LVAD explant complicated by PVD,  remote tobacco use and alcohol abuse (quit 11/2016) who presents for follow up of his HFrEF. Mr. Irma Londono presented 11/22/2016 for decompressive laminectomy at [facility]- complicated by myocardial infarction. The cardiac catheterization (11/25/16) revealed severe left main and 3 vessel disease along with a 60% right common iliac stenosis and  LVEF was 10%. He subsequently underwent placement of an IABP followed by placement of a HeartMate2 LVAD on 12/1/2016 with concomitant CABG (SVG to the RCA, LIMA to LAD). His postoperative course was complicated by RV failure and an ileus requiring TPN resulting and a transaminitis. He had multiple episodes of Torsade on 12/4/2016 prompting repeat catheterization revealing an occluded RCA vein graft. Two BMS were placed in the RCA graft. He also had SVT requiring cardioversion. He had a single lead ICD placed on 59/14/24 complicated by a hematoma. The ICD was later removed on 1/20/17. Following his LVAD surgery he did require inpatient rehab.      Mr. Irma Londono was listed for heart transplantation at [facility]. He was called in for transplantation on 7/14/2018 but on pre-op DELLA was noted to have normal LV function and the transplant surgery was aborted. He subsequently was admitted on 9/5/2018 for LVAD explant- his post-operative course was complicated by pneumonia and bilateral pleural effusions. He was discharged on home O2 which he discontinued on his own.       After device explant, his subsequent echocardiograms showed deterioration of LVEF and worsening mitral regurgitation from 40-45% with moderate to severe MR on 9/19/18 to 35-40% with moderate-to-severe MR on 10/15/18.   He has been removed from the transplant list at [facility] and is not interested in pursuing transplant evaluation at another center at this time. He has been followed in clinic here with Dr. Barry Jessica and Dr. Scott Mtz, working on optimization of GDMT and he has been placed on a LifeVest.       He was last seen 2 weeks ago and presents to day feeling well and has not acute complaints or concerns. His  Entresto dose was initially  increased to the third tier, 97/103 mg BID; however, his K increased to 5.6, so his dose Entresto was reduced to 49/51 mg BID, and he was instructed on a low K diet and encouraged to hydrate. He is following a low K diet, and hydrating with 48-52 ounces of fluid, and denies dizziness. .     Participating in cardiac rehab Ludin Mckeon   Following daily weights 177 at home   Has not needed  lasix   Negative sleep study 1 month ago    He and his wife are planning a vacation in April and he wants to be off the LifeVest for that trip.             CARDIAC EVALUATION  DELLA 2018 - normal LV function and the transplant surgery was aborted. He subsequently was admitted on 2018 for LVAD explant  ECHO 18:LVEF 40-45% mod-severe MR   ECHO 10/15/18:EF 35-40%, mod-severe MR   ECHO 10/31/18: TDS, EF , reduced RVEF, TAPSE 1.6, LAE, mild- mod MR/TR      CATH 16: severe LM, and 3 VD with 60% right common iliac stenosis. EF: 10%. ----S/p IABP   ----S/p HeartMate2 LVAD 2016   S/p CAB2016: LIMA-> LAD, SVG-> RCA     CATH 16 following torsades SVG-> RCA TO   ---- s/p BMS x 2-.  RCA  RHC 2017:RA: 15/15 14, RV:  14,PA: , m 20, PCWP 13, PVR 1.67 CO 5.67, CI 3    S/p LVAD explant 18 (UVA)    SVT s/p DCCV  AICD  single lead cx by hematoma  AICD explanted 17    EKG:    10/31/18 NSR        Review of Systems:     General:  Denies fever, chills, fatigue   HEENT: Denies epistaxis, angioedema   Pulmonary: Denies cough, wheeze, hemoptysis   Cardiac: Denies exertional chest pain, palpitations, orthopnea, PND, edema, lightheadedness, syncope, near syncope, bleeding or Life Vest firing.  Compliant with meds and Life Vest    GI:   Denies  abdominal pain, change in bowel habits, or black or bloody stools  Musculo: Positive for back pain for which he takes prn flexril and percocet- once last week Gout sx controlled with current meds   Neuro: Denies  TIA/CVA sx  Skin:  Denies rash   Allergies: Reviewed   Psych: Denies depression or anxiety       History:  Past Medical History:   Diagnosis Date    Arrhythmia     SVT    Arthritis     CAD (coronary artery disease)     Chronic pain     back    Congestive heart failure (HCC)     Gout     [facility]     Hypertension     LVAD (left ventricular assist device) present ([facility] 12/01/2016    Myocardial infarction [facility]     Raynaud disease     Seizures ([facility]     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     Past Surgical History:   Procedure Laterality Date    CABG, ARTERY-VEIN, TWO  12/01/2016    CARDIAC SURG PROCEDURE UNLIST  12/01/2016    LVAD    HX HEENT      wisdom teeth removed    HX ORTHOPAEDIC  11/22/2016    laminectomy    HX PACEMAKER      ICD - removed 1/2017     Social History     Socioeconomic History    Marital status:      Spouse name: Bello Bolden    Number of children: 2    Years of education: Not on file    Highest education level: Some college, no degree   Social Needs    Financial resource strain: Not hard at all   [place] insecurity - worry: Never true    Food insecurity - inability: Never true   [place] needs - medical: No    Transportation needs - non-medical: No   Occupational History    Not on file   Tobacco Use    Smoking status: Former Smoker     Packs/day: 1.50     Years: 30.00     Pack years: 45.00     Last attempt to quit: 2008     Years since quitting: 10.9    Smokeless tobacco: Never Used   Substance and Sexual Activity    Alcohol use: No    Drug use: No    Sexual activity: No   Other Topics Concern     Service Not Asked    Blood Transfusions Not Asked    Caffeine Concern Not Asked    Occupational Exposure Not Asked    Hobby Hazards Not Asked    Sleep Concern Not Asked    Stress Concern Not Asked    Weight Concern Not Asked    Special Diet Not Asked    Back Care Not Asked    Exercise Not Asked    Bike Helmet Not Asked   [address] Not Asked    Self-Exams Not Asked   Social History Narrative    Not on file     Family History   Problem Relation Age of Onset    Diabetes Mother     Dementia Mother     Other Mother         carotid artery blockage    Heart Disease Father     Stroke Father     Heart Attack Father         several    Hypertension Father     Elevated Lipids Father     No Known Problems Sister     Cancer Brother         brain    Lung Disease Sister     COPD Sister     Cancer Sister         lung       Current Medications:   Current Outpatient Medications   Medication Sig Dispense Refill    sacubitril-valsartan (ENTRESTO) 97 mg/103 mg tablet Take 1 Tab by mouth two (2) times a day. 60 Tab 5    amiodarone (CORDARONE) 200 mg tablet Take 200 mg by mouth.  pantoprazole (PROTONIX) 40 mg tablet TAKE 1 TABLET BY MOUTH DAILY BEFORE BREAKFAST  3    HYDROcodone-acetaminophen (NORCO) 5-325 mg per tablet TAKE 1 TABLET EVERY 6 HOURS AS NEEDED  0    DULoxetine (CYMBALTA) 60 mg capsule Take 60 mg by mouth daily.  allopurinol (ZYLOPRIM) 100 mg tablet Take  by mouth daily.  tamsulosin (FLOMAX) 0.4 mg capsule Take 0.4 mg by mouth daily.  carvedilol (COREG) 3.125 mg tablet Take 1 Tab by mouth two (2) times daily (with meals). 60 Tab 3    apixaban (ELIQUIS) 5 mg tablet Take 1 Tab by mouth two (2) times a day. 60 Tab 11    atorvastatin (LIPITOR) 40 mg tablet Take 40 mg by mouth daily.  colchicine 0.6 mg tablet Take 0.6 mg by mouth every other day.  cyclobenzaprine (FLEXERIL) 5 mg tablet Take 5 mg by mouth daily as needed for Muscle Spasm(s).       aspirin 81 mg chewable tablet Take 81 mg by mouth daily.  tadalafil (CIALIS) 10 mg tablet Take 1 Tab by mouth as needed. Indications: Erectile Dysfunction 30 Tab 2    ascorbic acid, vitamin C, (VITAMIN C) 500 mg tablet Take 1 Tab by mouth two (2) times a day. 60 Tab 6       Allergies: Allergies   Allergen Reactions    Morphine Other (comments)     Hallucinations. Confusion. Impaired judgement    Chlorhexidine Rash           Physical Exam:   Vitals:    Visit Vitals  /64 (BP 1 Location: Right arm, BP Patient Position: Sitting)   Pulse 82   Temp 97.6 °F (36.4 °C) (Oral)   Resp 19   Ht 5' 10\" (1.778 m)   Wt 183 lb 6.4 oz (83.2 kg)   SpO2 96%   BMI 26.32 kg/m²      General:  Well developed WM, AAOx3, pleasant,  cooperative, no acute distress. HEENT:  Atraumatic. Pink and moist.  Anicteric sclerae. Neck:   Supple, no adenopoathy. Lungs:  CTA bilaterally. No wheezing/rhonchi/rales. Heart:   Life Vest in place, PMI: Median sternotomy scar,  Regular rhythm, S1, S2 present, no murmur, no rubs, no gallops. JVD 10 cm (-) HJR . No carotid bruits. Abdomen:  Soft, non-distended, non-tender. + Bowel sounds. No bruits. Extremities:  Venous stasis changes, No edema, no clubbing, no cyanosis. No calf tenderness  Neurologic:  Grossly intact. Alert and oriented X 3. No acute neurological distress. Skin:   intact, no wounds, ecchymosis, bruising, rashes   Psych:  Good insight. Not anxious nor agitated.     Recent Labs:     Lab Results   Component Value Date/Time    WBC 8.5 12/05/2018 12:00 AM    HGB 10.2 (L) 12/05/2018 12:00 AM    HCT 32.8 (L) 12/05/2018 12:00 AM    PLATELET 689 94/50/0166 12:00 AM    MCV 86 12/05/2018 12:00 AM     Lab Results   Component Value Date/Time    GFR est non-AA 57 (L) 12/07/2018 12:11 PM    GFR est AA 66 12/07/2018 12:11 PM    Creatinine 1.33 (H) 12/07/2018 12:11 PM    BUN 19 12/07/2018 12:11 PM    Sodium 136 12/07/2018 12:11 PM    Potassium 5.3 (H) 12/07/2018 12:11 PM    Chloride 100 12/07/2018 12:11 PM CO2 20 12/07/2018 12:11 PM    Magnesium 1.7 12/21/2016 05:10 AM    Phosphorus 1.9 (L) 11/25/2016 09:36 PM     Lab Results   Component Value Date/Time    TSH 2.83 11/28/2016 07:29 AM      Lab Results   Component Value Date/Time    Sodium 136 12/07/2018 12:11 PM    Potassium 5.3 (H) 12/07/2018 12:11 PM    Chloride 100 12/07/2018 12:11 PM    CO2 20 12/07/2018 12:11 PM    Anion gap 8 01/24/2017 05:33 AM    Glucose 89 12/07/2018 12:11 PM    BUN 19 12/07/2018 12:11 PM    Creatinine 1.33 (H) 12/07/2018 12:11 PM    BUN/Creatinine ratio 14 12/07/2018 12:11 PM    GFR est AA 66 12/07/2018 12:11 PM    GFR est non-AA 57 (L) 12/07/2018 12:11 PM    Calcium 9.3 12/07/2018 12:11 PM    Bilirubin, total 0.5 12/05/2018 12:00 AM    ALT (SGPT) 15 12/05/2018 12:00 AM    AST (SGOT) 20 12/05/2018 12:00 AM    Alk. phosphatase 97 12/05/2018 12:00 AM    Protein, total 6.4 12/05/2018 12:00 AM    Albumin 4.2 12/05/2018 12:00 AM    Globulin 3.6 01/24/2017 05:33 AM    A-G Ratio 1.9 12/05/2018 12:00 AM      Lab Results   Component Value Date/Time    Hemoglobin A1c 6.6 (H) 11/15/2016 12:32 PM        I have discussed the diagnosis with  Bolivar Minor  and the intended plan as seen in the above orders. Questions were answered concerning future plans. I have discussed medication side effects and warnings with the patient as well. Thank you for allowing me to participate in the care of this delightfully pleasant gentleman. Please do not hesitate to call with any questions. Geno Friend  RN, ACNP-BC, [address], [address] [place] [address]  [phone]  24 hour VAD/HF Pager: [phone]

## 2018-12-20 LAB
BUN SERPL-MCNC: 16 MG/DL (ref 8–27)
BUN/CREAT SERPL: 13 (ref 10–24)
CALCIUM SERPL-MCNC: 9.1 MG/DL (ref 8.6–10.2)
CHLORIDE SERPL-SCNC: 105 MMOL/L (ref 96–106)
CO2 SERPL-SCNC: 21 MMOL/L (ref 20–29)
CREAT SERPL-MCNC: 1.19 MG/DL (ref 0.76–1.27)
GLUCOSE SERPL-MCNC: 90 MG/DL (ref 65–99)
POTASSIUM SERPL-SCNC: 5.2 MMOL/L (ref 3.5–5.2)
SODIUM SERPL-SCNC: 138 MMOL/L (ref 134–144)

## 2018-12-21 ENCOUNTER — HOSPITAL ENCOUNTER (OUTPATIENT)
Dept: CARDIAC REHAB | Age: 63
Discharge: HOME | End: 2018-12-21
Payer: COMMERCIAL

## 2018-12-21 ENCOUNTER — TELEPHONE (OUTPATIENT)
Dept: CARDIOLOGY CLINIC | Age: 63
End: 2018-12-21

## 2018-12-21 VITALS — BODY MASS INDEX: 26.4 KG/M2 | WEIGHT: 184 LBS

## 2018-12-21 LAB
NT-PROBNP SERPL-MCNC: 3518 PG/ML (ref 0–210)
SPECIMEN STATUS REPORT, ROLRST: NORMAL

## 2018-12-21 PROCEDURE — 93798 PHYS/QHP OP CAR RHAB W/ECG: CPT

## 2018-12-21 NOTE — TELEPHONE ENCOUNTER
----- Message from PASQUALE Aggarwal sent at 12/21/2018 12:56 PM EST -----  Labs stable  Continue Current plan  Thank you      I called patient, reviewed all labs, he states understanding and has no questions.

## 2018-12-26 ENCOUNTER — HOSPITAL ENCOUNTER (OUTPATIENT)
Dept: CARDIAC REHAB | Age: 63
Discharge: HOME | End: 2018-12-26
Payer: COMMERCIAL

## 2018-12-26 VITALS — WEIGHT: 184 LBS | BODY MASS INDEX: 26.4 KG/M2

## 2018-12-26 PROCEDURE — 93798 PHYS/QHP OP CAR RHAB W/ECG: CPT

## 2018-12-28 ENCOUNTER — HOSPITAL ENCOUNTER (OUTPATIENT)
Dept: CARDIAC REHAB | Age: 63
Discharge: HOME | End: 2018-12-28
Payer: COMMERCIAL

## 2018-12-28 VITALS — WEIGHT: 183 LBS | BODY MASS INDEX: 26.26 KG/M2

## 2018-12-28 PROCEDURE — 93798 PHYS/QHP OP CAR RHAB W/ECG: CPT

## 2019-01-02 ENCOUNTER — HOSPITAL ENCOUNTER (OUTPATIENT)
Dept: CARDIAC REHAB | Age: 64
Discharge: HOME | End: 2019-01-02
Payer: COMMERCIAL

## 2019-01-02 VITALS — WEIGHT: 183 LBS | BODY MASS INDEX: 26.26 KG/M2

## 2019-01-02 PROCEDURE — 93798 PHYS/QHP OP CAR RHAB W/ECG: CPT

## 2019-01-03 ENCOUNTER — HOSPITAL ENCOUNTER (OUTPATIENT)
Dept: CARDIAC REHAB | Age: 64
Discharge: HOME | End: 2019-01-03
Payer: COMMERCIAL

## 2019-01-03 VITALS — BODY MASS INDEX: 26.4 KG/M2 | WEIGHT: 184 LBS

## 2019-01-03 PROCEDURE — 93798 PHYS/QHP OP CAR RHAB W/ECG: CPT

## 2019-01-04 ENCOUNTER — HOSPITAL ENCOUNTER (OUTPATIENT)
Dept: CARDIAC REHAB | Age: 64
Discharge: HOME | End: 2019-01-04
Payer: COMMERCIAL

## 2019-01-04 VITALS — BODY MASS INDEX: 26.26 KG/M2 | WEIGHT: 183 LBS

## 2019-01-04 PROCEDURE — 93798 PHYS/QHP OP CAR RHAB W/ECG: CPT

## 2019-01-07 ENCOUNTER — HOSPITAL ENCOUNTER (OUTPATIENT)
Dept: CARDIAC REHAB | Age: 64
Discharge: HOME | End: 2019-01-07
Payer: COMMERCIAL

## 2019-01-07 VITALS — WEIGHT: 183 LBS | BODY MASS INDEX: 26.26 KG/M2

## 2019-01-07 PROCEDURE — 93798 PHYS/QHP OP CAR RHAB W/ECG: CPT

## 2019-01-09 ENCOUNTER — OFFICE VISIT (OUTPATIENT)
Dept: CARDIOLOGY CLINIC | Age: 64
End: 2019-01-09

## 2019-01-09 ENCOUNTER — HOSPITAL ENCOUNTER (OUTPATIENT)
Dept: CARDIAC REHAB | Age: 64
Discharge: HOME | End: 2019-01-09
Payer: COMMERCIAL

## 2019-01-09 ENCOUNTER — TELEPHONE (OUTPATIENT)
Dept: CARDIOLOGY CLINIC | Age: 64
End: 2019-01-09

## 2019-01-09 VITALS
TEMPERATURE: 96.4 F | SYSTOLIC BLOOD PRESSURE: 100 MMHG | BODY MASS INDEX: 26.03 KG/M2 | WEIGHT: 181.8 LBS | HEART RATE: 71 BPM | RESPIRATION RATE: 20 BRPM | HEIGHT: 70 IN | OXYGEN SATURATION: 97 % | DIASTOLIC BLOOD PRESSURE: 62 MMHG

## 2019-01-09 VITALS — WEIGHT: 182 LBS | BODY MASS INDEX: 26.11 KG/M2

## 2019-01-09 DIAGNOSIS — I25.5 ISCHEMIC CARDIOMYOPATHY: ICD-10-CM

## 2019-01-09 DIAGNOSIS — I47.20 SUSTAINED VT (VENTRICULAR TACHYCARDIA): ICD-10-CM

## 2019-01-09 DIAGNOSIS — I50.22 CHRONIC SYSTOLIC HEART FAILURE (HCC): Primary | ICD-10-CM

## 2019-01-09 DIAGNOSIS — Z95.810 S/P ICD (INTERNAL CARDIAC DEFIBRILLATOR) PROCEDURE: ICD-10-CM

## 2019-01-09 DIAGNOSIS — R06.09 DOE (DYSPNEA ON EXERTION): ICD-10-CM

## 2019-01-09 DIAGNOSIS — Z79.01 CHRONIC ANTICOAGULATION: ICD-10-CM

## 2019-01-09 DIAGNOSIS — I25.10 CAD, MULTIPLE VESSEL: ICD-10-CM

## 2019-01-09 PROCEDURE — 93798 PHYS/QHP OP CAR RHAB W/ECG: CPT

## 2019-01-09 NOTE — LETTER
1/9/2019 3:48 PM 
 
Patient:  Miguel Mayers YOB: 1955 Date of Visit: 1/9/2019 Dear Caryn Robison MD 
[address] [address] VIA Facsimile: [phone] Romain Ramos MD 
[address] [phone] VIA In Basket Tushar Shoemaker MD 
[facility] [address] [facility] [address] VIA In Basket 
 : Thank you for referring Mr. Dallas Preciado to me for evaluation/treatment. Below are the relevant portions of my assessment and plan of care. If you have questions, please do not hesitate to call me. I look forward to following Mr. Mann along with you. Sincerely, NICK McadamsP

## 2019-01-09 NOTE — PATIENT INSTRUCTIONS
You are doing well    Recommend you  Wear the Life vest to prevent sudden cardiac death      CONTINUE CURRENT medications  Low potassium diet    Take twice a day medications 12 hours apart with food    Labs today BMP, proBNP       Echo in the near future with Dr. Kareem Fernández office   Do not drink beer    Remains active, continue cardiac rehab     Keep a positive attitude     Follow up  3 weeks     HF Education: Continue daily weights (in the morning, after voiding). Notify HF team of overnight weight gains > 2 lbs or weekly >5 lbs or if any of the following Sx. Continue to limit sodium intake & monitor your fluid intake .

## 2019-01-09 NOTE — PROGRESS NOTES
[facility] Clinic Note      DOS:  1/9/2019  REFERRING PROVIDER: Li Velez MD  PRIMARY CARE PHYSICIAN: Radha Hale MD  PRIMARY CARDIOLOGIST:  Cynthia Esqueda MD   EP: Romana Castro MD   PULMONARY: Estefani Echevarria MD       IMPRESSION/PLAN:   ICM s/p HMII as BTT on 12/1/16 and explant, NYHA Class II, LVEF 30-35%, mild RV dysfunction     Unable to increase  Entresto to 97/103 mg BID 2/2 elevated K        Discussed the option of increasing Entresto  To 97/103 mg BID and adding Valtessa.  Favor rechecking echo and revisit based on EF     Unable to add aldactone 2/2 hyperkalemia             Continue carvedilol 3.125 mg twice daily             PRN  lasix 20 mg  - for shortness of breath- he has not required        Continue cardiac rehab - he has had 15 sessions               Follow up in the [facility] in 3 weeks     Echo soon       CAD s/p CABG (SVG to RCA and LIMA to LAD) on 12/1/16 s/p BMS x2 -> SVG-RCA graft              Continue ASA, BB, statin      High Risk of SCD - s/p AICD in 2017 with removal d/t hematoma, LVEF 30-35%             Recommended he wear the LifeVest, discussed risk and benefits- he verbalized understanding          PAD - difficult femoral access with LVAD explant              No symptoms of claudication      Chronic Anticoagulation s/p LVAD explant              On eliquis- no bleeding      Hyperkalemia       Follow labs     Low K diet and remains hydrated       Consider valtessa if needed    Gout              On colchicine 0.6 mg every other day              On allopurinol 100 mg daily      Chronic Lower back pain              On oxycodone, flexeril    H/o tobacco abuse, remote     H/o alcohol abuse, quit 1/2017      Seizure disorder - early 20s              No recent seizure activity     Peripheral neuropathy              On cymbalta     Prevention              Influenza annually              Pneumonia vaccine every 5 years       Thank you for allowing me to participate in the care of this delightful  Gentleman. Please do not hesitate to call with any questions. Geno Barboza RN, ACNP-BC, [facility]       Chief Complaint   Patient presents with    Follow-up         HPI: Carlene Calvillo is a 59y.o. year old male  with a history of HFrEF in the setting of ICM s/p LVAD and recent LVAD explant complicated by PVD,  remote tobacco use and alcohol abuse (quit 11/2016) who presents for follow up of his HFrEF. Mr. Janey Escalante presented 11/22/2016 for decomp[facility] lamine[facility] complicated by myocardial infarction. The cardiac catheterization (11/25/16) revealed severe left main and 3 vessel disease along with a 60% right common iliac stenosis and  LVEF was 10%. He subsequently underwent placement of an IABP followed by placement of a HeartMate2 LVAD on 12/1/2016 with concomitant CABG (SVG to the RCA, LIMA to LAD). His postoperative course was complicated by RV failure and an ileus requiring TPN resulting and a transaminitis. He had multiple episodes of Torsade on 12/4/2016 prompting repeat catheterization revealing an occluded RCA vein graft. Two BMS were placed in the RCA graft. He also had SVT requiring cardioversion. He had a single lead ICD placed on 86/83/62 complicated by a hematoma. The ICD was later removed on 1/20/17. Following his LVAD surgery he did require inpatient rehab.      Mr. Janey Escalante was listed for heart transplantation at [facility]. He was called in for transplantation on 7/14/2018 but on pre-op DELLA was noted to have normal LV function and the transplant surgery was aborted. He subsequently was admitted on 9/5/2018 for LVAD explant- his post-operative course was complicated by pneumonia and bilateral pleural effusions.  He was discharged on home O2 which he discontinued on his own.       After device explant, his subsequent echocardiograms showed deterioration of LVEF and worsening mitral regurgitation from 40-45% with moderate to severe MR on 9/19/18 to 35-40% with moderate-to-severe MR on 10/15/18. He has been removed from the transplant list at [facility] and is not interested in pursuing transplant evaluation at another center at this time. He has been followed in clinic here with Dr. Faraz Trivedi and Dr. Alley Hancock, working on optimization of GDMT and he has been placed on a LifeVest.       He was last seen 3 weeks ago and presents to day feeling well and has not acute complaints or concerns. His wife accompanies him today. His  Entresto dose was initially  increased to the third tier, 97/103 mg BID; however, his K increased to 5.6, so his dose Entresto was reduced to 49/51 mg BID, and he was instructed on a low K diet and encouraged to hydrate. Drinks ~ 50 oz of water and 1-2 beers most day. He continues with high normal K. He denies acute complaints or concerns   Participating in cardiac rehab [facility]av[facility] Burt and walks his dog on the off days  Following daily weights 177# at home   Has not needed  lasix   Negative sleep study 1 month ago   They have spent a weekend at a [employer] and sat in a hot tub. He has stopped  wearing the Life Vest    He and his wife are planning a vacation in April in [facility] and he wants to be off the LifeVest for that trip.             CARDIAC EVALUATION  DELLA 2018 - normal LV function and the transplant surgery was aborted. He subsequently was admitted on 2018 for LVAD explant  ECHO 18:LVEF 40-45% mod-severe MR   ECHO 10/15/18:EF 35-40%, mod-severe MR   ECHO 10/31/18: TDS, EF 30-35%, reduced RVEF, TAPSE 1.6, LAE, mild- mod MR/TR      CATH 16: severe LM, and 3 VD with 60% right common iliac stenosis. EF: 10%. ----S/p IABP   ----S/p HeartMate2 LVAD 2016   S/p CAB2016: LIMA-> LAD, SVG-> RCA     CATH 16 following torsades SVG-> RCA TO   ---- s/p BMS x 2-.  RCA  RHC 2017:RA: 15/15 14, RV:  14,PA: , m 20, PCWP 13, PVR 1.67 CO 5.67, CI 3    S/p LVAD explant 18 ([facility])    SVT s/p DCCV  AICD  single lead cx by hematoma  AICD explanted 1/20/17    EKG:    10/31/18 NSR        Review of Systems:     General:  Denies fever, chills, fatigue   HEENT: Denies epistaxis, angioedema   Pulmonary: Denies cough, wheeze, hemoptysis   Cardiac: Denies exertional chest pain, palpitations, orthopnea, PND, edema, lightheadedness, syncope, near syncope, bleeding or Life Vest firing.       GI:   Denies  abdominal pain, change in bowel habits, or black or bloody stools  Musculo: Positive for back pain for which he takes prn flexril and percocet- Gout sx controlled with current meds   Neuro: Denies  TIA/CVA sx   Skin:  Denies rash   Allergies: Reviewed   Psych: Denies depression or anxiety       History:  Past Medical History:   Diagnosis Date    Arrhythmia     SVT    Arthritis     CAD (coronary artery disease)     Chronic pain     back    Congestive heart failure (HCC)     Gout     [facility]     Hypertension     ICD (implantable cardioverter-defibrillator) in place     Long term current use of anticoagulant therapy     LVAD (left ventricular assist device) present ([facility] 12/01/2016    Myocardial infarction ([facility]     Raynaud disease     Seizures ([facility]     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     Past Surgical History:   Procedure Laterality Date    CABG, ARTERY-VEIN, TWO  12/01/2016    CARDIAC SURG PROCEDURE UNLIST  12/01/2016    LVAD    HX CORONARY ARTERY BYPASS GRAFT      HX CORONARY STENT PLACEMENT      HX HEART CATHETERIZATION      HX HEENT      wisdom teeth removed    HX ORTHOPAEDIC  11/22/2016    laminectomy    HX PACEMAKER      ICD - removed 1/2017    HX PTCA       Social History     Socioeconomic History    Marital status:      Spouse name: Kai Morales    Number of children: 2    Years of education: Not on file    Highest education level: Some college, no degree   Social Needs    Financial resource strain: Not hard at all   Epi-Blair insecurity - worry: Never true    Food insecurity - inability: Never [employer] needs - medical: No    Transportation needs - non-medical: No   Occupational History    Not on file   Tobacco Use    Smoking status: Former Smoker     Packs/day: 1.50     Years: 30.00     Pack years: 45.00     Last attempt to quit:      Years since quittin.0    Smokeless tobacco: Never Used   Substance and Sexual Activity    Alcohol use: No    Drug use: No    Sexual activity: No   Other Topics Concern     Service Not Asked    Blood Transfusions Not Asked    Caffeine Concern Not Asked    Occupational Exposure Not Asked    Hobby Hazards Not Asked    Sleep Concern Not Asked    Stress Concern Not Asked    Weight Concern Not Asked    Special Diet Not Asked    Back Care Not Asked    Exercise Not Asked    Bike Helmet Not Asked    Seat Belt Not Asked    Self-Exams Not Asked   Social History Narrative    Not on file     Family History   Problem Relation Age of Onset    Diabetes Mother     Dementia Mother     Other Mother         carotid artery blockage    Heart Disease Father     Stroke Father     Heart Attack Father         several    Hypertension Father     Elevated Lipids Father     No Known Problems Sister     Cancer Brother         brain    Lung Disease Sister     COPD Sister     Cancer Sister         lung       Current Medications:   Current Outpatient Medications   Medication Sig Dispense Refill    sacubitril-valsartan (ENTRESTO) 49 mg/51 mg tablet Take 1 Tab by mouth two (2) times a day.  amiodarone (CORDARONE) 200 mg tablet Take 200 mg by mouth.  pantoprazole (PROTONIX) 40 mg tablet TAKE 1 TABLET BY MOUTH DAILY BEFORE BREAKFAST  3    HYDROcodone-acetaminophen (NORCO) 5-325 mg per tablet TAKE 1 TABLET EVERY 6 HOURS AS NEEDED  0    DULoxetine (CYMBALTA) 60 mg capsule Take 60 mg by mouth daily.  allopurinol (ZYLOPRIM) 100 mg tablet Take  by mouth daily.  tamsulosin (FLOMAX) 0.4 mg capsule Take 0.4 mg by mouth daily.       carvedilol (COREG) 3.125 mg tablet Take 1 Tab by mouth two (2) times daily (with meals). 60 Tab 3    apixaban (ELIQUIS) 5 mg tablet Take 1 Tab by mouth two (2) times a day. 60 Tab 11    atorvastatin (LIPITOR) 40 mg tablet Take 40 mg by mouth daily.  colchicine 0.6 mg tablet Take 0.6 mg by mouth every other day.  cyclobenzaprine (FLEXERIL) 5 mg tablet Take 5 mg by mouth daily as needed for Muscle Spasm(s).  aspirin 81 mg chewable tablet Take 81 mg by mouth daily.  tadalafil (CIALIS) 10 mg tablet Take 1 Tab by mouth as needed. Indications: Erectile Dysfunction 30 Tab 2    ascorbic acid, vitamin C, (VITAMIN C) 500 mg tablet Take 1 Tab by mouth two (2) times a day. 60 Tab 6       Allergies: Allergies   Allergen Reactions    Morphine Other (comments)     Hallucinations. Confusion. Impaired judgement    Chlorhexidine Rash           Physical Exam:   Vitals:    Visit Vitals  /62 (BP 1 Location: Right arm, BP Patient Position: Sitting)   Pulse 71   Temp 96.4 °F (35.8 °C) (Oral)   Resp 20   Ht 5' 10\" (1.778 m)   Wt 181 lb 12.8 oz (82.5 kg)   SpO2 97%   BMI 26.09 kg/m²      General:  Well developed WM, AAOx3, pleasant,  cooperative, no acute distress. HEENT:  Atraumatic. Pink and moist.  Anicteric sclerae. Neck:   Supple, no adenopoathy. Lungs:  CTA bilaterally. No wheezing/rhonchi/rales. Heart:   PMI: Median sternotomy scar,  Regular rhythm, S1, S2 present, no murmur, no rubs, no gallops. JVD 10 cm (-) HJR . No carotid bruits. Abdomen:  Soft, non-distended, non-tender. + Bowel sounds. No bruits. Extremities:  Venous stasis changes, No edema, no clubbing, no cyanosis. No calf tenderness  Neurologic:  Grossly intact. Alert and oriented X 3. No acute neurological distress. Skin:   intact, no wounds, ecchymosis, bruising, rashes   Psych:  Good insight. Not anxious nor agitated.     Recent Labs:     Lab Results   Component Value Date/Time    WBC 8.5 12/05/2018 12:00 AM    HGB 10.2 (L) 12/05/2018 12:00 AM    HCT 32.8 (L) 12/05/2018 12:00 AM    PLATELET 048 21/29/2707 12:00 AM    MCV 86 12/05/2018 12:00 AM     Lab Results   Component Value Date/Time    GFR est non-AA 65 12/19/2018 12:00 AM    GFR est AA 75 12/19/2018 12:00 AM    Creatinine 1.19 12/19/2018 12:00 AM    BUN 16 12/19/2018 12:00 AM    Sodium 138 12/19/2018 12:00 AM    Potassium 5.2 12/19/2018 12:00 AM    Chloride 105 12/19/2018 12:00 AM    CO2 21 12/19/2018 12:00 AM    Magnesium 1.7 12/21/2016 05:10 AM    Phosphorus 1.9 (L) 11/25/2016 09:36 PM     Lab Results   Component Value Date/Time    TSH 2.83 11/28/2016 07:29 AM      Lab Results   Component Value Date/Time    Sodium 138 12/19/2018 12:00 AM    Potassium 5.2 12/19/2018 12:00 AM    Chloride 105 12/19/2018 12:00 AM    CO2 21 12/19/2018 12:00 AM    Anion gap 8 01/24/2017 05:33 AM    Glucose 90 12/19/2018 12:00 AM    BUN 16 12/19/2018 12:00 AM    Creatinine 1.19 12/19/2018 12:00 AM    BUN/Creatinine ratio 13 12/19/2018 12:00 AM    GFR est AA 75 12/19/2018 12:00 AM    GFR est non-AA 65 12/19/2018 12:00 AM    Calcium 9.1 12/19/2018 12:00 AM    Bilirubin, total 0.5 12/05/2018 12:00 AM    ALT (SGPT) 15 12/05/2018 12:00 AM    AST (SGOT) 20 12/05/2018 12:00 AM    Alk. phosphatase 97 12/05/2018 12:00 AM    Protein, total 6.4 12/05/2018 12:00 AM    Albumin 4.2 12/05/2018 12:00 AM    Globulin 3.6 01/24/2017 05:33 AM    A-G Ratio 1.9 12/05/2018 12:00 AM      Lab Results   Component Value Date/Time    Hemoglobin A1c 6.6 (H) 11/15/2016 12:32 PM        I have discussed the diagnosis with  Alexa Thomas  and the intended plan as seen in the above orders. Questions were answered concerning future plans. I have discussed medication side effects and warnings with the patient as well. Thank you for allowing me to participate in the care of this delightfully pleasant gentleman. Please do not hesitate to call with any questions. Geno Street RN, ACNP-BC, [facility]      Advanced [facility] Failure Javier Lazaro, [address] [facility]  [phone]  24 hour VAD/HF Pager: [phone]

## 2019-01-11 ENCOUNTER — HOSPITAL ENCOUNTER (OUTPATIENT)
Dept: CARDIAC REHAB | Age: 64
Discharge: HOME | End: 2019-01-11
Payer: COMMERCIAL

## 2019-01-11 ENCOUNTER — DOCUMENTATION ONLY (OUTPATIENT)
Dept: CARDIOLOGY CLINIC | Age: 64
End: 2019-01-11

## 2019-01-11 VITALS — WEIGHT: 179 LBS | BODY MASS INDEX: 25.68 KG/M2

## 2019-01-11 PROCEDURE — 93798 PHYS/QHP OP CAR RHAB W/ECG: CPT

## 2019-01-11 NOTE — PROGRESS NOTES
met with Boni Rosado and Sang Min following his office visit with Alise Noland at [facility]. Discussed Medicare enrollment as per Sudhakar he will be eligible in May 2019. Discussed Medicare A/B, Medigap, Medicare Advantage policies and also the potential for him to stay on [employer] as his primary insurance. Encouraged him to inquire with the insurance carrier that his providers/ hospitals will be considered in network. He reports they will consider their options and follow up with .      Fidel Lacy, MSW, LCSW    Clinical    [id number]   [employer] ® ACP Facilitator

## 2019-01-14 ENCOUNTER — HOSPITAL ENCOUNTER (OUTPATIENT)
Dept: CARDIAC REHAB | Age: 64
Discharge: HOME | End: 2019-01-14
Payer: COMMERCIAL

## 2019-01-14 VITALS — BODY MASS INDEX: 25.83 KG/M2 | WEIGHT: 180 LBS

## 2019-01-14 PROCEDURE — 93798 PHYS/QHP OP CAR RHAB W/ECG: CPT

## 2019-01-16 ENCOUNTER — HOSPITAL ENCOUNTER (OUTPATIENT)
Dept: NON INVASIVE DIAGNOSTICS | Age: 64
Discharge: HOME | End: 2019-01-16
Attending: NURSE PRACTITIONER
Payer: COMMERCIAL

## 2019-01-16 ENCOUNTER — HOSPITAL ENCOUNTER (OUTPATIENT)
Dept: CARDIAC REHAB | Age: 64
Discharge: HOME | End: 2019-01-16
Payer: COMMERCIAL

## 2019-01-16 ENCOUNTER — TELEPHONE (OUTPATIENT)
Dept: CARDIOLOGY CLINIC | Age: 64
End: 2019-01-16

## 2019-01-16 VITALS — WEIGHT: 179 LBS | BODY MASS INDEX: 25.68 KG/M2

## 2019-01-16 DIAGNOSIS — I25.5 ISCHEMIC CARDIOMYOPATHY: ICD-10-CM

## 2019-01-16 DIAGNOSIS — I47.20 SUSTAINED VT (VENTRICULAR TACHYCARDIA): ICD-10-CM

## 2019-01-16 DIAGNOSIS — I50.22 CHRONIC SYSTOLIC HEART FAILURE (HCC): Primary | ICD-10-CM

## 2019-01-16 DIAGNOSIS — I50.22 CHRONIC SYSTOLIC HEART FAILURE (HCC): ICD-10-CM

## 2019-01-16 LAB
BNP SERPL-MCNC: 1810.8 PG/ML (ref 0–100)
BUN SERPL-MCNC: 21 MG/DL (ref 8–27)
BUN/CREAT SERPL: 17 (ref 10–24)
CALCIUM SERPL-MCNC: 9 MG/DL (ref 8.6–10.2)
CHLORIDE SERPL-SCNC: 102 MMOL/L (ref 96–106)
CO2 SERPL-SCNC: 19 MMOL/L (ref 20–29)
CREAT SERPL-MCNC: 1.23 MG/DL (ref 0.76–1.27)
GLUCOSE SERPL-MCNC: 75 MG/DL (ref 65–99)
POTASSIUM SERPL-SCNC: 5.5 MMOL/L (ref 3.5–5.2)
SODIUM SERPL-SCNC: 136 MMOL/L (ref 134–144)

## 2019-01-16 PROCEDURE — C8929 TTE W OR WO FOL WCON,DOPPLER: HCPCS

## 2019-01-16 PROCEDURE — 74011250636 HC RX REV CODE- 250/636: Performed by: NURSE PRACTITIONER

## 2019-01-16 PROCEDURE — 93798 PHYS/QHP OP CAR RHAB W/ECG: CPT

## 2019-01-16 RX ADMIN — PERFLUTREN 2 ML: 6.52 INJECTION, SUSPENSION INTRAVENOUS at 14:22

## 2019-01-16 NOTE — PROGRESS NOTES
K elevated  Add valtessa 8.4G daily and recheck in 1 week   we can decide then about increasing the entresto to 97/103 mg   Thank you

## 2019-01-16 NOTE — TELEPHONE ENCOUNTER
----- Message from PASQUALE Mcneil sent at 1/16/2019  3:04 PM EST -----  K elevated  Add valtessa 8.4G daily and recheck in 1 week   we can decide then about increasing the entresto to 97/103 mg   Thank you    I called patient, reviewed lab results. He states understanding. He states he has been following a low potassium diet. Will send prescription for veltassa to pharmacy, will check for prior auth, if needed, patient will come tomorrow for samples. He states understanding of instructions.

## 2019-01-17 DIAGNOSIS — E87.5 HYPERKALEMIA: ICD-10-CM

## 2019-01-17 DIAGNOSIS — I51.3 APICAL MURAL THROMBUS: Primary | ICD-10-CM

## 2019-01-17 DIAGNOSIS — Z79.01 CHRONIC ANTICOAGULATION: ICD-10-CM

## 2019-01-17 LAB
INR PPP: 1.1 (ref 0.8–1.2)
PROTHROMBIN TIME: 11.5 SEC (ref 9.1–12)

## 2019-01-17 RX ORDER — WARFARIN 2.5 MG/1
5 TABLET ORAL
Qty: 60 TAB | Refills: 0 | Status: SHIPPED | OUTPATIENT
Start: 2019-01-17 | End: 2019-02-14 | Stop reason: SDUPTHER

## 2019-01-17 RX ORDER — ENOXAPARIN SODIUM 100 MG/ML
80 INJECTION SUBCUTANEOUS EVERY 12 HOURS
Qty: 7 SYRINGE | Refills: 1 | Status: SHIPPED | OUTPATIENT
Start: 2019-01-17 | End: 2019-01-30

## 2019-01-17 NOTE — PROGRESS NOTES
EF a bit worse now 25-30% and there is a ? Apical thrombus. Given he is s/p LVAD explant and with reduced LVEF, he is at risk. Do not feel MRI will add anything   Denies TIA sx   Favor starting coumadin - asking Dr. Rosalia Calvo office to follow since he will need close monitoring.    I spoke with Scott Spangler in the coumadin clinic   He is to stop Eliquis today  Stat PT/INR  Start Lovenox 80 mg subQ BID  Start Coumadin 5 mg daily  INR check Monday Jan 21 @ 1120 am at Dr. Rosalia Calvo office   Bleeding risk reviewed   I spoke with patient   He verbalized understanding

## 2019-01-18 ENCOUNTER — TELEPHONE (OUTPATIENT)
Dept: CARDIOLOGY CLINIC | Age: 64
End: 2019-01-18

## 2019-01-18 ENCOUNTER — HOSPITAL ENCOUNTER (OUTPATIENT)
Dept: CARDIAC REHAB | Age: 64
Discharge: HOME | End: 2019-01-18
Payer: COMMERCIAL

## 2019-01-18 VITALS — WEIGHT: 179 LBS | BODY MASS INDEX: 25.68 KG/M2

## 2019-01-18 LAB
BUN SERPL-MCNC: 25 MG/DL (ref 8–27)
BUN/CREAT SERPL: 19 (ref 10–24)
CALCIUM SERPL-MCNC: 9.3 MG/DL (ref 8.6–10.2)
CHLORIDE SERPL-SCNC: 101 MMOL/L (ref 96–106)
CO2 SERPL-SCNC: 16 MMOL/L (ref 20–29)
CREAT SERPL-MCNC: 1.34 MG/DL (ref 0.76–1.27)
GLUCOSE SERPL-MCNC: 84 MG/DL (ref 65–99)
POTASSIUM SERPL-SCNC: 5.4 MMOL/L (ref 3.5–5.2)
SODIUM SERPL-SCNC: 137 MMOL/L (ref 134–144)

## 2019-01-18 PROCEDURE — 93798 PHYS/QHP OP CAR RHAB W/ECG: CPT

## 2019-01-18 NOTE — TELEPHONE ENCOUNTER
----- Message from PASQUALE Garces sent at 1/18/2019  9:29 AM EST -----  K still elevated   Continue with jatin   thank you     Left message for patient to return call with message as noted above.

## 2019-01-18 NOTE — TELEPHONE ENCOUNTER
I returned call to patient, reviewed latest lab results. He states he is taking Veltassa. Also reviewed high and low potassium foods, he has list at home that I sent him.

## 2019-01-21 ENCOUNTER — ANTI-COAG VISIT (OUTPATIENT)
Dept: CARDIOLOGY CLINIC | Age: 64
End: 2019-01-21

## 2019-01-21 ENCOUNTER — HOSPITAL ENCOUNTER (OUTPATIENT)
Dept: CARDIAC REHAB | Age: 64
Discharge: HOME | End: 2019-01-21
Payer: COMMERCIAL

## 2019-01-21 VITALS — BODY MASS INDEX: 25.68 KG/M2 | WEIGHT: 179 LBS

## 2019-01-21 DIAGNOSIS — Z79.01 ANTICOAGULATED ON COUMADIN: Primary | ICD-10-CM

## 2019-01-21 LAB
INR BLD: 1.3
INR, EXTERNAL: 1.3 (ref 2–3)
PT POC: 16 SECONDS
VALID INTERNAL CONTROL?: YES

## 2019-01-21 PROCEDURE — 93798 PHYS/QHP OP CAR RHAB W/ECG: CPT

## 2019-01-23 ENCOUNTER — HOSPITAL ENCOUNTER (OUTPATIENT)
Dept: CARDIAC REHAB | Age: 64
Discharge: HOME | End: 2019-01-23
Payer: COMMERCIAL

## 2019-01-23 VITALS — WEIGHT: 180 LBS | BODY MASS INDEX: 25.83 KG/M2

## 2019-01-23 PROCEDURE — 93798 PHYS/QHP OP CAR RHAB W/ECG: CPT

## 2019-01-24 ENCOUNTER — TELEPHONE (OUTPATIENT)
Dept: CARDIOLOGY CLINIC | Age: 64
End: 2019-01-24

## 2019-01-24 NOTE — TELEPHONE ENCOUNTER
Telephone Call RE:  Lab Reminder      Outcome:     [x] Patient verbalizes understanding    [] Unable to reach   [] Left message              []       Kenzie Miranda

## 2019-01-25 ENCOUNTER — ANTI-COAG VISIT (OUTPATIENT)
Dept: CARDIOLOGY CLINIC | Age: 64
End: 2019-01-25

## 2019-01-25 ENCOUNTER — HOSPITAL ENCOUNTER (OUTPATIENT)
Dept: CARDIAC REHAB | Age: 64
Discharge: HOME | End: 2019-01-25
Payer: COMMERCIAL

## 2019-01-25 VITALS — BODY MASS INDEX: 25.83 KG/M2 | WEIGHT: 180 LBS

## 2019-01-25 DIAGNOSIS — Z79.01 ANTICOAGULATED ON COUMADIN: Primary | ICD-10-CM

## 2019-01-25 LAB
BUN SERPL-MCNC: 20 MG/DL (ref 8–27)
BUN/CREAT SERPL: 14 (ref 10–24)
CALCIUM SERPL-MCNC: 8.6 MG/DL (ref 8.6–10.2)
CHLORIDE SERPL-SCNC: 102 MMOL/L (ref 96–106)
CO2 SERPL-SCNC: 21 MMOL/L (ref 20–29)
CREAT SERPL-MCNC: 1.42 MG/DL (ref 0.76–1.27)
GLUCOSE SERPL-MCNC: 128 MG/DL (ref 65–99)
INR BLD: 46.8
INR, EXTERNAL: 3.9 (ref 2–3)
POTASSIUM SERPL-SCNC: 4.6 MMOL/L (ref 3.5–5.2)
PT POC: 3.9 SECONDS
SODIUM SERPL-SCNC: 136 MMOL/L (ref 134–144)
VALID INTERNAL CONTROL?: YES

## 2019-01-25 PROCEDURE — 93798 PHYS/QHP OP CAR RHAB W/ECG: CPT

## 2019-01-28 ENCOUNTER — HOSPITAL ENCOUNTER (OUTPATIENT)
Dept: CARDIAC REHAB | Age: 64
Discharge: HOME | End: 2019-01-28
Payer: COMMERCIAL

## 2019-01-28 ENCOUNTER — TELEPHONE ANTICOAG (OUTPATIENT)
Dept: CARDIOLOGY CLINIC | Age: 64
End: 2019-01-28

## 2019-01-28 VITALS — WEIGHT: 180 LBS | BODY MASS INDEX: 25.83 KG/M2

## 2019-01-28 LAB — INR, EXTERNAL: 2.2 (ref 2–3)

## 2019-01-28 PROCEDURE — 93798 PHYS/QHP OP CAR RHAB W/ECG: CPT

## 2019-01-29 ENCOUNTER — TELEPHONE (OUTPATIENT)
Dept: CARDIOLOGY CLINIC | Age: 64
End: 2019-01-29

## 2019-01-29 NOTE — PROGRESS NOTES
[facility] Clinic Note      DOS:  1/30/2019  REFERRING PROVIDER: Candida Adams MD  PRIMARY CARE PHYSICIAN: Gerald King MD  PRIMARY CARDIOLOGIST:  Tavo Miranda MD   EP: Baron Zaira MD   PULMONARY: Munira Crowder MD       IMPRESSION/PLAN:   ICM s/p HMII as BTT on 12/1/16 and explant, NYHA Class II, LVEF 30-35%, mild RV dysfunction     Gently increase  Entresto to 97/103 mg BID 2/2- watch K- may need to increase Valtessa      Unable to add aldactone 2/2 hyperkalemia             Continue carvedilol 3.125 mg twice daily             PRN  lasix 20 mg  - for shortness of breath- he has not required        Continue cardiac rehab - he has had 24 sessions               Follow up in the [facility] in 3-4 weeks      CAD s/p CABG (SVG to RCA and LIMA to LAD) on 12/1/16 s/p BMS x2 -> SVG-RCA graft              Continue ASA, BB, statin      High Risk of SCD - s/p AICD in 2017 with removal d/t hematoma, LVEF 30-35%              Recommended he wear the LifeVest, discussed risk and benefits- he verbalized understanding     Recommend he make an appointment with Dr. Robby Castillo, or he would like to see  [facility] since he lives closer to [facility] to discuss AICD implant.          PAD - difficult femoral access with LVAD explant              No symptoms of claudication      Chronic Anticoagulation s/p LVAD explant              On Coumadin - no bleeding - CAV following      Hyperkalemia K 4.6        Follow labs     Low K diet and remains hydrated       Increase  valtessa 16.8 mg  if needed- labs being drawn 2/4     Gout              On colchicine 0.6 mg every other day              On allopurinol 100 mg daily      Chronic Lower back pain              On oxycodone, flexeril    H/o tobacco abuse, remote     H/o alcohol abuse, quit 1/2017      Seizure disorder - early 20s              No recent seizure activity     Peripheral neuropathy              On cymbalta     Prevention              Influenza annually              Pneumonia vaccine every 5 years       Thank you for allowing me to participate in the care of this delightful  Gentleman. Please do not hesitate to call with any questions. Geno Otero RN, ACNP-BC, [facility]       Chief Complaint   Patient presents with    CHF     follow up         HPI: Misty Martini is a 59y.o. year old male  with a history of HFrEF in the setting of ICM s/p LVAD and recent LVAD explant complicated by PVD,  remote tobacco use and alcohol abuse (quit 11/2016) who presents for follow up of his HFrEF. Mr. Phylicia Fisher presented 11/22/2016 for decompressive laminectomy at A0-L1 complicated by myocardial infarction. The cardiac catheterization (11/25/16) revealed severe left main and 3 vessel disease along with a 60% right common iliac stenosis and  LVEF was 10%. He subsequently underwent placement of an IABP followed by placement of a HeartMate2 LVAD on 12/1/2016 with concomitant CABG (SVG to the RCA, LIMA to LAD). His postoperative course was complicated by RV failure and an ileus requiring TPN resulting and a transaminitis. He had multiple episodes of Torsade on 12/4/2016 prompting repeat catheterization revealing an occluded RCA vein graft. Two BMS were placed in the RCA graft. He also had SVT requiring cardioversion. He had a single lead ICD placed on 00/32/15 complicated by a hematoma. The ICD was later removed on 1/20/17. Following his LVAD surgery he did require inpatient rehab.      Mr. Phylicia Fisher was listed for heart transplantation at [facility]. He was called in for transplantation on 7/14/2018 but on pre-op DELLA was noted to have normal LV function and the transplant surgery was aborted. He subsequently was admitted on 9/5/2018 for LVAD explant- his post-operative course was complicated by pneumonia and bilateral pleural effusions.  He was discharged on home O2 which he discontinued on his own.       After device explant, his subsequent echocardiograms showed deterioration of LVEF and worsening mitral regurgitation from 40-45% with moderate to severe MR on 9/19/18 to 35-40% with moderate-to-severe MR on 10/15/18. He has been removed from the transplant list at [facility] and is not interested in pursuing transplant evaluation at another center at this time. He has been followed in clinic here with Dr. Aniya Araiza and Dr. Haris Bingham, working on optimization of GDMT and he has been placed on a LifeVest.       He was last seen 3 weeks ago and  His wife accompanies him today. Nery Rueda presents to day feeling well and has not acute complaints or concerns. His  Entresto dose was initially  increased to the third tier, 97/103 mg BID; however, his K increased to 5.6, so his dose Entresto was reduced to 49/51 mg BID, and he was instructed on a low K diet, Valtessa added, and encouraged to hydrate. Drinks ~ 50 oz of water and  Has replaced beer with near beer. complinat with meds, no bleeding on coumadin. His wife reached out to [facility] re recent echo and EF read as 25-30%. Daily weight stable 180, -120 range   Participating in cardiac rehab [facility] and walks his dog on the off days  Following daily weights 177# at home   Has not needed  lasix   Negative sleep study 1 month ago   He is asking about seeing EP for AICD implant    He and his wife are planning a vacation in April in [facility] and he wants to be off the LifeVest for that trip.             CARDIAC EVALUATION  DELLA 7/14/2018 - normal LV function and the transplant surgery was aborted. He subsequently was admitted on 9/5/2018 for LVAD explant  ECHO 9/19/18:LVEF 40-45% mod-severe MR   ECHO 10/15/18:EF 35-40%, mod-severe MR   ECHO 10/31/18: TDS, EF 30-35%, reduced RVEF, TAPSE 1.6, LAE, mild- mod MR/TR  ECHO 1/16/19: EF 25-30%, question of thrombus in apex. Mild- mod TR. Consider cardiac MR to exclude left ventricular thrombus. CATH 11/25/16: severe LM, and 3 VD with 60% right common iliac stenosis.  EF: 10%. ----S/p IABP   ----S/p HeartMate2 LVAD 2016   S/p CAB2016: LIMA-> LAD, SVG-> RCA     CATH 16 following torsades SVG-> RCA TO   ---- s/p BMS x 2-. RCA  RHC 2017:RA: 1515 14, RV:  14,PA: 23, m 20, PCWP 13, PVR 1.67 CO 5.67, CI 3    S/p LVAD explant 18 ([facility])    SVT s/p DCCV  AICD  single lead cx by hematoma  AICD explanted 17    EKG:    10/31/18 NSR QRS 88ms   ms        Review of Systems:     General:  Denies fever, chills, fatigue   HEENT: Denies epistaxis, angioedema   Pulmonary: Denies cough, wheeze, hemoptysis   Cardiac: Denies exertional chest pain, palpitations, orthopnea, PND, edema, lightheadedness, syncope, near syncope, bleeding or Life Vest firing.       GI:   Denies  abdominal pain, change in bowel habits, or black or bloody stools  Musculo: Positive for back pain for which he takes prn flexril and percocet- Gout sx controlled with current meds   Neuro: Denies  TIA/CVA sx   Skin:  Denies rash   Allergies: Reviewed   Psych: Denies depression or anxiety       History:  Past Medical History:   Diagnosis Date    Arrhythmia     SVT    Arthritis     CAD (coronary artery disease)     Chronic pain     back    Congestive heart failure (HCC)     Gout     Heart failure ([address]     Hypertension     ICD (implantable cardioverter-defibrillator) in place     Long term current use of anticoagulant therapy     LVAD (left ventricular assist device) present ([address] 2016    Myocardial infarction ([address]     Raynaud disease     Seizures ([address]     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     Past Surgical History:   Procedure Laterality Date    CABG, ARTERY-VEIN, TWO  2016    CARDIAC SURG PROCEDURE UNLIST  2016    LVAD    HX CORONARY ARTERY BYPASS GRAFT      HX CORONARY STENT PLACEMENT      HX HEART CATHETERIZATION      HX HEENT      wisdom teeth removed    HX ORTHOPAEDIC  2016    laminectomy    HX PACEMAKER      ICD - removed 2017    HX PTCA       Social History     Socioeconomic History    Marital status:      Spouse name: Andreas Cardoso    Number of children: 2    Years of education: Not on file    Highest education level: Some college, no degree   Social Needs    Financial resource strain: Not hard at all   [place] insecurity - worry: Never true   Emery Mahan Food insecurity - inability: Never true   [employer] needs - medical: No    Transportation needs - non-medical: No   Occupational History    Not on file   Tobacco Use    Smoking status: Former Smoker     Packs/day: 1.50     Years: 30.00     Pack years: 45.00     Last attempt to quit:      Years since quittin.0    Smokeless tobacco: Never Used   Substance and Sexual Activity    Alcohol use: No    Drug use: No    Sexual activity: No   Other Topics Concern     Service Not Asked    Blood Transfusions Not Asked    Caffeine Concern Not Asked    Occupational Exposure Not Asked    Hobby Hazards Not Asked    Sleep Concern Not Asked    Stress Concern Not Asked    Weight Concern Not Asked    Special Diet Not Asked    Back Care Not Asked    Exercise Not Asked    Bike Helmet Not Asked    Seat Belt Not Asked    Self-Exams Not Asked   Social History Narrative    Not on file     Family History   Problem Relation Age of Onset    Diabetes Mother     Dementia Mother     Other Mother         carotid artery blockage    Heart Disease Father     Stroke Father     Heart Attack Father         several    Hypertension Father     Elevated Lipids Father     No Known Problems Sister     Cancer Brother         brain    Lung Disease Sister     COPD Sister     Cancer Sister         lung       Current Medications:   Current Outpatient Medications   Medication Sig Dispense Refill    warfarin (COUMADIN) 2.5 mg tablet Take 2 Tabs by mouth daily (with dinner). 60 Tab 0    patiromer calcium sorbitex (VELTASSA) 8.4 gram powder Take 8.4 g by mouth daily.  30 Packet 3    sacubitril-valsartan (ENTRESTO) 49 mg/51 mg tablet Take 1 Tab by mouth two (2) times a day.  amiodarone (CORDARONE) 200 mg tablet Take 200 mg by mouth.  HYDROcodone-acetaminophen (NORCO) 5-325 mg per tablet TAKE 1 TABLET EVERY 6 HOURS AS NEEDED  0    DULoxetine (CYMBALTA) 60 mg capsule Take 60 mg by mouth daily.  allopurinol (ZYLOPRIM) 100 mg tablet Take  by mouth daily.  tamsulosin (FLOMAX) 0.4 mg capsule Take 0.4 mg by mouth daily.  carvedilol (COREG) 3.125 mg tablet Take 1 Tab by mouth two (2) times daily (with meals). 60 Tab 3    atorvastatin (LIPITOR) 40 mg tablet Take 40 mg by mouth daily.  colchicine 0.6 mg tablet Take 0.6 mg by mouth every other day.  cyclobenzaprine (FLEXERIL) 5 mg tablet Take 5 mg by mouth daily as needed for Muscle Spasm(s).  aspirin 81 mg chewable tablet Take 81 mg by mouth daily.  tadalafil (CIALIS) 10 mg tablet Take 1 Tab by mouth as needed. Indications: Erectile Dysfunction 30 Tab 2    ascorbic acid, vitamin C, (VITAMIN C) 500 mg tablet Take 1 Tab by mouth two (2) times a day. 60 Tab 6    pantoprazole (PROTONIX) 40 mg tablet TAKE 1 TABLET BY MOUTH DAILY BEFORE BREAKFAST  3       Allergies: Allergies   Allergen Reactions    Morphine Other (comments)     Hallucinations. Confusion. Impaired judgement    Chlorhexidine Rash           Physical Exam:   Vitals:    Visit Vitals  BP 90/64 (BP 1 Location: Right arm, BP Patient Position: Sitting)   Pulse 72   Temp 97.4 °F (36.3 °C) (Oral)   Resp 20   Ht 5' 10\" (1.778 m)   Wt 181 lb 9.6 oz (82.4 kg)   SpO2 95%   BMI 26.06 kg/m²      General:  Well developed WM, AAOx3, pleasant,  cooperative, no acute distress. HEENT:  Atraumatic. Pink and moist.  Anicteric sclerae. Neck:   Supple, no adenopoathy. Lungs:  CTA bilaterally. No wheezing/rhonchi/rales. Heart:   PMI: Median sternotomy scar,  Regular rhythm, S1, S2 present, no murmur, no rubs, no gallops. JVD 10 cm (-) HJR . No carotid bruits. Abdomen:  Soft, non-distended, non-tender.  + Bowel sounds. No bruits. Extremities:  Venous stasis changes, No edema, no clubbing, no cyanosis. No calf tenderness  Neurologic:  Grossly intact. Alert and oriented X 3. No acute neurological distress. Skin:   intact, no wounds, ecchymosis, bruising, rashes   Psych:  Good insight. Not anxious nor agitated. Recent Labs:     Lab Results   Component Value Date/Time    WBC 8.5 12/05/2018 12:00 AM    HGB 10.2 (L) 12/05/2018 12:00 AM    HCT 32.8 (L) 12/05/2018 12:00 AM    PLATELET 259 72/19/9463 12:00 AM    MCV 86 12/05/2018 12:00 AM     Lab Results   Component Value Date/Time    GFR est non-AA 53 (L) 01/25/2019 02:00 PM    GFR est AA 61 01/25/2019 02:00 PM    Creatinine 1.42 (H) 01/25/2019 02:00 PM    BUN 20 01/25/2019 02:00 PM    Sodium 136 01/25/2019 02:00 PM    Potassium 4.6 01/25/2019 02:00 PM    Chloride 102 01/25/2019 02:00 PM    CO2 21 01/25/2019 02:00 PM    Magnesium 1.7 12/21/2016 05:10 AM    Phosphorus 1.9 (L) 11/25/2016 09:36 PM     Lab Results   Component Value Date/Time    TSH 2.83 11/28/2016 07:29 AM      Lab Results   Component Value Date/Time    Sodium 136 01/25/2019 02:00 PM    Potassium 4.6 01/25/2019 02:00 PM    Chloride 102 01/25/2019 02:00 PM    CO2 21 01/25/2019 02:00 PM    Anion gap 8 01/24/2017 05:33 AM    Glucose 128 (H) 01/25/2019 02:00 PM    BUN 20 01/25/2019 02:00 PM    Creatinine 1.42 (H) 01/25/2019 02:00 PM    BUN/Creatinine ratio 14 01/25/2019 02:00 PM    GFR est AA 61 01/25/2019 02:00 PM    GFR est non-AA 53 (L) 01/25/2019 02:00 PM    Calcium 8.6 01/25/2019 02:00 PM    Bilirubin, total 0.5 12/05/2018 12:00 AM    ALT (SGPT) 15 12/05/2018 12:00 AM    AST (SGOT) 20 12/05/2018 12:00 AM    Alk.  phosphatase 97 12/05/2018 12:00 AM    Protein, total 6.4 12/05/2018 12:00 AM    Albumin 4.2 12/05/2018 12:00 AM    Globulin 3.6 01/24/2017 05:33 AM    A-G Ratio 1.9 12/05/2018 12:00 AM      Lab Results   Component Value Date/Time    Hemoglobin A1c 6.6 (H) 11/15/2016 12:32 PM        I have discussed the diagnosis with  Carol Stanford  and the intended plan as seen in the above orders. Questions were answered concerning future plans. I have discussed medication side effects and warnings with the patient as well. Thank you for allowing me to participate in the care of this delightfully pleasant gentleman. Please do not hesitate to call with any questions. Geno David RN, [facility], [address], [address] [place] [address]  [phone]  24 hour VAD/HF Pager: [phone]

## 2019-01-29 NOTE — TELEPHONE ENCOUNTER
----- Message from PASQUALE Garces sent at 1/28/2019  3:14 PM EST -----  K better   continue plan    thank you    I called patient and reviewed lab results. He is still taking veltassa. He has no further questions.

## 2019-01-30 ENCOUNTER — HOSPITAL ENCOUNTER (OUTPATIENT)
Dept: CARDIAC REHAB | Age: 64
Discharge: HOME | End: 2019-01-30
Payer: COMMERCIAL

## 2019-01-30 ENCOUNTER — OFFICE VISIT (OUTPATIENT)
Dept: CARDIOLOGY CLINIC | Age: 64
End: 2019-01-30

## 2019-01-30 VITALS
SYSTOLIC BLOOD PRESSURE: 90 MMHG | RESPIRATION RATE: 20 BRPM | DIASTOLIC BLOOD PRESSURE: 64 MMHG | TEMPERATURE: 97.4 F | HEIGHT: 70 IN | WEIGHT: 181.6 LBS | BODY MASS INDEX: 26 KG/M2 | HEART RATE: 72 BPM | OXYGEN SATURATION: 95 %

## 2019-01-30 VITALS — WEIGHT: 179 LBS | BODY MASS INDEX: 25.68 KG/M2

## 2019-01-30 DIAGNOSIS — I25.5 ISCHEMIC CARDIOMYOPATHY: ICD-10-CM

## 2019-01-30 DIAGNOSIS — Z79.01 CHRONIC ANTICOAGULATION: ICD-10-CM

## 2019-01-30 DIAGNOSIS — R06.09 DOE (DYSPNEA ON EXERTION): ICD-10-CM

## 2019-01-30 DIAGNOSIS — I50.22 CHRONIC SYSTOLIC HEART FAILURE (HCC): Primary | ICD-10-CM

## 2019-01-30 DIAGNOSIS — E87.5 HYPERKALEMIA: ICD-10-CM

## 2019-01-30 DIAGNOSIS — I25.10 CAD, MULTIPLE VESSEL: ICD-10-CM

## 2019-01-30 PROCEDURE — 93798 PHYS/QHP OP CAR RHAB W/ECG: CPT

## 2019-01-30 NOTE — PROGRESS NOTES
Chief Complaint   Patient presents with    CHF     follow up     No concerns today. 1. Have you been to the ER, urgent care clinic since your last visit? Hospitalized since your last visit? No    2. Have you seen or consulted any other health care providers outside of the [address] [place] [address] since your last visit? Include any pap smears or colon screening.  No

## 2019-01-30 NOTE — LETTER
1/30/2019 2:10 PM 
 
Patient:  Jung Fan YOB: 1955 Date of Visit: 1/30/2019 Dear MD [facility] [address] [address] VIA Facsimile: [phone] Tj Wing MD 
[address] [place] [address] [phone] [address] [address] VIA In Basket 
 : Thank you for referring Mr. Edwin Reinoso to me for evaluation/treatment. Below are the relevant portions of my assessment and plan of care. If you have questions, please do not hesitate to call me. I look forward to following Mr. Mann along with you. Sincerely, NICK ChuaP

## 2019-01-30 NOTE — PATIENT INSTRUCTIONS
You are stable      INCREASE Entresto 97/103 mg twice a day      CONTINUE CURRENT medications    Take twice a day medications 12 hours apart with food    Labs next week BMP, proBNP   See either Dr. Mortimer Kirks or Dr. Estela Blum foods ( flour, sugar, pasta, rice, potatoes)  Limit salt     Continue exercise at rehab and on off days     Keep a positive attitude     Follow up month     HF Education: Continue daily weights (in the morning, after voiding). Notify HF team of overnight weight gains > 2 lbs or weekly >5 lbs or if any of the following Sx. Continue to limit sodium intake & monitor your fluid intake .

## 2019-02-01 ENCOUNTER — HOSPITAL ENCOUNTER (OUTPATIENT)
Dept: CARDIAC REHAB | Age: 64
Discharge: HOME | End: 2019-02-01
Payer: COMMERCIAL

## 2019-02-01 VITALS — WEIGHT: 179 LBS | BODY MASS INDEX: 25.68 KG/M2

## 2019-02-01 PROCEDURE — 93798 PHYS/QHP OP CAR RHAB W/ECG: CPT

## 2019-02-04 ENCOUNTER — HOSPITAL ENCOUNTER (OUTPATIENT)
Dept: CARDIAC REHAB | Age: 64
Discharge: HOME | End: 2019-02-04
Payer: COMMERCIAL

## 2019-02-04 VITALS — BODY MASS INDEX: 25.68 KG/M2 | WEIGHT: 179 LBS

## 2019-02-04 LAB — INR, EXTERNAL: 3.6 (ref 2–3)

## 2019-02-04 PROCEDURE — 93798 PHYS/QHP OP CAR RHAB W/ECG: CPT

## 2019-02-05 ENCOUNTER — TELEPHONE ANTICOAG (OUTPATIENT)
Dept: CARDIOLOGY CLINIC | Age: 64
End: 2019-02-05

## 2019-02-06 ENCOUNTER — HOSPITAL ENCOUNTER (OUTPATIENT)
Dept: CARDIAC REHAB | Age: 64
Discharge: HOME | End: 2019-02-06
Payer: COMMERCIAL

## 2019-02-06 VITALS — BODY MASS INDEX: 25.54 KG/M2 | WEIGHT: 178 LBS

## 2019-02-06 LAB
BUN SERPL-MCNC: 27 MG/DL (ref 8–27)
BUN/CREAT SERPL: 23 (ref 10–24)
CALCIUM SERPL-MCNC: 8.7 MG/DL (ref 8.6–10.2)
CHLORIDE SERPL-SCNC: 105 MMOL/L (ref 96–106)
CO2 SERPL-SCNC: 15 MMOL/L (ref 20–29)
CREAT SERPL-MCNC: 1.17 MG/DL (ref 0.76–1.27)
GLUCOSE SERPL-MCNC: 87 MG/DL (ref 65–99)
NT-PROBNP SERPL-MCNC: 4625 PG/ML (ref 0–210)
POTASSIUM SERPL-SCNC: 4.6 MMOL/L (ref 3.5–5.2)
SODIUM SERPL-SCNC: 139 MMOL/L (ref 134–144)

## 2019-02-06 PROCEDURE — 93798 PHYS/QHP OP CAR RHAB W/ECG: CPT

## 2019-02-08 ENCOUNTER — TELEPHONE (OUTPATIENT)
Dept: CARDIOLOGY CLINIC | Age: 64
End: 2019-02-08

## 2019-02-08 ENCOUNTER — HOSPITAL ENCOUNTER (OUTPATIENT)
Dept: CARDIAC REHAB | Age: 64
Discharge: HOME | End: 2019-02-08
Payer: COMMERCIAL

## 2019-02-08 VITALS — BODY MASS INDEX: 25.25 KG/M2 | WEIGHT: 176 LBS

## 2019-02-08 PROCEDURE — 93798 PHYS/QHP OP CAR RHAB W/ECG: CPT

## 2019-02-08 RX ORDER — PANTOPRAZOLE SODIUM 40 MG/1
TABLET, DELAYED RELEASE ORAL
Qty: 90 TAB | Refills: 3 | Status: SHIPPED | OUTPATIENT
Start: 2019-02-08 | End: 2019-02-13 | Stop reason: ALTCHOICE

## 2019-02-08 NOTE — TELEPHONE ENCOUNTER
Patient called for lab results. I reviewed results, of note, Pro-BNP is elevated. Patient states weight is not up, occasionally he has pedal edema. He denies shortness of breath except with activity. I advised that Olga Lidia Montes will be reviewing labs. He also would like his coagucheck to be renewed. I will call coagucheck services. Need to confirm who is managing his INR.

## 2019-02-11 ENCOUNTER — TELEPHONE ANTICOAG (OUTPATIENT)
Dept: CARDIOLOGY CLINIC | Age: 64
End: 2019-02-11

## 2019-02-11 ENCOUNTER — HOSPITAL ENCOUNTER (OUTPATIENT)
Dept: CARDIAC REHAB | Age: 64
Discharge: HOME | End: 2019-02-11
Payer: COMMERCIAL

## 2019-02-11 VITALS — WEIGHT: 176 LBS | BODY MASS INDEX: 25.25 KG/M2

## 2019-02-11 LAB — INR, EXTERNAL: 6.8 (ref 2–3)

## 2019-02-11 PROCEDURE — 93798 PHYS/QHP OP CAR RHAB W/ECG: CPT

## 2019-02-11 NOTE — PROGRESS NOTES
HISTORY OF PRESENTING ILLNESS      Matias Soriano is a 59 y.o. male with ischemic cardiomyopathy, LV function 20-25%, QRS duration 92 ms, history of myocardial infarction, CAD/CABG, history of LVAD status post explant who previously had an ICD that was removed following infection and now presents to discuss whether reimplantation of an ICD is of worthwhile consideration. He is a right-handed shooter.         ACTIVE PROBLEM LIST     Patient Active Problem List    Diagnosis Date Noted    DSOUZA (dyspnea on exertion) 01/30/2019    Erectile dysfunction 09/08/2017    ICD (implantable cardioverter-defibrillator) infection ([address] 01/20/2017    Gout 12/30/2016    Chronic anticoagulation 12/27/2016    Left ventricular assist device present ([address] 12/27/2016    Sustained VT (ventricular tachycardia) ([address] 12/13/2016    MI (myocardial infarction) ([address] 11/27/2016    Chronic systolic heart failure ([address] 11/26/2016    CAD, multiple vessel 11/26/2016    Ischemic cardiomyopathy 11/26/2016    Sinus tachycardia 11/24/2016    Acute respiratory failure with hypoxia ([address] 11/24/2016    Essential hypertension 11/24/2016    Alcohol abuse 11/24/2016    S/P laminectomy 11/22/2016           PAST MEDICAL HISTORY     Past Medical History:   Diagnosis Date    Arrhythmia     SVT    Arthritis     CAD (coronary artery disease)     Chronic pain     back    Congestive heart failure (HCC)     DSOUZA (dyspnea on exertion) 1/30/2019    Gout     Heart failure ([address]     Hypertension     ICD (implantable cardioverter-defibrillator) in place     Long term current use of anticoagulant therapy     LVAD (left ventricular assist device) present ([address] 12/01/2016    Myocardial infarction ([address]     Raynaud disease     Seizures ([address]     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 Confusion. Impaired judgement    Chlorhexidine Rash          FAMILY HISTORY     Family History   Problem Relation Age of Onset    Diabetes Mother     Dementia Mother     Other Mother         carotid artery blockage    Heart Disease Father     Stroke Father     Heart Attack Father         several    Hypertension Father     Elevated Lipids Father     No Known Problems Sister     Cancer Brother         brain    Lung Disease Sister     COPD Sister     Cancer Sister         lung    negative for cardiac disease       SOCIAL HISTORY     Social History     Socioeconomic History    Marital status:      Spouse name: Montie Aase    Number of children: 2    Years of education: Not on file    Highest education level: Some college, no degree   Social Needs    Financial resource strain: Not hard at all   [place] insecurity - worry: Never true    Food insecurity - inability: Never true   BuildFax needs - medical: No    Transportation needs - non-medical: No   Tobacco Use    Smoking status: Former Smoker     Packs/day: 1.50     Years: 30.00     Pack years: 45.00     Last attempt to quit:      Years since quittin.1    Smokeless tobacco: Never Used   Substance and Sexual Activity    Alcohol use: No    Drug use: No    Sexual activity: No   Other Topics Concern         MEDICATIONS     Current Outpatient Medications   Medication Sig    pantoprazole (PROTONIX) 40 mg tablet TAKE 1 TABLET BY MOUTH DAILY BEFORE BREAKFAST    sacubitril-valsartan (ENTRESTO) 97 mg/103 mg tablet Take 1 Tab by mouth two (2) times a day.     warfarin (COUMADIN) 2.5 mg tablet Take 2 Tabs by mouth daily (with dinner).  patiromer calcium sorbitex (VELTASSA) 8.4 gram powder Take 8.4 g by mouth daily.  amiodarone (CORDARONE) 200 mg tablet Take 200 mg by mouth.  pantoprazole (PROTONIX) 40 mg tablet TAKE 1 TABLET BY MOUTH DAILY BEFORE BREAKFAST    HYDROcodone-acetaminophen (NORCO) 5-325 mg per tablet TAKE 1 TABLET EVERY 6 HOURS AS NEEDED    DULoxetine (CYMBALTA) 60 mg capsule Take 60 mg by mouth daily.  allopurinol (ZYLOPRIM) 100 mg tablet Take  by mouth daily.  tamsulosin (FLOMAX) 0.4 mg capsule Take 0.4 mg by mouth daily.  carvedilol (COREG) 3.125 mg tablet Take 1 Tab by mouth two (2) times daily (with meals).  atorvastatin (LIPITOR) 40 mg tablet Take 40 mg by mouth daily.  colchicine 0.6 mg tablet Take 0.6 mg by mouth every other day.  cyclobenzaprine (FLEXERIL) 5 mg tablet Take 5 mg by mouth daily as needed for Muscle Spasm(s).  aspirin 81 mg chewable tablet Take 81 mg by mouth daily.  tadalafil (CIALIS) 10 mg tablet Take 1 Tab by mouth as needed. Indications: Erectile Dysfunction    ascorbic acid, vitamin C, (VITAMIN C) 500 mg tablet Take 1 Tab by mouth two (2) times a day. No current facility-administered medications for this visit. I have reviewed the nurses notes, vitals, problem list, allergy list, medical history, family, social history and medications. REVIEW OF SYMPTOMS      General: Pt denies excessive weight gain or loss. Pt is able to conduct ADL's  HEENT: Denies blurred vision, headaches, hearing loss, epistaxis and difficulty swallowing. Respiratory: Denies cough, congestion, shortness of breath, DSOUZA, wheezing or stridor.   Cardiovascular: Denies precordial pain, palpitations, edema or PND  Gastrointestinal: Denies poor appetite, indigestion, abdominal pain or blood in stool  Genitourinary: Denies hematuria, dysuria, increased urinary frequency  Musculoskeletal: Denies joint pain or swelling from muscles or joints  Neurologic: Denies tremor, paresthesias, headache, or sensory motor disturbance  Psychiatric: Denies confusion, insomnia, depression  Integumentray: Denies rash, itching or ulcers. Hematologic: Denies easy bruising, bleeding       PHYSICAL EXAMINATION      There were no vitals filed for this visit. General: Well developed, in no acute distress. HEENT: No jaundice, oral mucosa moist, no oral ulcers  Neck: Supple, no stiffness, no lymphadenopathy, supple  Heart:  Normal S1/S2 negative S3 or S4. Regular, no murmur, gallop or rub, no jugular venous distention  Respiratory: Clear bilaterally x 4, no wheezing or rales  Abdomen:   Soft, non-tender, bowel sounds are active.   Extremities:  No edema, normal cap refill, no cyanosis. Musculoskeletal: No clubbing, no deformities  Neuro: A&Ox3, speech clear, gait stable, cooperative, no focal neurologic deficits  Skin: Skin color is normal. No rashes or lesions. Non diaphoretic, moist.  Vascular: 2+ pulses symmetric in all extremities       DIAGNOSTIC DATA      EKG:        LABORATORY DATA      Lab Results   Component Value Date/Time    WBC 8.5 12/05/2018 12:00 AM    HGB 10.2 (L) 12/05/2018 12:00 AM    HCT 32.8 (L) 12/05/2018 12:00 AM    PLATELET 204 55/13/3449 12:00 AM    MCV 86 12/05/2018 12:00 AM      Lab Results   Component Value Date/Time    Sodium 139 02/05/2019 12:00 AM    Potassium 4.6 02/05/2019 12:00 AM    Chloride 105 02/05/2019 12:00 AM    CO2 15 (L) 02/05/2019 12:00 AM    Anion gap 8 01/24/2017 05:33 AM    Glucose 87 02/05/2019 12:00 AM    BUN 27 02/05/2019 12:00 AM    Creatinine 1.17 02/05/2019 12:00 AM    BUN/Creatinine ratio 23 02/05/2019 12:00 AM    GFR est AA 77 02/05/2019 12:00 AM    GFR est non-AA 66 02/05/2019 12:00 AM    Calcium 8.7 02/05/2019 12:00 AM    Bilirubin, total 0.5 12/05/2018 12:00 AM    AST (SGOT) 20 12/05/2018 12:00 AM    Alk.  phosphatase 97 12/05/2018 12:00 AM    Protein, total 6.4 12/05/2018 12:00 AM    Albumin 4.2 12/05/2018 12:00 AM    Globulin 3.6 01/24/2017 05:33 AM    A-G Ratio 1.9 12/05/2018 12:00 AM    ALT (SGPT) 15 12/05/2018 12:00 AM           ASSESSMENT      1. Cardiomyopathy   A. Ischemic   B. Left ventricular ejection fraction 20-25%   C. QRS duration 92 ms  2. Hypertension  3. CAD, native  4. History myocardial infarction  5. History of ventricular tachycardia  6. History of LVAD status post explant  7. Seizure disorder  8. CABG       PLAN     Patient reports that he consumes heavy amounts of alcohol with 2-3 cans of beer per night. Given his previous LV function improvement, will have patient discontinue alcohol and repeat echocardiogram to determine whether ICD reimplantation would be indicated. He is already abstained from alcohol for 1 month. Repeat echocardiogram in 2 months and if LV function remains less than 35% will plan for several chamber ICD. Will screen patient for a subcutaneous ICD however this may be confounded by his previous sternotomy. Patient wishes to avoid a right-sided ICD due to 2 being a right sided shooter. May consider left-sided implant should his left subclavian remained patent following extraction in the past.       FOLLOW-UP       Thank you, Abner De Oliveira MD and Dr. Anand Olsen for allowing me to participate in the care of this extraordinarily pleasant male. Please do not hesitate to contact me for further questions/concerns.          Rosa Isela Antunez MD  Cardiac Electrophysiology / Cardiology    [address]  [address]  [phone] / [phone] Fax   [phone] / [phone] Fax

## 2019-02-11 NOTE — PROGRESS NOTES
Labs stable  Elevated proBNp should be improved with increased Entresto  Continue Current plan  I spoke with patient   Thank you

## 2019-02-13 ENCOUNTER — OFFICE VISIT (OUTPATIENT)
Dept: CARDIOLOGY CLINIC | Age: 64
End: 2019-02-13

## 2019-02-13 ENCOUNTER — HOSPITAL ENCOUNTER (OUTPATIENT)
Dept: CARDIAC REHAB | Age: 64
Discharge: HOME | End: 2019-02-13
Payer: COMMERCIAL

## 2019-02-13 VITALS
DIASTOLIC BLOOD PRESSURE: 52 MMHG | HEART RATE: 60 BPM | OXYGEN SATURATION: 94 % | SYSTOLIC BLOOD PRESSURE: 104 MMHG | RESPIRATION RATE: 20 BRPM | BODY MASS INDEX: 25.6 KG/M2 | WEIGHT: 178.8 LBS | HEIGHT: 70 IN

## 2019-02-13 VITALS — BODY MASS INDEX: 25.25 KG/M2 | WEIGHT: 176 LBS

## 2019-02-13 DIAGNOSIS — I25.5 ISCHEMIC CARDIOMYOPATHY: Primary | ICD-10-CM

## 2019-02-13 PROCEDURE — 93798 PHYS/QHP OP CAR RHAB W/ECG: CPT

## 2019-02-13 RX ORDER — AMIODARONE HYDROCHLORIDE 200 MG/1
200 TABLET ORAL DAILY
Qty: 90 TAB | Refills: 1 | Status: SHIPPED | OUTPATIENT
Start: 2019-02-13 | End: 2019-08-16 | Stop reason: SDUPTHER

## 2019-02-13 NOTE — PROGRESS NOTES
Room # K5    SOB with exertion    Visit Vitals  /52 (BP 1 Location: Left arm, BP Patient Position: Sitting)   Pulse 60   Resp 20   Ht 5' 10\" (1.778 m)   Wt 178 lb 12.8 oz (81.1 kg)   SpO2 94%   BMI 25.66 kg/m²

## 2019-02-14 ENCOUNTER — TELEPHONE ANTICOAG (OUTPATIENT)
Dept: CARDIOLOGY CLINIC | Age: 64
End: 2019-02-14

## 2019-02-14 LAB — INR, EXTERNAL: 2.3 (ref 2–3)

## 2019-02-14 RX ORDER — WARFARIN 2.5 MG/1
5 TABLET ORAL
Qty: 60 TAB | Refills: 1 | Status: SHIPPED | OUTPATIENT
Start: 2019-02-14 | End: 2019-04-15 | Stop reason: SDUPTHER

## 2019-02-15 ENCOUNTER — HOSPITAL ENCOUNTER (OUTPATIENT)
Dept: CARDIAC REHAB | Age: 64
Discharge: HOME | End: 2019-02-15
Payer: COMMERCIAL

## 2019-02-15 VITALS — BODY MASS INDEX: 24.97 KG/M2 | WEIGHT: 174 LBS

## 2019-02-15 PROCEDURE — 93798 PHYS/QHP OP CAR RHAB W/ECG: CPT

## 2019-02-18 RX ORDER — CARVEDILOL 3.12 MG/1
TABLET ORAL
Qty: 60 TAB | Refills: 3 | Status: SHIPPED | OUTPATIENT
Start: 2019-02-18 | End: 2019-06-18 | Stop reason: SDUPTHER

## 2019-02-21 ENCOUNTER — TELEPHONE ANTICOAG (OUTPATIENT)
Dept: CARDIOLOGY CLINIC | Age: 64
End: 2019-02-21

## 2019-02-21 LAB — INR, EXTERNAL: 2.7 (ref 2–3)

## 2019-02-22 ENCOUNTER — TELEPHONE ANTICOAG (OUTPATIENT)
Dept: CARDIOLOGY CLINIC | Age: 64
End: 2019-02-22

## 2019-02-25 ENCOUNTER — TELEPHONE (OUTPATIENT)
Dept: CARDIOLOGY CLINIC | Age: 64
End: 2019-02-25

## 2019-02-25 ENCOUNTER — HOSPITAL ENCOUNTER (OUTPATIENT)
Dept: CARDIAC REHAB | Age: 64
Discharge: HOME | End: 2019-02-25
Payer: COMMERCIAL

## 2019-02-25 VITALS — WEIGHT: 174 LBS | BODY MASS INDEX: 24.97 KG/M2

## 2019-02-25 PROCEDURE — 93798 PHYS/QHP OP CAR RHAB W/ECG: CPT

## 2019-02-25 NOTE — TELEPHONE ENCOUNTER
Patient is calling to say he went to PCP and they told him he had chest congestion which they are treating him for. I called office and requested records, which they are sending.

## 2019-02-27 ENCOUNTER — TELEPHONE (OUTPATIENT)
Dept: CARDIOLOGY CLINIC | Age: 64
End: 2019-02-27

## 2019-02-27 ENCOUNTER — HOSPITAL ENCOUNTER (OUTPATIENT)
Dept: CARDIAC REHAB | Age: 64
Discharge: HOME | End: 2019-02-27
Payer: COMMERCIAL

## 2019-02-27 ENCOUNTER — OFFICE VISIT (OUTPATIENT)
Dept: CARDIOLOGY CLINIC | Age: 64
End: 2019-02-27

## 2019-02-27 ENCOUNTER — DOCUMENTATION ONLY (OUTPATIENT)
Dept: CARDIOLOGY CLINIC | Age: 64
End: 2019-02-27

## 2019-02-27 VITALS
HEART RATE: 69 BPM | DIASTOLIC BLOOD PRESSURE: 60 MMHG | OXYGEN SATURATION: 98 % | RESPIRATION RATE: 12 BRPM | WEIGHT: 175 LBS | BODY MASS INDEX: 25.05 KG/M2 | HEIGHT: 70 IN | TEMPERATURE: 96.8 F | SYSTOLIC BLOOD PRESSURE: 92 MMHG

## 2019-02-27 VITALS — BODY MASS INDEX: 24.82 KG/M2 | WEIGHT: 173 LBS

## 2019-02-27 DIAGNOSIS — I50.22 CHRONIC SYSTOLIC HEART FAILURE (HCC): Primary | ICD-10-CM

## 2019-02-27 DIAGNOSIS — F10.10 ALCOHOL ABUSE: ICD-10-CM

## 2019-02-27 DIAGNOSIS — R06.09 DOE (DYSPNEA ON EXERTION): ICD-10-CM

## 2019-02-27 DIAGNOSIS — I47.20 SUSTAINED VT (VENTRICULAR TACHYCARDIA): ICD-10-CM

## 2019-02-27 PROCEDURE — 93798 PHYS/QHP OP CAR RHAB W/ECG: CPT

## 2019-02-27 RX ORDER — RANITIDINE 150 MG/1
150 TABLET, FILM COATED ORAL
COMMUNITY
Start: 2019-02-27 | End: 2019-04-17

## 2019-02-27 RX ORDER — FUROSEMIDE 40 MG/1
20 TABLET ORAL EVERY OTHER DAY
COMMUNITY
End: 2019-05-24 | Stop reason: DRUGHIGH

## 2019-02-27 NOTE — PROGRESS NOTES
[facility] Clinic Note      DOS:  2/27/2019  REFERRING PROVIDER: Li Velez MD  PRIMARY CARE PHYSICIAN: Radha Hale MD  PRIMARY CARDIOLOGIST:  Cynthia Esqueda MD   EP: Romana Castro MD   PULMONARY: Estefani Echevarria MD       IMPRESSION/PLAN:   ICM s/p HMII as BTT on 12/1/16 and explant, NYHA Class II, LVEF 30-35%, mild RV dysfunction    Continue Entresto 97/1033 mg daily    Continue Coreg 3.125 mg BID    Increase lasix take 20 mg additional today then  20 mg every other day for 2 weeks to see if his SOB, cough improve    Call us in a week     Continue cardiac rehab     Follow up 2 weeks      Recent Bronchitis- element of HF     continue abx and increase diuretics     CAD s/p CABG (SVG to RCA and LIMA to LAD) on 12/1/16 s/p BMS x2 -> SVG-RCA graft - no angina              Continue ASA, BB, statin      High Risk of SCD - s/p AICD in 2017 with removal d/t hematoma, LVEF 30-35%              Recommended he wear the LifeVest, discussed risk and benefits- he verbalized understanding   He has stopped drinking since January, plan to reassess EF with echo in April and see Dr. Lennox Rung         PAD - difficult femoral access with LVAD explant              No symptoms of claudication      Chronic Anticoagulation s/p LVAD explant              On Coumadin - no bleeding - CAV following INR 2.7      Hyperkalemia K 4.6     Follow labs    Continue valtessa          GERD sx    Add zantac 150 mg daily     Gout              On colchicine 0.6 mg every other day              On allopurinol 100 mg daily      Chronic Lower back pain              On oxycodone, flexeril    H/o tobacco abuse, remote     H/o alcohol abuse, quit 1/2017      Seizure disorder - early 20s              No recent seizure activity     Peripheral neuropathy              On cymbalta     Prevention              Influenza annually              Pneumonia vaccine every 5 years       Thank you for allowing me to participate in the care of this delightful Gentleman. Please do not hesitate to call with any questions. Geno Cuadra RN, ACNP-BC, [facility]       Chief Complaint   Patient presents with    Follow Up Chronic Condition         HPI: Christina Yoo is a 59y.o. year old male  with a history of HFrEF in the setting of ICM s/p LVAD and recent LVAD explant complicated by PVD,  remote tobacco use and alcohol abuse (quit 11/2016) who presents for follow up of his HFrEF. Mr. Luci Apley presented 11/22/2016 for decompressive laminectomy at F0-O9 complicated by myocardial infarction. The cardiac catheterization (11/25/16) revealed severe left main and 3 vessel disease along with a 60% right common iliac stenosis and  LVEF was 10%. He subsequently underwent placement of an IABP followed by placement of a HeartMate2 LVAD on 12/1/2016 with concomitant CABG (SVG to the RCA, LIMA to LAD). His postoperative course was complicated by RV failure and an ileus requiring TPN resulting and a transaminitis. He had multiple episodes of Torsade on 12/4/2016 prompting repeat catheterization revealing an occluded RCA vein graft. Two BMS were placed in the RCA graft. He also had SVT requiring cardioversion. He had a single lead ICD placed on 30/91/76 complicated by a hematoma. The ICD was later removed on 1/20/17. Following his LVAD surgery he did require inpatient rehab.      Mr. Luci Apley was listed for heart transplantation at [facility]. He was called in for transplantation on 7/14/2018 but on pre-op DELLA was noted to have normal LV function and the transplant surgery was aborted. He subsequently was admitted on 9/5/2018 for LVAD explant- his post-operative course was complicated by pneumonia and bilateral pleural effusions.  He was discharged on home O2 which he discontinued on his own.       After device explant, his subsequent echocardiograms showed deterioration of LVEF and worsening mitral regurgitation from 40-45% with moderate to severe MR on 9/19/18 to 35-40% with moderate-to-severe MR on 10/15/18. He has been removed from the transplant list at [facility] and is not interested in pursuing transplant evaluation at another center at this time. He has been followed in clinic here with Dr. Poonam Vizcaino and Dr. Kaleb Dietz, working on optimization of GDMT and he has been placed on a LifeVest.       He was last seen 1 month  ago and  His wife accompanies him today. Since his last visit he has seen Dr. Oanh Flowers: AICD implant on hold and Mr. Naveen Zamorano has stopped drinking. Plan to reassess EF in April, off etoh, and re-assess for  AICD. Carol Stanford  Was recently seen by PCP for SOB, productive cough, worse when supine, no fever, He was treated for bronchitis. He has taken abx and lasix 40 mg which he has taken x1 and 20 mg today. SOB remains, orhtopnea last night, + supine cough   Compliant with Entresto 97/103 mg BID  He has stopped drinking  Since January. + GERD sx  Following Low K diet   Recent INR 2.7   Daily weight and he has lost a few pounds,    BP   range   Participating in cardiac rehab [address] [place] [address] every other day, and walks his dog on the off days    Negative sleep study 1 in December. Compliant with meds, no bleeding, CAV following coumadin     He and his wife are planning a vacation in April in [facility] and he wants to be off the LifeVest for that trip.             CARDIAC EVALUATION  DELLA 2018 - normal LV function and the transplant surgery was aborted. He subsequently was admitted on 2018 for LVAD explant  ECHO 18:LVEF 40-45% mod-severe MR   ECHO 10/15/18:EF 35-40%, mod-severe MR   ECHO 10/31/18: TDS, EF 30-35%, reduced RVEF, TAPSE 1.6, LAE, mild- mod MR/TR  ECHO 19: EF 25-30%, question of thrombus in apex. Mild- mod TR. Consider cardiac MR to exclude left ventricular thrombus. CATH 16: severe LM, and 3 VD with 60% right common iliac stenosis.  EF: 10%. ----S/p IABP   ----S/p HeartMate2 LVAD 2016   S/p CAB2016: LIMA-> LAD, SVG-> RCA     CATH 12/16/16 following torsades SVG-> RCA TO   ---- s/p BMS x 2-. RCA  RHC 8/2017:RA: 15/15 14, RV: 21/13 14,PA: 23/28, m 20, PCWP 13, PVR 1.67 CO 5.67, CI 3    S/p LVAD explant 9/5/18 ([facility])    SVT s/p DCCV  AICD 12/116/16 single lead cx by hematoma  AICD explanted 1/20/17    EKG:    10/31/18 NSR QRS 88ms   ms        Review of Systems:     General:  Denies fever, chills, fatigue   HEENT: Denies epistaxis, angioedema   Pulmonary:  per HPI Denies  wheeze, hemoptysis   Cardiac: Denies exertional chest pain, palpitations, orthopnea, PND, edema, lightheadedness, syncope, near syncope, bleeding or Life Vest firing.       GI:   Denies  abdominal pain, change in bowel habits, or black or bloody stools  Musculo: Positive for back pain for which he takes prn flexril and percocet- Gout sx controlled with current meds   Neuro: Denies  TIA/CVA sx   Skin:  Denies rash   Allergies: Reviewed   Psych: Denies depression or anxiety       History:  Past Medical History:   Diagnosis Date    Arrhythmia     SVT    Arthritis     CAD (coronary artery disease)     Chronic pain     back    [address]estive heart failure ([address]     DSOUZA (dyspnea on exertion) 1/30/2019    Gout     Heart failure ([address]     Hypertension     ICD (implantable cardioverter-defibrillator) in place     Long term current use of anticoagulant therapy     LVAD (left ventricular assist device) present ([address] 12/01/2016    Myocardial infarction ([address]     Raynaud disease     Seizures ([address]     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     Past Surgical History:   Procedure Laterality Date    CABG, ARTERY-VEIN, TWO  12/01/2016    CARDIAC SURG PROCEDURE UNLIST  12/01/2016    LVAD    HX CORONARY ARTERY BYPASS GRAFT      HX CORONARY STENT PLACEMENT      HX HEART CATHETERIZATION      HX HEENT      wisdom teeth removed    HX ORTHOPAEDIC  11/22/2016    laminectomy    HX PACEMAKER      ICD - removed 1/2017    HX PTCA       Social History     Socioeconomic History    Marital status:      Spouse name: Montie Aase    Number of children: 2    Years of education: Not on file    Highest education level: Some college, no degree   Social Needs    Financial resource strain: Not hard at all   [employer] insecurity - worry: Never true   [facility] [address] [employer] insecurity - inability: Never true   [employer] needs - medical: No    Transportation needs - non-medical: No   Occupational History    Not on file   Tobacco Use    Smoking status: Former Smoker     Packs/day: 1.50     Years: 30.00     Pack years: 45.00     Last attempt to quit:      Years since quittin.1    Smokeless tobacco: Never Used   Substance and Sexual Activity    Alcohol use: No    Drug use: No    Sexual activity: No   Other Topics Concern     Service Not Asked    Blood Transfusions Not Asked    Caffeine Concern Not Asked    Occupational Exposure Not Asked    Hobby Hazards Not Asked    Sleep Concern Not Asked    Stress Concern Not Asked    Weight Concern Not Asked    Special Diet Not Asked    Back Care Not Asked    Exercise Not Asked    Bike Helmet Not Asked    Seat Belt Not Asked    Self-Exams Not Asked   Social History Narrative    Not on file     Family History   Problem Relation Age of Onset    Diabetes Mother     Dementia Mother     Other Mother         carotid artery blockage    Heart Disease Father     Stroke Father     Heart Attack Father         several    Hypertension Father     Elevated Lipids Father     No Known Problems Sister     Cancer Brother         brain    Lung Disease Sister     COPD Sister     Cancer Sister         lung       Current Medications:   Current Outpatient Medications   Medication Sig Dispense Refill    carvedilol (COREG) 3.125 mg tablet TAKE 1 TABLET BY MOUTH TWICE A DAY WITH MEALS 60 Tab 3    warfarin (COUMADIN) 2.5 mg tablet Take 2 Tabs by mouth daily (with dinner).  60 Tab 1    amiodarone (CORDARONE) 200 mg tablet Take 1 Tab by mouth daily. 90 Tab 1    sacubitril-valsartan (ENTRESTO) 97 mg/103 mg tablet Take 1 Tab by mouth two (2) times a day. 60 Tab 3    patiromer calcium sorbitex (VELTASSA) 8.4 gram powder Take 8.4 g by mouth daily. 30 Packet 3    HYDROcodone-acetaminophen (NORCO) 5-325 mg per tablet TAKE 1 TABLET EVERY 6 HOURS AS NEEDED  0    DULoxetine (CYMBALTA) 60 mg capsule Take 60 mg by mouth daily.  allopurinol (ZYLOPRIM) 100 mg tablet Take  by mouth daily.  tamsulosin (FLOMAX) 0.4 mg capsule Take 0.4 mg by mouth daily.  atorvastatin (LIPITOR) 40 mg tablet Take 40 mg by mouth daily.  colchicine 0.6 mg tablet Take 0.6 mg by mouth every other day.  cyclobenzaprine (FLEXERIL) 5 mg tablet Take 5 mg by mouth daily as needed for Muscle Spasm(s).  aspirin 81 mg chewable tablet Take 81 mg by mouth daily.  tadalafil (CIALIS) 10 mg tablet Take 1 Tab by mouth as needed. Indications: Erectile Dysfunction 30 Tab 2    ascorbic acid, vitamin C, (VITAMIN C) 500 mg tablet Take 1 Tab by mouth two (2) times a day. 60 Tab 6       Allergies: Allergies   Allergen Reactions    Morphine Other (comments)     Hallucinations. Confusion. Impaired judgement    Chlorhexidine Rash           Physical Exam:   Vitals:    Visit Vitals  BP 92/60   Pulse 69   Temp 96.8 °F (36 °C)   Resp 12   Ht 5' 10\" (1.778 m)   Wt 175 lb (79.4 kg)   BMI 25.11 kg/m²      General:  Well developed WM, AAOx3, pleasant,  cooperative, no acute distress. HEENT:  Atraumatic. Pink and moist.  Anicteric sclerae. Neck:   Supple, no adenopoathy. Lungs:  Mild DSOUZA RA few crackles LLL, CTA bilaterally. No wheezing/rhonchi/rales. Heart:   PMI: Median sternotomy scar,  Regular rhythm, S1, S2 present, no murmur, no rubs, no gallops. JVD 12 cm (-) HJR . No carotid bruits. Abdomen:  Soft, non-distended, non-tender. + Bowel sounds. No bruits. Extremities:  Venous stasis changes, No edema, no clubbing, no cyanosis.  No calf tenderness  Neurologic:  Grossly intact. Alert and oriented X 3. No acute neurological distress. Skin:   intact, no wounds, ecchymosis, bruising, rashes   Psych:  Good insight. Not anxious nor agitated. Recent Labs:     Lab Results   Component Value Date/Time    WBC 8.5 12/05/2018 12:00 AM    HGB 10.2 (L) 12/05/2018 12:00 AM    HCT 32.8 (L) 12/05/2018 12:00 AM    PLATELET 527 23/96/5401 12:00 AM    MCV 86 12/05/2018 12:00 AM     Lab Results   Component Value Date/Time    GFR est non-AA 66 02/05/2019 12:00 AM    GFR est AA 77 02/05/2019 12:00 AM    Creatinine 1.17 02/05/2019 12:00 AM    BUN 27 02/05/2019 12:00 AM    Sodium 139 02/05/2019 12:00 AM    Potassium 4.6 02/05/2019 12:00 AM    Chloride 105 02/05/2019 12:00 AM    CO2 15 (L) 02/05/2019 12:00 AM    Magnesium 1.7 12/21/2016 05:10 AM    Phosphorus 1.9 (L) 11/25/2016 09:36 PM     Lab Results   Component Value Date/Time    TSH 2.83 11/28/2016 07:29 AM      Lab Results   Component Value Date/Time    Sodium 139 02/05/2019 12:00 AM    Potassium 4.6 02/05/2019 12:00 AM    Chloride 105 02/05/2019 12:00 AM    CO2 15 (L) 02/05/2019 12:00 AM    Anion gap 8 01/24/2017 05:33 AM    Glucose 87 02/05/2019 12:00 AM    BUN 27 02/05/2019 12:00 AM    Creatinine 1.17 02/05/2019 12:00 AM    BUN/Creatinine ratio 23 02/05/2019 12:00 AM    GFR est AA 77 02/05/2019 12:00 AM    GFR est non-AA 66 02/05/2019 12:00 AM    Calcium 8.7 02/05/2019 12:00 AM    Bilirubin, total 0.5 12/05/2018 12:00 AM    ALT (SGPT) 15 12/05/2018 12:00 AM    AST (SGOT) 20 12/05/2018 12:00 AM    Alk. phosphatase 97 12/05/2018 12:00 AM    Protein, total 6.4 12/05/2018 12:00 AM    Albumin 4.2 12/05/2018 12:00 AM    Globulin 3.6 01/24/2017 05:33 AM    A-G Ratio 1.9 12/05/2018 12:00 AM      Lab Results   Component Value Date/Time    Hemoglobin A1c 6.6 (H) 11/15/2016 12:32 PM        I have discussed the diagnosis with  Miguel Mayers  and the intended plan as seen in the above orders. Questions were answered concerning future plans.   I have discussed medication side effects and warnings with the patient as well. Thank you for allowing me to participate in the care of this delightfully pleasant gentleman. Please do not hesitate to call with any questions. Geno Choe RN, [facility], [address] [place] [address]  [phone]  24 hour VAD/HF Pager: [phone]

## 2019-02-27 NOTE — PATIENT INSTRUCTIONS
START  Take extra lasix 20 mg today and then Lasix 20 mg every other day for 2 weeks   call us next week with update     CONTINUE CURRENT medications    Take twice a day medications 12 hours apart with food    Labs today BMP, proBNP     See us in 2 weeks     Remain active        Keep a positive attitude       HF Education: Continue daily weights (in the morning, after voiding). Notify HF team of overnight weight gains > 2 lbs or weekly >5 lbs or if any of the following Sx. Continue to limit sodium intake & monitor your fluid intake .

## 2019-02-27 NOTE — PROGRESS NOTES
FMLA paperwork left on Kaushik Head NP desk to complete for Mr. Mann's wife, Bailey Zamorano, on this date.     Jorge Luis Bell, MSW, LCSW    Clinical    Norman Sims [phone]

## 2019-02-28 ENCOUNTER — TELEPHONE ANTICOAG (OUTPATIENT)
Dept: CARDIOLOGY CLINIC | Age: 64
End: 2019-02-28

## 2019-02-28 LAB
INR, EXTERNAL: 3.8
INR, EXTERNAL: 3.8 (ref 2–3)

## 2019-03-01 ENCOUNTER — TELEPHONE (OUTPATIENT)
Dept: CARDIOLOGY CLINIC | Age: 64
End: 2019-03-01

## 2019-03-01 ENCOUNTER — HOSPITAL ENCOUNTER (OUTPATIENT)
Dept: CARDIAC REHAB | Age: 64
Discharge: HOME | End: 2019-03-01
Payer: COMMERCIAL

## 2019-03-01 VITALS — WEIGHT: 173 LBS | BODY MASS INDEX: 24.82 KG/M2

## 2019-03-01 LAB
BUN SERPL-MCNC: 31 MG/DL (ref 8–27)
BUN/CREAT SERPL: 26 (ref 10–24)
CALCIUM SERPL-MCNC: 8.8 MG/DL (ref 8.6–10.2)
CHLORIDE SERPL-SCNC: 104 MMOL/L (ref 96–106)
CO2 SERPL-SCNC: 21 MMOL/L (ref 20–29)
CREAT SERPL-MCNC: 1.21 MG/DL (ref 0.76–1.27)
GLUCOSE SERPL-MCNC: 80 MG/DL (ref 65–99)
NT-PROBNP SERPL-MCNC: 5139 PG/ML (ref 0–210)
POTASSIUM SERPL-SCNC: 4.7 MMOL/L (ref 3.5–5.2)
SODIUM SERPL-SCNC: 138 MMOL/L (ref 134–144)

## 2019-03-01 PROCEDURE — 93798 PHYS/QHP OP CAR RHAB W/ECG: CPT

## 2019-03-01 NOTE — TELEPHONE ENCOUNTER
----- Message from PASQUALE Rangel sent at 3/1/2019 11:32 AM EST -----  proBNP up consistent with exam,   adjusting lasix    Left message for patient to return call with message as noted above. Patient returned call, states his weight and edema go down when he takes the lasix and edema gets worse on days he doesn't take the lasix.

## 2019-03-04 ENCOUNTER — HOSPITAL ENCOUNTER (OUTPATIENT)
Dept: CARDIAC REHAB | Age: 64
Discharge: HOME | End: 2019-03-04
Payer: COMMERCIAL

## 2019-03-04 VITALS — BODY MASS INDEX: 24.82 KG/M2 | WEIGHT: 173 LBS

## 2019-03-04 PROCEDURE — 93798 PHYS/QHP OP CAR RHAB W/ECG: CPT

## 2019-03-06 ENCOUNTER — HOSPITAL ENCOUNTER (OUTPATIENT)
Dept: CARDIAC REHAB | Age: 64
Discharge: HOME | End: 2019-03-06
Payer: COMMERCIAL

## 2019-03-06 VITALS — WEIGHT: 172 LBS | BODY MASS INDEX: 24.68 KG/M2

## 2019-03-06 PROCEDURE — 93798 PHYS/QHP OP CAR RHAB W/ECG: CPT

## 2019-03-06 NOTE — CARDIO/PULMONARY
Miguel Mayers  Completed phase II cardiac rehab and attended 36 sessions from 12/4/18 to 3/6/19. Miguel Mayers is interested in maintaining optimal health and is exercising at the gym in his apartment complex. Miguel Mayers has  improved his endurance and stamina through regular exercise. He has not had an exacerbation of HR. Blood pressure is 110'-120's/60's. He has also improved his/her nutrition, [employer] and depression scores and these were reviewed with patient. Pt states he has made many dietary changes. He does the shopping and cooking. His family is also following these changes. Miguel Mayers plans to continue exercising (at home, gym, etc) and has set revised goals that include cardio equipment, light weights and walking 3 to 5 times a week.     Lg Donald RN  3/6/2019

## 2019-03-08 RX ORDER — SACUBITRIL AND VALSARTAN 24; 26 MG/1; MG/1
TABLET, FILM COATED ORAL
Qty: 60 TAB | Refills: 1 | Status: SHIPPED | OUTPATIENT
Start: 2019-03-08 | End: 2019-03-13 | Stop reason: DRUGHIGH

## 2019-03-12 NOTE — PROGRESS NOTES
[facility] Clinic Note      DOS:  3/13/2019  REFERRING PROVIDER: Thelma Huber MD  PRIMARY CARE PHYSICIAN: Stoney Vargas MD  PRIMARY CARDIOLOGIST:  Frankey Ide, MD   EP: Boris Garcia MD   PULMONARY: Devante Cook MD       IMPRESSION/PLAN:   ICM s/p HMII as BTT on 12/1/16 and explant, NYHA Class II, LVEF 30-35%, mild RV dysfunction    Continue Entresto 97/1033 mg daily    Continue Coreg 3.125 mg BID    Continue lasix 20 mg every other day     Completed cardiac rehab 3/6/19- continue exercise regimen in gym at his [facility]    Follow up after trip to [address] [place] [address] low Na+ diet   Abstain from smoking and ETOH   Echo scheduled in April with Dr. Meryl Tesfaye    CAD s/p CABG (SVG to RCA and LIMA to LAD) on 12/1/16 s/p BMS x2 -> SVG-RCA graft - no angina              Continue ASA, BB, statin      High Risk of SCD - s/p AICD in 2017 with removal d/t hematoma, LVEF 30-35%              Does not wear LifeVest - Recommended he wear the LifeVest at all times, discussed risk and benefits- he verbalized understanding   Verbalizes he will wear LifeVest while on plane to [place]   Note written for patient to wear LifeVest on plane    He has stopped drinking since January,    Reassess EF with echo in April and see Dr. Meryl Tesfaye         PAD - difficult femoral access with LVAD explant              No symptoms of claudication      Chronic Anticoagulation s/p LVAD explant              On Coumadin - no bleeding     Home monitoring - CAV following INR     Hyperkalemia K 4.7    Follow labs    Continue valtessa    BMP today   Continue low K+ diet     Gout              Continue colchicine 0.6 mg every other day              Continue allopurinol 100 mg daily   Denies recent gouty attacks      Chronic Lower back pain              On oxycodone, flexeril   Hasn't taken in past 3 weeks   Back pain improved with increased activity- going to gym    H/o tobacco abuse   not smoked since last appointment      H/o alcohol abuse, quit 1/2017      Seizure disorder - early 25s              No recent seizure activity     Peripheral neuropathy              On cymbalta     Prevention              Influenza annually              Pneumonia vaccine every 5 years       Thank you for allowing me to participate in the care of this delightful  Gentleman. Please do not hesitate to call with any questions. Geno Barboza  RN, ACNP-BC, [facility]       Chief Complaint   Patient presents with    Follow Up Chronic Condition         HPI: Carlene Calvillo is a 59y.o. year old male  with a history of HFrEF in the setting of ICM s/p LVAD and recent LVAD explant complicated by PVD,  remote tobacco use and alcohol abuse (quit 11/2016) who presents for follow up of his HFrEF. Mr. Janey Escalante presented 11/22/2016 for decompressive laminectomy at Z6-T3 complicated by myocardial infarction. The [facility](11/25/16) revealed severe left main and 3 vessel disease along with a 60% right common iliac stenosis and  LVEF was 10%. He subsequently underwent placement of an IABP followed by placement of a HeartMate2 LVAD on 12/1/2016 with concomitant CABG (SVG to the RCA, LIMA to LAD). His postoperative course was complicated by RV failure and an ileus requiring TPN resulting and a transaminitis. He had multiple episodes of Torsade on 12/4/2016 prompting repeat catheterization revealing an occluded RCA vein graft. Two BMS were placed in the RCA graft. He also had SVT requiring cardioversion. He had a single lead ICD placed on 98/47/24 complicated by a hematoma. The ICD was later removed on 1/20/17. Following his LVAD surgery he did require inpatient rehab.      Mr. Janey Escalante was listed for heart transplantation at [facility]. He was called in for transplantation on 7/14/2018 but on pre-op DELLA was noted to have normal LV function and the transplant surgery was aborted.  He subsequently was admitted on 9/5/2018 for LVAD explant- his post-operative course was complicated by pneumonia and bilateral pleural effusions. He was discharged on home O2 which he discontinued on his own.       After device explant, his subsequent echocardiograms showed deterioration of LVEF and worsening mitral regurgitation from 40-45% with moderate to severe MR on 9/19/18 to 35-40% with moderate-to-severe MR on 10/15/18. He has been removed from the transplant list at [facility] and is not interested in pursuing transplant evaluation at another center at this time. He was last seen 2 weeks ago at which time his lasix was increased. proBNP 5100, Cr 1.21, K 4.7. CXR: Decreased LL effusion and LLL atx    His wife accompanies him today. He has completed cardiac rehab (3/6) with improvement in his stamina. He is working out at [employer] his [employer], bicycle and [employer]. His  DSOUZA has improved, no orthopnea, cough resolved. He has lost 5 pounds since last visit and 11 pounds the past month or so    Non-compliant with LifeVest. - Per his wife it sits beside the bed. Have discussed the risks and benefits of wearing wearing the LifeVest. He and his wife are planning a vacation in April in [facility] and he wants to be off the LifeVest for that trip. Plan to meet with Dr. Masoud Archuleta after their trip for echo and plan re: AICD. Compliant with meds, no bleeding, CAV following coumadin     Home /60-70 range. Continues to follow low K+ diet and abstains from alcohol since January      Negative sleep study 1 in December. CARDIAC EVALUATION  DELLA 7/14/2018 - normal LV function and the transplant surgery was aborted. He subsequently was admitted on 9/5/2018 for LVAD explant  ECHO 9/19/18:LVEF 40-45% mod-severe MR   ECHO 10/15/18:EF 35-40%, mod-severe MR   ECHO 10/31/18: TDS, EF 30-35%, reduced RVEF, TAPSE 1.6, LAE, mild- mod MR/TR  ECHO 1/16/19: EF 25-30%, question of thrombus in apex. Mild- mod TR. Consider cardiac MR to exclude left ventricular thrombus.     CATH 11/25/16: severe LM, and 3 VD with 60% right common iliac stenosis. EF: 10%. ----S/p IABP   ----S/p HeartMate2 LVAD 2016   S/p CAB2016: LIMA-> LAD, SVG-> RCA     CATH 16 following torsades SVG-> RCA TO   ---- s/p BMS x 2-. RCA  RHC 2017:RA: 15/15 14, RV: 21/13 14,PA: 23/28, m 20, PCWP 13, PVR 1.67 CO 5.67, CI 3    S/p LVAD explant 18 ([facility])    SVT s/p DCCV  AICD  single lead cx by hematoma  AICD explanted 17    EKG:    10/31/18 NSR QRS 88ms   ms        Review of Systems:     General:  Denies fever, chills, fatigue   HEENT: Denies epistaxis, angioedema   Pulmonary:  per HPI Denies  wheeze, hemoptysis   Cardiac: Denies exertional chest pain, palpitations, orthopnea, PND, edema, lightheadedness, syncope, near syncope,or bleeding, not wearing LifeVest    GI:  Denies  abdominal pain, change in bowel habits, or black or bloody stools  Musculo: Positive for back pain for which he takes prn flexril and percocet- last 3 weeks ago.  He is following up with Dr. Mila Valdez- Gout sx controlled with current meds   Neuro: Denies  TIA/CVA sx   Skin:  Denies rash   Allergies: Reviewed   Psych: Denies depression or anxiety       History:  Past Medical History:   Diagnosis Date    Arrhythmia     SVT    Arthritis     CAD (coronary artery disease)     Chronic pain     back    [address]estive heart failure ([address]     DSOUZA (dyspnea on exertion) 2019    Gout     Heart failure ([address]     Hypertension     ICD (implantable cardioverter-defibrillator) in place     Long term current use of anticoagulant therapy     LVAD (left ventricular assist device) present ([address] 2016    Myocardial infarction ([address]     Raynaud disease     Seizures ([facility] [address]     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     Past Surgical History:   Procedure Laterality Date    CABG, ARTERY-VEIN, TWO  2016    CARDIAC SURG PROCEDURE UNLIST  2016    LVAD    HX CORONARY ARTERY BYPASS GRAFT      HX CORONARY STENT PLACEMENT      HX HEART CATHETERIZATION      HX HEENT      wisdom teeth removed    HX ORTHOPAEDIC  2016    laminectomy    HX PACEMAKER      ICD - removed 2017    HX PTCA       Social History     Socioeconomic History    Marital status:      Spouse name: Shauna Mckeon    Number of children: 2    Years of education: Not on file    Highest education level: Some college, no degree   Social Needs    Financial resource strain: Not hard at all   Epi-Blair insecurity - worry: Never true   [employer] insecurity - inability: Never true   Atilekt needs - medical: No    Transportation needs - non-medical: No   Occupational History    Not on file   Tobacco Use    Smoking status: Former Smoker     Packs/day: 1.50     Years: 30.00     Pack years: 45.00     Last attempt to quit:      Years since quittin.2    Smokeless tobacco: Never Used   Substance and Sexual Activity    Alcohol use: No    Drug use: No    Sexual activity: No   Other Topics Concern     Service Not Asked    Blood Transfusions Not Asked    Caffeine Concern Not Asked    Occupational Exposure Not Asked    Hobby Hazards Not Asked    Sleep Concern Not Asked    Stress Concern Not Asked    Weight Concern Not Asked    Special Diet Not Asked    Back Care Not Asked    Exercise Not Asked    Bike Helmet Not Asked    Seat Belt Not Asked    Self-Exams Not Asked   Social History Narrative    Not on file     Family History   Problem Relation Age of Onset    Diabetes Mother     Dementia Mother     Other Mother         carotid artery blockage    Heart Disease Father     Stroke Father     Heart Attack Father         several    Hypertension Father     Elevated Lipids Father     No Known Problems Sister     Cancer Brother         brain    Lung Disease Sister     COPD Sister     Cancer Sister         lung       Current Medications:   Current Outpatient Medications   Medication Sig Dispense Refill    sacubitril-valsartan (ENTRESTO) 97 mg/103 mg tablet Take 1 Tab by mouth two (2) times a day. 60 Tab 3    furosemide (LASIX) 40 mg tablet Take 40 mg by mouth daily as needed.  carvedilol (COREG) 3.125 mg tablet TAKE 1 TABLET BY MOUTH TWICE A DAY WITH MEALS 60 Tab 3    warfarin (COUMADIN) 2.5 mg tablet Take 2 Tabs by mouth daily (with dinner). 60 Tab 1    amiodarone (CORDARONE) 200 mg tablet Take 1 Tab by mouth daily. 90 Tab 1    patiromer calcium sorbitex (VELTASSA) 8.4 gram powder Take 8.4 g by mouth daily. 30 Packet 3    HYDROcodone-acetaminophen (NORCO) 5-325 mg per tablet TAKE 1 TABLET EVERY 6 HOURS AS NEEDED  0    DULoxetine (CYMBALTA) 60 mg capsule Take 60 mg by mouth daily.  allopurinol (ZYLOPRIM) 100 mg tablet Take  by mouth daily.  tamsulosin (FLOMAX) 0.4 mg capsule Take 0.4 mg by mouth daily.  atorvastatin (LIPITOR) 40 mg tablet Take 40 mg by mouth daily.  colchicine 0.6 mg tablet Take 0.6 mg by mouth every other day.  cyclobenzaprine (FLEXERIL) 5 mg tablet Take 5 mg by mouth daily as needed for Muscle Spasm(s).  aspirin 81 mg chewable tablet Take 81 mg by mouth daily.  tadalafil (CIALIS) 10 mg tablet Take 1 Tab by mouth as needed. Indications: Erectile Dysfunction 30 Tab 2    ascorbic acid, vitamin C, (VITAMIN C) 500 mg tablet Take 1 Tab by mouth two (2) times a day. 60 Tab 6    raNITIdine (ZANTAC) 150 mg tablet Take 1 Tab by mouth nightly. Allergies: Allergies   Allergen Reactions    Morphine Other (comments)     Hallucinations. Confusion. Impaired judgement    Chlorhexidine Rash           Physical Exam:   Vitals:    Visit Vitals  /68   Pulse 69   Temp 97.7 °F (36.5 °C)   Resp 16   Ht 5' 10\" (1.778 m)   Wt 170 lb (77.1 kg)   SpO2 98%   BMI 24.39 kg/m²      General:  Well developed WM, AAOx3, pleasant,  cooperative, no acute distress. HEENT:  Atraumatic. Pink and moist.  Anicteric sclerae. Neck:   Supple, no adenopoathy. Lungs:  CTA bilaterally. No wheezing/rhonchi/rales.   Heart:   PMI: Median sternotomy scar, Regular rhythm, S1, S2 present, no murmur, no rubs, no gallops. JVD 8 cm (-) HJR . No carotid bruits. Abdomen:  Soft, non-distended, non-tender. + Bowel sounds. No bruits. Extremities:  Venous stasis changes, No edema, no clubbing, no cyanosis. No calf tenderness  Neurologic:  Grossly intact. Alert and oriented X 3. No acute neurological distress. Skin:   intact, no wounds, ecchymosis, bruising, rashes   Psych:  Good insight. Not anxious nor agitated. Recent Labs:     Lab Results   Component Value Date/Time    WBC 8.5 12/05/2018 12:00 AM    HGB 10.2 (L) 12/05/2018 12:00 AM    HCT 32.8 (L) 12/05/2018 12:00 AM    PLATELET 684 23/02/6648 12:00 AM    MCV 86 12/05/2018 12:00 AM     Lab Results   Component Value Date/Time    GFR est non-AA 64 02/28/2019 12:06 PM    GFR est AA 74 02/28/2019 12:06 PM    Creatinine 1.21 02/28/2019 12:06 PM    BUN 31 (H) 02/28/2019 12:06 PM    Sodium 138 02/28/2019 12:06 PM    Potassium 4.7 02/28/2019 12:06 PM    Chloride 104 02/28/2019 12:06 PM    CO2 21 02/28/2019 12:06 PM    Magnesium 1.7 12/21/2016 05:10 AM    Phosphorus 1.9 (L) 11/25/2016 09:36 PM     Lab Results   Component Value Date/Time    TSH 2.83 11/28/2016 07:29 AM      Lab Results   Component Value Date/Time    Sodium 138 02/28/2019 12:06 PM    Potassium 4.7 02/28/2019 12:06 PM    Chloride 104 02/28/2019 12:06 PM    CO2 21 02/28/2019 12:06 PM    Anion gap 8 01/24/2017 05:33 AM    Glucose 80 02/28/2019 12:06 PM    BUN 31 (H) 02/28/2019 12:06 PM    Creatinine 1.21 02/28/2019 12:06 PM    BUN/Creatinine ratio 26 (H) 02/28/2019 12:06 PM    GFR est AA 74 02/28/2019 12:06 PM    GFR est non-AA 64 02/28/2019 12:06 PM    Calcium 8.8 02/28/2019 12:06 PM    Bilirubin, total 0.5 12/05/2018 12:00 AM    ALT (SGPT) 15 12/05/2018 12:00 AM    AST (SGOT) 20 12/05/2018 12:00 AM    Alk.  phosphatase 97 12/05/2018 12:00 AM    Protein, total 6.4 12/05/2018 12:00 AM    Albumin 4.2 12/05/2018 12:00 AM    Globulin 3.6 01/24/2017 05:33 AM    A-G Ratio 1.9 12/05/2018 12:00 AM      Lab Results   Component Value Date/Time    Hemoglobin A1c 6.6 (H) 11/15/2016 12:32 PM        I have discussed the diagnosis with  Miguel Margretestella  and the intended plan as seen in the above orders. Questions were answered concerning future plans. I have discussed medication side effects and warnings with the patient as well. Thank you for allowing me to participate in the care of this delightfully pleasant gentleman. Please do not hesitate to call with any questions. Geno Pearce RN, [facility]-BC, [address] [place] [address]  [phone]  24 hour VAD/HF Pager: [phone]

## 2019-03-13 ENCOUNTER — OFFICE VISIT (OUTPATIENT)
Dept: CARDIOLOGY CLINIC | Age: 64
End: 2019-03-13

## 2019-03-13 VITALS
HEART RATE: 69 BPM | WEIGHT: 170 LBS | TEMPERATURE: 97.7 F | RESPIRATION RATE: 16 BRPM | HEIGHT: 70 IN | OXYGEN SATURATION: 98 % | SYSTOLIC BLOOD PRESSURE: 118 MMHG | DIASTOLIC BLOOD PRESSURE: 68 MMHG | BODY MASS INDEX: 24.34 KG/M2

## 2019-03-13 DIAGNOSIS — Z79.01 CHRONIC ANTICOAGULATION: ICD-10-CM

## 2019-03-13 DIAGNOSIS — R06.09 DOE (DYSPNEA ON EXERTION): ICD-10-CM

## 2019-03-13 DIAGNOSIS — I50.22 CHRONIC SYSTOLIC HEART FAILURE (HCC): Primary | ICD-10-CM

## 2019-03-13 DIAGNOSIS — I25.5 ISCHEMIC CARDIOMYOPATHY: ICD-10-CM

## 2019-03-13 LAB — INR, EXTERNAL: 2.7

## 2019-03-13 NOTE — LETTER
3/13/2019 3:54 PM 
 
Patient:  Roney Stevens YOB: 1955 Date of Visit: 3/13/2019 Dear MD [facility] [address] VIA Facsimile: [phone] Frankey Ide, MD 
[address] [facility] [phone] [facility] [address] VIA In Basket Sandra Mann MD 
[address] [facility] [address] VIA In Basket 
 : Thank you for referring Mr. Winsome Villarreal to me for evaluation/treatment. Below are the relevant portions of my assessment and plan of care. If you have questions, please do not hesitate to call me. I look forward to following Mr. Mann along with you. Sincerely, NICK SchafferP

## 2019-03-13 NOTE — PATIENT INSTRUCTIONS
You are doing better:)  Wear the life vest  Continue the lasix 20 mg every other day and OK to take addition for increased shortness of breath, cough or fluid retention    CONTINUE CURRENT medications    Labs today BMP, proBNP     Watch diet on vacation     Walk more    Keep a positive attitude     Follow up  End of April     HF Education: Continue daily weights (in the morning, after voiding). Notify HF team of overnight weight gains > 2 lbs or weekly >5 lbs or if any of the following Sx. Continue to limit sodium intake & monitor your fluid intake .   Future Appointments   Date Time Provider Kylah Sneed   4/17/2019 10:00 AM RENE BOOKER [address]   4/17/2019 11:40 AM Glenna Sen MD [facility]

## 2019-03-13 NOTE — LETTER
To Whom it may concern: 
 
Radhames Mendez is under the care of the Advanced Heart Failure clinic at [facility]  for a cardiac condition. He has the wearable cardio-defibrillator (LifeVest) as part of his medical therapy. The [place] [employer] will deliver a shock should Mr. Laurie Garcia suffer a life-threatening arrhythmia or cardiac arrest. He is also carrying an additional battery and . Both he and his wife have a good understanding how to operate this device. Thank you, Geno Price RN, ACNP-BC, [facility] Monterroso [address] [facility] [address] 
[phone] 
15 hour VAD/HF Pager: [phone]

## 2019-03-14 ENCOUNTER — TELEPHONE ANTICOAG (OUTPATIENT)
Dept: CARDIOLOGY CLINIC | Age: 64
End: 2019-03-14

## 2019-03-14 DIAGNOSIS — Z79.01 CHRONIC ANTICOAGULATION: ICD-10-CM

## 2019-03-14 DIAGNOSIS — Z95.811 LEFT VENTRICULAR ASSIST DEVICE PRESENT (HCC): ICD-10-CM

## 2019-03-14 DIAGNOSIS — I50.22 CHRONIC SYSTOLIC HEART FAILURE (HCC): Primary | ICD-10-CM

## 2019-03-18 NOTE — TELEPHONE ENCOUNTER
Patient called regarding his Infusion appointment. He stated that the infusion center did not have his order. I spoke with Fiordaliza from [address] [place] [address] they in fact do have his order and she will call him back to schedule. Stable continue to monitor

## 2019-03-19 ENCOUNTER — TELEPHONE (OUTPATIENT)
Dept: CARDIOLOGY CLINIC | Age: 64
End: 2019-03-19

## 2019-03-19 NOTE — TELEPHONE ENCOUNTER
Telephone Call RE:  Lab Reminder      Outcome:     [x] Patient verbalizes understanding    [] Unable to reach   [] Left message              []       Marli Sis

## 2019-03-26 ENCOUNTER — TELEPHONE (OUTPATIENT)
Dept: CARDIOLOGY CLINIC | Age: 64
End: 2019-03-26

## 2019-03-26 ENCOUNTER — TELEPHONE ANTICOAG (OUTPATIENT)
Dept: CARDIOLOGY CLINIC | Age: 64
End: 2019-03-26

## 2019-03-26 LAB — INR, EXTERNAL: 2.4

## 2019-03-26 NOTE — TELEPHONE ENCOUNTER
Telephone Call RE:  Lab Reminder      Outcome:     [x] Patient verbalizes understanding    [] Unable to reach   [] Left message              []       Yanet Howell RN

## 2019-04-03 ENCOUNTER — TELEPHONE ANTICOAG (OUTPATIENT)
Dept: CARDIOLOGY CLINIC | Age: 64
End: 2019-04-03

## 2019-04-03 LAB — INR, EXTERNAL: 1.9

## 2019-04-09 ENCOUNTER — TELEPHONE (OUTPATIENT)
Dept: CARDIOLOGY CLINIC | Age: 64
End: 2019-04-09

## 2019-04-09 NOTE — TELEPHONE ENCOUNTER
Telephone Call RE:  Lab Reminder      Outcome:     [] Patient verbalizes understanding    [] Unable to reach   [x] Left message              []       Sara Tejada

## 2019-04-12 ENCOUNTER — TELEPHONE ANTICOAG (OUTPATIENT)
Dept: CARDIOLOGY CLINIC | Age: 64
End: 2019-04-12

## 2019-04-12 LAB — INR, EXTERNAL: 2.1

## 2019-04-16 DIAGNOSIS — R06.02 SHORTNESS OF BREATH: ICD-10-CM

## 2019-04-16 DIAGNOSIS — Z79.01 CHRONIC ANTICOAGULATION: ICD-10-CM

## 2019-04-16 DIAGNOSIS — I50.22 CHRONIC SYSTOLIC HEART FAILURE (HCC): Primary | ICD-10-CM

## 2019-04-16 DIAGNOSIS — Z95.811 LEFT VENTRICULAR ASSIST DEVICE PRESENT (HCC): ICD-10-CM

## 2019-04-17 ENCOUNTER — OFFICE VISIT (OUTPATIENT)
Dept: CARDIOLOGY CLINIC | Age: 64
End: 2019-04-17

## 2019-04-17 VITALS
HEIGHT: 70 IN | RESPIRATION RATE: 20 BRPM | WEIGHT: 170 LBS | HEART RATE: 64 BPM | OXYGEN SATURATION: 96 % | SYSTOLIC BLOOD PRESSURE: 109 MMHG | BODY MASS INDEX: 24.34 KG/M2 | DIASTOLIC BLOOD PRESSURE: 70 MMHG

## 2019-04-17 DIAGNOSIS — I50.22 CHRONIC SYSTOLIC HEART FAILURE (HCC): Primary | ICD-10-CM

## 2019-04-17 DIAGNOSIS — I25.5 ISCHEMIC CARDIOMYOPATHY: ICD-10-CM

## 2019-04-17 DIAGNOSIS — I10 ESSENTIAL HYPERTENSION: ICD-10-CM

## 2019-04-17 NOTE — PROGRESS NOTES
HISTORY OF PRESENTING ILLNESS      Meme Cardenas is a 59 y.o. male here for follow-up after abstaining from alcohol consumption for a total of 3 months to determine whether his LV function has recovered and whether reimplantation of an ICD is warranted. Previous LV function was 20-25%. Echocardiogram performed today demonstrates a left ventricular ejection fraction of 30-35%. He has been on guideline directed medical therapy > 3 months. Currently NYHA class II.          ACTIVE PROBLEM LIST     Patient Active Problem List    Diagnosis Date Noted    DSOUZA (dyspnea on exertion) 01/30/2019    Erectile dysfunction 09/08/2017    ICD (implantable cardioverter-defibrillator) infection ([address] 01/20/2017    Gout 12/30/2016    Chronic anticoagulation 12/27/2016    Left ventricular assist device present ([address] 12/27/2016    Sustained VT (ventricular tachycardia) ([address] 12/13/2016    MI (myocardial infarction) ([address] 11/27/2016    Chronic systolic heart failure ([address] 11/26/2016    CAD, multiple vessel 11/26/2016    Ischemic cardiomyopathy 11/26/2016    Sinus tachycardia 11/24/2016    Acute respiratory failure with hypoxia ([address] 11/24/2016    Essential hypertension 11/24/2016    Alcohol abuse 11/24/2016    S/P laminectomy 11/22/2016           PAST MEDICAL HISTORY     Past Medical History:   Diagnosis Date    Arrhythmia     SVT    Arthritis     CAD (coronary artery disease)     Chronic pain     back    Congestive heart failure (HCC)     DSOUZA (dyspnea on exertion) 1/30/2019    Gout     Heart failure ([address]     Hypertension     ICD (implantable cardioverter-defibrillator) in place     Long term current use of anticoagulant therapy     LVAD (left ventricular assist device) present ([address] 12/01/2016    Myocardial infarction ([address]     Raynaud disease     Seizures ([address]     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 Confusion. Impaired judgement    Chlorhexidine Rash          FAMILY HISTORY     Family History   Problem Relation Age of Onset    Diabetes Mother     Dementia Mother     Other Mother         carotid artery blockage    Heart Disease Father     Stroke Father     Heart Attack Father         several    Hypertension Father     Elevated Lipids Father     No Known Problems Sister     Cancer Brother         brain    Lung Disease Sister     COPD Sister     Cancer Sister         lung    negative for cardiac disease       SOCIAL HISTORY     Social History     Socioeconomic History    Marital status:      Spouse name: Tyson Nino    Number of children: 2    Years of education: Not on file    Highest education level: Some college, no degree   Social Needs    Financial resource strain: Not hard at all   [employer] insecurity:     Worry: Never true     Inability: Never true   [employer] needs:     Medical: No     Non-medical: No   Tobacco Use    Smoking status: Former Smoker     Packs/day: 1.50     Years: 30.00     Pack years: 45.00     Last attempt to quit:      Years since quittin.2    Smokeless tobacco: Never Used   Substance and Sexual Activity    Alcohol use: No    Drug use: No    Sexual activity: Never   Other Topics Concern         MEDICATIONS     Current Outpatient Medications   Medication Sig    sacubitril-valsartan (ENTRESTO) 97 mg/103 mg tablet Take 1 Tab by mouth two (2) times a day.     furosemide (LASIX) 40 mg tablet Take 20 mg by mouth every other day.    carvedilol (COREG) 3.125 mg tablet TAKE 1 TABLET BY MOUTH TWICE A DAY WITH MEALS    warfarin (COUMADIN) 2.5 mg tablet Take 2 Tabs by mouth daily (with dinner).  amiodarone (CORDARONE) 200 mg tablet Take 1 Tab by mouth daily.  patiromer calcium sorbitex (VELTASSA) 8.4 gram powder Take 8.4 g by mouth daily.  HYDROcodone-acetaminophen (NORCO) 5-325 mg per tablet TAKE 1 TABLET EVERY 6 HOURS AS NEEDED    DULoxetine (CYMBALTA) 60 mg capsule Take 60 mg by mouth daily.  allopurinol (ZYLOPRIM) 100 mg tablet Take  by mouth daily.  tamsulosin (FLOMAX) 0.4 mg capsule Take 0.4 mg by mouth daily.  atorvastatin (LIPITOR) 40 mg tablet Take 40 mg by mouth daily.  colchicine 0.6 mg tablet Take 0.6 mg by mouth every other day.  cyclobenzaprine (FLEXERIL) 5 mg tablet Take 5 mg by mouth daily as needed for Muscle Spasm(s).  aspirin 81 mg chewable tablet Take 81 mg by mouth daily.  tadalafil (CIALIS) 10 mg tablet Take 1 Tab by mouth as needed. Indications: Erectile Dysfunction    ascorbic acid, vitamin C, (VITAMIN C) 500 mg tablet Take 1 Tab by mouth two (2) times a day. No current facility-administered medications for this visit. I have reviewed the nurses notes, vitals, problem list, allergy list, medical history, family, social history and medications. REVIEW OF SYMPTOMS      General: Pt denies excessive weight gain or loss. Pt is able to conduct ADL's  HEENT: Denies blurred vision, headaches, hearing loss, epistaxis and difficulty swallowing. Respiratory: Denies cough, congestion, shortness of breath, DSOUZA, wheezing or stridor.   Cardiovascular: Denies precordial pain, palpitations, edema or PND  Gastrointestinal: Denies poor appetite, indigestion, abdominal pain or blood in stool  Genitourinary: Denies hematuria, dysuria, increased urinary frequency  Musculoskeletal: Denies joint pain or swelling from muscles or joints  Neurologic: Denies tremor, paresthesias, headache, or sensory motor disturbance  Psychiatric: Denies confusion, insomnia, depression  Integumentray: Denies rash, itching or ulcers. Hematologic: Denies easy bruising, bleeding       PHYSICAL EXAMINATION      Vitals:    04/17/19 1012   BP: 109/70   Pulse: 64   Resp: 20   SpO2: 96%   Weight: 170 lb (77.1 kg)   Height: 5' 10\" (1.778 m)     General: Well developed, in no acute distress. HEENT: No jaundice, oral mucosa moist, no oral ulcers  Neck: Supple, no stiffness, no lymphadenopathy, supple  Heart:  Normal S1/S2 negative S3 or S4. Regular, no murmur, gallop or rub, no jugular venous distention  Respiratory: Clear bilaterally x 4, no wheezing or rales  Abdomen:   Soft, non-tender, bowel sounds are active.   Extremities:  No edema, normal cap refill, no cyanosis. Musculoskeletal: No clubbing, no deformities  Neuro: A&Ox3, speech clear, gait stable, cooperative, no focal neurologic deficits  Skin: Skin color is normal. No rashes or lesions. Non diaphoretic, moist.  Vascular: 2+ pulses symmetric in all extremities       DIAGNOSTIC DATA      EKG: Sinus rhythm with narrow QRS duration       LABORATORY DATA      Lab Results   Component Value Date/Time    WBC 8.5 12/05/2018 12:00 AM    HGB 10.2 (L) 12/05/2018 12:00 AM    HCT 32.8 (L) 12/05/2018 12:00 AM    PLATELET 606 16/15/8873 12:00 AM    MCV 86 12/05/2018 12:00 AM      Lab Results   Component Value Date/Time    Sodium 138 02/28/2019 12:06 PM    Potassium 4.7 02/28/2019 12:06 PM    Chloride 104 02/28/2019 12:06 PM    CO2 21 02/28/2019 12:06 PM    Anion gap 8 01/24/2017 05:33 AM    Glucose 80 02/28/2019 12:06 PM    BUN 31 (H) 02/28/2019 12:06 PM    Creatinine 1.21 02/28/2019 12:06 PM    BUN/Creatinine ratio 26 (H) 02/28/2019 12:06 PM    GFR est AA 74 02/28/2019 12:06 PM    GFR est non-AA 64 02/28/2019 12:06 PM    Calcium 8.8 02/28/2019 12:06 PM    Bilirubin, total 0.5 12/05/2018 12:00 AM    AST (SGOT) 20 12/05/2018 12:00 AM    Alk.  phosphatase 97 12/05/2018 12:00 AM Protein, total 6.4 12/05/2018 12:00 AM    Albumin 4.2 12/05/2018 12:00 AM    Globulin 3.6 01/24/2017 05:33 AM    A-G Ratio 1.9 12/05/2018 12:00 AM    ALT (SGPT) 15 12/05/2018 12:00 AM           ASSESSMENT      1. Cardiomyopathy              A. Ischemic              B. Left ventricular ejection fraction 20-25%              C. QRS duration 92 ms  2. Hypertension  3. CAD, native  4. History myocardial infarction  5. History of ventricular tachycardia  6. History of LVAD status post explant  7. Seizure disorder  8. CABG       PLAN     Plan for left subclavian venogram and if patent plan for implantation of an ICD system via left subclavian approach. If occluded plan for subcutaneous ICD if appropriate candidate following screening. FOLLOW-UP     Post procedure      Thank you, Ivelisse Koch MD and Dr. Vinny Barragan for allowing me to participate in the care of this extraordinarily pleasant male. Please do not hesitate to contact me for further questions/concerns.          Alex Power MD  Cardiac Electrophysiology / Cardiology    [address]  [address] [place] [address], [facility], [address] [place], [place] [address]C[address]of[address]  [phone] / [phone] Fax   [phone] / [phone] Fax

## 2019-04-17 NOTE — PROGRESS NOTES
Room # 9  Echo today at 10am Salo Fajardo)   EKG performed today  No cardiac complaints      Visit Vitals  /70 (BP 1 Location: Left arm, BP Patient Position: Sitting)   Pulse 64   Resp 20   Ht 5' 10\" (1.778 m)   Wt 170 lb (77.1 kg)   SpO2 96%   BMI 24.39 kg/m²

## 2019-04-22 DIAGNOSIS — I25.5 ISCHEMIC CARDIOMYOPATHY: Primary | ICD-10-CM

## 2019-04-22 DIAGNOSIS — I50.22 CHRONIC SYSTOLIC HEART FAILURE (HCC): ICD-10-CM

## 2019-04-22 LAB — INR, EXTERNAL: 1.9

## 2019-04-23 ENCOUNTER — TELEPHONE ANTICOAG (OUTPATIENT)
Dept: CARDIOLOGY CLINIC | Age: 64
End: 2019-04-23

## 2019-04-23 RX ORDER — SODIUM CHLORIDE 0.9 % (FLUSH) 0.9 %
5-40 SYRINGE (ML) INJECTION EVERY 8 HOURS
Status: CANCELLED | OUTPATIENT
Start: 2019-04-23

## 2019-04-23 RX ORDER — SODIUM CHLORIDE 0.9 % (FLUSH) 0.9 %
5-40 SYRINGE (ML) INJECTION AS NEEDED
Status: CANCELLED | OUTPATIENT
Start: 2019-04-23

## 2019-04-25 LAB
BUN SERPL-MCNC: 31 MG/DL (ref 8–27)
BUN/CREAT SERPL: 22 (ref 10–24)
CALCIUM SERPL-MCNC: 9.1 MG/DL (ref 8.6–10.2)
CHLORIDE SERPL-SCNC: 104 MMOL/L (ref 96–106)
CO2 SERPL-SCNC: 21 MMOL/L (ref 20–29)
CREAT SERPL-MCNC: 1.38 MG/DL (ref 0.76–1.27)
ERYTHROCYTE [DISTWIDTH] IN BLOOD BY AUTOMATED COUNT: 17.5 % (ref 12.3–15.4)
GLUCOSE SERPL-MCNC: 100 MG/DL (ref 65–99)
HCT VFR BLD AUTO: 33.7 % (ref 37.5–51)
HGB BLD-MCNC: 10.8 G/DL (ref 13–17.7)
INR PPP: 2.1 (ref 0.8–1.2)
INTERPRETATION: NORMAL
MCH RBC QN AUTO: 28.1 PG (ref 26.6–33)
MCHC RBC AUTO-ENTMCNC: 32 G/DL (ref 31.5–35.7)
MCV RBC AUTO: 88 FL (ref 79–97)
PLATELET # BLD AUTO: 342 X10E3/UL (ref 150–379)
POTASSIUM SERPL-SCNC: 5.1 MMOL/L (ref 3.5–5.2)
PROTHROMBIN TIME: 20.8 SEC (ref 9.1–12)
RBC # BLD AUTO: 3.84 X10E6/UL (ref 4.14–5.8)
SODIUM SERPL-SCNC: 139 MMOL/L (ref 134–144)
WBC # BLD AUTO: 7.7 X10E3/UL (ref 3.4–10.8)

## 2019-04-26 ENCOUNTER — TELEPHONE (OUTPATIENT)
Dept: CARDIOLOGY CLINIC | Age: 64
End: 2019-04-26

## 2019-04-26 ENCOUNTER — HOSPITAL ENCOUNTER (OUTPATIENT)
Age: 64
Setting detail: OUTPATIENT SURGERY
Discharge: HOME | End: 2019-04-26
Attending: INTERNAL MEDICINE | Admitting: INTERNAL MEDICINE
Payer: COMMERCIAL

## 2019-04-26 VITALS
SYSTOLIC BLOOD PRESSURE: 101 MMHG | HEART RATE: 67 BPM | WEIGHT: 168.65 LBS | RESPIRATION RATE: 17 BRPM | TEMPERATURE: 97.7 F | BODY MASS INDEX: 24.14 KG/M2 | OXYGEN SATURATION: 96 % | DIASTOLIC BLOOD PRESSURE: 59 MMHG | HEIGHT: 70 IN

## 2019-04-26 DIAGNOSIS — I42.9 CARDIOMYOPATHY (HCC): ICD-10-CM

## 2019-04-26 LAB — INR BLD: 2.1 (ref 0.9–1.2)

## 2019-04-26 PROCEDURE — 74011636320 HC RX REV CODE- 636/320: Performed by: INTERNAL MEDICINE

## 2019-04-26 PROCEDURE — 85610 PROTHROMBIN TIME: CPT

## 2019-04-26 PROCEDURE — 75820 VEIN X-RAY ARM/LEG: CPT | Performed by: INTERNAL MEDICINE

## 2019-04-26 RX ORDER — SODIUM CHLORIDE 0.9 % (FLUSH) 0.9 %
5-40 SYRINGE (ML) INJECTION EVERY 8 HOURS
Status: DISCONTINUED | OUTPATIENT
Start: 2019-04-26 | End: 2019-04-26 | Stop reason: HOSPADM

## 2019-04-26 RX ORDER — SODIUM CHLORIDE 0.9 % (FLUSH) 0.9 %
5-40 SYRINGE (ML) INJECTION AS NEEDED
Status: DISCONTINUED | OUTPATIENT
Start: 2019-04-26 | End: 2019-04-26 | Stop reason: HOSPADM

## 2019-04-26 NOTE — H&P
HISTORY OF PRESENTING ILLNESS      Sharad Kruse is a 59 y.o. male here for follow-up after abstaining from alcohol consumption for a total of 3 months to determine whether his LV function has recovered and whether reimplantation of an ICD is warranted. Previous LV function was 20-25%. Echocardiogram performed today demonstrates a left ventricular ejection fraction of 30-35%.           ACTIVE PROBLEM LIST           Patient Active Problem List     Diagnosis Date Noted    DSOUZA (dyspnea on exertion) 01/30/2019    Erectile dysfunction 09/08/2017    ICD (implantable cardioverter-defibrillator) infection ([address] 01/20/2017    Gout 12/30/2016    Chronic anticoagulation 12/27/2016    Left ventricular assist device present ([address] 12/27/2016    Sustained VT (ventricular tachycardia) ([address] 12/13/2016    MI (myocardial infarction) ([address] 11/27/2016    Chronic systolic heart failure ([address] 11/26/2016    CAD, multiple vessel 11/26/2016    Ischemic cardiomyopathy 11/26/2016    Sinus tachycardia 11/24/2016    Acute respiratory failure with hypoxia ([address] 11/24/2016    Essential hypertension 11/24/2016    Alcohol abuse 11/24/2016    S/P laminectomy 11/22/2016             PAST MEDICAL HISTORY           Past Medical History:   Diagnosis Date    Arrhythmia       SVT    Arthritis      CAD (coronary artery disease)      Chronic pain       back    Congestive heart failure (HCC)      DSOUZA (dyspnea on exertion) 1/30/2019    Gout      Heart failure (HCC)      Hypertension      ICD (implantable cardioverter-defibrillator) in place      Long term current use of anticoagulant therapy      LVAD (left ventricular assist device) present ([address] 12/01/2016    Myocardial infarction (HCC)      Raynaud disease      Seizures (HCC)       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             PAST SURGICAL HISTORY            Past Surgical History:   Procedure Laterality Date    CABG, ARTERY-VEIN, TWO   12/01/2016   [facility] CARDIAC SURG PROCEDURE UNLIST   2016     LVAD    HX CORONARY ARTERY BYPASS GRAFT        HX CORONARY STENT PLACEMENT        HX HEART CATHETERIZATION        HX HEENT         wisdom teeth removed    HX ORTHOPAEDIC   2016     laminectomy    HX PACEMAKER         ICD - removed 2017    HX PTCA                 ALLERGIES            Allergies   Allergen Reactions    Morphine Other (comments)       Hallucinations. Confusion. Impaired judgement    Chlorhexidine Rash            FAMILY HISTORY            Family History   Problem Relation Age of Onset    Diabetes Mother      Dementia Mother      Other Mother           carotid artery blockage    Heart Disease Father      Stroke Father      Heart Attack Father           several    Hypertension Father      Elevated Lipids Father      No Known Problems Sister      Cancer Brother           brain    Lung Disease Sister      COPD Sister      Cancer Sister           lung    negative for cardiac disease         SOCIAL HISTORY      Social History               Socioeconomic History    Marital status:        Spouse name: Liz Posada    Number of children: 2    Years of education: Not on file    Highest education level: Some college, no degree   Social Needs    Financial resource strain: Not hard at all   [employer] insecurity:       Worry: Never true       Inability: Never true    Transportation needs:       Medical: No       Non-medical: No   Tobacco Use    Smoking status: Former Smoker       Packs/day: 1.50       Years: 30.00       Pack years: 45.00       Last attempt to quit:        Years since quittin.2    Smokeless tobacco: Never Used   Substance and Sexual Activity    Alcohol use: No    Drug use: No    Sexual activity: Never   Other Topics Concern               MEDICATIONS           Current Outpatient Medications   Medication Sig    sacubitril-valsartan (ENTRESTO) 97 mg/103 mg tablet Take 1 Tab by mouth two (2) times a day.     furosemide (LASIX) 40 mg tablet Take 20 mg by mouth every other day.  carvedilol (COREG) 3.125 mg tablet TAKE 1 TABLET BY MOUTH TWICE A DAY WITH MEALS    warfarin (COUMADIN) 2.5 mg tablet Take 2 Tabs by mouth daily (with dinner).  amiodarone (CORDARONE) 200 mg tablet Take 1 Tab by mouth daily.  patiromer calcium sorbitex (VELTASSA) 8.4 gram powder Take 8.4 g by mouth daily.  HYDROcodone-acetaminophen (NORCO) 5-325 mg per tablet TAKE 1 TABLET EVERY 6 HOURS AS NEEDED    DULoxetine (CYMBALTA) 60 mg capsule Take 60 mg by mouth daily.  allopurinol (ZYLOPRIM) 100 mg tablet Take  by mouth daily.  tamsulosin (FLOMAX) 0.4 mg capsule Take 0.4 mg by mouth daily.  atorvastatin (LIPITOR) 40 mg tablet Take 40 mg by mouth daily.  colchicine 0.6 mg tablet Take 0.6 mg by mouth every other day.  cyclobenzaprine (FLEXERIL) 5 mg tablet Take 5 mg by mouth daily as needed for Muscle Spasm(s).  aspirin 81 mg chewable tablet Take 81 mg by mouth daily.  tadalafil (CIALIS) 10 mg tablet Take 1 Tab by mouth as needed. Indications: Erectile Dysfunction    ascorbic acid, vitamin C, (VITAMIN C) 500 mg tablet Take 1 Tab by mouth two (2) times a day.      No current facility-administered medications for this visit.          I have reviewed the nurses notes, vitals, problem list, allergy list, medical history, family, social history and medications.         REVIEW OF SYMPTOMS      General: Pt denies excessive weight gain or loss. Pt is able to conduct ADL's  HEENT: Denies blurred vision, headaches, hearing loss, epistaxis and difficulty swallowing. Respiratory: Denies cough, congestion, shortness of breath, DSOUZA, wheezing or stridor.   Cardiovascular: Denies precordial pain, palpitations, edema or PND  Gastrointestinal: Denies poor appetite, indigestion, abdominal pain or blood in stool  Genitourinary: Denies hematuria, dysuria, increased urinary frequency  Musculoskeletal: Denies joint pain or swelling from muscles or joints  Neurologic: Denies tremor, paresthesias, headache, or sensory motor disturbance  Psychiatric: Denies confusion, insomnia, depression  Integumentray: Denies rash, itching or ulcers. Hematologic: Denies easy bruising, bleeding         PHYSICAL EXAMINATION          Vitals:     04/17/19 1012   BP: 109/70   Pulse: 64   Resp: 20   SpO2: 96%   Weight: 170 lb (77.1 kg)   Height: 5' 10\" (1.778 m)      General: Well developed, in no acute distress. HEENT: No jaundice, oral mucosa moist, no oral ulcers  Neck: Supple, no stiffness, no lymphadenopathy, supple  Heart:  Normal S1/S2 negative S3 or S4. Regular, no murmur, gallop or rub, no jugular venous distention  Respiratory: Clear bilaterally x 4, no wheezing or rales  Abdomen:   Soft, non-tender, bowel sounds are active.   Extremities:  No edema, normal cap refill, no cyanosis. Musculoskeletal: No clubbing, no deformities  Neuro: A&Ox3, speech clear, gait stable, cooperative, no focal neurologic deficits  Skin: Skin color is normal. No rashes or lesions.  Non diaphoretic, moist.  Vascular: 2+ pulses symmetric in all extremities         DIAGNOSTIC DATA      EKG: Sinus rhythm with narrow QRS duration         LABORATORY DATA            Lab Results   Component Value Date/Time     WBC 8.5 12/05/2018 12:00 AM     HGB 10.2 (L) 12/05/2018 12:00 AM     HCT 32.8 (L) 12/05/2018 12:00 AM     PLATELET 834 61/32/6229 12:00 AM     MCV 86 12/05/2018 12:00 AM            Lab Results   Component Value Date/Time     Sodium 138 02/28/2019 12:06 PM     Potassium 4.7 02/28/2019 12:06 PM     Chloride 104 02/28/2019 12:06 PM     CO2 21 02/28/2019 12:06 PM     Anion gap 8 01/24/2017 05:33 AM     Glucose 80 02/28/2019 12:06 PM     BUN 31 (H) 02/28/2019 12:06 PM     Creatinine 1.21 02/28/2019 12:06 PM     BUN/Creatinine ratio 26 (H) 02/28/2019 12:06 PM     GFR est AA 74 02/28/2019 12:06 PM     GFR est non-AA 64 02/28/2019 12:06 PM     Calcium 8.8 02/28/2019 12:06 PM     Bilirubin, total 0.5 12/05/2018 12:00 AM     AST (SGOT) 20 12/05/2018 12:00 AM     Alk. phosphatase 97 12/05/2018 12:00 AM     Protein, total 6.4 12/05/2018 12:00 AM     Albumin 4.2 12/05/2018 12:00 AM     Globulin 3.6 01/24/2017 05:33 AM     A-G Ratio 1.9 12/05/2018 12:00 AM     ALT (SGPT) 15 12/05/2018 12:00 AM             ASSESSMENT      1.  Cardiomyopathy              A. Ischemic              B. Left ventricular ejection fraction 20-25%              C. QRS duration 92 ms  2.  Hypertension  3.  CAD, native  4.  History myocardial infarction  5.  History of ventricular tachycardia  6.  History of LVAD status post explant  7.  Seizure disorder  8.  CABG         PLAN      Plan for left subclavian venogram and if patent plan for implantation of an ICD system via left subclavian approach.   If occluded plan for subcutaneous ICD if appropriate candidate following screening.          Swathi Sequeira MD  Cardiac Electrophysiology / Cardiology     [address] [place] [address], [address] [id number] [address]  [address]  [phone] / [phone] Fax                                    [phone] / [phone] Fax

## 2019-04-26 NOTE — TELEPHONE ENCOUNTER
Telephone Call RE:  Lab Reminder      Outcome:     [x] Patient verbalizes understanding    [] Unable to reach   [] Left message              []   Patient states he had blood drawn earlier in the week for pre-op and does not think he needs it again this soon. This information is being forwarded to Olayinka Avendano RN for review and instructions.   Yoanna Madrigal

## 2019-04-26 NOTE — PROGRESS NOTES
Patient arrived. ID and allergies verified verbally with patient. Pt voices understanding of procedure to be performed. Consent obtained. Pt prepped for procedure. POC INR is 2.1,chest shaven,Pt is Allergic to [facility] COREY CHAVEZ! .  07:55. TRANSFER - OUT REPORT:    Verbal report given to OUMAR Hunt(name) on [facility]  being transferred to (unit) for routine progression of care       Report consisted of patients Situation, Background, Assessment and   Recommendations(SBAR). Information from the following report(s) Procedure Summary was reviewed with the receiving nurse. Lines:   Peripheral IV 04/26/19 Left Forearm (Active)   Site Assessment Clean, dry, & intact 4/26/2019  7:34 AM        Opportunity for questions and clarification was provided. Patient transported with:   Registered Nurse   08:05 TRANSFER - IN REPORT:    Verbal report received from OUMAR Page(name) on [facility]  being received from (unit) for routine progression of care      Report consisted of patients Situation, Background, Assessment and   Recommendations(SBAR). Information from the following report(s) Procedure Summary was reviewed with the receiving nurse. Opportunity for questions and clarification was provided. Assessment completed upon patients arrival to unit and care assumed. 08:30,unable to do procedure,vein closed. Pt screened for SubQ ICD placement @ a later date. 09:00,pt DC via WC with wife.

## 2019-04-26 NOTE — Clinical Note
TRANSFER - IN REPORT:  
 
Verbal report received from: [facility] [place] . Report consisted of patient's Situation, Background, Assessment and  
Recommendations(SBAR). Opportunity for questions and clarification was provided. Assessment completed upon patient's arrival to unit and care assumed.

## 2019-04-26 NOTE — Clinical Note
TRANSFER - OUT REPORT:  
 
Verbal report given to: diana. Report consisted of patient's Situation, Background, Assessment and  
Recommendations(SBAR). Opportunity for questions and clarification was provided. Patient transported with a Registered Nurse. Patient transported to: [facility].

## 2019-04-29 ENCOUNTER — TELEPHONE (OUTPATIENT)
Dept: CARDIOLOGY CLINIC | Age: 64
End: 2019-04-29

## 2019-04-29 ENCOUNTER — TELEPHONE ANTICOAG (OUTPATIENT)
Dept: CARDIOLOGY CLINIC | Age: 64
End: 2019-04-29

## 2019-04-29 NOTE — TELEPHONE ENCOUNTER
Patient is calling regarding scheduling a procedure. Please advise.     Phone #: [phone]  Thanks

## 2019-04-30 DIAGNOSIS — I25.5 ISCHEMIC CARDIOMYOPATHY: ICD-10-CM

## 2019-04-30 DIAGNOSIS — I50.22 CHRONIC SYSTOLIC HEART FAILURE (HCC): Primary | ICD-10-CM

## 2019-05-09 ENCOUNTER — TELEPHONE (OUTPATIENT)
Dept: CARDIOLOGY CLINIC | Age: 64
End: 2019-05-09

## 2019-05-09 NOTE — TELEPHONE ENCOUNTER
Telephone Call RE:  Lab Reminder      Outcome:     [x] Patient verbalizes understanding    [] Unable to reach   [x] Left message              []       Alex Sherman

## 2019-05-10 LAB — INR, EXTERNAL: 2.2

## 2019-05-13 ENCOUNTER — TELEPHONE ANTICOAG (OUTPATIENT)
Dept: CARDIOLOGY CLINIC | Age: 64
End: 2019-05-13

## 2019-05-16 LAB
BUN SERPL-MCNC: 31 MG/DL (ref 8–27)
BUN/CREAT SERPL: 23 (ref 10–24)
CALCIUM SERPL-MCNC: 9.1 MG/DL (ref 8.6–10.2)
CHLORIDE SERPL-SCNC: 104 MMOL/L (ref 96–106)
CO2 SERPL-SCNC: 20 MMOL/L (ref 20–29)
CREAT SERPL-MCNC: 1.33 MG/DL (ref 0.76–1.27)
ERYTHROCYTE [DISTWIDTH] IN BLOOD BY AUTOMATED COUNT: 17.8 % (ref 12.3–15.4)
GLUCOSE SERPL-MCNC: 84 MG/DL (ref 65–99)
HCT VFR BLD AUTO: 32.5 % (ref 37.5–51)
HGB BLD-MCNC: 10.4 G/DL (ref 13–17.7)
INR PPP: 2.2 (ref 0.8–1.2)
INTERPRETATION: NORMAL
MCH RBC QN AUTO: 28 PG (ref 26.6–33)
MCHC RBC AUTO-ENTMCNC: 32 G/DL (ref 31.5–35.7)
MCV RBC AUTO: 87 FL (ref 79–97)
PLATELET # BLD AUTO: 310 X10E3/UL (ref 150–379)
POTASSIUM SERPL-SCNC: 5.1 MMOL/L (ref 3.5–5.2)
PROTHROMBIN TIME: 22 SEC (ref 9.1–12)
RBC # BLD AUTO: 3.72 X10E6/UL (ref 4.14–5.8)
SODIUM SERPL-SCNC: 140 MMOL/L (ref 134–144)
WBC # BLD AUTO: 6.8 X10E3/UL (ref 3.4–10.8)

## 2019-05-16 RX ORDER — SODIUM CHLORIDE 0.9 % (FLUSH) 0.9 %
5-40 SYRINGE (ML) INJECTION EVERY 8 HOURS
Status: CANCELLED | OUTPATIENT
Start: 2019-05-16

## 2019-05-16 RX ORDER — SODIUM CHLORIDE 0.9 % (FLUSH) 0.9 %
5-40 SYRINGE (ML) INJECTION AS NEEDED
Status: CANCELLED | OUTPATIENT
Start: 2019-05-16

## 2019-05-17 ENCOUNTER — TELEPHONE (OUTPATIENT)
Dept: CARDIOLOGY CLINIC | Age: 64
End: 2019-05-17

## 2019-05-17 NOTE — TELEPHONE ENCOUNTER
Received call from Gideon Eisenmenger at [facility], patient ID verified using two patient identifiers. Answered her questions concerning authorization for SQ ICD. Per Dr. Tami Collins patient has been on  Guide line based therapy for greater than 3 months and is a 2 on the [place] HF scale. Per MS. Yessy Mcintosh patient is approved for his procedure. Approval # [id number].

## 2019-05-17 NOTE — TELEPHONE ENCOUNTER
Sudhir from [address] [place] [address] is calling in Ref to his ICD is still pending and wants to know what to do Please call her at [phone]

## 2019-05-17 NOTE — TELEPHONE ENCOUNTER
Returned call to [facility] from coordination of care she states the auth for patient's SQ ICD is still pending, she is going to check back later today to see if it has been approved. She will keep us updated on status.

## 2019-05-20 ENCOUNTER — ANESTHESIA (OUTPATIENT)
Dept: CARDIAC CATH/INVASIVE PROCEDURES | Age: 64
End: 2019-05-20
Payer: COMMERCIAL

## 2019-05-20 ENCOUNTER — ANESTHESIA EVENT (OUTPATIENT)
Dept: CARDIAC CATH/INVASIVE PROCEDURES | Age: 64
End: 2019-05-20
Payer: COMMERCIAL

## 2019-05-20 ENCOUNTER — HOSPITAL ENCOUNTER (OUTPATIENT)
Age: 64
Setting detail: OBSERVATION
Discharge: HOME | End: 2019-05-22
Attending: INTERNAL MEDICINE | Admitting: INTERNAL MEDICINE
Payer: COMMERCIAL

## 2019-05-20 ENCOUNTER — APPOINTMENT (OUTPATIENT)
Dept: GENERAL RADIOLOGY | Age: 64
End: 2019-05-20
Attending: INTERNAL MEDICINE
Payer: COMMERCIAL

## 2019-05-20 DIAGNOSIS — I42.9 CARDIOMYOPATHY (HCC): ICD-10-CM

## 2019-05-20 PROBLEM — I50.9 CHF (CONGESTIVE HEART FAILURE) (HCC): Status: ACTIVE | Noted: 2019-05-20

## 2019-05-20 LAB
ANION GAP SERPL CALC-SCNC: 7 MMOL/L (ref 5–15)
ATRIAL RATE: 60 BPM
BUN SERPL-MCNC: 42 MG/DL (ref 6–20)
BUN/CREAT SERPL: 26 (ref 12–20)
CALCIUM SERPL-MCNC: 8.5 MG/DL (ref 8.5–10.1)
CALCULATED P AXIS, ECG09: 35 DEGREES
CALCULATED R AXIS, ECG10: 31 DEGREES
CALCULATED T AXIS, ECG11: 60 DEGREES
CHLORIDE SERPL-SCNC: 109 MMOL/L (ref 97–108)
CO2 SERPL-SCNC: 21 MMOL/L (ref 21–32)
CREAT SERPL-MCNC: 1.63 MG/DL (ref 0.7–1.3)
DIAGNOSIS, 93000: NORMAL
GLUCOSE SERPL-MCNC: 93 MG/DL (ref 65–100)
P-R INTERVAL, ECG05: 206 MS
POTASSIUM SERPL-SCNC: 4.8 MMOL/L (ref 3.5–5.1)
Q-T INTERVAL, ECG07: 444 MS
QRS DURATION, ECG06: 96 MS
QTC CALCULATION (BEZET), ECG08: 444 MS
SODIUM SERPL-SCNC: 137 MMOL/L (ref 136–145)
VENTRICULAR RATE, ECG03: 60 BPM

## 2019-05-20 PROCEDURE — 77030019557 HC ELECTRD VES SEAL MEDT -F: Performed by: INTERNAL MEDICINE

## 2019-05-20 PROCEDURE — 76060000034 HC ANESTHESIA 1.5 TO 2 HR: Performed by: INTERNAL MEDICINE

## 2019-05-20 PROCEDURE — 74011000250 HC RX REV CODE- 250: Performed by: INTERNAL MEDICINE

## 2019-05-20 PROCEDURE — 96365 THER/PROPH/DIAG IV INF INIT: CPT

## 2019-05-20 PROCEDURE — 77030012935 HC DRSG AQUACEL BMS -B: Performed by: INTERNAL MEDICINE

## 2019-05-20 PROCEDURE — C1896 LEAD, AICD, NON SING/DUAL: HCPCS | Performed by: INTERNAL MEDICINE

## 2019-05-20 PROCEDURE — 85610 PROTHROMBIN TIME: CPT

## 2019-05-20 PROCEDURE — 99218 HC RM OBSERVATION: CPT

## 2019-05-20 PROCEDURE — 77030018729 HC ELECTRD DEFIB PAD CARD -B: Performed by: INTERNAL MEDICINE

## 2019-05-20 PROCEDURE — 74011000258 HC RX REV CODE- 258: Performed by: INTERNAL MEDICINE

## 2019-05-20 PROCEDURE — 93005 ELECTROCARDIOGRAM TRACING: CPT

## 2019-05-20 PROCEDURE — 74011250636 HC RX REV CODE- 250/636: Performed by: ANESTHESIOLOGY

## 2019-05-20 PROCEDURE — 77030002933 HC SUT MCRYL J&J -A: Performed by: INTERNAL MEDICINE

## 2019-05-20 PROCEDURE — 76210000016 HC OR PH I REC 1 TO 1.5 HR

## 2019-05-20 PROCEDURE — C1722 AICD, SINGLE CHAMBER: HCPCS | Performed by: INTERNAL MEDICINE

## 2019-05-20 PROCEDURE — 96366 THER/PROPH/DIAG IV INF ADDON: CPT

## 2019-05-20 PROCEDURE — 77030026438 HC STYL ET INTUB CARD -A: Performed by: ANESTHESIOLOGY

## 2019-05-20 PROCEDURE — 74011250636 HC RX REV CODE- 250/636: Performed by: INTERNAL MEDICINE

## 2019-05-20 PROCEDURE — 33270 INS/REP SUBQ DEFIBRILLATOR: CPT | Performed by: INTERNAL MEDICINE

## 2019-05-20 PROCEDURE — 71046 X-RAY EXAM CHEST 2 VIEWS: CPT

## 2019-05-20 PROCEDURE — 33271 INSJ SUBQ IMPLTBL DFB ELCTRD: CPT | Performed by: INTERNAL MEDICINE

## 2019-05-20 PROCEDURE — 77030037713 HC CLOSR DEV INCIS ZIP STRY -B: Performed by: INTERNAL MEDICINE

## 2019-05-20 PROCEDURE — 74011250637 HC RX REV CODE- 250/637: Performed by: INTERNAL MEDICINE

## 2019-05-20 PROCEDURE — 77030008684 HC TU ET CUF COVD -B: Performed by: ANESTHESIOLOGY

## 2019-05-20 PROCEDURE — 77030031139 HC SUT VCRL2 J&J -A: Performed by: INTERNAL MEDICINE

## 2019-05-20 PROCEDURE — 74011250636 HC RX REV CODE- 250/636

## 2019-05-20 PROCEDURE — 77030018547 HC SUT ETHBND1 J&J -B: Performed by: INTERNAL MEDICINE

## 2019-05-20 PROCEDURE — 36415 COLL VENOUS BLD VENIPUNCTURE: CPT

## 2019-05-20 PROCEDURE — 77030028698 HC BLD TISS PLSM MEDT -D: Performed by: INTERNAL MEDICINE

## 2019-05-20 PROCEDURE — 80048 BASIC METABOLIC PNL TOTAL CA: CPT

## 2019-05-20 PROCEDURE — 74011000250 HC RX REV CODE- 250

## 2019-05-20 PROCEDURE — 74011000272 HC RX REV CODE- 272: Performed by: INTERNAL MEDICINE

## 2019-05-20 PROCEDURE — 77030018673: Performed by: INTERNAL MEDICINE

## 2019-05-20 PROCEDURE — 96375 TX/PRO/DX INJ NEW DRUG ADDON: CPT

## 2019-05-20 DEVICE — SUBCUTANEOUS ELECTRODE
Type: IMPLANTABLE DEVICE | Site: CHEST  WALL | Status: FUNCTIONAL
Brand: EMBLEM™ S-ICD

## 2019-05-20 DEVICE — SUBCUTANEOUS IMPLANTABLE CARDIOVERTER DEFIBRILLATOR
Type: IMPLANTABLE DEVICE | Site: CHEST  WALL | Status: FUNCTIONAL
Brand: EMBLEM™ MRI S-ICD

## 2019-05-20 RX ORDER — HYDROCODONE BITARTRATE AND ACETAMINOPHEN 5; 325 MG/1; MG/1
1 TABLET ORAL
Status: DISCONTINUED | OUTPATIENT
Start: 2019-05-20 | End: 2019-05-20 | Stop reason: SDUPTHER

## 2019-05-20 RX ORDER — ATORVASTATIN CALCIUM 20 MG/1
40 TABLET, FILM COATED ORAL DAILY
Status: DISCONTINUED | OUTPATIENT
Start: 2019-05-21 | End: 2019-05-22 | Stop reason: HOSPADM

## 2019-05-20 RX ORDER — GLYCOPYRROLATE 0.2 MG/ML
INJECTION INTRAMUSCULAR; INTRAVENOUS AS NEEDED
Status: DISCONTINUED | OUTPATIENT
Start: 2019-05-20 | End: 2019-05-20 | Stop reason: HOSPADM

## 2019-05-20 RX ORDER — ROCURONIUM BROMIDE 10 MG/ML
INJECTION, SOLUTION INTRAVENOUS AS NEEDED
Status: DISCONTINUED | OUTPATIENT
Start: 2019-05-20 | End: 2019-05-20 | Stop reason: HOSPADM

## 2019-05-20 RX ORDER — SODIUM CHLORIDE 0.9 % (FLUSH) 0.9 %
5-40 SYRINGE (ML) INJECTION EVERY 8 HOURS
Status: DISCONTINUED | OUTPATIENT
Start: 2019-05-20 | End: 2019-05-20 | Stop reason: HOSPADM

## 2019-05-20 RX ORDER — SODIUM CHLORIDE 9 MG/ML
INJECTION, SOLUTION INTRAVENOUS
Status: DISCONTINUED | OUTPATIENT
Start: 2019-05-20 | End: 2019-05-20 | Stop reason: HOSPADM

## 2019-05-20 RX ORDER — ETOMIDATE 2 MG/ML
INJECTION INTRAVENOUS AS NEEDED
Status: DISCONTINUED | OUTPATIENT
Start: 2019-05-20 | End: 2019-05-20 | Stop reason: HOSPADM

## 2019-05-20 RX ORDER — ACETAMINOPHEN 325 MG/1
650 TABLET ORAL
Status: DISCONTINUED | OUTPATIENT
Start: 2019-05-20 | End: 2019-05-22 | Stop reason: HOSPADM

## 2019-05-20 RX ORDER — GENTAMICIN SULFATE 80 MG/100ML
INJECTION, SOLUTION INTRAVENOUS AS NEEDED
Status: DISCONTINUED | OUTPATIENT
Start: 2019-05-20 | End: 2019-05-20 | Stop reason: HOSPADM

## 2019-05-20 RX ORDER — MIDAZOLAM HYDROCHLORIDE 1 MG/ML
INJECTION, SOLUTION INTRAMUSCULAR; INTRAVENOUS AS NEEDED
Status: DISCONTINUED | OUTPATIENT
Start: 2019-05-20 | End: 2019-05-20 | Stop reason: HOSPADM

## 2019-05-20 RX ORDER — WARFARIN SODIUM 5 MG/1
5 TABLET ORAL
Status: DISCONTINUED | OUTPATIENT
Start: 2019-05-20 | End: 2019-05-22 | Stop reason: HOSPADM

## 2019-05-20 RX ORDER — HYDROCODONE BITARTRATE AND ACETAMINOPHEN 5; 325 MG/1; MG/1
1 TABLET ORAL
Status: DISCONTINUED | OUTPATIENT
Start: 2019-05-20 | End: 2019-05-22 | Stop reason: HOSPADM

## 2019-05-20 RX ORDER — LIDOCAINE HYDROCHLORIDE 20 MG/ML
INJECTION, SOLUTION EPIDURAL; INFILTRATION; INTRACAUDAL; PERINEURAL AS NEEDED
Status: DISCONTINUED | OUTPATIENT
Start: 2019-05-20 | End: 2019-05-20 | Stop reason: HOSPADM

## 2019-05-20 RX ORDER — TAMSULOSIN HYDROCHLORIDE 0.4 MG/1
0.4 CAPSULE ORAL DAILY
Status: DISCONTINUED | OUTPATIENT
Start: 2019-05-20 | End: 2019-05-22 | Stop reason: HOSPADM

## 2019-05-20 RX ORDER — SODIUM CHLORIDE 0.9 % (FLUSH) 0.9 %
5-40 SYRINGE (ML) INJECTION AS NEEDED
Status: DISCONTINUED | OUTPATIENT
Start: 2019-05-20 | End: 2019-05-22 | Stop reason: HOSPADM

## 2019-05-20 RX ORDER — SODIUM CHLORIDE 0.9 % (FLUSH) 0.9 %
5-40 SYRINGE (ML) INJECTION EVERY 8 HOURS
Status: DISCONTINUED | OUTPATIENT
Start: 2019-05-20 | End: 2019-05-22 | Stop reason: HOSPADM

## 2019-05-20 RX ORDER — ONDANSETRON 2 MG/ML
4 INJECTION INTRAMUSCULAR; INTRAVENOUS
Status: DISCONTINUED | OUTPATIENT
Start: 2019-05-20 | End: 2019-05-22 | Stop reason: HOSPADM

## 2019-05-20 RX ORDER — BUPIVACAINE HYDROCHLORIDE 5 MG/ML
INJECTION, SOLUTION EPIDURAL; INTRACAUDAL AS NEEDED
Status: DISCONTINUED | OUTPATIENT
Start: 2019-05-20 | End: 2019-05-20 | Stop reason: HOSPADM

## 2019-05-20 RX ORDER — ACETAMINOPHEN 10 MG/ML
1000 INJECTION, SOLUTION INTRAVENOUS ONCE
Status: COMPLETED | OUTPATIENT
Start: 2019-05-20 | End: 2019-05-20

## 2019-05-20 RX ORDER — DULOXETIN HYDROCHLORIDE 30 MG/1
60 CAPSULE, DELAYED RELEASE ORAL DAILY
Status: DISCONTINUED | OUTPATIENT
Start: 2019-05-21 | End: 2019-05-22 | Stop reason: HOSPADM

## 2019-05-20 RX ORDER — KETOROLAC TROMETHAMINE 30 MG/ML
INJECTION, SOLUTION INTRAMUSCULAR; INTRAVENOUS AS NEEDED
Status: DISCONTINUED | OUTPATIENT
Start: 2019-05-20 | End: 2019-05-20 | Stop reason: HOSPADM

## 2019-05-20 RX ORDER — SODIUM CHLORIDE 0.9 % (FLUSH) 0.9 %
5-40 SYRINGE (ML) INJECTION AS NEEDED
Status: DISCONTINUED | OUTPATIENT
Start: 2019-05-20 | End: 2019-05-20 | Stop reason: HOSPADM

## 2019-05-20 RX ORDER — NEOSTIGMINE METHYLSULFATE 1 MG/ML
INJECTION INTRAVENOUS AS NEEDED
Status: DISCONTINUED | OUTPATIENT
Start: 2019-05-20 | End: 2019-05-20 | Stop reason: HOSPADM

## 2019-05-20 RX ORDER — FENTANYL CITRATE 50 UG/ML
INJECTION, SOLUTION INTRAMUSCULAR; INTRAVENOUS AS NEEDED
Status: DISCONTINUED | OUTPATIENT
Start: 2019-05-20 | End: 2019-05-20 | Stop reason: HOSPADM

## 2019-05-20 RX ORDER — PROPOFOL 10 MG/ML
INJECTION, EMULSION INTRAVENOUS
Status: DISCONTINUED | OUTPATIENT
Start: 2019-05-20 | End: 2019-05-20 | Stop reason: HOSPADM

## 2019-05-20 RX ORDER — FUROSEMIDE 20 MG/1
20 TABLET ORAL EVERY OTHER DAY
Status: DISCONTINUED | OUTPATIENT
Start: 2019-05-21 | End: 2019-05-22 | Stop reason: HOSPADM

## 2019-05-20 RX ORDER — DEXAMETHASONE SODIUM PHOSPHATE 4 MG/ML
INJECTION, SOLUTION INTRA-ARTICULAR; INTRALESIONAL; INTRAMUSCULAR; INTRAVENOUS; SOFT TISSUE AS NEEDED
Status: DISCONTINUED | OUTPATIENT
Start: 2019-05-20 | End: 2019-05-20 | Stop reason: HOSPADM

## 2019-05-20 RX ORDER — SUCCINYLCHOLINE CHLORIDE 20 MG/ML
INJECTION INTRAMUSCULAR; INTRAVENOUS AS NEEDED
Status: DISCONTINUED | OUTPATIENT
Start: 2019-05-20 | End: 2019-05-20 | Stop reason: HOSPADM

## 2019-05-20 RX ORDER — CARVEDILOL 3.12 MG/1
3.12 TABLET ORAL 2 TIMES DAILY WITH MEALS
Status: DISCONTINUED | OUTPATIENT
Start: 2019-05-20 | End: 2019-05-22 | Stop reason: HOSPADM

## 2019-05-20 RX ORDER — CEFAZOLIN SODIUM 1 G/3ML
INJECTION, POWDER, FOR SOLUTION INTRAMUSCULAR; INTRAVENOUS AS NEEDED
Status: DISCONTINUED | OUTPATIENT
Start: 2019-05-20 | End: 2019-05-20 | Stop reason: HOSPADM

## 2019-05-20 RX ORDER — LIDOCAINE HYDROCHLORIDE 10 MG/ML
INJECTION INFILTRATION; PERINEURAL AS NEEDED
Status: DISCONTINUED | OUTPATIENT
Start: 2019-05-20 | End: 2019-05-20 | Stop reason: HOSPADM

## 2019-05-20 RX ORDER — AMIODARONE HYDROCHLORIDE 200 MG/1
200 TABLET ORAL DAILY
Status: DISCONTINUED | OUTPATIENT
Start: 2019-05-21 | End: 2019-05-22 | Stop reason: HOSPADM

## 2019-05-20 RX ORDER — ALLOPURINOL 100 MG/1
100 TABLET ORAL DAILY
Status: DISCONTINUED | OUTPATIENT
Start: 2019-05-21 | End: 2019-05-22 | Stop reason: HOSPADM

## 2019-05-20 RX ORDER — COLCHICINE 0.6 MG/1
0.6 TABLET ORAL EVERY OTHER DAY
Status: DISCONTINUED | OUTPATIENT
Start: 2019-05-21 | End: 2019-05-22 | Stop reason: HOSPADM

## 2019-05-20 RX ADMIN — CEFAZOLIN SODIUM 2 G: 1 INJECTION, POWDER, FOR SOLUTION INTRAMUSCULAR; INTRAVENOUS at 15:10

## 2019-05-20 RX ADMIN — DEXAMETHASONE SODIUM PHOSPHATE 8 MG: 4 INJECTION, SOLUTION INTRA-ARTICULAR; INTRALESIONAL; INTRAMUSCULAR; INTRAVENOUS; SOFT TISSUE at 16:24

## 2019-05-20 RX ADMIN — CEFAZOLIN 1 G: 1 INJECTION, POWDER, FOR SOLUTION INTRAMUSCULAR; INTRAVENOUS at 21:28

## 2019-05-20 RX ADMIN — SODIUM CHLORIDE 500 ML: 900 INJECTION, SOLUTION INTRAVENOUS at 17:30

## 2019-05-20 RX ADMIN — HYDROCODONE BITARTRATE AND ACETAMINOPHEN 1 TABLET: 5; 325 TABLET ORAL at 20:57

## 2019-05-20 RX ADMIN — FENTANYL CITRATE 50 MCG: 50 INJECTION, SOLUTION INTRAMUSCULAR; INTRAVENOUS at 15:08

## 2019-05-20 RX ADMIN — MIDAZOLAM HYDROCHLORIDE 2 MG: 1 INJECTION, SOLUTION INTRAMUSCULAR; INTRAVENOUS at 15:00

## 2019-05-20 RX ADMIN — ROCURONIUM BROMIDE 25 MG: 10 INJECTION, SOLUTION INTRAVENOUS at 15:12

## 2019-05-20 RX ADMIN — Medication 10 ML: at 21:32

## 2019-05-20 RX ADMIN — SACUBITRIL AND VALSARTAN 1 TABLET: 97; 103 TABLET, FILM COATED ORAL at 19:07

## 2019-05-20 RX ADMIN — GENTAMICIN SULFATE 80 MG: 80 INJECTION, SOLUTION INTRAVENOUS at 15:28

## 2019-05-20 RX ADMIN — ROCURONIUM BROMIDE 10 MG: 10 INJECTION, SOLUTION INTRAVENOUS at 15:39

## 2019-05-20 RX ADMIN — PROPOFOL 100 MCG/KG/MIN: 10 INJECTION, EMULSION INTRAVENOUS at 15:15

## 2019-05-20 RX ADMIN — SODIUM CHLORIDE: 9 INJECTION, SOLUTION INTRAVENOUS at 15:34

## 2019-05-20 RX ADMIN — CARVEDILOL 3.12 MG: 3.12 TABLET, FILM COATED ORAL at 19:06

## 2019-05-20 RX ADMIN — FENTANYL CITRATE 50 MCG: 50 INJECTION, SOLUTION INTRAMUSCULAR; INTRAVENOUS at 15:04

## 2019-05-20 RX ADMIN — KETOROLAC TROMETHAMINE 30 MG: 30 INJECTION, SOLUTION INTRAMUSCULAR; INTRAVENOUS at 16:24

## 2019-05-20 RX ADMIN — TAMSULOSIN HYDROCHLORIDE 0.4 MG: 0.4 CAPSULE ORAL at 20:55

## 2019-05-20 RX ADMIN — ETOMIDATE 24 MG: 2 INJECTION INTRAVENOUS at 15:05

## 2019-05-20 RX ADMIN — ROCURONIUM BROMIDE 5 MG: 10 INJECTION, SOLUTION INTRAVENOUS at 15:05

## 2019-05-20 RX ADMIN — SUCCINYLCHOLINE CHLORIDE 100 MG: 20 INJECTION INTRAMUSCULAR; INTRAVENOUS at 15:05

## 2019-05-20 RX ADMIN — ACETAMINOPHEN 1000 MG: 10 INJECTION, SOLUTION INTRAVENOUS at 17:07

## 2019-05-20 RX ADMIN — WARFARIN SODIUM 5 MG: 5 TABLET ORAL at 19:06

## 2019-05-20 RX ADMIN — SODIUM CHLORIDE: 9 INJECTION, SOLUTION INTRAVENOUS at 14:59

## 2019-05-20 RX ADMIN — GLYCOPYRROLATE 0.4 MG: 0.2 INJECTION INTRAMUSCULAR; INTRAVENOUS at 16:31

## 2019-05-20 RX ADMIN — LIDOCAINE HYDROCHLORIDE 80 MG: 20 INJECTION, SOLUTION EPIDURAL; INFILTRATION; INTRACAUDAL; PERINEURAL at 15:05

## 2019-05-20 RX ADMIN — NEOSTIGMINE METHYLSULFATE 3 MG: 1 INJECTION INTRAVENOUS at 16:31

## 2019-05-20 NOTE — PROGRESS NOTES
1800- TRANSFER - IN REPORT:    Verbal report received from Fermin Braxton RN(name) on An Mejia  being received from Cath Lab(unit) for routine progression of care      Report consisted of patients Situation, Background, Assessment and   Recommendations(SBAR). Information from the following report(s) SBAR, Kardex, Procedure Summary, Intake/Output, MAR, Recent Results, Med Rec Status and Cardiac Rhythm Sinus chavez was reviewed with the receiving nurse. Opportunity for questions and clarification was provided. Assessment completed upon patients arrival to unit and care assumed. 1900- Bedside and Verbal shift change report given to [facility] (oncoming nurse) by Shante De León RN (offgoing nurse). Report included the following information SBAR, Kardex, Procedure Summary, Intake/Output, MAR, Recent Results, Med Rec Status and Cardiac Rhythm sinus chavez.

## 2019-05-20 NOTE — ANESTHESIA PREPROCEDURE EVALUATION
Anesthetic History No history of anesthetic complications Review of Systems / Medical History Patient summary reviewed and pertinent labs reviewed Pulmonary Within defined limits Neuro/Psych Within defined limits Cardiovascular Hypertension CHF: dyspnea on exertion Dysrhythmias Pacemaker, past MI, CAD and cardiac stents Comments: ischemic Cardiomyopathy; h/o [employer] H/o infected AICD, LVAD Echo today EF 30-35 % Off coumadin, INR today 1.6 GI/Hepatic/Renal 
  
 
 
Renal disease: CRI Comments: Cr 1.6 Endo/Other Arthritis Other Findings Physical Exam 
 
Airway Mallampati: II 
 
Neck ROM: normal range of motion Mouth opening: Normal 
 
 Cardiovascular Rhythm: regular Rate: normal 
 
 
 
 Dental 
 
Dentition: Full upper dentures and Full lower dentures Pulmonary Breath sounds clear to auscultation Abdominal 
GI exam deferred Other Findings Anesthetic Plan ASA: 4 Anesthesia type: general 
 
Monitoring Plan: Arterial line Induction: Intravenous Anesthetic plan and risks discussed with: Patient and Family

## 2019-05-20 NOTE — Clinical Note
TRANSFER - IN REPORT:  
 
Verbal report received from: [facility] RN. Report consisted of patient's Situation, Background, Assessment and  
Recommendations(SBAR). Opportunity for questions and clarification was provided. Assessment completed upon patient's arrival to unit and care assumed. Patient transported with a Registered Nurse.

## 2019-05-20 NOTE — PERIOP NOTES
Spoke with Dr Caryn Urrutia via phone about patient's blood pressure. Order for 500 cc normal saline bolus received. Teetee Allen RN with new orders.

## 2019-05-20 NOTE — PERIOP NOTES
TRANSFER - OUT REPORT:    Verbal report given to Britt Mack RN on Adilene Patterson  being transferred to [facility](unit) for routine post - op       Report consisted of patients Situation, Background, Assessment and   Recommendations(SBAR). Information from the following report(s) SBAR, Kardex, OR Summary, Procedure Summary, MAR and Cardiac Rhythm Erica Bagley was reviewed with the receiving nurse. Lines:   Peripheral IV 05/20/19 Right Wrist (Active)   Site Assessment Clean, dry, & intact 5/20/2019  4:50 PM   Phlebitis Assessment 0 5/20/2019  4:50 PM   Infiltration Assessment 0 5/20/2019  4:50 PM   Dressing Status Clean, dry, & intact 5/20/2019  4:50 PM   Dressing Type Tape;Transparent 5/20/2019  4:50 PM   Hub Color/Line Status Pink; Infusing;Patent 5/20/2019  4:50 PM   Action Taken Open ports on tubing capped 5/20/2019  4:50 PM   Alcohol Cap Used Yes 5/20/2019  4:50 PM       Peripheral IV 05/20/19 Anterior; Left Wrist (Active)   Site Assessment Clean, dry, & intact 5/20/2019  5:01 PM   Phlebitis Assessment 0 5/20/2019  5:01 PM   Infiltration Assessment 0 5/20/2019  5:01 PM   Dressing Status Clean, dry, & intact 5/20/2019  5:01 PM   Dressing Type Transparent;Tape 5/20/2019  5:01 PM   Hub Color/Line Status Pink 5/20/2019  5:01 PM   Action Taken Open ports on tubing capped 5/20/2019  5:01 PM   Alcohol Cap Used Yes 5/20/2019  5:01 PM        Opportunity for questions and clarification was provided.       Patient transported with:   Monitor  O2 @ 2 liters  Registered Nurse

## 2019-05-20 NOTE — PROCEDURES
Cardiac Electrophysiology Report        PATIENT INFORMATION     Patient Name: Meme Cardenas     MRN: [id number]    Study Date: 2019    YOB: 1955      Age: 59 y.o. Gender: male      Procedure:  ICD Implantation    Referring Physician:  Fabiana Alvarez and Dr. Rubin Trejo Name   Electrophysiologist Sindi Boyer MD   Monitor Anesthesia service   Circulator Obdulio Cox RN; Jasmina Henao RT; Dariana Anthony RN       PATIENT HISTORY     Jessica Mann is a 59 y. o. male with ischemic cardiomyopathy, LVEF 30-35%, who has been on guideline directed medical therapy > 3 months, s/p ICD extraction for infection, LVAD s/p explantation, currently NYHA class II now presenting for subcutaneous ICD implantation for primary prevention of sudden death.            PROCEDURE     The patient was brought to the Cardiac Electrophysiology laboratory in a post-absorptive, fasting state. Informed consent was obtained. A peripheral IV was in place. Continuous electrocardiographic, blood pressure, O2 saturation and  CO2 monitoring was initiated. Intravenous antibiotics were administered pre-operatively. Self-adhesive cardioversion patches were positioned on the chest.  Conscious sedation was effectuated according to protocol. The patient was then prepped and draped in the usual sterile fashion. A 50/50 mixture of lidocaine (1%) with epinephrine and bupivicaine (0.5%) was utilized for local anesthesia. An incision was performed along the left mid-axillary line and sub-xiphoid. Sharp and blunt dissection was carried down to the level of the pre-pectoralis fascia. Hemostasis was maintained with electrocautery. A device pocket was then fashioned and flushed copiously with antibiotic solution. Using a tunneling tool, a sheath was tunneled from the device pocket to the sub-xiphoid pocket. An ICD lead was advanced from the device pocket to the sub-xiphoid pocket.  The lead was then tunneled superiorly along the left para-sternal plane. The lead was anchored using 0-ethibond suture material in the sub-xiphoid pocket. An ICD generator was connected to the lead and the generator was placed into the pocket. The device was secured to the pocket using O-ethibond, non-absorbable suture material. The pockets were then closed in three layers using 2-O vicryl, 3-O monocryl absorbable suture material and ziplines. The skin was closed using a sub-cuticular technique. A bio-occlusive dressing were applied to the skin. Defibrillation threshold testing was performed successfully. The patient remained hemodynamically stable, tolerated the procedure well and was transferred in stable condition. There were no immediate complications encountered during the procedure. No specimen were removed; there was minimal blood loss. LEAD & GENERATOR DATA       Model # Serial #   Generator [employer] Project [id number]   Ventricular Lead Day [employer] Project [id number]         FINAL PROGRAMMING     Pacing Mode Lower Rate (ppm) Upper Rate (ppm)   VVI 40    VF Rate (bpm) # Antitachycardia Pacing [employer] (J)   215 Quick Convert 41 x 6   VT Rate (bpm) # Antitachycardia Pacing [employer] (J)   185 Busts (3) 41 x 5         DEFIBRILLATION THRESHOLD TESTING     Result: success  Ohms: 51  Time to therapy: 16 seconds        MEDICATION SUMMARY     Medication Route Unit Total   Gentamycin IV mg 80   Ancef IV grams 2         CONCLUSIONS     1. Successful implantation of a subcutaneous ICD. 2. Oral antibiotics x 5 days. 3. Portable chest Xray now  4. Wound check in EP clinic in 10 days. 5. Follow up in EP clinic in 1 month or earlier if necessary. 6. Follow up with Ros Shook MD and Dr. Darlene Carrion as scheduled. Thank you, Ros Shook MD and Dr. Darlene Carrion for involving me in the care of this extraordinarily pleasant male.               Horacio Jurado MD  Cardiac Electrophysiology / Cardiology    [address], [address], [facility] [address]       [address]  [phone] / [phone] Fax     [phone] / [phone] Fax

## 2019-05-20 NOTE — ANESTHESIA POSTPROCEDURE EVALUATION
Procedure(s): 
INSERT SUBQ ICD w/ anesthesia. general 
 
Anesthesia Post Evaluation Multimodal analgesia: multimodal analgesia not used between 6 hours prior to anesthesia start to PACU discharge Patient location during evaluation: PACU Patient participation: complete - patient participated Level of consciousness: awake Pain management: adequate Airway patency: patent Anesthetic complications: no 
Cardiovascular status: acceptable, blood pressure returned to baseline and hemodynamically stable Respiratory status: acceptable Hydration status: acceptable Post anesthesia nausea and vomiting:  controlled Vitals Value Taken Time BP 90/53 5/20/2019  6:00 PM  
Temp 36.5 °C (97.7 °F) 5/20/2019  4:51 PM  
Pulse 53 5/20/2019  6:02 PM  
Resp 15 5/20/2019  6:02 PM  
SpO2 100 % 5/20/2019  6:02 PM  
Vitals shown include unvalidated device data.

## 2019-05-20 NOTE — PROGRESS NOTES
12:35 PM  Patient arrived. ID and allergies verified verbally with patient. Pt voices understanding of procedure to be performed. Consent obtained. Pt prepped for procedure. Patient and family oriented to fall prevention protocol. Understanding verbalized. Yellow band and socks applied    2:51 PM  TRANSFER - OUT REPORT:    Verbal report given to Gilbert[facility]  being transferred to EP(unit) for ordered procedure       Report consisted of patients Situation, Background, Assessment and   Recommendations(SBAR). Information from the following report(s) SBAR was reviewed with the receiving nurse. Lines:   Peripheral IV 05/20/19 Right Wrist (Active)        Opportunity for questions and clarification was provided.       Patient transported with:   Registered Nurse

## 2019-05-20 NOTE — Clinical Note
Lateral incision closed with 2-0 vicryl, 30 monocryl, skin approximated with zip line closure device, aquacel to be applied post procedure

## 2019-05-20 NOTE — Clinical Note
TRANSFER - OUT REPORT:  
 
Verbal report given to: David Lynne RN. Report consisted of patient's Situation, Background, Assessment and  
Recommendations(SBAR). Opportunity for questions and clarification was provided. Patient transported with a Registered Nurse. Patient transported to: PACU.

## 2019-05-20 NOTE — Clinical Note
Xyphoid incision closed with 2-0 vicryl, 3-0 monocryl, skin approximated with zipline closure device, aquacel will be applied topically post procedure

## 2019-05-20 NOTE — H&P
HISTORY OF PRESENTING ILLNESS      Sharad Kruse is a 59 y.o. male here for follow-up after abstaining from alcohol consumption for a total of 3 months to determine whether his LV function has recovered and whether reimplantation of an ICD is warranted. Previous LV function was 20-25%. Echocardiogram performed today demonstrates a left ventricular ejection fraction of 30-35%. He has been on guideline directed medical therapy > 3 months.  Currently NYHA class II.           ACTIVE PROBLEM LIST           Patient Active Problem List     Diagnosis Date Noted    DSOUZA (dyspnea on exertion) 01/30/2019    Erectile dysfunction 09/08/2017    ICD (implantable cardioverter-defibrillator) infection ([address] 01/20/2017    Gout 12/30/2016    Chronic anticoagulation 12/27/2016    Left ventricular assist device present ([address] 12/27/2016    Sustained VT (ventricular tachycardia) ([address] 12/13/2016    MI (myocardial infarction) ([address] 11/27/2016    Chronic systolic heart failure ([address] 11/26/2016    CAD, multiple vessel 11/26/2016    Ischemic cardiomyopathy 11/26/2016    Sinus tachycardia 11/24/2016    Acute respiratory failure with hypoxia ([address] 11/24/2016    Essential hypertension 11/24/2016    Alcohol abuse 11/24/2016    S/P laminectomy 11/22/2016             PAST MEDICAL HISTORY           Past Medical History:   Diagnosis Date    Arrhythmia       SVT    Arthritis      CAD (coronary artery disease)      Chronic pain       back    Congestive heart failure (HCC)      DSOUZA (dyspnea on exertion) 1/30/2019    Gout      Heart failure (HCC)      Hypertension      ICD (implantable cardioverter-defibrillator) in place      Long term current use of anticoagulant therapy      LVAD (left ventricular assist device) present ([address] 12/01/2016    Myocardial infarction (HCC)      Raynaud disease      Seizures ([address]       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             PAST SURGICAL HISTORY            Past Surgical History:   Procedure Laterality Date    CABG, ARTERY-VEIN, TWO   2016    CARDIAC SURG PROCEDURE UNLIST   2016     LVAD    HX CORONARY ARTERY BYPASS GRAFT        HX CORONARY STENT PLACEMENT        HX HEART CATHETERIZATION        HX HEENT         wisdom teeth removed    HX ORTHOPAEDIC   2016     laminectomy    HX PACEMAKER         ICD - removed 2017    HX PTCA                 ALLERGIES            Allergies   Allergen Reactions    Morphine Other (comments)       Hallucinations. Confusion.  Impaired judgement    Chlorhexidine Rash            FAMILY HISTORY            Family History   Problem Relation Age of Onset    Diabetes Mother      Dementia Mother      Other Mother           carotid artery blockage    Heart Disease Father      Stroke Father      Heart Attack Father           several    Hypertension Father      Elevated Lipids Father      No Known Problems Sister      Cancer Brother           brain    Lung Disease Sister      COPD Sister      Cancer Sister           lung    negative for cardiac disease         SOCIAL HISTORY      Social History               Socioeconomic History    Marital status:        Spouse name: Africa Sommer    Number of children: 2    Years of education: Not on file    Highest education level: Some college, no degree   Social Needs    Financial resource strain: Not hard at all   Will insecurity:       Worry: Never true       Inability: Never true    Transportation needs:       Medical: No       Non-medical: No   Tobacco Use    Smoking status: Former Smoker       Packs/day: 1.50       Years: 30.00       Pack years: 45.00       Last attempt to quit:        Years since quittin.2    Smokeless tobacco: Never Used   Substance and Sexual Activity    Alcohol use: No    Drug use: No    Sexual activity: Never   Other Topics Concern               MEDICATIONS           Current Outpatient Medications   Medication Sig    sacubitril-valsartan (ENTRESTO) 97 mg/103 mg tablet Take 1 Tab by mouth two (2) times a day.  furosemide (LASIX) 40 mg tablet Take 20 mg by mouth every other day.  carvedilol (COREG) 3.125 mg tablet TAKE 1 TABLET BY MOUTH TWICE A DAY WITH MEALS    warfarin (COUMADIN) 2.5 mg tablet Take 2 Tabs by mouth daily (with dinner).  amiodarone (CORDARONE) 200 mg tablet Take 1 Tab by mouth daily.  patiromer calcium sorbitex (VELTASSA) 8.4 gram powder Take 8.4 g by mouth daily.  HYDROcodone-acetaminophen (NORCO) 5-325 mg per tablet TAKE 1 TABLET EVERY 6 HOURS AS NEEDED    DULoxetine (CYMBALTA) 60 mg capsule Take 60 mg by mouth daily.  allopurinol (ZYLOPRIM) 100 mg tablet Take  by mouth daily.  tamsulosin (FLOMAX) 0.4 mg capsule Take 0.4 mg by mouth daily.  atorvastatin (LIPITOR) 40 mg tablet Take 40 mg by mouth daily.  colchicine 0.6 mg tablet Take 0.6 mg by mouth every other day.  cyclobenzaprine (FLEXERIL) 5 mg tablet Take 5 mg by mouth daily as needed for Muscle Spasm(s).  aspirin 81 mg chewable tablet Take 81 mg by mouth daily.  tadalafil (CIALIS) 10 mg tablet Take 1 Tab by mouth as needed. Indications: Erectile Dysfunction    ascorbic acid, vitamin C, (VITAMIN C) 500 mg tablet Take 1 Tab by mouth two (2) times a day.      No current facility-administered medications for this visit.          I have reviewed the nurses notes, vitals, problem list, allergy list, medical history, family, social history and medications.         REVIEW OF SYMPTOMS      General: Pt denies excessive weight gain or loss. Pt is able to conduct ADL's  HEENT: Denies blurred vision, headaches, hearing loss, epistaxis and difficulty swallowing. Respiratory: Denies cough, congestion, shortness of breath, DSOUZA, wheezing or stridor.   Cardiovascular: Denies precordial pain, palpitations, edema or PND  Gastrointestinal: Denies poor appetite, indigestion, abdominal pain or blood in stool  Genitourinary: Denies hematuria, dysuria, increased urinary frequency  Musculoskeletal: Denies joint pain or swelling from muscles or joints  Neurologic: Denies tremor, paresthesias, headache, or sensory motor disturbance  Psychiatric: Denies confusion, insomnia, depression  Integumentray: Denies rash, itching or ulcers. Hematologic: Denies easy bruising, bleeding         PHYSICAL EXAMINATION          Vitals:     04/17/19 1012   BP: 109/70   Pulse: 64   Resp: 20   SpO2: 96%   Weight: 170 lb (77.1 kg)   Height: 5' 10\" (1.778 m)      General: Well developed, in no acute distress. HEENT: No jaundice, oral mucosa moist, no oral ulcers  Neck: Supple, no stiffness, no lymphadenopathy, supple  Heart:  Normal S1/S2 negative S3 or S4. Regular, no murmur, gallop or rub, no jugular venous distention  Respiratory: Clear bilaterally x 4, no wheezing or rales  Abdomen:   Soft, non-tender, bowel sounds are active.   Extremities:  No edema, normal cap refill, no cyanosis. Musculoskeletal: No clubbing, no deformities  Neuro: A&Ox3, speech clear, gait stable, cooperative, no focal neurologic deficits  Skin: Skin color is normal. No rashes or lesions.  Non diaphoretic, moist.  Vascular: 2+ pulses symmetric in all extremities         DIAGNOSTIC DATA      EKG: Sinus rhythm with narrow QRS duration         LABORATORY DATA            Lab Results   Component Value Date/Time     WBC 8.5 12/05/2018 12:00 AM     HGB 10.2 (L) 12/05/2018 12:00 AM     HCT 32.8 (L) 12/05/2018 12:00 AM     PLATELET 228 06/75/2049 12:00 AM     MCV 86 12/05/2018 12:00 AM            Lab Results   Component Value Date/Time     Sodium 138 02/28/2019 12:06 PM     Potassium 4.7 02/28/2019 12:06 PM     Chloride 104 02/28/2019 12:06 PM     CO2 21 02/28/2019 12:06 PM     Anion gap 8 01/24/2017 05:33 AM     Glucose 80 02/28/2019 12:06 PM     BUN 31 (H) 02/28/2019 12:06 PM     Creatinine 1.21 02/28/2019 12:06 PM     BUN/Creatinine ratio 26 (H) 02/28/2019 12:06 PM     GFR est AA 74 02/28/2019 12:06 PM     GFR est non-AA 64 02/28/2019 12:06 PM     Calcium 8.8 02/28/2019 12:06 PM     Bilirubin, total 0.5 12/05/2018 12:00 AM     AST (SGOT) 20 12/05/2018 12:00 AM     Alk.  phosphatase 97 12/05/2018 12:00 AM     Protein, total 6.4 12/05/2018 12:00 AM     Albumin 4.2 12/05/2018 12:00 AM     Globulin 3.6 01/24/2017 05:33 AM     A-G Ratio 1.9 12/05/2018 12:00 AM     ALT (SGPT) 15 12/05/2018 12:00 AM             ASSESSMENT      1.  Cardiomyopathy              A. Ischemic              B. Left ventricular ejection fraction 20-25%              C. QRS duration 92 ms  2.  Hypertension  3.  CAD, native  4.  History myocardial infarction  5.  History of ventricular tachycardia  6.  History of LVAD status post explant  7.  Seizure disorder  8.  CABG         PLAN      Plan for Subcutaneous ICD           Cristopher Whitehead MD  Cardiac Electrophysiology / Cardiology     [address] [place] [address], [address]  [address]  [phone] / [phone] Fax                                    [phone] / [phone] Fax

## 2019-05-21 LAB
ANION GAP SERPL CALC-SCNC: 8 MMOL/L (ref 5–15)
BUN SERPL-MCNC: 46 MG/DL (ref 6–20)
BUN/CREAT SERPL: 25 (ref 12–20)
CALCIUM SERPL-MCNC: 8.5 MG/DL (ref 8.5–10.1)
CHLORIDE SERPL-SCNC: 108 MMOL/L (ref 97–108)
CO2 SERPL-SCNC: 20 MMOL/L (ref 21–32)
CREAT SERPL-MCNC: 1.85 MG/DL (ref 0.7–1.3)
GLUCOSE BLD STRIP.AUTO-MCNC: 111 MG/DL (ref 65–100)
GLUCOSE BLD STRIP.AUTO-MCNC: 159 MG/DL (ref 65–100)
GLUCOSE SERPL-MCNC: 152 MG/DL (ref 65–100)
INR PPP: 1.4 (ref 0.9–1.1)
POTASSIUM SERPL-SCNC: 5.1 MMOL/L (ref 3.5–5.1)
PROTHROMBIN TIME: 13.5 SEC (ref 9–11.1)
SERVICE CMNT-IMP: ABNORMAL
SERVICE CMNT-IMP: ABNORMAL
SODIUM SERPL-SCNC: 136 MMOL/L (ref 136–145)

## 2019-05-21 PROCEDURE — 74011250636 HC RX REV CODE- 250/636: Performed by: INTERNAL MEDICINE

## 2019-05-21 PROCEDURE — 80048 BASIC METABOLIC PNL TOTAL CA: CPT

## 2019-05-21 PROCEDURE — 82962 GLUCOSE BLOOD TEST: CPT

## 2019-05-21 PROCEDURE — 74011250637 HC RX REV CODE- 250/637: Performed by: INTERNAL MEDICINE

## 2019-05-21 PROCEDURE — 99218 HC RM OBSERVATION: CPT

## 2019-05-21 PROCEDURE — 85610 PROTHROMBIN TIME: CPT

## 2019-05-21 PROCEDURE — 74011636637 HC RX REV CODE- 636/637: Performed by: NURSE PRACTITIONER

## 2019-05-21 PROCEDURE — 74011000258 HC RX REV CODE- 258: Performed by: INTERNAL MEDICINE

## 2019-05-21 PROCEDURE — 74011250637 HC RX REV CODE- 250/637: Performed by: NURSE PRACTITIONER

## 2019-05-21 PROCEDURE — 51798 US URINE CAPACITY MEASURE: CPT

## 2019-05-21 PROCEDURE — 36415 COLL VENOUS BLD VENIPUNCTURE: CPT

## 2019-05-21 PROCEDURE — 74011250636 HC RX REV CODE- 250/636: Performed by: NURSE PRACTITIONER

## 2019-05-21 PROCEDURE — 77010033678 HC OXYGEN DAILY

## 2019-05-21 RX ORDER — DEXTROSE 50 % IN WATER (D50W) INTRAVENOUS SYRINGE
12.5-25 AS NEEDED
Status: DISCONTINUED | OUTPATIENT
Start: 2019-05-21 | End: 2019-05-22 | Stop reason: HOSPADM

## 2019-05-21 RX ORDER — GUAIFENESIN 100 MG/5ML
81 LIQUID (ML) ORAL DAILY
Status: DISCONTINUED | OUTPATIENT
Start: 2019-05-22 | End: 2019-05-22 | Stop reason: HOSPADM

## 2019-05-21 RX ORDER — CEPHALEXIN 250 MG/1
250 CAPSULE ORAL 3 TIMES DAILY
Status: DISCONTINUED | OUTPATIENT
Start: 2019-05-21 | End: 2019-05-22 | Stop reason: HOSPADM

## 2019-05-21 RX ORDER — MAGNESIUM SULFATE 100 %
4 CRYSTALS MISCELLANEOUS AS NEEDED
Status: DISCONTINUED | OUTPATIENT
Start: 2019-05-21 | End: 2019-05-22 | Stop reason: HOSPADM

## 2019-05-21 RX ORDER — INSULIN LISPRO 100 [IU]/ML
INJECTION, SOLUTION INTRAVENOUS; SUBCUTANEOUS
Status: DISCONTINUED | OUTPATIENT
Start: 2019-05-21 | End: 2019-05-22 | Stop reason: HOSPADM

## 2019-05-21 RX ORDER — CEPHALEXIN 250 MG/1
250 CAPSULE ORAL 3 TIMES DAILY
Qty: 15 CAP | Refills: 0 | Status: SHIPPED | OUTPATIENT
Start: 2019-05-21 | End: 2019-05-28 | Stop reason: ALTCHOICE

## 2019-05-21 RX ADMIN — ALLOPURINOL 100 MG: 100 TABLET ORAL at 08:20

## 2019-05-21 RX ADMIN — TAMSULOSIN HYDROCHLORIDE 0.4 MG: 0.4 CAPSULE ORAL at 22:21

## 2019-05-21 RX ADMIN — DULOXETINE HYDROCHLORIDE 60 MG: 30 CAPSULE, DELAYED RELEASE ORAL at 08:20

## 2019-05-21 RX ADMIN — SODIUM CHLORIDE 250 ML: 900 INJECTION, SOLUTION INTRAVENOUS at 12:00

## 2019-05-21 RX ADMIN — CEFAZOLIN 1 G: 1 INJECTION, POWDER, FOR SOLUTION INTRAMUSCULAR; INTRAVENOUS at 15:08

## 2019-05-21 RX ADMIN — ATORVASTATIN CALCIUM 40 MG: 20 TABLET, FILM COATED ORAL at 08:20

## 2019-05-21 RX ADMIN — PATIROMER 8.4 G: 8.4 POWDER, FOR SUSPENSION ORAL at 15:28

## 2019-05-21 RX ADMIN — Medication 10 ML: at 22:22

## 2019-05-21 RX ADMIN — AMIODARONE HYDROCHLORIDE 200 MG: 200 TABLET ORAL at 08:20

## 2019-05-21 RX ADMIN — Medication 10 ML: at 06:00

## 2019-05-21 RX ADMIN — COLCHICINE 0.6 MG: 0.6 TABLET, FILM COATED ORAL at 22:25

## 2019-05-21 RX ADMIN — CEFAZOLIN 1 G: 1 INJECTION, POWDER, FOR SOLUTION INTRAMUSCULAR; INTRAVENOUS at 06:01

## 2019-05-21 RX ADMIN — SACUBITRIL AND VALSARTAN 1 TABLET: 97; 103 TABLET, FILM COATED ORAL at 08:20

## 2019-05-21 RX ADMIN — CEPHALEXIN 250 MG: 250 CAPSULE ORAL at 15:28

## 2019-05-21 RX ADMIN — CARVEDILOL 3.12 MG: 3.12 TABLET, FILM COATED ORAL at 16:33

## 2019-05-21 RX ADMIN — FUROSEMIDE 20 MG: 20 TABLET ORAL at 08:20

## 2019-05-21 RX ADMIN — Medication 10 ML: at 15:28

## 2019-05-21 RX ADMIN — CARVEDILOL 3.12 MG: 3.12 TABLET, FILM COATED ORAL at 07:18

## 2019-05-21 RX ADMIN — INSULIN LISPRO 2 UNITS: 100 INJECTION, SOLUTION INTRAVENOUS; SUBCUTANEOUS at 16:33

## 2019-05-21 RX ADMIN — HYDROCODONE BITARTRATE AND ACETAMINOPHEN 1 TABLET: 5; 325 TABLET ORAL at 02:31

## 2019-05-21 RX ADMIN — HYDROCODONE BITARTRATE AND ACETAMINOPHEN 1 TABLET: 5; 325 TABLET ORAL at 08:20

## 2019-05-21 RX ADMIN — WARFARIN SODIUM 5 MG: 5 TABLET ORAL at 16:33

## 2019-05-21 RX ADMIN — HYDROCODONE BITARTRATE AND ACETAMINOPHEN 1 TABLET: 5; 325 TABLET ORAL at 20:15

## 2019-05-21 RX ADMIN — CEPHALEXIN 250 MG: 250 CAPSULE ORAL at 22:21

## 2019-05-21 NOTE — PROGRESS NOTES
Bedside and Verbal shift change report given to Ofelia Kendall (oncoming nurse) by Lewis Whitehead (offgoing nurse). Report included the following information SBAR, Kardex, Accordion and Recent Results. 5/21/19  0700  Bedside and Verbal shift change report given to Lewis Whitehead (oncoming nurse) by Ofelia Kendall (offgoing nurse). Report included the following information SBAR, Kardex, Accordion and Recent Results.

## 2019-05-21 NOTE — PROGRESS NOTES
Problem: Pacer/ICD: Post-Procedure  Goal: Activity/Safety  Outcome: Progressing Towards Goal     Problem: Pacer/ICD: Post-Procedure  Goal: Diagnostic Test/Procedures  Outcome: Progressing Towards Goal     Problem: Pacer/ICD: Post-Procedure  Goal: Nutrition/Diet  Outcome: Progressing Towards Goal     Problem: Pacer/ICD: Post-Procedure  Goal: Discharge Planning  Outcome: Progressing Towards Goal     Problem: Pacer/ICD: Post-Procedure  Goal: Medications  Outcome: Progressing Towards Goal     Problem: Pacer/ICD: Post-Procedure  Goal: Respiratory  Outcome: Progressing Towards Goal     Problem: Pacer/ICD: Post-Procedure  Goal: Treatments/Interventions/Procedures  Outcome: Progressing Towards Goal     Problem: Pacer/ICD: Post-Procedure  Goal: Psychosocial  Outcome: Progressing Towards Goal     Problem: Pacer/ICD: Post-Procedure  Goal: *Hemodynamically stable  Outcome: Progressing Towards Goal     Problem: Pacer/ICD: Post-Procedure  Goal: *Optimal pain control at patient's stated goal  Outcome: Progressing Towards Goal     Problem: Pacer/ICD: Post-Procedure  Goal: *Absence of signs and symptoms of infection or wound complication  Outcome: Progressing Towards Goal

## 2019-05-21 NOTE — PROGRESS NOTES
Cardiology Progress Note                             [address] [place] [address]. [address], [address]                                 Phone [phone]; Fax [phone]        2019 2:44 PM     Admit Date:           2019  Admit Diagnosis:  Cardiomyopathy ([facility] [I42.9]  CHF (congestive heart failure) ([facility] [I50.9]  :          1955   MRN:          [id number]   ASSESSMENT/RECOMMENDATION:   S/p subcutaneous ICD: device check shows proper function per Dr Keo Vigil.   - Garlon Hum (renal dose) Rx/ has completed IV antibiotics   - chest Xray without pneumothorax , but left pleural effusion-had lasix today -  no DSOUZA/SOB  -prn pain medication     Cardiomyopathy, ischemic: s/p ICD   -will need hold entresto d/t low BP and worsening renal function. Will resume OP  -recheck creatinine in AM   -cont BB/lasix q other day    Hypertension now hypotensive: on low side post procedure- holding entresto. -250 cc fluid bolus given     CAD/MI/CABG: statin/ASA/BB    Hx fo VT: continue amiodarone     Lv thrombus? On coumadin, INR 1.4- recheck in AM    KLEVER, pre and post renal: holding entresto. - had not urinated in over 18 hours, bladder scan done. Straight cath ordered, patient finally urinated around 1400.  -recheck creatinine in AM    Elevated glucose: check HgbA1c  -SSI  KIRAN blood glucose checks     Hyperkalemia: resume valtessa                  07 - 1900  In: -   Out: 350 [Urine:350]    Last 3 Recorded Weights in this Encounter    19 1301   Weight: 170 lb 15.8 oz (77.6 kg)         1901 -  07  In: 2728 [P.O.:240; I.V.:1050]  Out: -     SUBJECTIVE               Ruthine Jetty denies palpitations, irregular heart beat, SOB, chest pain or LE edema.    No lightheadedness or dizziness  Has not urinated since before ICD was placed yesterday          Current Facility-Administered Medications   Medication Dose Route Frequency    cephALEXin (KEFLEX) capsule 250 mg  250 mg Oral TID    sodium chloride (NS) flush 5-40 mL  5-40 mL IntraVENous Q8H    sodium chloride (NS) flush 5-40 mL  5-40 mL IntraVENous PRN    acetaminophen (TYLENOL) tablet 650 mg  650 mg Oral Q4H PRN    HYDROcodone-acetaminophen (NORCO) 5-325 mg per tablet 1 Tab  1 Tab Oral Q4H PRN    ondansetron (ZOFRAN) injection 4 mg  4 mg IntraVENous Q4H PRN    ceFAZolin (ANCEF) 1 g in 0.9% sodium chloride (MBP/ADV) 50 mL  1 g IntraVENous Q8H    allopurinol (ZYLOPRIM) tablet 100 mg  100 mg Oral DAILY    amiodarone (CORDARONE) tablet 200 mg  200 mg Oral DAILY    atorvastatin (LIPITOR) tablet 40 mg  40 mg Oral DAILY    carvedilol (COREG) tablet 3.125 mg  3.125 mg Oral BID WITH MEALS    colchicine tablet 0.6 mg  0.6 mg Oral EVERY OTHER DAY    DULoxetine (CYMBALTA) capsule 60 mg  60 mg Oral DAILY    furosemide (LASIX) tablet 20 mg  20 mg Oral EVERY OTHER DAY    tamsulosin (FLOMAX) capsule 0.4 mg  0.4 mg Oral DAILY    warfarin (COUMADIN) tablet 5 mg  5 mg Oral DAILY WITH DINNER      OBJECTIVE               Intake/Output Summary (Last 24 hours) at 5/21/2019 1444  Last data filed at 5/21/2019 1411  Gross per 24 hour   Intake 1290 ml   Output 350 ml   Net 940 ml       Review of Systems - History obtained from the patient AS PER  HPI    Telemetry NSR    PHYSICAL EXAM        Visit Vitals  /60 (BP Patient Position: Standing)   Pulse 66   Temp 97.9 °F (36.6 °C)   Resp 18   Ht 5' 10\" (1.778 m)   Wt 170 lb 15.8 oz (77.6 kg)   SpO2 96%   BMI 24.53 kg/m²       Gen: Well-developed, well-nourished, in no acute distress  alert and oriented x 3  HEENT:  Pink conjunctivae, Hearing grossly normal.No scleral icterus or conjunctival, moist mucous membranes  Neck: Supple,No JVD  Resp: No accessory muscle use, few crackles/diminished LLL  Card: Regular Rate,Rythm, 2/6 murmurs, no rubs or gallop. No thrills.    GI:          soft, non-tender   MSK: No cyanosis or clubbing, good capillary refill  Skin: No rashes or ulcers, no bruising. CDI aquacel dressing at xiphoid region and left axillary- mild swelling- no redness visualized. Tender  Neuro:  Cranial nerves are grossly intact, moving all four extremities, no focal deficit, follows commands appropriately  Psych:  Good insight, oriented to person, place and time, alert, Nml Affect  LE: No edema       DATA REVIEW            Laboratory and Imaging have been reviewed by me and are notable for  No results for input(s): CPK, CKMB, TROIQ in the last 72 hours.   Recent Labs     05/21/19  0909 05/20/19  1255    137   K 5.1 4.8   CO2 20* 21   BUN 46* 42*   CREA 1.85* 1.63*   * 93             Kayla Adame NP

## 2019-05-21 NOTE — DISCHARGE SUMMARY
Cardiology Discharge Summary     Patient ID:  Meme Cardenas  [id number]  5 y.o.  1955    Admit Date: 5/20/2019    Discharge Date: 5/22/2019     Admitting Physician: Trini Lundberg MD     Discharge Physician: Dr. Ajay Degroot, NP    Admission Diagnoses: Cardiomyopathy [facility]) [I42.9]  CHF (congestive heart failure) ([facility] [I50.9]    Discharge Diagnoses: Active Problems:    CHF (congestive heart failure) ([facility] (5/20/2019)        Discharge Condition: Good    Cardiology Procedures this Admission:  subcutaneous ICD implant    Consults: None        Discharge Exam:     Visit Vitals  /75 (BP 1 Location: Left arm, BP Patient Position: At rest)   Pulse 75   Temp 97.5 °F (36.4 °C)   Resp 18   Ht 5' 10\" (1.778 m)   Wt 181 lb 14.1 oz (82.5 kg)   SpO2 93%   BMI 26.10 kg/m²     General Appearance:  Well developed, well nourished,alert and oriented x 3, and individual in no acute distress. Ears/Nose/Mouth/Throat:   Hearing grossly normal.         Neck: Supple. Chest:   Slightly diminished LLL   Cardiovascular:  Regular rate and rhythm, 2/6 murmur. Abdomen:   Soft, non-tender, bowel sounds are active. Extremities: No edema bilaterally. Skin: Warm and dry. CDI aquacel dressing at xiphoid region and left axillary- mild swelling- no redness visualized. Tender. HOSPITAL COURSE: Jessica Mann is a 59 y. o. male here for follow-up after abstaining from alcohol consumption for a total of 3 months to determine whether his LV function has recovered and whether reimplantation of an ICD is warranted.  Previous LV function was 20-25%.  Echocardiogram performed today demonstrates a left ventricular ejection fraction of 30-35%. He has been on guideline directed medical therapy > 3 months. Currently NYHA class II.     A/P  S/p subcutaneous ICD: device check shows proper function per Dr Susanne Abbott (renal dose) Rx   - chest Xray without pneumothorax , but left pleural effusion (stable)-resume home lasix every other day  -he has norco at home, encouraged him to use tylenol     Cardiomyopathy, ischemic/chronic systolic CHF NYHA class II-admission/discharge- NO ACTIVE CHF exacerbation : s/p ICD   -will need hold entresto d/t low BP- he will follow up on Friday to consider resuming at a  Lower dose.   -will need to recheck creatinine as well -OP  -cont BB/lasix    Hypertension: better today 110-120- cont to hold entresto - will reassess resuming at a lower dose OP    KLEVER: creatinine 2.0, holding entresto. Check BMP on Friday, will need referral to nephrologists if no improvement     CAD/MI/CABG: statin/ASA/BB    Hx fo VT: continue amiodarone     Lv thrombus? On coumadin, INR 1.7- coumadin check on Friday    Hga1c 5.9    Reviewed above plan with the patient and his wife  Disposition: home    Patient Instructions:   Current Discharge Medication List      START taking these medications    Details   cephALEXin (KEFLEX) 250 mg capsule Take 1 Cap by mouth three (3) times daily. Qty: 15 Cap, Refills: 0         CONTINUE these medications which have NOT CHANGED    Details   furosemide (LASIX) 40 mg tablet Take 20 mg by mouth every other day. carvedilol (COREG) 3.125 mg tablet TAKE 1 TABLET BY MOUTH TWICE A DAY WITH MEALS  Qty: 60 Tab, Refills: 3      amiodarone (CORDARONE) 200 mg tablet Take 1 Tab by mouth daily. Qty: 90 Tab, Refills: 1      patiromer calcium sorbitex (VELTASSA) 8.4 gram powder Take 8.4 g by mouth daily. Qty: 30 Packet, Refills: 3      HYDROcodone-acetaminophen (NORCO) 5-325 mg per tablet TAKE 1 TABLET EVERY 6 HOURS AS NEEDED  Refills: 0      DULoxetine (CYMBALTA) 60 mg capsule Take 60 mg by mouth daily. allopurinol (ZYLOPRIM) 100 mg tablet Take  by mouth daily. tamsulosin (FLOMAX) 0.4 mg capsule Take 0.4 mg by mouth daily. atorvastatin (LIPITOR) 40 mg tablet Take 40 mg by mouth daily. colchicine 0.6 mg tablet Take 0.6 mg by mouth every other day.       cyclobenzaprine (FLEXERIL) 5 mg tablet Take 5 mg by mouth daily as needed for Muscle Spasm(s). aspirin 81 mg chewable tablet Take 81 mg by mouth daily. tadalafil (CIALIS) 10 mg tablet Take 1 Tab by mouth as needed. Indications: Erectile Dysfunction  Qty: 30 Tab, Refills: 2      ascorbic acid, vitamin C, (VITAMIN C) 500 mg tablet Take 1 Tab by mouth two (2) times a day. Qty: 60 Tab, Refills: 6      warfarin (COUMADIN) 2.5 mg tablet TAKE 2 TABS BY MOUTH DAILY (WITH DINNER)  Qty: 60 Tab, Refills: 3         STOP taking these medications       sacubitril-valsartan (ENTRESTO) 97 mg/103 mg tablet Comments:   Reason for Stopping:             Referenced discharge instructions provided by nursing for diet and activity.             Follow-up with Dr Juliette Mares in 10 days for wound check    Follow up on Friday  Future Appointments   Date Time Provider \[facility] [place] [facility]\""   5/24/2019  8:40 AM Doreen Hoover NP [facility]   5/24/2019  9:20 AM COUMADIN, STFRANCIS NICK JAHAIRA SCHED   6/3/2019  9:00 AM MARY Ge SCHED   7/3/2019 10:00 AM PACEMAKER, STFRANCES NICK JAHAIRA SCHED   7/3/2019 10:20 AM Dwight Sen MD CAVSF JAHAIRA SCHED         Signed:  Giulia Lopez NP  5/22/2019  11:28 AM  \

## 2019-05-21 NOTE — PROGRESS NOTES
5- Reason for Admission:   ICD Implantation                  RRAT Score:     14             Do you (patient/family) have any concerns for transition/discharge? No                Plan for utilizing home health:   No    Current Advanced Directive/Advance Care Plan:  Not on File    Likelihood of readmission? Moderate            Transition of Care Plan:   Home    I met with the pt to determine potential discharge needs. He lives with his wife, Cheyenne Landis ([phone]), in a first floor apartment with no entry steps. He is independent with his ADL's, has a rollator he uses at times and drives. His PCP is Dr. Ap Meléndez who has no nurse navigator. He has prescription drug coverage and gets his medications from [facility] on [address]. Observation status was explained to him, he signed the Medicare and [facility], was given copies and the originals were placed on his chart. He is not anticipating any discharge needs and his wife will drive him home.     Care Management Interventions  PCP Verified by CM: Yes(Dr. Jessy Salmon nurse navigator)  Discharge Durable Medical Equipment: No(Has a rollator)  Physical Therapy Consult: No  Occupational Therapy Consult: No  Speech Therapy Consult: No  Current Support Network: Lives with Spouse, Own Home  Confirm Follow Up Transport: Family  Plan discussed with Pt/Family/Caregiver: Yes  Discharge Location  Discharge Placement: (Home with wife)    [place] Irineo, [place], CM

## 2019-05-21 NOTE — PROGRESS NOTES
0700- Bedside and Verbal shift change report given to BELINDA Carroll (oncoming nurse) by Bernabe Joseph RN (offgoing nurse). Report included the following information SBAR, Kardex, Procedure Summary, Intake/Output, MAR, Recent Results, Med Rec Status and Cardiac Rhythm Sinus chavez. 1124- Orders received to discontinue Entresto. BP has been soft, cardio aware. Patient asymptomatic. Will continue to monitor. 1145- Pt receiving 250mL bolus at this time. Per cardio, will assess orthostatics after bolus and lunch. [place] as follows:  Lying BP 98/58  Sitting /59  Standing 100/60     1328- Pt has not voided since procedure yesterday. Bladder scan shows 530mL retention. Orders received to straight cath    1412- Pt voided 350mL    1900- Bedside and Verbal shift change report given to Maikel [address] Amanda (oncoming nurse) by Alfredo Ahumada RN (offgoing nurse). Report included the following information SBAR, Kardex, Procedure Summary, Intake/Output, MAR, Recent Results, Med Rec Status and Cardiac Rhythm NSR.

## 2019-05-22 VITALS
BODY MASS INDEX: 26.04 KG/M2 | DIASTOLIC BLOOD PRESSURE: 75 MMHG | RESPIRATION RATE: 18 BRPM | SYSTOLIC BLOOD PRESSURE: 120 MMHG | TEMPERATURE: 97.5 F | WEIGHT: 181.88 LBS | HEART RATE: 75 BPM | HEIGHT: 70 IN | OXYGEN SATURATION: 93 %

## 2019-05-22 LAB
ANION GAP SERPL CALC-SCNC: 4 MMOL/L (ref 5–15)
BUN SERPL-MCNC: 46 MG/DL (ref 6–20)
BUN/CREAT SERPL: 23 (ref 12–20)
CALCIUM SERPL-MCNC: 8.4 MG/DL (ref 8.5–10.1)
CHLORIDE SERPL-SCNC: 110 MMOL/L (ref 97–108)
CO2 SERPL-SCNC: 21 MMOL/L (ref 21–32)
CREAT SERPL-MCNC: 2.03 MG/DL (ref 0.7–1.3)
EST. AVERAGE GLUCOSE BLD GHB EST-MCNC: 123 MG/DL
GLUCOSE BLD STRIP.AUTO-MCNC: 101 MG/DL (ref 65–100)
GLUCOSE SERPL-MCNC: 120 MG/DL (ref 65–100)
HBA1C MFR BLD: 5.9 % (ref 4.2–6.3)
INR BLD: 1.6 (ref 0.9–1.2)
INR PPP: 1.7 (ref 0.9–1.1)
POTASSIUM SERPL-SCNC: 4.8 MMOL/L (ref 3.5–5.1)
PROTHROMBIN TIME: 17.1 SEC (ref 9–11.1)
SERVICE CMNT-IMP: ABNORMAL
SODIUM SERPL-SCNC: 135 MMOL/L (ref 136–145)

## 2019-05-22 PROCEDURE — 74011250637 HC RX REV CODE- 250/637: Performed by: NURSE PRACTITIONER

## 2019-05-22 PROCEDURE — 85610 PROTHROMBIN TIME: CPT

## 2019-05-22 PROCEDURE — 80048 BASIC METABOLIC PNL TOTAL CA: CPT

## 2019-05-22 PROCEDURE — 74011250637 HC RX REV CODE- 250/637: Performed by: INTERNAL MEDICINE

## 2019-05-22 PROCEDURE — 82962 GLUCOSE BLOOD TEST: CPT

## 2019-05-22 PROCEDURE — 99218 HC RM OBSERVATION: CPT

## 2019-05-22 PROCEDURE — 83036 HEMOGLOBIN GLYCOSYLATED A1C: CPT

## 2019-05-22 PROCEDURE — 36415 COLL VENOUS BLD VENIPUNCTURE: CPT

## 2019-05-22 RX ADMIN — Medication 10 ML: at 06:00

## 2019-05-22 RX ADMIN — CEPHALEXIN 250 MG: 250 CAPSULE ORAL at 08:33

## 2019-05-22 RX ADMIN — DULOXETINE HYDROCHLORIDE 60 MG: 30 CAPSULE, DELAYED RELEASE ORAL at 08:33

## 2019-05-22 RX ADMIN — HYDROCODONE BITARTRATE AND ACETAMINOPHEN 1 TABLET: 5; 325 TABLET ORAL at 03:12

## 2019-05-22 RX ADMIN — CARVEDILOL 3.12 MG: 3.12 TABLET, FILM COATED ORAL at 08:33

## 2019-05-22 RX ADMIN — ASPIRIN 81 MG 81 MG: 81 TABLET ORAL at 08:32

## 2019-05-22 RX ADMIN — AMIODARONE HYDROCHLORIDE 200 MG: 200 TABLET ORAL at 08:33

## 2019-05-22 RX ADMIN — ATORVASTATIN CALCIUM 40 MG: 20 TABLET, FILM COATED ORAL at 08:32

## 2019-05-22 RX ADMIN — ALLOPURINOL 100 MG: 100 TABLET ORAL at 08:33

## 2019-05-22 NOTE — DISCHARGE INSTRUCTIONS
ICD  Discharge Instructions    Please make sure you have received your Temporary ICD identification card with your discharge instructions      MEDICATIONS         Take only the medications prescribed to you at discharge. ACTIVITY         Return to your normal activity, except as noted below. o Avoid tight clothes or unnecessary pressure over your incision (such as bra straps or seat belts). If it is tender or sensitive to clothing, cover the incision with a soft dressing or pad.  o Questions about driving are individualized and should be discussed with one of the EP Physicians prior to discharge. SHOWERING         Leave the bandage over your incision for 10 days after the ICD implant. You bandage will be removed in clinic in 10 days.  It is important to keep the bandaged area clean and dry. You may shower around the site until the bandage is removed in clinic. Thereafter, you may shower after the bandage is removed, washing it gently with soap and water. Do not apply any lotions, powders, or perfumes to the incision line.  Avoid submerging your incision in water (tub baths, hot tubs, or swimming) for four weeks.  Underneath the dressing. o If you have white steri-strips over your incision (underneath the gauze dressing), they will curl up at the end and fall off, usually within 10 days. Do not pull them off.  - OR -   o You may have a different type of closure for the incision. If Dermabond Adhesive was used to close your incision, you will receive a separate instruction sheet. DISCHARGE PRECAUTIONS         Record your temperature every day, at the same time, for 3 weeks after your implant. A temperature of 100.5 F, or higher, can be the first sign of infection. This should be reported to your Doctor immediately.  You can have an MRI after 6 weeks. You must be aware that any strong magnet or magnetic field can affect your ICD.   In general, be careful of metal detectors, heavy machinery, and any area where arc-welding is performed. Avoid metal detectors such as the ones in security checkpoints at [facility] or [address] [place] [address]. When approaching a security checkpoint show your ICD Identification Card to security personnel and ask to be hand searched.  Always tell your doctor or dentist that you have an ICD. Antibiotics may be prescribed before certain procedures.  If you use a cell phone, hold it on the opposite side from where your ICD is implanted.  Your temporary identification will be given to you with these instructions. Keep your ICD card in your wallet or on your person at all times. You should receive your permanent card in 8 weeks. If you do not receive your permanent card, please call the office at [phone]. TAKING YOUR PULSE         Take your pulse the same time every day, preferably in the morning.  Sit down and rest for 5 minutes prior to taking your pulse.  Take your pulse for 1 full minute, use a clock or stop watch with a second hand.  To feel your pulse, use the first two fingers of one hand; place them on the thumb side of the wrist of the opposite hand. The pulse will be steady, regular and throbbing.  Call the office if your pulse is less than 40 beats per minute. SYMPTOMS THAT NEED TO BE REPORTED IMMEDIATELY         Temperature more than 100.4 F     Redness or warmth at the incision site, or pain for longer than the first 5 days after the implant.  Drainage from the incision site.  Swelling around the incision site.  Shortness of breath.  Rapid heart rate or palpitations.  Dizziness, lightheadedness, fainting.  Slow pulse below 40 beats per minute.  REMEMBER: If you feel something is an emergency or cannot be handled over the phone, call 911 or go to the closest emergency room.       WHAT TO DO IF YOUR ICD DELIVERS A SHOCK     · If you ICD delivers a shock, you should seek attention at the nearest emergency room, call our office or call 911.           Caleen Meigs, MD  Cardiac Electrophysiology / Cardiology    [address], [address] [facility], [address]  Tawana Zhang [place]  [phone] / [phone] Fax       [phone] / [phone] Fax

## 2019-05-22 NOTE — PHYSICIAN ADVISORY
Letter of Status Determination:   Recommend hospitalization status upgraded from   OBSERVATION  to INPATIENT  Status     Pt Name:  Tutu Escobedo   MR#   [address] # [id number] /  [id number]  Payor: Caryn Laws / Plan: [address] [place] [address] / Product Type: PPO /    CSN#  [id number]   Room and Hospital  326/01  @ [facility]   Hospitalization date  5/20/2019 12:01 PM   Current Attending Physician  Tamela Meigs, MD   Principal diagnosis  Cardiomyopathy [facility]) [I42.9]  CHF (congestive heart failure) ([facility] [I50.9]     Clinicals  59 y.o. y.o  male hospitalized with above diagnosis   This pt suffers from Ischemic cardiomyopathy, chronic CHF with low EF, hx of Alcohol use, hx of VT on Amiodarone etc.,  He went through planned ICD implantation. Post procedure followup showed that he was having significant urinary retention, acute rise in creatinine, episodes of hypotension. His care has now exceeded 23 hours OBS period afforded by his insurance carrier. [place] ([facility]) criteria   Does   apply    STATUS DETERMINATION  Based on documented presenting clinical data, comorbid conditions, high risk of adverse events and deterioration, it is our recommendation that the patient's status should be upgraded from OBSERVATION to INPATIENT status. The final decision of the patient's hospitalization status depends on the attending physician's judgment. Additional comments     Payor: Caryn Laws / Plan: [facility] / Product Type: Cristina Ray MD MPH FACP   Cell: [phone]  [facility] [address]       Cell  [phone]        [phone]    .

## 2019-05-22 NOTE — PROGRESS NOTES
Problem: Falls - Risk of  Goal: *Absence of Falls  Description  Document Edgar Brunner Fall Risk and appropriate interventions in the flowsheet.   5/21/2019 2344 by Shine Saucedo RN  Outcome: Progressing Towards Goal  5/21/2019 2340 by Shine Saucedo RN  Outcome: Progressing Towards Goal     Problem: Patient Education: Go to Patient Education Activity  Goal: Patient/Family Education  Outcome: Progressing Towards Goal     Problem: Pacer/ICD: Day 1  Goal: Activity/Safety  Outcome: Progressing Towards Goal  Goal: Diagnostic Test/Procedures  Outcome: Progressing Towards Goal  Goal: Nutrition/Diet  Outcome: Progressing Towards Goal  Goal: Discharge Planning  Outcome: Progressing Towards Goal  Goal: Medications  Outcome: Progressing Towards Goal  Goal: Respiratory  Outcome: Progressing Towards Goal  Goal: Treatments/Interventions/Procedures  Outcome: Progressing Towards Goal  Goal: Psychosocial  Outcome: Progressing Towards Goal

## 2019-05-22 NOTE — ROUTINE PROCESS
PCP SABRINA apt scheduled with Dr. Tristen Montanez on 5/30/19 at 8:00AM at the [facility] location.   Apt added to AVS

## 2019-05-22 NOTE — PROGRESS NOTES
Bedside and Verbal shift change report given to Yue Hampton (oncoming nurse) by Vinod Martinez (offgoing nurse). Report included the following information SBAR, Kardex, Accordion, Recent Results and Cardiac Rhythm Sinus Elyssa Bassam.

## 2019-05-22 NOTE — PROGRESS NOTES
In preparation for discharge, I have completed Care Plans and Education in [facility] and sent message to CMS to schedule PCP appointment. Will continue to work on discharge process    1030   Discharge instructions, including information on new medication and post ICD care, were all reviewed with patient. IV and heart monitor were removed. Patient received a copy of his discharge papers and the prescription was electronically sent to his pharmacy.  PCP and Cardiology appointment were scheduled and added to AVS. Patient discharged home with his wife

## 2019-05-23 ENCOUNTER — TELEPHONE (OUTPATIENT)
Dept: CARDIOLOGY CLINIC | Age: 64
End: 2019-05-23

## 2019-05-23 DIAGNOSIS — Z95.811 LEFT VENTRICULAR ASSIST DEVICE PRESENT (HCC): ICD-10-CM

## 2019-05-23 DIAGNOSIS — Z79.01 CHRONIC ANTICOAGULATION: ICD-10-CM

## 2019-05-23 DIAGNOSIS — I50.22 CHRONIC SYSTOLIC CONGESTIVE HEART FAILURE (HCC): Primary | ICD-10-CM

## 2019-05-23 DIAGNOSIS — R06.02 SHORTNESS OF BREATH: ICD-10-CM

## 2019-05-23 LAB
ALBUMIN SERPL-MCNC: 4.1 G/DL (ref 3.6–4.8)
ALBUMIN/GLOB SERPL: 1.7 {RATIO} (ref 1.2–2.2)
ALP SERPL-CCNC: 78 IU/L (ref 39–117)
ALT SERPL-CCNC: 11 IU/L (ref 0–44)
AST SERPL-CCNC: 21 IU/L (ref 0–40)
BILIRUB SERPL-MCNC: 0.6 MG/DL (ref 0–1.2)
BUN SERPL-MCNC: 37 MG/DL (ref 8–27)
BUN/CREAT SERPL: 25 (ref 10–24)
CALCIUM SERPL-MCNC: 9 MG/DL (ref 8.6–10.2)
CHLORIDE SERPL-SCNC: 102 MMOL/L (ref 96–106)
CO2 SERPL-SCNC: 21 MMOL/L (ref 20–29)
CREAT SERPL-MCNC: 1.49 MG/DL (ref 0.76–1.27)
ERYTHROCYTE [DISTWIDTH] IN BLOOD BY AUTOMATED COUNT: 16.4 % (ref 12.3–15.4)
GLOBULIN SER CALC-MCNC: 2.4 G/DL (ref 1.5–4.5)
GLUCOSE SERPL-MCNC: 77 MG/DL (ref 65–99)
HCT VFR BLD AUTO: 29.7 % (ref 37.5–51)
HGB BLD-MCNC: 10.4 G/DL (ref 13–17.7)
INR PPP: 1.8 (ref 0.8–1.2)
MCH RBC QN AUTO: 28.5 PG (ref 26.6–33)
MCHC RBC AUTO-ENTMCNC: 35 G/DL (ref 31.5–35.7)
MCV RBC AUTO: 81 FL (ref 79–97)
NT-PROBNP SERPL-MCNC: ABNORMAL PG/ML (ref 0–210)
PLATELET # BLD AUTO: 267 X10E3/UL (ref 150–450)
POTASSIUM SERPL-SCNC: 4.8 MMOL/L (ref 3.5–5.2)
PROT SERPL-MCNC: 6.5 G/DL (ref 6–8.5)
PROTHROMBIN TIME: 18.1 SEC (ref 9.1–12)
RBC # BLD AUTO: 3.65 X10E6/UL (ref 4.14–5.8)
SODIUM SERPL-SCNC: 135 MMOL/L (ref 134–144)
WBC # BLD AUTO: 9.1 X10E3/UL (ref 3.4–10.8)

## 2019-05-23 NOTE — TELEPHONE ENCOUNTER
Telephone Call RE:  Lab Reminder      Outcome:     [x] Patient verbalizes understanding    [] Unable to reach   [] Left message              []       Gabriele Carbajal

## 2019-05-23 NOTE — TELEPHONE ENCOUNTER
Patient spouse, Mando Chung called. Had ICV placed Monday by Dr. Kings Desai. Blood pressure low, they took him off Entrestro. Patient Spouse concerned. Please call her.     [phone]

## 2019-05-23 NOTE — TELEPHONE ENCOUNTER
I returned call to wife, she states patient was discharged Wednesday after getting ICD on Monday-his renal function is worse and she states he is anemic. She states Dr. Sadia Salcedo stopped his entresto and told him to be seen in [facility] on Friday. I have notified Dr. Estela Judd and Anastasia Macias to advise. I called wife back advised patient go for labs now, has appt in am with dr Estela Judd at 10 am.  They state understanding.

## 2019-05-24 ENCOUNTER — OFFICE VISIT (OUTPATIENT)
Dept: CARDIOLOGY CLINIC | Age: 64
End: 2019-05-24

## 2019-05-24 VITALS
HEIGHT: 70 IN | RESPIRATION RATE: 20 BRPM | TEMPERATURE: 97.4 F | WEIGHT: 176.2 LBS | SYSTOLIC BLOOD PRESSURE: 122 MMHG | BODY MASS INDEX: 25.22 KG/M2 | HEART RATE: 74 BPM | OXYGEN SATURATION: 94 % | DIASTOLIC BLOOD PRESSURE: 72 MMHG

## 2019-05-24 DIAGNOSIS — E55.9 VITAMIN D DEFICIENCY: ICD-10-CM

## 2019-05-24 DIAGNOSIS — D53.9 NUTRITIONAL ANEMIA: ICD-10-CM

## 2019-05-24 DIAGNOSIS — I25.5 ISCHEMIC CARDIOMYOPATHY: Primary | ICD-10-CM

## 2019-05-24 DIAGNOSIS — F10.10 ALCOHOL ABUSE: ICD-10-CM

## 2019-05-24 RX ORDER — MONTELUKAST SODIUM 10 MG/1
TABLET ORAL
Refills: 3 | COMMUNITY
Start: 2019-05-07

## 2019-05-24 RX ORDER — FUROSEMIDE 20 MG/1
20 TABLET ORAL EVERY OTHER DAY
Qty: 45 TAB | Refills: 3 | Status: SHIPPED | OUTPATIENT
Start: 2019-05-24 | End: 2019-06-11

## 2019-05-24 RX ORDER — LEVALBUTEROL TARTRATE 45 UG/1
AEROSOL, METERED ORAL
Refills: 0 | COMMUNITY
Start: 2019-05-08

## 2019-05-24 NOTE — PROGRESS NOTES
[facility] Clinic Note      DOS:  5/24/2019  REFERRING PROVIDER: Criselda De Oliveira MD  PRIMARY CARE PHYSICIAN: Vanessa Saravia MD  PRIMARY CARDIOLOGIST:  Jaz Arriaga MD   EP: Arely Tesfaye MD   PULMONARY: William Bueno MD       IMPRESSION/PLAN:   ICM s/p HMII as BTT on 12/1/16 and explant, NYHA Class II, LVEF 32%  Resume Entresto 24/26 mg, 1 tablet twice daily    Continue Coreg 3.125 mg BID    Continue lasix 20 mg every other day     Continue low Na+ diet   Abstain from smoking and ETOH   Check ATTR   Follow up in the [facility] in 2 weeks    CAD s/p CABG (SVG to RCA and LIMA to LAD) on 12/1/16 s/p BMS x2 -> SVG-RCA graft - no angina              Continue ASA, BB, statin      High Risk of SCD - s/p SQ AICD            PAD - difficult femoral access with LVAD explant              No symptoms of claudication      Chronic Anticoagulation s/p LVAD explant              On Coumadin - no bleeding     Discontinue coumadin   Start eliquis 5 mg  twice daily    Hyperkalemia K 4.8    Follow labs    Continue veltessa    Continue low K+ diet     Gout              Continue colchicine 0.6 mg every other day              Continue allopurinol 100 mg daily   Denies recent gouty attacks      Chronic Lower back pain              On oxycodone, flexeril   Hasn't taken in past 3 weeks   Back pain improved with increased activity- going to gym    H/o tobacco abuse   Abstaining from nicotine      H/o alcohol abuse   Currently abstaining   Encouraged complete abstinence      Seizure disorder - early 25s              No recent seizure activity     Peripheral neuropathy              On cymbalta   Check ATTR     Prevention              Influenza annually              Pneumonia vaccine every 5 years      Kristi Rice MD, [facility], [address] [place] [address]  Chief of Cardiology, [address] [facility] Director  [facility]  [facility], [address] [place] [address], [address] [place] [address]  Office [phone]  Fax [phone]           Chief Complaint   Patient presents with    Follow-up     CHF-has been off entresto since monday due to labs          HPI: Donaldo Crowley is a 59y.o. year old male  with a history of HFrEF in the setting of ICM s/p LVAD and recent LVAD explant complicated by PVD,  remote tobacco use and alcohol abuse (quit 11/2016) who presents for follow up of his HFrEF. Mr. Aron Alba presented 11/22/2016 for decompressive laminectomy at [facility] complicated by myocardial infarction. The [facility](11/25/16) revealed severe left main and 3 vessel disease along with a 60% right common iliac stenosis and  LVEF was 10%. He subsequently underwent placement of an IABP followed by placement of a HeartMate2 LVAD on 12/1/2016 with concomitant CABG (SVG to the RCA, LIMA to LAD). His postoperative course was complicated by RV failure and an ileus requiring TPN resulting and a transaminitis. He had multiple episodes of Torsade on 12/4/2016 prompting repeat catheterization revealing an occluded RCA vein graft. Two BMS were placed in the RCA graft. He also had SVT requiring cardioversion. He had a single lead ICD placed on 86/19/52 complicated by a hematoma. The ICD was later removed on 1/20/17. Following his LVAD surgery he did require inpatient rehab.      Mr. Aron Alba was listed for heart transplantation at [facility]. He was called in for transplantation on 7/14/2018 but on pre-op DELLA was noted to have normal LV function and the transplant surgery was aborted. He subsequently was admitted on 9/5/2018 for LVAD explant- his post-operative course was complicated by pneumonia and bilateral pleural effusions. He was discharged on home O2 which he discontinued on his own.       After device explant, his subsequent echocardiograms showed deterioration of LVEF and worsening mitral regurgitation from 40-45% with moderate to severe MR on 9/19/18 to 35-40% with moderate-to-severe MR on 10/15/18.   He has been removed from the transplant list at [facility] and is not interested in pursuing transplant evaluation at another center at this time. He returns to clinic today following his SQ AICD implant. His serum creatinine was elevated and BP low at the time of his AICD. His entresto was held. Repeat labs yesterday reveal improved renal function but his NT pro BNP is markedly elevated. He denies dyspnea on exertion but admits to orthopnea and PND. Negative sleep study in December. CARDIAC EVALUATION  TTE 19: LVEF 32%, LVAD plug at LV apex, grade 2 diastolic dysfunction, mild-mod MR, mild TR, PAPs 43 mmHg, mild LAE  DELLA 2018 - normal LV function and the transplant surgery was aborted. He subsequently was admitted on 2018 for LVAD explant  ECHO 18:LVEF 40-45% mod-severe MR   ECHO 10/15/18:EF 35-40%, mod-severe MR   ECHO 10/31/18: TDS, EF 30-35%, reduced RVEF, TAPSE 1.6, LAE, mild- mod MR/TR  ECHO 19: EF 25-30%, question of thrombus in apex. Mild- mod TR. Consider cardiac MR to exclude left ventricular thrombus. CATH 16: severe LM, and 3 VD with 60% right common iliac stenosis. EF: 10%. ----S/p IABP   ----S/p HeartMate2 LVAD 2016   S/p CAB2016: LIMA-> LAD, SVG-> RCA     CATH 16 following torsades SVG-> RCA TO   ---- s/p BMS x 2-.  RCA  RHC 2017:RA: 15/15 14, RV: 21/13 14,PA: 23/28, m 20, PCWP 13, PVR 1.67 CO 5.67, CI 3    S/p LVAD explant 18 ([facility])    SVT s/p DCCV  AICD  single lead cx by hematoma  AICD explanted 17    EKG:    10/31/18 NSR QRS 88ms   ms        Review of Systems:     General:  Denies fever, chills, fatigue   HEENT: Denies epistaxis, angioedema   Pulmonary:  per HPI Denies  wheeze, hemoptysis   Cardiac: Denies exertional chest pain, palpitations, orthopnea, PND, edema, lightheadedness, syncope, near syncope,or bleeding, not wearing LifeVest    GI:  Denies  abdominal pain, change in bowel habits, or black or bloody stools  Musculo: Positive for back pain for which he takes prn flexril and percocet- last 3 weeks ago.  He is following up with Dr. Gisela Laguerre- Gout sx controlled with current meds   Neuro: Denies  TIA/CVA sx   Skin:  Denies rash   Allergies: Reviewed   Psych: Denies depression or anxiety       History:  Past Medical History:   Diagnosis Date    Arrhythmia     SVT    Arthritis     CAD (coronary artery disease)     Chronic pain     back    [facility]     DSOUZA (dyspnea on exertion) 2019    Gout     Heart failure ([facility]     Hypertension     ICD (implantable cardioverter-defibrillator) in place     Long term current use of anticoagulant therapy     LVAD (left ventricular assist device) present ([facility] 2016    Myocardial infarction ([facility]     Raynaud disease     Seizures ([facility]     strobic seizures early      Past Surgical History:   Procedure Laterality Date    CABG, ARTERY-VEIN, TWO  2016    CARDIAC SURG PROCEDURE UNLIST  2016    LVAD    HX CORONARY ARTERY BYPASS GRAFT      HX CORONARY STENT PLACEMENT      HX HEART CATHETERIZATION      HX HEENT      wisdom teeth removed    HX ORTHOPAEDIC  2016    laminectomy    HX PACEMAKER      ICD - removed 2017    HX PTCA       Social History     Socioeconomic History    Marital status:      Spouse name: Gracie Preciado    Number of children: 2    Years of education: Not on file    Highest education level: Some college, no degree   Occupational History    Not on file   Social Needs    Financial resource strain: Not hard at all   [place] insecurity:     Worry: Never true     Inability: Never true   [employer] needs:     Medical: No     Non-medical: No   Tobacco Use    Smoking status: Former Smoker     Packs/day: 1.50     Years: 30.00     Pack years: 45.00     Last attempt to quit:      Years since quittin.4    Smokeless tobacco: Never Used   Substance and Sexual Activity    Alcohol use: No    Drug use: No    Sexual activity: Never   Lifestyle    Physical activity:     Days per week: Not on file     Minutes per session: Not on file    Stress: Not on file   Relationships    Social connections:     Talks on phone: Not on file     Gets together: Not on file     Attends Shinto service: Not on file     Active member of club or organization: Not on file     Attends meetings of clubs or organizations: Not on file     Relationship status: Not on file    Intimate partner violence:     Fear of current or ex partner: Not on file     Emotionally abused: Not on file     Physically abused: Not on file     Forced sexual activity: Not on file   Other Topics Concern     Service Not Asked    Blood Transfusions Not Asked    Caffeine Concern Not Asked    Occupational Exposure Not Asked   [employer] Hazards Not Asked    Sleep Concern Not Asked    Stress Concern Not Asked    Weight Concern Not Asked    Special Diet Not Asked    Back Care Not Asked    Exercise Not Asked    Bike Helmet Not Asked   [facility] Not Asked    Self-Exams Not Asked   Social History Narrative    Not on file     Family History   Problem Relation Age of Onset    Diabetes Mother     Dementia Mother     Other Mother         carotid artery blockage    Heart Disease Father     Stroke Father     Heart Attack Father         several    Hypertension Father     Elevated Lipids Father     No Known Problems Sister     Cancer Brother         brain    Lung Disease Sister     COPD Sister     Cancer Sister         lung       Current Medications:   Current Outpatient Medications   Medication Sig Dispense Refill    cephALEXin (KEFLEX) 250 mg capsule Take 1 Cap by mouth three (3) times daily. 15 Cap 0    warfarin (COUMADIN) 2.5 mg tablet TAKE 2 TABS BY MOUTH DAILY (WITH DINNER) 60 Tab 3    furosemide (LASIX) 40 mg tablet Take 20 mg by mouth every other day.       carvedilol (COREG) 3.125 mg tablet TAKE 1 TABLET BY MOUTH TWICE A DAY WITH MEALS 60 Tab 3    amiodarone (CORDARONE) 200 mg tablet Take 1 Tab by mouth daily. 90 Tab 1    patiromer calcium sorbitex (VELTASSA) 8.4 gram powder Take 8.4 g by mouth daily. 30 Packet 3    HYDROcodone-acetaminophen (NORCO) 5-325 mg per tablet TAKE 1 TABLET EVERY 6 HOURS AS NEEDED  0    DULoxetine (CYMBALTA) 60 mg capsule Take 60 mg by mouth daily.  allopurinol (ZYLOPRIM) 100 mg tablet Take  by mouth daily.  tamsulosin (FLOMAX) 0.4 mg capsule Take 0.4 mg by mouth daily.  atorvastatin (LIPITOR) 40 mg tablet Take 40 mg by mouth daily.  colchicine 0.6 mg tablet Take 0.6 mg by mouth every other day.  cyclobenzaprine (FLEXERIL) 5 mg tablet Take 5 mg by mouth daily as needed for Muscle Spasm(s).  aspirin 81 mg chewable tablet Take 81 mg by mouth daily.  tadalafil (CIALIS) 10 mg tablet Take 1 Tab by mouth as needed. Indications: Erectile Dysfunction 30 Tab 2    ascorbic acid, vitamin C, (VITAMIN C) 500 mg tablet Take 1 Tab by mouth two (2) times a day. 60 Tab 6       Allergies: Allergies   Allergen Reactions    Morphine Other (comments)     Hallucinations. Confusion. Impaired judgement    Chlorhexidine Rash           Physical Exam:   Vitals:    Visit Vitals  /72 (BP 1 Location: Left arm, BP Patient Position: Sitting)   Pulse 74   Temp 97.4 °F (36.3 °C) (Oral)   Resp 20   Ht 5' 10\" (1.778 m)   Wt 176 lb 3.2 oz (79.9 kg)   SpO2 94%   BMI 25.28 kg/m²      General:  Well developed WM, AAOx3, pleasant,  cooperative, no acute distress. HEENT:  Atraumatic. Pink and moist.  Anicteric sclerae. Neck:   Supple, no adenopoathy. Lungs:  Diminished breath sounds at left base. No wheezing/rhonchi/rales. Heart:   PMI: Median sternotomy scar,  Regular rhythm, S1, S2 present, no murmur, no rubs, no gallops. JVD 8 cm (-) HJR . No carotid bruits. Abdomen:  Soft, non-distended, non-tender. + Bowel sounds. No bruits. Extremities:  Venous stasis changes, No edema, no clubbing, no cyanosis.  No calf tenderness  Neurologic:  Grossly intact. Alert and oriented X 3. No acute neurological distress. Skin:   intact, no wounds, ecchymosis, bruising, rashes   Psych:  Good insight. Not anxious nor agitated. Recent Labs:     Lab Results   Component Value Date/Time    WBC 9.1 05/23/2019 03:29 PM    HGB 10.4 (L) 05/23/2019 03:29 PM    HCT 29.7 (L) 05/23/2019 03:29 PM    PLATELET 352 26/84/6043 03:29 PM    MCV 81 05/23/2019 03:29 PM     Lab Results   Component Value Date/Time    GFR est non-AA 50 (L) 05/23/2019 03:29 PM    GFR est AA 57 (L) 05/23/2019 03:29 PM    Creatinine 1.49 (H) 05/23/2019 03:29 PM    BUN 37 (H) 05/23/2019 03:29 PM    Sodium 135 05/23/2019 03:29 PM    Potassium 4.8 05/23/2019 03:29 PM    Chloride 102 05/23/2019 03:29 PM    CO2 21 05/23/2019 03:29 PM    Magnesium 1.7 12/21/2016 05:10 AM    Phosphorus 1.9 (L) 11/25/2016 09:36 PM     Lab Results   Component Value Date/Time    TSH 2.83 11/28/2016 07:29 AM      Lab Results   Component Value Date/Time    Sodium 135 05/23/2019 03:29 PM    Potassium 4.8 05/23/2019 03:29 PM    Chloride 102 05/23/2019 03:29 PM    CO2 21 05/23/2019 03:29 PM    Anion gap 4 (L) 05/22/2019 02:56 AM    Glucose 77 05/23/2019 03:29 PM    BUN 37 (H) 05/23/2019 03:29 PM    Creatinine 1.49 (H) 05/23/2019 03:29 PM    BUN/Creatinine ratio 25 (H) 05/23/2019 03:29 PM    GFR est AA 57 (L) 05/23/2019 03:29 PM    GFR est non-AA 50 (L) 05/23/2019 03:29 PM    Calcium 9.0 05/23/2019 03:29 PM    Bilirubin, total 0.6 05/23/2019 03:29 PM    ALT (SGPT) 11 05/23/2019 03:29 PM    AST (SGOT) 21 05/23/2019 03:29 PM    Alk.  phosphatase 78 05/23/2019 03:29 PM    Protein, total 6.5 05/23/2019 03:29 PM    Albumin 4.1 05/23/2019 03:29 PM    Globulin 3.6 01/24/2017 05:33 AM    A-G Ratio 1.7 05/23/2019 03:29 PM      Lab Results   Component Value Date/Time    Hemoglobin A1c 5.9 05/22/2019 02:56 AM

## 2019-05-24 NOTE — PATIENT INSTRUCTIONS
1. Resume entresto at 24/26 mg, 1 tablet twice daily  2. Continue lasix 20 mg every other day  3. Take carvedilol 3.125 mg twice daily, after meals  4. Take amiodarone after meals  5. Repeat labs prior to next clinic visit  6.  [place] [facility] [place] 2 weeks

## 2019-05-26 LAB
25(OH)D3+25(OH)D2 SERPL-MCNC: 36.3 NG/ML (ref 30–100)
FOLATE SERPL-MCNC: 13 NG/ML
IRON SATN MFR SERPL: 10 % (ref 15–55)
IRON SERPL-MCNC: 30 UG/DL (ref 38–169)
MAGNESIUM SERPL-MCNC: 1.9 MG/DL (ref 1.6–2.3)
PREALB SERPL-MCNC: 28 MG/DL (ref 10–36)
TIBC SERPL-MCNC: 289 UG/DL (ref 250–450)
UIBC SERPL-MCNC: 259 UG/DL (ref 111–343)
VIT B12 SERPL-MCNC: 484 PG/ML (ref 232–1245)

## 2019-05-28 ENCOUNTER — OFFICE VISIT (OUTPATIENT)
Dept: CARDIOLOGY CLINIC | Age: 64
End: 2019-05-28

## 2019-05-28 VITALS
SYSTOLIC BLOOD PRESSURE: 90 MMHG | HEART RATE: 62 BPM | OXYGEN SATURATION: 96 % | WEIGHT: 174.6 LBS | DIASTOLIC BLOOD PRESSURE: 60 MMHG | BODY MASS INDEX: 25 KG/M2 | HEIGHT: 70 IN

## 2019-05-28 DIAGNOSIS — I25.10 CAD, MULTIPLE VESSEL: ICD-10-CM

## 2019-05-28 DIAGNOSIS — Z95.810 PRESENCE OF CARDIAC DEFIBRILLATOR: Primary | ICD-10-CM

## 2019-05-28 DIAGNOSIS — I50.22 CHRONIC SYSTOLIC CONGESTIVE HEART FAILURE (HCC): ICD-10-CM

## 2019-05-28 DIAGNOSIS — I10 ESSENTIAL HYPERTENSION: ICD-10-CM

## 2019-05-28 DIAGNOSIS — I25.5 ISCHEMIC CARDIOMYOPATHY: ICD-10-CM

## 2019-05-28 RX ORDER — DOXYCYCLINE 100 MG/1
100 TABLET ORAL EVERY 12 HOURS
Qty: 14 TAB | Refills: 0 | Status: SHIPPED | OUTPATIENT
Start: 2019-05-28 | End: 2019-06-11 | Stop reason: ALTCHOICE

## 2019-05-28 RX ORDER — OXYCODONE AND ACETAMINOPHEN 5; 325 MG/1; MG/1
1 TABLET ORAL
Qty: 30 TAB | Refills: 0 | Status: SHIPPED | OUTPATIENT
Start: 2019-05-28 | End: 2019-06-07

## 2019-05-28 NOTE — PROGRESS NOTES
Patient temp today 96.1       Visit Vitals  BP 90/60 (BP 1 Location: Left arm, BP Patient Position: Sitting)   Pulse 62   Ht 5' 10\" (1.778 m)   Wt 174 lb 9.6 oz (79.2 kg)   SpO2 96%   BMI 25.05 kg/m²    patient pain and swelling in right arm

## 2019-05-28 NOTE — PATIENT INSTRUCTIONS
Please hold apixapan for the next 3 days. Do not shower while pressure dressing is applied. Please start doxycycline 100mg twice daily for 7 days. I have given you percocet for pain management. I recommend you use this or norco; do not use them together. Do no drive while on narcotic medications.

## 2019-05-29 ENCOUNTER — TELEPHONE (OUTPATIENT)
Dept: CARDIOLOGY CLINIC | Age: 64
End: 2019-05-29

## 2019-05-31 LAB
ERYTHROCYTE [DISTWIDTH] IN BLOOD BY AUTOMATED COUNT: 17.6 % (ref 12.3–15.4)
HCT VFR BLD AUTO: 27.6 % (ref 37.5–51)
HGB BLD-MCNC: 8.9 G/DL (ref 13–17.7)
MCH RBC QN AUTO: 28.5 PG (ref 26.6–33)
MCHC RBC AUTO-ENTMCNC: 32.2 G/DL (ref 31.5–35.7)
MCV RBC AUTO: 89 FL (ref 79–97)
PLATELET # BLD AUTO: 282 X10E3/UL (ref 150–450)
RBC # BLD AUTO: 3.12 X10E6/UL (ref 4.14–5.8)
WBC # BLD AUTO: 8 X10E3/UL (ref 3.4–10.8)

## 2019-06-03 ENCOUNTER — OFFICE VISIT (OUTPATIENT)
Dept: CARDIOLOGY CLINIC | Age: 64
End: 2019-06-03

## 2019-06-03 DIAGNOSIS — Z95.810 ICD (IMPLANTABLE CARDIOVERTER-DEFIBRILLATOR) IN PLACE: ICD-10-CM

## 2019-06-03 DIAGNOSIS — Z51.89 VISIT FOR WOUND CHECK: ICD-10-CM

## 2019-06-03 DIAGNOSIS — I25.5 ISCHEMIC CARDIOMYOPATHY: Primary | ICD-10-CM

## 2019-06-03 NOTE — PROGRESS NOTES
Patient presents for wound check post-device implantation (sub Q ICD). He was seen last week for swelling at the incision. He had changed the dressing at home d/t gaping around the edges. He was given doxycycline for one week, and additional pain medications. Eliquis was held 3 days, pressure dressing applied. His HgB from 5/28/19 had dropped to 8.9. The dressing was removed and the site was inspected. The site appeared to be well-healing without ecchymosis/tenderness/erythema. Denies pain, fevers, discharge. There is still moderate swelling around site. Incision has healed well. Plan:    Encouraged ice packs to swelling, BID x 20 minutes. Will re-check H&H. Continue follow up in device clinic as planned.      Dhara Felder NP

## 2019-06-04 ENCOUNTER — TELEPHONE (OUTPATIENT)
Dept: CARDIOLOGY CLINIC | Age: 64
End: 2019-06-04

## 2019-06-04 LAB
HCT VFR BLD AUTO: 30.3 % (ref 37.5–51)
HGB BLD-MCNC: 9.5 G/DL (ref 13–17.7)

## 2019-06-04 NOTE — TELEPHONE ENCOUNTER
Received call from patient, ID verified using two patient identifiers. Advised patient that per aris Pham NP his Hgb is trending upward and is now 9.5. Patient verbalizes understanding of all information.

## 2019-06-07 DIAGNOSIS — F10.10 ALCOHOL ABUSE: ICD-10-CM

## 2019-06-07 DIAGNOSIS — D53.9 NUTRITIONAL ANEMIA: ICD-10-CM

## 2019-06-07 DIAGNOSIS — I25.5 ISCHEMIC CARDIOMYOPATHY: ICD-10-CM

## 2019-06-11 ENCOUNTER — TELEPHONE (OUTPATIENT)
Dept: CARDIOLOGY CLINIC | Age: 64
End: 2019-06-11

## 2019-06-11 ENCOUNTER — OFFICE VISIT (OUTPATIENT)
Dept: CARDIOLOGY CLINIC | Age: 64
End: 2019-06-11

## 2019-06-11 VITALS
OXYGEN SATURATION: 97 % | HEIGHT: 70 IN | DIASTOLIC BLOOD PRESSURE: 60 MMHG | TEMPERATURE: 98.4 F | WEIGHT: 171.6 LBS | HEART RATE: 64 BPM | RESPIRATION RATE: 20 BRPM | BODY MASS INDEX: 24.57 KG/M2 | SYSTOLIC BLOOD PRESSURE: 104 MMHG

## 2019-06-11 DIAGNOSIS — R06.09 DOE (DYSPNEA ON EXERTION): ICD-10-CM

## 2019-06-11 DIAGNOSIS — R06.02 SHORTNESS OF BREATH: ICD-10-CM

## 2019-06-11 DIAGNOSIS — I25.5 ISCHEMIC CARDIOMYOPATHY: Primary | ICD-10-CM

## 2019-06-11 DIAGNOSIS — Z79.01 CHRONIC ANTICOAGULATION: ICD-10-CM

## 2019-06-11 DIAGNOSIS — I50.22 CHRONIC SYSTOLIC CONGESTIVE HEART FAILURE (HCC): ICD-10-CM

## 2019-06-11 DIAGNOSIS — Z95.810 ICD (IMPLANTABLE CARDIOVERTER-DEFIBRILLATOR) IN PLACE: ICD-10-CM

## 2019-06-11 DIAGNOSIS — E78.5 HYPERLIPIDEMIA, UNSPECIFIED HYPERLIPIDEMIA TYPE: ICD-10-CM

## 2019-06-11 DIAGNOSIS — E87.5 HYPERKALEMIA: ICD-10-CM

## 2019-06-11 DIAGNOSIS — J90 PLEURAL EFFUSION: ICD-10-CM

## 2019-06-11 RX ORDER — FUROSEMIDE 20 MG/1
20 TABLET ORAL 2 TIMES DAILY
Qty: 45 TAB | Refills: 3 | Status: SHIPPED | OUTPATIENT
Start: 2019-06-11 | End: 2019-08-09

## 2019-06-11 RX ORDER — VALSARTAN 40 MG/1
20 TABLET ORAL
Qty: 30 TAB | Refills: 3 | Status: SHIPPED | OUTPATIENT
Start: 2019-06-11 | End: 2019-06-26 | Stop reason: ALTCHOICE

## 2019-06-11 NOTE — PROGRESS NOTES
[facility] Clinic Note      DOS:  6/11/2019  REFERRING PROVIDER: Antony Johnson MD  PRIMARY CARE PHYSICIAN: Von Johns MD  PRIMARY CARDIOLOGIST:  Becky Yee MD   EP: Jordan Suarez MD   PULMONARY: Rada Nyhan, MD       IMPRESSION/PLAN:   ICM s/p HMII as BTT on 12/1/16 and explant, NYHA Class II, LVEF 32%     Stop Entresto 24/26 mg, labile BP's    Check orthostatics   Start Diovan 20mg QHS    Continue Coreg 3.125 mg BID    Increase lasix 20 mg BID -call for increased weight loss >6-8lbs    Continue low Na+ diet   Abstain from smoking and ETOH   ATTR- negative   Labs today: LD, CMP, TSTs, Lipid panel, NTproBNP, CBC, CPK   Schedule ECHO and EKG   Follow up in the [facility] in 2 weeks    CAD s/p CABG (SVG to RCA and LIMA to LAD) on 12/1/16 s/p BMS x2 -> SVG-RCA graft - no angina              Continue ASA, BB, statin     High Risk of SCD - s/p SQ AICD (5/20/19)         PAD - difficult femoral access with LVAD explant              No symptoms of claudication      Chronic Anticoagulation s/p LVAD explant              Continue eliquis 5 mg  twice daily    Hyperkalemia K 4.8    Follow labs    Continue veltessa    Continue low K+ diet     Gout              Continue colchicine 0.6 mg every other day              Continue allopurinol 100 mg daily   Denies recent gouty attacks    Chronic Lower back pain              On oxycodone, flexeril   Hasn't taken in since ICD implant   Back pain improved with increased activity- going to gym    H/o tobacco abuse   Abstaining from nicotine      H/o alcohol abuse   Currently abstaining   Encouraged complete abstinence      Seizure disorder - early 25s              No recent seizure activity     Peripheral neuropathy              On cymbalta   ATTR- Negative    Left pleural effusion   Schedule PA/Lat CXR   diuresis     Prevention              Influenza annually              Pneumonia vaccine every 5 years       Chief Complaint   Patient presents with    Follow-up    Fatigue         HPI: Melyssa Randhawa is a 59y.o. year old male  with a history of HFrEF in the setting of ICM s/p LVAD and recent LVAD explant complicated by PVD,  remote tobacco use and alcohol abuse (quit 11/2016) who presents for follow up of his HFrEF. Mr. Mannie Singer presented 11/22/2016 for decompressive laminectomy at [facility]- complicated by myocardial infarction. The [facility](11/25/16) revealed severe left main and 3 vessel disease along with a 60% right common iliac stenosis and  LVEF was 10%. He subsequently underwent placement of an IABP followed by placement of a HeartMate2 LVAD on 12/1/2016 with concomitant CABG (SVG to the RCA, LIMA to LAD). His postoperative course was complicated by RV failure and an ileus requiring TPN resulting and a transaminitis. He had multiple episodes of Torsade on 12/4/2016 prompting repeat catheterization revealing an occluded RCA vein graft. Two BMS were placed in the RCA graft. He also had SVT requiring cardioversion. He had a single lead ICD placed on 48/73/58 complicated by a hematoma. The ICD was later removed on 1/20/17. Following his LVAD surgery he did require inpatient rehab.      Mr. Mannie Singer was listed for heart transplantation at [facility]. He was called in for transplantation on 7/14/2018 but on pre-op DELLA was noted to have normal LV function and the transplant surgery was aborted. He subsequently was admitted on 9/5/2018 for LVAD explant- his post-operative course was complicated by pneumonia and bilateral pleural effusions. He was discharged on home O2 which he discontinued on his own.       After device explant, his subsequent echocardiograms showed deterioration of LVEF and worsening mitral regurgitation from 40-45% with moderate to severe MR on 9/19/18 to 35-40% with moderate-to-severe MR on 10/15/18. He has been removed from the transplant list at [facility] and is not interested in pursuing transplant evaluation at another center at this time.         He returns to clinic today for follow up for his systolic heart failure and s/p SQ AICD implant. He resumed Entresto 24/26mg BID on last clinic visit and his serum creatinine was elevated and BP low at the time of his AICD. Repeat labs on 19 reveal improved renal function but his NT pro BNP is markedly elevated. Today he c/o generalized fatigue since resuming the entresto and implantation of the SQ AICD. He denies DSOUZA, PND, and orthopnea, but has been abnormally tired and taking daytime naps. His sleep study was negative in December. His BP at home has been 120-97/50-70's with HR 60-70's. He doesn't feel as if he drinks enough, consumes approximately 48oz daily. Of note per his wife, he has pulmonary complications with every procedure, he has a chronic left pleural effusion that has been monitored by his PCP with serial CXRs. CARDIAC EVALUATION  TTE 19: LVEF 32%, LVAD plug at LV apex, grade 2 diastolic dysfunction, mild-mod MR, mild TR, PAPs 43 mmHg, mild LAE  DELLA 2018 - normal LV function and the transplant surgery was aborted. He subsequently was admitted on 2018 for LVAD explant  ECHO 18:LVEF 40-45% mod-severe MR   ECHO 10/15/18:EF 35-40%, mod-severe MR   ECHO 10/31/18: TDS, EF 30-35%, reduced RVEF, TAPSE 1.6, LAE, mild- mod MR/TR  ECHO 19: EF 25-30%, question of thrombus in apex. Mild- mod TR. Consider cardiac MR to exclude left ventricular thrombus. CATH 16: severe LM, and 3 VD with 60% right common iliac stenosis. EF: 10%. ----S/p IABP   ----S/p HeartMate2 LVAD 2016   S/p CAB2016: LIMA-> LAD, SVG-> RCA     CATH 16 following torsades SVG-> RCA TO   ---- s/p BMS x 2-.  RCA  RHC 2017:RA: 15/15 14, RV:  14,PA: , m 20, PCWP 13, PVR 1.67 CO 5.67, CI 3    S/p LVAD explant 18 ([facility])    SVT s/p DCCV  AICD  single lead cx by hematoma  AICD explanted 17    EKG:    10/31/18 NSR QRS 88ms   ms  19: SR rate 68bpm QRS 94ms Qtc 438 ms    Review of Systems:     General:  Positive Fatigue   HEENT: Denies epistaxis, angioedema   Pulmonary: Denies  wheeze, hemoptysis   Cardiac: Denies exertional chest pain, palpitations, orthopnea, PND, edema, lightheadedness, syncope, near syncope,or bleeding   GI:  Denies  abdominal pain, change in bowel habits, or black or bloody stools  Musculo: Positive for chronic back pain for which he takes prn flexril and percocet   Neuro: Denies  TIA/CVA sx   Skin:  Denies rash   Allergies: Reviewed   Psych: Denies depression or anxiety       History:  Past Medical History:   Diagnosis Date    Arrhythmia     SVT    Arthritis     CAD (coronary artery disease)     Chronic pain     back    [facility]     DSOUZA (dyspnea on exertion) 1/30/2019    Gout     Heart failure ([facility]     Hypertension     ICD (implantable cardioverter-defibrillator) in place     Long term current use of anticoagulant therapy     LVAD (left ventricular assist device) present ([facility] 12/01/2016    Myocardial infarction ([facility]     Raynaud disease     Seizures ([facility]     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     Past Surgical History:   Procedure Laterality Date    CABG, ARTERY-VEIN, TWO  12/01/2016    CARDIAC SURG PROCEDURE UNLIST  12/01/2016    LVAD    HX CORONARY ARTERY BYPASS GRAFT      HX CORONARY STENT PLACEMENT      HX HEART CATHETERIZATION      HX HEENT      wisdom teeth removed    HX ORTHOPAEDIC  11/22/2016    laminectomy    HX PACEMAKER      ICD - removed 1/2017    HX PTCA      OR INSJ OF SUBQ IMPLANTABLE DEFIBRILLATOR ELECTRODE N/A 5/20/2019    INSERT SUBQ ICD w/ anesthesia performed by Adelaida Cronin MD at [address] [place] [address] LAB     Social History     Socioeconomic History    Marital status:      Spouse name: Mike Gross    Number of children: 2    Years of education: Not on file    Highest education level: Some college, no degree   Occupational History    Not on file   Social Needs    Financial resource strain: Not hard at all   HandMinder insecurity:     Worry: Never true     Inability: Never true   Phigital needs:     Medical: No     Non-medical: No   Tobacco Use    Smoking status: Former Smoker     Packs/day: 1.50     Years: 30.00     Pack years: 45.00     Last attempt to quit:      Years since quittin.4    Smokeless tobacco: Never Used   Substance and Sexual Activity    Alcohol use: No    Drug use: No    Sexual activity: Never   Lifestyle    Physical activity:     Days per week: Not on file     Minutes per session: Not on file    Stress: Not on file   Relationships    Social connections:     Talks on phone: Not on file     Gets together: Not on file     Attends Yarsanism service: Not on file     Active member of club or organization: Not on file     Attends meetings of clubs or organizations: Not on file     Relationship status: Not on file    Intimate partner violence:     Fear of current or ex partner: Not on file     Emotionally abused: Not on file     Physically abused: Not on file     Forced sexual activity: Not on file   Other Topics Concern     Service Not Asked    Blood Transfusions Not Asked    Caffeine Concern Not Asked    Occupational Exposure Not Asked   Adilson Backer Hazards Not Asked    Sleep Concern Not Asked    Stress Concern Not Asked    Weight Concern Not Asked    Special Diet Not Asked    Back Care Not Asked    Exercise Not Asked    Bike Helmet Not Asked    [address] Not Asked    Self-Exams Not Asked   Social History Narrative    Not on file     Family History   Problem Relation Age of Onset    Diabetes Mother     Dementia Mother     Other Mother         carotid artery blockage    Heart Disease Father     Stroke Father     Heart Attack Father         several    Hypertension Father     Elevated Lipids Father     No Known Problems Sister     Cancer Brother         brain    Lung Disease Sister     COPD Sister     Cancer Sister lung       Current Medications:   Current Outpatient Medications   Medication Sig Dispense Refill    valsartan (DIOVAN) 40 mg tablet Take 0.5 Tabs by mouth nightly. 30 Tab 3    furosemide (LASIX) 20 mg tablet Take 1 Tab by mouth two (2) times a day. 45 Tab 3    apixaban (ELIQUIS) 5 mg tablet Take 1 Tab by mouth two (2) times a day. 60 Tab 11    levalbuterol tartrate (XOPENEX) 45 mcg/actuation inhaler INHALE 2 PUFFS EVERY 4 HOURS AS NEEDED  0    montelukast (SINGULAIR) 10 mg tablet TAKE 1 TABLET BY MOUTH ONCE A DAY  3    carvedilol (COREG) 3.125 mg tablet TAKE 1 TABLET BY MOUTH TWICE A DAY WITH MEALS 60 Tab 3    amiodarone (CORDARONE) 200 mg tablet Take 1 Tab by mouth daily. 90 Tab 1    patiromer calcium sorbitex (VELTASSA) 8.4 gram powder Take 8.4 g by mouth daily. 30 Packet 3    HYDROcodone-acetaminophen (NORCO) 5-325 mg per tablet TAKE 1 TABLET EVERY 6 HOURS AS NEEDED  0    DULoxetine (CYMBALTA) 60 mg capsule Take 60 mg by mouth daily.  allopurinol (ZYLOPRIM) 100 mg tablet Take  by mouth daily.  tamsulosin (FLOMAX) 0.4 mg capsule Take 0.4 mg by mouth daily.  atorvastatin (LIPITOR) 40 mg tablet Take 40 mg by mouth daily.  colchicine 0.6 mg tablet Take 0.6 mg by mouth every other day.  cyclobenzaprine (FLEXERIL) 5 mg tablet Take 5 mg by mouth daily as needed for Muscle Spasm(s).  aspirin 81 mg chewable tablet Take 81 mg by mouth daily.  tadalafil (CIALIS) 10 mg tablet Take 1 Tab by mouth as needed. Indications: Erectile Dysfunction 30 Tab 2    ascorbic acid, vitamin C, (VITAMIN C) 500 mg tablet Take 1 Tab by mouth two (2) times a day. 60 Tab 6       Allergies: Allergies   Allergen Reactions    Morphine Other (comments)     Hallucinations. Confusion.  Impaired judgement    Chlorhexidine Rash           Physical Exam:   Vitals:    Visit Vitals  /60 (BP 1 Location: Left arm, BP Patient Position: Sitting)   Pulse 64   Temp 98.4 °F (36.9 °C) (Oral)   Resp 20   Ht 5' 10\" (1.778 m)   Wt 171 lb 9.6 oz (77.8 kg)   SpO2 97%   BMI 24.62 kg/m²      General:  Well developed WM, AAOx3, pleasant,  cooperative, no acute distress. HEENT:  Atraumatic. Pink and moist.  Anicteric sclerae. Neck:   Supple, no adenopoathy. Lungs:  Diminished breath sounds at left base No wheezing/rhonchi/rales. Heart:   PMI: Median sternotomy scar,  Regular rhythm, S1, S2 present, no murmur, no rubs, no gallops. JVD 8 cm (-) HJR . No carotid bruits. Left lateral axilla healing AICD incision with brusing, trace edema   Abdomen:  Soft, non-distended, non-tender. + Bowel sounds. No bruits. Extremities:  Venous stasis changes, trace BLE pitting edema R>L, no clubbing, no cyanosis. No calf tenderness  Neurologic:  Grossly intact. Alert and oriented X 3. No acute neurological distress. Skin:   intact, no wounds, ecchymosis, bruising, rashes   Psych:  Good insight. Not anxious nor agitated.     Recent Labs:     Lab Results   Component Value Date/Time    WBC 8.0 05/30/2019 12:11 PM    HGB 9.5 (L) 06/03/2019 11:15 AM    HCT 30.3 (L) 06/03/2019 11:15 AM    PLATELET 611 85/22/2521 12:11 PM    MCV 89 05/30/2019 12:11 PM     Lab Results   Component Value Date/Time    GFR est non-AA 50 (L) 05/23/2019 03:29 PM    GFR est AA 57 (L) 05/23/2019 03:29 PM    Creatinine 1.49 (H) 05/23/2019 03:29 PM    BUN 37 (H) 05/23/2019 03:29 PM    Sodium 135 05/23/2019 03:29 PM    Potassium 4.8 05/23/2019 03:29 PM    Chloride 102 05/23/2019 03:29 PM    CO2 21 05/23/2019 03:29 PM    Magnesium 1.9 05/24/2019 12:00 AM    Phosphorus 1.9 (L) 11/25/2016 09:36 PM     Lab Results   Component Value Date/Time    TSH 2.83 11/28/2016 07:29 AM      Lab Results   Component Value Date/Time    Sodium 135 05/23/2019 03:29 PM    Potassium 4.8 05/23/2019 03:29 PM    Chloride 102 05/23/2019 03:29 PM    CO2 21 05/23/2019 03:29 PM    Anion gap 4 (L) 05/22/2019 02:56 AM    Glucose 77 05/23/2019 03:29 PM    BUN 37 (H) 05/23/2019 03:29 PM    Creatinine 1.49 (H) 05/23/2019 03:29 PM    BUN/Creatinine ratio 25 (H) 05/23/2019 03:29 PM    GFR est AA 57 (L) 05/23/2019 03:29 PM    GFR est non-AA 50 (L) 05/23/2019 03:29 PM    Calcium 9.0 05/23/2019 03:29 PM    Bilirubin, total 0.6 05/23/2019 03:29 PM    ALT (SGPT) 11 05/23/2019 03:29 PM    AST (SGOT) 21 05/23/2019 03:29 PM    Alk. phosphatase 78 05/23/2019 03:29 PM    Protein, total 6.5 05/23/2019 03:29 PM    Albumin 4.1 05/23/2019 03:29 PM    Globulin 3.6 01/24/2017 05:33 AM    A-G Ratio 1.7 05/23/2019 03:29 PM      Lab Results   Component Value Date/Time    Hemoglobin A1c 5.9 05/22/2019 02:56 AM       Felicity Lopez NP   [address] [facility] [place]  [address] [place] [address]  Office [phone]  Fax [phone]    [facility] ATTENDING ADDENDUM    Patient was seen and examined in person. Data and notes were reviewed. I have discussed and agree with the plan as noted in the NP note above without further additions.     Indu Salguero MD PhD  Felisa Husain

## 2019-06-11 NOTE — PATIENT INSTRUCTIONS
START Diovan 20 mg every night INCREASE lasix to 20mg twice daily - if you lose more than 6-8 lbs in one week call the office STOP taking the entresto CONTINUE CURRENT medications Take twice a day medications 12 hours apart with food Labs and EKG today, we will schedule CXR, ECHO Continue to monitor and record daily weights and BP in AM and 2 hrs after AM medication Follow up with [facility] in 2 weeks HF Education: Continue daily weights (in the morning, after voiding). Notify HF team of overnight weight gains > 2 lbs or weekly >5 lbs or if any of the following Sx. Continue to limit sodium intake & monitor your fluid intake .

## 2019-06-13 ENCOUNTER — HOSPITAL ENCOUNTER (OUTPATIENT)
Dept: NON INVASIVE DIAGNOSTICS | Age: 64
Discharge: HOME | End: 2019-06-13
Attending: NURSE PRACTITIONER
Payer: COMMERCIAL

## 2019-06-13 DIAGNOSIS — E87.5 HYPERKALEMIA: ICD-10-CM

## 2019-06-13 DIAGNOSIS — I50.22 CHRONIC SYSTOLIC CONGESTIVE HEART FAILURE (HCC): ICD-10-CM

## 2019-06-13 DIAGNOSIS — E78.5 HYPERLIPIDEMIA, UNSPECIFIED HYPERLIPIDEMIA TYPE: ICD-10-CM

## 2019-06-13 DIAGNOSIS — R06.09 DOE (DYSPNEA ON EXERTION): ICD-10-CM

## 2019-06-13 DIAGNOSIS — Z95.810 ICD (IMPLANTABLE CARDIOVERTER-DEFIBRILLATOR) IN PLACE: ICD-10-CM

## 2019-06-13 DIAGNOSIS — I25.5 ISCHEMIC CARDIOMYOPATHY: ICD-10-CM

## 2019-06-13 DIAGNOSIS — Z79.01 CHRONIC ANTICOAGULATION: ICD-10-CM

## 2019-06-13 DIAGNOSIS — R06.02 SHORTNESS OF BREATH: ICD-10-CM

## 2019-06-13 LAB
ALBUMIN SERPL-MCNC: 4.1 G/DL (ref 3.6–4.8)
ALBUMIN/GLOB SERPL: 1.8 {RATIO} (ref 1.2–2.2)
ALP SERPL-CCNC: 86 IU/L (ref 39–117)
ALT SERPL-CCNC: 27 IU/L (ref 0–44)
AST SERPL-CCNC: 24 IU/L (ref 0–40)
BILIRUB SERPL-MCNC: 0.5 MG/DL (ref 0–1.2)
BUN SERPL-MCNC: 23 MG/DL (ref 8–27)
BUN/CREAT SERPL: 19 (ref 10–24)
CALCIUM SERPL-MCNC: 9 MG/DL (ref 8.6–10.2)
CHLORIDE SERPL-SCNC: 103 MMOL/L (ref 96–106)
CHOLEST SERPL-MCNC: 117 MG/DL (ref 100–199)
CK SERPL-CCNC: 51 U/L (ref 24–204)
CO2 SERPL-SCNC: 23 MMOL/L (ref 20–29)
CREAT SERPL-MCNC: 1.21 MG/DL (ref 0.76–1.27)
ECHO LA MAJOR AXIS: 4.05 CM
ECHO LV INTERNAL DIMENSION DIASTOLIC: 5.35 CM (ref 4.2–5.9)
ECHO LV INTERNAL DIMENSION SYSTOLIC: 4.43 CM
ECHO LV IVSD: 0.47 CM (ref 0.6–1)
ECHO LV MASS 2D: 145.2 G (ref 88–224)
ECHO LV POSTERIOR WALL DIASTOLIC: 0.92 CM (ref 0.6–1)
ECHO PULMONARY ARTERY SYSTOLIC PRESSURE (PASP): 85 MMHG
ECHO RV INTERNAL DIMENSION: 3.38 CM
ECHO RV TAPSE: 1.47 CM (ref 1.5–2)
ECHO TV REGURGITANT MAX VELOCITY: 377.62 CM/S
ECHO TV REGURGITANT PEAK GRADIENT: 57 MMHG
ERYTHROCYTE [DISTWIDTH] IN BLOOD BY AUTOMATED COUNT: 17.5 % (ref 12.3–15.4)
GLOBULIN SER CALC-MCNC: 2.3 G/DL (ref 1.5–4.5)
GLUCOSE SERPL-MCNC: 123 MG/DL (ref 65–99)
HCT VFR BLD AUTO: 30.4 % (ref 37.5–51)
HDLC SERPL-MCNC: 75 MG/DL
HGB BLD-MCNC: 9.5 G/DL (ref 13–17.7)
LDH SERPL-CCNC: 211 IU/L (ref 121–224)
LDLC SERPL CALC-MCNC: 36 MG/DL (ref 0–99)
MAGNESIUM SERPL-MCNC: 1.9 MG/DL (ref 1.6–2.3)
MCH RBC QN AUTO: 27.9 PG (ref 26.6–33)
MCHC RBC AUTO-ENTMCNC: 31.3 G/DL (ref 31.5–35.7)
MCV RBC AUTO: 89 FL (ref 79–97)
NT-PROBNP SERPL-MCNC: 4918 PG/ML (ref 0–210)
PLATELET # BLD AUTO: 322 X10E3/UL (ref 150–450)
POTASSIUM SERPL-SCNC: 4.8 MMOL/L (ref 3.5–5.2)
PROT SERPL-MCNC: 6.4 G/DL (ref 6–8.5)
RBC # BLD AUTO: 3.41 X10E6/UL (ref 4.14–5.8)
SODIUM SERPL-SCNC: 138 MMOL/L (ref 134–144)
T4 FREE SERPL-MCNC: 1.68 NG/DL (ref 0.82–1.77)
TRIGL SERPL-MCNC: 28 MG/DL (ref 0–149)
TSH SERPL DL<=0.005 MIU/L-ACNC: 3.79 UIU/ML (ref 0.45–4.5)
VLDLC SERPL CALC-MCNC: 6 MG/DL (ref 5–40)
WBC # BLD AUTO: 6.3 X10E3/UL (ref 3.4–10.8)

## 2019-06-13 PROCEDURE — 93306 TTE W/DOPPLER COMPLETE: CPT

## 2019-06-17 ENCOUNTER — PATIENT MESSAGE (OUTPATIENT)
Dept: CARDIOLOGY CLINIC | Age: 64
End: 2019-06-17

## 2019-06-21 RX ORDER — ATORVASTATIN CALCIUM 40 MG/1
TABLET, FILM COATED ORAL
Qty: 30 TAB | Refills: 11 | Status: SHIPPED | OUTPATIENT
Start: 2019-06-21

## 2019-06-24 RX ORDER — CARVEDILOL 3.12 MG/1
TABLET ORAL
Qty: 60 TAB | Refills: 3 | Status: SHIPPED | OUTPATIENT
Start: 2019-06-24 | End: 2019-09-05 | Stop reason: DRUGHIGH

## 2019-06-25 ENCOUNTER — TELEPHONE (OUTPATIENT)
Dept: CARDIOLOGY CLINIC | Age: 64
End: 2019-06-25

## 2019-06-25 LAB
BUN SERPL-MCNC: 25 MG/DL (ref 8–27)
BUN/CREAT SERPL: 18 (ref 10–24)
CALCIUM SERPL-MCNC: 9 MG/DL (ref 8.6–10.2)
CHLORIDE SERPL-SCNC: 103 MMOL/L (ref 96–106)
CO2 SERPL-SCNC: 24 MMOL/L (ref 20–29)
CREAT SERPL-MCNC: 1.39 MG/DL (ref 0.76–1.27)
GLUCOSE SERPL-MCNC: 80 MG/DL (ref 65–99)
NT-PROBNP SERPL-MCNC: 3566 PG/ML (ref 0–210)
POTASSIUM SERPL-SCNC: 5.2 MMOL/L (ref 3.5–5.2)
SODIUM SERPL-SCNC: 140 MMOL/L (ref 134–144)

## 2019-06-26 ENCOUNTER — DOCUMENTATION ONLY (OUTPATIENT)
Dept: CARDIOLOGY | Age: 64
End: 2019-06-26

## 2019-06-26 ENCOUNTER — OFFICE VISIT (OUTPATIENT)
Dept: CARDIOLOGY CLINIC | Age: 64
End: 2019-06-26

## 2019-06-26 VITALS
BODY MASS INDEX: 24.17 KG/M2 | HEART RATE: 70 BPM | OXYGEN SATURATION: 97 % | WEIGHT: 168.8 LBS | RESPIRATION RATE: 20 BRPM | TEMPERATURE: 96.7 F | SYSTOLIC BLOOD PRESSURE: 90 MMHG | DIASTOLIC BLOOD PRESSURE: 58 MMHG | HEIGHT: 70 IN

## 2019-06-26 DIAGNOSIS — I34.0 NON-RHEUMATIC MITRAL REGURGITATION: ICD-10-CM

## 2019-06-26 DIAGNOSIS — I50.22 SYSTOLIC CHF, CHRONIC (HCC): ICD-10-CM

## 2019-06-26 DIAGNOSIS — I49.01 VF (VENTRICULAR FIBRILLATION) (HCC): ICD-10-CM

## 2019-06-26 DIAGNOSIS — I25.5 ISCHEMIC CARDIOMYOPATHY: Primary | ICD-10-CM

## 2019-06-26 DIAGNOSIS — J90 RECURRENT LEFT PLEURAL EFFUSION: ICD-10-CM

## 2019-06-26 PROBLEM — I77.9 CAROTID ARTERIAL DISEASE (HCC): Status: ACTIVE | Noted: 2017-06-29

## 2019-06-26 PROBLEM — G89.29 CHRONIC BACK PAIN GREATER THAN 3 MONTHS DURATION: Status: ACTIVE | Noted: 2017-06-13

## 2019-06-26 PROBLEM — I73.9 PAD (PERIPHERAL ARTERY DISEASE) (HCC): Status: ACTIVE | Noted: 2017-06-29

## 2019-06-26 PROBLEM — M62.838 MUSCLE SPASM: Status: ACTIVE | Noted: 2018-07-16

## 2019-06-26 PROBLEM — M54.9 CHRONIC BACK PAIN GREATER THAN 3 MONTHS DURATION: Status: ACTIVE | Noted: 2017-06-13

## 2019-06-26 PROBLEM — R12 HEART BURN: Status: ACTIVE | Noted: 2019-06-26

## 2019-06-26 PROBLEM — Z87.891 HISTORY OF TOBACCO ABUSE: Status: ACTIVE | Noted: 2017-06-13

## 2019-06-26 PROBLEM — Z91.89 AT RISK FOR OBSTRUCTIVE SLEEP APNEA: Status: ACTIVE | Noted: 2018-09-03

## 2019-06-26 RX ORDER — SACUBITRIL AND VALSARTAN 24; 26 MG/1; MG/1
TABLET, FILM COATED ORAL
Refills: 1 | COMMUNITY
Start: 2019-06-20 | End: 2019-06-26

## 2019-06-26 RX ORDER — TADALAFIL 20 MG/1
20 TABLET ORAL AS NEEDED
Refills: 6 | COMMUNITY
Start: 2019-06-20

## 2019-06-26 NOTE — PATIENT INSTRUCTIONS
1. Resume entresto 24/26 mg, 1 tablet twice daily  2. Discontinue diovan  3. Continue lasix 20 mg daily  4. Take carvedilol 3.125 mg, 1 tablet twice daily after meals  5. Weigh daily  6. Keep a home BP log  7. Schedule Transesophageal Echo at [facility]  8. Scheduled for Thoracentesis (Left pleural tap) with Interventional Radiology at [facility] - hold elqiuis for 48 hours prior to procedure  9.  Follow up in the [facility] in 2-4 weeks

## 2019-06-26 NOTE — PROGRESS NOTES
I saw and examined Mr. Vivek Bell with Dr. Shmuel Chapman - he has probably severe FMR and cardiomyopathy along with 3-4+ TR, and is s/p VAD explant. Will get DELLA next to clarify mechanism and severity of MR, as PA pressures 70's, after further diuresis, and see about transcatheter options.

## 2019-06-26 NOTE — PROGRESS NOTES
[facility] Clinic Note      DOS:  6/26/2019  REFERRING PROVIDER: Julio C Case MD  PRIMARY CARE PHYSICIAN: Chris Reddy MD  PRIMARY CARDIOLOGIST:  Michelle Acuna MD   EP: Langston Saint, MD   PULMONARY: Kelsy Watson MD       IMPRESSION/PLAN:   ICM s/p HMII as BTT on 12/1/16 and explant, NYHA Class II-III, LVEF 16-20%      Resume entresto 24/26 mg, 1 tablet BID    Continue Coreg 3.125 mg BID    Continue lasix 20 mg daily    Continue low Na+ diet   Abstain from smoking and ETOH   ATTR- negative   DELLA to assess severity and etiology MR   Follow up in the [facility] in 2 weeks    CAD s/p CABG (SVG to RCA and LIMA to LAD) on 12/1/16 s/p BMS x2 -> SVG-RCA graft - no angina              Continue ASA, BB, statin    Chronic Left Pleural Effusion   Left thoracentesis - schedule with IR at [place] [address] for 2 days prior to procedure     High Risk of SCD - s/p SQ AICD (5/20/19)         PAD - difficult femoral access with LVAD explant              No symptoms of claudication      Chronic Anticoagulation s/p LVAD explant              Continue eliquis 5 mg  twice daily    Hyperkalemia K 5.2    Follow labs    Continue veltessa    Continue low K+ diet     Gout              Continue colchicine 0.6 mg every other day              Continue allopurinol 100 mg daily   Denies recent gouty attacks    Chronic Lower back pain              On oxycodone, flexeril   Hasn't taken in since ICD implant   Back pain improved with increased activity- going to gym    H/o tobacco abuse   Abstaining from nicotine      H/o alcohol abuse   Currently abstaining   Encouraged complete abstinence      Seizure disorder - early 25s              No recent seizure activity     Peripheral neuropathy              On cymbalta   ATTR- Negative     Prevention              Influenza annually              Pneumonia vaccine every 5 years       Chief Complaint   Patient presents with    CHF         HPI: Meme Cardenas is a 59y.o. year old male  with a history of HFrEF in the setting of ICM s/p LVAD and recent LVAD explant complicated by PVD,  remote tobacco use and alcohol abuse (quit 11/2016) who presents for follow up of his HFrEF. Mr. Clare Kimbrough presented 11/22/2016 for decompressive laminectomy at G5-N3 complicated by myocardial infarction. The [facility](11/25/16) revealed severe left main and 3 vessel disease along with a 60% right common iliac stenosis and  LVEF was 10%. He subsequently underwent placement of an IABP followed by placement of a HeartMate2 LVAD on 12/1/2016 with concomitant CABG (SVG to the RCA, LIMA to LAD). His postoperative course was complicated by RV failure and an ileus requiring TPN resulting and a transaminitis. He had multiple episodes of Torsade on 12/4/2016 prompting repeat catheterization revealing an occluded RCA vein graft. Two BMS were placed in the RCA graft. He also had SVT requiring cardioversion. He had a single lead ICD placed on 59/81/49 complicated by a hematoma. The ICD was later removed on 1/20/17. Following his LVAD surgery he did require inpatient rehab.      Mr. Clare Kimbrough was listed for heart transplantation at [facility]. He was called in for transplantation on 7/14/2018 but on pre-op DELLA was noted to have normal LV function and the transplant surgery was aborted. He subsequently was admitted on 9/5/2018 for LVAD explant- his post-operative course was complicated by pneumonia and bilateral pleural effusions. He was discharged on home O2 which he discontinued on his own.       After device explant, his subsequent echocardiograms showed deterioration of LVEF and worsening mitral regurgitation from 40-45% with moderate to severe MR on 9/19/18 to 35-40% with moderate-to-severe MR on 10/15/18. He has been removed from the transplant list at [facility] and is not interested in pursuing transplant evaluation at another center at this time. He returns to clinic today for follow up for his systolic heart failure and s/p SQ AICD implant. He discontinued the entresto at his last visit and started diovan. He wishes to resume entresto today. He denies any dizziness and states that he feels better on the furosemide 20 mg daily. He is frustrated that his entresto has been decreased and ultimately stopped since his SQ AICD placement. He denies DSOUZA, PND, and orthopnea, but has been abnormally tired and taking daytime naps. His sleep study was negative in December. His BP at home has been systolic  mmHg. He has a chronic left pleural effusion that has been monitored by his PCP with serial CXRs. A recent CXR was performed at [place] [address] but the results have not been faxed to our clinic. CARDIAC EVALUATION  TTE 19: LVEF 32%, LVAD plug at LV apex, grade 2 diastolic dysfunction, mild-mod MR, mild TR, PAPs 43 mmHg, mild LAE  DELLA 2018 - normal LV function and the transplant surgery was aborted. He subsequently was admitted on 2018 for LVAD explant  ECHO 18:LVEF 40-45% mod-severe MR   ECHO 10/15/18:EF 35-40%, mod-severe MR   ECHO 10/31/18: TDS, EF 30-35%, reduced RVEF, TAPSE 1.6, LAE, mild- mod MR/TR  ECHO 19: EF 25-30%, question of thrombus in apex. Mild- mod TR. Consider cardiac MR to exclude left ventricular thrombus. ECHO 2019: EF 16-20%, mod MR, mod-severe TR, severe pulm HTN (PA systolic pressure 85 mmHg), mildly reduced RV systolic function (TAPSE 5.77 cm)    CATH 16: severe LM, and 3 VD with 60% right common iliac stenosis. EF: 10%. ----S/p IABP   ----S/p HeartMate2 LVAD 2016   S/p CAB2016: LIMA-> LAD, SVG-> RCA     CATH 16 following torsades SVG-> RCA TO   ---- s/p BMS x 2-.  RCA  RHC 2017:RA: 15/15 14, RV:  14,PA: 23/, m 20, PCWP 13, PVR 1.67 CO 5.67, CI 3    S/p LVAD explant 18 (UVA)    SVT s/p DCCV  AICD  single lead cx by hematoma  AICD explanted 17    EKG:    10/31/18 NSR QRS 88ms   ms  19: SR rate 68bpm QRS 94ms Qtc 438 ms    Review of Systems:     General:  Positive Fatigue   HEENT: Denies epistaxis, angioedema   Pulmonary: Denies  wheeze, hemoptysis   Cardiac: Denies exertional chest pain, palpitations, orthopnea, PND, edema, lightheadedness, syncope, near syncope,or bleeding   GI:  Denies  abdominal pain, change in bowel habits, or black or bloody stools  Musculo: Positive for chronic back pain for which he takes prn flexril and percocet   Neuro: Denies  TIA/CVA sx   Skin:  Denies rash   Allergies: Reviewed   Psych: Denies depression or anxiety       History:  Past Medical History:   Diagnosis Date    Arrhythmia     SVT    Arthritis     CAD (coronary artery disease)     Chronic pain     back    [facility]     DSOUZA (dyspnea on exertion) 1/30/2019    Gout     Heart failure [facility]     Hypertension     ICD (implantable cardioverter-defibrillator) in place     Long term current use of anticoagulant therapy     LVAD (left ventricular assist device) present ([facility] [address] 12/01/2016    Myocardial infarction ([facility]     Raynaud disease     Seizures ([facility] [address] [facility]     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     Past Surgical History:   Procedure Laterality Date    CABG, ARTERY-VEIN, TWO  12/01/2016    CARDIAC SURG PROCEDURE UNLIST  12/01/2016    LVAD    HX CORONARY ARTERY BYPASS GRAFT      HX CORONARY STENT PLACEMENT      HX HEART CATHETERIZATION      HX HEENT      wisdom teeth removed    HX ORTHOPAEDIC  11/22/2016    laminectomy    HX PACEMAKER      ICD - removed 1/2017    HX PTCA      [place] INSJ OF SUBQ IMPLANTABLE DEFIBRILLATOR ELECTRODE N/A 5/20/2019    INSERT SUBQ ICD w/ anesthesia performed by Nydia Ramsey MD at [address] [place] [address] LAB     Social History     Socioeconomic History    Marital status:      Spouse name: Shavon Mann    Number of children: 2    Years of education: Not on file    Highest education level: Some college, no degree   Occupational History    Not on file   Social Needs    Financial resource strain: Not hard at all   [employer]nex[employer] insecurity:     Worry: Never true     Inability: Never true   [employer] needs:     Medical: No     Non-medical: No   Tobacco Use    Smoking status: Former Smoker     Packs/day: 1.50     Years: 30.00     Pack years: 45.00     Last attempt to quit:      Years since quittin.4    Smokeless tobacco: Never Used   Substance and Sexual Activity    Alcohol use: No    Drug use: No    Sexual activity: Never   Lifestyle    Physical activity:     Days per week: Not on file     Minutes per session: Not on file    Stress: Not on file   Relationships    Social connections:     Talks on phone: Not on file     Gets together: Not on file     Attends [employer] service: Not on file     Active member of club or organization: Not on file     Attends meetings of clubs or organizations: Not on file     Relationship status: Not on file    Intimate partner violence:     Fear of current or ex partner: Not on file     Emotionally abused: Not on file     Physically abused: Not on file     Forced sexual activity: Not on file   Other Topics Concern     Service Not Asked    Blood Transfusions Not Asked    Caffeine Concern Not Asked    Occupational Exposure Not Asked   Clemetine Budge Hazards Not Asked    Sleep Concern Not Asked    Stress Concern Not Asked    Weight Concern Not Asked    Special Diet Not Asked    Back Care Not Asked    Exercise Not Asked    Bike Helmet Not Asked    [address] Not Asked    Self-Exams Not Asked   Social History Narrative    Not on file     Family History   Problem Relation Age of Onset    Diabetes Mother     Dementia Mother     Other Mother         carotid artery blockage    Heart Disease Father     Stroke Father     Heart Attack Father         several    Hypertension Father     Elevated Lipids Father     No Known Problems Sister     Cancer Brother         brain    Lung Disease Sister     COPD Sister     Cancer Sister lung       Current Medications:   Current Outpatient Medications   Medication Sig Dispense Refill    tadalafil (CIALIS) 20 mg tablet TAKE 1 TABLET BY MOUTH EVERY DAY  6    carvedilol (COREG) 3.125 mg tablet TAKE 1 TABLET BY MOUTH TWICE A DAY WITH MEALS 60 Tab 3    atorvastatin (LIPITOR) 40 mg tablet TAKE 1 TABLET BY MOUTH EVERY DAY 30 Tab 11    patiromer calcium sorbitex (VELTASSA) 8.4 gram powder Take 8.4 g by mouth daily. 30 Packet 3    valsartan (DIOVAN) 40 mg tablet Take 0.5 Tabs by mouth nightly. 30 Tab 3    furosemide (LASIX) 20 mg tablet Take 1 Tab by mouth two (2) times a day. 45 Tab 3    apixaban (ELIQUIS) 5 mg tablet Take 1 Tab by mouth two (2) times a day. 60 Tab 11    levalbuterol tartrate (XOPENEX) 45 mcg/actuation inhaler INHALE 2 PUFFS EVERY 4 HOURS AS NEEDED  0    montelukast (SINGULAIR) 10 mg tablet TAKE 1 TABLET BY MOUTH ONCE A DAY  3    amiodarone (CORDARONE) 200 mg tablet Take 1 Tab by mouth daily. 90 Tab 1    HYDROcodone-acetaminophen (NORCO) 5-325 mg per tablet TAKE 1 TABLET EVERY 6 HOURS AS NEEDED  0    DULoxetine (CYMBALTA) 60 mg capsule Take 60 mg by mouth daily.  allopurinol (ZYLOPRIM) 100 mg tablet Take  by mouth daily.  tamsulosin (FLOMAX) 0.4 mg capsule Take 0.4 mg by mouth daily.  colchicine 0.6 mg tablet Take 0.6 mg by mouth every other day.  cyclobenzaprine (FLEXERIL) 5 mg tablet Take 5 mg by mouth daily as needed for Muscle Spasm(s).  aspirin 81 mg chewable tablet Take 81 mg by mouth daily.  ascorbic acid, vitamin C, (VITAMIN C) 500 mg tablet Take 1 Tab by mouth two (2) times a day. 60 Tab 6       Allergies: Allergies   Allergen Reactions    Morphine Other (comments)     Hallucinations. Confusion.  Impaired judgement    Chlorhexidine Rash           Physical Exam:   Vitals:    Visit Vitals  BP 90/58 (BP 1 Location: Left arm, BP Patient Position: Sitting)   Pulse 70   Temp 96.7 °F (35.9 °C) (Oral)   Resp 20   Ht 5' 10\" (1.778 m)   Wt 168 lb 12.8 oz (76.6 kg)   SpO2 97%   BMI 24.22 kg/m²        Orthostatics:  Supine     BP 94/54   HR 72  Sitting      BP  90/54  HR 68  Standing  BP 92/54  HR 72      General:  Well developed WM, AAOx3, pleasant,  cooperative, no acute distress. HEENT:  Atraumatic. Pink and moist.  Anicteric sclerae. Neck:   Supple, no adenopoathy. Lungs:  Diminished breath sounds at left base No wheezing/rhonchi/rales. Heart:   PMI: Median sternotomy scar,  Regular rhythm, S1, S2 present, no murmur, no rubs, no gallops. JVD 8 cm (-) HJR . No carotid bruits. Left lateral axilla healing AICD incision with brusing, trace edema   Abdomen:  Soft, non-distended, non-tender. + Bowel sounds. No bruits. Extremities:  Venous stasis changes, trace BLE pitting edema R>L, no clubbing, no cyanosis. No calf tenderness  Neurologic:  Grossly intact. Alert and oriented X 3. No acute neurological distress. Skin:   intact, no wounds, ecchymosis, bruising, rashes   Psych:  Good insight. Not anxious nor agitated.     Recent Labs:     Lab Results   Component Value Date/Time    WBC 6.3 06/12/2019 03:05 PM    HGB 9.5 (L) 06/12/2019 03:05 PM    HCT 30.4 (L) 06/12/2019 03:05 PM    PLATELET 573 08/18/0307 03:05 PM    MCV 89 06/12/2019 03:05 PM     Lab Results   Component Value Date/Time    GFR est non-AA 54 (L) 06/24/2019 12:57 PM    GFR est AA 62 06/24/2019 12:57 PM    Creatinine 1.39 (H) 06/24/2019 12:57 PM    BUN 25 06/24/2019 12:57 PM    Sodium 140 06/24/2019 12:57 PM    Potassium 5.2 06/24/2019 12:57 PM    Chloride 103 06/24/2019 12:57 PM    CO2 24 06/24/2019 12:57 PM    Magnesium 1.9 06/12/2019 03:05 PM    Phosphorus 1.9 (L) 11/25/2016 09:36 PM     Lab Results   Component Value Date/Time    TSH 3.790 06/12/2019 03:05 PM    T4, Free 1.68 06/12/2019 03:05 PM      Lab Results   Component Value Date/Time    Sodium 140 06/24/2019 12:57 PM    Potassium 5.2 06/24/2019 12:57 PM    Chloride 103 06/24/2019 12:57 PM    CO2 24 06/24/2019 12:57 PM    Anion gap 4 (L) 05/22/2019 02:56 AM    Glucose 80 06/24/2019 12:57 PM    BUN 25 06/24/2019 12:57 PM    Creatinine 1.39 (H) 06/24/2019 12:57 PM    BUN/Creatinine ratio 18 06/24/2019 12:57 PM    GFR est AA 62 06/24/2019 12:57 PM    GFR est non-AA 54 (L) 06/24/2019 12:57 PM    Calcium 9.0 06/24/2019 12:57 PM    Bilirubin, total 0.5 06/12/2019 03:05 PM    ALT (SGPT) 27 06/12/2019 03:05 PM    AST (SGOT) 24 06/12/2019 03:05 PM    Alk. phosphatase 86 06/12/2019 03:05 PM    Protein, total 6.4 06/12/2019 03:05 PM    Albumin 4.1 06/12/2019 03:05 PM    Globulin 3.6 01/24/2017 05:33 AM    A-G Ratio 1.8 06/12/2019 03:05 PM      Lab Results   Component Value Date/Time    Hemoglobin A1c 5.9 05/22/2019 02:56 AM       Thank you for letting us see him with you,      Kristi Rosa MD, [facility], [address] [place] [address]  Chief of Cardiology, [facility] Director  [address] [place] [address] [place]  [address] [place] [address], [address] [place] [address], [address] [place] [address]  Office [phone]  Fax [phone]

## 2019-06-27 ENCOUNTER — HOSPITAL ENCOUNTER (OUTPATIENT)
Dept: GENERAL RADIOLOGY | Age: 64
Discharge: HOME | End: 2019-06-27
Payer: COMMERCIAL

## 2019-06-27 DIAGNOSIS — I50.22 CHRONIC SYSTOLIC HEART FAILURE (HCC): Primary | ICD-10-CM

## 2019-06-27 DIAGNOSIS — I50.22 CHRONIC SYSTOLIC HEART FAILURE (HCC): ICD-10-CM

## 2019-06-27 DIAGNOSIS — R06.09 DOE (DYSPNEA ON EXERTION): ICD-10-CM

## 2019-06-27 PROCEDURE — 71048 X-RAY EXAM CHEST 4+ VIEWS: CPT

## 2019-06-27 NOTE — PROGRESS NOTES
I called patient, requested he come in today for chest xray, PA, lat and lo, per Dr. Dalila Thomas. He will come this afternoon to [facility]. Dr. Dalila Thomas will then decide regarding IR thoracentesis.

## 2019-06-28 ENCOUNTER — TELEPHONE (OUTPATIENT)
Dept: CARDIOLOGY CLINIC | Age: 64
End: 2019-06-28

## 2019-06-28 NOTE — TELEPHONE ENCOUNTER
----- Message from Yvette Pack MD sent at 6/27/2019  4:12 PM EDT -----  Sravan Kody,    Would you mind calling IR to see if they can tap his pleural effusion. I would also let them know that his LVAD has been deactivated. Thanks  ----- Message -----  From: Marcin, Rad Results In  Sent: 6/27/2019   3:43 PM  To: Yvette Pack MD    I called IR to schedule-they advised I call [facility]-message sent to Juan Luis Kamara to schedule.

## 2019-07-01 ENCOUNTER — TELEPHONE (OUTPATIENT)
Dept: CARDIOLOGY CLINIC | Age: 64
End: 2019-07-01

## 2019-07-01 DIAGNOSIS — J90 PLEURAL EFFUSION: ICD-10-CM

## 2019-07-01 DIAGNOSIS — I50.22 CHRONIC SYSTOLIC HEART FAILURE (HCC): Primary | ICD-10-CM

## 2019-07-01 DIAGNOSIS — R06.02 SHORTNESS OF BREATH: ICD-10-CM

## 2019-07-01 NOTE — TELEPHONE ENCOUNTER
Patient called spouse called regarding orders placed by Dr. Bryan Rao. Please call her.     [phone]

## 2019-07-02 NOTE — PROGRESS NOTES
HISTORY OF PRESENTING ILLNESS      Rob Reid is a 59 y.o. male who underwent SICD implantation and now presents for follow up. He is doing well, reports improvement in weakness since last visit. His incisions have healed well and device interrogation shows normal functioning.        ACTIVE PROBLEM LIST     Patient Active Problem List    Diagnosis Date Noted    CHF (congestive heart failure) ([address] 05/20/2019     Priority: 1 - One    Heart burn 06/26/2019    DSOUZA (dyspnea on exertion) 01/30/2019    At risk for obstructive sleep apnea 09/03/2018    Muscle spasm 07/16/2018    Erectile dysfunction 09/08/2017    Carotid arterial disease ([address] 06/29/2017    PAD (peripheral artery disease) ([address] 06/29/2017    Chronic back pain greater than 3 months duration 06/13/2017    History of tobacco abuse 06/13/2017    ICD (implantable cardioverter-defibrillator) infection ([address] 01/20/2017    Gout 12/30/2016    Chronic anticoagulation 12/27/2016    Sustained VT (ventricular tachycardia) ([address] 12/13/2016    Coronary artery disease involving coronary bypass graft 12/01/2016    MI (myocardial infarction) ([address] 11/27/2016    Chronic systolic heart failure ([address] 11/26/2016    CAD, multiple vessel 11/26/2016    Ischemic cardiomyopathy 11/26/2016    Sinus tachycardia 11/24/2016    Acute respiratory failure with hypoxia ([address] 11/24/2016    Essential hypertension 11/24/2016    Alcohol abuse 11/24/2016    S/P laminectomy 11/22/2016    S/P lumbar laminectomy 11/22/2016    Seizure disorder ([address] 11/22/2016    HTN (hypertension) 11/22/2016           PAST MEDICAL HISTORY     Past Medical History:   Diagnosis Date    Arrhythmia     SVT    Arthritis     CAD (coronary artery disease)     Chronic pain     back    Congestive heart failure (HCC)     DSOUZA (dyspnea on exertion) 1/30/2019    Gout     Heart failure ([address]     Hypertension     ICD (implantable cardioverter-defibrillator) in place     Long term current use of anticoagulant therapy     LVAD (left ventricular assist device) present ([facility] 12/01/2016    Myocardial infarction (HCC)     Raynaud disease     Seizures ([facility]     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           PAST SURGICAL HISTORY     Past Surgical History:   Procedure Laterality Date    CABG, ARTERY-VEIN, TWO  12/01/2016    CARDIAC SURG PROCEDURE UNLIST  12/01/2016    LVAD    HX CORONARY ARTERY BYPASS GRAFT      HX CORONARY STENT PLACEMENT      HX HEART CATHETERIZATION      HX HEENT      wisdom teeth removed    HX ORTHOPAEDIC  11/22/2016    laminectomy    HX PACEMAKER      ICD - removed 1/2017    HX PTCA      [place] INSJ OF SUBQ IMPLANTABLE DEFIBRILLATOR ELECTRODE N/A 5/20/2019    INSERT SUBQ ICD w/ anesthesia performed by Karthik Tran MD at [facility] CATH LAB          ALLERGIES     Allergies   Allergen Reactions    Morphine Other (comments)     Hallucinations. Confusion.  Impaired judgement    Chlorhexidine Rash          FAMILY HISTORY     Family History   Problem Relation Age of Onset    Diabetes Mother     Dementia Mother     Other Mother         carotid artery blockage    Heart Disease Father     Stroke Father     Heart Attack Father         several    Hypertension Father     Elevated Lipids Father     No Known Problems Sister     Cancer Brother         brain    Lung Disease Sister     COPD Sister     Cancer Sister         lung    negative for cardiac disease       SOCIAL HISTORY     Social History     Socioeconomic History    Marital status:      Spouse name: Merly Grimaldo    Number of children: 2    Years of education: Not on file    Highest education level: Some college, no degree   Social Needs    Financial resource strain: Not hard at all   [place] insecurity:     Worry: Never true     Inability: Never true   [employer] needs:     Medical: No     Non-medical: No   Tobacco Use    Smoking status: Former Smoker     Packs/day: 1.50     Years: 30.00 Pack years: 45.00     Last attempt to quit:      Years since quittin.5    Smokeless tobacco: Never Used   Substance and Sexual Activity    Alcohol use: No    Drug use: No    Sexual activity: Never   Other Topics Concern         MEDICATIONS     Current Outpatient Medications   Medication Sig    tadalafil (CIALIS) 20 mg tablet TAKE 1 TABLET BY MOUTH EVERY DAY    sacubitril-valsartan (ENTRESTO) 24 mg/26 mg tablet Take 1 Tab by mouth two (2) times a day.  carvedilol (COREG) 3.125 mg tablet TAKE 1 TABLET BY MOUTH TWICE A DAY WITH MEALS    atorvastatin (LIPITOR) 40 mg tablet TAKE 1 TABLET BY MOUTH EVERY DAY    patiromer calcium sorbitex (VELTASSA) 8.4 gram powder Take 8.4 g by mouth daily.  furosemide (LASIX) 20 mg tablet Take 1 Tab by mouth two (2) times a day.  apixaban (ELIQUIS) 5 mg tablet Take 1 Tab by mouth two (2) times a day.  levalbuterol tartrate (XOPENEX) 45 mcg/actuation inhaler INHALE 2 PUFFS EVERY 4 HOURS AS NEEDED    montelukast (SINGULAIR) 10 mg tablet TAKE 1 TABLET BY MOUTH ONCE A DAY    amiodarone (CORDARONE) 200 mg tablet Take 1 Tab by mouth daily.  HYDROcodone-acetaminophen (NORCO) 5-325 mg per tablet TAKE 1 TABLET EVERY 6 HOURS AS NEEDED    DULoxetine (CYMBALTA) 60 mg capsule Take 60 mg by mouth daily.  allopurinol (ZYLOPRIM) 100 mg tablet Take  by mouth daily.  tamsulosin (FLOMAX) 0.4 mg capsule Take 0.4 mg by mouth daily.  colchicine 0.6 mg tablet Take 0.6 mg by mouth every other day.  cyclobenzaprine (FLEXERIL) 5 mg tablet Take 5 mg by mouth daily as needed for Muscle Spasm(s).  aspirin 81 mg chewable tablet Take 81 mg by mouth daily.  ascorbic acid, vitamin C, (VITAMIN C) 500 mg tablet Take 1 Tab by mouth two (2) times a day. No current facility-administered medications for this visit. I have reviewed the nurses notes, vitals, problem list, allergy list, medical history, family, social history and medications.        REVIEW OF SYMPTOMS General: Pt denies excessive weight gain or loss. Pt is able to conduct ADL's  HEENT: Denies blurred vision, headaches, hearing loss, epistaxis and difficulty swallowing. Respiratory: Denies cough, congestion, shortness of breath, DSOUZA, wheezing or stridor. Cardiovascular: Denies precordial pain, palpitations, edema or PND  Gastrointestinal: Denies poor appetite, indigestion, abdominal pain or blood in stool  Genitourinary: Denies hematuria, dysuria, increased urinary frequency  Musculoskeletal: Denies joint pain or swelling from muscles or joints  Neurologic: Denies tremor, paresthesias, headache, or sensory motor disturbance  Psychiatric: Denies confusion, insomnia, depression  Integumentray: Denies rash, itching or ulcers. Hematologic: Denies easy bruising, bleeding       PHYSICAL EXAMINATION      There were no vitals filed for this visit. General: Well developed, in no acute distress. HEENT: No jaundice, oral mucosa moist, no oral ulcers  Neck: Supple, no stiffness, no lymphadenopathy, supple  Heart:  Normal S1/S2 negative S3 or S4. Regular, no murmur, gallop or rub, no jugular venous distention  Respiratory: Clear bilaterally x 4, no wheezing or rales  Abdomen:   Soft, non-tender, bowel sounds are active.   Extremities:  No edema, normal cap refill, no cyanosis. Musculoskeletal: No clubbing, no deformities  Neuro: A&Ox3, speech clear, gait stable, cooperative, no focal neurologic deficits  Skin: Skin color is normal. No rashes or lesions.  Non diaphoretic, moist.  Vascular: 2+ pulses symmetric in all extremities       DIAGNOSTIC DATA      EKG:        LABORATORY DATA      Lab Results   Component Value Date/Time    WBC 6.3 06/12/2019 03:05 PM    HGB 9.5 (L) 06/12/2019 03:05 PM    HCT 30.4 (L) 06/12/2019 03:05 PM    PLATELET 156 24/00/9833 03:05 PM    MCV 89 06/12/2019 03:05 PM      Lab Results   Component Value Date/Time    Sodium 140 06/24/2019 12:57 PM    Potassium 5.2 06/24/2019 12:57 PM    Chloride 103 06/24/2019 12:57 PM    CO2 24 06/24/2019 12:57 PM    Anion gap 4 (L) 05/22/2019 02:56 AM    Glucose 80 06/24/2019 12:57 PM    BUN 25 06/24/2019 12:57 PM    Creatinine 1.39 (H) 06/24/2019 12:57 PM    BUN/Creatinine ratio 18 06/24/2019 12:57 PM    GFR est AA 62 06/24/2019 12:57 PM    GFR est non-AA 54 (L) 06/24/2019 12:57 PM    Calcium 9.0 06/24/2019 12:57 PM    Bilirubin, total 0.5 06/12/2019 03:05 PM    AST (SGOT) 24 06/12/2019 03:05 PM    Alk. phosphatase 86 06/12/2019 03:05 PM    Protein, total 6.4 06/12/2019 03:05 PM    Albumin 4.1 06/12/2019 03:05 PM    Globulin 3.6 01/24/2017 05:33 AM    A-G Ratio 1.8 06/12/2019 03:05 PM    ALT (SGPT) 27 06/12/2019 03:05 PM           ASSESSMENT      1.  Cardiomyopathy              A. Ischemic              B. Left ventricular ejection fraction 20-25%              C. QRS duration 92 ms  2.  Hypertension  3.  CAD, native  4.  History myocardial infarction  5.  History of ventricular tachycardia  6.  History of LVAD status post explant  7.  Seizure disorder  8.  CABG  9. ICD   A. SICD         PLAN     Continue following with heart clinic. Will follow up per device clinic and in office in one year. FOLLOW-UP     1 year    Thank you, Boston Sheets MD and Dr. Aneta Colbert for allowing me to participate in the care of this extraordinarily pleasant male. Please do not hesitate to contact me for further questions/concerns. Marcial Weiss NP    Patient seen and examined by me with nurse practitioner. I personally performed all components of the history, physical, and medical decision making and agree with the assessment and plan with minor modifications as noted.        Tosha Isidro MD  Cardiac Electrophysiology / Cardiology    [address] [place] [address], [address]  [phone] / [phone] Fax   [phone] / [phone] Fax

## 2019-07-03 ENCOUNTER — OFFICE VISIT (OUTPATIENT)
Dept: CARDIOLOGY CLINIC | Age: 64
End: 2019-07-03

## 2019-07-03 ENCOUNTER — CLINICAL SUPPORT (OUTPATIENT)
Dept: CARDIOLOGY CLINIC | Age: 64
End: 2019-07-03

## 2019-07-03 VITALS
HEIGHT: 70 IN | SYSTOLIC BLOOD PRESSURE: 102 MMHG | HEART RATE: 68 BPM | RESPIRATION RATE: 18 BRPM | OXYGEN SATURATION: 94 % | BODY MASS INDEX: 24.05 KG/M2 | WEIGHT: 168 LBS | DIASTOLIC BLOOD PRESSURE: 60 MMHG

## 2019-07-03 DIAGNOSIS — I25.5 ISCHEMIC CARDIOMYOPATHY: Primary | ICD-10-CM

## 2019-07-03 DIAGNOSIS — T82.9XXD LEFT VENTRICULAR ASSIST DEVICE (LVAD) COMPLICATION, SUBSEQUENT ENCOUNTER: ICD-10-CM

## 2019-07-03 DIAGNOSIS — I50.22 CHRONIC SYSTOLIC HEART FAILURE (HCC): ICD-10-CM

## 2019-07-03 DIAGNOSIS — Z95.810 ICD (IMPLANTABLE CARDIOVERTER-DEFIBRILLATOR) IN PLACE: Primary | ICD-10-CM

## 2019-07-03 DIAGNOSIS — J90 PLEURAL EFFUSION: ICD-10-CM

## 2019-07-03 DIAGNOSIS — Z95.810 S/P ICD (INTERNAL CARDIAC DEFIBRILLATOR) PROCEDURE: ICD-10-CM

## 2019-07-03 DIAGNOSIS — I25.10 CAD, MULTIPLE VESSEL: ICD-10-CM

## 2019-07-03 NOTE — PROGRESS NOTES
Room # 5  Device Checked Today  No Cardiac Complaints  Visit Vitals  /60 (BP 1 Location: Left arm, BP Patient Position: Sitting)   Pulse 68   Resp 18   Ht 5' 10\" (1.778 m)   Wt 168 lb (76.2 kg)   SpO2 94%   BMI 24.11 kg/m²

## 2019-07-03 NOTE — TELEPHONE ENCOUNTER
Advance Care Planning Note NAME: David Hughes Sr.  
:  1934 MRN:  [id number] Date/Time:  7/3/2019 12:03 PM 
 
Active Diagnoses: 
Hospital Problems  Date Reviewed: 2019 Codes Class Noted POA  
 CVA (cerebral vascular accident) ([facility] ICD-10-CM: I63.9 ICD-9-CM: 434.91  2019 Unknown Embolic stroke involving left vertebral artery (HCC) ICD-10-CM: G74.823 ICD-9-CM: 434.11  2019 Yes Bilateral carotid artery stenosis ICD-10-CM: I65.23 ICD-9-CM: 433.10, 433.30  2019 Yes Diabetic peripheral neuropathy associated with type 2 diabetes mellitus ([facility] ICD-10-CM: E11.42 
ICD-9-CM: 250.60, 357.2  2019 Unknown Post concussion syndrome ICD-10-CM: F07.81 ICD-9-CM: 310.2  2019 Yes Post-concussion headache ICD-10-CM: C62.648 ICD-9-CM: 339.20  2019 Unknown Post-concussion vertigo ICD-10-CM: F07.81, R42 
ICD-9-CM: 310.2, 780.4  2019 Yes Altered mental status, unspecified ICD-10-CM: R41.82 
ICD-9-CM: 780.97  2019 Unknown Convulsive syncope ICD-10-CM: R55 
ICD-9-CM: 780.2, 780.39  2019 Unknown  
   
 TIA (transient ischemic attack) ICD-10-CM: G45.9 ICD-9-CM: 435.9  2019 Yes These active diagnoses are of sufficient risk that focused discussion on advance care planning is indicated in order to allow the patient to thoughtfully consider personal goals of care, and if situations arise that prevent the ability to personally give input, to ensure appropriate representation of their personal desires for different levels and aggressiveness of care. Discussion:  
Code status addressed and wants to be a DNR Advance care plan discussed in detail, patient agreed to DNR, disease process and trajectory discussed underlying discussion included CVA, Persons present and participating in discussion: Ricardo Bradley, Crystal Bautista MD, Time Spent: Telephone Call RE:  Lab Reminder      Outcome:     [] Patient verbalizes understanding    [] Unable to reach   [] Left message              [x] left message with wife and she states understanding    Lena Mayes RN Total time spent face-to-face in education and discussion:   16  minutes.   
 
 
 
Stephan Doctor, MD  
Hospitalist

## 2019-07-05 ENCOUNTER — HOSPITAL ENCOUNTER (OUTPATIENT)
Dept: ULTRASOUND IMAGING | Age: 64
Discharge: HOME | End: 2019-07-05
Attending: INTERNAL MEDICINE
Payer: COMMERCIAL

## 2019-07-05 ENCOUNTER — HOSPITAL ENCOUNTER (OUTPATIENT)
Dept: GENERAL RADIOLOGY | Age: 64
Discharge: HOME | End: 2019-07-05
Attending: RADIOLOGY
Payer: COMMERCIAL

## 2019-07-05 VITALS
HEART RATE: 63 BPM | OXYGEN SATURATION: 100 % | DIASTOLIC BLOOD PRESSURE: 58 MMHG | WEIGHT: 168 LBS | BODY MASS INDEX: 24.05 KG/M2 | SYSTOLIC BLOOD PRESSURE: 111 MMHG | RESPIRATION RATE: 15 BRPM | HEIGHT: 70 IN

## 2019-07-05 DIAGNOSIS — I50.22 CHRONIC SYSTOLIC HEART FAILURE (HCC): ICD-10-CM

## 2019-07-05 DIAGNOSIS — R06.02 SHORTNESS OF BREATH: ICD-10-CM

## 2019-07-05 DIAGNOSIS — J90 PLEURAL EFFUSION: ICD-10-CM

## 2019-07-05 PROCEDURE — 71045 X-RAY EXAM CHEST 1 VIEW: CPT

## 2019-07-05 PROCEDURE — 32555 ASPIRATE PLEURA W/ IMAGING: CPT

## 2019-07-05 PROCEDURE — 74011250636 HC RX REV CODE- 250/636: Performed by: RADIOLOGY

## 2019-07-05 PROCEDURE — C1729 CATH, DRAINAGE: HCPCS

## 2019-07-05 RX ORDER — LIDOCAINE HYDROCHLORIDE 10 MG/ML
4 INJECTION, SOLUTION EPIDURAL; INFILTRATION; INTRACAUDAL; PERINEURAL
Status: COMPLETED | OUTPATIENT
Start: 2019-07-05 | End: 2019-07-05

## 2019-07-05 RX ADMIN — LIDOCAINE HYDROCHLORIDE 4 ML: 10 INJECTION, SOLUTION EPIDURAL; INFILTRATION; INTRACAUDAL; PERINEURAL at 09:00

## 2019-07-05 NOTE — ROUTINE PROCESS
Pt. Discharged to home and transported to the d/c lot via w/c. Post thoracentesis discharge instructions reviewed with pt. And wife. Both verbalized understanding of d/c instructions.

## 2019-07-05 NOTE — DISCHARGE INSTRUCTIONS
[address] [facility] Department of Interventional Radiology        THORACENTESIS DISCHARGE INSTRUCTIONS    General Information:  During this procedures, the doctor will insert a needle into the body to drain fluid from the chest. After the procedure, you will be able to take a deep breath much easier. The site of the puncture may ooze the first day. This will decrease and eventually stop. With the Thoracentesis (draining fluid from the chest), there is a risk of air leaking into the chest around the lung, and risk of bleeding into the chest, with the resulting pressure on the lung possibly making it collapse. A chest x-ray is done after the procedure to detect possible complications. Home Care Instructions:  Keep the puncture site clean and dry. No tub baths or swimming until puncture site heals. Showering is acceptable. Resume your normal diet, and resume your normal activity slowly and as you tolerate. If you are short of breath, rest. If shortness of breath does not ease, please call your ordering doctor. Fluid can re-accumulate in the chest and/or in the abdomen. If this should occur, your doctor needs to know as you may need to have the procedure done again. Call If:     You should call your Physician and/or the Radiology Nurse if you notice any signs of infection, like pus draining, or if it is swollen or reddened. Also call if you have a fever, or if you are bleeding from the puncture site more than a small amount on the dressing. Call if the puncture site keeps draining fluid. Some oozing is to be expected, but should slow and then stop. Call if you feel like you have pressure in your abdomen. SEEK IMMEDIATE CARE OR CALL 911 IF YOU SUDDENLY HAVE TROUBLE BREATHING, OR IF YOUR LIPS TURN BLUE, OR IF YOU NOTICE BLOOD IN YOUR SPUTUM. Follow-Up Instructions: Please see your ordering doctor as he/she has requested.       To Reach Us:  Side effects of sedation medications and other medications used today have been reviewed. Notify us of nausea, itching, hives, dizziness, or anything else out of the ordinary. Should you experience any of these significant changes, please call [phone] between the hours of 7:30 am and 10 pm or [phone] after hours.  After hours, ask the  to page the [address] Technologist, and describe the problem to the technologist.     We got 600 cc's from left thoracentesis today      Date: 7/5/2019  Discharging Nurse: Ayaka Sandhu RN

## 2019-07-08 ENCOUNTER — HOSPITAL ENCOUNTER (OUTPATIENT)
Dept: NON INVASIVE DIAGNOSTICS | Age: 64
Discharge: HOME | End: 2019-07-08
Payer: COMMERCIAL

## 2019-07-08 VITALS
WEIGHT: 168 LBS | HEIGHT: 68 IN | DIASTOLIC BLOOD PRESSURE: 67 MMHG | OXYGEN SATURATION: 100 % | SYSTOLIC BLOOD PRESSURE: 92 MMHG | BODY MASS INDEX: 25.46 KG/M2 | RESPIRATION RATE: 19 BRPM | HEART RATE: 65 BPM

## 2019-07-08 DIAGNOSIS — I34.0 MITRAL REGURGITATION: ICD-10-CM

## 2019-07-08 PROCEDURE — 74011000250 HC RX REV CODE- 250: Performed by: INTERNAL MEDICINE

## 2019-07-08 PROCEDURE — 74011250636 HC RX REV CODE- 250/636

## 2019-07-08 PROCEDURE — 74011250636 HC RX REV CODE- 250/636: Performed by: INTERNAL MEDICINE

## 2019-07-08 PROCEDURE — 99152 MOD SED SAME PHYS/QHP 5/>YRS: CPT

## 2019-07-08 PROCEDURE — 93325 DOPPLER ECHO COLOR FLOW MAPG: CPT

## 2019-07-08 RX ORDER — LIDOCAINE HYDROCHLORIDE 20 MG/ML
15 SOLUTION OROPHARYNGEAL AS NEEDED
Status: DISCONTINUED | OUTPATIENT
Start: 2019-07-08 | End: 2019-07-08

## 2019-07-08 RX ORDER — FENTANYL CITRATE 50 UG/ML
12.5-5 INJECTION, SOLUTION INTRAMUSCULAR; INTRAVENOUS
Status: DISCONTINUED | OUTPATIENT
Start: 2019-07-08 | End: 2019-07-08

## 2019-07-08 RX ORDER — MIDAZOLAM HYDROCHLORIDE 1 MG/ML
.5-2 INJECTION, SOLUTION INTRAMUSCULAR; INTRAVENOUS
Status: DISCONTINUED | OUTPATIENT
Start: 2019-07-08 | End: 2019-07-08

## 2019-07-08 RX ADMIN — BENZOCAINE, BUTAMBEN, AND TETRACAINE HYDROCHLORIDE 1 SPRAY: .028; .004; .004 AEROSOL, SPRAY TOPICAL at 08:17

## 2019-07-08 RX ADMIN — SODIUM CHLORIDE 250 ML: 900 INJECTION, SOLUTION INTRAVENOUS at 09:03

## 2019-07-08 RX ADMIN — MIDAZOLAM HYDROCHLORIDE 1 MG: 1 INJECTION, SOLUTION INTRAMUSCULAR; INTRAVENOUS at 08:25

## 2019-07-08 RX ADMIN — FENTANYL CITRATE 25 MCG: 50 INJECTION, SOLUTION INTRAMUSCULAR; INTRAVENOUS at 08:26

## 2019-07-08 RX ADMIN — LIDOCAINE HYDROCHLORIDE 15 ML: 20 SOLUTION ORAL; TOPICAL at 08:17

## 2019-07-08 RX ADMIN — MIDAZOLAM HYDROCHLORIDE 1 MG: 1 INJECTION, SOLUTION INTRAMUSCULAR; INTRAVENOUS at 08:29

## 2019-07-08 NOTE — PROGRESS NOTES
Patient arrived to Non-Invasive Cardiology Lab for Out Patient DELLA Procedure. Staff introduced to patient. Patient identifiers verified with Name and Date of Birth. Procedure verified with patient. Consent forms reviewed and signed by patient or authorized representative and verified. Allergies verified. Patient informed of procedure and plan of care. Questions answered with review. Patient on cardiac monitor, non-invasive blood pressure, SPO2 monitor. On room air. Patient is A&Ox3. Patient reports no complaints. Patient on stretcher, in low position, with side rails up. Patient instructed to call for assistance as needed. Family in waiting room.

## 2019-07-08 NOTE — DISCHARGE INSTRUCTIONS
DISCHARGE SUMMARY       The following personal items collected during your admission are returned to you:   Dental Appliance: With patient spouse    Vision:  Glasses   Jewelry:  With patient  Clothing: Shirt         PATIENT INSTRUCTIONS: Continue taking all the same medications as directed. You had a transesophageal echocardiogram, your throat was numbed for the procedure, so start with sips of water and advance diet as swallowing returns to normal. Avoid any scalding hot beverages for the next 2 hours. You have an appointment with Dr. Oneil Brasher on July 24, 2019 already scheduled. What to do at Home:  Recommended activity: No driving today      The discharge information has been reviewed with the PATIENT/family . The PATIENT  verbalized understanding.

## 2019-07-23 ENCOUNTER — TELEPHONE (OUTPATIENT)
Dept: CARDIOLOGY CLINIC | Age: 64
End: 2019-07-23

## 2019-07-23 NOTE — PROGRESS NOTES
Patient: Robbie Keene   Age: 59 y.o. Patient Care Team:  Itz Early MD as PCP - General (Family Practice)  Dariel Carlson MD as Referring Provider (Cardiology)  Cari Oliver MD as Consulting Provider (Cardiology)    PCP: Itz Early MD    Cardiologist: Joyce Lane / Madalyn Guzman / Maranda Edouard    Diagnosis/Reason for Consultation: The primary encounter diagnosis was Mitral valve insufficiency, unspecified etiology. Diagnoses of Ischemic cardiomyopathy, CAD, multiple vessel, S/P CABG x 2, Sustained VT (ventricular tachycardia) ([facility]), Hypertension, unspecified type, PAD (peripheral artery disease) ([facility] S/P laminectomy, Myocardial infarction, unspecified MI type, unspecified artery ([facility] and DSOUZA (dyspnea on exertion) were also pertinent to this visit. Problem List:   Patient Active Problem List   Diagnosis Code    S/P laminectomy Z98.890    Sinus tachycardia R00.0    Acute respiratory failure with hypoxia ([facility]) J96.01    Essential hypertension I10    Alcohol abuse F10.10    Chronic systolic heart failure ([facility]) I50.22    CAD, multiple vessel I25.10    Ischemic cardiomyopathy I25.5    MI (myocardial infarction) ([facility] I21.9    Sustained VT (ventricular tachycardia) ([facility]) I47.2    Chronic anticoagulation Z79.01    Gout M10.9    ICD (implantable cardioverter-defibrillator) infection ([facility] T82. 7XXA    Erectile dysfunction N52.9    DSOUZA (dyspnea on exertion) R06.09    CHF (congestive heart failure) ([facility]) I50.9    At risk for obstructive sleep apnea Z91.89    Carotid arterial disease ([facility]) I77.9    Chronic back pain greater than 3 months duration M54.9, G89.29    Coronary artery disease involving coronary bypass graft I25.810    Heart burn R12    History of tobacco abuse Z87.891    Muscle spasm M62.838    S/P lumbar laminectomy Z98.890    Seizure disorder ([facility]) G40.909    HTN (hypertension) I10    PAD (peripheral artery disease) ([facility]) I73.9    Mitral valve regurgitation I34.0    KLEVER (acute kidney injury) ([facility] N17.9    Vomiting and diarrhea R11.10, R19.7      HPI: 59 y.o.  male with PMHx of MR, AMI that lead to CAD s/p CABG X 2  (LIMA to LAD and SVG to RCA 11/2016) s/p BMS X 2 to RCA graft (11/2016), HFrEF s/p LVAD implant for BTT (11/2016), improved LVEF and LVAD explant (9/2018), VT s/p SICD (5/2019), HTN, Sz D/O, PAD, s/p decompressive lumbar laminectomy (2016) that is referred to the [address] [place] [address] by Dr. Alex Cage for interventional evaluation of MR. Mr. Curly Lindquist is being followed by Dr. Alex Cage for HF. Previously he had a large AMI that required emergent CABG and ultimate LVAD placement (2016). He was on the heart transplant list but his heart function recovered in 2018 and his LVAD was explanted (minus the LV inflow cannula in the LV apex remains). Over the past year or so, his cardiac function has again declined and he has had pleural effusion that required draining in July (L thoracentesis of 600ml). Despite working toward [address], he continues to have a fatigue, DSOUZA and at least two pillow orthopnea as he also raises his [facility] and it is approximately 30 degrees currently and this his higher than a few months prior. He also c/o a non-productive cough and some upper airway congestion that wakes him at least once nightly. He denies any CP, chest tightness, palpitations, lightheadedness, syncope or falls. He was previously on a higher dose of Entresto but had hypotension following the SCID implantation (5/2019). HF is currently working to  Increasing this again to previous dosing. Mr. Curly Lindquist denies any fevers, diarrhea, N/V, or sick contacts. He has chronic back pain that often limits his activity tolerance before his DSOUZA does. He has an abdominal hernia that bothers him as well. NYHA Classification: II   Class II (Mild): Slight limitation of physical activity.  Comfortable at rest, but ordinary physical activity results in fatigue, palpitation, or dyspnea.     Angina Classification: 0   Class 0: No symptoms    Past Medical History:   Diagnosis Date    Arrhythmia     SVT    Arthritis     CAD (coronary artery disease)     Chronic pain     back    Congestive heart failure (HCC)     DSOUZA (dyspnea on exertion) 2019    Gout     Heart failure ([facility]     Hypertension     ICD (implantable cardioverter-defibrillator) in place     Long term current use of anticoagulant therapy     LVAD (left ventricular assist device) present ([facility] 2016    Myocardial infarction ([facility]     Pacemaker     SubQ ICD    Raynaud disease     [facility]     strobic seizures early        Past Surgical History:   Procedure Laterality Date    CABG, ARTERY-VEIN, TWO  2016    CARDIAC SURG PROCEDURE UNLIST  2016    LVAD    HX CORONARY ARTERY BYPASS GRAFT      HX CORONARY STENT PLACEMENT      HX HEART CATHETERIZATION      HX HEENT      wisdom teeth removed    HX ORTHOPAEDIC  2016    laminectomy    HX PACEMAKER      ICD - removed 2017    HX PTCA      CT INSJ OF SUBQ IMPLANTABLE DEFIBRILLATOR ELECTRODE N/A 2019    INSERT SUBQ ICD w/ anesthesia performed by Kayce Boles MD at [address] [place] [address] CATH LAB      Social History     Tobacco Use    Smoking status: Former Smoker     Packs/day: 1.50     Years: 30.00     Pack years: 45.00     Last attempt to quit:      Years since quittin.7    Smokeless tobacco: Never Used   Substance Use Topics    Alcohol use: No      Family History   Problem Relation Age of Onset    Diabetes Mother     Dementia Mother     Other Mother         carotid artery blockage    Heart Disease Father     Stroke Father     Heart Attack Father         several    Hypertension Father     Elevated Lipids Father     No Known Problems Sister     Cancer Brother         brain    Lung Disease Sister     COPD Sister     Cancer Sister         lung     Prior to Admission medications    Medication Sig Start Date End Date Taking? Authorizing Provider   tadalafil (CIALIS) 20 mg tablet 20 mg as needed. 6/20/19  Yes Provider, Historical   atorvastatin (LIPITOR) 40 mg tablet TAKE 1 TABLET BY MOUTH EVERY DAY 6/21/19  Yes Mere Weems MD   apixaban (ELIQUIS) 5 mg tablet Take 1 Tab by mouth two (2) times a day. 5/24/19  Yes Mere Weems MD   levalbuterol tartrate (XOPENEX) 45 mcg/actuation inhaler INHALE 2 PUFFS EVERY 4 HOURS AS NEEDED 5/8/19  Yes Provider, Historical   montelukast (SINGULAIR) 10 mg tablet TAKE 1 TABLET BY MOUTH ONCE A DAY 5/7/19  Yes Provider, Historical   HYDROcodone-acetaminophen (NORCO) 5-325 mg per tablet TAKE 1 TABLET EVERY 6 HOURS AS NEEDED 11/2/18  Yes Provider, Historical   DULoxetine (CYMBALTA) 60 mg capsule Take 60 mg by mouth daily. Yes Provider, Historical   allopurinol (ZYLOPRIM) 100 mg tablet Take 100 mg by mouth daily. Yes Provider, Historical   tamsulosin (FLOMAX) 0.4 mg capsule Take 0.4 mg by mouth daily. 10/15/18  Yes Provider, Historical   colchicine 0.6 mg tablet Take 0.6 mg by mouth every other day. Yes Provider, Historical   cyclobenzaprine (FLEXERIL) 5 mg tablet Take 5 mg by mouth daily as needed for Muscle Spasm(s). Yes Provider, Historical   aspirin 81 mg chewable tablet Take 81 mg by mouth daily. Yes Provider, Historical   ascorbic acid, vitamin C, (VITAMIN C) 500 mg tablet Take 1 Tab by mouth two (2) times a day. 2/2/17  Yes Suki Salgado NP   betamethasone dipropionate (DIPROSONE) 0.05 % topical cream APPLY TO AFFECTED AREA ONCE EVERY DAY FOR 30 DAYS 9/5/19   Provider, Historical   sodium bicarbonate 650 mg tablet Take 1 Tab by mouth two (2) times a day for 7 days. 10/2/19 10/9/19  Siddharth Lopez MD   triamcinolone acetonide (KENALOG) 0.025 % topical cream Apply 0.025 Tubes to affected area as needed. 4/5/17   Provider, Historical   carvedilol (COREG) 6.25 mg tablet Take 1 Tab by mouth two (2) times daily (with meals).  9/5/19   Tonia Maxwell MD amiodarone (CORDARONE) 200 mg tablet Take 1 Tab by mouth daily. 8/16/19   Anibal Reed MD   patiromer calcium sorbitex (VELTASSA) 8.4 gram powder Take 16.8 g by mouth daily. 8/9/19   Akash Mckeon MD       Allergies   Allergen Reactions    Morphine Other (comments)     Hallucinations. Confusion. Impaired judgement    Chlorhexidine Rash       Current Medications:   Current Outpatient Medications   Medication Sig Dispense Refill    tadalafil (CIALIS) 20 mg tablet 20 mg as needed. 6    atorvastatin (LIPITOR) 40 mg tablet TAKE 1 TABLET BY MOUTH EVERY DAY 30 Tab 11    apixaban (ELIQUIS) 5 mg tablet Take 1 Tab by mouth two (2) times a day. 60 Tab 11    levalbuterol tartrate (XOPENEX) 45 mcg/actuation inhaler INHALE 2 PUFFS EVERY 4 HOURS AS NEEDED  0    montelukast (SINGULAIR) 10 mg tablet TAKE 1 TABLET BY MOUTH ONCE A DAY  3    HYDROcodone-acetaminophen (NORCO) 5-325 mg per tablet TAKE 1 TABLET EVERY 6 HOURS AS NEEDED  0    DULoxetine (CYMBALTA) 60 mg capsule Take 60 mg by mouth daily.  allopurinol (ZYLOPRIM) 100 mg tablet Take 100 mg by mouth daily.  tamsulosin (FLOMAX) 0.4 mg capsule Take 0.4 mg by mouth daily.  colchicine 0.6 mg tablet Take 0.6 mg by mouth every other day.  cyclobenzaprine (FLEXERIL) 5 mg tablet Take 5 mg by mouth daily as needed for Muscle Spasm(s).  aspirin 81 mg chewable tablet Take 81 mg by mouth daily.  ascorbic acid, vitamin C, (VITAMIN C) 500 mg tablet Take 1 Tab by mouth two (2) times a day. 60 Tab 6    betamethasone dipropionate (DIPROSONE) 0.05 % topical cream APPLY TO AFFECTED AREA ONCE EVERY DAY FOR 30 DAYS  4    sodium bicarbonate 650 mg tablet Take 1 Tab by mouth two (2) times a day for 7 days. 14 Tab 0    triamcinolone acetonide (KENALOG) 0.025 % topical cream Apply 0.025 Tubes to affected area as needed.  carvedilol (COREG) 6.25 mg tablet Take 1 Tab by mouth two (2) times daily (with meals).  180 Tab 3    amiodarone (CORDARONE) 200 mg tablet Take 1 Tab by mouth daily. 90 Tab 1    patiromer calcium sorbitex (VELTASSA) 8.4 gram powder Take 16.8 g by mouth daily. 60 Packet 11       Vitals: Blood pressure 112/68, pulse 77, temperature 97.8 °F (36.6 °C), temperature source Oral, resp. rate 16, weight 167 lb 5 oz (75.9 kg), SpO2 97 %. Pre 6 min walk VS: 68 / 97% sat on RA    Allergies: is allergic to morphine and chlorhexidine. Review of Systems: Pertinent Positives per HPI   [x]Total of 13 systems reviewed as follows:  Constitutional: Negative fever, negative chills  Eyes:   Negative for amauroses fugax  ENT:   Negative sore throat,oral absecess  Endocrine Negative for thyroid replacement Rx; goiter; DM  Respiratory:  Negative chronic cough,sputum production  Cards:   Negative for palpitations, lower extremity edema, varicosities, claudication  GI:   Negative for dysphagia, bleeding, nausea, vomiting, diarrhea, and abdominal pain  Genitourinary: Negative for frequency, dysuria  Integument:  Negative for rash and pruritus  Hematologic:  Negative for easy bruising; bleeding dyscarsia  Musculoskel: Negative for muscle weakness inhibiting ambulation  Neurological:  Negative for stroke, TIA, syncope, dizziness  Behavl/Psych: Negative for feelings of anxiety, depression     Cardiovascular Testing:   EKG 6/11/2019: SR 68 bpm. 1st AVB (), QRSD 94 msec    TTE 6/13/2019:  Interpretation Summary   · Left Ventricle: Moderately dilated left ventricle. Severe systolic dysfunction. Estimated left ventricular ejection fraction is 16 - 20%. Visually measured ejection fraction. Left ventricular global hypokinesis. Normal left ventricular strain. Inconclusive left ventricular diastolic function. · Left Atrium: Moderately dilated left atrium. · Aortic Valve: Aortic valve sclerosis with no evidence of reduced excursion. · Mitral Valve: Mitral valve thickening. Mitral annular dilatation. Moderate mitral valve regurgitation.   · Pericardium: There is a moderate left pleural effusion. · Pulmonary Artery: Severe pulmonary hypertension. · Tricuspid Valve: Moderate to severe tricuspid valve regurgitation is present. · Right Ventricle: Mildly dilated right ventricle. Mildly reduced systolic function. Echo Findings   Left Ventricle Normal wall thickness. Moderately dilated left ventricle. Severe systolic dysfunction. The estimated ejection fraction is 16 - 20%. Visually measured ejection fraction. Global hypokinesis observed. End-systolic volume is moderately increased. Normal left ventricular strain. There is inconclusive left ventricular diastolic function. Left ventricular assist device is present. Well visualized cannula. The interventricular septum bows into the right ventricle. The aortic valve opens normally with the presence of a left ventricular assist device. remanence of LVAD cannula. Patient said it was removed. Left Atrium Moderately dilated left atrium. Right Ventricle Normal wall thickness. Mildly dilated right ventricle. Mildly reduced systolic function. Right Atrium Normal cavity size. Aortic Valve Trileaflet valve structure, no stenosis and no regurgitation. Aortic valve sclerosis with no evidence of reduced excursion. Mitral Valve No stenosis. Mitral valve thickening. Mitral annular dilatation. Moderate regurgitation. Tricuspid Valve Normal valve structure and no stenosis. Moderate to severe tricuspid valve regurgitation. Pulmonary arterial systolic pressure is 54.8 mmHg. Severe pulmonary hypertension. Pulmonic Valve Normal valve structure, no stenosis and no regurgitation. Aorta Normal aortic root, ascending aortic, and aortic arch. Normal aortic root and ascending aorta. IVC/Hepatic Veins Normal structure. Normal central venous pressure (0-5 mmHg); IVC diameter is less than 21 mm and collapses more than 50% with respiration. Pericardium No evidence of pericardial effusion. There is a moderate left pleural effusion. 2D/M-Mode Measurements   Dimensions   Measurement Value (Range)   LVIDs 4.43 cm      LVIDd 5.35 cm (4.2 - 5.9)      IVSd 0.47 cm (0.6 - 1.0)      LVPWd 0.92 cm (0.6 - 1.0)      LV Mass .2 g (88 - 224)      Left Atrium Major Axis 4.05 cm      Tapse 1.47 cm (1.5 - 2.0)      RVIDd 3.38 cm                         Tricuspid Valve Measurements     Stenosis   PASP 85 mmHg       Regurgitation   Triscuspid Valve Regurgitation Peak Gradient 57 mmHg      TR Max Velocity 377.62 cm/s              DELLA 7/8/2019:Interpretation Summary   · Left Ventricle: Normal cavity size and wall thickness. Moderate systolic dysfunction. Estimated left ventricular ejection fraction is 36 - 40%. · Mitral Valve: Severe mitral valve regurgitation. · Tricuspid Valve: Moderate tricuspid valve regurgitation is present. · Pulmonary Artery: Mild to moderate pulmonary hypertension. Echo Findings   Left Ventricle Normal cavity size and wall thickness. Moderate systolic dysfunction. The estimated ejection fraction is 36 - 40%. Left ventricular assist device is present. Well visualized cannula. Left Atrium Dilated left atrium. No atrial septal defect present. No patent foramen ovale visualized. There is no thrombus. There is no mass. No spontaneous echo contrast. There is no thrombus within the left atrial appendage. There is no mass within the left atrial appendage. No spontaneous echo contrast   Right Ventricle Normal cavity size and global systolic function. Right Atrium Normal cavity size. There is no thrombus. There is no mass. No spontaneous echo contrast used. Aortic Valve Trileaflet valve structure, no stenosis and no regurgitation. There is mild noncoronary leaflet calcification. Mitral Valve Normal valve structure and no stenosis. Severe regurgitation. The mitral regurgitant jet is centrally directed. Tricuspid Valve Normal valve structure and no stenosis. Moderate tricuspid valve regurgitation.  Mild to moderate pulmonary hypertension. Pulmonic Valve Normal valve structure and no regurgitation. Aorta There is small plaque. Pericardium No evidence of pericardial effusion. Cardiac catheterization: none since 2016    Physical Exam:  General: Well nourished well groomed gentleman appearing stated age accompanied by his wife  Neuro: A&OX3. MONTANO. PERRL. Steady un-assisted gait  Head:Normocephalic. Atraumatic. Symmetrical  Neck: Trachea Midline  Resp: CTA B. No Adv BS/cough/sputum/tachypnea with seated conversation  CV: S1S2 RRR. ARMANDO III/VI. No JVD/carotid bruits. Pink/warm/dry extremities. Trace LE peripheral edema  GI:Benign ab. Soft. NT/ND. Active BS  : Voids  Integ: No obvious s/s of infection or breakdown  Musculo/Skeletal: FROM in all major joints. Good muscle tone    Clinic Evaluation:   KCCQ-12: scanned into EMR    6 minute walk test: PreTest HR/02 sat:  See VS this visit - completed 6 min walk             Post Test HR/02 sat: 77 / 97% sat on RA             Distance Walked: 858 ft    [place] Survey:  Rella Mcbrides Index ADL - 6/6  scanned into EMR     Assessment/Plan:   1. MR - moderate functional MR - continuing to work toward optimal GDMT. Ultimately to [facility] for CLASP-2F clinical trial once meds optimized. 2. ICM/ HFrEF - management per HF  3. Further plan/care by Vito Torres

## 2019-07-24 ENCOUNTER — OFFICE VISIT (OUTPATIENT)
Dept: CARDIOLOGY CLINIC | Age: 64
End: 2019-07-24

## 2019-07-24 VITALS
WEIGHT: 167.31 LBS | OXYGEN SATURATION: 97 % | RESPIRATION RATE: 16 BRPM | HEART RATE: 77 BPM | DIASTOLIC BLOOD PRESSURE: 68 MMHG | BODY MASS INDEX: 25.44 KG/M2 | TEMPERATURE: 97.8 F | SYSTOLIC BLOOD PRESSURE: 112 MMHG

## 2019-07-24 VITALS
TEMPERATURE: 97.2 F | RESPIRATION RATE: 20 BRPM | DIASTOLIC BLOOD PRESSURE: 60 MMHG | HEART RATE: 70 BPM | WEIGHT: 167.8 LBS | SYSTOLIC BLOOD PRESSURE: 100 MMHG | HEIGHT: 68 IN | BODY MASS INDEX: 25.43 KG/M2 | OXYGEN SATURATION: 97 %

## 2019-07-24 DIAGNOSIS — I21.9 MYOCARDIAL INFARCTION, UNSPECIFIED MI TYPE, UNSPECIFIED ARTERY (HCC): ICD-10-CM

## 2019-07-24 DIAGNOSIS — I25.5 ISCHEMIC CARDIOMYOPATHY: Primary | ICD-10-CM

## 2019-07-24 DIAGNOSIS — R06.09 DOE (DYSPNEA ON EXERTION): ICD-10-CM

## 2019-07-24 DIAGNOSIS — I34.0 MITRAL VALVE INSUFFICIENCY, UNSPECIFIED ETIOLOGY: Primary | ICD-10-CM

## 2019-07-24 DIAGNOSIS — I10 HYPERTENSION, UNSPECIFIED TYPE: ICD-10-CM

## 2019-07-24 DIAGNOSIS — Z98.890 S/P LAMINECTOMY: ICD-10-CM

## 2019-07-24 DIAGNOSIS — I25.10 CAD, MULTIPLE VESSEL: ICD-10-CM

## 2019-07-24 DIAGNOSIS — I73.9 PAD (PERIPHERAL ARTERY DISEASE) (HCC): ICD-10-CM

## 2019-07-24 DIAGNOSIS — I25.5 ISCHEMIC CARDIOMYOPATHY: ICD-10-CM

## 2019-07-24 DIAGNOSIS — I47.20 SUSTAINED VT (VENTRICULAR TACHYCARDIA): ICD-10-CM

## 2019-07-24 DIAGNOSIS — Z95.1 S/P CABG X 2: ICD-10-CM

## 2019-07-24 NOTE — PROGRESS NOTES
I had the pleasure of seeing Mr. Mann in the valve clinic earlier today with Ms. Renato King NP - please see her note for details. He has significant MR (rated as severe by DELLA but more like 3+ by my eye) on DELLA and moderate on TTE (but poor windows), with LVEF 25% range after VAD explant.  Will get him back to HF (discussed with Dank) to see about further uptitration of meds (may be limited by BP ), and then to [facility] for clinical trial (CLASP-2F, not [facility] candidate due to PVD) in mid August.

## 2019-07-24 NOTE — LETTER
7/24/2019 3:30 PM 
 
Patient:  Chaz Greenwood YOB: 1955 Date of Visit: 7/24/2019 Dear Dominique Laura MD 
[place] [address] [address] [facility] [address] VIA In Basket Trell Perdue MD 
[address] [facility] [address] VIA In Basket 
 : Thank you for referring Mr. Dahiana Case to me for evaluation/treatment. Below are the relevant portions of my assessment and plan of care. If you have questions, please do not hesitate to call me. I look forward to following Mr. Mann along with you. Sincerely, Simon Garcia MD

## 2019-07-24 NOTE — PROGRESS NOTES
[facility] Clinic Note      DOS:  7/24/2019  REFERRING PROVIDER: Mack Davis MD  PRIMARY CARE PHYSICIAN: Avani Guzman MD  PRIMARY CARDIOLOGIST:  Celeste Gaona MD   EP: Ruy Robert MD   PULMONARY: Dayna Zamorano MD       IMPRESSION/PLAN:   ICM s/p HMII as BTT on 12/1/16 and explant, NYHA Class II-III, LVEF 16-20%      Increase entresto to 49/51 mg, 1 tablet BID    Continue Coreg 3.125 mg BID    Continue lasix 20 mg daily    Continue low Na+ diet   Abstain from smoking and ETOH   ATTR- negative   Follow up in the [facility] in 2 weeks    CAD s/p CABG (SVG to RCA and LIMA to LAD) on 12/1/16 s/p BMS x2 -> SVG-RCA graft - no angina              Continue ASA, BB, statin    Chronic Left Pleural Effusion - s/p left thoracentesis on 7/5/2019   Encourage use of IS     High Risk of SCD - s/p SQ AICD (5/20/19)   No shocks       PAD - difficult femoral access with LVAD explant              No symptoms of claudication      Chronic Anticoagulation s/p LVAD explant              Continue eliquis 5 mg  twice daily    Hyperkalemia K 5.2    Check BMP today    Continue veltessa    Continue low K+ diet     Gout              Continue colchicine 0.6 mg every other day              Continue allopurinol 100 mg daily   Denies recent gouty attacks    Chronic Lower back pain              On oxycodone, flexeril   Hasn't taken in since ICD implant   Back pain improved with increased activity- going to gym    H/o tobacco abuse   Abstaining from nicotine      H/o alcohol abuse   Currently abstaining   Encouraged complete abstinence      Seizure disorder - early 25s              No recent seizure activity     Peripheral neuropathy              On cymbalta   ATTR- Negative     Prevention              Influenza annually              Pneumonia vaccine every 5 years       Chief Complaint   Patient presents with    Follow-up    Chest Congestion         HPI: Juan Pablo Stanton is a 59y.o. year old male  with a history of HFrEF in the setting of ICM s/p LVAD and recent LVAD explant complicated by PVD,  remote tobacco use and alcohol abuse (quit 11/2016) who presents for follow up of his HFrEF. Mr. Estrella Cannon presented 11/22/2016 for decompressive laminectomy at X2-X6 complicated by myocardial infarction. The [facility](11/25/16) revealed severe left main and 3 vessel disease along with a 60% right common iliac stenosis and  LVEF was 10%. He subsequently underwent placement of an IABP followed by placement of a HeartMate2 LVAD on 12/1/2016 with concomitant CABG (SVG to the RCA, LIMA to LAD). His postoperative course was complicated by RV failure and an ileus requiring TPN resulting and a transaminitis. He had multiple episodes of Torsade on 12/4/2016 prompting repeat catheterization revealing an occluded RCA vein graft. Two BMS were placed in the RCA graft. He also had SVT requiring cardioversion. He had a single lead ICD placed on 52/84/56 complicated by a hematoma. The ICD was later removed on 1/20/17. Following his LVAD surgery he did require inpatient rehab.      Mr. Estrella Cannon was listed for heart transplantation at [facility]. He was called in for transplantation on 7/14/2018 but on pre-op DELLA was noted to have normal LV function and the transplant surgery was aborted. He subsequently was admitted on 9/5/2018 for LVAD explant- his post-operative course was complicated by pneumonia and bilateral pleural effusions. He was discharged on home O2 which he discontinued on his own.       After device explant, his subsequent echocardiograms showed deterioration of LVEF and worsening mitral regurgitation from 40-45% with moderate to severe MR on 9/19/18 to 35-40% with moderate-to-severe MR on 10/15/18. He has been removed from the transplant list at [facility] and is not interested in pursuing transplant evaluation at another center at this time. He returns to clinic today for follow up for his systolic heart failure and s/p SQ AICD implant.  His entresto was discontinued at the time of his AICD implant due to hypotension. He subsequently took diovan but felt worse. We resume entresto at his last clinic visit. He is tolerating the entresto with no dizziness, presyncope, or syncope. He was seen by Dr. Edel Enrique, who stated that his mitral valve is amenable to MV clip if his MR remains moderate to severe with maximal tolerable HF medications. He underwent thoracentesis with removal of 650 cc of serous fluid. He has noted an cough since the thoracentesis and new onset orthopnea. He does not produce sputum. He denies worsening DSOUZA and walked 858 feet on a 6 minute walk during his [facility] visist.     CARDIAC EVALUATION  TTE 19: LVEF 32%, LVAD plug at LV apex, grade 2 diastolic dysfunction, mild-mod MR, mild TR, PAPs 43 mmHg, mild LAE  DELLA 2018 - normal LV function and the transplant surgery was aborted. He subsequently was admitted on 2018 for LVAD explant  ECHO 18:LVEF 40-45% mod-severe MR   ECHO 10/15/18:EF 35-40%, mod-severe MR   ECHO 10/31/18: TDS, EF 30-35%, reduced RVEF, TAPSE 1.6, LAE, mild- mod MR/TR  ECHO 19: EF 25-30%, question of thrombus in apex. Mild- mod TR. Consider cardiac MR to exclude left ventricular thrombus. ECHO 2019: EF 16-20%, mod MR, mod-severe TR, severe pulm HTN (PA systolic pressure 85 mmHg), mildly reduced RV systolic function (TAPSE 3.16 cm)    CATH 16: severe LM, and 3 VD with 60% right common iliac stenosis. EF: 10%. ----S/p IABP   ----S/p HeartMate2 LVAD 2016   S/p CAB2016: LIMA-> LAD, SVG-> RCA     CATH 16 following torsades SVG-> RCA TO   ---- s/p BMS x 2-.  RCA  RHC 2017:RA: 1515 14, RV: / 14,PA: 23/28, m 20, PCWP 13, PVR 1.67 CO 5.67, CI 3    S/p LVAD explant 18 (UVA)    SVT s/p DCCV  AICD  single lead cx by hematoma  AICD explanted 17    EKG:    10/31/18 NSR QRS 88ms   ms  19: SR rate 68bpm QRS 94ms Qtc 438 ms    Review of Systems: General:  Positive Fatigue   HEENT: Denies epistaxis, angioedema   Pulmonary: Denies  wheeze, hemoptysis, positive for cough   Cardiac: Denies exertional chest pain, palpitations, PND, edema, lightheadedness, syncope, near syncope,or bleeding; positive for orthopnea   GI:  Denies  abdominal pain, change in bowel habits, or black or bloody stools  Musculo: Positive for chronic back pain for which he takes prn flexril and percocet   Neuro: Denies  TIA/CVA sx   Skin:  Denies rash   Allergies: Reviewed   Psych: Denies depression or anxiety       History:  Past Medical History:   Diagnosis Date    Arrhythmia     SVT    Arthritis     CAD (coronary artery disease)     Chronic pain     back    [facility]     DSOUZA (dyspnea on exertion) 1/30/2019    Gout     Heart failure ([facility]     Hypertension     ICD (implantable cardioverter-defibrillator) in place     Long term current use of anticoagulant therapy     LVAD (left ventricular assist device) present ([facility] 12/01/2016    Myocardial infarction ([facility]     Pacemaker     SubQ ICD    Raynaud disease     Seizures ([facility]     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     Past Surgical History:   Procedure Laterality Date    CABG, ARTERY-VEIN, TWO  12/01/2016    CARDIAC SURG PROCEDURE UNLIST  12/01/2016    LVAD    HX CORONARY ARTERY BYPASS GRAFT      HX CORONARY STENT PLACEMENT      HX HEART CATHETERIZATION      HX HEENT      wisdom teeth removed    HX ORTHOPAEDIC  11/22/2016    laminectomy    HX PACEMAKER      ICD - removed 1/2017    HX PTCA      [place] INSJ OF SUBQ IMPLANTABLE DEFIBRILLATOR ELECTRODE N/A 5/20/2019    INSERT SUBQ ICD w/ anesthesia performed by Jes Hou MD at [address] [place] [address] LAB     Social History     Socioeconomic History    Marital status:      Spouse name: Apple Wadsworth    Number of children: 2    Years of education: Not on file    Highest education level: Some college, no degree   Occupational History    Not on file Social Needs    Financial resource strain: Not hard at all   Will insecurity:     Worry: Never true     Inability: Never true   [employer] needs:     Medical: No     Non-medical: No   Tobacco Use    Smoking status: Former Smoker     Packs/day: 1.50     Years: 30.00     Pack years: 45.00     Last attempt to quit:      Years since quittin.5    Smokeless tobacco: Never Used   Substance and Sexual Activity    Alcohol use: No    Drug use: No    Sexual activity: Yes   Lifestyle    Physical activity:     Days per week: Not on file     Minutes per session: Not on file    Stress: Not on file   Relationships    Social connections:     Talks on phone: Not on file     Gets together: Not on file     Attends Synagogue service: Not on file     Active member of club or organization: Not on file     Attends meetings of clubs or organizations: Not on file     Relationship status: Not on file    Intimate partner violence:     Fear of current or ex partner: Not on file     Emotionally abused: Not on file     Physically abused: Not on file     Forced sexual activity: Not on file   Other Topics Concern     Service Not Asked    Blood Transfusions Not Asked    Caffeine Concern Not Asked    Occupational Exposure Not Asked   [employer] Hazards Not Asked    Sleep Concern Not Asked    Stress Concern Not Asked    Weight Concern Not Asked    Special Diet Not Asked    Back Care Not Asked    Exercise Not Asked    Bike Helmet Not Asked    [address] Not Asked    Self-Exams Not Asked   Social History Narrative    Not on file     Family History   Problem Relation Age of Onset    Diabetes Mother     Dementia Mother     Other Mother         carotid artery blockage    Heart Disease Father     Stroke Father     Heart Attack Father         several    Hypertension Father     Elevated Lipids Father     No Known Problems Sister     Cancer Brother         brain    Lung Disease Sister     COPD Sister  Cancer Sister         lung       Current Medications:   Current Outpatient Medications   Medication Sig Dispense Refill    tadalafil (CIALIS) 20 mg tablet TAKE 1 TABLET BY MOUTH EVERY DAY  6    sacubitril-valsartan (ENTRESTO) 24 mg/26 mg tablet Take 1 Tab by mouth two (2) times a day. 60 Tab 3    carvedilol (COREG) 3.125 mg tablet TAKE 1 TABLET BY MOUTH TWICE A DAY WITH MEALS 60 Tab 3    atorvastatin (LIPITOR) 40 mg tablet TAKE 1 TABLET BY MOUTH EVERY DAY 30 Tab 11    patiromer calcium sorbitex (VELTASSA) 8.4 gram powder Take 8.4 g by mouth daily. 30 Packet 3    furosemide (LASIX) 20 mg tablet Take 1 Tab by mouth two (2) times a day. (Patient taking differently: Take 20 mg by mouth daily.) 45 Tab 3    apixaban (ELIQUIS) 5 mg tablet Take 1 Tab by mouth two (2) times a day. 60 Tab 11    levalbuterol tartrate (XOPENEX) 45 mcg/actuation inhaler INHALE 2 PUFFS EVERY 4 HOURS AS NEEDED  0    montelukast (SINGULAIR) 10 mg tablet TAKE 1 TABLET BY MOUTH ONCE A DAY  3    amiodarone (CORDARONE) 200 mg tablet Take 1 Tab by mouth daily. 90 Tab 1    HYDROcodone-acetaminophen (NORCO) 5-325 mg per tablet TAKE 1 TABLET EVERY 6 HOURS AS NEEDED  0    DULoxetine (CYMBALTA) 60 mg capsule Take 60 mg by mouth daily.  allopurinol (ZYLOPRIM) 100 mg tablet Take  by mouth daily.  tamsulosin (FLOMAX) 0.4 mg capsule Take 0.4 mg by mouth daily.  colchicine 0.6 mg tablet Take 0.6 mg by mouth every other day.  cyclobenzaprine (FLEXERIL) 5 mg tablet Take 5 mg by mouth daily as needed for Muscle Spasm(s).  aspirin 81 mg chewable tablet Take 81 mg by mouth daily.  ascorbic acid, vitamin C, (VITAMIN C) 500 mg tablet Take 1 Tab by mouth two (2) times a day. 60 Tab 6       Allergies: Allergies   Allergen Reactions    Morphine Other (comments)     Hallucinations. Confusion.  Impaired judgement    Chlorhexidine Rash           Physical Exam:   Vitals:    Visit Vitals  /60 (BP 1 Location: Left arm, BP Patient Position: Sitting)   Pulse 70   Temp 97.2 °F (36.2 °C) (Oral)   Resp 20   Ht 5' 8\" (1.727 m)   Wt 167 lb 12.8 oz (76.1 kg)   SpO2 97%   BMI 25.51 kg/m²        Orthostatics:  Supine     BP 94/54   HR 72  Sitting      BP  90/54  HR 68  Standing  BP 92/54  HR 72      General:  Well developed WM, AAOx3, pleasant,  cooperative, no acute distress. HEENT:  Atraumatic. Pink and moist.  Anicteric sclerae. Neck:   Supple, no adenopoathy. Lungs:  Diminished breath sounds at left base No wheezing/rhonchi/rales. Heart:   PMI: Median sternotomy scar,  Regular rhythm, S1, S2 present, no murmur, no rubs, no gallops. JVD 8 cm (-) HJR . No carotid bruits. Left lateral axilla healing AICD incision with brusing, trace edema   Abdomen:  Soft, non-distended, non-tender. + Bowel sounds. No bruits. Extremities:  Venous stasis changes, trace BLE pitting edema R>L, no clubbing, no cyanosis. No calf tenderness  Neurologic:  Grossly intact. Alert and oriented X 3. No acute neurological distress. Skin:   intact, no wounds, ecchymosis, bruising, rashes   Psych:  Good insight. Not anxious nor agitated.     Recent Labs:     Lab Results   Component Value Date/Time    WBC 6.3 06/12/2019 03:05 PM    HGB 9.5 (L) 06/12/2019 03:05 PM    HCT 30.4 (L) 06/12/2019 03:05 PM    PLATELET 304 60/85/6944 03:05 PM    MCV 89 06/12/2019 03:05 PM     Lab Results   Component Value Date/Time    GFR est non-AA 54 (L) 06/24/2019 12:57 PM    GFR est AA 62 06/24/2019 12:57 PM    Creatinine 1.39 (H) 06/24/2019 12:57 PM    BUN 25 06/24/2019 12:57 PM    Sodium 140 06/24/2019 12:57 PM    Potassium 5.2 06/24/2019 12:57 PM    Chloride 103 06/24/2019 12:57 PM    CO2 24 06/24/2019 12:57 PM    Magnesium 1.9 06/12/2019 03:05 PM    Phosphorus 1.9 (L) 11/25/2016 09:36 PM     Lab Results   Component Value Date/Time    TSH 3.790 06/12/2019 03:05 PM    T4, Free 1.68 06/12/2019 03:05 PM      Lab Results   Component Value Date/Time    Sodium 140 06/24/2019 12:57 PM Potassium 5.2 06/24/2019 12:57 PM    Chloride 103 06/24/2019 12:57 PM    CO2 24 06/24/2019 12:57 PM    Anion gap 4 (L) 05/22/2019 02:56 AM    Glucose 80 06/24/2019 12:57 PM    BUN 25 06/24/2019 12:57 PM    Creatinine 1.39 (H) 06/24/2019 12:57 PM    BUN/Creatinine ratio 18 06/24/2019 12:57 PM    GFR est AA 62 06/24/2019 12:57 PM    GFR est non-AA 54 (L) 06/24/2019 12:57 PM    Calcium 9.0 06/24/2019 12:57 PM    Bilirubin, total 0.5 06/12/2019 03:05 PM    ALT (SGPT) 27 06/12/2019 03:05 PM    AST (SGOT) 24 06/12/2019 03:05 PM    Alk. phosphatase 86 06/12/2019 03:05 PM    Protein, total 6.4 06/12/2019 03:05 PM    Albumin 4.1 06/12/2019 03:05 PM    Globulin 3.6 01/24/2017 05:33 AM    A-G Ratio 1.8 06/12/2019 03:05 PM      Lab Results   Component Value Date/Time    Hemoglobin A1c 5.9 05/22/2019 02:56 AM       Thank you for letting us see him with you,      Kristi Mcpherson MD, [facility]  Chief of Cardiology, [address] [place] [address] Director  [address] [place] [address], [address] [facility], [address] [place] [address]  Office [phone]  Fax [phone]

## 2019-07-24 NOTE — PATIENT INSTRUCTIONS
1. Increase entresto to 49/51 mg, 1 tablet twice daily  2. You can take entresto 24/26 mg, 2 tablets twice daily  3. Call our office if you feel lightheaded or dizzy  4. Blood work 1 day prior to your next clinic visit - lab slips given to you  5.  Follow up in the [facility] in 2 weeks

## 2019-07-25 ENCOUNTER — TELEPHONE (OUTPATIENT)
Dept: CARDIOLOGY CLINIC | Age: 64
End: 2019-07-25

## 2019-07-25 LAB
BUN SERPL-MCNC: 30 MG/DL (ref 8–27)
BUN/CREAT SERPL: 22 (ref 10–24)
CALCIUM SERPL-MCNC: 9 MG/DL (ref 8.6–10.2)
CHLORIDE SERPL-SCNC: 101 MMOL/L (ref 96–106)
CO2 SERPL-SCNC: 19 MMOL/L (ref 20–29)
CREAT SERPL-MCNC: 1.35 MG/DL (ref 0.76–1.27)
GLUCOSE SERPL-MCNC: 64 MG/DL (ref 65–99)
NT-PROBNP SERPL-MCNC: 5530 PG/ML (ref 0–210)
POTASSIUM SERPL-SCNC: 5.4 MMOL/L (ref 3.5–5.2)
PREALB SERPL-MCNC: 26 MG/DL (ref 10–36)
SODIUM SERPL-SCNC: 141 MMOL/L (ref 134–144)

## 2019-07-25 NOTE — TELEPHONE ENCOUNTER
Notified patient of his serum potassium 5.4. Advised him to resume Veltassa. Instructed patient to return to clinic in 2 weeks - may be seen by Radha Morales.

## 2019-08-07 DIAGNOSIS — R06.09 DOE (DYSPNEA ON EXERTION): ICD-10-CM

## 2019-08-07 DIAGNOSIS — I25.5 ISCHEMIC CARDIOMYOPATHY: ICD-10-CM

## 2019-08-08 ENCOUNTER — TELEPHONE (OUTPATIENT)
Dept: CARDIOLOGY CLINIC | Age: 64
End: 2019-08-08

## 2019-08-09 ENCOUNTER — OFFICE VISIT (OUTPATIENT)
Dept: CARDIOLOGY CLINIC | Age: 64
End: 2019-08-09

## 2019-08-09 VITALS
HEIGHT: 68 IN | TEMPERATURE: 97.5 F | SYSTOLIC BLOOD PRESSURE: 98 MMHG | RESPIRATION RATE: 20 BRPM | BODY MASS INDEX: 25.46 KG/M2 | WEIGHT: 168 LBS | OXYGEN SATURATION: 99 % | HEART RATE: 64 BPM | DIASTOLIC BLOOD PRESSURE: 60 MMHG

## 2019-08-09 DIAGNOSIS — I25.5 ISCHEMIC CARDIOMYOPATHY: Primary | ICD-10-CM

## 2019-08-09 PROBLEM — I34.0 MITRAL VALVE REGURGITATION: Status: ACTIVE | Noted: 2019-07-25

## 2019-08-09 LAB
BUN SERPL-MCNC: 28 MG/DL (ref 8–27)
BUN/CREAT SERPL: 20 (ref 10–24)
CALCIUM SERPL-MCNC: 9 MG/DL (ref 8.6–10.2)
CHLORIDE SERPL-SCNC: 103 MMOL/L (ref 96–106)
CO2 SERPL-SCNC: 21 MMOL/L (ref 20–29)
CREAT SERPL-MCNC: 1.41 MG/DL (ref 0.76–1.27)
GLUCOSE SERPL-MCNC: 126 MG/DL (ref 65–99)
NT-PROBNP SERPL-MCNC: 4964 PG/ML (ref 0–210)
POTASSIUM SERPL-SCNC: 5.2 MMOL/L (ref 3.5–5.2)
SODIUM SERPL-SCNC: 141 MMOL/L (ref 134–144)

## 2019-08-09 NOTE — PROGRESS NOTES
Patient and spouse educated on medication changes and instructions for follow up in 4 week with lab work prior. Patient encouraged to contact our office with any questions or concerns. Patient and spouse verbalized understanding and had no further questions at this time. Alfonso Pascual RN.

## 2019-08-09 NOTE — LETTER
8/9/2019 9:53 AM 
 
Patient:  Felicity Jones YOB: 1955 Date of Visit: 8/9/2019 Dear Evelia Song MD 
[address] [facility] [address] [facility] [address] VIA In Basket Janey Carroll MD 
[address] [facility] [address] [facility] [address] VIA In Basket Vinicio Issa MD 
[facility] [id number] [address] [place] [id number] VIA Facsimile: [phone] 
 : 
 
 
Thank you for referring Mr. Dorie Flores to me for evaluation/treatment. Below are the relevant portions of my assessment and plan of care. If you have questions, please do not hesitate to call me. I look forward to following Mr. Mann along with you. Sincerely, Marylu Diggs MD

## 2019-08-09 NOTE — PATIENT INSTRUCTIONS
Medication changes: STOP lasix INCREASE valtassa to 16.8 per day INCREASE entresto to 97 mg/103 mg tablet- Take one tablet by mouth twice daily If you need to use lasix please contact our office prior to taking a dose. Please monitor your blood pressures daily prior to medications and 2 hours after taking medications. Bring a written record of your blood pressures to your next appointment. Please monitor your weights daily upon waking and after using the bathroom. Keep a written records of your weights and bring to your next appointment. If you have a weight gain of 3 or more pounds overnight OR 5 or more pounds in one week, please contact our office. Follow up 4 weeks with BMP and NT pro BNP prior to the visit

## 2019-08-09 NOTE — PROGRESS NOTES
[facility] Clinic Note      DOS:  8/9/2019  REFERRING PROVIDER: Sandee Marie MD  PRIMARY CARE PHYSICIAN: Kenton Cantu MD  PRIMARY CARDIOLOGIST:  Abdiel Guevara MD   EP: Tami Hamm MD   PULMONARY: Luis Enrique Cardenas MD       IMPRESSION/PLAN:   ICM s/p HMII as BTT on 12/1/16 and explant, NYHA Class II-III, LVEF 16-20%      Increase entresto to 97/103 mg, 1 tablet BID   Discontinue lasix    Continue Coreg 3.125 mg BID    Continue low Na+ and low K+ diet   Abstain from smoking and ETOH   ATTR- negative   Follow up in the [facility] in 4 weeks    CAD s/p CABG (SVG to RCA and LIMA to LAD) on 12/1/16 s/p BMS x2 -> SVG-RCA graft - no angina              Continue ASA, BB, statin    Mitral Regurgitation, severe   Evaluate for MV clip at [facility]   Repeat echo at [facility]    Chronic Left Pleural Effusion - s/p left thoracentesis on 7/5/2019   Encourage use of IS     High Risk of SCD - s/p SQ AICD (5/20/19)   No shocks       PAD - difficult femoral access with LVAD explant              No symptoms of claudication      Chronic Anticoagulation s/p LVAD explant              Continue eliquis 5 mg  twice daily    Hyperkalemia K 5.2    Increase veltessa    Continue low K+ diet     Gout              Continue colchicine 0.6 mg every other day              Continue allopurinol 100 mg daily   Denies recent gouty attacks    Chronic Lower back pain              On oxycodone, flexeril   Hasn't taken since ICD implant   Back pain improved with increased activity- going to gym    H/o tobacco abuse   Abstaining from nicotine      H/o alcohol abuse   Currently abstaining   Encouraged complete abstinence      Seizure disorder - early 25s              No recent seizure activity     Peripheral neuropathy              On cymbalta   ATTR- Negative     Prevention              Influenza annually              Pneumonia vaccine every 5 years       Chief Complaint   Patient presents with    CHF         HPI: Lauren Guevara is a 59y.o. year old male with a history of HFrEF in the setting of ICM s/p LVAD and recent LVAD explant complicated by PVD,  remote tobacco use and alcohol abuse (quit 11/2016) who presents for follow up of his HFrEF. Mr. Silvia Jones presented 11/22/2016 for decompressive laminectomy at - complicated by myocardial infarction. The [facility](11/25/16) revealed severe left main and 3 vessel disease along with a 60% right common iliac stenosis and  LVEF was 10%. He subsequently underwent placement of an IABP followed by placement of a HeartMate2 LVAD on 12/1/2016 with concomitant CABG (SVG to the RCA, LIMA to LAD). His postoperative course was complicated by RV failure and an ileus requiring TPN resulting and a transaminitis. He had multiple episodes of Torsade on 12/4/2016 prompting repeat catheterization revealing an occluded RCA vein graft. Two BMS were placed in the RCA graft. He also had SVT requiring cardioversion. He had a single lead ICD placed on 15/81/43 complicated by a hematoma. The ICD was later removed on 1/20/17. Following his LVAD surgery he did require inpatient rehab.      Mr. Silvia Jones was listed for heart transplantation at [facility]. He was called in for transplantation on 7/14/2018 but on pre-op DELLA was noted to have normal LV function and the transplant surgery was aborted. He subsequently was admitted on 9/5/2018 for LVAD explant- his post-operative course was complicated by pneumonia and bilateral pleural effusions. He was discharged on home O2 which he discontinued on his own.       After device explant, his subsequent echocardiograms showed deterioration of LVEF and worsening mitral regurgitation from 40-45% with moderate to severe MR on 9/19/18 to 35-40% with moderate-to-severe MR on 10/15/18. He has been removed from the transplant list at [facility] and is not interested in pursuing transplant evaluation at another center at this time. His entresto was discontinued at the time of his AICD implant due to hypotension.  He subsequently took diovan but felt worse. We resume entresto at his last clinic visit. He is tolerating the entresto with no dizziness, presyncope, or syncope. He was seen by Dr. Jayleen Thompson, who stated that his mitral valve is amenable to MV clip if his MR remains moderate to severe with maximal tolerable HF medications. He returns to clinic today for follow up for his systolic heart failure. He is tolerating up-titration of his entresto and denies dizziness or presyncope. He notes increased energy and less dyspnea on exertion. CARDIAC EVALUATION  TTE 19: LVEF 32%, LVAD plug at LV apex, grade 2 diastolic dysfunction, mild-mod MR, mild TR, PAPs 43 mmHg, mild LAE  DELLA 2018 - normal LV function and the transplant surgery was aborted. He subsequently was admitted on 2018 for LVAD explant  ECHO 18:LVEF 40-45% mod-severe MR   ECHO 10/15/18:EF 35-40%, mod-severe MR   ECHO 10/31/18: TDS, EF 30-35%, reduced RVEF, TAPSE 1.6, LAE, mild- mod MR/TR  ECHO 19: EF 25-30%, question of thrombus in apex. Mild- mod TR. Consider cardiac MR to exclude left ventricular thrombus. ECHO 2019: EF 16-20%, mod MR, mod-severe TR, severe pulm HTN (PA systolic pressure 85 mmHg), mildly reduced RV systolic function (TAPSE 1.05 cm)    CATH 16: severe LM, and 3 VD with 60% right common iliac stenosis. EF: 10%. ----S/p IABP   ----S/p HeartMate2 LVAD 2016   S/p CAB2016: LIMA-> LAD, SVG-> RCA     CATH 16 following torsades SVG-> RCA TO   ---- s/p BMS x 2-.  RCA  RHC 2017:RA: 15/15 14, RV: 21/ 14,PA: 23/28, m 20, PCWP 13, PVR 1.67 CO 5.67, CI 3    S/p LVAD explant 18 ([facility])    SVT s/p DCCV  AICD  single lead cx by hematoma  AICD explanted 17    EKG:    10/31/18 NSR QRS 88ms   ms  19: SR rate 68bpm QRS 94ms Qtc 438 ms    Review of Systems:     General:  Negative   HEENT: Denies epistaxis, angioedema   Pulmonary: Denies  wheeze, hemoptysis  Cardiac: Denies exertional chest pain, palpitations, PND, edema, lightheadedness, syncope, near syncope,or bleeding  GI:  Denies  abdominal pain, change in bowel habits, or black or bloody stools  Musculo: Positive for chronic back pain for which he takes prn flexril and norco   Neuro: Denies  TIA/CVA sx   Skin:  Denies rash   Allergies: Reviewed   Psych: Denies depression or anxiety       History:  Past Medical History:   Diagnosis Date    Arrhythmia     SVT    Arthritis     CAD (coronary artery disease)     Chronic pain     back    Congestive heart failure (HCC)     DSOUZA (dyspnea on exertion) 1/30/2019    Gout     [facility] ([facility]     Hypertension     ICD (implantable cardioverter-defibrillator) in place     Long term current use of anticoagulant therapy     LVAD (left ventricular assist device) present ([facility] 12/01/2016    Myocardial infarction ([facility]     Pacemaker     SubQ ICD    Raynaud disease     Seizures ([facility]     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     Past Surgical History:   Procedure Laterality Date    CABG, ARTERY-VEIN, TWO  12/01/2016    CARDIAC SURG PROCEDURE UNLIST  12/01/2016    LVAD    HX CORONARY ARTERY BYPASS GRAFT      HX CORONARY STENT PLACEMENT      HX HEART CATHETERIZATION      HX HEENT      wisdom teeth removed    HX ORTHOPAEDIC  11/22/2016    laminectomy    HX PACEMAKER      ICD - removed 1/2017    HX PTCA      [place] INSJ OF SUBQ IMPLANTABLE DEFIBRILLATOR ELECTRODE N/A 5/20/2019    INSERT SUBQ ICD w/ anesthesia performed by Hakeem Cloud MD at [address] [place] [address] LAB     Social History     Socioeconomic History    Marital status:      Spouse name: Kings Méndez    Number of children: 2    Years of education: Not on file    Highest education level: Some college, no degree   Occupational History    Not on file   Social Needs    Financial resource strain: Not hard at all   [employer] insecurity:     Worry: Never true     Inability: Never true   [employer] needs:     Medical: No Non-medical: No   Tobacco Use    Smoking status: Former Smoker     Packs/day: 1.50     Years: 30.00     Pack years: 45.00     Last attempt to quit:      Years since quittin.6    Smokeless tobacco: Never Used   Substance and Sexual Activity    Alcohol use: No    Drug use: No    Sexual activity: Yes   Lifestyle    Physical activity:     Days per week: Not on file     Minutes per session: Not on file    Stress: Not on file   Relationships    Social connections:     Talks on phone: Not on file     Gets together: Not on file     Attends Gnosticism service: Not on file     Active member of club or organization: Not on file     Attends meetings of clubs or organizations: Not on file     Relationship status: Not on file    Intimate partner violence:     Fear of current or ex partner: Not on file     Emotionally abused: Not on file     Physically abused: Not on file     Forced sexual activity: Not on file   Other Topics Concern     Service Not Asked    Blood Transfusions Not Asked    Caffeine Concern Not Asked    Occupational Exposure Not Asked   [employer] Hazards Not Asked    Sleep Concern Not Asked    Stress Concern Not Asked    Weight Concern Not Asked    Special Diet Not Asked    Back Care Not Asked    Exercise Not Asked    Bike Helmet Not Asked    [facility] Not Asked    Self-Exams Not Asked   Social History Narrative    Not on file     Family History   Problem Relation Age of Onset    Diabetes Mother     Dementia Mother     Other Mother         carotid artery blockage    Heart Disease Father     Stroke Father     Heart Attack Father         several    Hypertension Father     Elevated Lipids Father     No Known Problems Sister     Cancer Brother         brain    Lung Disease Sister     COPD Sister     Cancer Sister         lung       Current Medications:   Current Outpatient Medications   Medication Sig Dispense Refill    patiromer calcium sorbitex (VELTASSA) 8.4 gram powder Take 16.8 g by mouth daily. 60 Packet 11    sacubitril-valsartan (ENTRESTO) 97 mg/103 mg tablet Take 1 Tab by mouth two (2) times a day. 180 Tab 3    tadalafil (CIALIS) 20 mg tablet 20 mg as needed. 6    carvedilol (COREG) 3.125 mg tablet TAKE 1 TABLET BY MOUTH TWICE A DAY WITH MEALS 60 Tab 3    atorvastatin (LIPITOR) 40 mg tablet TAKE 1 TABLET BY MOUTH EVERY DAY 30 Tab 11    apixaban (ELIQUIS) 5 mg tablet Take 1 Tab by mouth two (2) times a day. 60 Tab 11    levalbuterol tartrate (XOPENEX) 45 mcg/actuation inhaler INHALE 2 PUFFS EVERY 4 HOURS AS NEEDED  0    montelukast (SINGULAIR) 10 mg tablet TAKE 1 TABLET BY MOUTH ONCE A DAY  3    amiodarone (CORDARONE) 200 mg tablet Take 1 Tab by mouth daily. 90 Tab 1    HYDROcodone-acetaminophen (NORCO) 5-325 mg per tablet TAKE 1 TABLET EVERY 6 HOURS AS NEEDED  0    DULoxetine (CYMBALTA) 60 mg capsule Take 60 mg by mouth daily.  allopurinol (ZYLOPRIM) 100 mg tablet Take 100 mg by mouth daily.  tamsulosin (FLOMAX) 0.4 mg capsule Take 0.4 mg by mouth daily.  colchicine 0.6 mg tablet Take 0.6 mg by mouth every other day.  cyclobenzaprine (FLEXERIL) 5 mg tablet Take 5 mg by mouth daily as needed for Muscle Spasm(s).  aspirin 81 mg chewable tablet Take 81 mg by mouth daily.  ascorbic acid, vitamin C, (VITAMIN C) 500 mg tablet Take 1 Tab by mouth two (2) times a day. 60 Tab 6       Allergies: Allergies   Allergen Reactions    Morphine Other (comments)     Hallucinations. Confusion. Impaired judgement    Chlorhexidine Rash           Physical Exam:   Vitals:    Visit Vitals  BP 98/60 (BP 1 Location: Left arm, BP Patient Position: Sitting)   Pulse 64   Temp 97.5 °F (36.4 °C) (Oral)   Resp 20   Ht 5' 8\" (1.727 m)   Wt 168 lb (76.2 kg)   SpO2 99%   BMI 25.54 kg/m²        General:  Well developed WM, AAOx3, pleasant,  cooperative, no acute distress. HEENT:  Atraumatic. Pink and moist.  Anicteric sclerae. Neck:   Supple, no adenopoathy. Lungs:  Clear to auscultation, No wheezing/rhonchi/rales. Heart:   PMI: Median sternotomy scar,  Regular rhythm, S1, S2 present, no murmur, no rubs, no gallops. JVD 8 cm (-) HJR . No carotid bruits. Abdomen:  Soft, non-distended, non-tender. + Bowel sounds. No bruits. Extremities:  Venous stasis changes, no clubbing, cyanosis, or edema. No calf tenderness  Neurologic:  Grossly intact. Alert and oriented X 3. No acute neurological distress. Skin:   intact, no wounds, ecchymosis, bruising, rashes   Psych:  Good insight. Not anxious nor agitated. Recent Labs:     Lab Results   Component Value Date/Time    WBC 6.3 06/12/2019 03:05 PM    HGB 9.5 (L) 06/12/2019 03:05 PM    HCT 30.4 (L) 06/12/2019 03:05 PM    PLATELET 532 01/83/8506 03:05 PM    MCV 89 06/12/2019 03:05 PM     Lab Results   Component Value Date/Time    GFR est non-AA 53 (L) 08/08/2019 12:37 PM    GFR est AA 61 08/08/2019 12:37 PM    Creatinine 1.41 (H) 08/08/2019 12:37 PM    BUN 28 (H) 08/08/2019 12:37 PM    Sodium 141 08/08/2019 12:37 PM    Potassium 5.2 08/08/2019 12:37 PM    Chloride 103 08/08/2019 12:37 PM    CO2 21 08/08/2019 12:37 PM    Magnesium 1.9 06/12/2019 03:05 PM    Phosphorus 1.9 (L) 11/25/2016 09:36 PM     Lab Results   Component Value Date/Time    TSH 3.790 06/12/2019 03:05 PM    T4, Free 1.68 06/12/2019 03:05 PM      Lab Results   Component Value Date/Time    Sodium 141 08/08/2019 12:37 PM    Potassium 5.2 08/08/2019 12:37 PM    Chloride 103 08/08/2019 12:37 PM    CO2 21 08/08/2019 12:37 PM    Anion gap 4 (L) 05/22/2019 02:56 AM    Glucose 126 (H) 08/08/2019 12:37 PM    BUN 28 (H) 08/08/2019 12:37 PM    Creatinine 1.41 (H) 08/08/2019 12:37 PM    BUN/Creatinine ratio 20 08/08/2019 12:37 PM    GFR est AA 61 08/08/2019 12:37 PM    GFR est non-AA 53 (L) 08/08/2019 12:37 PM    Calcium 9.0 08/08/2019 12:37 PM    Bilirubin, total 0.5 06/12/2019 03:05 PM    ALT (SGPT) 27 06/12/2019 03:05 PM    AST (SGOT) 24 06/12/2019 03:05 PM    Alk. phosphatase 86 06/12/2019 03:05 PM    Protein, total 6.4 06/12/2019 03:05 PM    Albumin 4.1 06/12/2019 03:05 PM    Globulin 3.6 01/24/2017 05:33 AM    A-G Ratio 1.8 06/12/2019 03:05 PM      Lab Results   Component Value Date/Time    Hemoglobin A1c 5.9 05/22/2019 02:56 AM       Thank you for letting us see him with you,      Kristi Otero MD, [facility], [address] [place] [address]  Chief of Cardiology, [address] [place] [address] Director  [address] [place] [address], [address] [place] [address], [address] [place] [address]  Office [phone]  Fax [phone]

## 2019-08-16 RX ORDER — AMIODARONE HYDROCHLORIDE 200 MG/1
200 TABLET ORAL DAILY
Qty: 90 TAB | Refills: 1 | Status: SHIPPED | OUTPATIENT
Start: 2019-08-16 | End: 2020-01-30

## 2019-08-16 NOTE — TELEPHONE ENCOUNTER
Requested Prescriptions     Signed Prescriptions Disp Refills    amiodarone (CORDARONE) 200 mg tablet 90 Tab 1     Sig: Take 1 Tab by mouth daily. Authorizing Provider: Bernard Donohue     Ordering User: Unknown Cruzzier     Refill per verbal order Dr. Abdoul Yeh.

## 2019-09-04 LAB
BUN SERPL-MCNC: 36 MG/DL (ref 8–27)
BUN/CREAT SERPL: 24 (ref 10–24)
CALCIUM SERPL-MCNC: 9.2 MG/DL (ref 8.6–10.2)
CHLORIDE SERPL-SCNC: 100 MMOL/L (ref 96–106)
CO2 SERPL-SCNC: 22 MMOL/L (ref 20–29)
CREAT SERPL-MCNC: 1.47 MG/DL (ref 0.76–1.27)
GLUCOSE SERPL-MCNC: 113 MG/DL (ref 65–99)
NT-PROBNP SERPL-MCNC: 3522 PG/ML (ref 0–210)
POTASSIUM SERPL-SCNC: 5.4 MMOL/L (ref 3.5–5.2)
SODIUM SERPL-SCNC: 136 MMOL/L (ref 134–144)

## 2019-09-05 ENCOUNTER — OFFICE VISIT (OUTPATIENT)
Dept: CARDIOLOGY CLINIC | Age: 64
End: 2019-09-05

## 2019-09-05 VITALS
RESPIRATION RATE: 20 BRPM | WEIGHT: 168.8 LBS | DIASTOLIC BLOOD PRESSURE: 64 MMHG | BODY MASS INDEX: 25.58 KG/M2 | HEIGHT: 68 IN | OXYGEN SATURATION: 97 % | SYSTOLIC BLOOD PRESSURE: 106 MMHG | HEART RATE: 68 BPM | TEMPERATURE: 97.1 F

## 2019-09-05 DIAGNOSIS — I25.5 ISCHEMIC CARDIOMYOPATHY: Primary | ICD-10-CM

## 2019-09-05 RX ORDER — FUROSEMIDE 20 MG/1
20 TABLET ORAL EVERY OTHER DAY
Refills: 3 | COMMUNITY
Start: 2019-08-27 | End: 2019-10-02

## 2019-09-05 RX ORDER — TRIAMCINOLONE ACETONIDE 0.25 MG/G
0.03 CREAM TOPICAL AS NEEDED
COMMUNITY
Start: 2017-04-05 | End: 2019-11-22

## 2019-09-05 RX ORDER — CARVEDILOL 6.25 MG/1
6.25 TABLET ORAL 2 TIMES DAILY WITH MEALS
Qty: 180 TAB | Refills: 3 | Status: SHIPPED | OUTPATIENT
Start: 2019-09-05 | End: 2020-03-09

## 2019-09-05 NOTE — PROGRESS NOTES
[facility] Clinic Note      DOS:  9/5/2019  REFERRING PROVIDER: Mp Ayala MD  PRIMARY CARE PHYSICIAN: Shelby Abel MD  PRIMARY CARDIOLOGIST:  Romeo Najera MD   EP: Nena Hassan MD   PULMONARY: Elham Crook MD       IMPRESSION/PLAN:   ICM s/p HMII as BTT on 12/1/16 and explant, NYHA Class II, LVEF 20-25%      Continue entresto to 97/103 mg, 1 tablet BID    Increase Coreg to 6.25 mg BID   No aldosterone antagonist d/t hyperkalemia   Take furosemide 20 mg daily PRN swelling or dyspnea    Continue low Na+ and low K+ diet - low K+ diet printed out for patient   Abstain from smoking and ETOH   ATTR- negative   Repeat BMP, NT proBNP prior to next clinic visit   Follow up in the [facility] in 4 weeks    CAD s/p CABG (SVG to RCA and LIMA to LAD) on 12/1/16 s/p BMS x2 -> SVG-RCA graft - no angina              Continue ASA, BB, statin    Mitral Regurgitation, severe - improved to mild   Will follow on serial echo    Hyperkalemia   On Veltassa    Chronic Left Pleural Effusion - s/p left thoracentesis on 7/5/2019   Encourage use of IS     High Risk of SCD - s/p SQ AICD (5/20/19)   No shocks       PAD - difficult femoral access with LVAD explant              No symptoms of claudication      Chronic Anticoagulation s/p LVAD explant              Continue eliquis 5 mg  twice daily    Hyperkalemia K 5.4    Continue veltessa    Continue low K+ diet - printed out high K foods to avoid     Gout              Continue colchicine 0.6 mg every other day              Continue allopurinol 100 mg daily   Denies recent gouty attacks    Chronic Lower back pain              On oxycodone, flexeril   Hasn't taken since ICD implant   Back pain improved with increased activity- going to gym    H/o tobacco abuse   Abstaining from nicotine      H/o alcohol abuse   Currently abstaining   Encouraged complete abstinence      Seizure disorder - early 25s              No recent seizure activity     Peripheral neuropathy              On cymbalta   ATTR- Negative     Prevention              Influenza annually              Pneumonia vaccine every 5 years       No chief complaint on file. HPI: Summer Roman is a 59y.o. year old male  with a history of HFrEF in the setting of ICM s/p LVAD and recent LVAD explant complicated by PVD,  remote tobacco use and alcohol abuse (quit 11/2016) who presents for follow up of his HFrEF. Mr. Padmini Villatoro presented 11/22/2016 for decompressive laminectomy at I2-A6 complicated by myocardial infarction. The [facility](11/25/16) revealed severe left main and 3 vessel disease along with a 60% right common iliac stenosis and  LVEF was 10%. He subsequently underwent placement of an IABP followed by placement of a HeartMate2 LVAD on 12/1/2016 with concomitant CABG (SVG to the RCA, LIMA to LAD). His postoperative course was complicated by RV failure and an ileus requiring TPN resulting and a transaminitis. He had multiple episodes of Torsade on 12/4/2016 prompting repeat catheterization revealing an occluded RCA vein graft. Two BMS were placed in the RCA graft. He also had SVT requiring cardioversion. He had a single lead ICD placed on 33/78/30 complicated by a hematoma. The ICD was later removed on 1/20/17. Following his LVAD surgery he did require inpatient rehab.      Mr. Padmini Villatoro was listed for heart transplantation at [facility]. He was called in for transplantation on 7/14/2018 but on pre-op DELLA was noted to have normal LV function and the transplant surgery was aborted. He subsequently was admitted on 9/5/2018 for LVAD explant- his post-operative course was complicated by pneumonia and bilateral pleural effusions. He was discharged on home O2 which he discontinued on his own.       After device explant, his subsequent echocardiograms showed deterioration of LVEF and worsening mitral regurgitation from 40-45% with moderate to severe MR on 9/19/18 to 35-40% with moderate-to-severe MR on 10/15/18.   He has been removed from the transplant list at [facility] and is not interested in pursuing transplant evaluation at another center at this time. His entresto was discontinued at the time of his AICD implant due to hypotension. He subsequently took diovan but felt worse. He is tolerating the entresto with no dizziness, presyncope, or syncope. He was seen by Dr. Becky Nguyen, who stated that his mitral regurgitation has decreased significantly with optimizing his HF medications. He returns to clinic today for follow up for his systolic heart failure. He is tolerating up-titration of his entresto and denies dizziness or presyncope. He notes increased energy and less dyspnea on exertion. He continues to take lasix every other day as needed for lower extremity edema. CARDIAC EVALUATION  TTE 19: LVEF 32%, LVAD plug at LV apex, grade 2 diastolic dysfunction, mild-mod MR, mild TR, PAPs 43 mmHg, mild LAE  DELLA 2018 - normal LV function and the transplant surgery was aborted. He subsequently was admitted on 2018 for LVAD explant  ECHO 18:LVEF 40-45% mod-severe MR   ECHO 10/15/18:EF 35-40%, mod-severe MR   ECHO 10/31/18: TDS, EF 30-35%, reduced RVEF, TAPSE 1.6, LAE, mild- mod MR/TR  ECHO 19: EF 25-30%, question of thrombus in apex. Mild- mod TR. Consider cardiac MR to exclude left ventricular thrombus. ECHO 2019: EF 16-20%, mod MR, mod-severe TR, severe pulm HTN (PA systolic pressure 85 mmHg), mildly reduced RV systolic function (TAPSE 6.75 cm)  ECHO 19: EF 20-25%, moderately reduced RV systolic function, mild MR    CATH 16: severe LM, and 3 VD with 60% right common iliac stenosis. EF: 10%. ----S/p IABP   ----S/p HeartMate2 LVAD 2016   S/p CAB2016: LIMA-> LAD, SVG-> RCA     CATH 16 following torsades SVG-> RCA TO   ---- s/p BMS x 2-.  RCA  RHC 2017:RA: 15/15 14, RV:  14,PA: , m 20, PCWP 13, PVR 1.67 CO 5.67, CI 3    S/p LVAD explant 18 ([facility])    SVT s/p DCCV  AICD  single lead cx by hematoma  AICD explanted 1/20/17    EKG:    10/31/18 NSR QRS 88ms   ms  6/11/19: SR rate 68bpm QRS 94ms Qtc 438 ms    Review of Systems:     General:  Negative   HEENT: Denies epistaxis, angioedema   Pulmonary: Denies  wheeze, hemoptysis  Cardiac: Denies exertional chest pain, palpitations, PND, edema, lightheadedness, syncope, near syncope,or bleeding  GI:  Denies  abdominal pain, change in bowel habits, or black or bloody stools  Musculo: Positive for chronic back pain for which he takes prn flexril and norco   Neuro: Denies  TIA/CVA sx   Skin:  Denies rash   Allergies: Reviewed   Psych: Denies depression or anxiety       History:  Past Medical History:   Diagnosis Date    Arrhythmia     SVT    Arthritis     CAD (coronary artery disease)     Chronic pain     back    Congestive heart failure (HCC)     DSOUZA (dyspnea on exertion) 1/30/2019    Gout     Heart failure ([address]     Hypertension     ICD (implantable cardioverter-defibrillator) in place     Long term current use of anticoagulant therapy     LVAD (left ventricular assist device) present ([address] 12/01/2016    Myocardial infarction ([address]     Pacemaker     SubQ ICD    Raynaud disease     Seizures ([address]     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     Past Surgical History:   Procedure Laterality Date    CABG, ARTERY-VEIN, TWO  12/01/2016    CARDIAC SURG PROCEDURE UNLIST  12/01/2016    LVAD    HX CORONARY ARTERY BYPASS GRAFT      HX CORONARY STENT PLACEMENT      HX HEART CATHETERIZATION      HX HEENT      wisdom teeth removed    HX ORTHOPAEDIC  11/22/2016    laminectomy    HX PACEMAKER      ICD - removed 1/2017    HX PTCA      WA INSJ OF SUBQ IMPLANTABLE DEFIBRILLATOR ELECTRODE N/A 5/20/2019    INSERT SUBQ ICD w/ anesthesia performed by Angela Abreu MD at [facility] CATH LAB     Social History     Socioeconomic History    Marital status:      Spouse name: Addie Julian    Number of children: 2    Years of education: Not on file    Highest education level: Some college, no degree   Occupational History    Not on file   Social Needs    Financial resource strain: Not hard at all   Will insecurity:     Worry: Never true     Inability: Never true   [employer] needs:     Medical: No     Non-medical: No   Tobacco Use    Smoking status: Former Smoker     Packs/day: 1.50     Years: 30.00     Pack years: 45.00     Last attempt to quit:      Years since quittin.6    Smokeless tobacco: Never Used   Substance and Sexual Activity    Alcohol use: No    Drug use: No    Sexual activity: Yes   Lifestyle    Physical activity:     Days per week: Not on file     Minutes per session: Not on file    Stress: Not on file   Relationships    Social connections:     Talks on phone: Not on file     Gets together: Not on file     Attends Denominational service: Not on file     Active member of club or organization: Not on file     Attends meetings of clubs or organizations: Not on file     Relationship status: Not on file    Intimate partner violence:     Fear of current or ex partner: Not on file     Emotionally abused: Not on file     Physically abused: Not on file     Forced sexual activity: Not on file   Other Topics Concern     Service Not Asked    Blood Transfusions Not Asked    Caffeine Concern Not Asked    Occupational Exposure Not Asked   Kolleen Hoit Hazards Not Asked    Sleep Concern Not Asked    Stress Concern Not Asked    Weight Concern Not Asked    Special Diet Not Asked    Back Care Not Asked    Exercise Not Asked    Bike Helmet Not Asked    Seat Belt Not Asked    Self-Exams Not Asked   Social History Narrative    Not on file     Family History   Problem Relation Age of Onset    Diabetes Mother     Dementia Mother     Other Mother         carotid artery blockage    Heart Disease Father     Stroke Father     Heart Attack Father         several    Hypertension Father     Elevated Lipids Father     No Known Problems Sister     Cancer Brother         brain    Lung Disease Sister     COPD Sister    Tari Teja Cancer Sister         lung       Current Medications:   Current Outpatient Medications   Medication Sig Dispense Refill    amiodarone (CORDARONE) 200 mg tablet Take 1 Tab by mouth daily. 90 Tab 1    patiromer calcium sorbitex (VELTASSA) 8.4 gram powder Take 16.8 g by mouth daily. 60 Packet 11    sacubitril-valsartan (ENTRESTO) 97 mg/103 mg tablet Take 1 Tab by mouth two (2) times a day. 180 Tab 3    tadalafil (CIALIS) 20 mg tablet 20 mg as needed. 6    carvedilol (COREG) 3.125 mg tablet TAKE 1 TABLET BY MOUTH TWICE A DAY WITH MEALS 60 Tab 3    atorvastatin (LIPITOR) 40 mg tablet TAKE 1 TABLET BY MOUTH EVERY DAY 30 Tab 11    apixaban (ELIQUIS) 5 mg tablet Take 1 Tab by mouth two (2) times a day. 60 Tab 11    levalbuterol tartrate (XOPENEX) 45 mcg/actuation inhaler INHALE 2 PUFFS EVERY 4 HOURS AS NEEDED  0    montelukast (SINGULAIR) 10 mg tablet TAKE 1 TABLET BY MOUTH ONCE A DAY  3    HYDROcodone-acetaminophen (NORCO) 5-325 mg per tablet TAKE 1 TABLET EVERY 6 HOURS AS NEEDED  0    DULoxetine (CYMBALTA) 60 mg capsule Take 60 mg by mouth daily.  allopurinol (ZYLOPRIM) 100 mg tablet Take 100 mg by mouth daily.  tamsulosin (FLOMAX) 0.4 mg capsule Take 0.4 mg by mouth daily.  colchicine 0.6 mg tablet Take 0.6 mg by mouth every other day.  cyclobenzaprine (FLEXERIL) 5 mg tablet Take 5 mg by mouth daily as needed for Muscle Spasm(s).  aspirin 81 mg chewable tablet Take 81 mg by mouth daily.  ascorbic acid, vitamin C, (VITAMIN C) 500 mg tablet Take 1 Tab by mouth two (2) times a day. 60 Tab 6       Allergies: Allergies   Allergen Reactions    Morphine Other (comments)     Hallucinations. Confusion. Impaired judgement    Chlorhexidine Rash           Physical Exam:   Vitals: There were no vitals taken for this visit.      General:  Well developed WM, AAOx3, pleasant,  cooperative, no acute distress. HEENT:  Atraumatic. Pink and moist.  Anicteric sclerae. Neck:   Supple, no adenopoathy. Lungs:  Clear to auscultation, No wheezing/rhonchi/rales. Heart:   PMI: Median sternotomy scar,  Regular rhythm, S1, S2 present, 1/6 systolic murmur, no rubs, no gallops. JVD 8 cm (-) HJR . No carotid bruits. Abdomen:  Soft, non-distended, non-tender. + Bowel sounds. No bruits. Extremities:  Venous stasis changes, no clubbing, cyanosis, or edema. No calf tenderness  Neurologic:  Grossly intact. Alert and oriented X 3. No acute neurological distress. Skin:   intact, no wounds, ecchymosis, bruising, rashes   Psych:  Good insight. Not anxious nor agitated.     Recent Labs:     Lab Results   Component Value Date/Time    WBC 6.3 06/12/2019 03:05 PM    HGB 9.5 (L) 06/12/2019 03:05 PM    HCT 30.4 (L) 06/12/2019 03:05 PM    PLATELET 330 07/17/0721 03:05 PM    MCV 89 06/12/2019 03:05 PM     Lab Results   Component Value Date/Time    GFR est non-AA 50 (L) 09/04/2019 10:12 AM    GFR est AA 58 (L) 09/04/2019 10:12 AM    Creatinine 1.47 (H) 09/04/2019 10:12 AM    BUN 36 (H) 09/04/2019 10:12 AM    Sodium 136 09/04/2019 10:12 AM    Potassium 5.4 (H) 09/04/2019 10:12 AM    Chloride 100 09/04/2019 10:12 AM    CO2 22 09/04/2019 10:12 AM    Magnesium 1.9 06/12/2019 03:05 PM    Phosphorus 1.9 (L) 11/25/2016 09:36 PM     Lab Results   Component Value Date/Time    TSH 3.790 06/12/2019 03:05 PM    T4, Free 1.68 06/12/2019 03:05 PM      Lab Results   Component Value Date/Time    Sodium 136 09/04/2019 10:12 AM    Potassium 5.4 (H) 09/04/2019 10:12 AM    Chloride 100 09/04/2019 10:12 AM    CO2 22 09/04/2019 10:12 AM    Anion gap 4 (L) 05/22/2019 02:56 AM    Glucose 113 (H) 09/04/2019 10:12 AM    BUN 36 (H) 09/04/2019 10:12 AM    Creatinine 1.47 (H) 09/04/2019 10:12 AM    BUN/Creatinine ratio 24 09/04/2019 10:12 AM    GFR est AA 58 (L) 09/04/2019 10:12 AM    GFR est non-AA 50 (L) 09/04/2019 10:12 AM    Calcium 9.2 09/04/2019 10:12 AM    Bilirubin, total 0.5 06/12/2019 03:05 PM    ALT (SGPT) 27 06/12/2019 03:05 PM    AST (SGOT) 24 06/12/2019 03:05 PM    Alk. phosphatase 86 06/12/2019 03:05 PM    Protein, total 6.4 06/12/2019 03:05 PM    Albumin 4.1 06/12/2019 03:05 PM    Globulin 3.6 01/24/2017 05:33 AM    A-G Ratio 1.8 06/12/2019 03:05 PM      Lab Results   Component Value Date/Time    Hemoglobin A1c 5.9 05/22/2019 02:56 AM       Thank you for letting us see him with you,      Kristi Skinner MD, [facility], [address] [place] [address]  Chief of Cardiology, [address] [place] [address] Director  [address] [place] [address], [facility], [address] [place] [address]  Office [phone]  Fax [phone]

## 2019-09-05 NOTE — LETTER
9/5/2019 10:19 AM 
 
Patient:  David Sutherland YOB: 1955 Date of Visit: 9/5/2019 Dear Shyanne Redman MD 
[address] [address] VIA In Basket Laurence Solano MD 
[facility] [id number] [address] [address] VIA Facsimile: [phone] Yvonne Chester MD 
[facility] [address] [address] VIA In Basket 
 : Thank you for referring Mr. Anup Sanderson to me for evaluation/treatment. Below are the relevant portions of my assessment and plan of care. If you have questions, please do not hesitate to call me. I look forward to following Mr. Mann along with you. Sincerely, Katty Dickerson MD

## 2019-09-05 NOTE — PATIENT INSTRUCTIONS
1. Increase carvedilol to 6.25 mg twice daily (you may take 2 tablets of carvedilol 3.125 mg twice daily) - take after eating a meal    2. Follow a low potassium diet    3. Repeat labs in 1 month    4.  Follow up in the [facility] in 1 month

## 2019-09-23 RX ORDER — CARVEDILOL 3.12 MG/1
TABLET ORAL
Qty: 60 TAB | Refills: 3 | Status: SHIPPED | OUTPATIENT
Start: 2019-09-23 | End: 2019-09-29 | Stop reason: DRUGHIGH

## 2019-09-25 ENCOUNTER — TELEPHONE (OUTPATIENT)
Dept: CARDIOLOGY CLINIC | Age: 64
End: 2019-09-25

## 2019-09-25 NOTE — TELEPHONE ENCOUNTER
Spoke with patient regarding his next follow up appointment with Dr. Ale Collins. Appointment has been rescheduled to 12pm on October 3rd.   Patient is in agreement

## 2019-09-29 ENCOUNTER — APPOINTMENT (OUTPATIENT)
Dept: CT IMAGING | Age: 64
DRG: 683 | End: 2019-09-29
Attending: EMERGENCY MEDICINE
Payer: COMMERCIAL

## 2019-09-29 ENCOUNTER — HOSPITAL ENCOUNTER (INPATIENT)
Age: 64
LOS: 3 days | Discharge: HOME | DRG: 683 | End: 2019-10-02
Attending: EMERGENCY MEDICINE | Admitting: HOSPITALIST
Payer: COMMERCIAL

## 2019-09-29 DIAGNOSIS — I50.22 CHRONIC SYSTOLIC HEART FAILURE (HCC): ICD-10-CM

## 2019-09-29 DIAGNOSIS — R19.7 VOMITING AND DIARRHEA: Primary | ICD-10-CM

## 2019-09-29 DIAGNOSIS — I47.20 SUSTAINED VT (VENTRICULAR TACHYCARDIA): ICD-10-CM

## 2019-09-29 DIAGNOSIS — I25.5 ISCHEMIC CARDIOMYOPATHY: ICD-10-CM

## 2019-09-29 DIAGNOSIS — E86.0 DEHYDRATION: ICD-10-CM

## 2019-09-29 DIAGNOSIS — N17.9 AKI (ACUTE KIDNEY INJURY) (HCC): ICD-10-CM

## 2019-09-29 DIAGNOSIS — R11.10 VOMITING AND DIARRHEA: Primary | ICD-10-CM

## 2019-09-29 DIAGNOSIS — I50.22 CHRONIC SYSTOLIC CONGESTIVE HEART FAILURE (HCC): ICD-10-CM

## 2019-09-29 DIAGNOSIS — I73.9 PAD (PERIPHERAL ARTERY DISEASE) (HCC): ICD-10-CM

## 2019-09-29 DIAGNOSIS — E87.20 METABOLIC ACIDOSIS: ICD-10-CM

## 2019-09-29 DIAGNOSIS — I25.10 CAD, MULTIPLE VESSEL: ICD-10-CM

## 2019-09-29 DIAGNOSIS — R06.09 DOE (DYSPNEA ON EXERTION): ICD-10-CM

## 2019-09-29 DIAGNOSIS — I34.0 NON-RHEUMATIC MITRAL REGURGITATION: ICD-10-CM

## 2019-09-29 DIAGNOSIS — Z87.891 HISTORY OF TOBACCO ABUSE: ICD-10-CM

## 2019-09-29 LAB
ALBUMIN SERPL-MCNC: 4 G/DL (ref 3.5–5)
ALBUMIN/GLOB SERPL: 1 {RATIO} (ref 1.1–2.2)
ALP SERPL-CCNC: 114 U/L (ref 45–117)
ALT SERPL-CCNC: 35 U/L (ref 12–78)
ANION GAP SERPL CALC-SCNC: 7 MMOL/L (ref 5–15)
ANION GAP SERPL CALC-SCNC: 7 MMOL/L (ref 5–15)
APTT PPP: 32.3 SEC (ref 22.1–32)
AST SERPL-CCNC: 26 U/L (ref 15–37)
ATRIAL RATE: 60 BPM
BASOPHILS # BLD: 0 K/UL (ref 0–0.1)
BASOPHILS NFR BLD: 0 % (ref 0–1)
BILIRUB SERPL-MCNC: 0.5 MG/DL (ref 0.2–1)
BUN SERPL-MCNC: 44 MG/DL (ref 6–20)
BUN SERPL-MCNC: 50 MG/DL (ref 6–20)
BUN/CREAT SERPL: 22 (ref 12–20)
BUN/CREAT SERPL: 23 (ref 12–20)
CALCIUM SERPL-MCNC: 8.5 MG/DL (ref 8.5–10.1)
CALCIUM SERPL-MCNC: 9.5 MG/DL (ref 8.5–10.1)
CALCULATED P AXIS, ECG09: 37 DEGREES
CALCULATED R AXIS, ECG10: 30 DEGREES
CALCULATED T AXIS, ECG11: 85 DEGREES
CHLORIDE SERPL-SCNC: 117 MMOL/L (ref 97–108)
CHLORIDE SERPL-SCNC: 118 MMOL/L (ref 97–108)
CO2 SERPL-SCNC: 16 MMOL/L (ref 21–32)
CO2 SERPL-SCNC: 19 MMOL/L (ref 21–32)
COMMENT, HOLDF: NORMAL
CREAT SERPL-MCNC: 1.89 MG/DL (ref 0.7–1.3)
CREAT SERPL-MCNC: 2.28 MG/DL (ref 0.7–1.3)
DIAGNOSIS, 93000: NORMAL
DIFFERENTIAL METHOD BLD: ABNORMAL
EOSINOPHIL # BLD: 0.4 K/UL (ref 0–0.4)
EOSINOPHIL NFR BLD: 4 % (ref 0–7)
ERYTHROCYTE [DISTWIDTH] IN BLOOD BY AUTOMATED COUNT: 17 % (ref 11.5–14.5)
GLOBULIN SER CALC-MCNC: 3.9 G/DL (ref 2–4)
GLUCOSE SERPL-MCNC: 107 MG/DL (ref 65–100)
GLUCOSE SERPL-MCNC: 94 MG/DL (ref 65–100)
HCT VFR BLD AUTO: 38.9 % (ref 36.6–50.3)
HGB BLD-MCNC: 12 G/DL (ref 12.1–17)
IMM GRANULOCYTES # BLD AUTO: 0 K/UL (ref 0–0.04)
IMM GRANULOCYTES NFR BLD AUTO: 0 % (ref 0–0.5)
INR PPP: 1.1 (ref 0.9–1.1)
LACTATE SERPL-SCNC: 0.6 MMOL/L (ref 0.4–2)
LYMPHOCYTES # BLD: 0.6 K/UL (ref 0.8–3.5)
LYMPHOCYTES NFR BLD: 7 % (ref 12–49)
MAGNESIUM SERPL-MCNC: 1.7 MG/DL (ref 1.6–2.4)
MCH RBC QN AUTO: 28.8 PG (ref 26–34)
MCHC RBC AUTO-ENTMCNC: 30.8 G/DL (ref 30–36.5)
MCV RBC AUTO: 93.5 FL (ref 80–99)
MONOCYTES # BLD: 0.6 K/UL (ref 0–1)
MONOCYTES NFR BLD: 7 % (ref 5–13)
NEUTS SEG # BLD: 7.4 K/UL (ref 1.8–8)
NEUTS SEG NFR BLD: 82 % (ref 32–75)
NRBC # BLD: 0 K/UL (ref 0–0.01)
NRBC BLD-RTO: 0 PER 100 WBC
P-R INTERVAL, ECG05: 208 MS
PHOSPHATE SERPL-MCNC: 2.6 MG/DL (ref 2.6–4.7)
PLATELET # BLD AUTO: 260 K/UL (ref 150–400)
PMV BLD AUTO: 11.1 FL (ref 8.9–12.9)
POTASSIUM SERPL-SCNC: 4.2 MMOL/L (ref 3.5–5.1)
POTASSIUM SERPL-SCNC: 5 MMOL/L (ref 3.5–5.1)
PROT SERPL-MCNC: 7.9 G/DL (ref 6.4–8.2)
PROTHROMBIN TIME: 11.4 SEC (ref 9–11.1)
Q-T INTERVAL, ECG07: 422 MS
QRS DURATION, ECG06: 96 MS
QTC CALCULATION (BEZET), ECG08: 422 MS
RBC # BLD AUTO: 4.16 M/UL (ref 4.1–5.7)
RBC MORPH BLD: ABNORMAL
SAMPLES BEING HELD,HOLD: NORMAL
SODIUM SERPL-SCNC: 141 MMOL/L (ref 136–145)
SODIUM SERPL-SCNC: 143 MMOL/L (ref 136–145)
THERAPEUTIC RANGE,PTTT: ABNORMAL SECS (ref 58–77)
TROPONIN I SERPL-MCNC: <0.05 NG/ML
VENTRICULAR RATE, ECG03: 60 BPM
WBC # BLD AUTO: 9 K/UL (ref 4.1–11.1)

## 2019-09-29 PROCEDURE — 74176 CT ABD & PELVIS W/O CONTRAST: CPT

## 2019-09-29 PROCEDURE — 83735 ASSAY OF MAGNESIUM: CPT

## 2019-09-29 PROCEDURE — 85610 PROTHROMBIN TIME: CPT

## 2019-09-29 PROCEDURE — 36415 COLL VENOUS BLD VENIPUNCTURE: CPT

## 2019-09-29 PROCEDURE — 85025 COMPLETE CBC W/AUTO DIFF WBC: CPT

## 2019-09-29 PROCEDURE — 80053 COMPREHEN METABOLIC PANEL: CPT

## 2019-09-29 PROCEDURE — 74011250637 HC RX REV CODE- 250/637: Performed by: HOSPITALIST

## 2019-09-29 PROCEDURE — 84484 ASSAY OF TROPONIN QUANT: CPT

## 2019-09-29 PROCEDURE — 99223 1ST HOSP IP/OBS HIGH 75: CPT | Performed by: INTERNAL MEDICINE

## 2019-09-29 PROCEDURE — 96360 HYDRATION IV INFUSION INIT: CPT

## 2019-09-29 PROCEDURE — 74011250636 HC RX REV CODE- 250/636: Performed by: HOSPITALIST

## 2019-09-29 PROCEDURE — 84100 ASSAY OF PHOSPHORUS: CPT

## 2019-09-29 PROCEDURE — 87177 OVA AND PARASITES SMEARS: CPT

## 2019-09-29 PROCEDURE — 65660000000 HC RM CCU STEPDOWN

## 2019-09-29 PROCEDURE — 83605 ASSAY OF LACTIC ACID: CPT

## 2019-09-29 PROCEDURE — 99285 EMERGENCY DEPT VISIT HI MDM: CPT

## 2019-09-29 PROCEDURE — 87449 NOS EACH ORGANISM AG IA: CPT

## 2019-09-29 PROCEDURE — 85730 THROMBOPLASTIN TIME PARTIAL: CPT

## 2019-09-29 PROCEDURE — 82438 ASSAY OTHER FLUID CHLORIDES: CPT

## 2019-09-29 PROCEDURE — 74011250636 HC RX REV CODE- 250/636: Performed by: EMERGENCY MEDICINE

## 2019-09-29 PROCEDURE — 93005 ELECTROCARDIOGRAM TRACING: CPT

## 2019-09-29 PROCEDURE — 74011250637 HC RX REV CODE- 250/637: Performed by: EMERGENCY MEDICINE

## 2019-09-29 PROCEDURE — 70450 CT HEAD/BRAIN W/O DYE: CPT

## 2019-09-29 RX ORDER — DULOXETIN HYDROCHLORIDE 60 MG/1
60 CAPSULE, DELAYED RELEASE ORAL DAILY
Status: DISCONTINUED | OUTPATIENT
Start: 2019-09-30 | End: 2019-10-02 | Stop reason: HOSPADM

## 2019-09-29 RX ORDER — AMIODARONE HYDROCHLORIDE 200 MG/1
200 TABLET ORAL DAILY
Status: DISCONTINUED | OUTPATIENT
Start: 2019-09-30 | End: 2019-10-02 | Stop reason: HOSPADM

## 2019-09-29 RX ORDER — SODIUM CHLORIDE, SODIUM LACTATE, POTASSIUM CHLORIDE, CALCIUM CHLORIDE 600; 310; 30; 20 MG/100ML; MG/100ML; MG/100ML; MG/100ML
100 INJECTION, SOLUTION INTRAVENOUS CONTINUOUS
Status: DISCONTINUED | OUTPATIENT
Start: 2019-09-29 | End: 2019-09-30

## 2019-09-29 RX ORDER — ATORVASTATIN CALCIUM 20 MG/1
40 TABLET, FILM COATED ORAL
Status: DISCONTINUED | OUTPATIENT
Start: 2019-09-29 | End: 2019-10-02 | Stop reason: HOSPADM

## 2019-09-29 RX ORDER — MONTELUKAST SODIUM 10 MG/1
10 TABLET ORAL
Status: DISCONTINUED | OUTPATIENT
Start: 2019-09-29 | End: 2019-10-02 | Stop reason: HOSPADM

## 2019-09-29 RX ORDER — TAMSULOSIN HYDROCHLORIDE 0.4 MG/1
0.4 CAPSULE ORAL DAILY
Status: DISCONTINUED | OUTPATIENT
Start: 2019-09-30 | End: 2019-10-02 | Stop reason: HOSPADM

## 2019-09-29 RX ORDER — GUAIFENESIN 100 MG/5ML
81 LIQUID (ML) ORAL DAILY
Status: DISCONTINUED | OUTPATIENT
Start: 2019-09-30 | End: 2019-10-02 | Stop reason: HOSPADM

## 2019-09-29 RX ORDER — ALLOPURINOL 100 MG/1
100 TABLET ORAL DAILY
Status: DISCONTINUED | OUTPATIENT
Start: 2019-09-30 | End: 2019-10-02 | Stop reason: HOSPADM

## 2019-09-29 RX ORDER — LOPERAMIDE HYDROCHLORIDE 2 MG/1
2 CAPSULE ORAL
Status: COMPLETED | OUTPATIENT
Start: 2019-09-29 | End: 2019-09-29

## 2019-09-29 RX ADMIN — ATORVASTATIN CALCIUM 40 MG: 20 TABLET, FILM COATED ORAL at 21:06

## 2019-09-29 RX ADMIN — LOPERAMIDE HYDROCHLORIDE 2 MG: 2 CAPSULE ORAL at 11:27

## 2019-09-29 RX ADMIN — APIXABAN 5 MG: 5 TABLET, FILM COATED ORAL at 17:11

## 2019-09-29 RX ADMIN — PATIROMER 16.8 G: 8.4 POWDER, FOR SUSPENSION ORAL at 14:18

## 2019-09-29 RX ADMIN — SODIUM CHLORIDE 1000 ML: 900 INJECTION, SOLUTION INTRAVENOUS at 10:06

## 2019-09-29 RX ADMIN — MONTELUKAST 10 MG: 10 TABLET, FILM COATED ORAL at 21:06

## 2019-09-29 RX ADMIN — SODIUM CHLORIDE, SODIUM LACTATE, POTASSIUM CHLORIDE, AND CALCIUM CHLORIDE 100 ML/HR: 600; 310; 30; 20 INJECTION, SOLUTION INTRAVENOUS at 14:20

## 2019-09-29 NOTE — PROGRESS NOTES
TRANSFER - IN REPORT:    Verbal report received from Blane RN(name) on Edouard White  being received from ED(unit) for routine progression of care      Report consisted of patients Situation, Background, Assessment and   Recommendations(SBAR). Information from the following report(s) SBAR, Kardex, ED Summary, Intake/Output, MAR, Recent Results and Cardiac Rhythm NSR was reviewed with the receiving nurse. Opportunity for questions and clarification was provided.

## 2019-09-29 NOTE — H&P
History and Physical  Primary Care Provider: Yi Diaz MD    Subjective:     Sy Munoz is a 59 y.o. male with PMH of systolic heart failure, h/o LVAD placement which was removed after EF has improved,CAD,Arrythymias and other medical issues came to the hospital with a cc of diarrhea, dizziness. Patient states that he was having diarrhea since 6 days  about 3-4 times /day and had intermittent episodes of vomiting. He denied any fever,chills,abdominal pain,chest pain,SOB. has chronic cough. Denied any blood in stool. Per wife,they went to [facility] and diarrhea started after 2 days into their trip.they returned last thursday. Patient continued to take all his prescribed medications without any changes. No other family or friends were sick. he denied use of abx recently    His diarrhea was better yesterday and last night,when he went to the bathroom -he had lightheadedness -fell on the floor -did not lose consciousness but said he might have hit his head. This morning,he felt weak and came to the hospital.      In the ER,he was found to have KLEVER with metabolic acidosis -hospitalist was consulted for admission   Review of Systems:    A comprehensive review of systems was negative except for that written in the History of Present Illness.      Past Medical History:   Diagnosis Date    Arrhythmia     SVT    Arthritis     CAD (coronary artery disease)     Chronic pain     back    Congestive heart failure (HCC)     DSOUZA (dyspnea on exertion) 1/30/2019    Gout     Heart failure ([address]     Hypertension     ICD (implantable cardioverter-defibrillator) in place     Long term current use of anticoagulant therapy     LVAD (left ventricular assist device) present ([address] 12/01/2016    Myocardial infarction ([address]     Pacemaker     SubQ ICD    Raynaud disease     Seizures ([address]     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      Past Surgical History:   Procedure Laterality Date    CABG, ARTERY-VEIN, TWO  12/01/2016    CARDIAC SURG PROCEDURE UNLIST  12/01/2016    LVAD    HX CORONARY ARTERY BYPASS GRAFT      HX CORONARY STENT PLACEMENT      HX HEART CATHETERIZATION      HX HEENT      wisdom teeth removed    HX ORTHOPAEDIC  11/22/2016    laminectomy    HX PACEMAKER      ICD - removed 1/2017    HX PTCA      MO INSJ OF SUBQ IMPLANTABLE DEFIBRILLATOR ELECTRODE N/A 5/20/2019    INSERT SUBQ ICD w/ anesthesia performed by Cari Oliver MD at [facility]     Prior to Admission medications    Medication Sig Start Date End Date Taking? Authorizing Provider   furosemide (LASIX) 20 mg tablet Take 20 mg by mouth every other day. 8/27/19  Yes Provider, Historical   triamcinolone acetonide (KENALOG) 0.025 % topical cream Apply 0.025 Tubes to affected area as needed. 4/5/17  Yes Provider, Historical   carvedilol (COREG) 6.25 mg tablet Take 1 Tab by mouth two (2) times daily (with meals). 9/5/19  Yes Kishan Weems MD   amiodarone (CORDARONE) 200 mg tablet Take 1 Tab by mouth daily. 8/16/19  Yes Cari Oliver MD   patiromer calcium sorbitex (VELTASSA) 8.4 gram powder Take 16.8 g by mouth daily. 8/9/19  Yes Kishan Weems MD   sacubitril-valsartan (ENTRESTO) 97 mg/103 mg tablet Take 1 Tab by mouth two (2) times a day. 8/9/19  Yes Kishan Weems MD   tadalafil (CIALIS) 20 mg tablet 20 mg as needed. 6/20/19  Yes Provider, Historical   atorvastatin (LIPITOR) 40 mg tablet TAKE 1 TABLET BY MOUTH EVERY DAY 6/21/19  Yes Kishan Weems MD   apixaban (ELIQUIS) 5 mg tablet Take 1 Tab by mouth two (2) times a day.  5/24/19  Yes Kishan Weems MD   levalbuterol tartrate (XOPENEX) 45 mcg/actuation inhaler INHALE 2 PUFFS EVERY 4 HOURS AS NEEDED 5/8/19  Yes Provider, Historical   montelukast (SINGULAIR) 10 mg tablet TAKE 1 TABLET BY MOUTH ONCE A DAY 5/7/19  Yes Provider, Historical   HYDROcodone-acetaminophen (NORCO) 5-325 mg per tablet TAKE 1 TABLET EVERY 6 HOURS AS NEEDED 11/2/18  Yes Provider, Historical   DULoxetine (CYMBALTA) 60 mg capsule Take 60 mg by mouth daily. Yes Provider, Historical   allopurinol (ZYLOPRIM) 100 mg tablet Take 100 mg by mouth daily. Yes Provider, Historical   tamsulosin (FLOMAX) 0.4 mg capsule Take 0.4 mg by mouth daily. 10/15/18  Yes Provider, Historical   colchicine 0.6 mg tablet Take 0.6 mg by mouth every other day. Yes Provider, Historical   cyclobenzaprine (FLEXERIL) 5 mg tablet Take 5 mg by mouth daily as needed for Muscle Spasm(s). Yes Provider, Historical   aspirin 81 mg chewable tablet Take 81 mg by mouth daily. Yes Provider, Historical   ascorbic acid, vitamin C, (VITAMIN C) 500 mg tablet Take 1 Tab by mouth two (2) times a day. 2/2/17  Yes Wilton Torres NP     Allergies   Allergen Reactions    Morphine Other (comments)     Hallucinations. Confusion. Impaired judgement    Chlorhexidine Rash      Family History   Problem Relation Age of Onset    Diabetes Mother     Dementia Mother     Other Mother         carotid artery blockage    Heart Disease Father     Stroke Father     Heart Attack Father         several    Hypertension Father     Elevated Lipids Father     No Known Problems Sister     Cancer Brother         brain    Lung Disease Sister     COPD Sister     Cancer Sister         lung        SOCIAL HISTORY:  Patient resides at home.   Patient ambulates independently  Smoking history:former smoker,quit 10 yrs ago  Alcohol history: denied use of alcohol      Objective:       Physical Exam:   Gen:  Well-developed, well-nourished, in no acute distress  HEENT:   PERRL, EOMI,anicteric, no pallor,hearing intact to voice, moist mucous membranes,sinus non tender  Neck:  Supple, without masses, thyroid non-tender  Resp:  No accessory muscle use, clear breath sounds without wheezes rales or rhonchi  Card:  No murmurs, normal S1, S2 without thrills, bruits or peripheral edema  Abd:  Soft, non-tender, non-distended, normoactive bowel sounds are present,he has a hernia. Lymph:  No cervical adenopathy  Musc:  No cyanosis or clubbing  Skin:  No rashes or ulcers, skin turgor is good  Neuro:  alert and oriented X 3, no focal deficits  Psych:  Alert with good insight. Oriented to person, place, and time         Data Review: All diagnostic labs and studies have been reviewed. Ct Head Wo Cont    Result Date: 9/29/2019  IMPRESSION: No acute intracranial abnormality. Ct Abd Pelv Wo Cont    Result Date: 9/29/2019  IMPRESSION: 1. Increased small left pleural effusion since 2016. 2. Nonobstructing right nephrolithiasis. 3. Colonic diverticulosis without evidence of inflammation. Assessment:     Active Problems:    KLEVER (acute kidney injury) ([facility] (9/29/2019)      Vomiting and diarrhea (9/29/2019)      #Nausea/vomiting and diarrhea:  -suspected gastroenteritis.  -check stool cultures, C diff   -As white count wnl, no fever - no indication for abx  -CT abdomen showed colonic diverticulosis but no inflammation  -Troponin and lactic acid wnl    #KLEVER on CKD 2 with metabolic acidosis:  -base line around 1.4, cr at admission is 2.28  -continue IVF and repeat labs in the evening    #Presyncope:  -likely due to soft BP -dehydration with gastroenteritis and was also taking antihypertensives/diuretics(GDMT for CHF)  -IVF    ##Chronic systolic heart failure with ischemic cardiomyopathy:  - Recent echo showed 36-40%   -Hold off on guideline directed therapy meds for now  -cautious with IVF  -Advanced heart failure team consulted    #History of hyperkalemia:  -K 5.1 today  -continue valtessa    # History of CAD s/p CABG and BMS:  -continue aspirin,statin. Hold beta blocker due to soft BP      #Chronic anticoagulation s/p LVAD explant -continue eliquis    #Chronic gout: allopurinol    #Chronic back pain on narcotics: hold oxycodone for now.     FUNCTIONAL STATUS PRIOR TO HOSPITALIZATION (including history of recent falls):independent    Signed By: Adirel Schulz MD     September 29, 2019

## 2019-09-29 NOTE — CONSULTS
Advanced Heart Failure Consult Note      DOS:  9/29/2019  REFERRING PROVIDER: Kelli Encinas MD  PRIMARY CARE PHYSICIAN: Shanon Rivera MD  PRIMARY CARDIOLOGIST:  Sidney Hoyos MD   EP: Stefan Caceres MD   PULMONARY: Fatimah Araujo MD       IMPRESSION/PLAN:   ICM s/p HMII as BTT on 12/1/16 and explant, NYHA Class II, LVEF 36-40%      Hold entresto d/t volume depletion    Hold Coreg to 6.25 mg BID   IV fluid resuscitation    No aldosterone antagonist d/t hyperkalemia   ATTR- negative    Gastroenteritis   Enteric precautions   IV fluid resuscitation   Stool studies pending       CAD s/p CABG (SVG to RCA and LIMA to LAD) on 12/1/16 s/p BMS x2 -> SVG-RCA graft - no angina              Continue ASA, BB, statin    Mitral Regurgitation, severe - improved to mild   Will follow on serial echo    Hyperkalemia   On Veltassa    Chronic Left Pleural Effusion - s/p left thoracentesis on 7/5/2019   Encourage use of IS     High Risk of SCD - s/p SQ AICD (5/20/19)   No shocks       PAD - difficult femoral access with LVAD explant              No symptoms of claudication      Chronic Anticoagulation s/p LVAD explant              Continue eliquis 5 mg  twice daily    Hyperkalemia K 5.0    Continue veltessa    Continue low K+ diet     Gout              Continue allopurinol 100 mg daily   Denies recent gouty attacks    Chronic Lower back pain              On hydrocodone, flexeril   Hasn't taken since ICD implant   Back pain improved with increased activity- going to gym    H/o tobacco abuse   Abstaining from nicotine      H/o alcohol abuse   Currently abstaining   Encouraged complete abstinence      Seizure disorder - early 25s              No recent seizure activity     Peripheral neuropathy              On cymbalta   ATTR- Negative     Prevention              Influenza annually              Pneumonia vaccine every 5 years       Chief Complaint   Patient presents with    Fall    Diarrhea         HPI: Avis Harrington is a 59y.o. year old male  with a history of HFrEF in the setting of ICM s/p LVAD and recent LVAD explant complicated by PVD,  remote tobacco use and alcohol abuse (quit 11/2016) who presents for follow up of his HFrEF. Mr. Dulce Saleem [facility] by myocardial infarction. The [facility](11/25/16) revealed severe left main and 3 vessel disease along with a 60% right common iliac stenosis and  LVEF was 10%. He subsequently underwent placement of an IABP followed by placement of a HeartMate2 LVAD on 12/1/2016 with concomitant CABG (SVG to the RCA, LIMA to LAD). His postoperative course was complicated by RV failure and an ileus requiring TPN resulting and a transaminitis. He had multiple episodes of Torsade on 12/4/2016 prompting repeat catheterization revealing an occluded RCA vein graft. Two BMS were placed in the RCA graft. He also had SVT requiring cardioversion. He had a single lead ICD placed on 37/09/94 complicated by a hematoma. The ICD was later removed on 1/20/17. Following his LVAD surgery he did require inpatient rehab.      Mr. Dulce Saleem was listed for heart transplantation at [facility]. He was called in for transplantation on 7/14/2018 but on pre-op DELLA was noted to have normal LV function and the transplant surgery was aborted. He subsequently was admitted on 9/5/2018 for LVAD explant- his post-operative course was complicated by pneumonia and bilateral pleural effusions. He was discharged on home O2 which he discontinued on his own.       After device explant, his subsequent echocardiograms showed deterioration of LVEF and worsening mitral regurgitation from 40-45% with moderate to severe MR on 9/19/18 to 35-40% with moderate-to-severe MR on 10/15/18. He has been removed from the transplant list at [facility] and is not interested in pursuing transplant evaluation at another center at this time. His entresto was discontinued at the time of his AICD implant due to hypotension.  He subsequently took diovan but felt worse. He is tolerating the entresto with no dizziness, presyncope, or syncope. He was seen by Dr. Adin Castillo, who stated that his mitral regurgitation has decreased significantly with optimizing his HF medications. He was admitted to [facility] following an episode of gastroenteritis and presyncope. He and his wife traveled to [place] recently. He developed nausea, vomiting, and diarrhea shortly after returning home. His wife did not develop gastroenteritis. He denies blood or mucus in his stool. He felt dizziness and experienced an episode of presyncope, hitting his head. He denies LOC. CARDIAC EVALUATION  TTE 19: LVEF 32%, LVAD plug at LV apex, grade 2 diastolic dysfunction, mild-mod MR, mild TR, PAPs 43 mmHg, mild LAE  DELLA 2018 - normal LV function and the transplant surgery was aborted. He subsequently was admitted on 2018 for LVAD explant  ECHO 18:LVEF 40-45% mod-severe MR   ECHO 10/15/18:EF 35-40%, mod-severe MR   ECHO 10/31/18: TDS, EF 30-35%, reduced RVEF, TAPSE 1.6, LAE, mild- mod MR/TR  ECHO 19: EF 25-30%, question of thrombus in apex. Mild- mod TR. Consider cardiac MR to exclude left ventricular thrombus. ECHO 2019: EF 16-20%, mod MR, mod-severe TR, severe pulm HTN (PA systolic pressure 85 mmHg), mildly reduced RV systolic function (TAPSE 5.27 cm)  ECHO 19: EF 20-25%, moderately reduced RV systolic function, mild MR    CATH 16: severe LM, and 3 VD with 60% right common iliac stenosis. EF: 10%. ----S/p IABP   ----S/p HeartMate2 LVAD 2016   S/p CAB2016: LIMA-> LAD, SVG-> RCA     CATH 16 following torsades SVG-> RCA TO   ---- s/p BMS x 2-.  RCA  RHC 2017:RA: 15/15 14, RV:  14,PA: , m 20, PCWP 13, PVR 1.67 CO 5.67, CI 3    S/p LVAD explant 18 ([facility])    SVT s/p DCCV  AICD  single lead cx by hematoma  AICD explanted 17    EKG:    10/31/18 NSR QRS 88ms   ms  19: SR rate 68bpm QRS 94ms Qtc 438 ms    Review of Systems:     General:  Negative   HEENT: Denies epistaxis, angioedema   Pulmonary: Denies  wheeze, hemoptysis  Cardiac: Denies exertional chest pain, palpitations, PND, edema, syncope; positive for dizziness and near syncope  GI:  Positive for nausea, vomiting, diarrhea  Musculo: Positive for chronic back pain for which he takes prn flexril and norco   Neuro: Denies  TIA/CVA sx   Skin:  Denies rash   Allergies: Reviewed   Psych: Denies depression or anxiety       History:  Past Medical History:   Diagnosis Date    Arrhythmia     SVT    Arthritis     CAD (coronary artery disease)     Chronic pain     back    Congestive heart failure (HCC)     DSOUZA (dyspnea on exertion) 1/30/2019    Gout     Heart failure ([address]     Hypertension     ICD (implantable cardioverter-defibrillator) in place     Long term current use of anticoagulant therapy     LVAD (left ventricular assist device) present ([address] 12/01/2016    Myocardial infarction ([facility]     Pacemaker     SubQ ICD    Raynaud disease     Seizures ([facility]     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     Past Surgical History:   Procedure Laterality Date    CABG, ARTERY-VEIN, TWO  12/01/2016    CARDIAC SURG PROCEDURE UNLIST  12/01/2016    LVAD    HX CORONARY ARTERY BYPASS GRAFT      HX CORONARY STENT PLACEMENT      HX HEART CATHETERIZATION      HX HEENT      wisdom teeth removed    HX ORTHOPAEDIC  11/22/2016    laminectomy    HX PACEMAKER      ICD - removed 1/2017    HX PTCA      SC INSJ OF SUBQ IMPLANTABLE DEFIBRILLATOR ELECTRODE N/A 5/20/2019    INSERT SUBQ ICD w/ anesthesia performed by Javier Jiang MD at [facility]     Social History     Socioeconomic History    Marital status:      Spouse name: Estella James    Number of children: 2    Years of education: Not on file    Highest education level: Some college, no degree   Occupational History    Not on file   Social Needs    Financial resource strain: Not hard at all  Food insecurity:     Worry: Never true     Inability: Never true    Transportation needs:     Medical: No     Non-medical: No   Tobacco Use    Smoking status: Former Smoker     Packs/day: 1.50     Years: 30.00     Pack years: 45.00     Last attempt to quit:      Years since quittin.7    Smokeless tobacco: Never Used   Substance and Sexual Activity    Alcohol use: No    Drug use: No    Sexual activity: Yes   Lifestyle    Physical activity:     Days per week: Not on file     Minutes per session: Not on file    Stress: Not on file   Relationships    Social connections:     Talks on phone: Not on file     Gets together: Not on file     Attends Confucianist service: Not on file     Active member of club or organization: Not on file     Attends meetings of clubs or organizations: Not on file     Relationship status: Not on file    Intimate partner violence:     Fear of current or ex partner: Not on file     Emotionally abused: Not on file     Physically abused: Not on file     Forced sexual activity: Not on file   Other Topics Concern     Service Not Asked    Blood Transfusions Not Asked    Caffeine Concern Not Asked    Occupational Exposure Not Asked   Rosa Maria Blizzard Hazards Not Asked    Sleep Concern Not Asked    Stress Concern Not Asked    Weight Concern Not Asked    Special Diet Not Asked    Back Care Not Asked    Exercise Not Asked    Bike Helmet Not Asked    [facility] Not Asked    Self-Exams Not Asked   Social History Narrative    Not on file     Family History   Problem Relation Age of Onset    Diabetes Mother     Dementia Mother     Other Mother         carotid artery blockage    Heart Disease Father     Stroke Father     Heart Attack Father         several    Hypertension Father     Elevated Lipids Father     No Known Problems Sister     Cancer Brother         brain    Lung Disease Sister     COPD Sister     Cancer Sister         lung       Current Medications: Current Facility-Administered Medications   Medication Dose Route Frequency Provider Last Rate Last Dose    lactated Ringers infusion  100 mL/hr IntraVENous CONTINUOUS MD Jada Mccray [START ON 9/30/2019] amiodarone (CORDARONE) tablet 200 mg  200 mg Oral DAILY Levi Nolasco MD        apixaban (ELIQUIS) tablet 5 mg  5 mg Oral BID MD Jada Mccray [START ON 9/30/2019] aspirin chewable tablet 81 mg  81 mg Oral DAILY Levi Nolasco MD        atorvastatin (LIPITOR) tablet 40 mg  40 mg Oral QHS Levi Nolasco MD        montelukast (SINGULAIR) tablet 10 mg  10 mg Oral QHS MD Jada Mccray [START ON 9/30/2019] tamsulosin (FLOMAX) capsule 0.4 mg  0.4 mg Oral DAILY Levi Nolasco MD        patiromer calcium sorbitex (VELTASSA) powder 16.8 g  16.8 g Oral DAILY Levi Nolasco MD        [START ON 9/30/2019] allopurinol (ZYLOPRIM) tablet 100 mg  100 mg Oral DAILY MD Jada Mccray Jefferson Ko ON 9/30/2019] DULoxetine (CYMBALTA) capsule 60 mg  60 mg Oral DAILY Levi Nolasco MD        influenza vaccine 2019-20 (6 mos+)(PF) (FLUARIX/FLULAVAL/FLUZONE QUAD) injection 0.5 mL  0.5 mL IntraMUSCular PRIOR TO DISCHARGE Levi Nolasco MD           Allergies: Allergies   Allergen Reactions    Morphine Other (comments)     Hallucinations. Confusion. Impaired judgement    Chlorhexidine Rash           Physical Exam:   Vitals:    Visit Vitals  BP 98/49 (BP 1 Location: Left arm, BP Patient Position: At rest)   Pulse 66   Temp 97.2 °F (36.2 °C)   Resp 16   Ht 5' 8\" (1.727 m)   Wt 165 lb 5.5 oz (75 kg)   SpO2 100%   BMI 25.14 kg/m²        General:  Well developed WM, AAOx3, pleasant,  cooperative, no acute distress. HEENT:  Atraumatic. Pink and moist.  Anicteric sclerae. Neck:   Supple, no adenopoathy. Lungs:  Clear to auscultation, No wheezing/rhonchi/rales.   Heart:   PMI: Median sternotomy scar,  Regular rhythm, S1, S2 present, 1/6 systolic murmur, no rubs, no gallops. JVD 8 cm (-) HJR . No carotid bruits. Abdomen:  Soft, non-distended, non-tender. + Bowel sounds. No bruits. Extremities:  Venous stasis changes, no clubbing, cyanosis, or edema. No calf tenderness  Neurologic:  Grossly intact. Alert and oriented X 3. No acute neurological distress. Skin:   intact, no wounds, ecchymosis, bruising, rashes   Psych:  Good insight. Not anxious nor agitated. Recent Labs:     Lab Results   Component Value Date/Time    WBC 9.0 09/29/2019 10:05 AM    HGB 12.0 (L) 09/29/2019 10:05 AM    HCT 38.9 09/29/2019 10:05 AM    PLATELET 147 82/40/6636 10:05 AM    MCV 93.5 09/29/2019 10:05 AM     Lab Results   Component Value Date/Time    GFR est non-AA 29 (L) 09/29/2019 10:05 AM    GFR est AA 35 (L) 09/29/2019 10:05 AM    Creatinine 2.28 (H) 09/29/2019 10:05 AM    BUN 50 (H) 09/29/2019 10:05 AM    Sodium 141 09/29/2019 10:05 AM    Potassium 5.0 09/29/2019 10:05 AM    Chloride 118 (H) 09/29/2019 10:05 AM    CO2 16 (L) 09/29/2019 10:05 AM    Magnesium 1.7 09/29/2019 10:05 AM    Phosphorus 2.6 09/29/2019 10:05 AM     Lab Results   Component Value Date/Time    TSH 3.790 06/12/2019 03:05 PM    T4, Free 1.68 06/12/2019 03:05 PM      Lab Results   Component Value Date/Time    Sodium 141 09/29/2019 10:05 AM    Potassium 5.0 09/29/2019 10:05 AM    Chloride 118 (H) 09/29/2019 10:05 AM    CO2 16 (L) 09/29/2019 10:05 AM    Anion gap 7 09/29/2019 10:05 AM    Glucose 94 09/29/2019 10:05 AM    BUN 50 (H) 09/29/2019 10:05 AM    Creatinine 2.28 (H) 09/29/2019 10:05 AM    BUN/Creatinine ratio 22 (H) 09/29/2019 10:05 AM    GFR est AA 35 (L) 09/29/2019 10:05 AM    GFR est non-AA 29 (L) 09/29/2019 10:05 AM    Calcium 9.5 09/29/2019 10:05 AM    Bilirubin, total 0.5 09/29/2019 10:05 AM    ALT (SGPT) 35 09/29/2019 10:05 AM    AST (SGOT) 26 09/29/2019 10:05 AM    Alk.  phosphatase 114 09/29/2019 10:05 AM    Protein, total 7.9 09/29/2019 10:05 AM    Albumin 4.0 09/29/2019 10:05 AM    Globulin 3.9 09/29/2019 10:05 AM    A-G Ratio 1.0 (L) 09/29/2019 10:05 AM      Lab Results   Component Value Date/Time    Hemoglobin A1c 5.9 05/22/2019 02:56 AM       Thank you for letting us see him with you,      Kristi Beauchamp MD, [facility], Ingrid Otero  Chief of Cardiology, [address] [place] [address] Director  [address] [place] [address]  [facility], [address] [place] [address], [address] [place] [address]  Office [phone]  Fax [phone]

## 2019-09-29 NOTE — PROGRESS NOTES
Admission Medication Reconciliation:    Information obtained from:  Patient  RxQuery data available¹:  yes    Comments      1) Patient and wife were good historian. Patient denies recent changes in medication (except an increase in Coreg Dose from 3.125mg BID to 6.25mg BID earlier this month)    2)  Medication changes (since last review): Added  - none    Adjusted  - none    Removed  - Coreg      3) Last home doses are noted below.  -Patient routinely takes Colchicine every other day (last dose was today)   [facility] benefit data reflects medications filled and processed through the patient's insurance, however   this data does NOT capture whether the medication was picked up or is currently being taken by the patient. Allergies:  Morphine and Chlorhexidine    Significant PMH/Disease States:   Past Medical History:   Diagnosis Date    Arrhythmia     SVT    Arthritis     CAD (coronary artery disease)     Chronic pain     back    Congestive heart failure (HCC)     DSOUZA (dyspnea on exertion) 1/30/2019    Gout     Heart failure (HCC)     Hypertension     ICD (implantable cardioverter-defibrillator) in place     Long term current use of anticoagulant therapy     LVAD (left ventricular assist device) present ([facility] [address] 12/01/2016    Myocardial infarction [facility])     Pacemaker     SubQ ICD    Raynaud disease     Seizures ([facility] [address]     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     Chief Complaint for this Admission:    Chief Complaint   Patient presents with    Fall    Diarrhea     Prior to Admission Medications:   Prior to Admission Medications   Prescriptions Last Dose Informant Patient Reported? Taking? DULoxetine (CYMBALTA) 60 mg capsule 9/29/2019 at Unknown time  Yes Yes   Sig: Take 60 mg by mouth daily.    HYDROcodone-acetaminophen (NORCO) 5-325 mg per tablet 9/22/2019 at Unknown time  Yes Yes   Sig: TAKE 1 TABLET EVERY 6 HOURS AS NEEDED   allopurinol (ZYLOPRIM) 100 mg tablet 9/29/2019 at Unknown time  Yes Yes   Sig: Take 100 mg by mouth daily. amiodarone (CORDARONE) 200 mg tablet 2019 at Unknown time  No Yes   Sig: Take 1 Tab by mouth daily. apixaban (ELIQUIS) 5 mg tablet 2019 at Unknown time  No Yes   Sig: Take 1 Tab by mouth two (2) times a day. ascorbic acid, vitamin C, (VITAMIN C) 500 mg tablet 2019 at Unknown time  No Yes   Sig: Take 1 Tab by mouth two (2) times a day. aspirin 81 mg chewable tablet 2019 at Unknown time  Yes Yes   Sig: Take 81 mg by mouth daily. atorvastatin (LIPITOR) 40 mg tablet 2019 at Unknown time  No Yes   Sig: TAKE 1 TABLET BY MOUTH EVERY DAY   carvedilol (COREG) 6.25 mg tablet 2019 at Unknown time  No Yes   Sig: Take 1 Tab by mouth two (2) times daily (with meals). colchicine 0.6 mg tablet 2019 at Unknown time  Yes Yes   Sig: Take 0.6 mg by mouth every other day. cyclobenzaprine (FLEXERIL) 5 mg tablet   Yes Yes   Sig: Take 5 mg by mouth daily as needed for Muscle Spasm(s). furosemide (LASIX) 20 mg tablet 2019 at Unknown time  Yes Yes   Sig: Take 20 mg by mouth every other day. levalbuterol tartrate (XOPENEX) 45 mcg/actuation inhaler   Yes Yes   Sig: INHALE 2 PUFFS EVERY 4 HOURS AS NEEDED   montelukast (SINGULAIR) 10 mg tablet 2019 at Unknown time  Yes Yes   Sig: TAKE 1 TABLET BY MOUTH ONCE A DAY   patiromer calcium sorbitex (VELTASSA) 8.4 gram powder 2019 at Unknown time  No Yes   Sig: Take 16.8 g by mouth daily. sacubitril-valsartan (ENTRESTO) 97 mg/103 mg tablet 2019 at Unknown time  No Yes   Sig: Take 1 Tab by mouth two (2) times a day. tadalafil (CIALIS) 20 mg tablet   Yes Yes   Si mg as needed. tamsulosin (FLOMAX) 0.4 mg capsule 2019 at Unknown time  Yes Yes   Sig: Take 0.4 mg by mouth daily. triamcinolone acetonide (KENALOG) 0.025 % topical cream   Yes Yes   Sig: Apply 0.025 Tubes to affected area as needed.       Facility-Administered Medications: None       Please contact the main inpatient pharmacy with any questions or concerns at [phone] and we will direct you to the clinical pharmacist covering this patient's care while in-house.    Alycia Torres, PHARMD

## 2019-09-29 NOTE — ED PROVIDER NOTES
56-year-old male who presents from home accompanied by his wife with a chief complaint of vomiting and diarrhea. Past medical history is significant for CAD, CHF, hypertension, LVAD which has since been removed, and chronic pain. He is followed by the heart failure clinic. He reports diarrhea that started about 1 week ago. He has had 3-4 loose stools per day since then. This morning he got up in the early morning hours to use the bathroom and felt weak and dizzy and subsequently fell. He denies any injury or pain for as a result of the fall. No head injury or loss of consciousness. He vomited twice this morning before getting back to bed. He denies any blood in the vomit or diarrhea. He has been trying to drink fluids and stay hydrated but he feels weak and dehydrated. Denies any fever, chest pain, shortness of breath, abdominal pain, headaches. He has no known sick contacts. He has not been on antibiotics in the past month.            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       Past Surgical History:   Procedure Laterality Date    CABG, ARTERY-VEIN, TWO  12/01/2016    CARDIAC SURG PROCEDURE UNLIST  12/01/2016    LVAD    HX CORONARY ARTERY BYPASS GRAFT      HX CORONARY STENT PLACEMENT      HX HEART CATHETERIZATION      HX HEENT      wisdom teeth removed    HX ORTHOPAEDIC  11/22/2016    laminectomy    HX PACEMAKER      ICD - removed 1/2017    HX PTCA      [facility] INSJ OF SUBQ IMPLANTABLE DEFIBRILLATOR ELECTRODE N/A 2019    INSERT SUBQ ICD w/ anesthesia performed by Tato Mace MD at [facility]         Family History:   Problem Relation Age of Onset    Diabetes Mother     Dementia Mother     Other Mother         carotid artery blockage    Heart Disease Father     Stroke Father     Heart Attack Father         several    Hypertension Father     Elevated Lipids Father     No Known Problems Sister     Cancer Brother         brain    Lung Disease Sister     COPD Sister     Cancer Sister         lung       Social History     Socioeconomic History    Marital status:      Spouse name: Lizzy Corbin    Number of children: 2    Years of education: Not on file    Highest education level: Some college, no degree   Occupational History    Not on file   Social Needs    Financial resource strain: Not hard at all   Episona insecurity:     Worry: Never true     Inability: Never true   "[employer] needs:     Medical: No     Non-medical: No   Tobacco Use    Smoking status: Former Smoker     Packs/day: 1.50     Years: 30.00     Pack years: 45.00     Last attempt to quit:      Years since quittin.7    Smokeless tobacco: Never Used   Substance and Sexual Activity    Alcohol use: No    Drug use: No    Sexual activity: Yes   Lifestyle    Physical activity:     Days per week: Not on file     Minutes per session: Not on file    Stress: Not on file   Relationships    Social connections:     Talks on phone: Not on file     Gets together: Not on file     Attends Shinto service: Not on file     Active member of club or organization: Not on file     Attends meetings of clubs or organizations: Not on file     Relationship status: Not on file    Intimate partner violence:     Fear of current or ex partner: Not on file     Emotionally abused: Not on file     Physically abused: Not on file     Forced sexual activity: Not on file   Other Topics Concern     Service Not Asked    Blood Transfusions Not Asked    Caffeine Concern Not Asked    Occupational Exposure Not Asked    Hobby Hazards Not Asked    Sleep Concern Not Asked    Stress Concern Not Asked    Weight Concern Not Asked    Special Diet Not Asked    Back Care Not Asked    Exercise Not Asked    Bike Helmet Not Asked   [address] Not Asked    Self-Exams Not Asked   Social History Narrative    Not on file         ALLERGIES: Morphine and Chlorhexidine    Review of Systems   Constitutional: Negative for diaphoresis and fever. HENT: Negative for facial swelling. Eyes: Negative for visual disturbance. Respiratory: Negative for cough. Cardiovascular: Negative for chest pain. Gastrointestinal: Negative for abdominal pain. Genitourinary: Negative for dysuria. Musculoskeletal: Negative for joint swelling. Skin: Negative for rash. Neurological: Negative for headaches. Hematological: Negative for adenopathy. Psychiatric/Behavioral: Negative for suicidal ideas. There were no vitals filed for this visit. Physical Exam   Constitutional: He is oriented to person, place, and time. He appears well-developed and well-nourished. No distress. HENT:   Head: Normocephalic and atraumatic. Mouth/Throat: Oropharynx is clear and moist.   Eyes: Pupils are equal, round, and reactive to light. Neck: Normal range of motion. Neck supple. Cardiovascular: Normal rate, regular rhythm, normal heart sounds and intact distal pulses. Pulmonary/Chest: Effort normal and breath sounds normal. No respiratory distress. Abdominal: Soft. Bowel sounds are normal. He exhibits no distension. There is no tenderness. Musculoskeletal: Normal range of motion. He exhibits no edema. Neurological: He is alert and oriented to person, place, and time. Skin: Skin is warm and dry. Nursing note and vitals reviewed.        MDM  Number of Diagnoses or Management Options  KLEVER (acute kidney injury) [facility]):   Dehydration:   Metabolic acidosis:   Vomiting and diarrhea:   Diagnosis management comments: A:  Vomiting and diarrhea. Likely dehydrated. VS stable. Has pmh concerning for heart failure but has improved with LVAD removed and improving EF. Abd soft with normal bs.      P:  Labs  ivf  Stool studies if able to provide sample  reassess       TTE 7/8/2019  Result status: Final result   · Left Ventricle: Normal cavity size and wall thickness. Moderate systolic dysfunction. Estimated left ventricular ejection fraction is 36 - 40%. · Mitral Valve: Severe mitral valve regurgitation. · Tricuspid Valve: Moderate tricuspid valve regurgitation is present. · Pulmonary Artery: Mild to moderate pulmonary hypertension. ED EKG interpretation:  Rhythm: normal sinus rhythm. Rate (approx.): 60.  Axis: normal.  ST segment:  No concerning ST elevations or depressions. This EKG was interpreted by Shree Powell MD,ED Provider. 11:05 AM  WBC normal  BUN/Cr elevated  CO2=16    Hospitalist [place] for Admission  11:05 AM    ED Room Number: ER14/14  Patient Name and age:  Meliton Jackson 59 y.o.  male  Working Diagnosis:   1. Vomiting and diarrhea    2. KLEVER (acute kidney injury) ([facility] [address]    3. Metabolic acidosis    4. Dehydration      Readmission: no  Isolation Requirements:  yes  Recommended Level of Care:  med/surg  Code Status:  Full Code  Department:[facility] Adult ED - 21   Other:  Diarrhea and vomiting for one week. VS stable. BUN/Cr elevated. CO2-16. Followed by heart failure team with LVAD that was removed several months ago due to recovering EF.     Procedures

## 2019-09-29 NOTE — ROUTINE PROCESS
TRANSFER - OUT REPORT: 
 
Verbal report given to [facility] RN(name) on Kevin Coyle  being transferred to [facility](unit) for routine progression of care Report consisted of patients Situation, Background, Assessment and  
Recommendations(SBAR). Information from the following report(s) SBAR, Kardex, ED Summary and MAR was reviewed with the receiving nurse. Lines:  
Peripheral IV 09/29/19 Right Forearm (Active) Site Assessment Clean, dry, & intact 9/29/2019 10:12 AM  
Phlebitis Assessment 0 9/29/2019 10:12 AM  
Infiltration Assessment 0 9/29/2019 10:12 AM  
Dressing Status Clean, dry, & intact 9/29/2019 10:12 AM  
  
 
Opportunity for questions and clarification was provided. Patient transported with: 
 [facility]

## 2019-09-29 NOTE — ED TRIAGE NOTES
Patient presents from home with complaints of vomiting and diarrhea. Patient reports the diarrhea started last Sunday and the vomiting started Tuesday and he has had about 3 episode of emesis since then, including this morning     Patient reports feeling dizzy and increased generalized weakness    Patient reports after an episode of emesis this morning he fell. Patient denies LOC and hitting head.

## 2019-09-30 LAB
ANION GAP SERPL CALC-SCNC: 5 MMOL/L (ref 5–15)
ANION GAP SERPL CALC-SCNC: 8 MMOL/L (ref 5–15)
ARTERIAL PATENCY WRIST A: ABNORMAL
BASE DEFICIT BLDV-SCNC: 7 MMOL/L
BDY SITE: ABNORMAL
BUN SERPL-MCNC: 26 MG/DL (ref 6–20)
BUN SERPL-MCNC: 30 MG/DL (ref 6–20)
BUN/CREAT SERPL: 23 (ref 12–20)
BUN/CREAT SERPL: 31 (ref 12–20)
C DIFF GDH STL QL: NEGATIVE
C DIFF TOX A+B STL QL IA: NEGATIVE
CALCIUM SERPL-MCNC: 6.2 MG/DL (ref 8.5–10.1)
CALCIUM SERPL-MCNC: 9 MG/DL (ref 8.5–10.1)
CHLORIDE SERPL-SCNC: 117 MMOL/L (ref 97–108)
CHLORIDE SERPL-SCNC: 126 MMOL/L (ref 97–108)
CO2 SERPL-SCNC: 12 MMOL/L (ref 21–32)
CO2 SERPL-SCNC: 14 MMOL/L (ref 21–32)
CREAT SERPL-MCNC: 0.85 MG/DL (ref 0.7–1.3)
CREAT SERPL-MCNC: 1.32 MG/DL (ref 0.7–1.3)
GAS FLOW.O2 O2 DELIVERY SYS: ABNORMAL L/MIN
GLUCOSE SERPL-MCNC: 76 MG/DL (ref 65–100)
GLUCOSE SERPL-MCNC: 89 MG/DL (ref 65–100)
HCO3 BLDV-SCNC: 20 MMOL/L (ref 23–28)
INTERPRETATION: NORMAL
MAGNESIUM SERPL-MCNC: 0.9 MG/DL (ref 1.6–2.4)
MAGNESIUM SERPL-MCNC: 1.3 MG/DL (ref 1.6–2.4)
PCO2 BLDV: 41.3 MMHG (ref 41–51)
PH BLDV: 7.29 [PH] (ref 7.32–7.42)
PO2 BLDV: 25 MMHG (ref 25–40)
POTASSIUM SERPL-SCNC: 3.2 MMOL/L (ref 3.5–5.1)
POTASSIUM SERPL-SCNC: 4.5 MMOL/L (ref 3.5–5.1)
SAO2 % BLDV: 38 % (ref 65–88)
SODIUM SERPL-SCNC: 139 MMOL/L (ref 136–145)
SODIUM SERPL-SCNC: 143 MMOL/L (ref 136–145)
SPECIMEN TYPE: ABNORMAL
TOTAL RESP. RATE, ITRR: 18

## 2019-09-30 PROCEDURE — 83735 ASSAY OF MAGNESIUM: CPT

## 2019-09-30 PROCEDURE — 36415 COLL VENOUS BLD VENIPUNCTURE: CPT

## 2019-09-30 PROCEDURE — 82803 BLOOD GASES ANY COMBINATION: CPT

## 2019-09-30 PROCEDURE — 74011250637 HC RX REV CODE- 250/637: Performed by: HOSPITALIST

## 2019-09-30 PROCEDURE — 74011250636 HC RX REV CODE- 250/636: Performed by: HOSPITALIST

## 2019-09-30 PROCEDURE — 74011000250 HC RX REV CODE- 250: Performed by: HOSPITALIST

## 2019-09-30 PROCEDURE — C9113 INJ PANTOPRAZOLE SODIUM, VIA: HCPCS | Performed by: HOSPITALIST

## 2019-09-30 PROCEDURE — 80048 BASIC METABOLIC PNL TOTAL CA: CPT

## 2019-09-30 PROCEDURE — 99232 SBSQ HOSP IP/OBS MODERATE 35: CPT | Performed by: INTERNAL MEDICINE

## 2019-09-30 PROCEDURE — 74011250637 HC RX REV CODE- 250/637: Performed by: NURSE PRACTITIONER

## 2019-09-30 PROCEDURE — 65660000000 HC RM CCU STEPDOWN

## 2019-09-30 RX ORDER — FLUTICASONE PROPIONATE 50 MCG
2 SPRAY, SUSPENSION (ML) NASAL
Status: DISCONTINUED | OUTPATIENT
Start: 2019-09-30 | End: 2019-10-02 | Stop reason: HOSPADM

## 2019-09-30 RX ORDER — POTASSIUM CHLORIDE 750 MG/1
40 TABLET, FILM COATED, EXTENDED RELEASE ORAL
Status: COMPLETED | OUTPATIENT
Start: 2019-09-30 | End: 2019-09-30

## 2019-09-30 RX ORDER — LANOLIN ALCOHOL/MO/W.PET/CERES
3 CREAM (GRAM) TOPICAL
Status: DISCONTINUED | OUTPATIENT
Start: 2019-09-30 | End: 2019-10-02 | Stop reason: HOSPADM

## 2019-09-30 RX ORDER — ONDANSETRON 2 MG/ML
4 INJECTION INTRAMUSCULAR; INTRAVENOUS
Status: DISCONTINUED | OUTPATIENT
Start: 2019-09-30 | End: 2019-10-02 | Stop reason: HOSPADM

## 2019-09-30 RX ORDER — GUAIFENESIN/DEXTROMETHORPHAN 100-10MG/5
10 SYRUP ORAL
Status: DISCONTINUED | OUTPATIENT
Start: 2019-09-30 | End: 2019-10-02 | Stop reason: HOSPADM

## 2019-09-30 RX ORDER — CALCIUM CARBONATE 500(1250)
1000 TABLET ORAL
Status: DISCONTINUED | OUTPATIENT
Start: 2019-09-30 | End: 2019-10-02 | Stop reason: HOSPADM

## 2019-09-30 RX ORDER — SIMETHICONE 80 MG
80 TABLET,CHEWABLE ORAL
Status: DISCONTINUED | OUTPATIENT
Start: 2019-09-30 | End: 2019-10-02 | Stop reason: HOSPADM

## 2019-09-30 RX ORDER — ACETAMINOPHEN 10 MG/ML
500 INJECTION, SOLUTION INTRAVENOUS ONCE
Status: COMPLETED | OUTPATIENT
Start: 2019-09-30 | End: 2019-09-30

## 2019-09-30 RX ADMIN — ACETAMINOPHEN 500 MG: 10 INJECTION, SOLUTION INTRAVENOUS at 19:10

## 2019-09-30 RX ADMIN — ALLOPURINOL 100 MG: 100 TABLET ORAL at 09:40

## 2019-09-30 RX ADMIN — FLUTICASONE PROPIONATE 2 SPRAY: 50 SPRAY, METERED NASAL at 03:43

## 2019-09-30 RX ADMIN — APIXABAN 5 MG: 5 TABLET, FILM COATED ORAL at 19:10

## 2019-09-30 RX ADMIN — AMIODARONE HYDROCHLORIDE 200 MG: 200 TABLET ORAL at 09:39

## 2019-09-30 RX ADMIN — ONDANSETRON 4 MG: 2 INJECTION INTRAMUSCULAR; INTRAVENOUS at 14:26

## 2019-09-30 RX ADMIN — SODIUM CHLORIDE 40 MG: 9 INJECTION INTRAMUSCULAR; INTRAVENOUS; SUBCUTANEOUS at 20:16

## 2019-09-30 RX ADMIN — SODIUM CHLORIDE, SODIUM LACTATE, POTASSIUM CHLORIDE, AND CALCIUM CHLORIDE 100 ML/HR: 600; 310; 30; 20 INJECTION, SOLUTION INTRAVENOUS at 01:08

## 2019-09-30 RX ADMIN — APIXABAN 5 MG: 5 TABLET, FILM COATED ORAL at 11:02

## 2019-09-30 RX ADMIN — Medication 1000 MG: at 14:11

## 2019-09-30 RX ADMIN — SODIUM CHLORIDE 40 MG: 9 INJECTION INTRAMUSCULAR; INTRAVENOUS; SUBCUTANEOUS at 16:36

## 2019-09-30 RX ADMIN — SIMETHICONE CHEW TAB 80 MG 80 MG: 80 TABLET ORAL at 14:11

## 2019-09-30 RX ADMIN — POTASSIUM CHLORIDE 40 MEQ: 750 TABLET, FILM COATED, EXTENDED RELEASE ORAL at 14:11

## 2019-09-30 RX ADMIN — DULOXETINE HYDROCHLORIDE 60 MG: 60 CAPSULE, DELAYED RELEASE ORAL at 09:40

## 2019-09-30 RX ADMIN — FLUTICASONE PROPIONATE 2 SPRAY: 50 SPRAY, METERED NASAL at 21:58

## 2019-09-30 RX ADMIN — TAMSULOSIN HYDROCHLORIDE 0.4 MG: 0.4 CAPSULE ORAL at 09:40

## 2019-09-30 RX ADMIN — Medication 1000 MG: at 16:36

## 2019-09-30 RX ADMIN — ATORVASTATIN CALCIUM 40 MG: 20 TABLET, FILM COATED ORAL at 21:58

## 2019-09-30 RX ADMIN — GUAIFENESIN AND DEXTROMETHORPHAN 10 ML: 100; 10 SYRUP ORAL at 03:43

## 2019-09-30 RX ADMIN — ASPIRIN 81 MG CHEWABLE TABLET 81 MG: 81 TABLET CHEWABLE at 09:40

## 2019-09-30 RX ADMIN — MONTELUKAST 10 MG: 10 TABLET, FILM COATED ORAL at 21:58

## 2019-09-30 NOTE — PROGRESS NOTES
1145:   Notified by lab, [facility], of critical lab values CO2 12 and Ca 6.2. Called and spoke to Dr. Joceline Spangler. No labs given at this time. 1419     Requested at bedside by pt's daughter. C/o pt have episode of emesis. Called Dr. Joceline Spangler. Awaiting call back.

## 2019-09-30 NOTE — PROGRESS NOTES
Bedside shift change report given to [address] (oncoming nurse) by Muriel Solo (offgoing nurse). Report included the following information SBAR, Kardex, Intake/Output, MAR and Cardiac Rhythm NSR.

## 2019-09-30 NOTE — PROGRESS NOTES
SABRINA PLAN:    1. Patient will continue care with [facility]. 2. S/P LVAD 2016, explant    3. To be discharged home with wife and family. Daughter is a nurse. 4. Follow up with PCP/Specialists      Reason for Admission:  KLEVER, Diarrhea, vomiting, Fall                   RRAT Score:   18               Do you (patient/family) have any concerns for transition/discharge? No                Plan for utilizing home health: TBD      Current Advanced Directive/Advance Care Plan:  ACP is in the chart            Transition of Care Plan:  Patient has a medical hx of CAD, CHF, hypertension, LVAD which has since been removed, and chronic pain. CM met with patient and daughter Juanita Contreras [phone] (nurse) to discuss discharge planning. Patient lives with his wife in their apartment. He is on disability and independent. He recently ordered a rollator. Patient saw his PCP last month and uses his local [facility] pharmacy for prescriptions. His wife will provide transportation home and he did not voice any discharge barriers. CM will follow as needed. Mayra Champion MSA, RN, CRM  Care Management Interventions  PCP Verified by CM: Yes  Palliative Care Criteria Met (RRAT>21 & CHF Dx)?: No  Mode of Transport at Discharge:  Other (see comment)(Private car)  Transition of Care Consult (CM Consult): Discharge Planning  MyChart Signup: No  Discharge Durable Medical Equipment: No  Health Maintenance Reviewed: Yes  Physical Therapy Consult: No  Occupational Therapy Consult: No  Speech Therapy Consult: No  Current Support Network: Lives with Spouse  Confirm Follow Up Transport: Family  Plan discussed with Pt/Family/Caregiver: Yes  Discharge Location  Discharge Placement: Home with family assistance

## 2019-09-30 NOTE — PROGRESS NOTES
Advanced Heart Failure Progress Note      DOS:  9/30/2019  REFERRING PROVIDER: Sharmaine Rivres MD  PRIMARY CARE PHYSICIAN: Pawel Ortiz MD  PRIMARY CARDIOLOGIST:  Guanako Wright MD   EP: Darrian Morel MD   PULMONARY: Joey Pickard MD     24Hr Events  Admitted to [facility] for N/V/Dizziness  IVF resuscitation  GDMT held due to KLEVER    IMPRESSION/PLAN:   ICM s/p HMII as BTT on 12/1/16 and explant, NYHA Class II, LVEF 36-40%      Hold entresto d/t volume depletion    Hold Coreg to 6.25 mg BID   IV fluid resuscitation    No aldosterone antagonist d/t hyperkalemia   ATTR- negative   Will resume GDMT when able, can do as OP   Hopefully home tomorrow     Gastroenteritis   Enteric precautions   IV fluid resuscitation   Stool studies pending       CAD s/p CABG (SVG to RCA and LIMA to LAD) on 12/1/16 s/p BMS x2 -> SVG-RCA graft - no angina              Continue ASA, BB, statin    Mitral Regurgitation, severe - improved to mild   Will follow on serial echo    Hyperkalemia   On Veltassa    Chronic Left Pleural Effusion - s/p left thoracentesis on 7/5/2019   Encourage use of IS     High Risk of SCD - s/p SQ AICD (5/20/19)   No shocks       PAD - difficult femoral access with LVAD explant              No symptoms of claudication      Chronic Anticoagulation s/p LVAD explant              Continue eliquis 5 mg  twice daily    Hyperkalemia K 5.0    Continue veltessa    Continue low K+ diet     Gout              Continue allopurinol 100 mg daily   Denies recent gouty attacks    Chronic Lower back pain              On hydrocodone, flexeril   Hasn't taken since ICD implant   Back pain improved with increased activity- going to gym    H/o tobacco abuse   Abstaining from nicotine      H/o alcohol abuse   Currently abstaining   Encouraged complete abstinence      Seizure disorder - early 25s              No recent seizure activity     Peripheral neuropathy              On cymbalta   ATTR- Negative     Prevention              Influenza annually Pneumonia vaccine every 5 years       Chief Complaint   Patient presents with    Fall    Diarrhea         HPI: Gustavo Christensen is a 59y.o. year old male  with a history of HFrEF in the setting of ICM s/p LVAD and recent LVAD explant complicated by PVD,  remote tobacco use and alcohol abuse (quit 11/2016) who presents for follow up of his HFrEF. Mr. Epifanio Diaz presented 11/22/2016 for decompressive laminectomy at A3-W1 complicated by myocardial infarction. [facility](11/25/16) revealed severe left main and 3 vessel disease along with a 60% right common iliac stenosis and  LVEF was 10%. He subsequently underwent placement of an IABP followed by placement of a HeartMate2 LVAD on 12/1/2016 with concomitant CABG (SVG to the RCA, LIMA to LAD). His postoperative course was complicated by RV failure and an ileus requiring TPN resulting and a transaminitis. He had multiple episodes of Torsade on 12/4/2016 prompting repeat catheterization revealing an occluded RCA vein graft. Two BMS were placed in the RCA graft. He also had SVT requiring cardioversion. He had a single lead ICD placed on 45/51/87 complicated by a hematoma. The ICD was later removed on 1/20/17. Following his LVAD surgery he did require inpatient rehab.      Mr. Epifanio Diaz was listed for heart transplantation at [facility]. He was called in for transplantation on 7/14/2018 but on pre-op DELLA was noted to have normal LV function and the transplant surgery was aborted. He subsequently was admitted on 9/5/2018 for LVAD explant- his post-operative course was complicated by pneumonia and bilateral pleural effusions. He was discharged on home O2 which he discontinued on his own.       After device explant, his subsequent echocardiograms showed deterioration of LVEF and worsening mitral regurgitation from 40-45% with moderate to severe MR on 9/19/18 to 35-40% with moderate-to-severe MR on 10/15/18.   He has been removed from the transplant list at [facility] and is not interested in pursuing transplant evaluation at another center at this time. His entresto was discontinued at the time of his AICD implant due to hypotension. He subsequently took diovan but felt worse. He is tolerating the entresto with no dizziness, presyncope, or syncope. He was seen by Dr. Leslie Zavala, who stated that his mitral regurgitation has decreased significantly with optimizing his HF medications. He was admitted to [facility] following an episode of gastroenteritis and presyncope. He and his wife traveled to [place] recently. He developed nausea, vomiting, and diarrhea shortly after returning home. His wife did not develop gastroenteritis. He denies blood or mucus in his stool. He felt dizziness and experienced an episode of presyncope, hitting his head. He denies LOC. CARDIAC EVALUATION  TTE 19: LVEF 32%, LVAD plug at LV apex, grade 2 diastolic dysfunction, mild-mod MR, mild TR, PAPs 43 mmHg, mild LAE  DELLA 2018 - normal LV function and the transplant surgery was aborted. He subsequently was admitted on 2018 for LVAD explant  ECHO 18:LVEF 40-45% mod-severe MR   ECHO 10/15/18:EF 35-40%, mod-severe MR   ECHO 10/31/18: TDS, EF 30-35%, reduced RVEF, TAPSE 1.6, LAE, mild- mod MR/TR  ECHO 19: EF 25-30%, question of thrombus in apex. Mild- mod TR. Consider cardiac MR to exclude left ventricular thrombus. ECHO 2019: EF 16-20%, mod MR, mod-severe TR, severe pulm HTN (PA systolic pressure 85 mmHg), mildly reduced RV systolic function (TAPSE 0.85 cm)  ECHO 19: EF 20-25%, moderately reduced RV systolic function, mild MR    CATH 16: severe LM, and 3 VD with 60% right common iliac stenosis. EF: 10%. ----S/p IABP   ----S/p HeartMate2 LVAD 2016   S/p CAB2016: LIMA-> LAD, SVG-> RCA     CATH 16 following torsades SVG-> RCA TO   ---- s/p BMS x 2-.  RCA  RHC 2017:RA: 15/15 14, RV:  14,PA: , m 20, PCWP 13, PVR 1.67 CO 5.67, CI 3    S/p LVAD explant 18 ([facility])    SVT s/p DCCV  AICD 12/116/16 single lead cx by hematoma  AICD explanted 1/20/17    EKG:    10/31/18 NSR QRS 88ms   ms  6/11/19: SR rate 68bpm QRS 94ms Qtc 438 ms    Review of Systems:     General:  Negative   HEENT: Denies epistaxis, angioedema   Pulmonary: Denies  wheeze, hemoptysis  Cardiac: Denies exertional chest pain, palpitations, PND, edema, syncope; positive for dizziness and near syncope  GI:  Positive for nausea, vomiting, diarrhea  Musculo: Positive for chronic back pain for which he takes prn flexril and norco   Neuro: Denies  TIA/CVA sx   Skin:  Denies rash   Allergies: Reviewed   Psych: Denies depression or anxiety       History:  Past Medical History:   Diagnosis Date    Arrhythmia     SVT    Arthritis     CAD (coronary artery disease)     Chronic pain     back    [address]estive heart failure ([address]     DSOUZA (dyspnea on exertion) 1/30/2019    Gout     Heart failure ([address]     Hypertension     ICD (implantable cardioverter-defibrillator) in place     Long term current use of anticoagulant therapy     LVAD (left ventricular assist device) present ([address] 12/01/2016    Myocardial infarction ([address]     Pacemaker     SubQ ICD    Raynaud disease     Seizures ([address]     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     Past Surgical History:   Procedure Laterality Date    CABG, ARTERY-VEIN, TWO  12/01/2016    CARDIAC SURG PROCEDURE UNLIST  12/01/2016    LVAD    HX CORONARY ARTERY BYPASS GRAFT      HX CORONARY STENT PLACEMENT      HX HEART CATHETERIZATION      HX HEENT      wisdom teeth removed    HX ORTHOPAEDIC  11/22/2016    laminectomy    HX PACEMAKER      ICD - removed 1/2017    HX PTCA      FL INSJ OF SUBQ IMPLANTABLE DEFIBRILLATOR ELECTRODE N/A 5/20/2019    INSERT SUBQ ICD w/ anesthesia performed by Abhijeet Flores MD at [facility] CATH LAB     Social History     Socioeconomic History    Marital status:      Spouse name: Shreya Whitaker    Number of children: 2    Years of education: Not on file    Highest education level: Some college, no degree   Occupational History    Not on file   Social Needs    Financial resource strain: Not hard at all   Will insecurity:     Worry: Never true     Inability: Never true   Unified Social needs:     Medical: No     Non-medical: No   Tobacco Use    Smoking status: Former Smoker     Packs/day: 1.50     Years: 30.00     Pack years: 45.00     Last attempt to quit:      Years since quittin.7    Smokeless tobacco: Never Used   Substance and Sexual Activity    Alcohol use: No    Drug use: No    Sexual activity: Yes   Lifestyle    Physical activity:     Days per week: Not on file     Minutes per session: Not on file    Stress: Not on file   Relationships    Social connections:     Talks on phone: Not on file     Gets together: Not on file     Attends Orthodoxy service: Not on file     Active member of club or organization: Not on file     Attends meetings of clubs or organizations: Not on file     Relationship status: Not on file    Intimate partner violence:     Fear of current or ex partner: Not on file     Emotionally abused: Not on file     Physically abused: Not on file     Forced sexual activity: Not on file   Other Topics Concern     Service Not Asked    Blood Transfusions Not Asked    Caffeine Concern Not Asked    Occupational Exposure Not Asked   Colie Maizes Hazards Not Asked    Sleep Concern Not Asked    Stress Concern Not Asked    Weight Concern Not Asked    Special Diet Not Asked    Back Care Not Asked    Exercise Not Asked    Bike Helmet Not Asked    [address] Not Asked    Self-Exams Not Asked   Social History Narrative    Not on file     Family History   Problem Relation Age of Onset    Diabetes Mother     Dementia Mother     Other Mother         carotid artery blockage    Heart Disease Father     Stroke Father     Heart Attack Father         several    Hypertension Father     Elevated Lipids Father     No Known Problems Sister     Cancer Brother         brain    Lung Disease Sister     COPD Sister     Cancer Sister         lung       Current Medications:   Current Facility-Administered Medications   Medication Dose Route Frequency Provider Last Rate Last Dose    fluticasone propionate (FLONASE) 50 mcg/actuation nasal spray 2 Spray  2 Spray Both Nostrils DAILY PRN Erik Lewis, MARY   2 Spray at 09/30/19 0343    guaiFENesin-dextromethorphan (ROBITUSSIN DM) 100-10 mg/5 mL syrup 10 mL  10 mL Oral Q6H PRN Erik Lewis NP   10 mL at 09/30/19 0343    melatonin tablet 3 mg  3 mg Oral QHS PRN Erik Lewis, NP        amiodarone (CORDARONE) tablet 200 mg  200 mg Oral DAILY Reina Polanco MD   200 mg at 09/30/19 2084    apixaban (ELIQUIS) tablet 5 mg  5 mg Oral BID Reina Polanco MD   5 mg at 09/30/19 1102    aspirin chewable tablet 81 mg  81 mg Oral DAILY Reina Polanco MD   81 mg at 09/30/19 0940    atorvastatin (LIPITOR) tablet 40 mg  40 mg Oral QHS Reina Polanco MD   40 mg at 09/29/19 2106    montelukast (SINGULAIR) tablet 10 mg  10 mg Oral QHS Reina Polanco MD   10 mg at 09/29/19 2106    tamsulosin (FLOMAX) capsule 0.4 mg  0.4 mg Oral DAILY Reian Polanco MD   0.4 mg at 09/30/19 0940    patiromer calcium sorbitex (VELTASSA) powder 16.8 g  16.8 g Oral DAILY Reina Polanco MD   16.8 g at 09/29/19 1418    allopurinol (ZYLOPRIM) tablet 100 mg  100 mg Oral DAILY Reina Polanco MD   100 mg at 09/30/19 0940    DULoxetine (CYMBALTA) capsule 60 mg  60 mg Oral DAILY Reina Polanco MD   60 mg at 09/30/19 0940    influenza vaccine 2019-20 (6 mos+)(PF) (FLUARIX/FLULAVAL/FLUZONE QUAD) injection 0.5 mL  0.5 mL IntraMUSCular PRIOR TO DISCHARGE Reina Polanco MD           Allergies: Allergies   Allergen Reactions    Morphine Other (comments)     Hallucinations. Confusion.  Impaired judgement    Chlorhexidine Rash           Physical Exam:   Vitals: Visit Vitals  /70 (BP 1 Location: Right arm, BP Patient Position: At rest)   Pulse 67   Temp 97.9 °F (36.6 °C)   Resp 18   Ht 5' 8\" (1.727 m)   Wt 166 lb 10.7 oz (75.6 kg)   SpO2 98%   BMI 25.34 kg/m²        General:  Well developed WM, AAOx3, pleasant,  cooperative, no acute distress. HEENT:  Atraumatic. Pink and moist.  Anicteric sclerae. Neck:   Supple, no adenopoathy. Lungs:  Clear to auscultation, No wheezing/rhonchi/rales. Heart:   PMI: Median sternotomy scar,  Regular rhythm, S1, S2 present, 1/6 systolic murmur, no rubs, no gallops. JVD 8 cm (-) HJR . No carotid bruits. Abdomen:  Soft, non-distended, non-tender. hyperactive Bowel sounds. No bruits. Extremities:  Venous stasis changes, no clubbing, cyanosis, or edema. No calf tenderness  Neurologic:  Grossly intact. Alert and oriented X 3. No acute neurological distress. Skin:   intact, no wounds, ecchymosis, bruising, rashes   Psych:  Good insight. Not anxious nor agitated.     Recent Labs:     Lab Results   Component Value Date/Time    WBC 9.0 09/29/2019 10:05 AM    HGB 12.0 (L) 09/29/2019 10:05 AM    HCT 38.9 09/29/2019 10:05 AM    PLATELET 178 83/95/5581 10:05 AM    MCV 93.5 09/29/2019 10:05 AM     Lab Results   Component Value Date/Time    GFR est non-AA >60 09/30/2019 11:01 AM    GFR est AA >60 09/30/2019 11:01 AM    Creatinine 0.85 09/30/2019 11:01 AM    BUN 26 (H) 09/30/2019 11:01 AM    Sodium 143 09/30/2019 11:01 AM    Potassium 3.2 (L) 09/30/2019 11:01 AM    Chloride 126 (H) 09/30/2019 11:01 AM    CO2 12 (LL) 09/30/2019 11:01 AM    Magnesium 1.7 09/29/2019 10:05 AM    Phosphorus 2.6 09/29/2019 10:05 AM     Lab Results   Component Value Date/Time    TSH 3.790 06/12/2019 03:05 PM    T4, Free 1.68 06/12/2019 03:05 PM      Lab Results   Component Value Date/Time    Sodium 143 09/30/2019 11:01 AM    Potassium 3.2 (L) 09/30/2019 11:01 AM    Chloride 126 (H) 09/30/2019 11:01 AM    CO2 12 (LL) 09/30/2019 11:01 AM    Anion gap 5 09/30/2019 11:01 AM    Glucose 76 09/30/2019 11:01 AM    BUN 26 (H) 09/30/2019 11:01 AM    Creatinine 0.85 09/30/2019 11:01 AM    BUN/Creatinine ratio 31 (H) 09/30/2019 11:01 AM    GFR est AA >60 09/30/2019 11:01 AM    GFR est non-AA >60 09/30/2019 11:01 AM    Calcium 6.2 (LL) 09/30/2019 11:01 AM    Bilirubin, total 0.5 09/29/2019 10:05 AM    ALT (SGPT) 35 09/29/2019 10:05 AM    AST (SGOT) 26 09/29/2019 10:05 AM    Alk. phosphatase 114 09/29/2019 10:05 AM    Protein, total 7.9 09/29/2019 10:05 AM    Albumin 4.0 09/29/2019 10:05 AM    Globulin 3.9 09/29/2019 10:05 AM    A-G Ratio 1.0 (L) 09/29/2019 10:05 AM      Lab Results   Component Value Date/Time    Hemoglobin A1c 5.9 05/22/2019 02:56 AM       Thank you for letting us see him with you,      Val Johnson, NP  [address]  Office [phone]  Fax [phone]    [facility] ATTENDING ADDENDUM    Patient was seen and examined in person. Data and notes were reviewed. I have discussed and agree with the plan as noted in the NP note above without further additions.     Alberto Sandhu MD PhD  [address] [facility]

## 2019-09-30 NOTE — PROGRESS NOTES
Bedside verbal shift change report given to oncoming nurse Lindajean Rubinstein, R.N. Opportunity for questions and clarifications provided.

## 2019-09-30 NOTE — PROGRESS NOTES
Problem: Risk for Spread of Infection  Goal: Prevent transmission of infectious organism to others  Description  Prevent the transmission of infectious organisms to other patients, staff members, and visitors. Outcome: Progressing Towards Goal     Problem: Patient Education:  Go to Education Activity  Goal: Patient/Family Education  Outcome: Progressing Towards Goal     Problem: Falls - Risk of  Goal: *Absence of Falls  Description  Document Azul Ruts Fall Risk and appropriate interventions in the flowsheet.   Outcome: Progressing Towards Goal  Note:   Fall Risk Interventions:            Medication Interventions: Teach patient to arise slowly         History of Falls Interventions: Room close to nurse's station, Door open when patient unattended         Problem: Patient Education: Go to Patient Education Activity  Goal: Patient/Family Education  Outcome: Progressing Towards Goal

## 2019-09-30 NOTE — H&P
History and Physical  Primary Care Provider: Pawel Ortiz MD    Subjective:     Volodymyr Villarreal is a 59 y.o. male with PMH of systolic heart failure, h/o LVAD placement which was removed after EF has improved,CAD,Arrythymias and other medical issues came to the hospital with a cc of diarrhea, dizziness. Patient states that he was having diarrhea since 6 days  about 3-4 times /day and had intermittent episodes of vomiting. He denied any fever,chills,abdominal pain,chest pain,SOB. has chronic cough. Denied any blood in stool. Per wife,they went to [facility] and diarrhea started after 2 days into their trip.they returned last thursday. Patient continued to take all his prescribed medications without any changes. No other family or friends were sick. he denied use of abx recently    His diarrhea was better yesterday and last night,when he went to the bathroom -he had lightheadedness -fell on the floor -did not lose consciousness but said he might have hit his head. This morning,he felt weak and came to the hospital.      In the ER,he was found to have KLEVER with metabolic acidosis -hospitalist was consulted for admission   Review of Systems:    A comprehensive review of systems was negative except for that written in the History of Present Illness.      Past Medical History:   Diagnosis Date    Arrhythmia     SVT    Arthritis     CAD (coronary artery disease)     Chronic pain     back    Congestive heart failure (HCC)     DSOUZA (dyspnea on exertion) 1/30/2019    Gout     Heart failure ([address]     Hypertension     ICD (implantable cardioverter-defibrillator) in place     Long term current use of anticoagulant therapy     LVAD (left ventricular assist device) present ([address] 12/01/2016    Myocardial infarction ([address]     Pacemaker     SubQ ICD    Raynaud disease     Seizures ([address]     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      Past Surgical History:   Procedure Laterality Date    CABG, ARTERY-VEIN, TWO  12/01/2016    CARDIAC SURG PROCEDURE UNLIST  12/01/2016    LVAD    HX CORONARY ARTERY BYPASS GRAFT      HX CORONARY STENT PLACEMENT      HX HEART CATHETERIZATION      HX HEENT      wisdom teeth removed    HX ORTHOPAEDIC  11/22/2016    laminectomy    HX PACEMAKER      ICD - removed 1/2017    HX PTCA      NM INSJ OF SUBQ IMPLANTABLE DEFIBRILLATOR ELECTRODE N/A 5/20/2019    INSERT SUBQ ICD w/ anesthesia performed by Roma De Oliveira MD at [facility] CATH LAB     Prior to Admission medications    Medication Sig Start Date End Date Taking? Authorizing Provider   furosemide (LASIX) 20 mg tablet Take 20 mg by mouth every other day. 8/27/19  Yes Provider, Historical   triamcinolone acetonide (KENALOG) 0.025 % topical cream Apply 0.025 Tubes to affected area as needed. 4/5/17  Yes Provider, Historical   carvedilol (COREG) 6.25 mg tablet Take 1 Tab by mouth two (2) times daily (with meals). 9/5/19  Yes Luis Weems MD   amiodarone (CORDARONE) 200 mg tablet Take 1 Tab by mouth daily. 8/16/19  Yes Roma De Oliveira MD   patiromer calcium sorbitex (VELTASSA) 8.4 gram powder Take 16.8 g by mouth daily. 8/9/19  Yes Luis Weems MD   sacubitril-valsartan (ENTRESTO) 97 mg/103 mg tablet Take 1 Tab by mouth two (2) times a day. 8/9/19  Yes Luis Weems MD   tadalafil (CIALIS) 20 mg tablet 20 mg as needed. 6/20/19  Yes Provider, Historical   atorvastatin (LIPITOR) 40 mg tablet TAKE 1 TABLET BY MOUTH EVERY DAY 6/21/19  Yes Luis Weems MD   apixaban (ELIQUIS) 5 mg tablet Take 1 Tab by mouth two (2) times a day.  5/24/19  Yes Luis Weems MD   levalbuterol tartrate (XOPENEX) 45 mcg/actuation inhaler INHALE 2 PUFFS EVERY 4 HOURS AS NEEDED 5/8/19  Yes Provider, Historical   montelukast (SINGULAIR) 10 mg tablet TAKE 1 TABLET BY MOUTH ONCE A DAY 5/7/19  Yes Provider, Historical   HYDROcodone-acetaminophen (NORCO) 5-325 mg per tablet TAKE 1 TABLET EVERY 6 HOURS AS NEEDED 11/2/18  Yes Provider, Historical   DULoxetine (CYMBALTA) 60 mg capsule Take 60 mg by mouth daily. Yes Provider, Historical   allopurinol (ZYLOPRIM) 100 mg tablet Take 100 mg by mouth daily. Yes Provider, Historical   tamsulosin (FLOMAX) 0.4 mg capsule Take 0.4 mg by mouth daily. 10/15/18  Yes Provider, Historical   colchicine 0.6 mg tablet Take 0.6 mg by mouth every other day. Yes Provider, Historical   cyclobenzaprine (FLEXERIL) 5 mg tablet Take 5 mg by mouth daily as needed for Muscle Spasm(s). Yes Provider, Historical   aspirin 81 mg chewable tablet Take 81 mg by mouth daily. Yes Provider, Historical   ascorbic acid, vitamin C, (VITAMIN C) 500 mg tablet Take 1 Tab by mouth two (2) times a day. 2/2/17  Yes Purnima Reed NP     Allergies   Allergen Reactions    Morphine Other (comments)     Hallucinations. Confusion. Impaired judgement    Chlorhexidine Rash      Family History   Problem Relation Age of Onset    Diabetes Mother     Dementia Mother     Other Mother         carotid artery blockage    Heart Disease Father     Stroke Father     Heart Attack Father         several    Hypertension Father     Elevated Lipids Father     No Known Problems Sister     Cancer Brother         brain    Lung Disease Sister     COPD Sister     Cancer Sister         lung        SOCIAL HISTORY:  Patient resides at home.   Patient ambulates independently  Smoking history:former smoker,quit 10 yrs ago  Alcohol history: denied use of alcohol      Objective:       Physical Exam:   Gen:  Well-developed, well-nourished, in no acute distress  HEENT:   PERRL, EOMI,anicteric, no pallor,hearing intact to voice, moist mucous membranes,sinus non tender  Neck:  Supple, without masses, thyroid non-tender  Resp:  No accessory muscle use, clear breath sounds without wheezes rales or rhonchi  Card:  No murmurs, normal S1, S2 without thrills, bruits or peripheral edema  Abd:  Soft, non-tender, non-distended, normoactive bowel sounds are present,he has a hernia. Lymph:  No cervical adenopathy  Musc:  No cyanosis or clubbing  Skin:  No rashes or ulcers, skin turgor is good  Neuro:  alert and oriented X 3, no focal deficits  Psych:  Alert with good insight. Oriented to person, place, and time         Data Review: All diagnostic labs and studies have been reviewed. Ct Head Wo Cont    Result Date: 9/29/2019  IMPRESSION: No acute intracranial abnormality. Ct Abd Pelv Wo Cont    Result Date: 9/29/2019  IMPRESSION: 1. Increased small left pleural effusion since 2016. 2. Nonobstructing right nephrolithiasis. 3. Colonic diverticulosis without evidence of inflammation. Follow up diarrhea/dehydartion. Overnight no more diarrhea, tolerate diet. n  No sob. Assessment:     Active Problems:    KLEVER (acute kidney injury) ([facility] (9/29/2019)      Vomiting and diarrhea (9/29/2019)      #Nausea/vomiting and diarrhea:  -suspected gastroenteritis.  -check stool cultures, C diff   -As white count wnl, no fever - no indication for abx  -CT abdomen showed colonic diverticulosis but no inflammation  -Troponin and lactic acid wnl  -Advance diet. Will dc IVF due to his CHF. -vomiting again today and with persistent abd pain, ? Gas pain. -start PPI , zofran, not tolerate narcotics, give one dose iv tylenol  -consult GI     #KLEVER on CKD 2 with metabolic acidosis:  -base line around 1.4, cr at admission is 2.28. Now back to normal   -will d/c IVF. Metabolic acidosis: bicarb low 12 .   ? Due to GI lost and NS infusion. Start po calcium bicarb. Will check venous blood gas, may need iv biarb if severe acidemia.      Hypocalcemia: po calcium     #Presyncope:  -likely due to soft BP -dehydration with gastroenteritis and was also taking antihypertensives/diuretics(GDMT for CHF)  -IVF    ##Chronic systolic heart failure with ischemic cardiomyopathy:  - Recent echo showed 36-40%   -Hold off on guideline directed therapy meds for now  -cautious with IVF  -Advanced heart failure team consulted    #History of hyperkalemia:  -continue valtessa    # History of CAD s/p CABG and BMS:  -continue aspirin,statin. Hold beta blocker due to soft BP      #Chronic anticoagulation s/p LVAD explant -continue eliquis    #Chronic gout: allopurinol    #Chronic back pain on narcotics: hold oxycodone for now.     FUNCTIONAL STATUS PRIOR TO HOSPITALIZATION (including history of recent falls):independent    Signed By: Tiffany Bai MD     September 30, 2019

## 2019-10-01 LAB
ALBUMIN SERPL-MCNC: 3.6 G/DL (ref 3.5–5)
ALBUMIN/GLOB SERPL: 1 {RATIO} (ref 1.1–2.2)
ALP SERPL-CCNC: 108 U/L (ref 45–117)
ALT SERPL-CCNC: 25 U/L (ref 12–78)
ANION GAP SERPL CALC-SCNC: 8 MMOL/L (ref 5–15)
AST SERPL-CCNC: 17 U/L (ref 15–37)
BASOPHILS # BLD: 0 K/UL (ref 0–0.1)
BASOPHILS NFR BLD: 0 % (ref 0–1)
BILIRUB SERPL-MCNC: 0.5 MG/DL (ref 0.2–1)
BUN SERPL-MCNC: 27 MG/DL (ref 6–20)
BUN/CREAT SERPL: 24 (ref 12–20)
CALCIUM SERPL-MCNC: 9.3 MG/DL (ref 8.5–10.1)
CAMPYLOBACTER SPECIES, DNA: NEGATIVE
CHLORIDE SERPL-SCNC: 116 MMOL/L (ref 97–108)
CO2 SERPL-SCNC: 17 MMOL/L (ref 21–32)
CREAT SERPL-MCNC: 1.12 MG/DL (ref 0.7–1.3)
DIFFERENTIAL METHOD BLD: ABNORMAL
ENTEROTOXIGEN E COLI, DNA: NEGATIVE
EOSINOPHIL # BLD: 0.2 K/UL (ref 0–0.4)
EOSINOPHIL NFR BLD: 3 % (ref 0–7)
ERYTHROCYTE [DISTWIDTH] IN BLOOD BY AUTOMATED COUNT: 17 % (ref 11.5–14.5)
GLOBULIN SER CALC-MCNC: 3.5 G/DL (ref 2–4)
GLUCOSE SERPL-MCNC: 84 MG/DL (ref 65–100)
HCT VFR BLD AUTO: 36.9 % (ref 36.6–50.3)
HGB BLD-MCNC: 11.4 G/DL (ref 12.1–17)
IMM GRANULOCYTES # BLD AUTO: 0 K/UL
IMM GRANULOCYTES NFR BLD AUTO: 0 %
LIPASE SERPL-CCNC: 201 U/L (ref 73–393)
LYMPHOCYTES # BLD: 0.8 K/UL (ref 0.8–3.5)
LYMPHOCYTES NFR BLD: 13 % (ref 12–49)
MAGNESIUM SERPL-MCNC: 2 MG/DL (ref 1.6–2.4)
MCH RBC QN AUTO: 28.6 PG (ref 26–34)
MCHC RBC AUTO-ENTMCNC: 30.9 G/DL (ref 30–36.5)
MCV RBC AUTO: 92.7 FL (ref 80–99)
MONOCYTES # BLD: 0.7 K/UL (ref 0–1)
MONOCYTES NFR BLD: 11 % (ref 5–13)
NEUTS BAND NFR BLD MANUAL: 2 % (ref 0–6)
NEUTS SEG # BLD: 4.8 K/UL (ref 1.8–8)
NEUTS SEG NFR BLD: 71 % (ref 32–75)
NRBC # BLD: 0 K/UL (ref 0–0.01)
NRBC BLD-RTO: 0 PER 100 WBC
P SHIGELLOIDES DNA STL QL NAA+PROBE: NEGATIVE
PHOSPHATE SERPL-MCNC: 1.8 MG/DL (ref 2.6–4.7)
PLATELET # BLD AUTO: 235 K/UL (ref 150–400)
PMV BLD AUTO: 11 FL (ref 8.9–12.9)
POTASSIUM SERPL-SCNC: 4.2 MMOL/L (ref 3.5–5.1)
PROT SERPL-MCNC: 7.1 G/DL (ref 6.4–8.2)
RBC # BLD AUTO: 3.98 M/UL (ref 4.1–5.7)
RBC MORPH BLD: ABNORMAL
SALMONELLA SPECIES, DNA: NEGATIVE
SHIGA TOXIN PRODUCING, DNA: NEGATIVE
SHIGELLA SP+EIEC IPAH STL QL NAA+PROBE: NEGATIVE
SODIUM SERPL-SCNC: 141 MMOL/L (ref 136–145)
VIBRIO SPECIES, DNA: NEGATIVE
WBC # BLD AUTO: 6.5 K/UL (ref 4.1–11.1)
Y. ENTEROCOLITICA, DNA: NEGATIVE

## 2019-10-01 PROCEDURE — 74011250636 HC RX REV CODE- 250/636: Performed by: HOSPITALIST

## 2019-10-01 PROCEDURE — C9113 INJ PANTOPRAZOLE SODIUM, VIA: HCPCS | Performed by: HOSPITALIST

## 2019-10-01 PROCEDURE — 83993 ASSAY FOR CALPROTECTIN FECAL: CPT

## 2019-10-01 PROCEDURE — 85025 COMPLETE CBC W/AUTO DIFF WBC: CPT

## 2019-10-01 PROCEDURE — 90686 IIV4 VACC NO PRSV 0.5 ML IM: CPT | Performed by: HOSPITALIST

## 2019-10-01 PROCEDURE — 83520 IMMUNOASSAY QUANT NOS NONAB: CPT

## 2019-10-01 PROCEDURE — 80053 COMPREHEN METABOLIC PANEL: CPT

## 2019-10-01 PROCEDURE — 99232 SBSQ HOSP IP/OBS MODERATE 35: CPT | Performed by: INTERNAL MEDICINE

## 2019-10-01 PROCEDURE — 74011250637 HC RX REV CODE- 250/637: Performed by: HOSPITALIST

## 2019-10-01 PROCEDURE — 65660000000 HC RM CCU STEPDOWN

## 2019-10-01 PROCEDURE — 36415 COLL VENOUS BLD VENIPUNCTURE: CPT

## 2019-10-01 PROCEDURE — 74011000250 HC RX REV CODE- 250: Performed by: HOSPITALIST

## 2019-10-01 PROCEDURE — 74011250637 HC RX REV CODE- 250/637: Performed by: NURSE PRACTITIONER

## 2019-10-01 PROCEDURE — 74011250636 HC RX REV CODE- 250/636: Performed by: NURSE PRACTITIONER

## 2019-10-01 PROCEDURE — 84100 ASSAY OF PHOSPHORUS: CPT

## 2019-10-01 PROCEDURE — 90471 IMMUNIZATION ADMIN: CPT

## 2019-10-01 PROCEDURE — 83690 ASSAY OF LIPASE: CPT

## 2019-10-01 PROCEDURE — 83735 ASSAY OF MAGNESIUM: CPT

## 2019-10-01 RX ORDER — PANTOPRAZOLE SODIUM 40 MG/1
40 TABLET, DELAYED RELEASE ORAL DAILY
Status: DISCONTINUED | OUTPATIENT
Start: 2019-10-02 | End: 2019-10-02 | Stop reason: HOSPADM

## 2019-10-01 RX ORDER — DICYCLOMINE HYDROCHLORIDE 10 MG/1
10 CAPSULE ORAL 4 TIMES DAILY
Status: DISCONTINUED | OUTPATIENT
Start: 2019-10-01 | End: 2019-10-02 | Stop reason: HOSPADM

## 2019-10-01 RX ORDER — CARVEDILOL 6.25 MG/1
6.25 TABLET ORAL 2 TIMES DAILY WITH MEALS
Status: DISCONTINUED | OUTPATIENT
Start: 2019-10-01 | End: 2019-10-02 | Stop reason: HOSPADM

## 2019-10-01 RX ORDER — MAGNESIUM SULFATE HEPTAHYDRATE 40 MG/ML
2 INJECTION, SOLUTION INTRAVENOUS ONCE
Status: COMPLETED | OUTPATIENT
Start: 2019-10-01 | End: 2019-10-01

## 2019-10-01 RX ADMIN — ALLOPURINOL 100 MG: 100 TABLET ORAL at 08:31

## 2019-10-01 RX ADMIN — PATIROMER 16.8 G: 8.4 POWDER, FOR SUSPENSION ORAL at 11:25

## 2019-10-01 RX ADMIN — TAMSULOSIN HYDROCHLORIDE 0.4 MG: 0.4 CAPSULE ORAL at 08:31

## 2019-10-01 RX ADMIN — APIXABAN 5 MG: 5 TABLET, FILM COATED ORAL at 08:31

## 2019-10-01 RX ADMIN — MAGNESIUM SULFATE HEPTAHYDRATE 2 G: 40 INJECTION, SOLUTION INTRAVENOUS at 01:50

## 2019-10-01 RX ADMIN — ATORVASTATIN CALCIUM 40 MG: 20 TABLET, FILM COATED ORAL at 21:01

## 2019-10-01 RX ADMIN — Medication 1000 MG: at 17:50

## 2019-10-01 RX ADMIN — SODIUM CHLORIDE 40 MG: 9 INJECTION INTRAMUSCULAR; INTRAVENOUS; SUBCUTANEOUS at 08:31

## 2019-10-01 RX ADMIN — APIXABAN 5 MG: 5 TABLET, FILM COATED ORAL at 17:50

## 2019-10-01 RX ADMIN — MONTELUKAST 10 MG: 10 TABLET, FILM COATED ORAL at 21:01

## 2019-10-01 RX ADMIN — DULOXETINE HYDROCHLORIDE 60 MG: 60 CAPSULE, DELAYED RELEASE ORAL at 08:31

## 2019-10-01 RX ADMIN — ASPIRIN 81 MG CHEWABLE TABLET 81 MG: 81 TABLET CHEWABLE at 08:31

## 2019-10-01 RX ADMIN — DICYCLOMINE HYDROCHLORIDE 10 MG: 10 CAPSULE ORAL at 14:41

## 2019-10-01 RX ADMIN — DICYCLOMINE HYDROCHLORIDE 10 MG: 10 CAPSULE ORAL at 21:02

## 2019-10-01 RX ADMIN — Medication 1000 MG: at 08:31

## 2019-10-01 RX ADMIN — AMIODARONE HYDROCHLORIDE 200 MG: 200 TABLET ORAL at 08:31

## 2019-10-01 RX ADMIN — INFLUENZA VIRUS VACCINE 0.5 ML: 15; 15; 15; 15 SUSPENSION INTRAMUSCULAR at 14:50

## 2019-10-01 RX ADMIN — DICYCLOMINE HYDROCHLORIDE 10 MG: 10 CAPSULE ORAL at 17:50

## 2019-10-01 NOTE — PROGRESS NOTES
Bedside shift change report given to [address] (oncoming nurse) by Vernell Goldsmith (offgoing nurse). Report included the following information SBAR, Kardex, Intake/Output, MAR and Cardiac Rhythm NSR.

## 2019-10-01 NOTE — PROGRESS NOTES
Bedside and Verbal shift change report given to Edwin Johnson (oncoming nurse) by Chemo Lyn (offgoing nurse). Report included the following information SBAR, Kardex, Intake/Output, MAR, Recent Results and Cardiac Rhythm NSR.

## 2019-10-01 NOTE — PROGRESS NOTES
Bedside and Verbal shift change report given to Cailin Prince (oncoming nurse) by Desi Jovel (offgoing nurse). Report included the following information SBAR, Kardex, Intake/Output, MAR, Recent Results and Cardiac Rhythm NSR.

## 2019-10-01 NOTE — PROGRESS NOTES
Problem: Risk for Spread of Infection  Goal: Prevent transmission of infectious organism to others  Description  Prevent the transmission of infectious organisms to other patients, staff members, and visitors. Outcome: Progressing Towards Goal     Problem: Patient Education:  Go to Education Activity  Goal: Patient/Family Education  Outcome: Progressing Towards Goal     Problem: Falls - Risk of  Goal: *Absence of Falls  Description  Document Alexandro Sonya Fall Risk and appropriate interventions in the flowsheet. Outcome: Progressing Towards Goal  Note:   Fall Risk Interventions:            Medication Interventions: Teach patient to arise slowly         History of Falls Interventions: Room close to nurse's station, Door open when patient unattended         Problem: Patient Education: Go to Patient Education Activity  Goal: Patient/Family Education  Outcome: Progressing Towards Goal     Problem: Pressure Injury - Risk of  Goal: *Prevention of pressure injury  Description  Document Toi Scale and appropriate interventions in the flowsheet. Outcome: Progressing Towards Goal  Note:   Pressure Injury Interventions:             Activity Interventions: Increase time out of bed         Nutrition Interventions: Document food/fluid/supplement intake    Friction and Shear Interventions: Lift sheet, Minimize layers                Problem: Patient Education: Go to Patient Education Activity  Goal: Patient/Family Education  Outcome: Progressing Towards Goal

## 2019-10-01 NOTE — PROGRESS NOTES
Hospitalist Progress Note  Sarah Chun MD  Answering service: [phone] OR [phone] from in house phone      Date of Service:  10/1/2019  NAME:  Rupal Brandt                                                         :  1955                                               MRN:  [id number]    Admission summary   Patient presents from home with complaints of vomiting and diarrhea,also reports feeling dizzy and increased generalized weakness       Subjective/interval history    -No nausea or vomiting. Had 2 watery BM earlier   GI has seen him and have requested stool studies. Assessment and plan   #Nausea/vomiting and diarrhea:  -suspected gastroenteritis.  -Enteric pathogens,C diff negative.  -As white count wnl, no fever - no indication for abx  -CT abdomen showed colonic diverticulosis but no inflammation  -Troponin and lactic acid wnl  -Advance diet. PPI , zofran.consult GI on board.     #KLEVER on CKD 2 with metabolic acidosis:  -base line around 1.4, cr at admission is 2.28. Now back to normal        Metabolic acidosis: bicarb low 12 .   -improving     Hypocalcemia: po calcium      #Presyncope:  -likely due to soft BP -dehydration with gastroenteritis and was also taking antihypertensives/diuretics(GDMT for CHF)       ##Chronic systolic heart failure with ischemic cardiomyopathy:  - Recent echo showed 36-40%   -Advanced heart failure team consulted: hold enteresto,resumed coreg    -Reassess as out patient for enteresto resumption. #History of hyperkalemia:  -continue valtessa     # History of CAD s/p CABG and BMS:  -continue aspirin,statin,coreg        #Chronic anticoagulation s/p LVAD explant -continue eliquis     #Chronic gout: allopurinol     #Chronic back pain on narcotics: hold oxycodone for now.             possible d/c in AM         Current facility administered and prior to admit medications reviewed.   x         Review of Systems:  A comprehensive review of systems was negative except for that written in the HPI. PHYSICAL EXAM:  O:  Visit Vitals  BP 93/47 (BP 1 Location: Left arm, BP Patient Position: At rest)   Pulse 67   Temp 97.2 °F (36.2 °C)   Resp 16   Ht 5' 8\" (1.727 m)   Wt 74 kg (163 lb 2.3 oz)   SpO2 98%   BMI 24.81 kg/m²     GENERAL:  Alert, oriented, cooperative, no apparent distress  HEENT:  Normocephalic, atraumatic, non icteric sclerae, non pallor conjuctivae, EOMs intact, PERRLA. NECK: Supple, trachea midline, no adenopathy, no thyromegally or tenderness, no carotid bruit and no JVD. LUNGS:   Vesicular breath sounds bilaterally, no added sounds. HEART:   S1 and S2 well heard,RRR,  no murmur, click, rub or gallop. ABDOMEB:   Soft, non-tender. Normoactive bowel sounds. No masses,  No organomegaly. EXTREMETIES:  Atraumatic, acyanotic, no edema  PULSES: 2+ and symmetric all extremities. SKIN:  No rashes or lesions  NEUROLOGY: Alert and oriented to PPT, CNII-XII intact. Motor and sensory exam grossly intact. Recent labs & imaging reviewed:    Problem List as of 10/1/2019 Date Reviewed: 9/5/2019          Codes Class Noted - Resolved    CHF (congestive heart failure) ([facility] ICD-10-CM: I50.9  ICD-9-CM: 428.0  5/20/2019 - Present        * (Principal) KLEVER (acute kidney injury) ([facility] ICD-10-CM: N17.9  ICD-9-CM: 584.9  9/29/2019 - Present        Vomiting and diarrhea ICD-10-CM: R11.10, R19.7  ICD-9-CM: 787.03, 787.91  9/29/2019 - Present        Mitral valve regurgitation ICD-10-CM: I34.0  ICD-9-CM: 424.0  7/25/2019 - Present    Overview Signed 8/9/2019  9:05 AM by India Shelby MD     DELLA 7/8/19:  EF 36 - 40%, severe MR.              Heart burn ICD-10-CM: R12  ICD-9-CM: 787.1  6/26/2019 - Present        DSOUZA (dyspnea on exertion) ICD-10-CM: R06.09  ICD-9-CM: 786.09  1/30/2019 - Present        At risk for obstructive sleep apnea ICD-10-CM: Z91.89  ICD-9-CM: V49.89  9/3/2018 - Present        Muscle spasm ICD-10-CM: H12.300  ICD-9-CM: 728.85 7/16/2018 - Present        Erectile dysfunction ICD-10-CM: N52.9  ICD-9-CM: 607.84  9/8/2017 - Present        Carotid arterial disease ([facility] ICD-10-CM: I73.9  ICD-9-CM: 447.9  6/29/2017 - Present        PAD (peripheral artery disease) ([facility]) ICD-10-CM: I73.9  ICD-9-CM: 443.9  6/29/2017 - Present        Chronic back pain greater than 3 months duration ICD-10-CM: M54.9, G89.29  ICD-9-CM: 724.5, 338.29  6/13/2017 - Present        History of tobacco abuse ICD-10-CM: G18.243  ICD-9-CM: V15.82  6/13/2017 - Present    Overview Signed 6/26/2019  1:21 PM by Gage Ibrahim MD     Overview:   Quit in 2008             ICD (implantable cardioverter-defibrillator) infection ([facility] ICD-10-CM: T82. 7XXA  ICD-9-CM: 996.61  1/20/2017 - Present    Overview Signed 1/24/2017 11:39 AM by Kris Brown NP     1/20/2017: Removal of dual chamber AICD generator and its leads under fluoroscopy  Cultures 1/20/17  RA lead/ICD pocket: gram negative rods, cultures enterobacter cloacae  RV lead gram negative rods enterobacter species     DELLA 1/24/17: No vegetation on valves              Gout (Chronic) ICD-10-CM: M10.9  ICD-9-CM: 274.9  12/30/2016 - Present        Chronic anticoagulation ICD-10-CM: Z79.01  ICD-9-CM: V58.61  12/27/2016 - Present        Sustained VT (ventricular tachycardia) ([facility] ICD-10-CM: I47.2  ICD-9-CM: 427.1  12/13/2016 - Present    Overview Signed 12/13/2016  9:43 PM by Alda Stevenson MD     12/6/2016 defib 150 J             Coronary artery disease involving coronary bypass graft ICD-10-CM: I25.810  ICD-9-CM: 414.05  12/1/2016 - Present        MI (myocardial infarction) ([facility] ICD-10-CM: I21.9  ICD-9-CM: 410.90  11/27/2016 - Present        Chronic systolic heart failure (HCC) ICD-10-CM: I50.22  ICD-9-CM: 428.22  11/26/2016 - Present        CAD, multiple vessel ICD-10-CM: I25.10  ICD-9-CM: 414.00  11/26/2016 - Present    Overview Signed 12/11/2018  1:27 PM by Fabian Sender     CATH 11/25/16: severe LM, and 3 VD with 60% right common iliac stenosis. EF: 10%. ----S/p IABP   S/p CAB2016: LIMA-> LAD, SVG-> RCA     CATH 16 following torsades SVG-> RCA TO   ---- s/p BMS x 2-.  RCA                                 Ischemic cardiomyopathy ICD-10-CM: I25.5  ICD-9-CM: 414.8  2016 - Present        Sinus tachycardia ICD-10-CM: R00.0  ICD-9-CM: 427.89  2016 - Present        Acute respiratory failure with hypoxia [facility]) ICD-10-CM: J96.01  ICD-9-CM: 518.81  2016 - Present        Essential hypertension ICD-10-CM: I10  ICD-9-CM: 401.9  2016 - Present        Alcohol abuse ICD-10-CM: F10.10  ICD-9-CM: 305.00  2016 - Present        S/P laminectomy ICD-10-CM: Z98.890  ICD-9-CM: V45.89  2016 - Present        S/P lumbar laminectomy ICD-10-CM: Z98.890  ICD-9-CM: V45.89  2016 - Present        Seizure disorder ([facility] ICD-10-CM: G40.909  ICD-9-CM: 345.90  2016 - Present    Overview Signed 2019  1:21 PM by Toan Gusman MD     Overview:   Cagle Odor seizure             HTN (hypertension) ICD-10-CM: I10  ICD-9-CM: 401.9  2016 - Present        RESOLVED: Left ventricular assist device present ([facility] ICD-10-CM: Z95.811  ICD-9-CM: V43.21  2016 - 2019        RESOLVED: S/P ICD (internal cardiac defibrillator) procedure ICD-10-CM: Z95.810  ICD-9-CM: V45.02  2016 - 2019    Overview Signed 2016  3:08 PM by Fátima Olsen MD     16 Dual chamber Medtronic ICD for secondary prevention of sudden death  DFT < = 25 J             RESOLVED: VF (ventricular fibrillation) ([facility] ICD-10-CM: I49.01  ICD-9-CM: 427.41  2016 - 2016    Overview Signed 2016  9:44 PM by Fátima Olsen MD     2016 defibrillated 150 J             RESOLVED: HCAP (healthcare-associated pneumonia) ICD-10-CM: J18.9  ICD-9-CM: 486  2016 - 2016                Tara Rojas MD  Internal Medicine  Date of Service: 10/1/2019

## 2019-10-01 NOTE — CONSULTS
[address] [facility] NOTE  [facility] office  [phone] NP in-hospital cell phone M-F until 4:30  After 5pm or on weekends, please call  for physician on call        NAME:  Nakul Santos   :   1955   MRN:   [id number]       Referring Physician: Estiven Bonner    Consult Date: 10/1/2019 9:16 AM    Chief Complaint: abdominal pain     History of Present Illness:  Patient is a 59 y.o. who is seen in consultation at the request of Dr. Estiven Bonner for abdominal pain. Medical history as listed below. He presented after 6 days of diarrhea, his GI symptoms improved but he felt weak so came to the hospital. He reports lower abdominal cramping with postprandial diarrhea since last Monday. He reports 3-4 episodes of watery diarrhea daily, no melena or hematochezia. Prior to this episode he reports having one formed stool very other day. He was evaluated in the office in 2017 for diarrhea which was self limited. He had nausea which is improving, tolerating diet. He reports ~8 pound weight loss. NSAID's a few times/month. No alcohol or tobacco.  Colonoscopy  normal    I have reviewed the emergency room note, hospital admission note, notes by all other clinicians who have seen the patient during this hospitalization to date. I have reviewed the problem list and the reason for this hospitalization. I have reviewed the allergies and the medications the patient was taking at home prior to this hospitalization.     PMH:  Past Medical History:   Diagnosis Date    Arrhythmia     SVT    Arthritis     CAD (coronary artery disease)     Chronic pain     back    Congestive heart failure (HCC)     DSOUZA (dyspnea on exertion) 2019    Gout     Heart failure ([facility] [address]     Hypertension     ICD (implantable cardioverter-defibrillator) in place     Long term current use of anticoagulant therapy     LVAD (left ventricular assist device) present ([facility] [address] 12/01/2016    Myocardial infarction ([facility] [address]     Pacemaker     SubQ ICD    Raynaud disease     Seizures ([facility] [address]     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       PSH:  Past Surgical History:   Procedure Laterality Date    CABG, ARTERY-VEIN, TWO  12/01/2016    CARDIAC SURG PROCEDURE UNLIST  12/01/2016    LVAD    HX CORONARY ARTERY BYPASS GRAFT      HX CORONARY STENT PLACEMENT      HX HEART CATHETERIZATION      HX HEENT      wisdom teeth removed    HX ORTHOPAEDIC  11/22/2016    laminectomy    HX PACEMAKER      ICD - removed 1/2017    HX PTCA      [place] INSJ OF SUBQ IMPLANTABLE DEFIBRILLATOR ELECTRODE N/A 5/20/2019    INSERT SUBQ ICD w/ anesthesia performed by Sixto Morales MD at [facility] CATH LAB       Allergies: Allergies   Allergen Reactions    Morphine Other (comments)     Hallucinations. Confusion. Impaired judgement    Chlorhexidine Rash       Home Medications:  Prior to Admission Medications   Prescriptions Last Dose Informant Patient Reported? Taking? DULoxetine (CYMBALTA) 60 mg capsule 9/29/2019 at Unknown time  Yes Yes   Sig: Take 60 mg by mouth daily. HYDROcodone-acetaminophen (NORCO) 5-325 mg per tablet 9/22/2019 at Unknown time  Yes Yes   Sig: TAKE 1 TABLET EVERY 6 HOURS AS NEEDED   allopurinol (ZYLOPRIM) 100 mg tablet 9/29/2019 at Unknown time  Yes Yes   Sig: Take 100 mg by mouth daily. amiodarone (CORDARONE) 200 mg tablet 9/29/2019 at Unknown time  No Yes   Sig: Take 1 Tab by mouth daily. apixaban (ELIQUIS) 5 mg tablet 9/29/2019 at Unknown time  No Yes   Sig: Take 1 Tab by mouth two (2) times a day. ascorbic acid, vitamin C, (VITAMIN C) 500 mg tablet 9/29/2019 at Unknown time  No Yes   Sig: Take 1 Tab by mouth two (2) times a day. aspirin 81 mg chewable tablet 9/29/2019 at Unknown time  Yes Yes   Sig: Take 81 mg by mouth daily.    atorvastatin (LIPITOR) 40 mg tablet 9/29/2019 at Unknown time  No Yes   Sig: TAKE 1 TABLET BY MOUTH EVERY DAY   carvedilol (COREG) 6.25 mg tablet 2019 at Unknown time  No Yes   Sig: Take 1 Tab by mouth two (2) times daily (with meals). colchicine 0.6 mg tablet 2019 at Unknown time  Yes Yes   Sig: Take 0.6 mg by mouth every other day. cyclobenzaprine (FLEXERIL) 5 mg tablet   Yes Yes   Sig: Take 5 mg by mouth daily as needed for Muscle Spasm(s). furosemide (LASIX) 20 mg tablet 2019 at Unknown time  Yes Yes   Sig: Take 20 mg by mouth every other day. levalbuterol tartrate (XOPENEX) 45 mcg/actuation inhaler   Yes Yes   Sig: INHALE 2 PUFFS EVERY 4 HOURS AS NEEDED   montelukast (SINGULAIR) 10 mg tablet 2019 at Unknown time  Yes Yes   Sig: TAKE 1 TABLET BY MOUTH ONCE A DAY   patiromer calcium sorbitex (VELTASSA) 8.4 gram powder 2019 at Unknown time  No Yes   Sig: Take 16.8 g by mouth daily. sacubitril-valsartan (ENTRESTO) 97 mg/103 mg tablet 2019 at Unknown time  No Yes   Sig: Take 1 Tab by mouth two (2) times a day. tadalafil (CIALIS) 20 mg tablet   Yes Yes   Si mg as needed. tamsulosin (FLOMAX) 0.4 mg capsule 2019 at Unknown time  Yes Yes   Sig: Take 0.4 mg by mouth daily. triamcinolone acetonide (KENALOG) 0.025 % topical cream   Yes Yes   Sig: Apply 0.025 Tubes to affected area as needed.       Facility-Administered Medications: None       Hospital Medications:  Current Facility-Administered Medications   Medication Dose Route Frequency    fluticasone propionate (FLONASE) 50 mcg/actuation nasal spray 2 Spray  2 Spray Both Nostrils DAILY PRN    guaiFENesin-dextromethorphan (ROBITUSSIN DM) 100-10 mg/5 mL syrup 10 mL  10 mL Oral Q6H PRN    melatonin tablet 3 mg  3 mg Oral QHS PRN    calcium carbonate (OS-FINN) tablet 1,000 mg [elemental]  1,000 mg Oral TID WITH MEALS    simethicone (MYLICON) tablet 80 mg  80 mg Oral QID PRN    ondansetron (ZOFRAN) injection 4 mg  4 mg IntraVENous Q4H PRN    pantoprazole (PROTONIX) 40 mg in sodium chloride 0.9% 10 mL injection  40 mg IntraVENous Q12H  amiodarone (CORDARONE) tablet 200 mg  200 mg Oral DAILY    apixaban (ELIQUIS) tablet 5 mg  5 mg Oral BID    aspirin chewable tablet 81 mg  81 mg Oral DAILY    atorvastatin (LIPITOR) tablet 40 mg  40 mg Oral QHS    montelukast (SINGULAIR) tablet 10 mg  10 mg Oral QHS    tamsulosin (FLOMAX) capsule 0.4 mg  0.4 mg Oral DAILY    patiromer calcium sorbitex (VELTASSA) powder 16.8 g  16.8 g Oral DAILY    allopurinol (ZYLOPRIM) tablet 100 mg  100 mg Oral DAILY    DULoxetine (CYMBALTA) capsule 60 mg  60 mg Oral DAILY    influenza vaccine - (6 mos+)(PF) (FLUARIX/FLULAVAL/FLUZONE QUAD) injection 0.5 mL  0.5 mL IntraMUSCular PRIOR TO DISCHARGE       Social History:  Social History     Tobacco Use    Smoking status: Former Smoker     Packs/day: 1.50     Years: 30.00     Pack years: 45.00     Last attempt to quit:      Years since quittin.7    Smokeless tobacco: Never Used   Substance Use Topics    Alcohol use: No       Family History:  Family History   Problem Relation Age of Onset    Diabetes Mother     Dementia Mother     Other Mother         carotid artery blockage    Heart Disease Father     Stroke Father     Heart Attack Father         several    Hypertension Father     Elevated Lipids Father     No Known Problems Sister     Cancer Brother         brain    Lung Disease Sister     COPD Sister     Cancer Sister         lung       Review of Systems:  Constitutional: negative fever, negative chills, +weight loss  Eyes:   negative visual changes  ENT:   negative sore throat, tongue or lip swelling  Respiratory:  negative cough, negative dyspnea  Cards:  negative for chest pain, palpitations, lower extremity edema  GI:   See HPI  :  negative for frequency, dysuria  Integument:  negative for rash and pruritus  Heme:  +for easy bruising and gum/nose bleeding  Musculoskeletal:negative for myalgias and muscle weakness; +back pain  Neuro:    negative for headaches, +dizziness, vertigo  Psych:  negative for feelings of anxiety, depression     Objective:     Patient Vitals for the past 8 hrs:   BP Temp Pulse Resp SpO2 Weight   10/01/19 0823 105/53 97.5 °F (36.4 °C) 72 16 95 %    10/01/19 0658      74 kg (163 lb 2.3 oz)   10/01/19 0329 125/64 97.6 °F (36.4 °C) 79 16 96 %      10/01 0701 - 10/01 1900  In: 400 [P.O.:400]  Out: -   09/29 1901 - 10/01 0700  In: 1646.7 [P.O.:240; I.V.:1406.7]  Out: 3 [Urine:1]    EXAM:     CONST:  Pleasant male lying in bed, no acute distress   NEURO:  alert and oriented x 3   HEENT: EOMI, no scleral icterus   LUNGS: clear to ausculation, (-) wheeze   CARD:   S1 S2   ABD:  soft, no tenderness, no rebound, bowel sounds (+) all 4 quadrants, no masses, non distended   EXT:  no edema, warm   PSYCH: full, not anxious     Data Review     Recent Labs     10/01/19  0349 09/29/19  1005   WBC 6.5 9.0   HGB 11.4* 12.0*   HCT 36.9 38.9    260     Recent Labs     10/01/19  0349 09/30/19  2259  09/29/19  1005    139   < > 141   K 4.2 4.5   < > 5.0   * 117*   < > 118*   CO2 17* 14*   < > 16*   BUN 27* 30*   < > 50*   CREA 1.12 1.32*   < > 2.28*   GLU 84 89   < > 94   PHOS 1.8*  --   --  2.6   CA 9.3 9.0   < > 9.5    < > = values in this interval not displayed. Recent Labs     10/01/19  0349 09/29/19  1005   SGOT 17 26    114   TP 7.1 7.9   ALB 3.6 4.0   GLOB 3.5 3.9     Recent Labs     09/29/19  1005   INR 1.1   PTP 11.4*   APTT 32.3*   IMPRESSION:  1. Increased small left pleural effusion since 2016.  2. Nonobstructing right nephrolithiasis. 3. Colonic diverticulosis without evidence of inflammation.     Assessment:     · Abdominal pain/diarrhea: wbc 6.5, hgb 11.4, LFT's unremarkable; CT unremarkable; c. Diff negative   · Nausea/vomiting: improving  · KLEVER on CKD  · Heart failure/CAD status post CABG: history of LVAD removed after EF improved; on Eliquis     Patient Active Problem List   Diagnosis Code    S/P laminectomy Z98.890    Sinus tachycardia R00.0    Acute respiratory failure with hypoxia ([facility] J96.01    Essential hypertension I10    Alcohol abuse F10.10    Chronic systolic heart failure ([facility]) I50.22    CAD, multiple vessel I25.10    Ischemic cardiomyopathy I25.5    MI (myocardial infarction) ([facility]) I21.9    Sustained VT (ventricular tachycardia) ([facility]) I47.2    Chronic anticoagulation Z79.01    Gout M10.9    ICD (implantable cardioverter-defibrillator) infection ([address] T82. 7XXA    Erectile dysfunction N52.9    DSOUZA (dyspnea on exertion) R06.09    CHF (congestive heart failure) ([facility]) I50.9    At risk for obstructive sleep apnea Z91.89    Carotid arterial disease ([facility]) I73.9    Chronic back pain greater than 3 months duration M54.9, G89.29    Coronary artery disease involving coronary bypass graft I25.810    Heart burn R12    History of tobacco abuse Z87.891    Muscle spasm M62.838    S/P lumbar laminectomy Z98.890    Seizure disorder ([facility]) G40.909    HTN (hypertension) I10    PAD (peripheral artery disease) ([facility]) I73.9    Mitral valve regurgitation I34.0    KLEVER (acute kidney injury) ([facility]) N17.9    Vomiting and diarrhea R11.10, R19.7     Plan:       · Check lipase  · O&P pending, add enteric pathogen panel  · PPI  · Bentyl   · Supportive care - antiemetics, simethicone  · Will need colonoscopy, can be outpatient  · Patient discussed with and will be seen by Dr. Trent Barrett  · Thank you for allowing me to participate in care of Rupal Brandt     Signed By: Neftali James NP     10/1/2019  9:16 AM       This patient was seen and examined by me in a face-to-face visit today. I reviewed the medical record including lab work, imaging and other provider notes. I confirmed the history as described above. I spoke to the patient, reviewed the medical record including lab work, imaging and other provider notes. I discussed this case in detail with Solange macias NP.  I formulated an  assessment of this patient and developed a treatment plan. I agree with the above consultation note. I agree with the history, exam and assessment and plan as outlined in the note. I would like to add the following: He appears to have self limited acute diarrheal illness. He is definitely improved. Stool cultures are pending, c. Diff. Was negative. He has significant heart diease which has stablized. He will need colonoscopy which can be done as outpatient. His abdominal exam is benign.

## 2019-10-01 NOTE — PROGRESS NOTES
Advanced Heart Failure Progress Note      DOS:  10/1/2019  REFERRING PROVIDER: Stanford Ahn MD  PRIMARY CARE PHYSICIAN: Cristina Dennis MD  PRIMARY CARDIOLOGIST:  Peterson Celaya MD   EP: Gama Greene MD   PULMONARY: Angela Mata MD     24Hr Events  Diarrhea improved  Cdiff negative     IMPRESSION/PLAN:   ICM s/p HMII as BTT on 12/1/16 and explant, NYHA Class II, LVEF 36-40%      Hold entresto d/t volume depletion   No aldosterone antagonist d/t hyperkalemia   ATTR- negative   Resume Coreg today   Resume Entresto as an OP    Home today     Gastroenteritis   Enteric precautions   Stool studies pending   GI consult- needs colonoscopy as OP       CAD s/p CABG (SVG to RCA and LIMA to LAD) on 12/1/16 s/p BMS x2 -> SVG-RCA graft - no angina              Continue ASA, BB, statin    Mitral Regurgitation, severe - improved to mild   Will follow on serial echo    Hyperkalemia   On Veltassa    Chronic Left Pleural Effusion - s/p left thoracentesis on 7/5/2019   Encourage use of IS     High Risk of SCD - s/p SQ AICD (5/20/19)   No shocks       PAD - difficult femoral access with LVAD explant              No symptoms of claudication      Chronic Anticoagulation s/p LVAD explant              Continue eliquis 5 mg  twice daily    Hyperkalemia K 5.0    Continue veltessa    Continue low K+ diet     Gout              Continue allopurinol 100 mg daily   Denies recent gouty attacks    Chronic Lower back pain              On hydrocodone, flexeril   Hasn't taken since ICD implant   Back pain improved with increased activity- going to gym    H/o tobacco abuse   Abstaining from nicotine      H/o alcohol abuse   Currently abstaining   Encouraged complete abstinence      Seizure disorder - early 25s              No recent seizure activity     Peripheral neuropathy              On cymbalta   ATTR- Negative     Prevention              Influenza annually              Pneumonia vaccine every 5 years       Chief Complaint   Patient presents with  Fall    Diarrhea         HPI: Joselito Parisi is a 59y.o. year old male  with a history of HFrEF in the setting of ICM s/p LVAD and recent LVAD explant complicated by PVD,  remote tobacco use and alcohol abuse (quit 11/2016) who presents for follow up of his HFrEF. Mr. Alis Cleaning presented 11/22/2016 for decompressive laminectomy at F5-E8 complicated by myocardial infarction. The [facility](11/25/16) revealed severe left main and 3 vessel disease along with a 60% right common iliac stenosis and  LVEF was 10%. He subsequently underwent placement of an IABP followed by placement of a HeartMate2 LVAD on 12/1/2016 with concomitant CABG (SVG to the RCA, LIMA to LAD). His postoperative course was complicated by RV failure and an ileus requiring TPN resulting and a transaminitis. He had multiple episodes of Torsade on 12/4/2016 prompting repeat catheterization revealing an occluded RCA vein graft. Two BMS were placed in the RCA graft. He also had SVT requiring cardioversion. He had a single lead ICD placed on 74/57/52 complicated by a hematoma. The ICD was later removed on 1/20/17. Following his LVAD surgery he did require inpatient rehab.      Mr. Alis Cleaning was listed for heart transplantation at [facility]. He was called in for transplantation on 7/14/2018 but on pre-op DELLA was noted to have normal LV function and the transplant surgery was aborted. He subsequently was admitted on 9/5/2018 for LVAD explant- his post-operative course was complicated by pneumonia and bilateral pleural effusions. He was discharged on home O2 which he discontinued on his own.       After device explant, his subsequent echocardiograms showed deterioration of LVEF and worsening mitral regurgitation from 40-45% with moderate to severe MR on 9/19/18 to 35-40% with moderate-to-severe MR on 10/15/18. He has been removed from the transplant list at [facility] and is not interested in pursuing transplant evaluation at another center at this time.      His entresto was discontinued at the time of his AICD implant due to hypotension. He subsequently took diovan but felt worse. He is tolerating the entresto with no dizziness, presyncope, or syncope. He was seen by Dr. Leslie Zavala, who stated that his mitral regurgitation has decreased significantly with optimizing his HF medications. He was admitted to [facility] following an episode of gastroenteritis and presyncope. He and his wife traveled to [place] recently. He developed nausea, vomiting, and diarrhea shortly after returning home. His wife did not develop gastroenteritis. He denies blood or mucus in his stool. He felt dizziness and experienced an episode of presyncope, hitting his head. He denies LOC. CARDIAC EVALUATION  TTE 19: LVEF 32%, LVAD plug at LV apex, grade 2 diastolic dysfunction, mild-mod MR, mild TR, PAPs 43 mmHg, mild LAE  DELLA 2018 - normal LV function and the transplant surgery was aborted. He subsequently was admitted on 2018 for LVAD explant  ECHO 18:LVEF 40-45% mod-severe MR   ECHO 10/15/18:EF 35-40%, mod-severe MR   ECHO 10/31/18: TDS, EF 30-35%, reduced RVEF, TAPSE 1.6, LAE, mild- mod MR/TR  ECHO 19: EF 25-30%, question of thrombus in apex. Mild- mod TR. Consider cardiac MR to exclude left ventricular thrombus. ECHO 2019: EF 16-20%, mod MR, mod-severe TR, severe pulm HTN (PA systolic pressure 85 mmHg), mildly reduced RV systolic function (TAPSE 4.75 cm)  ECHO 19: EF 20-25%, moderately reduced RV systolic function, mild MR    CATH 16: severe LM, and 3 VD with 60% right common iliac stenosis. EF: 10%. ----S/p IABP   ----S/p HeartMate2 LVAD 2016   S/p CAB2016: LIMA-> LAD, SVG-> RCA     CATH 16 following torsades SVG-> RCA TO   ---- s/p BMS x 2-.  RCA  RHC 2017:RA: 15/15 14, RV:  14,PA: /, m 20, PCWP 13, PVR 1.67 CO 5.67, CI 3    S/p LVAD explant 18 ([facility])    SVT s/p DCCV  AICD  single lead cx by hematoma  AICD explanted 1/20/17    EKG:    10/31/18 NSR QRS 88ms   ms  6/11/19: SR rate 68bpm QRS 94ms Qtc 438 ms    Review of Systems:     General:  Negative   HEENT: Denies epistaxis, angioedema   Pulmonary: Denies  wheeze, hemoptysis  Cardiac: Denies exertional chest pain, palpitations, PND, edema, syncope; positive for dizziness and near syncope  GI:  Positive for  diarrhea  Musculo: Positive for chronic back pain for which he takes prn flexril and norco   Neuro: Denies  TIA/CVA sx   Skin:  Denies rash   Allergies: Reviewed   Psych: Denies depression or anxiety       History:  Past Medical History:   Diagnosis Date    Arrhythmia     SVT    Arthritis     CAD (coronary artery disease)     Chronic pain     back    Congestive heart failure (HCC)     DSOUZA (dyspnea on exertion) 1/30/2019    Gout     [facility] ([facility]     Hypertension     ICD (implantable cardioverter-defibrillator) in place     Long term current use of anticoagulant therapy     LVAD (left ventricular assist device) present ([facility] 12/01/2016    Myocardial infarction ([facility]     Pacemaker     SubQ ICD    Raynaud disease     Seizures ([facility]     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     Past Surgical History:   Procedure Laterality Date    CABG, ARTERY-VEIN, TWO  12/01/2016    CARDIAC SURG PROCEDURE UNLIST  12/01/2016    LVAD    HX CORONARY ARTERY BYPASS GRAFT      HX CORONARY STENT PLACEMENT      HX HEART CATHETERIZATION      HX HEENT      wisdom teeth removed    HX ORTHOPAEDIC  11/22/2016    laminectomy    HX PACEMAKER      ICD - removed 1/2017    HX PTCA      [place] INSJ OF SUBQ IMPLANTABLE DEFIBRILLATOR ELECTRODE N/A 5/20/2019    INSERT SUBQ ICD w/ anesthesia performed by Orin Thorpe MD at [address] [place] [address] CATH LAB     Social History     Socioeconomic History    Marital status:      Spouse name: Randal Graves    Number of children: 2    Years of education: Not on file    Highest education level: Some college, no degree   Occupational History    Not on file   Social Needs    Financial resource strain: Not hard at all   Will insecurity:     Worry: Never true     Inability: Never true   Capsearch needs:     Medical: No     Non-medical: No   Tobacco Use    Smoking status: Former Smoker     Packs/day: 1.50     Years: 30.00     Pack years: 45.00     Last attempt to quit:      Years since quittin.7    Smokeless tobacco: Never Used   Substance and Sexual Activity    Alcohol use: No    Drug use: No    Sexual activity: Yes   Lifestyle    Physical activity:     Days per week: Not on file     Minutes per session: Not on file    Stress: Not on file   Relationships    Social connections:     Talks on phone: Not on file     Gets together: Not on file     Attends Hindu service: Not on file     Active member of club or organization: Not on file     Attends meetings of clubs or organizations: Not on file     Relationship status: Not on file    Intimate partner violence:     Fear of current or ex partner: Not on file     Emotionally abused: Not on file     Physically abused: Not on file     Forced sexual activity: Not on file   Other Topics Concern     Service Not Asked    Blood Transfusions Not Asked    Caffeine Concern Not Asked    Occupational Exposure Not Asked   Elisa Fluke Hazards Not Asked    Sleep Concern Not Asked    Stress Concern Not Asked    Weight Concern Not Asked    Special Diet Not Asked    Back Care Not Asked    Exercise Not Asked    Bike Helmet Not Asked    [address] Not Asked    Self-Exams Not Asked   Social History Narrative    Not on file     Family History   Problem Relation Age of Onset    Diabetes Mother     Dementia Mother     Other Mother         carotid artery blockage    Heart Disease Father     Stroke Father     Heart Attack Father         several    Hypertension Father     Elevated Lipids Father     No Known Problems Sister     Cancer Brother         brain    Lung Disease Sister     COPD Sister     Cancer Sister         lung       Current Medications:   Current Facility-Administered Medications   Medication Dose Route Frequency Provider Last Rate Last Dose    dicyclomine (BENTYL) capsule 10 mg  10 mg Oral QID Daria Tristan, NP        carvedilol (COREG) tablet 6.25 mg  6.25 mg Oral BID WITH MEALS Adriana Humphries, NP        fluticasone propionate (FLONASE) 50 mcg/actuation nasal spray 2 Spray  2 Spray Both Nostrils DAILY PRN Mancel [place], NP   2 [place] at 09/30/19 2158    guaiFENesin-dextromethorphan (ROBITUSSIN DM) 100-10 mg/5 mL syrup 10 mL  10 mL Oral Q6H PRN Mancel [place], NP   10 mL at 09/30/19 0343    melatonin tablet 3 mg  3 mg Oral QHS PRN Mancel [place], NP        calcium carbonate (OS-FINN) tablet 1,000 mg [elemental]  1,000 mg Oral TID WITH MEALS Anai Moss MD   1,000 mg at 10/01/19 0831    simethicone (MYLICON) tablet 80 mg  80 mg Oral QID PRN Romain Monte MD   80 mg at 09/30/19 1411    ondansetron (ZOFRAN) injection 4 mg  4 mg IntraVENous Q4H PRN Maricel Moss MD   4 mg at 09/30/19 1426    pantoprazole (PROTONIX) 40 mg in sodium chloride 0.9% 10 mL injection  40 mg IntraVENous Q12H Maricel Moss MD   40 mg at 10/01/19 0831    amiodarone (CORDARONE) tablet 200 mg  200 mg Oral DAILY Jason Le MD   200 mg at 10/01/19 0831    apixaban (ELIQUIS) tablet 5 mg  5 mg Oral BID Jason Le MD   5 mg at 10/01/19 0831    aspirin chewable tablet 81 mg  81 mg Oral DAILY Jason Le MD   81 mg at 10/01/19 0831    atorvastatin (LIPITOR) tablet 40 mg  40 mg Oral QHS Jason Le MD   40 mg at 09/30/19 2158    montelukast (SINGULAIR) tablet 10 mg  10 mg Oral QHS Jason Le MD   10 mg at 09/30/19 2158    tamsulosin (FLOMAX) capsule 0.4 mg  0.4 mg Oral DAILY Jason Le MD   0.4 mg at 10/01/19 0831    patiromer calcium sorbitex (VELTASSA) powder 16.8 g  16.8 g Oral DAILY Jason Le MD   16.8 g at 10/01/19 1125    allopurinol (ZYLOPRIM) tablet 100 mg  100 mg Oral DAILY Go Ryan MD   100 mg at 10/01/19 0831    DULoxetine (CYMBALTA) capsule 60 mg  60 mg Oral DAILY Go Ryan MD   60 mg at 10/01/19 0831    influenza vaccine 2019-20 (6 mos+)(PF) (FLUARIX/FLULAVAL/FLUZONE QUAD) injection 0.5 mL  0.5 mL IntraMUSCular PRIOR TO DISCHARGE Go Ryan MD           Allergies: Allergies   Allergen Reactions    Morphine Other (comments)     Hallucinations. Confusion. Impaired judgement    Chlorhexidine Rash           Physical Exam:   Vitals:    Visit Vitals  BP 97/50 (BP 1 Location: Left arm, BP Patient Position: At rest)   Pulse 68   Temp 97.6 °F (36.4 °C)   Resp 16   Ht 5' 8\" (1.727 m)   Wt 163 lb 2.3 oz (74 kg)   SpO2 96%   BMI 24.81 kg/m²        General:  Well developed WM, AAOx3, pleasant,  cooperative, no acute distress. HEENT:  Atraumatic. Pink and moist.  Anicteric sclerae. Neck:   Supple, no adenopoathy. Lungs:  Clear to auscultation, No wheezing/rhonchi/rales. Heart:   PMI: Median sternotomy scar,  Regular rhythm, S1, S2 present, 1/6 systolic murmur, no rubs, no gallops. JVD 8 cm (-) HJR . No carotid bruits. Abdomen:  Soft, non-distended, non-tender. hyperactive Bowel sounds. No bruits. Extremities:  Venous stasis changes, no clubbing, cyanosis, or edema. No calf tenderness  Neurologic:  Grossly intact. Alert and oriented X 3. No acute neurological distress. Skin:   intact, no wounds, ecchymosis, bruising, rashes   Psych:  Good insight. Not anxious nor agitated.     Recent Labs:     Lab Results   Component Value Date/Time    WBC 6.5 10/01/2019 03:49 AM    HGB 11.4 (L) 10/01/2019 03:49 AM    HCT 36.9 10/01/2019 03:49 AM    PLATELET 785 84/23/7587 03:49 AM    MCV 92.7 10/01/2019 03:49 AM     Lab Results   Component Value Date/Time    GFR est non-AA >60 10/01/2019 03:49 AM    GFR est AA >60 10/01/2019 03:49 AM    Creatinine 1.12 10/01/2019 03:49 AM    BUN 27 (H) 10/01/2019 03:49 AM    Sodium 141 10/01/2019 03:49 AM Potassium 4.2 10/01/2019 03:49 AM    Chloride 116 (H) 10/01/2019 03:49 AM    CO2 17 (L) 10/01/2019 03:49 AM    Magnesium 2.0 10/01/2019 03:49 AM    Phosphorus 1.8 (L) 10/01/2019 03:49 AM     Lab Results   Component Value Date/Time    TSH 3.790 06/12/2019 03:05 PM    T4, Free 1.68 06/12/2019 03:05 PM      Lab Results   Component Value Date/Time    Sodium 141 10/01/2019 03:49 AM    Potassium 4.2 10/01/2019 03:49 AM    Chloride 116 (H) 10/01/2019 03:49 AM    CO2 17 (L) 10/01/2019 03:49 AM    Anion gap 8 10/01/2019 03:49 AM    Glucose 84 10/01/2019 03:49 AM    BUN 27 (H) 10/01/2019 03:49 AM    Creatinine 1.12 10/01/2019 03:49 AM    BUN/Creatinine ratio 24 (H) 10/01/2019 03:49 AM    GFR est AA >60 10/01/2019 03:49 AM    GFR est non-AA >60 10/01/2019 03:49 AM    Calcium 9.3 10/01/2019 03:49 AM    Bilirubin, total 0.5 10/01/2019 03:49 AM    ALT (SGPT) 25 10/01/2019 03:49 AM    AST (SGOT) 17 10/01/2019 03:49 AM    Alk. phosphatase 108 10/01/2019 03:49 AM    Protein, total 7.1 10/01/2019 03:49 AM    Albumin 3.6 10/01/2019 03:49 AM    Globulin 3.5 10/01/2019 03:49 AM    A-G Ratio 1.0 (L) 10/01/2019 03:49 AM      Lab Results   Component Value Date/Time    Hemoglobin A1c 5.9 05/22/2019 02:56 AM       Thank you for letting us see him with you,      Irma Rai NP  [facility]  [address]  Office [phone]  Fax [phone]    [facility] ATTENDING ADDENDUM    Patient was seen and examined in person. Data and notes were reviewed. I have discussed and agree with the plan as noted in the NP note above without further additions.     Hollis Santos MD PhD  [address]

## 2019-10-01 NOTE — PROGRESS NOTES
Rosalva Pardo NP notified of critical CO2 of 14 and redrawn magnesium of 1.3. No orders received at this time.

## 2019-10-02 VITALS
DIASTOLIC BLOOD PRESSURE: 59 MMHG | WEIGHT: 163.36 LBS | RESPIRATION RATE: 16 BRPM | HEART RATE: 82 BPM | SYSTOLIC BLOOD PRESSURE: 124 MMHG | TEMPERATURE: 97.7 F | BODY MASS INDEX: 24.76 KG/M2 | HEIGHT: 68 IN | OXYGEN SATURATION: 98 %

## 2019-10-02 LAB
ALBUMIN SERPL-MCNC: 3.4 G/DL (ref 3.5–5)
ALBUMIN/GLOB SERPL: 0.9 {RATIO} (ref 1.1–2.2)
ALP SERPL-CCNC: 107 U/L (ref 45–117)
ALT SERPL-CCNC: 29 U/L (ref 12–78)
ANION GAP SERPL CALC-SCNC: 9 MMOL/L (ref 5–15)
AST SERPL-CCNC: 19 U/L (ref 15–37)
BILIRUB SERPL-MCNC: 0.4 MG/DL (ref 0.2–1)
BUN SERPL-MCNC: 21 MG/DL (ref 6–20)
BUN/CREAT SERPL: 17 (ref 12–20)
CALCIUM SERPL-MCNC: 9 MG/DL (ref 8.5–10.1)
CHLORIDE SERPL-SCNC: 115 MMOL/L (ref 97–108)
CO2 SERPL-SCNC: 14 MMOL/L (ref 21–32)
CREAT SERPL-MCNC: 1.21 MG/DL (ref 0.7–1.3)
GLOBULIN SER CALC-MCNC: 3.9 G/DL (ref 2–4)
GLUCOSE SERPL-MCNC: 132 MG/DL (ref 65–100)
O+P SPEC MICRO: NORMAL
O+P STL CONC: NORMAL
POTASSIUM SERPL-SCNC: 4 MMOL/L (ref 3.5–5.1)
PROT SERPL-MCNC: 7.3 G/DL (ref 6.4–8.2)
SODIUM SERPL-SCNC: 138 MMOL/L (ref 136–145)
SPECIMEN SOURCE: NORMAL

## 2019-10-02 PROCEDURE — 74011250637 HC RX REV CODE- 250/637: Performed by: HOSPITALIST

## 2019-10-02 PROCEDURE — 36415 COLL VENOUS BLD VENIPUNCTURE: CPT

## 2019-10-02 PROCEDURE — 80053 COMPREHEN METABOLIC PANEL: CPT

## 2019-10-02 PROCEDURE — 83735 ASSAY OF MAGNESIUM: CPT

## 2019-10-02 PROCEDURE — 74011250637 HC RX REV CODE- 250/637: Performed by: NURSE PRACTITIONER

## 2019-10-02 PROCEDURE — 83880 ASSAY OF NATRIURETIC PEPTIDE: CPT

## 2019-10-02 RX ORDER — SODIUM BICARBONATE 650 MG/1
650 TABLET ORAL 2 TIMES DAILY
Qty: 14 TAB | Refills: 0 | Status: SHIPPED | OUTPATIENT
Start: 2019-10-02 | End: 2019-10-09

## 2019-10-02 RX ADMIN — APIXABAN 5 MG: 5 TABLET, FILM COATED ORAL at 09:15

## 2019-10-02 RX ADMIN — ALLOPURINOL 100 MG: 100 TABLET ORAL at 09:16

## 2019-10-02 RX ADMIN — DULOXETINE HYDROCHLORIDE 60 MG: 60 CAPSULE, DELAYED RELEASE ORAL at 09:15

## 2019-10-02 RX ADMIN — AMIODARONE HYDROCHLORIDE 200 MG: 200 TABLET ORAL at 09:19

## 2019-10-02 RX ADMIN — PANTOPRAZOLE SODIUM 40 MG: 40 TABLET, DELAYED RELEASE ORAL at 09:16

## 2019-10-02 RX ADMIN — Medication 1000 MG: at 09:25

## 2019-10-02 RX ADMIN — DICYCLOMINE HYDROCHLORIDE 10 MG: 10 CAPSULE ORAL at 09:15

## 2019-10-02 RX ADMIN — ASPIRIN 81 MG CHEWABLE TABLET 81 MG: 81 TABLET CHEWABLE at 09:16

## 2019-10-02 RX ADMIN — TAMSULOSIN HYDROCHLORIDE 0.4 MG: 0.4 CAPSULE ORAL at 09:15

## 2019-10-02 NOTE — PROGRESS NOTES
Much improved. Tolerating diet. Stool cultures still pending, but agree with discharge plans. We will arrange an outpatient colonoscopy in the next couple of weeks.

## 2019-10-02 NOTE — PROGRESS NOTES
Bedside shift change report given to Malini Alegre RN (oncoming nurse) by Laura Rasmussen RN (offgoing nurse).  Report included the following information SBAR, Intake/Output, MAR, Recent Results and Cardiac Rhythm SR.

## 2019-10-02 NOTE — PROGRESS NOTES
Bedside and Verbal shift change report given to April (oncoming nurse) by Ruth Duran (offgoing nurse). Report included the following information SBAR, Kardex, Recent Results and Cardiac Rhythm NSR.

## 2019-10-02 NOTE — DISCHARGE SUMMARY
Discharge Summary       PATIENT ID: Brigitte Gibson  MRN: [id number]   YOB: 1955    DATE OF ADMISSION: 9/29/2019  9:33 AM    DATE OF DISCHARGE: 10/2/2019  PRIMARY CARE PROVIDER: Nathalie Parsons MD     ATTENDING PHYSICIAN: Nichelle Calabrese MD  DISCHARGING PROVIDER: Nichelle Calabrese MD    To contact this individual call [phone] and ask the  to page. If unavailable ask to be transferred the Adult Hospitalist Department. CONSULTATIONS: IP CONSULT TO HOSPITALIST  IP CONSULT TO ADVANCED HEART FAILURE  IP CONSULT TO GASTROENTEROLOGY    PROCEDURES/SURGERIES: * No surgery found *    ADMITTING DIAGNOSES & HOSPITAL COURSE:   #Nausea/vomiting and diarrhea:Resolved  -suspected gastroenteritis.  -Enteric pathogens,C diff negative.  -As white count wnl, no fever - no indication for abx  -CT abdomen showed colonic diverticulosis but no inflammation  -Troponin and lactic acid wnl  - GI on board,recommended out patient colonoscopy      #KLEVER on CKD 2 with metabolic acidosis:  -base line around 1.4, cr at admission is 2.28. Now back to normal         Metabolic acidosis: bicarb low  14  -Volume status improved. Acidosis is lagging. Discharged on sodium bicarb 650 mg po bid.        Hypocalcemia,corrected      #Presyncope:  -likely due to soft BP -dehydration with gastroenteritis and was also taking antihypertensives/diuretics(GDMT for CHF)        ##Chronic systolic heart failure with ischemic cardiomyopathy:  - Recent echo showed 36-40%   -Advanced heart failure team consulted: hold enteresto,resumed coreg    -Reassess as out patient for enteresto resumption.     #History of hyperkalemia:  -continue valtessa     # History of CAD s/p CABG and BMS:  -continue aspirin,statin,coreg        #Chronic anticoagulation s/p LVAD explant -continue eliquis     #Chronic gout: allopurinol     #Chronic back pain on narcotics:                           DISCHARGE MEDICATIONS:  Current Discharge Medication List      START taking these medications    Details   sodium bicarbonate 650 mg tablet Take 1 Tab by mouth two (2) times a day for 7 days. Qty: 14 Tab, Refills: 0         CONTINUE these medications which have NOT CHANGED    Details   triamcinolone acetonide (KENALOG) 0.025 % topical cream Apply 0.025 Tubes to affected area as needed. carvedilol (COREG) 6.25 mg tablet Take 1 Tab by mouth two (2) times daily (with meals). Qty: 180 Tab, Refills: 3      amiodarone (CORDARONE) 200 mg tablet Take 1 Tab by mouth daily. Qty: 90 Tab, Refills: 1      patiromer calcium sorbitex (VELTASSA) 8.4 gram powder Take 16.8 g by mouth daily. Qty: 60 Packet, Refills: 11      tadalafil (CIALIS) 20 mg tablet 20 mg as needed. Refills: 6      atorvastatin (LIPITOR) 40 mg tablet TAKE 1 TABLET BY MOUTH EVERY DAY  Qty: 30 Tab, Refills: 11    Comments: DX Code Needed  . apixaban (ELIQUIS) 5 mg tablet Take 1 Tab by mouth two (2) times a day. Qty: 60 Tab, Refills: 11      levalbuterol tartrate (XOPENEX) 45 mcg/actuation inhaler INHALE 2 PUFFS EVERY 4 HOURS AS NEEDED  Refills: 0      montelukast (SINGULAIR) 10 mg tablet TAKE 1 TABLET BY MOUTH ONCE A DAY  Refills: 3      HYDROcodone-acetaminophen (NORCO) 5-325 mg per tablet TAKE 1 TABLET EVERY 6 HOURS AS NEEDED  Refills: 0      DULoxetine (CYMBALTA) 60 mg capsule Take 60 mg by mouth daily. allopurinol (ZYLOPRIM) 100 mg tablet Take 100 mg by mouth daily. tamsulosin (FLOMAX) 0.4 mg capsule Take 0.4 mg by mouth daily. colchicine 0.6 mg tablet Take 0.6 mg by mouth every other day. cyclobenzaprine (FLEXERIL) 5 mg tablet Take 5 mg by mouth daily as needed for Muscle Spasm(s). aspirin 81 mg chewable tablet Take 81 mg by mouth daily. ascorbic acid, vitamin C, (VITAMIN C) 500 mg tablet Take 1 Tab by mouth two (2) times a day.   Qty: 60 Tab, Refills: 6         STOP taking these medications       furosemide (LASIX) 20 mg tablet Comments:   Reason for Stopping: sacubitril-valsartan (ENTRESTO) 97 mg/103 mg tablet Comments:   Reason for Stopping:               PENDING TEST RESULTS:   At the time of discharge the following test results are still pending: none    FOLLOW UP APPOINTMENTS:    Follow-up Information     Follow up With Specialties Details Why Contact Info    Akash Mckeon MD Cardiology On 10/3/2019  [address], Johnson Martinez MD Family Practice On 10/9/2019 Hospital f/u PCP appointment Wednesday, 10/9/19 @ 2:15 p.m.   47-7  [phone]      [address] [facility]   Go to your scheduled appointment [address], [address] [place] [address] [phone]           ADDITIONAL CARE RECOMMENDATIONS:     DIET: Regular Diet and Cardiac Diet    ACTIVITY: Activity as tolerated    WOUND CARE: NA    EQUIPMENT needed: NA          NOTIFY YOUR PHYSICIAN FOR ANY OF THE FOLLOWING:   Fever over 101 degrees for 24 hours. Chest pain, shortness of breath, fever, chills, nausea, vomiting, diarrhea, change in mentation, falling, weakness, bleeding. Severe pain or pain not relieved by medications. Or, any other signs or symptoms that you may have questions about.     DISPOSITION:   x Home With:   OT  PT  HH  RN       SNF(Name)    Inpatient Rehab(name)    Independent/assisted living(name)    Hospice    Other:       PATIENT CONDITION AT DISCHARGE:     Functional status        Poor     Deconditioned    x Independent    Cognition       x Lucid    Forgetful    Dementia   Catheter/Lines(indications)     Fernandez    PICC    PEG   x None   Code status     x Full    DNR       PHYSICAL EXAMINATION AT DISCHARGE:     Visit Vitals  /59   Pulse 82   Temp 97.7 °F (36.5 °C)   Resp 16   Ht 5' 8\" (1.727 m)   Wt 74.1 kg (163 lb 5.8 oz)   SpO2 98%   BMI 24.84 kg/m²      O2 Device: Room air    Temp (24hrs), Av.7 °F (36.5 °C), Min:97.2 °F (36.2 °C), Max:98 °F (36.7 °C)    10/02 0701 - 10/02 1900  In: 480 [P.O.:480]  Out: -    09/30 1901 - 10/02 0700  In: 500 [P.O.:400; I.V.:100]  Out: -     GENERAL:  Alert, oriented, cooperative, no apparent distress  HEENT:  Normocephalic, atraumatic, non icteric sclerae, non pallor conjuctivae, EOMs intact, PERRLA. NECK: Supple, trachea midline, no adenopathy, no thyromegally or tenderness, no carotid bruit and no JVD. LUNGS:   Vesicular breath sounds bilaterally, no added sounds. HEART:   S1 and S2 well heard,RRR,  no murmur, click, rub or gallop. ABDOMEB:   Soft, non-tender. Normoactive bowel sounds. No masses,  No organomegaly. EXTREMETIES:  Atraumatic, acyanotic, no edema  PULSES: 2+ and symmetric all extremities. SKIN:  No rashes or lesions  NEUROLOGY: Alert and oriented to PPT, CNII-XII intact. Motor and sensory exam grossly intact. CHRONIC MEDICAL DIAGNOSES:  Problem List as of 10/2/2019 Date Reviewed: 9/5/2019          Codes Class Noted - Resolved    CHF (congestive heart failure) ([facility].) ICD-10-CM: I50.9  ICD-9-CM: 428.0  5/20/2019 - Present        * (Principal) KLEVER (acute kidney injury) ([facility] 75.) ICD-10-CM: N17.9  ICD-9-CM: 584.9  9/29/2019 - Present        Vomiting and diarrhea ICD-10-CM: R11.10, R19.7  ICD-9-CM: 787.03, 787.91  9/29/2019 - Present        Mitral valve regurgitation ICD-10-CM: I34.0  ICD-9-CM: 424.0  7/25/2019 - Present    Overview Signed 8/9/2019  9:05 AM by Marie Donahue MD     DELLA 7/8/19:  EF 36 - 40%, severe MR.              Heart burn ICD-10-CM: R12  ICD-9-CM: 787.1  6/26/2019 - Present        DSOUZA (dyspnea on exertion) ICD-10-CM: R06.09  ICD-9-CM: 786.09  1/30/2019 - Present        At risk for obstructive sleep apnea ICD-10-CM: Z91.89  ICD-9-CM: V49.89  9/3/2018 - Present        Muscle spasm ICD-10-CM: G16.206  ICD-9-CM: 728.85  7/16/2018 - Present        Erectile dysfunction ICD-10-CM: N52.9  ICD-9-CM: 607.84  9/8/2017 - Present        Carotid arterial disease ([facility] ICD-10-CM: I73.9  ICD-9-CM: 447.9  6/29/2017 - Present        PAD (peripheral artery disease) (HCC) ICD-10-CM: I73.9  ICD-9-CM: 443.9  2017 - Present        Chronic back pain greater than 3 months duration ICD-10-CM: M54.9, G89.29  ICD-9-CM: 724.5, 338.29  2017 - Present        History of tobacco abuse ICD-10-CM: Y73.413  ICD-9-CM: V15.82  2017 - Present    Overview Signed 2019  1:21 PM by Albin Beaulieu MD     Overview:   Quit in              ICD (implantable cardioverter-defibrillator) infection ([facility].) ICD-10-CM: T82. 7XXA  ICD-9-CM: 996.61  2017 - Present    Overview Signed 2017 11:39 AM by Judith Cho NP     2017: Removal of dual chamber AICD generator and its leads under fluoroscopy  Cultures 17  RA lead/ICD pocket: gram negative rods, cultures enterobacter cloacae  RV lead gram negative rods enterobacter species     DELLA 17: No vegetation on valves              Gout (Chronic) ICD-10-CM: M10.9  ICD-9-CM: 274.9  2016 - Present        Chronic anticoagulation ICD-10-CM: Z79.01  ICD-9-CM: V58.61  2016 - Present        Sustained VT (ventricular tachycardia) ([facility].) ICD-10-CM: I47.2  ICD-9-CM: 427.1  2016 - Present    Overview Signed 2016  9:43 PM by Kalpesh Starr MD     2016 defib 150 J             Coronary artery disease involving coronary bypass graft ICD-10-CM: I25.810  ICD-9-CM: 414.05  2016 - Present        MI (myocardial infarction) ([facility] ICD-10-CM: I21.9  ICD-9-CM: 410.90  2016 - Present        Chronic systolic heart failure (HCC) ICD-10-CM: I50.22  ICD-9-CM: 428.22  2016 - Present        CAD, multiple vessel ICD-10-CM: I25.10  ICD-9-CM: 414.00  2016 - Present    Overview Signed 2018  1:27 PM by Nancy Wade     CATH 16: severe LM, and 3 VD with 60% right common iliac stenosis. EF: 10%. ----S/p IABP   S/p CAB2016: LIMA-> LAD, SVG-> RCA     CATH 16 following torsades SVG-> RCA TO   ---- s/p BMS x 2-.  RCA Ischemic cardiomyopathy ICD-10-CM: I25.5  ICD-9-CM: 414.8  11/26/2016 - Present        Sinus tachycardia ICD-10-CM: R00.0  ICD-9-CM: 427.89  11/24/2016 - Present        Acute respiratory failure with hypoxia [facility]) ICD-10-CM: J96.01  ICD-9-CM: 518.81  11/24/2016 - Present        Essential hypertension ICD-10-CM: I10  ICD-9-CM: 401.9  11/24/2016 - Present        Alcohol abuse ICD-10-CM: F10.10  ICD-9-CM: 305.00  11/24/2016 - Present        S/P laminectomy ICD-10-CM: Z98.890  ICD-9-CM: V45.89  11/22/2016 - Present        S/P lumbar laminectomy ICD-10-CM: Z98.890  ICD-9-CM: V45.89  11/22/2016 - Present        Seizure disorder ([facility] ICD-10-CM: G40.909  ICD-9-CM: 345.90  11/22/2016 - Present    Overview Signed 6/26/2019  1:21 PM by Ricarda Hicks MD     Overview:   Nichole Shell seizure             HTN (hypertension) ICD-10-CM: I10  ICD-9-CM: 401.9  11/22/2016 - Present        RESOLVED: Left ventricular assist device present ([facility] ICD-10-CM: Z95.811  ICD-9-CM: V43.21  12/27/2016 - 6/26/2019        RESOLVED: S/P ICD (internal cardiac defibrillator) procedure ICD-10-CM: Z95.810  ICD-9-CM: V45.02  12/16/2016 - 1/9/2019    Overview Signed 12/16/2016  3:08 PM by Ki Tian MD     12/16/16 Dual chamber Medtronic ICD for secondary prevention of sudden death  DFT < = 25 J             RESOLVED: VF (ventricular fibrillation) ([facility] ICD-10-CM: I49.01  ICD-9-CM: 427.41  12/13/2016 - 12/30/2016    Overview Signed 12/13/2016  9:44 PM by Ki Tian MD     12/4/2016 defibrillated 150 J             RESOLVED: HCAP (healthcare-associated pneumonia) ICD-10-CM: J18.9  ICD-9-CM: 052  11/24/2016 - 12/30/2016              Greater than 30 minutes were spent with the patient on counseling and coordination of care    Signed:    Claudia Dupree MD  10/2/2019  1:58 PM

## 2019-10-02 NOTE — PROGRESS NOTES
A Spiritual Care Partner Volunteer visited patient in  443 on 10/02/2019.   Documented by:  Chaplain Chavez MDiv, MS, [address]  [address] [place] [address] ([id number])

## 2019-10-02 NOTE — PROGRESS NOTES
Hospital follow-up PCP transitional care appointment has been scheduled with Dr. Lora Valdez for Wednesday, 10/9/19 at 2:15 p.m. Pending patient discharge.   Kathie Herman, Care Management Specialist.

## 2019-10-02 NOTE — DISCHARGE INSTRUCTIONS
Do not resume your Entresto until your appointment with Dr. Gerson Laureano on Thursday     Dear Brigitte Gibson,    Thank you for choosing [facility] for your healthcare needs. We strive to provide EXCELLENT care to you and your family. In an effort to explain clearly why you were here in the hospital, I've also written a very brief summary below. Other details including formal diagnosis, medication changes, and follow up appointment recommendations can also be found in this packet. You were admitted for dehydration and metabolic acidosis  due to gastroenteritis. Symptoms resolved. Dehydration resolved. Blood pressure stable. Your enterest and lasix are held, Heart Failure team aware and do not resume until you see Dr Gerson Laureano tomorrow. You are prescribed sodium bicarbonate for a week to help correct the metabolic acidosis. You need follow up blood work to check for electrolytes and kidney function. You also received care from specialist physicians in the following specialties: [place]. The GI doctors suggested colonoscopy as out patient. Check you blood pressure and weight daily. If your blood pressure is lower than 100/60 and you feel dizzy,let the Heart Failure team aware. If your weight increases by 3 pounds in a span of 1-2 days,you may take the lasix as long as you blood pressure is stable above 90/60        Remember that it is important for you to take your medications exactly as they are prescribed. It is helpful to keep a list of your medication with the names, dosages, and times to be taken in your wallet. Make sure to also see your primary care doctor for follow-up. Bring these papers with you and be sure to review your medication list with your doctor. I cannot stress the importance of follow up enough. I've included the information for your follow-up appointments below:     Follow-up Information     Follow up With Specialties Details Why Contact Info Marie Donahue MD Cardiology On 10/3/2019  [address] [phone] [facility], [place]an Martinez MD Family Practice On 10/9/2019 Hospital f/u PCP appointment Wednesday, 10/9/19 @ 2:15 p.m.   47-7  [phone]      [facility]   Go to your scheduled appointment [address], [address] [place] [address]  [phone]          At this time, the following test results are still pending: none  Again, please follow-up these results with your primary care provider. Should you have any fever over 101 degrees for 24 hours, chest pain, shortness of breath, fever, chills, nausea, vomiting, diarrhea, change in mentation, falling, weakness, bleeding, or worsening pain, please seek medical attention immediately. If you have any questions, I can be reached at [phone].   Thank you so much again for allowing me to care for you at [facility].    Respectfully yours,  Nichelle Calabrese MD

## 2019-10-03 ENCOUNTER — PATIENT OUTREACH (OUTPATIENT)
Dept: FAMILY MEDICINE CLINIC | Age: 64
End: 2019-10-03

## 2019-10-03 ENCOUNTER — OFFICE VISIT (OUTPATIENT)
Dept: CARDIOLOGY CLINIC | Age: 64
End: 2019-10-03

## 2019-10-03 VITALS
BODY MASS INDEX: 25.67 KG/M2 | TEMPERATURE: 98.6 F | HEIGHT: 68 IN | WEIGHT: 169.4 LBS | HEART RATE: 62 BPM | DIASTOLIC BLOOD PRESSURE: 62 MMHG | SYSTOLIC BLOOD PRESSURE: 112 MMHG | OXYGEN SATURATION: 98 % | RESPIRATION RATE: 20 BRPM

## 2019-10-03 DIAGNOSIS — I50.22 CHRONIC SYSTOLIC CONGESTIVE HEART FAILURE (HCC): ICD-10-CM

## 2019-10-03 DIAGNOSIS — R06.02 SHORTNESS OF BREATH: Primary | ICD-10-CM

## 2019-10-03 LAB — CALPROTECTIN STL-MCNT: 603 UG/G (ref 0–120)

## 2019-10-03 RX ORDER — BETAMETHASONE DIPROPIONATE 0.5 MG/G
CREAM TOPICAL
Refills: 4 | COMMUNITY
Start: 2019-09-05

## 2019-10-03 RX ORDER — FUROSEMIDE 20 MG/1
20 TABLET ORAL
COMMUNITY
End: 2019-11-08 | Stop reason: DRUGHIGH

## 2019-10-03 NOTE — LETTER
10/3/2019 1:54 PM 
 
Patient:  Author Martinez YOB: 1955 Date of Visit: 10/3/2019 Dear Kori Sheldon MD 
[address] [facility] [facility] [address] [address] VIA In Basket Bibi Avina MD 
[place] [address] [address] [facility] 7 [address] VIA In Basket 
 : Thank you for referring Mr. Melita Owens to me for evaluation/treatment. Below are the relevant portions of my assessment and plan of care. If you have questions, please do not hesitate to call me. I look forward to following Mr. Mann along with you. Sincerely, Mario Rose MD

## 2019-10-03 NOTE — PROGRESS NOTES
Add on labs faxed to [facility] lab. Medication changes, plan for follow up, and AVS reviewed with patient and significant other. They verbalized understanding and had no further questions. Hunter Renner RN.

## 2019-10-03 NOTE — PROGRESS NOTES
Hospital Discharge Follow-Up      Date/Time:  10/3/2019 3:01 PM    Patient was admitted to [facility] on  and discharged on 10/2 for nausea,vomiting,KLEVER CKD 2 with metabolic acidosis. The physician discharge summary was available at the time of outreach. Patient was contacted within 1 business days of discharge. Top Challenges reviewed with the provider   Saw  10/3-resume Entresto at 24/26 bid and Lasix 20mg qd prn. Advance Care Planning:   Does patient have an Advance Directive:  reviewed and current   Bicarb level- 12 day of discharge- Sodium Bicarb 650mg bid x 7 days. Recheck. Method of communication with provider :phone    Inpatient RRAT score: 25  Was this a readmission? no  Patient stated reason for the readmission: aubrey    Care Transition Nurse (CTN) contacted the family by telephone to perform post hospital discharge assessment. Verified name and  with family as identifiers. Provided introduction to self, and explanation of the CTN role. Wife reports he is doing much better today, was weak yesterday but stronger today. No nausea,vomiting or diarrhea. Saw  today for routine visit. Family received hospital discharge instructions. CTN reviewed discharge instructions and red flags with family who verbalized understanding. Family given an opportunity to ask questions and does not have any further questions or concerns at this time. The family agrees to contact the PCP office for questions related to their healthcare. CTN provided contact information for future reference. Disease Specific:   Chronic systolic HF with ischemic cardiomyopathy    Patients top risk factors for readmission:  Medical condition-chronic systolic HF with ischemic cardiomyopathy. recent ECHO 36-40%.     Home Health orders at discharge: [address]: na  Date of initial visit: aubrey    Durable Medical Equipment ordered at discharge: none  [address]: na  Durable Medical Equipment received: na    Medication(s):   New Medications at Discharge: Sodium bicarb 650mg bid x 7 days  Changed Medications at Discharge: none  Discontinued Medications at Discharge: Lasix and Entresto 97/103. NOTE- saw Wilber Levine on 10/3- resume Entresto at lower dose of 24/26 and Lasix 20mg qd prn swelling and dyspnea. Medication reconciliation was performed with family, who verbalizes understanding of administration of home medications. There were no barriers to obtaining medications identified at this time. Referral to Pharm D needed: no     Current Outpatient Medications   Medication Sig    betamethasone dipropionate (DIPROSONE) 0.05 % topical cream APPLY TO AFFECTED AREA ONCE EVERY DAY FOR 30 DAYS    sacubitril-valsartan (ENTRESTO) 24 mg/26 mg tablet Take 1 Tab by mouth two (2) times a day.  furosemide (LASIX) 20 mg tablet Take 20 mg by mouth daily as needed (swelling or dyspnea).  sodium bicarbonate 650 mg tablet Take 1 Tab by mouth two (2) times a day for 7 days.  triamcinolone acetonide (KENALOG) 0.025 % topical cream Apply 0.025 Tubes to affected area as needed.  carvedilol (COREG) 6.25 mg tablet Take 1 Tab by mouth two (2) times daily (with meals).  amiodarone (CORDARONE) 200 mg tablet Take 1 Tab by mouth daily.  patiromer calcium sorbitex (VELTASSA) 8.4 gram powder Take 16.8 g by mouth daily.  tadalafil (CIALIS) 20 mg tablet 20 mg as needed.  atorvastatin (LIPITOR) 40 mg tablet TAKE 1 TABLET BY MOUTH EVERY DAY    apixaban (ELIQUIS) 5 mg tablet Take 1 Tab by mouth two (2) times a day.  levalbuterol tartrate (XOPENEX) 45 mcg/actuation inhaler INHALE 2 PUFFS EVERY 4 HOURS AS NEEDED    montelukast (SINGULAIR) 10 mg tablet TAKE 1 TABLET BY MOUTH ONCE A DAY    HYDROcodone-acetaminophen (NORCO) 5-325 mg per tablet TAKE 1 TABLET EVERY 6 HOURS AS NEEDED    DULoxetine (CYMBALTA) 60 mg capsule Take 60 mg by mouth daily.     allopurinol (ZYLOPRIM) 100 mg tablet Take 100 mg by mouth daily.  tamsulosin (FLOMAX) 0.4 mg capsule Take 0.4 mg by mouth daily.  colchicine 0.6 mg tablet Take 0.6 mg by mouth every other day.  cyclobenzaprine (FLEXERIL) 5 mg tablet Take 5 mg by mouth daily as needed for Muscle Spasm(s).  aspirin 81 mg chewable tablet Take 81 mg by mouth daily.  ascorbic acid, vitamin C, (VITAMIN C) 500 mg tablet Take 1 Tab by mouth two (2) times a day. No current facility-administered medications for this visit. There are no discontinued medications. [facility] follow up appointment(s):   Future Appointments   Date Time Provider Kylah Nedra   10/14/2019  2:00 PM Scott Nichole MD [facility] JAHAIRA SCHED   6/26/2020 10:40 AM MARY Jett JAHAIRA SCHED      Non-[facility] follow up appointment(s):   [facility]:  information provided as a resource       Goals      Prevent complications post hospitalization. 10/3/19 [facility] 9/29-10/2 Nausea,vomiting,diarrhea-KLEVER-CKD 2 with metabolic acidosis  · Called wife and reviewed discharge instructions with her  · Reviewed medications- teachback on new med: Sodium bicarb. .Wife reported he saw  earlier today, she started him back on Entresto at lower dose of 24/26 bid and Lasix 20mg qd prn. · Reviewed red flags: nausea,vomiting,diarrhea,abdominal pain, fever, weakness,chest pain, sob. · - 10/3 12:00pm  · (pcp) 10/9 at 2:15pm  · [facility] 10/14  · Reminded to call Mac LEROY, about scheduling colonoscopy. · Given info on [facility] as resource. · Given CTN contact info if any questions/concerns. · Advised CTN to follow up in 5-7 days for update. ross

## 2019-10-03 NOTE — PROGRESS NOTES
[facility] Clinic Note      DOS:  10/3/2019  REFERRING PROVIDER: Nedra Peterson MD  PRIMARY CARE PHYSICIAN: Tereso Chatman MD  PRIMARY CARDIOLOGIST:  Pavithra Goldstein MD   EP: Regino Dixon MD   PULMONARY: Dominique Carmona MD       IMPRESSION/PLAN:   ICM s/p HMII as BTT on 12/1/16 and explant, NYHA Class II, LVEF 20-25%      Resume entresto 24/26 mg, 1 tablet BID    Continue Coreg to 6.25 mg BID   Take furosemide 20 mg daily PRN swelling or dyspnea    Continue low Na+ and low K+ diet - low K+ diet printed out for patient   Abstain from smoking and ETOH   ATTR- negative   Phone visit next week   Follow up in the [facility] in 2 weeks   Establish a primary HF cardiologist in [place], [place]    CAD s/p CABG (SVG to RCA and LIMA to LAD) on 12/1/16 s/p BMS x2 -> SVG-RCA graft - no angina              Continue ASA, BB, statin    Mitral Regurgitation, severe - improved to mild   Will follow on serial echo    Hyperkalemia   On Veltassa    Chronic Left Pleural Effusion - s/p left thoracentesis on 7/5/2019   Encourage use of IS     High Risk of SCD - s/p SQ AICD (5/20/19)   No shocks       PAD - difficult femoral access with LVAD explant              No symptoms of claudication      Chronic Anticoagulation s/p LVAD explant              Continue eliquis 5 mg  twice daily    Hyperkalemia K 5.4    Continue veltessa    Continue low K+ diet - printed out high K foods to avoid     Gout              Continue colchicine 0.6 mg every other day              Continue allopurinol 100 mg daily   Denies recent gouty attacks    Chronic Lower back pain              On oxycodone, flexeril   Hasn't taken since ICD implant   Back pain improved with increased activity- going to gym    H/o tobacco abuse   Abstaining from nicotine      H/o alcohol abuse   Currently abstaining   Encouraged complete abstinence      Seizure disorder - early 25s              No recent seizure activity     Peripheral neuropathy              On cymbalta   ATTR- Negative     Prevention              Influenza annually              Pneumonia vaccine every 5 years    Viral Gastroenteritis   Resolved   Fluid resuscitation - encourage increased water intake       Chief Complaint   Patient presents with    Follow-up         HPI: Jaison Jensen is a 59y.o. year old male  with a history of HFrEF in the setting of ICM s/p LVAD and recent LVAD explant complicated by PVD,  remote tobacco use and alcohol abuse (quit 11/2016) who presents for follow up of his HFrEF. Mr. Rachael Eden presented 11/22/2016 for decompressive laminectomy at E9-D4 complicated by myocardial infarction. The [facility](11/25/16) revealed severe left main and 3 vessel disease along with a 60% right common iliac stenosis and  LVEF was 10%. He subsequently underwent placement of an IABP followed by placement of a HeartMate2 LVAD on 12/1/2016 with concomitant CABG (SVG to the RCA, LIMA to LAD). His postoperative course was complicated by RV failure and an ileus requiring TPN resulting and a transaminitis. He had multiple episodes of Torsade on 12/4/2016 prompting repeat catheterization revealing an occluded RCA vein graft. Two BMS were placed in the RCA graft. He also had SVT requiring cardioversion. He had a single lead ICD placed on 30/90/57 complicated by a hematoma. The ICD was later removed on 1/20/17. Following his LVAD surgery he did require inpatient rehab.      Mr. Rachael Eden was listed for heart transplantation at [facility]. He was called in for transplantation on 7/14/2018 but on pre-op DELLA was noted to have normal LV function and the transplant surgery was aborted. He subsequently was admitted on 9/5/2018 for LVAD explant- his post-operative course was complicated by pneumonia and bilateral pleural effusions.  He was discharged on home O2 which he discontinued on his own.       After device explant, his subsequent echocardiograms showed deterioration of LVEF and worsening mitral regurgitation from 40-45% with moderate to severe MR on 9/19/18 to 35-40% with moderate-to-severe MR on 10/15/18. He has been removed from the transplant list at [facility] and is not interested in pursuing transplant evaluation at another center at this time. His entresto was discontinued at the time of his AICD implant due to hypotension. He subsequently took diovan but felt worse. He is tolerating the entresto with no dizziness, presyncope, or syncope. He was seen by Dr. Reno Alvarado, who stated that his mitral regurgitation has decreased significantly with optimizing his HF medications. He returns to clinic today for follow up after a recent hospitalization. He developed gastroenteritis and syncope prompting admission to [facility]. His labs on admission revealed KLEVER on CKD and his entresto and carvedilol were held. On discharge, he resumed taking carvedilol. Sudhakar is anxious to resume his entresto. He denies dizziness, presyncope since discharge. He further denies orthopnea, PND, edema. He and his wife recently purchased a home in [place], [place] and plan to move in the summer of 2020. CARDIAC EVALUATION  TTE 4/7/19: LVEF 32%, LVAD plug at LV apex, grade 2 diastolic dysfunction, mild-mod MR, mild TR, PAPs 43 mmHg, mild LAE  DELLA 7/14/2018 - normal LV function and the transplant surgery was aborted. He subsequently was admitted on 9/5/2018 for LVAD explant  ECHO 9/19/18:LVEF 40-45% mod-severe MR   ECHO 10/15/18:EF 35-40%, mod-severe MR   ECHO 10/31/18: TDS, EF 30-35%, reduced RVEF, TAPSE 1.6, LAE, mild- mod MR/TR  ECHO 1/16/19: EF 25-30%, question of thrombus in apex. Mild- mod TR. Consider cardiac MR to exclude left ventricular thrombus.   ECHO 6/13/2019: EF 16-20%, mod MR, mod-severe TR, severe pulm HTN (PA systolic pressure 85 mmHg), mildly reduced RV systolic function (TAPSE 8.93 cm)  ECHO 8/16/19: EF 20-25%, moderately reduced RV systolic function, mild MR    CATH 11/25/16: severe LM, and 3 VD with 60% right common iliac stenosis. EF: 10%. ----S/p IABP   ----S/p HeartMate2 LVAD 2016   S/p CAB2016: LIMA-> LAD, SVG-> RCA     CATH 16 following torsades SVG-> RCA TO   ---- s/p BMS x 2-.  RCA  RHC 2017:RA: 15/15 14, RV: 21/13 14,PA: 23/28, m 20, PCWP 13, PVR 1.67 CO 5.67, CI 3    S/p LVAD explant 18 ([facility])    SVT s/p DCCV  AICD  single lead cx by hematoma  AICD explanted 17    EKG:    10/31/18 NSR QRS 88ms   ms  19: SR rate 68bpm QRS 94ms Qtc 438 ms    Review of Systems:     General:  Negative   HEENT: Denies epistaxis, angioedema   Pulmonary: Denies  wheeze, hemoptysis  Cardiac: Denies exertional chest pain, palpitations, PND, edema, lightheadedness, syncope, near syncope,or bleeding  GI:  Denies  abdominal pain, change in bowel habits, or black or bloody stools  Musculo: Positive for chronic back pain for which he takes prn flexril and norco   Neuro: Denies  TIA/CVA sx   Skin:  Denies rash   Allergies: Reviewed   Psych: Denies depression or anxiety       History:  Past Medical History:   Diagnosis Date    Arrhythmia     SVT    Arthritis     CAD (coronary artery disease)     Chronic pain     back    [address]estive heart failure ([address]     DSOUZA (dyspnea on exertion) 2019    Gout     Heart failure ([address]     Hypertension     ICD (implantable cardioverter-defibrillator) in place     Long term current use of anticoagulant therapy     LVAD (left ventricular assist device) present ([address] 2016    Myocardial infarction ([address]     Pacemaker     SubQ ICD    Raynaud disease     Seizures ([address]     strobic seizures early 's     Past Surgical History:   Procedure Laterality Date    CABG, ARTERY-VEIN, TWO  2016    CARDIAC SURG PROCEDURE UNLIST  2016    LVAD    HX CORONARY ARTERY BYPASS GRAFT      HX CORONARY STENT PLACEMENT      HX HEART CATHETERIZATION      HX HEENT      wisdom teeth removed    HX ORTHOPAEDIC  2016    laminectomy    HX PACEMAKER ICD - removed 2017    HX PTCA      CT INSJ OF SUBQ IMPLANTABLE DEFIBRILLATOR ELECTRODE N/A 2019    INSERT SUBQ ICD w/ anesthesia performed by Arslan Penny MD at [address] [place] [address] LAB     Social History     Socioeconomic History    Marital status:      Spouse name: Antoni Brown    Number of children: 2    Years of education: Not on file    Highest education level: Some college, no degree   Occupational History    Not on file   Social Needs    Financial resource strain: Not hard at all   [employer] insecurity:     Worry: Never true     Inability: Never true   [employer] needs:     Medical: No     Non-medical: No   Tobacco Use    Smoking status: Former Smoker     Packs/day: 1.50     Years: 30.00     Pack years: 45.00     Last attempt to quit:      Years since quittin.7    Smokeless tobacco: Never Used   Substance and Sexual Activity    Alcohol use: No    Drug use: No    Sexual activity: Yes   Lifestyle    Physical activity:     Days per week: Not on file     Minutes per session: Not on file    Stress: Not on file   Relationships    Social connections:     Talks on phone: Not on file     Gets together: Not on file     Attends Sikhism service: Not on file     Active member of club or organization: Not on file     Attends meetings of clubs or organizations: Not on file     Relationship status: Not on file    Intimate partner violence:     Fear of current or ex partner: Not on file     Emotionally abused: Not on file     Physically abused: Not on file     Forced sexual activity: Not on file   Other Topics Concern     Service Not Asked    Blood Transfusions Not Asked    Caffeine Concern Not Asked    Occupational Exposure Not Asked   [place] Hazards Not Asked    Sleep Concern Not Asked    Stress Concern Not Asked    Weight Concern Not Asked    Special Diet Not Asked    Back Care Not Asked    Exercise Not Asked    Bike Helmet Not Asked   Glide Not Asked [facility] Self-Exams Not Asked   Social History Narrative    Not on file     Family History   Problem Relation Age of Onset    Diabetes Mother     Dementia Mother     Other Mother         carotid artery blockage    Heart Disease Father     Stroke Father     Heart Attack Father         several    Hypertension Father     Elevated Lipids Father     No Known Problems Sister     Cancer Brother         brain    Lung Disease Sister     COPD Sister     Cancer Sister         lung       Current Medications:   Current Outpatient Medications   Medication Sig Dispense Refill    betamethasone dipropionate (DIPROSONE) 0.05 % topical cream APPLY TO AFFECTED AREA ONCE EVERY DAY FOR 30 DAYS  4    sodium bicarbonate 650 mg tablet Take 1 Tab by mouth two (2) times a day for 7 days. 14 Tab 0    triamcinolone acetonide (KENALOG) 0.025 % topical cream Apply 0.025 Tubes to affected area as needed.  carvedilol (COREG) 6.25 mg tablet Take 1 Tab by mouth two (2) times daily (with meals). 180 Tab 3    amiodarone (CORDARONE) 200 mg tablet Take 1 Tab by mouth daily. 90 Tab 1    patiromer calcium sorbitex (VELTASSA) 8.4 gram powder Take 16.8 g by mouth daily. 60 Packet 11    tadalafil (CIALIS) 20 mg tablet 20 mg as needed. 6    atorvastatin (LIPITOR) 40 mg tablet TAKE 1 TABLET BY MOUTH EVERY DAY 30 Tab 11    apixaban (ELIQUIS) 5 mg tablet Take 1 Tab by mouth two (2) times a day. 60 Tab 11    levalbuterol tartrate (XOPENEX) 45 mcg/actuation inhaler INHALE 2 PUFFS EVERY 4 HOURS AS NEEDED  0    montelukast (SINGULAIR) 10 mg tablet TAKE 1 TABLET BY MOUTH ONCE A DAY  3    HYDROcodone-acetaminophen (NORCO) 5-325 mg per tablet TAKE 1 TABLET EVERY 6 HOURS AS NEEDED  0    DULoxetine (CYMBALTA) 60 mg capsule Take 60 mg by mouth daily.  allopurinol (ZYLOPRIM) 100 mg tablet Take 100 mg by mouth daily.  tamsulosin (FLOMAX) 0.4 mg capsule Take 0.4 mg by mouth daily.       colchicine 0.6 mg tablet Take 0.6 mg by mouth every other day.  cyclobenzaprine (FLEXERIL) 5 mg tablet Take 5 mg by mouth daily as needed for Muscle Spasm(s).  aspirin 81 mg chewable tablet Take 81 mg by mouth daily.  ascorbic acid, vitamin C, (VITAMIN C) 500 mg tablet Take 1 Tab by mouth two (2) times a day. 60 Tab 6       Allergies: Allergies   Allergen Reactions    Morphine Other (comments)     Hallucinations. Confusion. Impaired judgement    Chlorhexidine Rash           Physical Exam:   Vitals:    Visit Vitals  /62 (BP 1 Location: Right arm, BP Patient Position: Sitting)   Pulse 62   Temp 98.6 °F (37 °C) (Oral)   Resp 20   Ht 5' 8\" (1.727 m)   Wt 169 lb 6.4 oz (76.8 kg)   SpO2 98%   BMI 25.76 kg/m²        General:  Well developed WM, AAOx3, pleasant,  cooperative, no acute distress. HEENT:  Atraumatic. Pink and moist.  Anicteric sclerae. Neck:   Supple, no adenopoathy. Lungs:  Clear to auscultation, No wheezing/rhonchi/rales. Heart:   PMI: Median sternotomy scar,  Regular rhythm, S1, S2 present, 1/6 systolic murmur, no rubs, no gallops. JVD 8 cm (-) HJR . No carotid bruits. Abdomen:  Soft, non-distended, non-tender. + Bowel sounds. No bruits. Extremities:  Venous stasis changes, no clubbing, cyanosis, or edema. No calf tenderness  Neurologic:  Grossly intact. Alert and oriented X 3. No acute neurological distress. Skin:   intact, no wounds, ecchymosis, bruising, rashes   Psych:  Good insight. Not anxious nor agitated.     Recent Labs:     Lab Results   Component Value Date/Time    WBC 6.5 10/01/2019 03:49 AM    HGB 11.4 (L) 10/01/2019 03:49 AM    HCT 36.9 10/01/2019 03:49 AM    PLATELET 742 95/83/6959 03:49 AM    MCV 92.7 10/01/2019 03:49 AM     Lab Results   Component Value Date/Time    GFR est non-AA >60 10/02/2019 12:22 PM    GFR est AA >60 10/02/2019 12:22 PM    Creatinine 1.21 10/02/2019 12:22 PM    BUN 21 (H) 10/02/2019 12:22 PM    Sodium 138 10/02/2019 12:22 PM    Potassium 4.0 10/02/2019 12:22 PM    Chloride 115 (H) 10/02/2019 12:22 PM    CO2 14 (LL) 10/02/2019 12:22 PM    Magnesium 2.0 10/01/2019 03:49 AM    Phosphorus 1.8 (L) 10/01/2019 03:49 AM     Lab Results   Component Value Date/Time    TSH 3.790 06/12/2019 03:05 PM    T4, Free 1.68 06/12/2019 03:05 PM      Lab Results   Component Value Date/Time    Sodium 138 10/02/2019 12:22 PM    Potassium 4.0 10/02/2019 12:22 PM    Chloride 115 (H) 10/02/2019 12:22 PM    CO2 14 (LL) 10/02/2019 12:22 PM    Anion gap 9 10/02/2019 12:22 PM    Glucose 132 (H) 10/02/2019 12:22 PM    BUN 21 (H) 10/02/2019 12:22 PM    Creatinine 1.21 10/02/2019 12:22 PM    BUN/Creatinine ratio 17 10/02/2019 12:22 PM    GFR est AA >60 10/02/2019 12:22 PM    GFR est non-AA >60 10/02/2019 12:22 PM    Calcium 9.0 10/02/2019 12:22 PM    Bilirubin, total 0.4 10/02/2019 12:22 PM    ALT (SGPT) 29 10/02/2019 12:22 PM    AST (SGOT) 19 10/02/2019 12:22 PM    Alk. phosphatase 107 10/02/2019 12:22 PM    Protein, total 7.3 10/02/2019 12:22 PM    Albumin 3.4 (L) 10/02/2019 12:22 PM    Globulin 3.9 10/02/2019 12:22 PM    A-G Ratio 0.9 (L) 10/02/2019 12:22 PM      Lab Results   Component Value Date/Time    Hemoglobin A1c 5.9 05/22/2019 02:56 AM       Thank you for letting us see him with you,      Kristi Solo MD, [facility], [address] [place] [address]  Chief of Cardiology, [address] [facility] Director  [address] [place] [address] [place]  [address] [facility], [address], [address] [place] [address]  Office [phone]  Fax [phone]

## 2019-10-03 NOTE — PATIENT INSTRUCTIONS
Medication changes:    RESTART sacubitril-valsartan (Entresto) 24mg/26mg- Take half of a 49mg/51mg tablet by mouth twice daily. Our office will contact you next week for an update on blood pressures and weights. Ensure you are taking in an adequate amount of fluid    Testing Ordered:    Lab work has been added on to the labs drawn yesterday. Our office will notify you of any abnormal results. Follow up 2 weeks (Monday Oct. 14th) with Brianna Patel       Please monitor your blood pressures daily prior to medications and 2 hours after taking medications. Bring a written record of your blood pressures to your next appointment. Please monitor your weights daily upon waking and after using the bathroom. Keep a written records of your weights and bring to your next appointment. If you have a weight gain of 3 or more pounds overnight OR 5 or more pounds in one week, please contact our office. Thank you for allowing us the privilege of being a part of your healthcare team! Please do not hesitate to contact our office at [phone] with any questions or concerns. [facility] in [facility] has an outpatient Heart Failure Clinic. To schedule an appointment or find out more, contact the clinic at [phone].   Address: Rickey Alarcon, [facility]

## 2019-10-04 LAB
BNP SERPL-MCNC: 4444 PG/ML
ELASTASE PANC STL-MCNT: 165 UG ELAST./G
MAGNESIUM SERPL-MCNC: 1.6 MG/DL (ref 1.6–2.4)

## 2019-10-05 DIAGNOSIS — I25.5 ISCHEMIC CARDIOMYOPATHY: ICD-10-CM

## 2019-10-16 ENCOUNTER — TELEPHONE (OUTPATIENT)
Dept: CARDIOLOGY CLINIC | Age: 64
End: 2019-10-16

## 2019-10-16 NOTE — TELEPHONE ENCOUNTER
Patient called to reschedule his missed appointment.     Appointment is scheduled on Friday November 8th at 1:30pm with Dr. Sonya Marshall

## 2019-11-01 ENCOUNTER — PATIENT OUTREACH (OUTPATIENT)
Dept: FAMILY MEDICINE CLINIC | Age: 64
End: 2019-11-01

## 2019-11-01 NOTE — PROGRESS NOTES
Called patient for update. Says doing fine now. Eating well, no further problems with the nausea or vomiting. Did see his pcp, Brandy Tobar, on 10/9. Missed appt with  on 10/14-thought it was for another day that week. Rescheduled for 11/7. Wife called GI to schedule colonoscopy-needs cardiac clearance prior=suggest he call 's nurse to see if she could do that when he sees her next Friday. Wished him well, reminded to f/u with providers and specialists as advised. Patient has graduated from the [employer] on 11/1. Patient's symptoms are stable at this time. Patient/family has the ability to self-manage. Care management goals have been completed at this time. No further nurse navigator follow up scheduled. Goals Addressed                 This Visit's Progress     Prevent complications post hospitalization. 10/3/19 [facility] 9/29-10/2 Nausea,vomiting,diarrhea-KLEVER-CKD 2 with metabolic acidosis  · Called wife and reviewed discharge instructions with her  · Reviewed medications- teachback on new med: Sodium bicarb. .Wife reported he saw  earlier today, she started him back on Entresto at lower dose of 24/26 bid and Lasix 20mg qd prn. · Reviewed red flags: nausea,vomiting,diarrhea,abdominal pain, fever, weakness,chest pain, sob. · - 10/3 12:00pm  · (pcp) 10/9 at 2:15pm  · [facility] 10/14  · Reminded to call GI, Shahana Deleon, about scheduling colonoscopy. · Given info on [employer] as resource. · Given [facility] contact info if any questions/concerns. · Advised CTN to follow up in 5-7 days for update. mbt  11/1/19  Patient in good spirits-feels fine now. No further nausea or vomiting after hospitalization. Saw Brandy Tobar 10/9 for SABRINA. Missed f/u with  on 10/14-thought it was for another day that week. Now rescheduled for 11/8-will ask for cardiac clearance for colonoscopy. mbt.             Pt has nurse navigator's contact information for any further questions, concerns, or needs.   Patients upcoming visits:    Future Appointments   Date Time Provider Kylah Sneed   11/8/2019  1:30 PM Mery Paredes MD [facility]   6/26/2020 10:40 AM Clint Nguyen NP [facility]

## 2019-11-05 ENCOUNTER — TELEPHONE (OUTPATIENT)
Dept: CARDIOLOGY CLINIC | Age: 64
End: 2019-11-05

## 2019-11-05 NOTE — TELEPHONE ENCOUNTER
Patient called stating he was recommended for a colonoscopy. He is wondering if Dr. Bernadette Villatoro can provide clearance for the test as patient needs cardiac clearance. Patient scheduled to see Dr. Bernadette Villatoro 11/8/19 at 1:30pm. Appointment confirmed with patient. Informed patient can discuss clearance at upcoming appointment. Patient verbalized understanding and had no further questions at this time. Madeleine Riley RN.

## 2019-11-07 LAB
BUN SERPL-MCNC: 25 MG/DL (ref 8–27)
BUN/CREAT SERPL: 20 (ref 10–24)
CALCIUM SERPL-MCNC: 9.1 MG/DL (ref 8.6–10.2)
CHLORIDE SERPL-SCNC: 102 MMOL/L (ref 96–106)
CO2 SERPL-SCNC: 19 MMOL/L (ref 20–29)
CREAT SERPL-MCNC: 1.27 MG/DL (ref 0.76–1.27)
GLUCOSE SERPL-MCNC: 127 MG/DL (ref 65–99)
NT-PROBNP SERPL-MCNC: ABNORMAL PG/ML (ref 0–210)
POTASSIUM SERPL-SCNC: 4.3 MMOL/L (ref 3.5–5.2)
SODIUM SERPL-SCNC: 136 MMOL/L (ref 134–144)

## 2019-11-08 ENCOUNTER — OFFICE VISIT (OUTPATIENT)
Dept: CARDIOLOGY CLINIC | Age: 64
End: 2019-11-08

## 2019-11-08 ENCOUNTER — HOSPITAL ENCOUNTER (INPATIENT)
Age: 64
LOS: 2 days | Discharge: HOME | DRG: 641 | End: 2019-11-10
Attending: INTERNAL MEDICINE | Admitting: INTERNAL MEDICINE
Payer: COMMERCIAL

## 2019-11-08 VITALS
SYSTOLIC BLOOD PRESSURE: 94 MMHG | TEMPERATURE: 97 F | HEART RATE: 70 BPM | WEIGHT: 180.2 LBS | HEIGHT: 68 IN | BODY MASS INDEX: 27.31 KG/M2 | RESPIRATION RATE: 16 BRPM | DIASTOLIC BLOOD PRESSURE: 58 MMHG

## 2019-11-08 DIAGNOSIS — I34.0 MITRAL VALVE INSUFFICIENCY, UNSPECIFIED ETIOLOGY: ICD-10-CM

## 2019-11-08 DIAGNOSIS — I25.10 CAD, MULTIPLE VESSEL: ICD-10-CM

## 2019-11-08 DIAGNOSIS — T82.7XXD INFECTION INVOLVING IMPLANTABLE CARDIOVERTER-DEFIBRILLATOR (ICD), SUBSEQUENT ENCOUNTER: ICD-10-CM

## 2019-11-08 DIAGNOSIS — R06.09 DOE (DYSPNEA ON EXERTION): ICD-10-CM

## 2019-11-08 DIAGNOSIS — I47.20 SUSTAINED VT (VENTRICULAR TACHYCARDIA): ICD-10-CM

## 2019-11-08 DIAGNOSIS — G40.909 SEIZURE DISORDER (HCC): ICD-10-CM

## 2019-11-08 DIAGNOSIS — Z87.891 HISTORY OF TOBACCO ABUSE: ICD-10-CM

## 2019-11-08 DIAGNOSIS — I50.23 ACUTE ON CHRONIC SYSTOLIC (CONGESTIVE) HEART FAILURE (HCC): ICD-10-CM

## 2019-11-08 DIAGNOSIS — I50.22 CHRONIC SYSTOLIC CONGESTIVE HEART FAILURE (HCC): ICD-10-CM

## 2019-11-08 DIAGNOSIS — I50.22 CHRONIC SYSTOLIC HEART FAILURE (HCC): ICD-10-CM

## 2019-11-08 DIAGNOSIS — R06.02 SHORTNESS OF BREATH: Primary | ICD-10-CM

## 2019-11-08 DIAGNOSIS — I25.5 ISCHEMIC CARDIOMYOPATHY: ICD-10-CM

## 2019-11-08 DIAGNOSIS — J96.01 ACUTE RESPIRATORY FAILURE WITH HYPOXIA (HCC): ICD-10-CM

## 2019-11-08 DIAGNOSIS — Z79.01 CHRONIC ANTICOAGULATION: ICD-10-CM

## 2019-11-08 DIAGNOSIS — F10.10 ALCOHOL ABUSE: ICD-10-CM

## 2019-11-08 LAB
ALBUMIN SERPL-MCNC: 3.4 G/DL (ref 3.5–5)
ALBUMIN/GLOB SERPL: 0.9 {RATIO} (ref 1.1–2.2)
ALP SERPL-CCNC: 118 U/L (ref 45–117)
ALT SERPL-CCNC: 52 U/L (ref 12–78)
ANION GAP SERPL CALC-SCNC: 11 MMOL/L (ref 5–15)
AST SERPL-CCNC: 47 U/L (ref 15–37)
BILIRUB SERPL-MCNC: 0.9 MG/DL (ref 0.2–1)
BNP SERPL-MCNC: ABNORMAL PG/ML
BUN SERPL-MCNC: 32 MG/DL (ref 6–20)
BUN/CREAT SERPL: 21 (ref 12–20)
CALCIUM SERPL-MCNC: 8.3 MG/DL (ref 8.5–10.1)
CHLORIDE SERPL-SCNC: 106 MMOL/L (ref 97–108)
CO2 SERPL-SCNC: 19 MMOL/L (ref 21–32)
CREAT SERPL-MCNC: 1.52 MG/DL (ref 0.7–1.3)
ERYTHROCYTE [DISTWIDTH] IN BLOOD BY AUTOMATED COUNT: 16.3 % (ref 11.5–14.5)
GLOBULIN SER CALC-MCNC: 3.7 G/DL (ref 2–4)
GLUCOSE SERPL-MCNC: 86 MG/DL (ref 65–100)
HCT VFR BLD AUTO: 27.7 % (ref 36.6–50.3)
HGB BLD-MCNC: 8.7 G/DL (ref 12.1–17)
MAGNESIUM SERPL-MCNC: 1.7 MG/DL (ref 1.6–2.4)
MCH RBC QN AUTO: 29.6 PG (ref 26–34)
MCHC RBC AUTO-ENTMCNC: 31.4 G/DL (ref 30–36.5)
MCV RBC AUTO: 94.2 FL (ref 80–99)
NRBC # BLD: 0 K/UL (ref 0–0.01)
NRBC BLD-RTO: 0 PER 100 WBC
PLATELET # BLD AUTO: 223 K/UL (ref 150–400)
PMV BLD AUTO: 11.3 FL (ref 8.9–12.9)
POTASSIUM SERPL-SCNC: 3.9 MMOL/L (ref 3.5–5.1)
PROT SERPL-MCNC: 7.1 G/DL (ref 6.4–8.2)
RBC # BLD AUTO: 2.94 M/UL (ref 4.1–5.7)
SODIUM SERPL-SCNC: 136 MMOL/L (ref 136–145)
WBC # BLD AUTO: 9.8 K/UL (ref 4.1–11.1)

## 2019-11-08 PROCEDURE — 74011000250 HC RX REV CODE- 250: Performed by: NURSE PRACTITIONER

## 2019-11-08 PROCEDURE — 74011250636 HC RX REV CODE- 250/636: Performed by: NURSE PRACTITIONER

## 2019-11-08 PROCEDURE — 85027 COMPLETE CBC AUTOMATED: CPT

## 2019-11-08 PROCEDURE — 94640 AIRWAY INHALATION TREATMENT: CPT

## 2019-11-08 PROCEDURE — 94664 DEMO&/EVAL PT USE INHALER: CPT

## 2019-11-08 PROCEDURE — 83880 ASSAY OF NATRIURETIC PEPTIDE: CPT

## 2019-11-08 PROCEDURE — 83735 ASSAY OF MAGNESIUM: CPT

## 2019-11-08 PROCEDURE — 74011250637 HC RX REV CODE- 250/637: Performed by: NURSE PRACTITIONER

## 2019-11-08 PROCEDURE — 80053 COMPREHEN METABOLIC PANEL: CPT

## 2019-11-08 PROCEDURE — 99223 1ST HOSP IP/OBS HIGH 75: CPT | Performed by: INTERNAL MEDICINE

## 2019-11-08 PROCEDURE — 65660000001 HC RM ICU INTERMED STEPDOWN

## 2019-11-08 PROCEDURE — 93005 ELECTROCARDIOGRAM TRACING: CPT

## 2019-11-08 PROCEDURE — 36415 COLL VENOUS BLD VENIPUNCTURE: CPT

## 2019-11-08 PROCEDURE — 77030029684 HC NEB SM VOL KT MONA -A

## 2019-11-08 RX ORDER — SODIUM CHLORIDE 0.9 % (FLUSH) 0.9 %
5-40 SYRINGE (ML) INJECTION AS NEEDED
Status: DISCONTINUED | OUTPATIENT
Start: 2019-11-08 | End: 2019-11-10 | Stop reason: HOSPADM

## 2019-11-08 RX ORDER — DOBUTAMINE HYDROCHLORIDE 200 MG/100ML
3 INJECTION INTRAVENOUS CONTINUOUS
Status: DISCONTINUED | OUTPATIENT
Start: 2019-11-08 | End: 2019-11-10

## 2019-11-08 RX ORDER — PREGABALIN 50 MG/1
50 CAPSULE ORAL 3 TIMES DAILY
Refills: 2 | COMMUNITY
Start: 2019-10-09

## 2019-11-08 RX ORDER — AMIODARONE HYDROCHLORIDE 200 MG/1
200 TABLET ORAL DAILY
Status: DISCONTINUED | OUTPATIENT
Start: 2019-11-09 | End: 2019-11-10 | Stop reason: HOSPADM

## 2019-11-08 RX ORDER — CYCLOBENZAPRINE HCL 10 MG
5 TABLET ORAL
Status: DISCONTINUED | OUTPATIENT
Start: 2019-11-08 | End: 2019-11-10 | Stop reason: HOSPADM

## 2019-11-08 RX ORDER — AMOXICILLIN 250 MG
1 CAPSULE ORAL 2 TIMES DAILY
Status: DISCONTINUED | OUTPATIENT
Start: 2019-11-08 | End: 2019-11-10 | Stop reason: HOSPADM

## 2019-11-08 RX ORDER — DOCUSATE SODIUM 100 MG/1
100 CAPSULE, LIQUID FILLED ORAL AS NEEDED
Status: DISCONTINUED | OUTPATIENT
Start: 2019-11-08 | End: 2019-11-10 | Stop reason: HOSPADM

## 2019-11-08 RX ORDER — FAMOTIDINE 20 MG/1
20 TABLET, FILM COATED ORAL EVERY 12 HOURS
Status: DISCONTINUED | OUTPATIENT
Start: 2019-11-08 | End: 2019-11-10

## 2019-11-08 RX ORDER — ONDANSETRON 2 MG/ML
4 INJECTION INTRAMUSCULAR; INTRAVENOUS
Status: DISCONTINUED | OUTPATIENT
Start: 2019-11-08 | End: 2019-11-10 | Stop reason: HOSPADM

## 2019-11-08 RX ORDER — BUMETANIDE 0.25 MG/ML
2 INJECTION INTRAMUSCULAR; INTRAVENOUS EVERY 12 HOURS
Status: DISCONTINUED | OUTPATIENT
Start: 2019-11-08 | End: 2019-11-10

## 2019-11-08 RX ORDER — ALLOPURINOL 100 MG/1
100 TABLET ORAL DAILY
Status: DISCONTINUED | OUTPATIENT
Start: 2019-11-09 | End: 2019-11-10 | Stop reason: HOSPADM

## 2019-11-08 RX ORDER — GUAIFENESIN 100 MG/5ML
81 LIQUID (ML) ORAL DAILY
Status: DISCONTINUED | OUTPATIENT
Start: 2019-11-09 | End: 2019-11-10 | Stop reason: HOSPADM

## 2019-11-08 RX ORDER — DULOXETIN HYDROCHLORIDE 60 MG/1
60 CAPSULE, DELAYED RELEASE ORAL DAILY
Status: DISCONTINUED | OUTPATIENT
Start: 2019-11-09 | End: 2019-11-10 | Stop reason: HOSPADM

## 2019-11-08 RX ORDER — GUAIFENESIN 600 MG/1
600 TABLET, EXTENDED RELEASE ORAL EVERY 12 HOURS
Status: DISCONTINUED | OUTPATIENT
Start: 2019-11-08 | End: 2019-11-10 | Stop reason: HOSPADM

## 2019-11-08 RX ORDER — ASCORBIC ACID 500 MG
500 TABLET ORAL 2 TIMES DAILY
Status: DISCONTINUED | OUTPATIENT
Start: 2019-11-08 | End: 2019-11-10 | Stop reason: HOSPADM

## 2019-11-08 RX ORDER — MONTELUKAST SODIUM 10 MG/1
10 TABLET ORAL
Status: DISCONTINUED | OUTPATIENT
Start: 2019-11-08 | End: 2019-11-10 | Stop reason: HOSPADM

## 2019-11-08 RX ORDER — HYDROCODONE BITARTRATE AND ACETAMINOPHEN 5; 325 MG/1; MG/1
1 TABLET ORAL
Status: DISCONTINUED | OUTPATIENT
Start: 2019-11-08 | End: 2019-11-10 | Stop reason: HOSPADM

## 2019-11-08 RX ORDER — ALBUTEROL SULFATE 0.83 MG/ML
2.5 SOLUTION RESPIRATORY (INHALATION)
Status: DISCONTINUED | OUTPATIENT
Start: 2019-11-08 | End: 2019-11-10 | Stop reason: HOSPADM

## 2019-11-08 RX ORDER — ACETAMINOPHEN 325 MG/1
650 TABLET ORAL
Status: DISCONTINUED | OUTPATIENT
Start: 2019-11-08 | End: 2019-11-10 | Stop reason: HOSPADM

## 2019-11-08 RX ORDER — SACUBITRIL AND VALSARTAN 97; 103 MG/1; MG/1
TABLET, FILM COATED ORAL
Refills: 3 | COMMUNITY
Start: 2019-10-25 | End: 2019-11-10

## 2019-11-08 RX ORDER — BISACODYL 5 MG
5 TABLET, DELAYED RELEASE (ENTERIC COATED) ORAL DAILY PRN
Status: DISCONTINUED | OUTPATIENT
Start: 2019-11-08 | End: 2019-11-10 | Stop reason: HOSPADM

## 2019-11-08 RX ORDER — TAMSULOSIN HYDROCHLORIDE 0.4 MG/1
0.4 CAPSULE ORAL DAILY
Status: DISCONTINUED | OUTPATIENT
Start: 2019-11-09 | End: 2019-11-10 | Stop reason: HOSPADM

## 2019-11-08 RX ORDER — SODIUM CHLORIDE 0.9 % (FLUSH) 0.9 %
5-40 SYRINGE (ML) INJECTION EVERY 8 HOURS
Status: DISCONTINUED | OUTPATIENT
Start: 2019-11-08 | End: 2019-11-10 | Stop reason: HOSPADM

## 2019-11-08 RX ORDER — ATORVASTATIN CALCIUM 40 MG/1
40 TABLET, FILM COATED ORAL
Status: DISCONTINUED | OUTPATIENT
Start: 2019-11-08 | End: 2019-11-10 | Stop reason: HOSPADM

## 2019-11-08 RX ORDER — COLCHICINE 0.6 MG/1
0.6 TABLET ORAL EVERY OTHER DAY
Status: DISCONTINUED | OUTPATIENT
Start: 2019-11-08 | End: 2019-11-10 | Stop reason: HOSPADM

## 2019-11-08 RX ORDER — PREGABALIN 25 MG/1
50 CAPSULE ORAL 3 TIMES DAILY
Status: DISCONTINUED | OUTPATIENT
Start: 2019-11-08 | End: 2019-11-10 | Stop reason: HOSPADM

## 2019-11-08 RX ADMIN — DOBUTAMINE IN DEXTROSE 5 MCG/KG/MIN: 200 INJECTION, SOLUTION INTRAVENOUS at 16:16

## 2019-11-08 RX ADMIN — Medication 10 ML: at 23:35

## 2019-11-08 RX ADMIN — FAMOTIDINE 20 MG: 20 TABLET ORAL at 20:30

## 2019-11-08 RX ADMIN — PREGABALIN 50 MG: 25 CAPSULE ORAL at 16:04

## 2019-11-08 RX ADMIN — ALBUTEROL SULFATE 2.5 MG: 2.5 SOLUTION RESPIRATORY (INHALATION) at 20:42

## 2019-11-08 RX ADMIN — FAMOTIDINE 20 MG: 20 TABLET ORAL at 16:05

## 2019-11-08 RX ADMIN — BUMETANIDE 2 MG: 0.25 INJECTION INTRAMUSCULAR; INTRAVENOUS at 20:30

## 2019-11-08 RX ADMIN — BUMETANIDE 2 MG: 0.25 INJECTION INTRAMUSCULAR; INTRAVENOUS at 16:04

## 2019-11-08 RX ADMIN — APIXABAN 5 MG: 5 TABLET, FILM COATED ORAL at 17:02

## 2019-11-08 RX ADMIN — COLCHICINE 0.6 MG: 0.6 TABLET, FILM COATED ORAL at 16:04

## 2019-11-08 RX ADMIN — GUAIFENESIN 600 MG: 600 TABLET, EXTENDED RELEASE ORAL at 21:41

## 2019-11-08 RX ADMIN — PREGABALIN 50 MG: 25 CAPSULE ORAL at 21:41

## 2019-11-08 RX ADMIN — MONTELUKAST SODIUM 10 MG: 10 TABLET, FILM COATED ORAL at 21:41

## 2019-11-08 RX ADMIN — ATORVASTATIN CALCIUM 40 MG: 40 TABLET, FILM COATED ORAL at 21:41

## 2019-11-08 RX ADMIN — OXYCODONE HYDROCHLORIDE AND ACETAMINOPHEN 500 MG: 500 TABLET ORAL at 17:02

## 2019-11-08 NOTE — PROGRESS NOTES
[facility] Clinic Note      DOS:  11/8/2019  REFERRING PROVIDER: Luis Enrique Duffy MD  PRIMARY CARE PHYSICIAN: Joshua Verdugo MD  PRIMARY CARDIOLOGIST:  Bernard Platt MD   EP: Smiley Warren MD   PULMONARY: Bere Krishna MD       IMPRESSION/PLAN:   ICM s/p HMII as BTT on 12/1/16 and explant, NYHA Class IV, LVEF 20-25%     Admit to [facility] for IV diuresis   Hold entresto and carvedilol   Start IV dobutamine 5 mcgs/kg/min   Start IV bumex 2 mg BID    Track BP and electrolytes   Continue low Na+ and low K+ diet    Abstain from smoking and ETOH     CAD s/p CABG (SVG to RCA and LIMA to LAD) on 12/1/16 s/p BMS x2 -> SVG-RCA graft - no angina              Continue ASA, statin   Resume BB once diuresed    Mitral Regurgitation, severe - improved to mild   Repeat echo    Hyperkalemia- resolved   Continue Veltassa    Chronic Left Pleural Effusion - s/p left thoracentesis on 7/5/2019   Encourage use of IS     High Risk of SCD - s/p SQ AICD (5/20/19)   No shocks       PAD - difficult femoral access with LVAD explant              No symptoms of claudication      Chronic Anticoagulation s/p LVAD explant              Continue eliquis 5 mg  twice daily     Gout              Continue colchicine 0.6 mg every other day              Continue allopurinol 100 mg daily   Denies recent gouty attacks    Chronic Lower back pain              On oxycodone, flexeril   Hasn't taken since ICD implant   Back pain improved with increased activity- going to gym    H/o tobacco abuse   Abstaining from nicotine      H/o alcohol abuse   Currently abstaining   Encouraged complete abstinence      Seizure disorder - early 25s              No recent seizure activity     Peripheral neuropathy              On cymbalta   ATTR- Negative     Prevention              Influenza annually              Pneumonia vaccine every 5 years    Viral Gastroenteritis   Resolved   Fluid resuscitation - encourage increased water intake       Chief Complaint   Patient presents with    Follow-up    Shortness of Breath    Leg Swelling         HPI: Leigh Ann Gardner is a 62y.o. year old male  with a history of HFrEF in the setting of ICM s/p LVAD and recent LVAD explant complicated by PVD,  remote tobacco use and alcohol abuse (quit 11/2016) who presents for follow up of his HFrEF. Mr. Shoshana Love presented 11/22/2016 for decompressive laminectomy at B0-Y6 complicated by myocardial infarction. [facility](11/25/16) revealed severe left main and 3 vessel disease along with a 60% right common iliac stenosis and  LVEF was 10%. He subsequently underwent placement of an IABP followed by placement of a HeartMate2 LVAD on 12/1/2016 with concomitant CABG (SVG to the RCA, LIMA to LAD). His postoperative course was complicated by RV failure and an ileus requiring TPN resulting and a transaminitis. He had multiple episodes of Torsade on 12/4/2016 prompting repeat catheterization revealing an occluded RCA vein graft. Two BMS were placed in the RCA graft. He also had SVT requiring cardioversion. He had a single lead ICD placed on 25/92/02 complicated by a hematoma. The ICD was later removed on 1/20/17. Following his LVAD surgery he did require inpatient rehab.      Mr. Shoshana Love was listed for heart transplantation at [facility]. He was called in for transplantation on 7/14/2018 but on pre-op DELLA was noted to have normal LV function and the transplant surgery was aborted. He subsequently was admitted on 9/5/2018 for LVAD explant- his post-operative course was complicated by pneumonia and bilateral pleural effusions. He was discharged on home O2 which he discontinued on his own.       After device explant, his subsequent echocardiograms showed deterioration of LVEF and worsening mitral regurgitation from 40-45% with moderate to severe MR on 9/19/18 to 35-40% with moderate-to-severe MR on 10/15/18.   He has been removed from the transplant list at [facility] and is not interested in pursuing transplant evaluation at another center at this time. His entresto was discontinued at the time of his AICD implant due to hypotension. He subsequently took diovan but felt worse. He is tolerating the entresto with no dizziness, presyncope, or syncope. He was seen by Dr. Mook Estevez, who stated that his mitral regurgitation has decreased significantly with optimizing his HF medications. He returns to clinic today for follow up. He self up-titrated his entresto to the 97/103 mg, 1 tablet twice daily without dizziness. However, he noted increased dyspnea, myalgias, and low grade fever 2 days ago. He has also gained 11 lbs and notes increased abdominal girth. His wife has noticed new onset LE edema. He took lasix without any response. He was scheduled to leave on a trip to [facility] tomorrow with his entire family. He and his wife recently purchased a home in [place], [place] and plan to move in the summer of . CARDIAC EVALUATION  TTE 19: LVEF 32%, LVAD plug at LV apex, grade 2 diastolic dysfunction, mild-mod MR, mild TR, PAPs 43 mmHg, mild LAE  DELLA 2018 - normal LV function and the transplant surgery was aborted. He subsequently was admitted on 2018 for LVAD explant  ECHO 18:LVEF 40-45% mod-severe MR   ECHO 10/15/18:EF 35-40%, mod-severe MR   ECHO 10/31/18: TDS, EF 30-35%, reduced RVEF, TAPSE 1.6, LAE, mild- mod MR/TR  ECHO 19: EF 25-30%, question of thrombus in apex. Mild- mod TR. Consider cardiac MR to exclude left ventricular thrombus. ECHO 2019: EF 16-20%, mod MR, mod-severe TR, severe pulm HTN (PA systolic pressure 85 mmHg), mildly reduced RV systolic function (TAPSE 2.44 cm)  ECHO 19: EF 20-25%, moderately reduced RV systolic function, mild MR    CATH 16: severe LM, and 3 VD with 60% right common iliac stenosis.  EF: 10%. ----S/p IABP   ----S/p HeartMate2 LVAD 2016   S/p CAB2016: LIMA-> LAD, SVG-> RCA     CATH 16 following torsades SVG-> RCA TO   ---- s/p BMS x 2-. RCA  RHC 8/2017:RA: 15/15 14, RV: 21/13 14,PA: 23/28, m 20, PCWP 13, PVR 1.67 CO 5.67, CI 3    S/p LVAD explant 9/5/18 ([facility])    SVT s/p DCCV  AICD 12/116/16 single lead cx by hematoma  AICD explanted 1/20/17    EKG:    10/31/18 NSR QRS 88ms   ms  6/11/19: SR rate 68bpm QRS 94ms Qtc 438 ms    Review of Systems:     General:  Negative   HEENT: Denies epistaxis, angioedema   Pulmonary: Positive for shortness of breath  Cardiac: Denies exertional chest pain, palpitations, PND, lightheadedness, syncope, near syncope; positive for fatigue, DSOUZA, edema  GI:  Denies change in bowel habits, or black or bloody stools; positive for abdominal distension  Musculo: Positive for chronic back pain for which he takes prn flexril and norco   Neuro: Denies  TIA/CVA sx   Skin:  Denies rash   Allergies: Reviewed   Psych: Denies depression or anxiety       History:  Past Medical History:   Diagnosis Date    Arrhythmia     SVT    Arthritis     CAD (coronary artery disease)     Chronic pain     back    [address]estive heart failure ([address]     DSOUZA (dyspnea on exertion) 1/30/2019    Gout     Heart failure ([address]     Hypertension     ICD (implantable cardioverter-defibrillator) in place     Long term current use of anticoagulant therapy     LVAD (left ventricular assist device) present ([address] 12/01/2016    [facility]     Pacemaker     SubQ ICD    Raynaud disease     Seizures ([facility]     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     Past Surgical History:   Procedure Laterality Date    CABG, ARTERY-VEIN, TWO  12/01/2016    CARDIAC SURG PROCEDURE UNLIST  12/01/2016    LVAD    HX CORONARY ARTERY BYPASS GRAFT      HX CORONARY STENT PLACEMENT      HX HEART CATHETERIZATION      HX HEENT      wisdom teeth removed    HX ORTHOPAEDIC  11/22/2016    laminectomy    HX PACEMAKER      ICD - removed 1/2017    HX PTCA      DC INSJ OF SUBQ IMPLANTABLE DEFIBRILLATOR ELECTRODE N/A 5/20/2019    INSERT SUBQ ICD w/ anesthesia performed by Bipin Hu MD at [address] [facility]     Social History     Socioeconomic History    Marital status:      Spouse name: Shree Vasques    Number of children: 2    Years of education: Not on file    Highest education level: Some college, no degree   Occupational History    Not on file   Social Needs    Financial resource strain: Not hard at all   [place] insecurity:     Worry: Never true     Inability: Never true   [employer] needs:     Medical: No     Non-medical: No   Tobacco Use    Smoking status: Former Smoker     Packs/day: 1.50     Years: 30.00     Pack years: 45.00     Last attempt to quit:      Years since quittin.8    Smokeless tobacco: Never Used   Substance and Sexual Activity    Alcohol use: No    Drug use: No    Sexual activity: Yes   Lifestyle    Physical activity:     Days per week: Not on file     Minutes per session: Not on file    Stress: Not on file   Relationships    Social connections:     Talks on phone: Not on file     Gets together: Not on file     Attends Orthodox service: Not on file     Active member of club or organization: Not on file     Attends meetings of clubs or organizations: Not on file     Relationship status: Not on file    Intimate partner violence:     Fear of current or ex partner: Not on file     Emotionally abused: Not on file     Physically abused: Not on file     Forced sexual activity: Not on file   Other Topics Concern     Service Not Asked    Blood Transfusions Not Asked    Caffeine Concern Not Asked    Occupational Exposure Not Asked   [place] Hazards Not Asked    Sleep Concern Not Asked    Stress Concern Not Asked    Weight Concern Not Asked    Special Diet Not Asked    Back Care Not Asked    Exercise Not Asked    Bike Helmet Not Asked    Seat Belt Not Asked    Self-Exams Not Asked   Social History Narrative    Not on file     Family History   Problem Relation Age of Onset    Diabetes Mother  Dementia Mother     Other Mother         carotid artery blockage    Heart Disease Father     Stroke Father     Heart Attack Father         several    Hypertension Father     Elevated Lipids Father     No Known Problems Sister     Cancer Brother         brain    Lung Disease Sister     COPD Sister     Cancer Sister         lung       Current Medications:   Facility-Administered Medications Ordered in Other Visits   Medication Dose Route Frequency Provider Last Rate Last Dose    [START ON 11/9/2019] allopurinol (ZYLOPRIM) tablet 100 mg  100 mg Oral DAILY Deepali Humphries NP        [START ON 11/9/2019] amiodarone (CORDARONE) tablet 200 mg  200 mg Oral DAILY Adriana Humphries NP        apixaban (ELIQUIS) tablet 5 mg  5 mg Oral BID Adriana Humphries NP        ascorbic acid (vitamin C) (VITAMIN C) tablet 500 mg  500 mg Oral BID Adriana Humphries NP        [START ON 11/9/2019] aspirin chewable tablet 81 mg  81 mg Oral DAILY Adriana Humphries NP        atorvastatin (LIPITOR) tablet 40 mg  40 mg Oral QHS Adriana Humphries NP        colchicine tablet 0.6 mg  0.6 mg Oral EVERY OTHER DAY Adriana Humphries NP        cyclobenzaprine (FLEXERIL) tablet 5 mg  5 mg Oral DAILY PRN Anisha DISLA NP        [START ON 11/9/2019] DULoxetine (CYMBALTA) capsule 60 mg  60 mg Oral DAILY Adriana Hmuphries NP        HYDROcodone-acetaminophen (NORCO) 5-325 mg per tablet 1 Tab  1 Tab Oral Q6H PRN Deepali Humphries NP        . PHARMACY TO SUBSTITUTE PER PROTOCOL (Reordered from: levalbuterol tartrate (XOPENEX) 45 mcg/actuation inhaler)    Per Protocol Adriana Humphries NP        montelukast (SINGULAIR) tablet 10 mg  10 mg Oral QHS Adriana Humphries NP        [START ON 11/9/2019] patiromer calcium sorbitex (VELTASSA) powder 16.8 g  16.8 g Oral DAILY Adriana Humphries NP        pregabalin (LYRICA) capsule 50 mg  50 mg Oral TID Renetta Courtney NP        [START ON 11/9/2019] tamsulosin (FLOMAX) capsule 0.4 mg  0.4 mg Oral DAILY Kristel, Adriana B, NP        sodium chloride (NS) flush 5-40 mL  5-40 mL IntraVENous Q8H Kristel, Adriana B, NP        sodium chloride (NS) flush 5-40 mL  5-40 mL IntraVENous PRN Kristel, Adriana B, NP        ondansetron (ZOFRAN) injection 4 mg  4 mg IntraVENous Q6H PRN Kristel, Adriana B, NP        acetaminophen (TYLENOL) tablet 650 mg  650 mg Oral Q6H PRN Kristel, Adriana B, NP        bumetanide (BUMEX) injection 2 mg  2 mg IntraVENous Q12H Kristel, Adriana B, NP        docusate sodium (COLACE) capsule 100 mg  100 mg Oral PRN Kristel, Adriana B, NP        senna-docusate (PERICOLACE) 8.6-50 mg per tablet 1 Tab  1 Tab Oral BID Kristel, Adriana B, NP        bisacodyl (DULCOLAX) tablet 5 mg  5 mg Oral DAILY PRN Kristel, Adriana B, NP        famotidine (PEPCID) tablet 20 mg  20 mg Oral Q12H Kristel, Adriana B, NP        DOBUTamine (DOBUTREX) 500 mg/250 mL (2,000 mcg/mL) infusion  2.5 mcg/kg/min IntraVENous CONTINUOUS Kristel, Adriana B, NP        [Held by provider] sacubitril-valsartan (ENTRESTO) 24-26 mg tablet 1 Tab  1 Tab Oral Q12H Kristel, Adriana B, NP           Allergies: Allergies   Allergen Reactions    Morphine Other (comments)     Hallucinations. Confusion. Impaired judgement    Chlorhexidine Rash           Physical Exam:   Vitals:    Visit Vitals  BP 94/58 (BP 1 Location: Left arm, BP Patient Position: Sitting)   Pulse 70   Temp 97 °F (36.1 °C)   Resp 16   Ht 5' 8\" (1.727 m)   Wt 180 lb 3.2 oz (81.7 kg)   BMI 27.40 kg/m²        General:  Well developed WM, AAOx3, pleasant,  Cooperative in mild distress. HEENT:  Atraumatic. Pink and moist.  Anicteric sclerae. Neck:   Supple, no adenopoathy. JVP > 12 cm (+) HJR . No carotid bruits. Lungs:  Diminished breath sounds bilaterally - 1/3 way up  Heart:   PMI: Median sternotomy scar,  Regular rhythm, S1, S2 present, S3 gallop, 2/6 systolic murmur    Abdomen:  Distended, hepatomegaly. + Bowel sounds.  No bruits. Extremities:  Venous stasis changes, trace edema, no clubbing, cyanosis, or calf tenderness  Neurologic:  Grossly intact. Alert and oriented X 3. No acute neurological distress. Skin:   intact, no wounds, ecchymosis, bruising, rashes   Psych:  Good insight. Not anxious nor agitated. Recent Labs:     Lab Results   Component Value Date/Time    WBC 6.5 10/01/2019 03:49 AM    HGB 11.4 (L) 10/01/2019 03:49 AM    HCT 36.9 10/01/2019 03:49 AM    PLATELET 103 87/02/3548 03:49 AM    MCV 92.7 10/01/2019 03:49 AM     Lab Results   Component Value Date/Time    GFR est non-AA 60 11/07/2019 12:06 PM    GFR est AA 69 11/07/2019 12:06 PM    Creatinine 1.27 11/07/2019 12:06 PM    BUN 25 11/07/2019 12:06 PM    Sodium 136 11/07/2019 12:06 PM    Potassium 4.3 11/07/2019 12:06 PM    Chloride 102 11/07/2019 12:06 PM    CO2 19 (L) 11/07/2019 12:06 PM    Magnesium 1.6 10/02/2019 12:22 PM    Phosphorus 1.8 (L) 10/01/2019 03:49 AM     Lab Results   Component Value Date/Time    TSH 3.790 06/12/2019 03:05 PM    T4, Free 1.68 06/12/2019 03:05 PM      Lab Results   Component Value Date/Time    Sodium 136 11/07/2019 12:06 PM    Potassium 4.3 11/07/2019 12:06 PM    Chloride 102 11/07/2019 12:06 PM    CO2 19 (L) 11/07/2019 12:06 PM    Anion gap 9 10/02/2019 12:22 PM    Glucose 127 (H) 11/07/2019 12:06 PM    BUN 25 11/07/2019 12:06 PM    Creatinine 1.27 11/07/2019 12:06 PM    BUN/Creatinine ratio 20 11/07/2019 12:06 PM    GFR est AA 69 11/07/2019 12:06 PM    GFR est non-AA 60 11/07/2019 12:06 PM    Calcium 9.1 11/07/2019 12:06 PM    Bilirubin, total 0.4 10/02/2019 12:22 PM    ALT (SGPT) 29 10/02/2019 12:22 PM    AST (SGOT) 19 10/02/2019 12:22 PM    Alk.  phosphatase 107 10/02/2019 12:22 PM    Protein, total 7.3 10/02/2019 12:22 PM    Albumin 3.4 (L) 10/02/2019 12:22 PM    Globulin 3.9 10/02/2019 12:22 PM    A-G Ratio 0.9 (L) 10/02/2019 12:22 PM      Lab Results   Component Value Date/Time    Hemoglobin A1c 5.9 05/22/2019 02:56 AM Thank you for letting us see him with you,      Kristi Malhotra MD, [facility], [address] [place] [address]  Chief of Cardiology, [facility] Director  [address] [place] [address], [address] [place] [address]  Office [phone]  Fax [phone]

## 2019-11-08 NOTE — PROGRESS NOTES
Acknowledged with Sudhakar that he was supposed to leave tomorrow for a trip to [facility]. His wife, Griffin Olvera, went home to attempt to change the dates of the trip. Offered support - will follow as needed.     Ivet Neumann, MSW, LCSW    Clinical    Norman Sims 1987

## 2019-11-08 NOTE — H&P
[facility] H&P Note      DOS:  11/8/2019  REFERRING PROVIDER: Fidencio Wade MD  PRIMARY CARE PHYSICIAN: Loli Garcia MD  PRIMARY CARDIOLOGIST:  Frank So MD   EP: April Sidhu MD   PULMONARY: Gracia Webster MD     IMPRESSION/PLAN:   ICM s/p HMII as BTT on 12/1/16 and explant, NYHA Class IV, LVEF 20-25%     Admit to [facility] for IV diuresis   Hold entresto and carvedilol   Start IV dobutamine 5 mcgs/kg/min   Start IV bumex 2 mg BID    Track BP and electrolytes   Continue low Na+ and low K+ diet    Abstain from smoking and ETOH     CAD s/p CABG (SVG to RCA and LIMA to LAD) on 12/1/16 s/p BMS x2 -> SVG-RCA graft - no angina              Continue ASA, statin             Resume BB once diuresed    Mitral Regurgitation, severe - improved to mild   Repeat echo    Hyperkalemia- resolved   Continue Veltassa    Chronic Left Pleural Effusion - s/p left thoracentesis on 7/5/2019   Encourage use of IS     High Risk of SCD - s/p SQ AICD (5/20/19)   No shocks       PAD - difficult femoral access with LVAD explant              No symptoms of claudication      Chronic Anticoagulation s/p LVAD explant              Continue eliquis 5 mg  twice daily     Gout              Continue colchicine 0.6 mg every other day              Continue allopurinol 100 mg daily   Denies recent gouty attacks    Chronic Lower back pain              On oxycodone, flexeril   Hasn't taken since ICD implant   Back pain improved with increased activity- going to gym    H/o tobacco abuse   Abstaining from nicotine      H/o alcohol abuse   Currently abstaining   Encouraged complete abstinence      Seizure disorder - early 25s              No recent seizure activity     Peripheral neuropathy              On cymbalta   ATTR- Negative     Prevention              Influenza annually              Pneumonia vaccine every 5 years    Viral Gastroenteritis   Resolved   Fluid resuscitation - encourage increased water intake       Chief complaint: Dyspnea and fatigue      HPI: Gustavo Christensen is a 62y.o. year old male  with a history of HFrEF in the setting of ICM s/p LVAD and recent LVAD explant complicated by PVD,  remote tobacco use and alcohol abuse (quit 11/2016) who presents for follow up of his HFrEF. Mr. Epifanio Diaz presented 11/22/2016 for decompressive laminectomy at P9-I8 complicated by myocardial infarction. The [facility](11/25/16) revealed severe left main and 3 vessel disease along with a 60% right common iliac stenosis and  LVEF was 10%. He subsequently underwent placement of an IABP followed by placement of a HeartMate2 LVAD on 12/1/2016 with concomitant CABG (SVG to the RCA, LIMA to LAD). His postoperative course was complicated by RV failure and an ileus requiring TPN resulting and a transaminitis. He had multiple episodes of Torsade on 12/4/2016 prompting repeat catheterization revealing an occluded RCA vein graft. Two BMS were placed in the RCA graft. He also had SVT requiring cardioversion. He had a single lead ICD placed on 01/89/54 complicated by a hematoma. The ICD was later removed on 1/20/17. Following his LVAD surgery he did require inpatient rehab.      Mr. Epifanio Diaz was listed for heart transplantation at [facility]. He was called in for transplantation on 7/14/2018 but on pre-op DELLA was noted to have normal LV function and the transplant surgery was aborted. He subsequently was admitted on 9/5/2018 for LVAD explant- his post-operative course was complicated by pneumonia and bilateral pleural effusions. He was discharged on home O2 which he discontinued on his own.       After device explant, his subsequent echocardiograms showed deterioration of LVEF and worsening mitral regurgitation from 40-45% with moderate to severe MR on 9/19/18 to 35-40% with moderate-to-severe MR on 10/15/18. He has been removed from the transplant list at [facility] and is not interested in pursuing transplant evaluation at another center at this time.      His entresto was discontinued at the time of his AICD implant due to hypotension. He subsequently took diovan but felt worse. He is tolerating the entresto with no dizziness, presyncope, or syncope. He was seen by Dr. Tyler Hwang, who stated that his mitral regurgitation has decreased significantly with optimizing his HF medications. He returns to clinic today for follow up. He self up-titrated his entresto to the 97/103 mg, 1 tablet twice daily without dizziness. However, he noted increased dyspnea, myalgias, and low grade fever 2 days ago. He has also gained 11 lbs and notes increased abdominal girth. His wife has noticed new onset LE edema. He took lasix without any response. He was scheduled to leave on a trip to [facility] tomorrow with his entire family. CARDIAC EVALUATION  TTE 19: LVEF 32%, LVAD plug at LV apex, grade 2 diastolic dysfunction, mild-mod MR, mild TR, PAPs 43 mmHg, mild LAE  DELLA 2018 - normal LV function and the transplant surgery was aborted. He subsequently was admitted on 2018 for LVAD explant  ECHO 18:LVEF 40-45% mod-severe MR   ECHO 10/15/18:EF 35-40%, mod-severe MR   ECHO 10/31/18: TDS, EF 30-35%, reduced RVEF, TAPSE 1.6, LAE, mild- mod MR/TR  ECHO 19: EF 25-30%, question of thrombus in apex. Mild- mod TR. Consider cardiac MR to exclude left ventricular thrombus. ECHO 2019: EF 16-20%, mod MR, mod-severe TR, severe pulm HTN (PA systolic pressure 85 mmHg), mildly reduced RV systolic function (TAPSE 2.37 cm)  ECHO 19: EF 20-25%, moderately reduced RV systolic function, mild MR    CATH 16: severe LM, and 3 VD with 60% right common iliac stenosis. EF: 10%. ----S/p IABP   ----S/p HeartMate2 LVAD 2016   S/p CAB2016: LIMA-> LAD, SVG-> RCA     CATH 16 following torsades SVG-> RCA TO   ---- s/p BMS x 2-.  RCA  RHC 2017:RA: 15/15 14, RV:  14,PA: 23/, m 20, PCWP 13, PVR 1.67 CO 5.67, CI 3    S/p LVAD explant 18 ([facility])    SVT s/p DCCV  AICD 12/116/16 single lead cx by hematoma  AICD explanted 1/20/17    EKG:    10/31/18 NSR QRS 88ms   ms  6/11/19: SR rate 68bpm QRS 94ms Qtc 438 ms    Review of Systems:     General:  Negative   HEENT: Denies epistaxis, angioedema   Pulmonary: Positive for shortness of breath  Cardiac: Denies exertional chest pain, palpitations, PND, lightheadedness, syncope, near syncope; positive for fatigue, DSOUZA, edema  GI:  Denies change in bowel habits, or black or bloody stools; positive for abdominal distension  Musculo: Positive for chronic back pain for which he takes prn flexril and norco   Neuro: Denies  TIA/CVA sx   Skin:  Denies rash   Allergies: Reviewed   Psych: Denies depression or anxiety     History:  Past Medical History:   Diagnosis Date    Arrhythmia     SVT    Arthritis     CAD (coronary artery disease)     Chronic pain     back    Congestive heart failure (HCC)     DSOUZA (dyspnea on exertion) 1/30/2019    Gout     Heart failure ([address]     Hypertension     ICD (implantable cardioverter-defibrillator) in place     Long term current use of anticoagulant therapy     LVAD (left ventricular assist device) present ([address] 12/01/2016    Myocardial infarction ([address]     Pacemaker     SubQ ICD    Raynaud disease     Seizures ([address]     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     Past Surgical History:   Procedure Laterality Date    CABG, ARTERY-VEIN, TWO  12/01/2016    CARDIAC SURG PROCEDURE UNLIST  12/01/2016    LVAD    HX CORONARY ARTERY BYPASS GRAFT      HX CORONARY STENT PLACEMENT      HX HEART CATHETERIZATION      HX HEENT      wisdom teeth removed    HX ORTHOPAEDIC  11/22/2016    laminectomy    HX PACEMAKER      ICD - removed 1/2017    HX PTCA      MD INSJ OF SUBQ IMPLANTABLE DEFIBRILLATOR ELECTRODE N/A 5/20/2019    INSERT SUBQ ICD w/ anesthesia performed by Brian Bassett MD at [facility] CATH LAB     Social History     Socioeconomic History    Marital status:      Spouse name: Bonnie Warren Number of children: 2    Years of education: Not on file    Highest education level: Some college, no degree   Occupational History    Not on file   Social Needs    Financial resource strain: Not hard at all   Epi-Blair insecurity:     Worry: Never true     Inability: Never true   [employer] needs:     Medical: No     Non-medical: No   Tobacco Use    Smoking status: Former Smoker     Packs/day: 1.50     Years: 30.00     Pack years: 45.00     Last attempt to quit:      Years since quittin.8    Smokeless tobacco: Never Used   Substance and Sexual Activity    Alcohol use: No    Drug use: No    Sexual activity: Yes   Lifestyle    Physical activity:     Days per week: Not on file     Minutes per session: Not on file    Stress: Not on file   Relationships    Social connections:     Talks on phone: Not on file     Gets together: Not on file     Attends Anabaptist service: Not on file     Active member of club or organization: Not on file     Attends meetings of clubs or organizations: Not on file     Relationship status: Not on file    Intimate partner violence:     Fear of current or ex partner: Not on file     Emotionally abused: Not on file     Physically abused: Not on file     Forced sexual activity: Not on file   Other Topics Concern     Service Not Asked    Blood Transfusions Not Asked    Caffeine Concern Not Asked    Occupational Exposure Not Asked   Gerbecky Pleas Hazards Not Asked    Sleep Concern Not Asked    Stress Concern Not Asked    Weight Concern Not Asked    Special Diet Not Asked    Back Care Not Asked    Exercise Not Asked    Bike Helmet Not Asked    [address] Not Asked    Self-Exams Not Asked   Social History Narrative    Not on file     Family History   Problem Relation Age of Onset    Diabetes Mother     Dementia Mother     Other Mother         carotid artery blockage    Heart Disease Father     Stroke Father     Heart Attack Father         several    Hypertension Father     Elevated Lipids Father     No Known Problems Sister     Cancer Brother         brain    Lung Disease Sister     COPD Sister    [facility] Cancer Sister         lung       Current Medications:   Current Facility-Administered Medications   Medication Dose Route Frequency Provider Last Rate Last Dose    [START ON 11/9/2019] allopurinol (ZYLOPRIM) tablet 100 mg  100 mg Oral DAILY Kristel Pamla Leventhal, NP        [START ON 11/9/2019] amiodarone (CORDARONE) tablet 200 mg  200 mg Oral DAILY Kristel, Adriana B, NP        apixaban (ELIQUIS) tablet 5 mg  5 mg Oral BID Kristel, Adriana B, NP        ascorbic acid (vitamin C) (VITAMIN C) tablet 500 mg  500 mg Oral BID Kristel, Adriana B, NP        [START ON 11/9/2019] aspirin chewable tablet 81 mg  81 mg Oral DAILY Kristel, Adriana B, NP        atorvastatin (LIPITOR) tablet 40 mg  40 mg Oral QHS Kristel, Adriana B, NP        colchicine tablet 0.6 mg  0.6 mg Oral EVERY OTHER DAY Kristel, Adriana B, NP   0.6 mg at 11/08/19 1604    cyclobenzaprine (FLEXERIL) tablet 5 mg  5 mg Oral DAILY PRN Gumaro Gamino, NP        [START ON 11/9/2019] DULoxetine (CYMBALTA) capsule 60 mg  60 mg Oral DAILY Kristel, Pamla Leventhal, NP        HYDROcodone-acetaminophen (NORCO) 5-325 mg per tablet 1 Tab  1 Tab Oral Q6H PRN Kristel, Adriana B, NP        montelukast (SINGULAIR) tablet 10 mg  10 mg Oral QHS Kristel, Adriana B, NP        [START ON 11/9/2019] patiromer calcium sorbitex (VELTASSA) powder 16.8 g  16.8 g Oral DAILY Kristel, Adriana B, NP        pregabalin (LYRICA) capsule 50 mg  50 mg Oral TID Jorge Shone B, NP   50 mg at 11/08/19 1604    [START ON 11/9/2019] tamsulosin (FLOMAX) capsule 0.4 mg  0.4 mg Oral DAILY Kristel, Adriana B, NP        sodium chloride (NS) flush 5-40 mL  5-40 mL IntraVENous Q8H Kristel, Adriana B, NP        sodium chloride (NS) flush 5-40 mL  5-40 mL IntraVENous PRN Adriana Humphries, NP        ondansetron (ZOFRAN) injection 4 mg  4 mg IntraVENous Q6H PRN Ria Zachery B, NP        acetaminophen (TYLENOL) tablet 650 mg  650 mg Oral Q6H PRN Kristel, Adriana B, NP        bumetanide (BUMEX) injection 2 mg  2 mg IntraVENous Q12H Kristel, Adriana B, NP   2 mg at 11/08/19 1604    docusate sodium (COLACE) capsule 100 mg  100 mg Oral PRN Kristel, Adriana B, NP        senna-docusate (PERICOLACE) 8.6-50 mg per tablet 1 Tab  1 Tab Oral BID Kristel, Adriana B, NP        bisacodyl (DULCOLAX) tablet 5 mg  5 mg Oral DAILY PRN Ria Zachery B, NP        famotidine (PEPCID) tablet 20 mg  20 mg Oral Q12H Kristel, Adriana B, NP   20 mg at 11/08/19 1605    DOBUTamine (DOBUTREX) 500 mg/250 mL (2,000 mcg/mL) infusion  5 mcg/kg/min IntraVENous CONTINUOUS Kristel, Adriana B, NP 12.3 mL/hr at 11/08/19 1616 5 mcg/kg/min at 11/08/19 1616    [Held by provider] sacubitril-valsartan (ENTRESTO) 24-26 mg tablet 1 Tab  1 Tab Oral Q12H Kristel, Adriana B, NP        albuterol (PROVENTIL VENTOLIN) nebulizer solution 2.5 mg  2.5 mg Nebulization Q4H PRN Kristel, Adriana B, NP           Allergies: Allergies   Allergen Reactions    Morphine Other (comments)     Hallucinations. Confusion. Impaired judgement    Chlorhexidine Rash           Physical Exam:   Vitals:    Visit Vitals  /67 (BP 1 Location: Left arm, BP Patient Position: At rest)   Pulse 65   Resp 19   Wt 178 lb 12.8 oz (81.1 kg)   SpO2 96%   BMI 27.19 kg/m²        General:  Well developed WM, AAOx3, pleasant,  Cooperative in mild distress. HEENT:  Atraumatic. Pink and moist.  Anicteric sclerae. Neck:   Supple, no adenopoathy. JVP > 12 cm (+) HJR . No carotid bruits. Lungs:  Diminished breath sounds bilaterally - 1/3 way up  Heart:   PMI: Median sternotomy scar,  Regular rhythm, S1, S2 present, S3 gallop, 2/6 systolic murmur    Abdomen:  Distended, hepatomegaly. + Bowel sounds. No bruits.   Extremities:  Venous stasis changes, trace edema, no clubbing, cyanosis, or calf tenderness  Neurologic: Grossly intact. Alert and oriented X 3. No acute neurological distress. Skin:   intact, no wounds, ecchymosis, bruising, rashes   Psych:  Good insight. Not anxious nor agitated. Recent Labs:   Lab Results   Component Value Date/Time    WBC 9.8 11/08/2019 03:30 PM    HGB 8.7 (L) 11/08/2019 03:30 PM    HCT 27.7 (L) 11/08/2019 03:30 PM    PLATELET 728 20/45/9759 03:30 PM    MCV 94.2 11/08/2019 03:30 PM     Lab Results   Component Value Date/Time    GFR est non-AA 47 (L) 11/08/2019 03:30 PM    GFR est AA 56 (L) 11/08/2019 03:30 PM    Creatinine 1.52 (H) 11/08/2019 03:30 PM    BUN 32 (H) 11/08/2019 03:30 PM    Sodium 136 11/08/2019 03:30 PM    Potassium 3.9 11/08/2019 03:30 PM    Chloride 106 11/08/2019 03:30 PM    CO2 19 (L) 11/08/2019 03:30 PM    Magnesium 1.7 11/08/2019 03:30 PM    Phosphorus 1.8 (L) 10/01/2019 03:49 AM     Lab Results   Component Value Date/Time    TSH 3.790 06/12/2019 03:05 PM    T4, Free 1.68 06/12/2019 03:05 PM      Lab Results   Component Value Date/Time    Sodium 136 11/08/2019 03:30 PM    Potassium 3.9 11/08/2019 03:30 PM    Chloride 106 11/08/2019 03:30 PM    CO2 19 (L) 11/08/2019 03:30 PM    Anion gap 11 11/08/2019 03:30 PM    Glucose 86 11/08/2019 03:30 PM    BUN 32 (H) 11/08/2019 03:30 PM    Creatinine 1.52 (H) 11/08/2019 03:30 PM    BUN/Creatinine ratio 21 (H) 11/08/2019 03:30 PM    GFR est AA 56 (L) 11/08/2019 03:30 PM    GFR est non-AA 47 (L) 11/08/2019 03:30 PM    Calcium 8.3 (L) 11/08/2019 03:30 PM    Bilirubin, total 0.9 11/08/2019 03:30 PM    ALT (SGPT) 52 11/08/2019 03:30 PM    AST (SGOT) 47 (H) 11/08/2019 03:30 PM    Alk. phosphatase 118 (H) 11/08/2019 03:30 PM    Protein, total 7.1 11/08/2019 03:30 PM    Albumin 3.4 (L) 11/08/2019 03:30 PM    Globulin 3.7 11/08/2019 03:30 PM    A-G Ratio 0.9 (L) 11/08/2019 03:30 PM      Lab Results   Component Value Date/Time    Hemoglobin A1c 5.9 05/22/2019 02:56 AM       Thank you for letting us see him with you,      Kristi Weems, MD, [facility], [address] [place] [address]  Chief of Cardiology, [facility] Director  [facility]  [facility], [address] [place] [address], [address] [place] [address]  Office [phone]  Fax [phone]

## 2019-11-08 NOTE — PROGRESS NOTES
1530 Direct admit to CVICU. [address], weight, EKG, Labs admission assessment completed. Spoke with Alena Agustin. NP, orders placed for CVSU.    1850 TRANSFER - OUT REPORT:    Verbal report given to Jose A Murray (name) alon Guy  being transferred to CVSU (unit) for routine progression of care       Report consisted of patients Situation, Background, Assessment and   Recommendations(SBAR). Information from the following report(s) SBAR, MAR and Cardiac Rhythm NSR was reviewed with the receiving nurse. Lines:   Peripheral IV 11/08/19 Anterior;Right Forearm (Active)   Site Assessment Clean, dry, & intact 11/8/2019  4:00 PM   Phlebitis Assessment 0 11/8/2019  4:00 PM   Infiltration Assessment 0 11/8/2019  4:00 PM   Dressing Status Clean, dry, & intact 11/8/2019  4:00 PM   Dressing Type Transparent 11/8/2019  4:00 PM   Hub Color/Line Status Blue;Flushed; Infusing 11/8/2019  4:00 PM   Action Taken Open ports on tubing capped 11/8/2019  4:00 PM   Alcohol Cap Used Yes 11/8/2019  4:00 PM        Opportunity for questions and clarification was provided.       Patient transported with:   O2 @ 4 liters  Registered Nurse  [facility]

## 2019-11-09 LAB
ALBUMIN SERPL-MCNC: 3.1 G/DL (ref 3.5–5)
ALBUMIN/GLOB SERPL: 0.9 {RATIO} (ref 1.1–2.2)
ALP SERPL-CCNC: 113 U/L (ref 45–117)
ALT SERPL-CCNC: 46 U/L (ref 12–78)
ANION GAP SERPL CALC-SCNC: 9 MMOL/L (ref 5–15)
AST SERPL-CCNC: 36 U/L (ref 15–37)
BILIRUB SERPL-MCNC: 0.9 MG/DL (ref 0.2–1)
BNP SERPL-MCNC: ABNORMAL PG/ML
BUN SERPL-MCNC: 32 MG/DL (ref 6–20)
BUN/CREAT SERPL: 21 (ref 12–20)
CALCIUM SERPL-MCNC: 8.2 MG/DL (ref 8.5–10.1)
CHLORIDE SERPL-SCNC: 107 MMOL/L (ref 97–108)
CO2 SERPL-SCNC: 20 MMOL/L (ref 21–32)
CREAT SERPL-MCNC: 1.55 MG/DL (ref 0.7–1.3)
ERYTHROCYTE [DISTWIDTH] IN BLOOD BY AUTOMATED COUNT: 16.1 % (ref 11.5–14.5)
GLOBULIN SER CALC-MCNC: 3.5 G/DL (ref 2–4)
GLUCOSE SERPL-MCNC: 80 MG/DL (ref 65–100)
HCT VFR BLD AUTO: 26.5 % (ref 36.6–50.3)
HGB BLD-MCNC: 8.4 G/DL (ref 12.1–17)
MAGNESIUM SERPL-MCNC: 1.6 MG/DL (ref 1.6–2.4)
MCH RBC QN AUTO: 29 PG (ref 26–34)
MCHC RBC AUTO-ENTMCNC: 31.7 G/DL (ref 30–36.5)
MCV RBC AUTO: 91.4 FL (ref 80–99)
NRBC # BLD: 0 K/UL (ref 0–0.01)
NRBC BLD-RTO: 0 PER 100 WBC
PLATELET # BLD AUTO: 237 K/UL (ref 150–400)
PMV BLD AUTO: 11.5 FL (ref 8.9–12.9)
POTASSIUM SERPL-SCNC: 3.6 MMOL/L (ref 3.5–5.1)
PROT SERPL-MCNC: 6.6 G/DL (ref 6.4–8.2)
RBC # BLD AUTO: 2.9 M/UL (ref 4.1–5.7)
SODIUM SERPL-SCNC: 136 MMOL/L (ref 136–145)
WBC # BLD AUTO: 6.6 K/UL (ref 4.1–11.1)

## 2019-11-09 PROCEDURE — 85027 COMPLETE CBC AUTOMATED: CPT

## 2019-11-09 PROCEDURE — 74011250637 HC RX REV CODE- 250/637: Performed by: NURSE PRACTITIONER

## 2019-11-09 PROCEDURE — 77010033678 HC OXYGEN DAILY

## 2019-11-09 PROCEDURE — 83880 ASSAY OF NATRIURETIC PEPTIDE: CPT

## 2019-11-09 PROCEDURE — 74011250636 HC RX REV CODE- 250/636: Performed by: NURSE PRACTITIONER

## 2019-11-09 PROCEDURE — 36415 COLL VENOUS BLD VENIPUNCTURE: CPT

## 2019-11-09 PROCEDURE — 83735 ASSAY OF MAGNESIUM: CPT

## 2019-11-09 PROCEDURE — 74011000250 HC RX REV CODE- 250: Performed by: NURSE PRACTITIONER

## 2019-11-09 PROCEDURE — 80053 COMPREHEN METABOLIC PANEL: CPT

## 2019-11-09 PROCEDURE — 65660000001 HC RM ICU INTERMED STEPDOWN

## 2019-11-09 RX ORDER — BUMETANIDE 0.25 MG/ML
2 INJECTION INTRAMUSCULAR; INTRAVENOUS ONCE
Status: COMPLETED | OUTPATIENT
Start: 2019-11-09 | End: 2019-11-09

## 2019-11-09 RX ADMIN — TAMSULOSIN HYDROCHLORIDE 0.4 MG: 0.4 CAPSULE ORAL at 08:45

## 2019-11-09 RX ADMIN — PREGABALIN 50 MG: 25 CAPSULE ORAL at 21:31

## 2019-11-09 RX ADMIN — PREGABALIN 50 MG: 25 CAPSULE ORAL at 16:59

## 2019-11-09 RX ADMIN — APIXABAN 5 MG: 5 TABLET, FILM COATED ORAL at 08:46

## 2019-11-09 RX ADMIN — Medication 10 ML: at 23:48

## 2019-11-09 RX ADMIN — BUMETANIDE 2 MG: 0.25 INJECTION INTRAMUSCULAR; INTRAVENOUS at 21:32

## 2019-11-09 RX ADMIN — FAMOTIDINE 20 MG: 20 TABLET ORAL at 08:45

## 2019-11-09 RX ADMIN — APIXABAN 5 MG: 5 TABLET, FILM COATED ORAL at 17:00

## 2019-11-09 RX ADMIN — BUMETANIDE 2 MG: 0.25 INJECTION INTRAMUSCULAR; INTRAVENOUS at 14:34

## 2019-11-09 RX ADMIN — Medication 10 ML: at 05:27

## 2019-11-09 RX ADMIN — DOBUTAMINE IN DEXTROSE 3 MCG/KG/MIN: 200 INJECTION, SOLUTION INTRAVENOUS at 11:10

## 2019-11-09 RX ADMIN — ASPIRIN 81 MG 81 MG: 81 TABLET ORAL at 08:46

## 2019-11-09 RX ADMIN — OXYCODONE HYDROCHLORIDE AND ACETAMINOPHEN 500 MG: 500 TABLET ORAL at 08:46

## 2019-11-09 RX ADMIN — GUAIFENESIN 600 MG: 600 TABLET, EXTENDED RELEASE ORAL at 21:31

## 2019-11-09 RX ADMIN — FAMOTIDINE 20 MG: 20 TABLET ORAL at 21:31

## 2019-11-09 RX ADMIN — OXYCODONE HYDROCHLORIDE AND ACETAMINOPHEN 500 MG: 500 TABLET ORAL at 17:00

## 2019-11-09 RX ADMIN — MONTELUKAST SODIUM 10 MG: 10 TABLET, FILM COATED ORAL at 21:31

## 2019-11-09 RX ADMIN — ATORVASTATIN CALCIUM 40 MG: 40 TABLET, FILM COATED ORAL at 21:31

## 2019-11-09 RX ADMIN — BUMETANIDE 2 MG: 0.25 INJECTION INTRAMUSCULAR; INTRAVENOUS at 08:45

## 2019-11-09 RX ADMIN — PREGABALIN 50 MG: 25 CAPSULE ORAL at 08:45

## 2019-11-09 RX ADMIN — GUAIFENESIN 600 MG: 600 TABLET, EXTENDED RELEASE ORAL at 08:46

## 2019-11-09 RX ADMIN — DULOXETINE HYDROCHLORIDE 60 MG: 60 CAPSULE, DELAYED RELEASE ORAL at 08:45

## 2019-11-09 RX ADMIN — SENNOSIDES AND DOCUSATE SODIUM 1 TABLET: 8.6; 5 TABLET ORAL at 08:45

## 2019-11-09 RX ADMIN — AMIODARONE HYDROCHLORIDE 200 MG: 200 TABLET ORAL at 08:46

## 2019-11-09 RX ADMIN — ALLOPURINOL 100 MG: 100 TABLET ORAL at 08:45

## 2019-11-09 RX ADMIN — Medication 10 ML: at 14:34

## 2019-11-09 NOTE — PROGRESS NOTES
Problem: Falls - Risk of  Goal: *Absence of Falls  Description  Document Azul Baum Fall Risk and appropriate interventions in the flowsheet.   Outcome: Progressing Towards Goal  Note:   Fall Risk Interventions:  Mobility Interventions: Communicate number of staff needed for ambulation/transfer, Patient to call before getting OOB         Medication Interventions: Patient to call before getting OOB, Teach patient to arise slowly    Elimination Interventions: Call light in reach, Patient to call for help with toileting needs, Toileting schedule/hourly rounds, Urinal in reach

## 2019-11-09 NOTE — PROGRESS NOTES
([phone]) Pt arrived in room 451 as a transfer from CVI. Dobutamine drip verified and tele resumed. VSS.

## 2019-11-09 NOTE — PROGRESS NOTES
0730: Bedside and Verbal shift change report given to Maine Winters (oncoming nurse) by Duncan Judd (offgoing nurse). Report included the following information SBAR, Kardex, Intake/Output, MAR, Recent Results and Cardiac Rhythm SR. Problem: Falls - Risk of  Goal: *Absence of Falls  Description  Document Rabia Sanchez Fall Risk and appropriate interventions in the flowsheet. Outcome: Progressing Towards Goal  Note:   Fall Risk Interventions:  Mobility Interventions: Patient to call before getting OOB, Communicate number of staff needed for ambulation/transfer     Medication Interventions: Patient to call before getting OOB, Teach patient to arise slowly    Elimination Interventions: Call light in reach, Stay With Me (per policy), Urinal in reach     Up with standby assistance. Call bell and personal effects within reach. Bed locked and in low position. Non skid footwear in place. Problem: Pressure Injury - Risk of  Goal: *Prevention of pressure injury  Description  Document Toi Scale and appropriate interventions in the flowsheet. Outcome: Progressing Towards Goal  Note:   Pressure Injury Interventions: Activity Interventions: Increase time out of bed    Nutrition Interventions: Document food/fluid/supplement intake    Friction and Shear Interventions: Lift sheet, Minimize layers     Turns self at appropriate intervals; skin assessed Q4H; blood glucose controlled       Problem: Heart Failure: Day 1  Goal: Activity/Safety  Outcome: Progressing Towards Goal  Note:   Tolerating activity  Goal: Diagnostic Test/Procedures  Outcome: Progressing Towards Goal  Note:   Labs drawn as ordered and monitored for results.   Goal: Medications  Outcome: Progressing Towards Goal  Goal: Respiratory  Outcome: Progressing Towards Goal  Goal: *Oxygen saturation within defined limits  Outcome: Progressing Towards Goal  Note:   O2 WDL on 2L NC  Goal: *Hemodynamically stable  Outcome: Progressing Towards Goal  Note:   Hypotensive in NSR  Visit Vitals  /54 (BP 1 Location: Left arm, BP Patient Position: At rest)   Pulse 71   Temp 98.3 °F (36.8 °C)   Resp 18   Wt 81.1 kg (178 lb 12.8 oz)   SpO2 94%   BMI 27.19 kg/m²

## 2019-11-09 NOTE — PROGRESS NOTES
[facility] Progress Note      DOS:  11/9/2019  REFERRING PROVIDER: Wesley Rubin MD  PRIMARY CARE PHYSICIAN: Hesham Jay MD  PRIMARY CARDIOLOGIST:  Eddie Hernandez MD   EP: Daniel Jason MD   PULMONARY: Nini Morales MD     24 Hr Events:  Admitted to [facility]  Negative 2L  On dobutamine     IMPRESSION/PLAN:   ICM s/p HMII as BTT on 12/1/16 and explant, NYHA Class IV, LVEF 20-25%     Hold entresto and carvedilol   Cont IV dobutamine 5 mcgs/kg/min- will stop tomorrow    Cont IV bumex 2 mg BID- can give extra dose if needed   Track BP and electrolytes   Continue low Na+ and low K+ diet    Abstain from smoking and ETOH     CAD s/p CABG (SVG to RCA and LIMA to LAD) on 12/1/16 s/p BMS x2 -> SVG-RCA graft - no angina              Continue ASA, statin             Resume BB once diuresed    Mitral Regurgitation, severe - improved to mild   Repeat echo    Hyperkalemia- resolved   Hold [place] today     Chronic Left Pleural Effusion - s/p left thoracentesis on 7/5/2019   Encourage use of IS     High Risk of SCD - s/p SQ AICD (5/20/19)   No shocks       PAD - difficult femoral access with LVAD explant              No symptoms of claudication      Chronic Anticoagulation s/p LVAD explant              Continue eliquis 5 mg  twice daily     Gout              Continue colchicine 0.6 mg every other day              Continue allopurinol 100 mg daily   Denies recent gouty attacks    Chronic Lower back pain              On oxycodone, flexeril   Hasn't taken since ICD implant   Back pain improved with increased activity- going to gym    H/o tobacco abuse   Abstaining from nicotine      H/o alcohol abuse   Currently abstaining   Encouraged complete abstinence      Seizure disorder - early 25s              No recent seizure activity     Peripheral neuropathy              On cymbalta   ATTR- Negative     Prevention              Influenza annually              Pneumonia vaccine every 5 years    Dispo:   Remain on [facility] for now, hopefully DC tomorrow          Chief complaint: Dyspnea and fatigue      HPI: Avis Harrington is a 62y.o. year old male  with a history of HFrEF in the setting of ICM s/p LVAD and recent LVAD explant complicated by PVD,  remote tobacco use and alcohol abuse (quit 11/2016) who presents for follow up of his HFrEF. Mr. Radha Amezquita presented 11/22/2016 for decompressive laminectomy at D4-P7 complicated by myocardial infarction. The [facility](11/25/16) revealed severe left main and 3 vessel disease along with a 60% right common iliac stenosis and  LVEF was 10%. He subsequently underwent placement of an IABP followed by placement of a HeartMate2 LVAD on 12/1/2016 with concomitant CABG (SVG to the RCA, LIMA to LAD). His postoperative course was complicated by RV failure and an ileus requiring TPN resulting and a transaminitis. He had multiple episodes of Torsade on 12/4/2016 prompting repeat catheterization revealing an occluded RCA vein graft. Two BMS were placed in the RCA graft. He also had SVT requiring cardioversion. He had a single lead ICD placed on 58/29/82 complicated by a hematoma. The ICD was later removed on 1/20/17. Following his LVAD surgery he did require inpatient rehab.      Mr. Radha Amezquita was listed for heart transplantation at [facility]. He was called in for transplantation on 7/14/2018 but on pre-op DELLA was noted to have normal LV function and the transplant surgery was aborted. He subsequently was admitted on 9/5/2018 for LVAD explant- his post-operative course was complicated by pneumonia and bilateral pleural effusions. He was discharged on home O2 which he discontinued on his own.       After device explant, his subsequent echocardiograms showed deterioration of LVEF and worsening mitral regurgitation from 40-45% with moderate to severe MR on 9/19/18 to 35-40% with moderate-to-severe MR on 10/15/18.   He has been removed from the transplant list at [facility] and is not interested in pursuing transplant evaluation at another center at this time. His entresto was discontinued at the time of his AICD implant due to hypotension. He subsequently took diovan but felt worse. He is tolerating the entresto with no dizziness, presyncope, or syncope. He was seen by Dr. Citlalli Trotter, who stated that his mitral regurgitation has decreased significantly with optimizing his HF medications. He returns to clinic today for follow up. He self up-titrated his entresto to the 97/103 mg, 1 tablet twice daily without dizziness. However, he noted increased dyspnea, myalgias, and low grade fever 2 days ago. He has also gained 11 lbs and notes increased abdominal girth. His wife has noticed new onset LE edema. He took lasix without any response. He was scheduled to leave on a trip to [facility] tomorrow with his entire family. CARDIAC EVALUATION  TTE 19: LVEF 32%, LVAD plug at LV apex, grade 2 diastolic dysfunction, mild-mod MR, mild TR, PAPs 43 mmHg, mild LAE  DELLA 2018 - normal LV function and the transplant surgery was aborted. He subsequently was admitted on 2018 for LVAD explant  ECHO 18:LVEF 40-45% mod-severe MR   ECHO 10/15/18:EF 35-40%, mod-severe MR   ECHO 10/31/18: TDS, EF 30-35%, reduced RVEF, TAPSE 1.6, LAE, mild- mod MR/TR  ECHO 19: EF 25-30%, question of thrombus in apex. Mild- mod TR. Consider cardiac MR to exclude left ventricular thrombus. ECHO 2019: EF 16-20%, mod MR, mod-severe TR, severe pulm HTN (PA systolic pressure 85 mmHg), mildly reduced RV systolic function (TAPSE 9.76 cm)  ECHO 19: EF 20-25%, moderately reduced RV systolic function, mild MR    CATH 16: severe LM, and 3 VD with 60% right common iliac stenosis. EF: 10%. ----S/p IABP   ----S/p HeartMate2 LVAD 2016   S/p CAB2016: LIMA-> LAD, SVG-> RCA     CATH 16 following torsades SVG-> RCA TO   ---- s/p BMS x 2-.  RCA  RHC 2017:RA: 15/15 14, RV:  14,PA: , m 20, PCWP 13, PVR 1.67 CO 5.67, CI 3    S/p LVAD explant 9/5/18 ([facility])    SVT s/p DCCV  AICD 12/116/16 single lead cx by hematoma  AICD explanted 1/20/17    EKG:    10/31/18 NSR QRS 88ms   ms  6/11/19: SR rate 68bpm QRS 94ms Qtc 438 ms    Review of Systems:     General:  States he feels better today   HEENT: Denies epistaxis, angioedema   Pulmonary: Positive for shortness of breath  Cardiac: Denies exertional chest pain, palpitations, PND, lightheadedness, syncope, near syncope; positive for fatigue, DSOUZA, edema  GI:  Denies change in bowel habits, or black or bloody stools; positive for abdominal distension  Musculo: Positive for chronic back pain for which he takes prn flexril and norco   Neuro: Denies  TIA/CVA sx   Skin:  Denies rash   Allergies: Reviewed   Psych: Denies depression or anxiety     History:  Past Medical History:   Diagnosis Date    Arrhythmia     SVT    Arthritis     CAD (coronary artery disease)     Chronic pain     back    [address]estive heart failure ([address]     DSOUZA (dyspnea on exertion) 1/30/2019    Gout     [facility] [address]     Hypertension     ICD (implantable cardioverter-defibrillator) in place     Long term current use of anticoagulant therapy     LVAD (left ventricular assist device) present ([facility] [address] 12/01/2016    Myocardial infarction ([facility] [address]     Pacemaker     SubQ ICD    Raynaud disease     Seizures ([facility] [address]     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     Past Surgical History:   Procedure Laterality Date    CABG, ARTERY-VEIN, TWO  12/01/2016    CARDIAC SURG PROCEDURE UNLIST  12/01/2016    LVAD    HX CORONARY ARTERY BYPASS GRAFT      HX CORONARY STENT PLACEMENT      HX HEART CATHETERIZATION      HX HEENT      wisdom teeth removed    HX ORTHOPAEDIC  11/22/2016    laminectomy    HX PACEMAKER      ICD - removed 1/2017    HX PTCA      DE INSJ OF SUBQ IMPLANTABLE DEFIBRILLATOR ELECTRODE N/A 5/20/2019    INSERT SUBQ ICD w/ anesthesia performed by Mary Cunningham MD at [address] [place] [address] CATH LAB     Social History     Socioeconomic History  Marital status:      Spouse name: Yvan Mccarthy    Number of children: 2    Years of education: Not on file    Highest education level: Some college, no degree   Occupational History    Not on file   Social Needs    Financial resource strain: Not hard at all   [place] insecurity:     Worry: Never true     Inability: Never true   [place] needs:     Medical: No     Non-medical: No   Tobacco Use    Smoking status: Former Smoker     Packs/day: 1.50     Years: 30.00     Pack years: 45.00     Last attempt to quit:      Years since quittin.8    Smokeless tobacco: Never Used   Substance and Sexual Activity    Alcohol use: No    Drug use: No    Sexual activity: Yes   Lifestyle    Physical activity:     Days per week: Not on file     Minutes per session: Not on file    Stress: Not on file   Relationships    Social connections:     Talks on phone: Not on file     Gets together: Not on file     Attends Bahai service: Not on file     Active member of club or organization: Not on file     Attends meetings of clubs or organizations: Not on file     Relationship status: Not on file    Intimate partner violence:     Fear of current or ex partner: Not on file     Emotionally abused: Not on file     Physically abused: Not on file     Forced sexual activity: Not on file   Other Topics Concern     Service Not Asked    Blood Transfusions Not Asked    Caffeine Concern Not Asked    Occupational Exposure Not Asked   Laurie Lamonte Hazards Not Asked    Sleep Concern Not Asked    Stress Concern Not Asked    Weight Concern Not Asked    Special Diet Not Asked    Back Care Not Asked    Exercise Not Asked    Bike Helmet Not Asked    Seat Belt Not Asked    Self-Exams Not Asked   Social History Narrative    Not on file     Family History   Problem Relation Age of Onset    Diabetes Mother     Dementia Mother     Other Mother         carotid artery blockage    Heart Disease Father    [employer] Stroke Father     Heart Attack Father         several    Hypertension Father     Elevated Lipids Father     No Known Problems Sister     Cancer Brother         brain    Lung Disease Sister     COPD Sister     Cancer Sister         lung       Current Medications:   Current Facility-Administered Medications   Medication Dose Route Frequency Provider Last Rate Last Dose    allopurinol (ZYLOPRIM) tablet 100 mg  100 mg Oral DAILY Kristel, Adriana B, NP        amiodarone (CORDARONE) tablet 200 mg  200 mg Oral DAILY Kristel, Adriana B, NP        apixaban (ELIQUIS) tablet 5 mg  5 mg Oral BID Kristel, Adriana B, NP   5 mg at 11/08/19 1702    ascorbic acid (vitamin C) (VITAMIN C) tablet 500 mg  500 mg Oral BID Kristel, Adriana B, NP   500 mg at 11/08/19 1702    aspirin chewable tablet 81 mg  81 mg Oral DAILY Kristel, Adriana B, NP        atorvastatin (LIPITOR) tablet 40 mg  40 mg Oral QHS Kristel, Adriana B, NP   40 mg at 11/08/19 2141    colchicine tablet 0.6 mg  0.6 mg Oral EVERY OTHER DAY Kristel, Adriana B, NP   0.6 mg at 11/08/19 1604    cyclobenzaprine (FLEXERIL) tablet 5 mg  5 mg Oral DAILY PRN Izabella Villatoro B, NP        DULoxetine (CYMBALTA) capsule 60 mg  60 mg Oral DAILY Kristel, Adriana B, NP        HYDROcodone-acetaminophen (NORCO) 5-325 mg per tablet 1 Tab  1 Tab Oral Q6H PRN Kristel, Adriana B, NP        montelukast (SINGULAIR) tablet 10 mg  10 mg Oral QHS Kristel, Adriana B, NP   10 mg at 11/08/19 2141    patiromer calcium sorbitex (VELTASSA) powder 16.8 g  16.8 g Oral DAILY Kristel, Adriana B, NP        pregabalin (LYRICA) capsule 50 mg  50 mg Oral TID Izabella Jamesler B, NP   50 mg at 11/08/19 2141    tamsulosin (FLOMAX) capsule 0.4 mg  0.4 mg Oral DAILY Kristel, Adriana B, NP        sodium chloride (NS) flush 5-40 mL  5-40 mL IntraVENous Q8H Kristel, Adriana B, NP   10 mL at 11/09/19 0527    sodium chloride (NS) flush 5-40 mL  5-40 mL IntraVENous PRN Maulik Barron, NP        ondansetron (ZOFRAN) injection 4 mg  4 mg IntraVENous Q6H PRN Kristel, Adriana B, NP        acetaminophen (TYLENOL) tablet 650 mg  650 mg Oral Q6H PRN Kristel, Adriana B, NP        bumetanide (BUMEX) injection 2 mg  2 mg IntraVENous Q12H Kristel, Adriana B, NP   2 mg at 11/08/19 2030    docusate sodium (COLACE) capsule 100 mg  100 mg Oral PRN Kristel, Adriana B, NP        senna-docusate (PERICOLACE) 8.6-50 mg per tablet 1 Tab  1 Tab Oral BID Kristel, Adriana B, NP   Stopped at 11/08/19 1800    bisacodyl (DULCOLAX) tablet 5 mg  5 mg Oral DAILY PRN Brian DISLA NP        famotidine (PEPCID) tablet 20 mg  20 mg Oral Q12H Kristel, Adriana B, NP   20 mg at 11/08/19 2030    DOBUTamine (DOBUTREX) 500 mg/250 mL (2,000 mcg/mL) infusion  5 mcg/kg/min IntraVENous CONTINUOUS Kristel, Adriana B, NP 12.3 mL/hr at 11/08/19 2015 5 mcg/kg/min at 11/08/19 2015    [Held by provider] sacubitril-valsartan (ENTRESTO) 24-26 mg tablet 1 Tab  1 Tab Oral Q12H Kristel, Adriana B, NP        albuterol (PROVENTIL VENTOLIN) nebulizer solution 2.5 mg  2.5 mg Nebulization Q4H PRN Kristel, Adriana B, NP   2.5 mg at 11/08/19 2042    guaiFENesin ER (MUCINEX) tablet 600 mg  600 mg Oral Q12H Kristel, Adriana B, NP   600 mg at 11/08/19 2141       Allergies: Allergies   Allergen Reactions    Morphine Other (comments)     Hallucinations. Confusion. Impaired judgement    Chlorhexidine Rash           Physical Exam:   Vitals:    Visit Vitals  /69 (BP 1 Location: Left arm, BP Patient Position: At rest)   Pulse 73   Temp 97.7 °F (36.5 °C)   Resp 18   Wt 171 lb 1.2 oz (77.6 kg)   SpO2 94%   BMI 26.01 kg/m²        General:  Well developed WM, AAOx3, pleasant,  Cooperative in mild distress. HEENT:  Atraumatic. Pink and moist.  Anicteric sclerae. Neck:   Supple, no adenopoathy. JVP > 12 cm (+) HJR . No carotid bruits.   Lungs:  Diminished breath sounds bilaterally - 1/3 way up  Heart:   PMI: Median sternotomy scar,  Regular rhythm, S1, S2 present, S3 gallop, 2/6 systolic murmur    Abdomen:  Distended, hepatomegaly. + Bowel sounds. No bruits. Extremities:  Venous stasis changes, trace edema, no clubbing, cyanosis, or calf tenderness  Neurologic:  Grossly intact. Alert and oriented X 3. No acute neurological distress. Skin:   intact, no wounds, ecchymosis, bruising, rashes   Psych:  Good insight. Not anxious nor agitated. Recent Labs:   Lab Results   Component Value Date/Time    WBC 6.6 11/09/2019 05:04 AM    HGB 8.4 (L) 11/09/2019 05:04 AM    HCT 26.5 (L) 11/09/2019 05:04 AM    PLATELET 602 98/84/7569 05:04 AM    MCV 91.4 11/09/2019 05:04 AM     Lab Results   Component Value Date/Time    GFR est non-AA 46 (L) 11/09/2019 05:04 AM    GFR est AA 55 (L) 11/09/2019 05:04 AM    Creatinine 1.55 (H) 11/09/2019 05:04 AM    BUN 32 (H) 11/09/2019 05:04 AM    Sodium 136 11/09/2019 05:04 AM    Potassium 3.6 11/09/2019 05:04 AM    Chloride 107 11/09/2019 05:04 AM    CO2 20 (L) 11/09/2019 05:04 AM    Magnesium 1.6 11/09/2019 05:04 AM    Phosphorus 1.8 (L) 10/01/2019 03:49 AM     Lab Results   Component Value Date/Time    TSH 3.790 06/12/2019 03:05 PM    T4, Free 1.68 06/12/2019 03:05 PM      Lab Results   Component Value Date/Time    Sodium 136 11/09/2019 05:04 AM    Potassium 3.6 11/09/2019 05:04 AM    Chloride 107 11/09/2019 05:04 AM    CO2 20 (L) 11/09/2019 05:04 AM    Anion gap 9 11/09/2019 05:04 AM    Glucose 80 11/09/2019 05:04 AM    BUN 32 (H) 11/09/2019 05:04 AM    Creatinine 1.55 (H) 11/09/2019 05:04 AM    BUN/Creatinine ratio 21 (H) 11/09/2019 05:04 AM    GFR est AA 55 (L) 11/09/2019 05:04 AM    GFR est non-AA 46 (L) 11/09/2019 05:04 AM    Calcium 8.2 (L) 11/09/2019 05:04 AM    Bilirubin, total 0.9 11/09/2019 05:04 AM    ALT (SGPT) 46 11/09/2019 05:04 AM    AST (SGOT) 36 11/09/2019 05:04 AM    Alk.  phosphatase 113 11/09/2019 05:04 AM    Protein, total 6.6 11/09/2019 05:04 AM    Albumin 3.1 (L) 11/09/2019 05:04 AM    Globulin 3.5 11/09/2019 05:04 AM    A-G Ratio 0.9 (L) 11/09/2019 05:04 AM      Lab Results   Component Value Date/Time    Hemoglobin A1c 5.9 05/22/2019 02:56 AM       Thank you for letting us see him with you,      Cinthia Hutton, MARY  [address]  [address] [place] [address]  Office [phone]  Fax [phone]

## 2019-11-10 ENCOUNTER — APPOINTMENT (OUTPATIENT)
Dept: NON INVASIVE DIAGNOSTICS | Age: 64
DRG: 641 | End: 2019-11-10
Payer: COMMERCIAL

## 2019-11-10 VITALS
DIASTOLIC BLOOD PRESSURE: 62 MMHG | RESPIRATION RATE: 18 BRPM | TEMPERATURE: 98.6 F | BODY MASS INDEX: 25.48 KG/M2 | SYSTOLIC BLOOD PRESSURE: 97 MMHG | HEART RATE: 71 BPM | OXYGEN SATURATION: 97 % | WEIGHT: 167.55 LBS

## 2019-11-10 LAB
ALBUMIN SERPL-MCNC: 3.2 G/DL (ref 3.5–5)
ALBUMIN/GLOB SERPL: 0.8 {RATIO} (ref 1.1–2.2)
ALP SERPL-CCNC: 121 U/L (ref 45–117)
ALT SERPL-CCNC: 51 U/L (ref 12–78)
ANION GAP SERPL CALC-SCNC: 7 MMOL/L (ref 5–15)
ANION GAP SERPL CALC-SCNC: 8 MMOL/L (ref 5–15)
AST SERPL-CCNC: 34 U/L (ref 15–37)
ATRIAL RATE: 65 BPM
BILIRUB SERPL-MCNC: 0.6 MG/DL (ref 0.2–1)
BNP SERPL-MCNC: 5330 PG/ML
BUN SERPL-MCNC: 41 MG/DL (ref 6–20)
BUN SERPL-MCNC: 46 MG/DL (ref 6–20)
BUN/CREAT SERPL: 21 (ref 12–20)
BUN/CREAT SERPL: 22 (ref 12–20)
CALCIUM SERPL-MCNC: 8.2 MG/DL (ref 8.5–10.1)
CALCIUM SERPL-MCNC: 8.6 MG/DL (ref 8.5–10.1)
CALCULATED P AXIS, ECG09: 42 DEGREES
CALCULATED R AXIS, ECG10: 47 DEGREES
CALCULATED T AXIS, ECG11: 59 DEGREES
CHLORIDE SERPL-SCNC: 104 MMOL/L (ref 97–108)
CHLORIDE SERPL-SCNC: 106 MMOL/L (ref 97–108)
CO2 SERPL-SCNC: 22 MMOL/L (ref 21–32)
CO2 SERPL-SCNC: 23 MMOL/L (ref 21–32)
CREAT SERPL-MCNC: 2 MG/DL (ref 0.7–1.3)
CREAT SERPL-MCNC: 2.06 MG/DL (ref 0.7–1.3)
DIAGNOSIS, 93000: NORMAL
ERYTHROCYTE [DISTWIDTH] IN BLOOD BY AUTOMATED COUNT: 15.8 % (ref 11.5–14.5)
GLOBULIN SER CALC-MCNC: 3.9 G/DL (ref 2–4)
GLUCOSE SERPL-MCNC: 103 MG/DL (ref 65–100)
GLUCOSE SERPL-MCNC: 95 MG/DL (ref 65–100)
HCT VFR BLD AUTO: 30.7 % (ref 36.6–50.3)
HGB BLD-MCNC: 9.9 G/DL (ref 12.1–17)
MAGNESIUM SERPL-MCNC: 1.8 MG/DL (ref 1.6–2.4)
MCH RBC QN AUTO: 29.1 PG (ref 26–34)
MCHC RBC AUTO-ENTMCNC: 32.2 G/DL (ref 30–36.5)
MCV RBC AUTO: 90.3 FL (ref 80–99)
NRBC # BLD: 0 K/UL (ref 0–0.01)
NRBC BLD-RTO: 0 PER 100 WBC
P-R INTERVAL, ECG05: 204 MS
PLATELET # BLD AUTO: 274 K/UL (ref 150–400)
PMV BLD AUTO: 10.6 FL (ref 8.9–12.9)
POTASSIUM SERPL-SCNC: 3.6 MMOL/L (ref 3.5–5.1)
POTASSIUM SERPL-SCNC: 4.3 MMOL/L (ref 3.5–5.1)
PROT SERPL-MCNC: 7.1 G/DL (ref 6.4–8.2)
Q-T INTERVAL, ECG07: 452 MS
QRS DURATION, ECG06: 104 MS
QTC CALCULATION (BEZET), ECG08: 470 MS
RBC # BLD AUTO: 3.4 M/UL (ref 4.1–5.7)
SODIUM SERPL-SCNC: 134 MMOL/L (ref 136–145)
SODIUM SERPL-SCNC: 136 MMOL/L (ref 136–145)
VENTRICULAR RATE, ECG03: 65 BPM
WBC # BLD AUTO: 6.1 K/UL (ref 4.1–11.1)

## 2019-11-10 PROCEDURE — 83880 ASSAY OF NATRIURETIC PEPTIDE: CPT

## 2019-11-10 PROCEDURE — 80053 COMPREHEN METABOLIC PANEL: CPT

## 2019-11-10 PROCEDURE — 85027 COMPLETE CBC AUTOMATED: CPT

## 2019-11-10 PROCEDURE — 83735 ASSAY OF MAGNESIUM: CPT

## 2019-11-10 PROCEDURE — 74011250637 HC RX REV CODE- 250/637: Performed by: NURSE PRACTITIONER

## 2019-11-10 PROCEDURE — 36415 COLL VENOUS BLD VENIPUNCTURE: CPT

## 2019-11-10 RX ORDER — BUMETANIDE 2 MG/1
2 TABLET ORAL DAILY
Qty: 30 TAB | Refills: 1 | Status: SHIPPED | OUTPATIENT
Start: 2019-11-11 | End: 2019-11-22 | Stop reason: SDUPTHER

## 2019-11-10 RX ORDER — FAMOTIDINE 20 MG/1
20 TABLET, FILM COATED ORAL
Status: DISCONTINUED | OUTPATIENT
Start: 2019-11-11 | End: 2019-11-10 | Stop reason: HOSPADM

## 2019-11-10 RX ORDER — BUMETANIDE 1 MG/1
2 TABLET ORAL DAILY
Status: DISCONTINUED | OUTPATIENT
Start: 2019-11-10 | End: 2019-11-10 | Stop reason: HOSPADM

## 2019-11-10 RX ADMIN — PREGABALIN 50 MG: 25 CAPSULE ORAL at 09:29

## 2019-11-10 RX ADMIN — APIXABAN 5 MG: 5 TABLET, FILM COATED ORAL at 09:30

## 2019-11-10 RX ADMIN — SACUBITRIL AND VALSARTAN 1 TABLET: 24; 26 TABLET, FILM COATED ORAL at 09:29

## 2019-11-10 RX ADMIN — TAMSULOSIN HYDROCHLORIDE 0.4 MG: 0.4 CAPSULE ORAL at 09:30

## 2019-11-10 RX ADMIN — ALLOPURINOL 100 MG: 100 TABLET ORAL at 09:30

## 2019-11-10 RX ADMIN — SENNOSIDES AND DOCUSATE SODIUM 1 TABLET: 8.6; 5 TABLET ORAL at 09:30

## 2019-11-10 RX ADMIN — ASPIRIN 81 MG 81 MG: 81 TABLET ORAL at 09:30

## 2019-11-10 RX ADMIN — DULOXETINE HYDROCHLORIDE 60 MG: 60 CAPSULE, DELAYED RELEASE ORAL at 09:30

## 2019-11-10 RX ADMIN — Medication 10 ML: at 05:21

## 2019-11-10 RX ADMIN — BUMETANIDE 2 MG: 1 TABLET ORAL at 09:29

## 2019-11-10 RX ADMIN — FAMOTIDINE 20 MG: 20 TABLET ORAL at 09:30

## 2019-11-10 RX ADMIN — OXYCODONE HYDROCHLORIDE AND ACETAMINOPHEN 500 MG: 500 TABLET ORAL at 09:30

## 2019-11-10 RX ADMIN — AMIODARONE HYDROCHLORIDE 200 MG: 200 TABLET ORAL at 09:30

## 2019-11-10 RX ADMIN — GUAIFENESIN 600 MG: 600 TABLET, EXTENDED RELEASE ORAL at 09:30

## 2019-11-10 NOTE — DISCHARGE INSTRUCTIONS
Please continue medications on discharge list only    Please call the office to make a follow up appointment    Labs as directed    Please weigh yourself daily

## 2019-11-10 NOTE — PROGRESS NOTES
0730: Bedside and Verbal shift change report given to Justino Causey (oncoming nurse) by Lior Laureano (offgoing nurse). Report included the following information SBAR, Kardex, Intake/Output, MAR, Recent Results and Cardiac Rhythm SR. Problem: Falls - Risk of  Goal: *Absence of Falls  Description  Document Mike Hawkins Fall Risk and appropriate interventions in the flowsheet. Outcome: Progressing Towards Goal  Note:   Fall Risk Interventions:  Mobility Interventions: Communicate number of staff needed for ambulation/transfer, Patient to call before getting OOB, Utilize walker, cane, or other assistive device    Medication Interventions: Patient to call before getting OOB, Teach patient to arise slowly    Elimination Interventions: Call light in reach, Urinal in reach, Patient to call for help with toileting needs     Call bell and personal effects within reach. Bed locked and in low position. Non skid footwear in place. Problem: Pressure Injury - Risk of  Goal: *Prevention of pressure injury  Description  Document Toi Scale and appropriate interventions in the flowsheet. Outcome: Progressing Towards Goal  Note:   Pressure Injury Interventions:       Activity Interventions: Increase time out of bed, Pressure redistribution bed/mattress(bed type), PT/OT evaluation    Nutrition Interventions: Document food/fluid/supplement intake    Friction and Shear Interventions: Minimize layers     Turns self at appropriate intervals; skin assessed Q4H; blood glucose controlled       Problem: Heart Failure: Day 3  Goal: *Oxygen saturation within defined limits  Outcome: Progressing Towards Goal  Note:   SpO2 WDL on room air  Goal: *Hemodynamically stable  Outcome: Progressing Towards Goal  Note:   Visit Vitals  BP 98/51 (BP 1 Location: Left arm, BP Patient Position: At rest)   Pulse 69   Temp 97.7 °F (36.5 °C)   Resp 18   Wt 76 kg (167 lb 8.8 oz)   SpO2 93%   BMI 25.48 kg/m²     Goal: *Anxiety reduced or absent  Outcome: Progressing Towards Goal  Goal: *Demonstrates progressive activity  Outcome: Progressing Towards Goal

## 2019-11-10 NOTE — PROGRESS NOTES
[facility] Progress Note      DOS:  11/10/2019  REFERRING PROVIDER: Nieves Jung MD  PRIMARY CARE PHYSICIAN: Ella Elkins MD  PRIMARY CARDIOLOGIST:  Simon Mazariegos MD   EP: Renee Hardwick MD   PULMONARY: Gaby Tucker MD     24 Hr Events:  Negative fluid balance  Weight down  Creatinine up     IMPRESSION/PLAN:   ICM s/p HMII as BTT on 12/1/16 and explant, NYHA Class IV, LVEF 20-25%     Resume entresto and carvedilol   Stop Dobutamine   Change to oral diuretics    Track BP and electrolytes   Continue low Na+ and low K+ diet    Abstain from smoking and ETOH     CAD s/p CABG (SVG to RCA and LIMA to LAD) on 12/1/16 s/p BMS x2 -> SVG-RCA graft - no angina              Continue ASA, statin             Resume BB once diuresed    Mitral Regurgitation, severe - improved to mild   Repeat echo    Hyperkalemia- resolved   Hold [place] today     Chronic Left Pleural Effusion - s/p left thoracentesis on 7/5/2019   Encourage use of IS     High Risk of SCD - s/p SQ AICD (5/20/19)   No shocks       PAD - difficult femoral access with LVAD explant              No symptoms of claudication      Chronic Anticoagulation s/p LVAD explant              Continue eliquis 5 mg  twice daily     Gout              Continue colchicine 0.6 mg every other day              Continue allopurinol 100 mg daily   Denies recent gouty attacks    Chronic Lower back pain              On oxycodone, flexeril   Hasn't taken since ICD implant   Back pain improved with increased activity- going to gym    H/o tobacco abuse   Abstaining from nicotine      H/o alcohol abuse   Currently abstaining   Encouraged complete abstinence      Seizure disorder - early 25s              No recent seizure activity     Peripheral neuropathy              On cymbalta   ATTR- Negative     Prevention              Influenza annually              Pneumonia vaccine every 5 years    Dispo:   DC later today         Chief complaint: Dyspnea and fatigue      HPI: Cassandra Contreras Jenny Aranda is a 62y.o. year old male  with a history of HFrEF in the setting of ICM s/p LVAD and recent LVAD explant complicated by PVD,  remote tobacco use and alcohol abuse (quit 11/2016) who presents for follow up of his HFrEF. Mr. Jenny Aranda presented 11/22/2016 for decompressive laminectomy at F9-K2 complicated by myocardial infarction. The [facility](11/25/16) revealed severe left main and 3 vessel disease along with a 60% right common iliac stenosis and  LVEF was 10%. He subsequently underwent placement of an IABP followed by placement of a HeartMate2 LVAD on 12/1/2016 with concomitant CABG (SVG to the RCA, LIMA to LAD). His postoperative course was complicated by RV failure and an ileus requiring TPN resulting and a transaminitis. He had multiple episodes of Torsade on 12/4/2016 prompting repeat catheterization revealing an occluded RCA vein graft. Two BMS were placed in the RCA graft. He also had SVT requiring cardioversion. He had a single lead ICD placed on 97/28/28 complicated by a hematoma. The ICD was later removed on 1/20/17. Following his LVAD surgery he did require inpatient rehab.      Mr. Jenny Aranda was listed for heart transplantation at [facility]. He was called in for transplantation on 7/14/2018 but on pre-op DELLA was noted to have normal LV function and the transplant surgery was aborted. He subsequently was admitted on 9/5/2018 for LVAD explant- his post-operative course was complicated by pneumonia and bilateral pleural effusions. He was discharged on home O2 which he discontinued on his own.       After device explant, his subsequent echocardiograms showed deterioration of LVEF and worsening mitral regurgitation from 40-45% with moderate to severe MR on 9/19/18 to 35-40% with moderate-to-severe MR on 10/15/18. He has been removed from the transplant list at [facility] and is not interested in pursuing transplant evaluation at another center at this time.      His entresto was discontinued at the time of his AICD implant due to hypotension. He subsequently took diovan but felt worse. He is tolerating the entresto with no dizziness, presyncope, or syncope. He was seen by Dr. Jorge Carlos, who stated that his mitral regurgitation has decreased significantly with optimizing his HF medications. He returns to clinic today for follow up. He self up-titrated his entresto to the 97/103 mg, 1 tablet twice daily without dizziness. However, he noted increased dyspnea, myalgias, and low grade fever 2 days ago. He has also gained 11 lbs and notes increased abdominal girth. His wife has noticed new onset LE edema. He took lasix without any response. He was scheduled to leave on a trip to [employer] [facility] tomorrow with his entire family. CARDIAC EVALUATION  TTE 19: LVEF 32%, LVAD plug at LV apex, grade 2 diastolic dysfunction, mild-mod MR, mild TR, PAPs 43 mmHg, mild LAE  DELLA 2018 - normal LV function and the transplant surgery was aborted. He subsequently was admitted on 2018 for LVAD explant  ECHO 18:LVEF 40-45% mod-severe MR   ECHO 10/15/18:EF 35-40%, mod-severe MR   ECHO 10/31/18: TDS, EF 30-35%, reduced RVEF, TAPSE 1.6, LAE, mild- mod MR/TR  ECHO 19: EF 25-30%, question of thrombus in apex. Mild- mod TR. Consider cardiac MR to exclude left ventricular thrombus. ECHO 2019: EF 16-20%, mod MR, mod-severe TR, severe pulm HTN (PA systolic pressure 85 mmHg), mildly reduced RV systolic function (TAPSE 1.57 cm)  ECHO 19: EF 20-25%, moderately reduced RV systolic function, mild MR    CATH 16: severe LM, and 3 VD with 60% right common iliac stenosis. EF: 10%. ----S/p IABP   ----S/p HeartMate2 LVAD 2016   S/p CAB2016: LIMA-> LAD, SVG-> RCA     CATH 16 following torsades SVG-> RCA TO   ---- s/p BMS x 2-.  RCA  RHC 2017:RA: 15/15 14, RV:  14,PA: , m 20, PCWP 13, PVR 1.67 CO 5.67, CI 3    S/p LVAD explant 18 ([facility])    SVT s/p DCCV  AICD  single lead cx by hematoma  AICD explanted 1/20/17    EKG:    10/31/18 NSR QRS 88ms   ms  6/11/19: SR rate 68bpm QRS 94ms Qtc 438 ms    Review of Systems:     General:  States he feels better today   HEENT: Denies epistaxis, angioedema   Pulmonary: Positive for shortness of breath  Cardiac: Denies exertional chest pain, palpitations, PND, lightheadedness, syncope, near syncope; positive for fatigue, DSOUZA, edema  GI:  Denies change in bowel habits, or black or bloody stools;   Musculo: Positive for chronic back pain for which he takes prn flexril and norco   Neuro: Denies  TIA/CVA sx   Skin:  Denies rash   Allergies: Reviewed   Psych: Denies depression or anxiety     History:  Past Medical History:   Diagnosis Date    Arrhythmia     SVT    Arthritis     CAD (coronary artery disease)     Chronic pain     back    Congestive heart failure ([address]     DSOUZA (dyspnea on exertion) 1/30/2019    Gout     [facility]     Hypertension     ICD (implantable cardioverter-defibrillator) in place     Long term current use of anticoagulant therapy     LVAD (left ventricular assist device) present ([address] 12/01/2016    Myocardial infarction ([facility]     Pacemaker     SubQ ICD    Raynaud disease     Seizures ([facility]     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     Past Surgical History:   Procedure Laterality Date    CABG, ARTERY-VEIN, TWO  12/01/2016    CARDIAC SURG PROCEDURE UNLIST  12/01/2016    LVAD    HX CORONARY ARTERY BYPASS GRAFT      HX CORONARY STENT PLACEMENT      HX HEART CATHETERIZATION      HX HEENT      wisdom teeth removed    HX ORTHOPAEDIC  11/22/2016    laminectomy    HX PACEMAKER      ICD - removed 1/2017    HX PTCA      MO INSJ OF SUBQ IMPLANTABLE DEFIBRILLATOR ELECTRODE N/A 5/20/2019    INSERT SUBQ ICD w/ anesthesia performed by Roma De Oliveira MD at [facility] CATH LAB     Social History     Socioeconomic History    Marital status:      Spouse name: Julia Bolden    Number of children: 2    Years of education: Not on file    Highest education level: Some college, no degree   Occupational History    Not on file   Social Needs    Financial resource strain: Not hard at all   Will insecurity:     Worry: Never true     Inability: Never true   [employer] needs:     Medical: No     Non-medical: No   Tobacco Use    Smoking status: Former Smoker     Packs/day: 1.50     Years: 30.00     Pack years: 45.00     Last attempt to quit: 2008     Years since quittin.8    Smokeless tobacco: Never Used   Substance and Sexual Activity    Alcohol use: No    Drug use: No    Sexual activity: Yes   Lifestyle    Physical activity:     Days per week: Not on file     Minutes per session: Not on file    Stress: Not on file   Relationships    Social connections:     Talks on phone: Not on file     Gets together: Not on file     Attends [employer] service: Not on file     Active member of club or organization: Not on file     Attends meetings of clubs or organizations: Not on file     Relationship status: Not on file    Intimate partner violence:     Fear of current or ex partner: Not on file     Emotionally abused: Not on file     Physically abused: Not on file     Forced sexual activity: Not on file   Other Topics Concern     Service Not Asked    Blood Transfusions Not Asked    Caffeine Concern Not Asked    Occupational Exposure Not Asked   [place] Hazards Not Asked    Sleep Concern Not Asked    Stress Concern Not Asked    Weight Concern Not Asked    Special Diet Not Asked    Back Care Not Asked    Exercise Not Asked    Bike Helmet Not Asked    [address] Not Asked    Self-Exams Not Asked   Social History Narrative    Not on file     Family History   Problem Relation Age of Onset    Diabetes Mother     Dementia Mother     Other Mother         carotid artery blockage    Heart Disease Father     Stroke Father     Heart Attack Father         several    Hypertension Father     Elevated Lipids Father     No Known Problems Sister     Cancer Brother         brain    Lung Disease Sister     COPD Sister     Cancer Sister         lung       Current Medications:   Current Facility-Administered Medications   Medication Dose Route Frequency Provider Last Rate Last Dose    bumetanide (BUMEX) tablet 2 mg  2 mg Oral DAILY Kristel, Adriana B, NP        [Held by provider] patiromer calcium sorbitex (VELTASSA) powder 8.4 g  8.4 g Oral DAILY Kristel, Adriana B, NP        allopurinol (ZYLOPRIM) tablet 100 mg  100 mg Oral DAILY Kristel, Adriana B, NP   100 mg at 11/09/19 0845    amiodarone (CORDARONE) tablet 200 mg  200 mg Oral DAILY Kristel, Adriana B, NP   200 mg at 11/09/19 0846    apixaban (ELIQUIS) tablet 5 mg  5 mg Oral BID Kristel, Adriana B, NP   5 mg at 11/09/19 1700    ascorbic acid (vitamin C) (VITAMIN C) tablet 500 mg  500 mg Oral BID Kristel, Adriana B, NP   500 mg at 11/09/19 1700    aspirin chewable tablet 81 mg  81 mg Oral DAILY Kristel, Adriana B, NP   81 mg at 11/09/19 0846    atorvastatin (LIPITOR) tablet 40 mg  40 mg Oral QHS Kristel, Adriana B, NP   40 mg at 11/09/19 2131    colchicine tablet 0.6 mg  0.6 mg Oral EVERY OTHER DAY Kristel, Adriana B, NP   0.6 mg at 11/08/19 1604    cyclobenzaprine (FLEXERIL) tablet 5 mg  5 mg Oral DAILY PRN Covarrubias Riley B, NP        DULoxetine (CYMBALTA) capsule 60 mg  60 mg Oral DAILY Kristel, Adriana B, NP   60 mg at 11/09/19 0845    HYDROcodone-acetaminophen (NORCO) 5-325 mg per tablet 1 Tab  1 Tab Oral Q6H PRN Kristel, Adriana B, NP        montelukast (SINGULAIR) tablet 10 mg  10 mg Oral QHS Kristel, Adriana B, NP   10 mg at 11/09/19 2131    pregabalin (LYRICA) capsule 50 mg  50 mg Oral TID Covarrubias Riley B, NP   50 mg at 11/09/19 2131    tamsulosin (FLOMAX) capsule 0.4 mg  0.4 mg Oral DAILY Kristel, Adriana B, NP   0.4 mg at 11/09/19 0845    sodium chloride (NS) flush 5-40 mL  5-40 mL IntraVENous Q8H Kristel, Adriana B, NP   10 mL at 11/10/19 0521    sodium chloride (NS) flush 5-40 mL  5-40 mL IntraVENous PRN Kristel, Adriana B, NP        ondansetron (ZOFRAN) injection 4 mg  4 mg IntraVENous Q6H PRN Kristel, Adriana B, NP        acetaminophen (TYLENOL) tablet 650 mg  650 mg Oral Q6H PRN Kristel, Adriana B, NP        docusate sodium (COLACE) capsule 100 mg  100 mg Oral PRN Kristel, Adriana B, NP        senna-docusate (PERICOLACE) 8.6-50 mg per tablet 1 Tab  1 Tab Oral BID Kristel, Adriana B, NP   1 Tab at 11/09/19 0845    bisacodyl (DULCOLAX) tablet 5 mg  5 mg Oral DAILY PRN Lux Justice B, NP        famotidine (PEPCID) tablet 20 mg  20 mg Oral Q12H Kristel, Adriana B, NP   20 mg at 11/09/19 2131    sacubitril-valsartan (ENTRESTO) 24-26 mg tablet 1 Tab  1 Tab Oral Q12H Kristel, Adriana B, NP        albuterol (PROVENTIL VENTOLIN) nebulizer solution 2.5 mg  2.5 mg Nebulization Q4H PRN Kristel, Adriana B, NP   2.5 mg at 11/08/19 2042    guaiFENesin ER (MUCINEX) tablet 600 mg  600 mg Oral Q12H Kristel, Adriana B, NP   600 mg at 11/09/19 2131       Allergies: Allergies   Allergen Reactions    Morphine Other (comments)     Hallucinations. Confusion. Impaired judgement    Chlorhexidine Rash           Physical Exam:   Vitals:    Visit Vitals  /53 (BP 1 Location: Left arm, BP Patient Position: Sitting)   Pulse 83   Temp 97.1 °F (36.2 °C)   Resp 18   Wt 167 lb 8.8 oz (76 kg)   SpO2 94%   BMI 25.48 kg/m²        General:  Well developed WM, AAOx3, pleasant,  Cooperative in mild distress. HEENT:  Atraumatic. Pink and moist.  Anicteric sclerae. Neck:   Supple, no adenopoathy. JVP > 12 cm (+) HJR . No carotid bruits. Lungs:  Diminished breath sounds bilaterally - 1/3 way up  Heart:   PMI: Median sternotomy scar,  Regular rhythm, S1, S2 present, S3 gallop, 2/6 systolic murmur    Abdomen:  Distended, hepatomegaly. + Bowel sounds. No bruits.   Extremities:  Venous stasis changes, trace edema, no clubbing, cyanosis, or calf tenderness  Neurologic:  Grossly intact. Alert and oriented X 3. No acute neurological distress. Skin:   intact, no wounds, ecchymosis, bruising, rashes   Psych:  Good insight. Not anxious nor agitated. Recent Labs:   Lab Results   Component Value Date/Time    WBC 6.1 11/10/2019 05:03 AM    HGB 9.9 (L) 11/10/2019 05:03 AM    HCT 30.7 (L) 11/10/2019 05:03 AM    PLATELET 054 08/40/9019 05:03 AM    MCV 90.3 11/10/2019 05:03 AM     Lab Results   Component Value Date/Time    GFR est non-AA 34 (L) 11/10/2019 05:03 AM    GFR est AA 41 (L) 11/10/2019 05:03 AM    Creatinine 2.00 (H) 11/10/2019 05:03 AM    BUN 41 (H) 11/10/2019 05:03 AM    Sodium 136 11/10/2019 05:03 AM    Potassium 3.6 11/10/2019 05:03 AM    Chloride 106 11/10/2019 05:03 AM    CO2 22 11/10/2019 05:03 AM    Magnesium 1.8 11/10/2019 05:03 AM    Phosphorus 1.8 (L) 10/01/2019 03:49 AM     Lab Results   Component Value Date/Time    TSH 3.790 06/12/2019 03:05 PM    T4, Free 1.68 06/12/2019 03:05 PM      Lab Results   Component Value Date/Time    Sodium 136 11/10/2019 05:03 AM    Potassium 3.6 11/10/2019 05:03 AM    Chloride 106 11/10/2019 05:03 AM    CO2 22 11/10/2019 05:03 AM    Anion gap 8 11/10/2019 05:03 AM    Glucose 95 11/10/2019 05:03 AM    BUN 41 (H) 11/10/2019 05:03 AM    Creatinine 2.00 (H) 11/10/2019 05:03 AM    BUN/Creatinine ratio 21 (H) 11/10/2019 05:03 AM    GFR est AA 41 (L) 11/10/2019 05:03 AM    GFR est non-AA 34 (L) 11/10/2019 05:03 AM    Calcium 8.2 (L) 11/10/2019 05:03 AM    Bilirubin, total 0.6 11/10/2019 05:03 AM    ALT (SGPT) 51 11/10/2019 05:03 AM    AST (SGOT) 34 11/10/2019 05:03 AM    Alk.  phosphatase 121 (H) 11/10/2019 05:03 AM    Protein, total 7.1 11/10/2019 05:03 AM    Albumin 3.2 (L) 11/10/2019 05:03 AM    Globulin 3.9 11/10/2019 05:03 AM    A-G Ratio 0.8 (L) 11/10/2019 05:03 AM      Lab Results   Component Value Date/Time    Hemoglobin A1c 5.9 05/22/2019 02:56 AM       Thank you for letting us see him with you,      Brady Holman, NP  [facility] [address]  [facility], [address] [place] [address]  Office [phone]  Fax [phone]

## 2019-11-10 NOTE — DISCHARGE SUMMARY
[facility] Discharge Summary     Patient ID:  Avis Harrington  [id number]  50 y.o.  1955    Admit date: 11/8/2019    Discharge date: 11/10/2019     Admitting Physician: Gary Miranda MD       PCP:  Shanon Rivera MD        Hospital Problems  Date Reviewed: 9/5/2019          Codes Class Noted POA    Acute on chronic systolic (congestive) heart failure [facility]) ICD-10-CM: I50.23  ICD-9-CM: 428.23, 428.0  11/8/2019 Unknown              Discharged Condition: good    Disposition: home, see patient instructions for treatment and plan    Procedures for this admission:  * No surgery found *    Discharge Medications:      My Medications      START taking these medications      Instructions Each Dose to Equal Morning Noon Evening Bedtime   bumetanide 2 mg tablet  Commonly known as:  [address]  Start taking on:  11/11/2019    Your last dose was: Your next dose is: Take 1 Tab by mouth daily. 2 mg                 sacubitril-valsartan 24-26 mg tablet  Commonly known as:  ENTRESTO  Replaces:  ENTRESTO  mg tablet    Your last dose was: Your next dose is: Take 1 Tab by mouth every twelve (12) hours. 1 Tab                    CONTINUE taking these medications      Instructions Each Dose to Equal Morning Noon Evening Bedtime   allopurinol 100 mg tablet  Commonly known as:  ZYLOPRIM    Your last dose was: Your next dose is: Take 100 mg by mouth daily. 100 mg                 amiodarone 200 mg tablet  Commonly known as:  CORDARONE    Your last dose was: Your next dose is: Take 1 Tab by mouth daily. 200 mg                 apixaban 5 mg tablet  Commonly known as:  ELIQUIS    Your last dose was: Your next dose is: Take 1 Tab by mouth two (2) times a day. 5 mg                 ascorbic acid (vitamin C) 500 mg tablet  Commonly known as:  VITAMIN C    Your last dose was: Your next dose is: Take 1 Tab by mouth two (2) times a day.    500 mg aspirin 81 mg chewable tablet    Your last dose was: Your next dose is: Take 81 mg by mouth daily. 81 mg                 atorvastatin 40 mg tablet  Commonly known as:  LIPITOR    Your last dose was: Your next dose is:          TAKE 1 TABLET BY MOUTH EVERY DAY                  betamethasone dipropionate 0.05 % topical cream  Commonly known as:  DIPROSONE    Your last dose was: Your next dose is:          APPLY TO AFFECTED AREA ONCE EVERY DAY FOR 30 DAYS                  carvedilol 6.25 mg tablet  Commonly known as:  COREG    Your last dose was: Your next dose is: Take 1 Tab by mouth two (2) times daily (with meals). 6.25 mg                 colchicine 0.6 mg tablet    Your last dose was: Your next dose is: Take 0.6 mg by mouth every other day. 0.6 mg                 cyclobenzaprine 5 mg tablet  Commonly known as:  FLEXERIL    Your last dose was: Your next dose is: Take 5 mg by mouth daily as needed for Muscle Spasm(s). 5 mg                 DULoxetine 60 mg capsule  Commonly known as:  CYMBALTA    Your last dose was: Your next dose is: Take 60 mg by mouth daily. 60 mg                 FLOMAX 0.4 mg capsule  Generic drug:  tamsulosin    Your last dose was: Your next dose is: Take 0.4 mg by mouth daily. 0.4 mg                 HYDROcodone-acetaminophen 5-325 mg per tablet  Commonly known as:  Marshall Nix    Your last dose was: Your next dose is:          TAKE 1 TABLET EVERY 6 HOURS AS NEEDED                  levalbuterol tartrate 45 mcg/actuation inhaler  Commonly known as:  Pavan Harris    Your last dose was: Your next dose is:          INHALE 2 PUFFS EVERY 4 HOURS AS NEEDED                  montelukast 10 mg tablet  Commonly known as:  SINGULAIR    Your last dose was:       Your next dose is:          TAKE 1 TABLET BY MOUTH ONCE A DAY                  pregabalin 50 mg capsule  Commonly known as:  LYRICA    Your last dose was: Your next dose is:          TAKE 1 CAPSULE TWICE A DAY FOR 5 DAYS THEN 1 CAPSULES 3 TIMES A DAY THEREAFTER                  tadalafil 20 mg tablet  Commonly known as:  CIALIS    Your last dose was: Your next dose is:          20 mg as needed. 20 mg                 triamcinolone acetonide 0.025 % topical cream  Commonly known as:  KENALOG    Your last dose was: Your next dose is:          Apply 0.025 Tubes to affected area as needed. 0.025 Tube                    STOP taking these medications    ENTRESTO  mg tablet  Generic drug:  sacubitril-valsartan  Replaced by:  sacubitril-valsartan 24-26 mg tablet        patiromer calcium sorbitex 8.4 gram powder  Commonly known as:  VELTASSA              Where to Get Your Medications      These medications were sent to [facility]/pharmacy #[id number], [place] - [address] [address] AT [address] [place] [address] [address] [place] [address]    Phone:  [phone]   · bumetanide 2 mg tablet  · sacubitril-valsartan 24-26 mg tablet         Oxygen:       Hospital Course:   63 y/o male with chronic HFrEF s/p LVAD implant and subsequent removal for recovery who was admitted on 11/9 for volume overload. He was started on IV dobutamine to help augment aggressive diuresis, entresto held while diuresing. Over the next two days he has lost the 11 pounds he gained, dobutamine weaned off, his renal function increased with diuresis but overall he states he feels much better. Will discharge home today with [facility] follow up.        Discharge Vital Signs:   Visit Vitals  /53 (BP 1 Location: Left arm, BP Patient Position: Sitting)   Pulse 75   Temp 97.1 °F (36.2 °C)   Resp 18   Wt 167 lb 8.8 oz (76 kg)   SpO2 94%   BMI 25.48 kg/m²       Labs:   Recent Labs     11/10/19  0503   WBC 6.1   HGB 9.9*   HCT 30.7*         K 3.6   BUN 41*   CREA 2.00*   GLU 95       Diagnostics:  See result section     Patient Instructions/Follow Up Care:  Discharge instructions were reviewed with the patient and family present. Questions were also answered at this time. Prescriptions and medications were reviewed. The patient was also instructed to follow up with his primary care physician as needed. The patient and family were encouraged to call with any questions or concerns.        Signed:  Shruthi Nascimento NP  11/10/2019  9:36 AM

## 2019-11-10 NOTE — PROGRESS NOTES
Bedside verbal shift change report given to oncoming nurse Haley Núñez R.N. [employer] for questions and clarifications provided.

## 2019-11-10 NOTE — PHYSICIAN ADVISORY
This patient is at above high risk of deterioration based on documented presenting clinical data, comorbid conditions, high risk of adverse events and current acute care course. Mr. Colin Mathis meets Inpatient Admission status criteria in accordance with CMS regulation Section 43 .3. Specifically, due to medical necessity the patient's stay now exceeds Two Midnights. It is our recommendation that this patient's hospitalization status should remain INPATIENT to INPATIENT status. The final decision of the patient's hospitalization status depends on the attending physician's judgment Letter of Status Determination:  
Recommend hospitalization status:  
INPATIENT  to INPATIENT Status Pt Name:  Colin Mathis MR#  
LLOYD # [id number] / 
[id number] Payor: Robin Ferraro / Plan: [address] [place] [address] / Product Type: Medicare /   
[facility]#  [id number] Room and Hospital  [facility]/  @ [facility]  
Hospitalization date  11/8/2019  2:28 PM  
Current Attending Physician  Maykel Abreu MD  
Principal diagnosis  Acute on chronic systolic (congestive) heart failure ([address] [I50.23] CAD s/p CABG (SVG to RCA and LIMA to LAD) on 12/1/16 s/p BMS x2 -> SVG-RCA graft Mitral Regurgitation, severe - improved to mild High Risk of SCD - s/p SQ AICD (5/20/19) Clinicals  62 y.o. y.o  male hospitalized with above diagnosis Home meds entresto and carvedilol held. Patient has been on IV dobutamine 5 mcgs/kg/min, and IV bumex 2 mg BID- can give extra dose if needed. Repeat echo is pending. [place] (MCG) criteria Does  NOT apply STATUS DETERMINATION  Remain inpatient. Additional comments Payor: Robin Ferraro / Plan: [place] MEDICARE PART A & B / Product Type: Medicare /   
  
 
 
Luis Antonio Canales M.D. Physician Advisor [employer] [address] [place] [address], [address] [place] [address] C: [phone] [email or web address]

## 2019-11-10 NOTE — PROGRESS NOTES
Pharmacy Renal Dosing Protocol  Medication: famotidine   Current regimen:  20 every 12 hr  Recent Labs     11/10/19  0503 11/09/19  0504 11/08/19  1530   CREA 2.00* 1.55* 1.52*   BUN 41* 32* 32*     Estimated CrCl:  36 ml/min  Plan: Change to 20mg q24h for CrCl < 50 ml/min

## 2019-11-11 ENCOUNTER — TELEPHONE (OUTPATIENT)
Dept: CASE MANAGEMENT | Age: 64
End: 2019-11-11

## 2019-11-11 NOTE — TELEPHONE ENCOUNTER
HF NN attempted to reach patient for post discharge phone call. Left message requesting return call.

## 2019-11-11 NOTE — NURSE NAVIGATOR
Chart reviewed by Heart Failure Nurse Navigator. Heart Failure database completed. EF:  20-25%     ACEi/ARB/ARNi: Entresto 97/103mg Q12HR; on hold on admission; restarted at discharge 24/26mg q12hr    BB: Coreg 6.25mg BID; on hold on discharge; restarted 6/25mg BID at discharge    Aldosterone Antagonist:     Obstructive Sleep Apnea Screening: NO   STOP-BANG score:   Referred to Sleep Medicine:     CRT patient with SQ ICD implanted 5/20/19. NYHA Functional Class IV on admission and discharge. Heart Failure Teach Back in Patient Education. Heart Failure Avoiding Triggers on Discharge Instructions. Cardiologist: Dr. Claudean Giovanni discharge follow up phone call to be made within 48-72 hours of discharge.

## 2019-11-12 ENCOUNTER — PATIENT OUTREACH (OUTPATIENT)
Dept: CASE MANAGEMENT | Age: 64
End: 2019-11-12

## 2019-11-12 ENCOUNTER — TELEPHONE (OUTPATIENT)
Dept: CASE MANAGEMENT | Age: 64
End: 2019-11-12

## 2019-11-12 NOTE — PROGRESS NOTES
Hospital Discharge Follow-Up      Date/Time:  11/12/2019 4:22 PM    Patient was admitted to [facility] on 11/8 and discharged on 11/10/19  for CHF fluid overload - direct admit from [facility] - Pt with vast history or interventions - outlined in Dr. Anum Monge notes - . The physician discharge summary was available at the time of outreach. Patient was contacted within 1 business days of discharge. Attempt to reach pt to CHF in patient NN - noted on 11/12 -  -     Top Challenges reviewed with the provider   [facility] patient with admission for 11 lb weight gain / and dyspnea  Short course of Dobutamine infusion   Pt with complex history including LVAD removal and ICD removal -  Pt off transplant list -     Advance Care Planning:   Does patient have an Advance Directive:  reviewed and current          63 y/o male with chronic HFrEF s/p LVAD implant and subsequent removal for recovery who was admitted on 11/9 for volume overload. He was started on IV dobutamine to help augment aggressive diuresis, entresto held while diuresing. Over the next two days he has lost the 11 pounds he gained, dobutamine weaned off, his renal function increased with diuresis but overall he states he feels much better. Will discharge home today with [facility] follow up.         Discharge Vital Signs:   Visit Vitals  /53 (BP 1 Location: Left arm, BP Patient Position: Sitting)   Pulse 75   Temp 97.1 °F (36.2 °C)   Resp 18   Wt 167 lb 8.8 oz (76 kg)   SpO2 94%   BMI 25.48 kg/m²       Method of communication with provider :chart routing, staff message, phone    Inpatient RRAT score: 25  Was this a readmission?  no   Patient stated reason for the readmission: n/a    Care Transition Nurse (CTN) attempted to contact the pt - LM for pt and wife on cell #s -   Goal to review discharge instructions and follow up with [facility] -     Goal to review: hospital discharge instructions, red flags, and give opportunity for questions or concerns - Left name and # for return call - [phone] - Jessica Burgos CTN    Disease Specific:   CHF    Patients top risk factors for readmission:  depression, financial, medical condition, medication management, utilization of services    Home Health orders at discharge: [address]: none  Date of initial visit: [facility] ordered at discharge: none  [facility]: none  [facility] received: none    Medication(s):   New Medications at Discharge: Bumex 2 mg - Entresto 24-26 mg. Changed Medications at Discharge: none  Discontinued Medications at Discharge: Entresto        / [place]     Medication reconciliation pending    Referral to Pharm D needed: no     [facility] follow up appointment(s):   Future Appointments   Date Time Provider Kylah Sneed   6/26/2020 10:40 AM Mars Prasad NP [facility]      Non-[facility] follow up appointment(s): Pt's pcp - Dr. Sharlene Burks - will inquire about follow up - not noted at discharge. [facility]:  information provided as a resource     Goals      Attends follow-up appointments as directed. 11/12/19  Pt to follow up with [facility] -   Pt had plans to go to [facility] with his family prior to admission -  note alluded to checking on alternate travel day. ttk       Knowledge and adherence of prescribed medication (ie. action, side effects, missed dose, etc.). Pt d/c on Bumex 2 mg -   And Entresto dosing to be at 24-26 mg/ from   mg    Pt was on short course Dobutamine therapy for diuresis/ and HF stabilization with this admission. ttk       Maintains daily weight. 11/12/19  Pt started on Bumex 1 mg - every day -  Pt to do daily am weights - and call [facility] with parameters discussed.     Pt had 11 lb weight gain / and diuresis this admission -  ttk          Lyubov Brush RN , [facility], [facility]  Care transitions nurse/ CHF bundle  [phone]

## 2019-11-22 ENCOUNTER — OFFICE VISIT (OUTPATIENT)
Dept: CARDIOLOGY CLINIC | Age: 64
End: 2019-11-22

## 2019-11-22 VITALS
TEMPERATURE: 97.3 F | HEART RATE: 58 BPM | DIASTOLIC BLOOD PRESSURE: 62 MMHG | BODY MASS INDEX: 26.1 KG/M2 | HEIGHT: 68 IN | OXYGEN SATURATION: 98 % | WEIGHT: 172.2 LBS | SYSTOLIC BLOOD PRESSURE: 112 MMHG | RESPIRATION RATE: 16 BRPM

## 2019-11-22 DIAGNOSIS — I50.22 CHRONIC SYSTOLIC CONGESTIVE HEART FAILURE (HCC): Primary | ICD-10-CM

## 2019-11-22 DIAGNOSIS — R06.02 SHORTNESS OF BREATH: ICD-10-CM

## 2019-11-22 RX ORDER — BUMETANIDE 2 MG/1
2 TABLET ORAL
Qty: 30 TAB | Refills: 1 | Status: SHIPPED | OUTPATIENT
Start: 2019-11-22 | End: 2019-12-05 | Stop reason: DRUGHIGH

## 2019-11-22 NOTE — PATIENT INSTRUCTIONS
Medication changes: 
 
INCREASE entresto to 49mg/51mg- Take one  tablet by mouth twice daily CHANGE bumetanide (Bumex) to 2mg AS NEEDED for weight gain of 2 or more pounds at night or shortness of breath. Testing Ordered: 
 
Lab work ordered today. You will be contacted with results. Follow up 2 weeks with Brianna Patel with NP or MD 
 
 
Please monitor your blood pressures daily prior to medications and 2 hours after taking medications. Bring a written record of your blood pressures to your next appointment. Please monitor your weights daily upon waking and after using the bathroom. Keep a written records of your weights and bring to your next appointment. If you have a weight gain of 3 or more pounds overnight OR 5 or more pounds in one week please contact our office. Thank you for allowing us the privilege of being a part of your healthcare team! Please do not hesitate to contact our office at [phone] with any questions or concerns.

## 2019-11-22 NOTE — PROGRESS NOTES
[facility] Clinic Note      DOS:  11/22/2019  REFERRING PROVIDER: Marguerite Shrestha MD  PRIMARY CARE PHYSICIAN: Lewis Schulz MD  PRIMARY CARDIOLOGIST:  Zoe Cordova MD   EP: Kavitha Jacome MD   PULMONARY: Africa Nolasco MD       IMPRESSION/PLAN:   ICM s/p HMII as BTT on 12/1/16 and explant, NYHA Class IV, LVEF 20-25%     Increase entresto to 49/51mg    Cont carvedilol   Bumex 2mg daily PRN only    Continue low Na+ and low K+ diet    Abstain from smoking and ETOH   Labs today   Follow up in 2 weeks with NP/MD     CAD s/p CABG (SVG to RCA and LIMA to LAD) on 12/1/16 s/p BMS x2 -> SVG-RCA graft - no angina              Continue ASA, statin   Cont Coreg     Mitral Regurgitation, severe - improved to mild   Repeat echo in February 2020     Hyperkalemia- resolved   Check Cmp- may need to resume     Chronic Left Pleural Effusion - s/p left thoracentesis on 7/5/2019   Encourage use of IS     High Risk of SCD - s/p SQ AICD (5/20/19)   No shocks       PAD - difficult femoral access with LVAD explant              No symptoms of claudication      Chronic Anticoagulation s/p LVAD explant              Continue eliquis 5 mg  twice daily     Gout              Continue colchicine 0.6 mg every other day              Continue allopurinol 100 mg daily   Denies recent gouty attacks    Chronic Lower back pain              On oxycodone, flexeril   Hasn't taken since ICD implant   Back pain improved with increased activity- going to gym    H/o tobacco abuse   Abstaining from nicotine      H/o alcohol abuse   Currently abstaining   Encouraged complete abstinence      Seizure disorder - early 25s              No recent seizure activity     Peripheral neuropathy              On cymbalta   ATTR- Negative     Prevention              Influenza annually              Pneumonia vaccine every 5 years         Chief Complaint   Patient presents with    CHF         HPI: Radha Griffith is a 62y.o. year old male  with a history of HFrEF in the setting of ICM s/p LVAD and recent LVAD explant complicated by PVD,  remote tobacco use and alcohol abuse (quit 11/2016) who presents for follow up of his HFrEF. Mr. Sowmya Colón presented 11/22/2016 for decompressive laminectomy at Y8-S3 complicated by myocardial infarction. The [facility](11/25/16) revealed severe left main and 3 vessel disease along with a 60% right common iliac stenosis and  LVEF was 10%. He subsequently underwent placement of an IABP followed by placement of a HeartMate2 LVAD on 12/1/2016 with concomitant CABG (SVG to the RCA, LIMA to LAD). His postoperative course was complicated by RV failure and an ileus requiring TPN resulting and a transaminitis. He had multiple episodes of Torsade on 12/4/2016 prompting repeat catheterization revealing an occluded RCA vein graft. Two BMS were placed in the RCA graft. He also had SVT requiring cardioversion. He had a single lead ICD placed on 81/61/38 complicated by a hematoma. The ICD was later removed on 1/20/17. Following his LVAD surgery he did require inpatient rehab.      Mr. Sowmya Colón was listed for heart transplantation at [facility]. He was called in for transplantation on 7/14/2018 but on pre-op DELLA was noted to have normal LV function and the transplant surgery was aborted. He subsequently was admitted on 9/5/2018 for LVAD explant- his post-operative course was complicated by pneumonia and bilateral pleural effusions. He was discharged on home O2 which he discontinued on his own.       After device explant, his subsequent echocardiograms showed deterioration of LVEF and worsening mitral regurgitation from 40-45% with moderate to severe MR on 9/19/18 to 35-40% with moderate-to-severe MR on 10/15/18. He has been removed from the transplant list at [facility] and is not interested in pursuing transplant evaluation at another center at this time. His entresto was discontinued at the time of his AICD implant due to hypotension.  He subsequently took diovan but felt worse. He is tolerating the entresto with no dizziness, presyncope, or syncope. He was seen by Dr. Maryuri Fernandez, who stated that his mitral regurgitation has decreased significantly with optimizing his HF medications. He returns to clinic today for follow up. He has done well since hospital discharge and on his trip to [facility], he is taking all of his medications, trying to be more compliant with his diet and has not had any extra fluid on board since increasing his diuretics. He and his wife recently purchased a home in [place], [place] and plan to move in the summer of . CARDIAC EVALUATION  TTE 19: LVEF 32%, LVAD plug at LV apex, grade 2 diastolic dysfunction, mild-mod MR, mild TR, PAPs 43 mmHg, mild LAE  DELLA 2018 - normal LV function and the transplant surgery was aborted. He subsequently was admitted on 2018 for LVAD explant  ECHO 18:LVEF 40-45% mod-severe MR   ECHO 10/15/18:EF 35-40%, mod-severe MR   ECHO 10/31/18: TDS, EF 30-35%, reduced RVEF, TAPSE 1.6, LAE, mild- mod MR/TR  ECHO 19: EF 25-30%, question of thrombus in apex. Mild- mod TR. Consider cardiac MR to exclude left ventricular thrombus. ECHO 2019: EF 16-20%, mod MR, mod-severe TR, severe pulm HTN (PA systolic pressure 85 mmHg), mildly reduced RV systolic function (TAPSE 2.69 cm)  ECHO 19: EF 20-25%, moderately reduced RV systolic function, mild MR    CATH 16: severe LM, and 3 VD with 60% right common iliac stenosis. EF: 10%. ----S/p IABP   ----S/p HeartMate2 LVAD 2016   S/p CAB2016: LIMA-> LAD, SVG-> RCA     CATH 16 following torsades SVG-> RCA TO   ---- s/p BMS x 2-.  RCA  RHC 2017:RA: 15/15 14, RV:  14,PA: 23, m 20, PCWP 13, PVR 1.67 CO 5.67, CI 3    S/p LVAD explant 18 (UVA)    SVT s/p DCCV  AICD  single lead cx by hematoma  AICD explanted 17    EKG:    10/31/18 NSR QRS 88ms   ms  19: SR rate 68bpm QRS 94ms Qtc 438 ms    Review of Systems:     General:  Negative   HEENT: Denies epistaxis, angioedema   Pulmonary: Denies SOB  Cardiac: Denies exertional chest pain, no edema.  palpitations, PND, lightheadedness, syncope, near syncope; positive for fatigue,   GI:  Denies change in bowel habits, or black or bloody stools;  Musculo: Positive for chronic back pain for which he takes prn flexril and norco   Neuro: Denies  TIA/CVA sx   Skin:  Denies rash   Allergies: Reviewed   Psych: Denies depression or anxiety       History:  Past Medical History:   Diagnosis Date    Arrhythmia     SVT    Arthritis     CAD (coronary artery disease)     Chronic pain     back    Congestive heart failure (HCC)     DSOUZA (dyspnea on exertion) 1/30/2019    Gout     Heart failure ([facility]     Hypertension     ICD (implantable cardioverter-defibrillator) in place     Long term current use of anticoagulant therapy     LVAD (left ventricular assist device) present ([facility] 12/01/2016    Myocardial infarction ([facility]     Pacemaker     SubQ ICD    Raynaud disease     Seizures ([facility]     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     Past Surgical History:   Procedure Laterality Date    CABG, ARTERY-VEIN, TWO  12/01/2016    CARDIAC SURG PROCEDURE UNLIST  12/01/2016    LVAD    HX CORONARY ARTERY BYPASS GRAFT      HX CORONARY STENT PLACEMENT      HX HEART CATHETERIZATION      HX HEENT      wisdom teeth removed    HX ORTHOPAEDIC  11/22/2016    laminectomy    HX PACEMAKER      ICD - removed 1/2017    HX PTCA      [place] INSJ OF SUBQ IMPLANTABLE DEFIBRILLATOR ELECTRODE N/A 5/20/2019    INSERT SUBQ ICD w/ anesthesia performed by Anibal Reed MD at [facility] CATH LAB     Social History     Socioeconomic History    Marital status:      Spouse name: Alessia Morataya    Number of children: 2    Years of education: Not on file    Highest education level: Some college, no degree   Occupational History    Not on file   Social Needs    Financial resource strain: Not hard at all   [facility] Food insecurity:     Worry: Never true     Inability: Never true    Transportation needs:     Medical: No     Non-medical: No   Tobacco Use    Smoking status: Former Smoker     Packs/day: 1.50     Years: 30.00     Pack years: 45.00     Last attempt to quit:      Years since quittin.8    Smokeless tobacco: Never Used   Substance and Sexual Activity    Alcohol use: No    Drug use: No    Sexual activity: Yes   Lifestyle    Physical activity:     Days per week: Not on file     Minutes per session: Not on file    Stress: Not on file   Relationships    Social connections:     Talks on phone: Not on file     Gets together: Not on file     Attends Denominational service: Not on file     Active member of club or organization: Not on file     Attends meetings of clubs or organizations: Not on file     Relationship status: Not on file    Intimate partner violence:     Fear of current or ex partner: Not on file     Emotionally abused: Not on file     Physically abused: Not on file     Forced sexual activity: Not on file   Other Topics Concern     Service Not Asked    Blood Transfusions Not Asked    Caffeine Concern Not Asked    Occupational Exposure Not Asked   Rosa Maria Blizzard Hazards Not Asked    Sleep Concern Not Asked    Stress Concern Not Asked    Weight Concern Not Asked    Special Diet Not Asked    Back Care Not Asked    Exercise Not Asked    Bike Helmet Not Asked    [facility] Not Asked    Self-Exams Not Asked   Social History Narrative    Not on file     Family History   Problem Relation Age of Onset    Diabetes Mother     Dementia Mother     Other Mother         carotid artery blockage    Heart Disease Father     Stroke Father     Heart Attack Father         several    Hypertension Father     Elevated Lipids Father     No Known Problems Sister     Cancer Brother         brain    Lung Disease Sister     COPD Sister     Cancer Sister         lung       Current Medications: Current Outpatient Medications   Medication Sig Dispense Refill    sacubitril-valsartan (ENTRESTO) 24 mg/26 mg tablet Take 1 Tab by mouth every twelve (12) hours. 60 Tab 1    bumetanide (BUMEX) 2 mg tablet Take 1 Tab by mouth daily. 30 Tab 1    pregabalin (LYRICA) 50 mg capsule TAKE 1 CAPSULE TWICE A DAY FOR 5 DAYS THEN 1 CAPSULES 3 TIMES A DAY THEREAFTER  2    betamethasone dipropionate (DIPROSONE) 0.05 % topical cream APPLY TO AFFECTED AREA ONCE EVERY DAY FOR 30 DAYS  4    triamcinolone acetonide (KENALOG) 0.025 % topical cream Apply 0.025 Tubes to affected area as needed.  carvedilol (COREG) 6.25 mg tablet Take 1 Tab by mouth two (2) times daily (with meals). 180 Tab 3    amiodarone (CORDARONE) 200 mg tablet Take 1 Tab by mouth daily. 90 Tab 1    tadalafil (CIALIS) 20 mg tablet 20 mg as needed. 6    atorvastatin (LIPITOR) 40 mg tablet TAKE 1 TABLET BY MOUTH EVERY DAY 30 Tab 11    apixaban (ELIQUIS) 5 mg tablet Take 1 Tab by mouth two (2) times a day. 60 Tab 11    levalbuterol tartrate (XOPENEX) 45 mcg/actuation inhaler INHALE 2 PUFFS EVERY 4 HOURS AS NEEDED  0    montelukast (SINGULAIR) 10 mg tablet TAKE 1 TABLET BY MOUTH ONCE A DAY  3    HYDROcodone-acetaminophen (NORCO) 5-325 mg per tablet TAKE 1 TABLET EVERY 6 HOURS AS NEEDED  0    DULoxetine (CYMBALTA) 60 mg capsule Take 60 mg by mouth daily.  allopurinol (ZYLOPRIM) 100 mg tablet Take 100 mg by mouth daily.  tamsulosin (FLOMAX) 0.4 mg capsule Take 0.4 mg by mouth daily.  colchicine 0.6 mg tablet Take 0.6 mg by mouth every other day.  cyclobenzaprine (FLEXERIL) 5 mg tablet Take 5 mg by mouth daily as needed for Muscle Spasm(s).  aspirin 81 mg chewable tablet Take 81 mg by mouth daily.  ascorbic acid, vitamin C, (VITAMIN C) 500 mg tablet Take 1 Tab by mouth two (2) times a day. 60 Tab 6       Allergies: Allergies   Allergen Reactions    Morphine Other (comments)     Hallucinations. Confusion.  Impaired judgement    Chlorhexidine Rash       Physical Exam:   Vitals:    Visit Vitals  Pulse (!) 58   Temp 97.3 °F (36.3 °C) (Oral)   Resp 16   Ht 5' 8\" (1.727 m)   Wt 172 lb 3.2 oz (78.1 kg)   SpO2 98%   BMI 26.18 kg/m²        General:  Well developed WM, AAOx3, pleasant,  Cooperative in mild distress. HEENT:  Atraumatic. Pink and moist.  Anicteric sclerae. Neck:   Supple, no adenopoathy. JVP > 12 cm (+) HJR . No carotid bruits. Lungs:  CTA, no cough   Heart:   PMI: Median sternotomy scar,  Regular rhythm, S1, S2 present, S3 gallop, 2/6 systolic murmur    Abdomen:  Non distended + Bowel sounds. No bruits. Extremities:  Venous stasis changes, trace edema, no clubbing, cyanosis, or calf tenderness  Neurologic:  Grossly intact. Alert and oriented X 3. No acute neurological distress. Skin:   intact, no wounds, ecchymosis, bruising, rashes   Psych:  Good insight. Not anxious nor agitated.     Recent Labs:     Lab Results   Component Value Date/Time    WBC 6.1 11/10/2019 05:03 AM    HGB 9.9 (L) 11/10/2019 05:03 AM    HCT 30.7 (L) 11/10/2019 05:03 AM    PLATELET 739 87/75/3030 05:03 AM    MCV 90.3 11/10/2019 05:03 AM     Lab Results   Component Value Date/Time    GFR est non-AA 33 (L) 11/10/2019 11:37 AM    GFR est AA 40 (L) 11/10/2019 11:37 AM    Creatinine 2.06 (H) 11/10/2019 11:37 AM    BUN 46 (H) 11/10/2019 11:37 AM    Sodium 134 (L) 11/10/2019 11:37 AM    Potassium 4.3 11/10/2019 11:37 AM    Chloride 104 11/10/2019 11:37 AM    CO2 23 11/10/2019 11:37 AM    Magnesium 1.8 11/10/2019 05:03 AM    Phosphorus 1.8 (L) 10/01/2019 03:49 AM     Lab Results   Component Value Date/Time    TSH 3.790 06/12/2019 03:05 PM    T4, Free 1.68 06/12/2019 03:05 PM      Lab Results   Component Value Date/Time    Sodium 134 (L) 11/10/2019 11:37 AM    Potassium 4.3 11/10/2019 11:37 AM    Chloride 104 11/10/2019 11:37 AM    CO2 23 11/10/2019 11:37 AM    Anion gap 7 11/10/2019 11:37 AM    Glucose 103 (H) 11/10/2019 11:37 AM    BUN 46 (H) 11/10/2019 11:37 AM    Creatinine 2.06 (H) 11/10/2019 11:37 AM    BUN/Creatinine ratio 22 (H) 11/10/2019 11:37 AM    GFR est AA 40 (L) 11/10/2019 11:37 AM    GFR est non-AA 33 (L) 11/10/2019 11:37 AM    Calcium 8.6 11/10/2019 11:37 AM    Bilirubin, total 0.6 11/10/2019 05:03 AM    ALT (SGPT) 51 11/10/2019 05:03 AM    AST (SGOT) 34 11/10/2019 05:03 AM    Alk.  phosphatase 121 (H) 11/10/2019 05:03 AM    Protein, total 7.1 11/10/2019 05:03 AM    Albumin 3.2 (L) 11/10/2019 05:03 AM    Globulin 3.9 11/10/2019 05:03 AM    A-G Ratio 0.8 (L) 11/10/2019 05:03 AM      Lab Results   Component Value Date/Time    Hemoglobin A1c 5.9 05/22/2019 02:56 AM       Thank you for letting us see him with you,      Matthews Brittle, NP  [address], [address] [place] [address]  Office [phone]  Fax [phone]

## 2019-11-23 LAB
ALBUMIN SERPL-MCNC: 4.5 G/DL (ref 3.6–4.8)
ALBUMIN/GLOB SERPL: 1.8 {RATIO} (ref 1.2–2.2)
ALP SERPL-CCNC: 111 IU/L (ref 39–117)
ALT SERPL-CCNC: 23 IU/L (ref 0–44)
AST SERPL-CCNC: 23 IU/L (ref 0–40)
BILIRUB SERPL-MCNC: 0.4 MG/DL (ref 0–1.2)
BUN SERPL-MCNC: 70 MG/DL (ref 8–27)
BUN/CREAT SERPL: 31 (ref 10–24)
CALCIUM SERPL-MCNC: 9.3 MG/DL (ref 8.6–10.2)
CHLORIDE SERPL-SCNC: 96 MMOL/L (ref 96–106)
CO2 SERPL-SCNC: 23 MMOL/L (ref 20–29)
CREAT SERPL-MCNC: 2.26 MG/DL (ref 0.76–1.27)
GLOBULIN SER CALC-MCNC: 2.5 G/DL (ref 1.5–4.5)
GLUCOSE SERPL-MCNC: 77 MG/DL (ref 65–99)
MAGNESIUM SERPL-MCNC: 2.4 MG/DL (ref 1.6–2.3)
NT-PROBNP SERPL-MCNC: 2657 PG/ML (ref 0–210)
POTASSIUM SERPL-SCNC: 5 MMOL/L (ref 3.5–5.2)
PROT SERPL-MCNC: 7 G/DL (ref 6–8.5)
SODIUM SERPL-SCNC: 136 MMOL/L (ref 134–144)

## 2019-11-25 ENCOUNTER — TELEPHONE (OUTPATIENT)
Dept: CARDIOLOGY CLINIC | Age: 64
End: 2019-11-25

## 2019-11-25 DIAGNOSIS — R06.02 SHORTNESS OF BREATH: Primary | ICD-10-CM

## 2019-11-25 DIAGNOSIS — I50.22 CHRONIC SYSTOLIC CONGESTIVE HEART FAILURE (HCC): ICD-10-CM

## 2019-11-25 NOTE — TELEPHONE ENCOUNTER
Labs reviewed. Notable for marked increase in Cr - 2.26 from 1.55. Pro-BNP decreased from 5330 to 2657. Patient denies complaints, states weight has started to increase slightly since changing Bumex to PRN and states he has a little more energy. Instructed patient to go for labs tomorrow to re-evaluate kidney function, patient verbalizes understanding.

## 2019-11-25 NOTE — TELEPHONE ENCOUNTER
Patient's wife called to see why they haven't received the communicator for his FICD device.     She can be reached at [phone]    [facility]

## 2019-11-25 NOTE — TELEPHONE ENCOUNTER
Spoke to pts wife & tried to figure out what clinic his Latitude would be enrolled in as it is saying he is already assigned to another clinic. She doesn't know b/c only sees [id number] [address] [facility] doesn't have a remote. Ordered new Latitude & informed her once he receives it to send manual & call office to get a pacer schedule. Verified address.

## 2019-11-25 NOTE — TELEPHONE ENCOUNTER
George Osuna NP  You 2 hours ago (1:18 PM)      I called and spoke to patient - will you please order CMP, Mg, pro-BNP, and CBC stat and send to his lab for tomorrow? Marvin Calvo!      Routing comment

## 2019-11-26 DIAGNOSIS — R06.02 SHORTNESS OF BREATH: ICD-10-CM

## 2019-11-26 DIAGNOSIS — I50.22 CHRONIC SYSTOLIC CONGESTIVE HEART FAILURE (HCC): ICD-10-CM

## 2019-11-27 ENCOUNTER — TELEPHONE (OUTPATIENT)
Dept: CARDIOLOGY CLINIC | Age: 64
End: 2019-11-27

## 2019-11-27 LAB
ALBUMIN SERPL-MCNC: 4.2 G/DL (ref 3.6–4.8)
ALBUMIN/GLOB SERPL: 1.6 {RATIO} (ref 1.2–2.2)
ALP SERPL-CCNC: 94 IU/L (ref 39–117)
ALT SERPL-CCNC: 24 IU/L (ref 0–44)
AST SERPL-CCNC: 28 IU/L (ref 0–40)
BILIRUB SERPL-MCNC: 0.3 MG/DL (ref 0–1.2)
BUN SERPL-MCNC: 50 MG/DL (ref 8–27)
BUN/CREAT SERPL: 28 (ref 10–24)
CALCIUM SERPL-MCNC: 9.2 MG/DL (ref 8.6–10.2)
CHLORIDE SERPL-SCNC: 104 MMOL/L (ref 96–106)
CO2 SERPL-SCNC: 21 MMOL/L (ref 20–29)
CREAT SERPL-MCNC: 1.78 MG/DL (ref 0.76–1.27)
ERYTHROCYTE [DISTWIDTH] IN BLOOD BY AUTOMATED COUNT: 14.6 % (ref 12.3–15.4)
GLOBULIN SER CALC-MCNC: 2.7 G/DL (ref 1.5–4.5)
GLUCOSE SERPL-MCNC: 87 MG/DL (ref 65–99)
HCT VFR BLD AUTO: 30.5 % (ref 37.5–51)
HGB BLD-MCNC: 9.6 G/DL (ref 13–17.7)
MAGNESIUM SERPL-MCNC: 2.4 MG/DL (ref 1.6–2.3)
MCH RBC QN AUTO: 28.5 PG (ref 26.6–33)
MCHC RBC AUTO-ENTMCNC: 31.5 G/DL (ref 31.5–35.7)
MCV RBC AUTO: 91 FL (ref 79–97)
NT-PROBNP SERPL-MCNC: 3091 PG/ML (ref 0–210)
PLATELET # BLD AUTO: 292 X10E3/UL (ref 150–450)
POTASSIUM SERPL-SCNC: 5.7 MMOL/L (ref 3.5–5.2)
PROT SERPL-MCNC: 6.9 G/DL (ref 6–8.5)
RBC # BLD AUTO: 3.37 X10E6/UL (ref 4.14–5.8)
SODIUM SERPL-SCNC: 139 MMOL/L (ref 134–144)
WBC # BLD AUTO: 8.3 X10E3/UL (ref 3.4–10.8)

## 2019-11-27 NOTE — TELEPHONE ENCOUNTER
Reviewed labs. Cr improved from 2.26 to 1.78 with decrease in Bumex from 2 mg PO daily to PRN. Pro-BNP up slightly from 2,657 to 3,091. Will review results and symptoms with patient. If symptoms are stable, will keep current medications at their doses.

## 2019-11-27 NOTE — TELEPHONE ENCOUNTER
Contacted patient who stated he feels \"pretty good. \" He denied shortness of breath or swelling. He stated his weight today was \"about 172lb\" and yesterday was \"about 171lb or something. \" Patient stated he does still have some fatigue that is unchanged. Reviewed with CHRISTIANA Richards NP who recommended patient make no changes to medications and should take 2mg Bumex PRN for weight gain of 2 or more pounds as previously advised. She also recommended patient ensure salt restriction. Informed patient who verbalized understanding and had no further questions. Violet Burton RN.

## 2019-12-03 ENCOUNTER — OFFICE VISIT (OUTPATIENT)
Dept: CARDIOLOGY CLINIC | Age: 64
End: 2019-12-03

## 2019-12-03 DIAGNOSIS — Z95.810 S/P ICD (INTERNAL CARDIAC DEFIBRILLATOR) PROCEDURE: Primary | ICD-10-CM

## 2019-12-05 ENCOUNTER — OFFICE VISIT (OUTPATIENT)
Dept: CARDIOLOGY CLINIC | Age: 64
End: 2019-12-05

## 2019-12-05 VITALS
TEMPERATURE: 97.6 F | SYSTOLIC BLOOD PRESSURE: 122 MMHG | HEIGHT: 68 IN | DIASTOLIC BLOOD PRESSURE: 60 MMHG | RESPIRATION RATE: 16 BRPM | OXYGEN SATURATION: 99 % | WEIGHT: 180 LBS | HEART RATE: 61 BPM | BODY MASS INDEX: 27.28 KG/M2

## 2019-12-05 DIAGNOSIS — I25.5 ISCHEMIC CARDIOMYOPATHY: ICD-10-CM

## 2019-12-05 DIAGNOSIS — I25.10 CAD, MULTIPLE VESSEL: ICD-10-CM

## 2019-12-05 DIAGNOSIS — I50.22 CHRONIC SYSTOLIC CONGESTIVE HEART FAILURE (HCC): Primary | ICD-10-CM

## 2019-12-05 LAB
BUN SERPL-MCNC: 52 MG/DL (ref 8–27)
BUN/CREAT SERPL: 26 (ref 10–24)
CALCIUM SERPL-MCNC: 9.6 MG/DL (ref 8.6–10.2)
CHLORIDE SERPL-SCNC: 101 MMOL/L (ref 96–106)
CO2 SERPL-SCNC: 21 MMOL/L (ref 20–29)
CREAT SERPL-MCNC: 2 MG/DL (ref 0.76–1.27)
GLUCOSE SERPL-MCNC: 90 MG/DL (ref 65–99)
NT-PROBNP SERPL-MCNC: 4479 PG/ML (ref 0–210)
POTASSIUM SERPL-SCNC: 4.8 MMOL/L (ref 3.5–5.2)
SODIUM SERPL-SCNC: 138 MMOL/L (ref 134–144)

## 2019-12-05 RX ORDER — PATIROMER 8.4 G/1
8.4 POWDER, FOR SUSPENSION ORAL DAILY
COMMUNITY
Start: 2019-12-02 | End: 2020-01-10 | Stop reason: DRUGHIGH

## 2019-12-05 RX ORDER — BUMETANIDE 0.5 MG/1
1 TABLET ORAL
Qty: 90 TAB | Refills: 3 | Status: ON HOLD | OUTPATIENT
Start: 2019-12-05 | End: 2020-03-09 | Stop reason: SDUPTHER

## 2019-12-05 NOTE — LETTER
12/7/2019 6:04 PM 
 
Patient:  Jaison Jensen YOB: 1955 Date of Visit: 12/5/2019 Dear Eri Martel MD 
[place] [address] [address] [facility] 7 [address] VIA In Basket Maxime Fong MD 
[address] [facility] [address] VIA Facsimile: [phone] MD Rafa Sanchez [address] [phone] [facility] 99 [address] VIA In Basket 
 : Thank you for referring Mr. Will Burrows to me for evaluation/treatment. Below are the relevant portions of my assessment and plan of care. If you have questions, please do not hesitate to call me. I look forward to following Mr. Mann along with you. Sincerely, Tena Alvarez MD

## 2019-12-05 NOTE — PATIENT INSTRUCTIONS
Medication changes: 
 
DECREASE bumetanide (Bumex) 0.5mg- Take two 0.5mg tablets (total of 1mg) by mouth AS NEEDED for shortness of breath, weight gain of 2-3 pounds overnight or 5 pounds in a week, or lower extremity swelling. INCREASE sacubitril-valsartan (Entresto) 97mg/103mg- Take one 97mg/103mg tablet by mouth twice daily. You may take two 49mg/51mg tablets by mouth twice a day until you run out. The new prescription will be for one 97mg/103mg tablet by mouth twice daily Testing Ordered: 
 
Lab work ordered. You will be contacted for any abnormal results. An echocardiogram has been ordered to be done in February. You will be contacted for scheduling of this test.  
 
Follow up 1 month with Brianna Patel with MD or NP Please monitor your blood pressures daily prior to medications and 2 hours after taking medications. Bring a written record of your blood pressures to your next appointment. Please monitor your weights daily upon waking and after using the bathroom. Keep a written records of your weights and bring to your next appointment. If you have a weight gain of 3 or more pounds overnight OR 5 or more pounds in one week please contact our office. Thank you for allowing us the privilege of being a part of your healthcare team! Please do not hesitate to contact our office at [phone] with any questions or concerns. [facility] in [facility] has an outpatient Heart Failure Clinic. To schedule an appointment or find out more, contact the clinic at [phone]. [facility] Try to find a doctor certified in Heart Failure

## 2019-12-05 NOTE — PROGRESS NOTES
[facility] Clinic Note      DOS:  12/5/2019  REFERRING PROVIDER: Anton Rg MD  PRIMARY CARE PHYSICIAN: Erich Jalloh MD  PRIMARY CARDIOLOGIST:  Curly Torres MD   EP: Luis Perez MD   PULMONARY: Colby Chan MD       IMPRESSION/PLAN:   ICM s/p HMII as BTT on 12/1/16 and explant, NYHA Class IV, LVEF 20-25%     Increase entresto to 97/103mg, 1 tablet twice daily   Cont carvedilol   Take bumex 1.0 mg daily only as needed for shortness of breath or swelling   Continue low Na+ and low K+ diet    Abstain from smoking and ETOH   Labs today - BMP, NT proBNP   Repeat BMP and NT proBNP in 2 weeks   Keep daily log of HR, BP, and weight   Follow up in 2 weeks with NP/MD     CAD s/p CABG (SVG to RCA and LIMA to LAD) on 12/1/16 s/p BMS x2 -> SVG-RCA graft - no angina              Continue ASA, statin   Cont Coreg     Mitral Regurgitation, severe - improved to mild   Repeat echo in February 2020     Hyperkalemia- resolved   Check BMP today    Chronic Left Pleural Effusion - s/p left thoracentesis on 7/5/2019   Encourage use of IS     High Risk of SCD - s/p SQ AICD (5/20/19)   No shocks       PAD - difficult femoral access with LVAD explant              No symptoms of claudication      Chronic Anticoagulation s/p LVAD explant              Continue eliquis 5 mg  twice daily     Gout              Continue colchicine 0.6 mg every other day              Continue allopurinol 100 mg daily   Denies recent gouty attacks    Chronic Lower back pain              On oxycodone, flexeril   Hasn't taken since ICD implant   Back pain improved with increased activity- going to gym    H/o tobacco abuse   Abstaining from nicotine      H/o alcohol abuse   Currently abstaining   Encouraged complete abstinence      Seizure disorder - early 25s              No recent seizure activity     Peripheral neuropathy              On cymbalta   ATTR- Negative     Prevention              Influenza annually              Pneumonia vaccine every 5 years       Thank you for letting us see him with you,      Kristi Mixon MD, [facility], [address]  Chief of Cardiology, [facility] [address], [address] [place] [address], [address] [place] [address]  Office [phone]  Fax [phone]      Chief Complaint   Patient presents with    Chest Pain         HPI: Madi Reid is a 62y.o. year old male  with a history of HFrEF in the setting of ICM s/p LVAD and recent LVAD explant complicated by PVD,  remote tobacco use and alcohol abuse (quit 11/2016) who presents for follow up of his HFrEF. Mr. Faustina Ramírez presented 11/22/2016 for decompressive laminectomy at [facility] complicated by myocardial infarction. The [facility](11/25/16) revealed severe left main and 3 vessel disease along with a 60% right common iliac stenosis and  LVEF was 10%. He subsequently underwent placement of an IABP followed by placement of a HeartMate2 LVAD on 12/1/2016 with concomitant CABG (SVG to the RCA, LIMA to LAD). His postoperative course was complicated by RV failure and an ileus requiring TPN resulting and a transaminitis. He had multiple episodes of Torsade on 12/4/2016 prompting repeat catheterization revealing an occluded RCA vein graft. Two BMS were placed in the RCA graft. He also had SVT requiring cardioversion. He had a single lead ICD placed on 23/14/13 complicated by a hematoma. The ICD was later removed on 1/20/17. Following his LVAD surgery he did require inpatient rehab.      Mr. Faustina Ramírez was listed for heart transplantation at [facility]. He was called in for transplantation on 7/14/2018 but on pre-op DELLA was noted to have normal LV function and the transplant surgery was aborted. He subsequently was admitted on 9/5/2018 for LVAD explant- his post-operative course was complicated by pneumonia and bilateral pleural effusions.  He was discharged on home O2 which he discontinued on his own.       After device explant, his subsequent echocardiograms showed deterioration of LVEF and worsening mitral regurgitation from 40-45% with moderate to severe MR on 9/19/18 to 35-40% with moderate-to-severe MR on 10/15/18. He has been removed from the transplant list at [facility] and is not interested in pursuing transplant evaluation at another center at this time. His entresto was discontinued at the time of his AICD implant due to hypotension. He subsequently took diovan but felt worse. He is tolerating the entresto with no dizziness, presyncope, or syncope. He was seen by Dr. Gisel Jordan, who stated that his mitral regurgitation has decreased significantly with optimizing his HF medications. He returns to clinic today for follow up. He has done well since hospital discharge and on his trip to [facility], he is taking all of his medications, trying to be more compliant with his diet and has not had any extra fluid on board since discharge from the hospital.       He and his wife recently purchased a home in [place], [place] and plan to move in the summer of 2020. CARDIAC EVALUATION  TTE 4/7/19: LVEF 32%, LVAD plug at LV apex, grade 2 diastolic dysfunction, mild-mod MR, mild TR, PAPs 43 mmHg, mild LAE  DELLA 7/14/2018 - normal LV function and the transplant surgery was aborted. He subsequently was admitted on 9/5/2018 for LVAD explant  ECHO 9/19/18:LVEF 40-45% mod-severe MR   ECHO 10/15/18:EF 35-40%, mod-severe MR   ECHO 10/31/18: TDS, EF 30-35%, reduced RVEF, TAPSE 1.6, LAE, mild- mod MR/TR  ECHO 1/16/19: EF 25-30%, question of thrombus in apex. Mild- mod TR. Consider cardiac MR to exclude left ventricular thrombus. ECHO 6/13/2019: EF 16-20%, mod MR, mod-severe TR, severe pulm HTN (PA systolic pressure 85 mmHg), mildly reduced RV systolic function (TAPSE 2.74 cm)  ECHO 8/16/19: EF 20-25%, moderately reduced RV systolic function, mild MR    CATH 11/25/16: severe LM, and 3 VD with 60% right common iliac stenosis.  EF: 10%. ----S/p IABP ----S/p HeartMate2 LVAD 2016   S/p CAB2016: LIMA-> LAD, SVG-> RCA     CATH 16 following torsades SVG-> RCA TO   ---- s/p BMS x 2-. RCA  RHC 2017:RA: 15/15 14, RV: 21/13 14,PA: 23/28, m 20, PCWP 13, PVR 1.67 CO 5.67, CI 3    S/p LVAD explant 18 ([facility])    SVT s/p DCCV  AICD  single lead cx by hematoma  AICD explanted 17    EKG:    10/31/18 NSR QRS 88ms   ms  19: SR rate 68bpm QRS 94ms Qtc 438 ms    Review of Systems:     General:  Negative   HEENT: Denies epistaxis, angioedema   Pulmonary: Denies SOB  Cardiac: Denies exertional chest pain, no edema.  palpitations, PND, lightheadedness, syncope, near syncope; positive for fatigue,   GI:  Denies change in bowel habits, or black or bloody stools;  Musculo: Positive for chronic back pain for which he takes prn flexril and norco   Neuro: Denies  TIA/CVA sx   Skin:  Denies rash   Allergies: Reviewed   Psych: Denies depression or anxiety       History:  Past Medical History:   Diagnosis Date    Arrhythmia     SVT    Arthritis     CAD (coronary artery disease)     Chronic pain     back    Congestive heart failure (HCC)     DSOUZA (dyspnea on exertion) 2019    Gout     Heart failure ([address]     Hypertension     ICD (implantable cardioverter-defibrillator) in place     Long term current use of anticoagulant therapy     LVAD (left ventricular assist device) present ([address] 2016    Myocardial infarction ([address]     Pacemaker     SubQ ICD    Raynaud disease     [facility]     strobic seizures early      Past Surgical History:   Procedure Laterality Date    CABG, ARTERY-VEIN, TWO  2016    CARDIAC SURG PROCEDURE UNLIST  2016    LVAD    HX CORONARY ARTERY BYPASS GRAFT      HX CORONARY STENT PLACEMENT      HX HEART CATHETERIZATION      HX HEENT      wisdom teeth removed    HX ORTHOPAEDIC  2016    laminectomy    HX PACEMAKER      ICD - removed 2017    HX PTCA      [place] INSJ OF SUBQ IMPLANTABLE DEFIBRILLATOR ELECTRODE N/A 2019    INSERT SUBQ ICD w/ anesthesia performed by Kaila Norman MD at [facility]     Social History     Socioeconomic History    Marital status:      Spouse name: Jade Bolivar    Number of children: 2    Years of education: Not on file    Highest education level: Some college, no degree   Occupational History    Not on file   Social Needs    Financial resource strain: Not hard at all   [employer] insecurity:     Worry: Never true     Inability: Never true   [employer] needs:     Medical: No     Non-medical: No   Tobacco Use    Smoking status: Former Smoker     Packs/day: 1.50     Years: 30.00     Pack years: 45.00     Last attempt to quit: 2008     Years since quittin.9    Smokeless tobacco: Never Used   Substance and Sexual Activity    Alcohol use: No    Drug use: No    Sexual activity: Yes   Lifestyle    Physical activity:     Days per week: Not on file     Minutes per session: Not on file    Stress: Not on file   Relationships    Social connections:     Talks on phone: Not on file     Gets together: Not on file     Attends [employer] service: Not on file     Active member of club or organization: Not on file     Attends meetings of clubs or organizations: Not on file     Relationship status: Not on file    Intimate partner violence:     Fear of current or ex partner: Not on file     Emotionally abused: Not on file     Physically abused: Not on file     Forced sexual activity: Not on file   Other Topics Concern     Service Not Asked    Blood Transfusions Not Asked    Caffeine Concern Not Asked    Occupational Exposure Not Asked   Steve Moros Hazards Not Asked    Sleep Concern Not Asked    Stress Concern Not Asked    Weight Concern Not Asked    Special Diet Not Asked    Back Care Not Asked    Exercise Not Asked    Bike Helmet Not Asked    Seat Belt Not Asked    Self-Exams Not Asked   Social History Narrative    Not on file     Family History   Problem Relation Age of Onset    Diabetes Mother     Dementia Mother     Other Mother         carotid artery blockage    Heart Disease Father     Stroke Father     Heart Attack Father         several    Hypertension Father     Elevated Lipids Father     No Known Problems Sister     Cancer Brother         brain    Lung Disease Sister     COPD Sister     Cancer Sister         lung       Current Medications:   Current Outpatient Medications   Medication Sig Dispense Refill    VELTASSA 8.4 gram powder Take 8.4 g by mouth daily.  bumetanide (BUMEX) 2 mg tablet Take 1 Tab by mouth daily as needed (Shortness of breath or weight gain greater than 2 pounds). 30 Tab 1    sacubitril-valsartan (ENTRESTO) 49 mg/51 mg tablet Take 1 Tab by mouth two (2) times a day. 60 Tab 2    pregabalin (LYRICA) 50 mg capsule TAKE 1 CAPSULE TWICE A DAY FOR 5 DAYS THEN 1 CAPSULES 3 TIMES A DAY THEREAFTER  2    betamethasone dipropionate (DIPROSONE) 0.05 % topical cream APPLY TO AFFECTED AREA ONCE EVERY DAY FOR 30 DAYS  4    carvedilol (COREG) 6.25 mg tablet Take 1 Tab by mouth two (2) times daily (with meals). 180 Tab 3    amiodarone (CORDARONE) 200 mg tablet Take 1 Tab by mouth daily. 90 Tab 1    tadalafil (CIALIS) 20 mg tablet 20 mg as needed. 6    atorvastatin (LIPITOR) 40 mg tablet TAKE 1 TABLET BY MOUTH EVERY DAY 30 Tab 11    apixaban (ELIQUIS) 5 mg tablet Take 1 Tab by mouth two (2) times a day. 60 Tab 11    levalbuterol tartrate (XOPENEX) 45 mcg/actuation inhaler INHALE 2 PUFFS EVERY 4 HOURS AS NEEDED  0    montelukast (SINGULAIR) 10 mg tablet TAKE 1 TABLET BY MOUTH ONCE A DAY  3    HYDROcodone-acetaminophen (NORCO) 5-325 mg per tablet TAKE 1 TABLET EVERY 6 HOURS AS NEEDED  0    DULoxetine (CYMBALTA) 60 mg capsule Take 60 mg by mouth daily.  allopurinol (ZYLOPRIM) 100 mg tablet Take 100 mg by mouth daily.       tamsulosin (FLOMAX) 0.4 mg capsule Take 0.4 mg by mouth daily.      colchicine 0.6 mg tablet Take 0.6 mg by mouth every other day.  cyclobenzaprine (FLEXERIL) 5 mg tablet Take 5 mg by mouth daily as needed for Muscle Spasm(s).  aspirin 81 mg chewable tablet Take 81 mg by mouth daily.  ascorbic acid, vitamin C, (VITAMIN C) 500 mg tablet Take 1 Tab by mouth two (2) times a day. 60 Tab 6       Allergies: Allergies   Allergen Reactions    Morphine Other (comments)     Hallucinations. Confusion. Impaired judgement    Chlorhexidine Rash       Physical Exam:   Vitals:    Visit Vitals  /60 (BP 1 Location: Left arm, BP Patient Position: Sitting)   Pulse 61   Temp 97.6 °F (36.4 °C) (Oral)   Resp 16   Ht 5' 8\" (1.727 m)   Wt 180 lb (81.6 kg)   SpO2 99%   BMI 27.37 kg/m²        General:  Well developed WM, AAOx3, pleasant,  Cooperative in mild distress. HEENT:  Atraumatic. Pink and moist.  Anicteric sclerae. Neck:   Supple, no adenopoathy. JVP > 12 cm (+) HJR . No carotid bruits. Lungs:  CTA, no cough   Heart:   PMI: Median sternotomy scar,  Regular rhythm, S1, S2 present, S3 gallop, 2/6 systolic murmur    Abdomen:  Non distended + Bowel sounds. No bruits. Extremities:  Venous stasis changes, trace edema, no clubbing, cyanosis, or calf tenderness  Neurologic:  Grossly intact. Alert and oriented X 3. No acute neurological distress. Skin:   intact, no wounds, ecchymosis, bruising, rashes   Psych:  Good insight. Not anxious nor agitated.     Recent Labs:     Lab Results   Component Value Date/Time    WBC 8.3 11/26/2019 01:17 PM    HGB 9.6 (L) 11/26/2019 01:17 PM    HCT 30.5 (L) 11/26/2019 01:17 PM    PLATELET 898 88/17/1750 01:17 PM    MCV 91 11/26/2019 01:17 PM     Lab Results   Component Value Date/Time    GFR est non-AA 40 (L) 11/26/2019 01:17 PM    GFR est AA 46 (L) 11/26/2019 01:17 PM    Creatinine 1.78 (H) 11/26/2019 01:17 PM    BUN 50 (H) 11/26/2019 01:17 PM    Sodium 139 11/26/2019 01:17 PM    Potassium 5.7 (H) 11/26/2019 01:17 PM    Chloride 104 11/26/2019 01:17 PM    CO2 21 11/26/2019 01:17 PM    Magnesium 2.4 (H) 11/26/2019 01:17 PM    Phosphorus 1.8 (L) 10/01/2019 03:49 AM     Lab Results   Component Value Date/Time    TSH 3.790 06/12/2019 03:05 PM    T4, Free 1.68 06/12/2019 03:05 PM      Lab Results   Component Value Date/Time    Sodium 139 11/26/2019 01:17 PM    Potassium 5.7 (H) 11/26/2019 01:17 PM    Chloride 104 11/26/2019 01:17 PM    CO2 21 11/26/2019 01:17 PM    Anion gap 7 11/10/2019 11:37 AM    Glucose 87 11/26/2019 01:17 PM    BUN 50 (H) 11/26/2019 01:17 PM    Creatinine 1.78 (H) 11/26/2019 01:17 PM    BUN/Creatinine ratio 28 (H) 11/26/2019 01:17 PM    GFR est AA 46 (L) 11/26/2019 01:17 PM    GFR est non-AA 40 (L) 11/26/2019 01:17 PM    Calcium 9.2 11/26/2019 01:17 PM    Bilirubin, total 0.3 11/26/2019 01:17 PM    ALT (SGPT) 24 11/26/2019 01:17 PM    AST (SGOT) 28 11/26/2019 01:17 PM    Alk.  phosphatase 94 11/26/2019 01:17 PM    Protein, total 6.9 11/26/2019 01:17 PM    Albumin 4.2 11/26/2019 01:17 PM    Globulin 3.9 11/10/2019 05:03 AM    A-G Ratio 1.6 11/26/2019 01:17 PM      Lab Results   Component Value Date/Time    Hemoglobin A1c 5.9 05/22/2019 02:56 AM       Thank you for letting us see him with you,      Tyson Melara MD  [address]  Office [phone]  Fax [phone]

## 2019-12-06 ENCOUNTER — TELEPHONE (OUTPATIENT)
Dept: CARDIOLOGY CLINIC | Age: 64
End: 2019-12-06

## 2019-12-06 DIAGNOSIS — R06.02 SHORTNESS OF BREATH: Primary | ICD-10-CM

## 2019-12-06 DIAGNOSIS — I50.22 CHRONIC SYSTOLIC CONGESTIVE HEART FAILURE (HCC): ICD-10-CM

## 2019-12-06 NOTE — TELEPHONE ENCOUNTER
Contacted patient to discuss lab work in 1 week and to inquire where patient prefers lab orders be sent. Message left. Will await return call. Cassandra Fleming RN.

## 2019-12-06 NOTE — TELEPHONE ENCOUNTER
Lab work reviewed by Dr. Terese Jimenez who recommended patient ensure adequate fluid intake and only take bumex PRN. Per Dr. Terese Jimenez V.O.R.B patient also to have repeat BMP and NT proBNP next week. Contacted patient to notify. Patient verbalized understanding and had no further questions at this time. Jud Alcazar RN.

## 2019-12-09 NOTE — TELEPHONE ENCOUNTER
Patient left message returning call. Attempted to contact patient. Message left. Will await return call. Jorge Vasquez RN.

## 2019-12-12 NOTE — TELEPHONE ENCOUNTER
Contacted patient regarding lab work. Patient requested orders be faxed to [facility] location. He verbalized understanding and had no further questions. Michael Koehler RN.

## 2019-12-14 LAB
BUN SERPL-MCNC: 36 MG/DL (ref 8–27)
BUN/CREAT SERPL: 23 (ref 10–24)
CALCIUM SERPL-MCNC: 9.1 MG/DL (ref 8.6–10.2)
CHLORIDE SERPL-SCNC: 105 MMOL/L (ref 96–106)
CO2 SERPL-SCNC: 20 MMOL/L (ref 20–29)
CREAT SERPL-MCNC: 1.56 MG/DL (ref 0.76–1.27)
GLUCOSE SERPL-MCNC: 104 MG/DL (ref 65–99)
NT-PROBNP SERPL-MCNC: 5375 PG/ML (ref 0–210)
POTASSIUM SERPL-SCNC: 5.4 MMOL/L (ref 3.5–5.2)
SODIUM SERPL-SCNC: 139 MMOL/L (ref 134–144)

## 2019-12-20 NOTE — TELEPHONE ENCOUNTER
Lab work reviewed by Dr. Efren Mckeon. Per Dr. Efren Mckeon RN to ensure patient taking Veltassa as potassium elevated at 5.4. Labs otherwise stable. Per Dr. Efren Mckeon V.O.R.B if patient is taking veltassa he is to increase to 16.8gm daily, and if he has not he is to take consistently daily and potassium diet information to be reviewed. Contacted patient to notify. He stated he \"has not really lately,\" been taking Veltassa. Informed patient of Dr. Cristel Henry recommendations to restart and potassium food list reviewed with patient over the phone. He verbalized understanding and had no further questions. Lizeth Rivas RN.

## 2020-01-08 ENCOUNTER — TELEPHONE (OUTPATIENT)
Dept: CARDIOLOGY CLINIC | Age: 65
End: 2020-01-08

## 2020-01-09 ENCOUNTER — OFFICE VISIT (OUTPATIENT)
Dept: CARDIOLOGY CLINIC | Age: 65
End: 2020-01-09

## 2020-01-09 VITALS
HEART RATE: 61 BPM | WEIGHT: 186 LBS | BODY MASS INDEX: 28.19 KG/M2 | RESPIRATION RATE: 24 BRPM | OXYGEN SATURATION: 98 % | SYSTOLIC BLOOD PRESSURE: 98 MMHG | HEIGHT: 68 IN | DIASTOLIC BLOOD PRESSURE: 58 MMHG | TEMPERATURE: 96.9 F

## 2020-01-09 DIAGNOSIS — I50.22 CHRONIC SYSTOLIC CONGESTIVE HEART FAILURE (HCC): ICD-10-CM

## 2020-01-09 DIAGNOSIS — I73.9 PAD (PERIPHERAL ARTERY DISEASE) (HCC): ICD-10-CM

## 2020-01-09 DIAGNOSIS — R06.02 SHORTNESS OF BREATH: Primary | ICD-10-CM

## 2020-01-09 DIAGNOSIS — J90 PLEURAL EFFUSION: ICD-10-CM

## 2020-01-09 DIAGNOSIS — I25.5 ISCHEMIC CARDIOMYOPATHY: ICD-10-CM

## 2020-01-09 DIAGNOSIS — R06.09 DOE (DYSPNEA ON EXERTION): ICD-10-CM

## 2020-01-09 DIAGNOSIS — Z95.1 S/P CABG X 2: ICD-10-CM

## 2020-01-09 DIAGNOSIS — I34.0 MITRAL VALVE INSUFFICIENCY, UNSPECIFIED ETIOLOGY: ICD-10-CM

## 2020-01-09 DIAGNOSIS — Z95.810 S/P ICD (INTERNAL CARDIAC DEFIBRILLATOR) PROCEDURE: ICD-10-CM

## 2020-01-09 DIAGNOSIS — I25.10 CAD, MULTIPLE VESSEL: ICD-10-CM

## 2020-01-09 LAB
ALBUMIN SERPL-MCNC: 3.7 G/DL (ref 3.6–4.8)
ALBUMIN/GLOB SERPL: 1.4 {RATIO} (ref 1.2–2.2)
ALP SERPL-CCNC: 112 IU/L (ref 39–117)
ALT SERPL-CCNC: 22 IU/L (ref 0–44)
AST SERPL-CCNC: 22 IU/L (ref 0–40)
BILIRUB SERPL-MCNC: 0.5 MG/DL (ref 0–1.2)
BUN SERPL-MCNC: 33 MG/DL (ref 8–27)
BUN/CREAT SERPL: 19 (ref 10–24)
CALCIUM SERPL-MCNC: 8.9 MG/DL (ref 8.6–10.2)
CHLORIDE SERPL-SCNC: 101 MMOL/L (ref 96–106)
CO2 SERPL-SCNC: 18 MMOL/L (ref 20–29)
CREAT SERPL-MCNC: 1.73 MG/DL (ref 0.76–1.27)
ERYTHROCYTE [DISTWIDTH] IN BLOOD BY AUTOMATED COUNT: 15.1 % (ref 11.6–15.4)
GLOBULIN SER CALC-MCNC: 2.7 G/DL (ref 1.5–4.5)
GLUCOSE SERPL-MCNC: 84 MG/DL (ref 65–99)
HCT VFR BLD AUTO: 25 % (ref 37.5–51)
HGB BLD-MCNC: 8.6 G/DL (ref 13–17.7)
MAGNESIUM SERPL-MCNC: 1.8 MG/DL (ref 1.6–2.3)
MCH RBC QN AUTO: 27.9 PG (ref 26.6–33)
MCHC RBC AUTO-ENTMCNC: 34.4 G/DL (ref 31.5–35.7)
MCV RBC AUTO: 81 FL (ref 79–97)
NT-PROBNP SERPL-MCNC: 9163 PG/ML (ref 0–210)
PLATELET # BLD AUTO: 379 X10E3/UL (ref 150–450)
POTASSIUM SERPL-SCNC: 5.4 MMOL/L (ref 3.5–5.2)
PROT SERPL-MCNC: 6.4 G/DL (ref 6–8.5)
RBC # BLD AUTO: 3.08 X10E6/UL (ref 4.14–5.8)
SODIUM SERPL-SCNC: 134 MMOL/L (ref 134–144)
WBC # BLD AUTO: 10.1 X10E3/UL (ref 3.4–10.8)

## 2020-01-09 NOTE — PATIENT INSTRUCTIONS
Take 1mg bumex today    CONTINUE CURRENT medications    Take twice a day medications 12 hours apart with food    Ordered PA/Lateral CXR    Labs today - will call with abnormal results    See Luz Wolff MD     Walk more    Follow up with Dr. Efrain Judd in 5 weeks after ECHO     HF Education: Continue daily weights (in the morning, after voiding).   take Bumex 1mg as needed for overnight weight gains greater than  2 lbs or 5 lbsin 7 days     Please bring your record of daily weights and BP to next clinic visit

## 2020-01-09 NOTE — PROGRESS NOTES
[facility] Clinic Note      DOS:  1/9/2020  REFERRING PROVIDER: Brittany Monreal MD  PRIMARY CARE PHYSICIAN: Helen Leach MD  PRIMARY CARDIOLOGIST:  Shawna Han MD   EP: Gracie Silva MD   PULMONARY: Giuliano Vences MD       IMPRESSION/PLAN:   ICM s/p HMII as BTT on 12/1/16 and explant, NYHA Class IV, LVEF 20-25%     Continue entresto to 97/103mg, 1 tablet twice daily- may decrease pending lab results   Cont carvedilol 6.25mg BID   Take bumex 1.0 mg daily only as needed for shortness of breath or swelling   Daily weights, record and bring to next clinic visit   Continue low Na+ and low K+ diet    Abstain from smoking and ETOH   Labs today - CMP, NT proBNP, CBC, Mag   ECHO scheduled on 2/12/2020   Follow up in 5 weeks with Dr. Daniela Joshi after ECHO     CAD s/p CABG (SVG to RCA and LIMA to LAD) on 12/1/16 s/p BMS x2 -> SVG-RCA graft - no angina    Continue ASA, statin   Cont Coreg     Mitral Regurgitation, severe - improved to mild   Repeat echo in February 2020     Hyperkalemia   Continue Veltassa   Check CMP today   Low potassium Diet    Chronic Left Pleural Effusion    s/p left thoracentesis on 7/5/2019   Encourage use of IS    Check CXR today      High Risk of SCD    s/p SQ AICD (5/20/19)   No shocks   Followed by Dr. Jo Ann Larson    Acute Cough    Taking mucinex dm prn at home   Check CXR   Check CBC   Encouraged to follow up with Helen Leach MD       PAD    difficult femoral access with LVAD explant              No symptoms of claudication      Chronic Anticoagulation    s/p LVAD explant              Continue eliquis 5 mg  twice daily     Goutk              Continue colchicine 0.6 mg every other day              Continue allopurinol 100 mg daily   Denies recent gouty attacks    Chronic Lower back pain              On oxycodone, flexeril   Hasn't taken since ICD implant   Back pain improved with increased activity- going to gym    H/o tobacco abuse   Abstaining from nicotine    Encouraged complete cessation     H/o alcohol abuse   Currently abstaining   Encouraged complete abstinence      Seizure disorder - early 25s              No recent seizure activity     Peripheral neuropathy              On cymbalta   ATTR- Negative     Prevention              Influenza annually              Pneumonia vaccine every 5 years       Chief Complaint   Patient presents with    CHF    Shortness of Breath     with activity         HPI: Jay Real is a 62y.o. year old male  with a history of HFrEF in the setting of ICM s/p LVAD and recent LVAD explant complicated by PVD,  remote tobacco use and alcohol abuse (quit 11/2016) who presents for follow up of his HFrEF. Mr. Elise Champion presented 11/22/2016 for decompressive laminectomy at [facility] complicated by myocardial infarction. [facility](11/25/16) revealed severe left main and 3 vessel disease along with a 60% right common iliac stenosis and  LVEF was 10%. He subsequently underwent placement of an IABP followed by placement of a HeartMate2 LVAD on 12/1/2016 with concomitant CABG (SVG to the RCA, LIMA to LAD). His postoperative course was complicated by RV failure and an ileus requiring TPN resulting and a transaminitis. He had multiple episodes of Torsade on 12/4/2016 prompting repeat catheterization revealing an occluded RCA vein graft. Two BMS were placed in the RCA graft. He also had SVT requiring cardioversion. He had a single lead ICD placed on 23/12/88 complicated by a hematoma. The ICD was later removed on 1/20/17. Following his LVAD surgery he did require inpatient rehab.      Mr. Elise Champion was listed for heart transplantation at [facility]. He was called in for transplantation on 7/14/2018 but on pre-op DELLA was noted to have normal LV function and the transplant surgery was aborted. He subsequently was admitted on 9/5/2018 for LVAD explant- his post-operative course was complicated by pneumonia and bilateral pleural effusions.  He was discharged on home O2 which he discontinued on his own.       After device explant, his subsequent echocardiograms showed deterioration of LVEF and worsening mitral regurgitation from 40-45% with moderate to severe MR on 9/19/18 to 35-40% with moderate-to-severe MR on 10/15/18. He has been removed from the transplant list at [facility] and is not interested in pursuing transplant evaluation at another center at this time. His entresto was discontinued at the time of his AICD implant due to hypotension. He subsequently took diovan but felt worse. He is tolerating the entresto with no dizziness, presyncope, or syncope. He was seen by Dr. Rohit Lundberg, who stated that his mitral regurgitation has decreased significantly with optimizing his HF medications. He returns to clinic today for follow up. His wife is with him today. He reports having a URI since Christmas. He has had sick contacts, his grandchildren have been sick with colds and RSV. Mr. Mannie Singer has been taking Mucinex DM without much relief. Denies fever, chills, or sputum production. His weight has steadily increased from 171lbs in Novemebr to 186lbs today per clinic scale. He did not bring record of weights with him but reports no significant changes in weights per his home scale. He last took Bumex 3 days ago for weight increase. Denies CP, presyncope, syncope, lightheadedness, dizziness, orthopnea, nocturia, PND, nausea, or vomiting. Reports good appetite. Reports some SOB/DSOUZA due to recent cold. He and his wife recently purchased a home in [place], [place] and plan to move in the summer of 2020. CARDIAC EVALUATION  TTE 4/7/19: LVEF 32%, LVAD plug at LV apex, grade 2 diastolic dysfunction, mild-mod MR, mild TR, PAPs 43 mmHg, mild LAE  DELLA 7/14/2018 - normal LV function and the transplant surgery was aborted.  He subsequently was admitted on 9/5/2018 for LVAD explant  ECHO 9/19/18:LVEF 40-45% mod-severe MR   ECHO 10/15/18:EF 35-40%, mod-severe MR   ECHO 10/31/18: TDS, EF 30-35%, reduced RVEF, TAPSE 1.6, LAE, mild- mod MR/TR  ECHO 19: EF 25-30%, question of thrombus in apex. Mild- mod TR. Consider cardiac MR to exclude left ventricular thrombus. ECHO 2019: EF 16-20%, mod MR, mod-severe TR, severe pulm HTN (PA systolic pressure 85 mmHg), mildly reduced RV systolic function (TAPSE 6.35 cm)  ECHO 19: EF 20-25%, moderately reduced RV systolic function, mild MR    CATH 16: severe LM, and 3 VD with 60% right common iliac stenosis. EF: 10%. ----S/p IABP   ----S/p HeartMate2 LVAD 2016   S/p CAB2016: LIMA-> LAD, SVG-> RCA     CATH 16 following torsades SVG-> RCA TO   ---- s/p BMS x 2-. RCA  RHC 2017:RA: 15/15 14, RV:  14,PA: 23/28, m 20, PCWP 13, PVR 1.67 CO 5.67, CI 3    S/p LVAD explant 18 ([facility])    SVT s/p DCCV  AICD  single lead cx by hematoma  AICD explanted 17    EKG:    10/31/18 NSR QRS 88ms   ms  19: SR rate 68bpm QRS 94ms Qtc 438 ms  Review of Systems   Constitutional: Positive for malaise/fatigue. Negative for chills and fever. HENT: Positive for congestion. Negative for sinus pain and sore throat. Eyes: Negative. Respiratory: Positive for cough and shortness of breath. Negative for sputum production. Cardiovascular: Negative for chest pain, palpitations, orthopnea, claudication and leg swelling. Gastrointestinal: Negative for abdominal pain, heartburn, nausea and vomiting. Genitourinary: Negative. Musculoskeletal: Positive for back pain. Negative for falls. Chronic back pain   Skin: Negative. Neurological: Negative for dizziness. Endo/Heme/Allergies: Negative. Psychiatric/Behavioral: Negative.           History:  Past Medical History:   Diagnosis Date    Arrhythmia     SVT    Arthritis     CAD (coronary artery disease)     Chronic pain     back    Congestive heart failure (HCC)     DSOUZA (dyspnea on exertion) 2019    Gout     [facility] [address]     Hypertension     ICD (implantable cardioverter-defibrillator) in place     Long term current use of anticoagulant therapy     [facility]AD (left ventricular assist device) [facility] ([facility] [facility] 2016    Myocardial infarction ([facility] [address]     Pacemaker     SubQ ICD    Raynaud disease     Seizures ([facility] [address]     strobic seizures early      Past Surgical History:   Procedure Laterality Date    CABG, ARTERY-VEIN, TWO  2016    CARDIAC SURG PROCEDURE UNLIST  2016    LVAD    HX CORONARY ARTERY BYPASS GRAFT      HX CORONARY STENT PLACEMENT      HX HEART CATHETERIZATION      HX HEENT      wisdom teeth removed    HX ORTHOPAEDIC  2016    laminectomy    HX PACEMAKER      ICD - removed 2017    HX PTCA      AL INSJ OF SUBQ IMPLANTABLE DEFIBRILLATOR ELECTRODE N/A 2019    INSERT SUBQ ICD w/ anesthesia performed by Brian Montoya MD at [facility] CATH LAB     Social History     Socioeconomic History    Marital status:      Spouse name: Shereen Avalos    Number of children: 2    Years of education: Not on file    Highest education level: Some college, no degree   Occupational History    Not on file   Social Needs    Financial resource strain: Not hard at all   [place] insecurity:     Worry: Never true     Inability: Never true    Transportation needs:     Medical: No     Non-medical: No   Tobacco Use    Smoking status: Former Smoker     Packs/day: 1.50     Years: 30.00     Pack years: 45.00     Last attempt to quit:      Years since quittin.0    Smokeless tobacco: Never Used   Substance and Sexual Activity    Alcohol use: No    Drug use: No    Sexual activity: Yes   Lifestyle    Physical activity:     Days per week: Not on file     Minutes per session: Not on file    Stress: Not on file   Relationships    Social connections:     Talks on phone: Not on file     Gets together: Not on file     Attends Yazidi service: Not on file     Active member of club or organization: Not on file     Attends meetings of clubs or organizations: Not on file     Relationship status: Not on file    Intimate partner violence:     Fear of current or ex partner: Not on file     Emotionally abused: Not on file     Physically abused: Not on file     Forced sexual activity: Not on file   Other Topics Concern     Service Not Asked    Blood Transfusions Not Asked    Caffeine Concern Not Asked    Occupational Exposure Not Asked    Hobby Hazards Not Asked    Sleep Concern Not Asked    Stress Concern Not Asked    Weight Concern Not Asked    Special Diet Not Asked    Back Care Not Asked    Exercise Not Asked    Bike Helmet Not Asked   [address] Not Asked    Self-Exams Not Asked   Social History Narrative    Not on file     Family History   Problem Relation Age of Onset    Diabetes Mother     Dementia Mother     Other Mother         carotid artery blockage    Heart Disease Father     Stroke Father     Heart Attack Father         several    Hypertension Father     Elevated Lipids Father     No Known Problems Sister     Cancer Brother         brain    Lung Disease Sister     COPD Sister     Cancer Sister         lung       Current Medications:   Current Outpatient Medications   Medication Sig Dispense Refill    VELTASSA 8.4 gram powder Take 8.4 g by mouth daily.  bumetanide (BUMEX) 0.5 mg tablet Take 2 Tabs by mouth daily as needed (shortness of breath). 90 Tab 3    sacubitril-valsartan (ENTRESTO) 97 mg/103 mg tablet Take 1 Tab by mouth two (2) times a day. 180 Tab 3    pregabalin (LYRICA) 50 mg capsule TAKE 1 CAPSULE TWICE A DAY FOR 5 DAYS THEN 1 CAPSULES 3 TIMES A DAY THEREAFTER  2    betamethasone dipropionate (DIPROSONE) 0.05 % topical cream APPLY TO AFFECTED AREA ONCE EVERY DAY FOR 30 DAYS  4    carvedilol (COREG) 6.25 mg tablet Take 1 Tab by mouth two (2) times daily (with meals). 180 Tab 3    amiodarone (CORDARONE) 200 mg tablet Take 1 Tab by mouth daily.  90 Tab 1  tadalafil (CIALIS) 20 mg tablet 20 mg as needed. 6    atorvastatin (LIPITOR) 40 mg tablet TAKE 1 TABLET BY MOUTH EVERY DAY 30 Tab 11    apixaban (ELIQUIS) 5 mg tablet Take 1 Tab by mouth two (2) times a day. 60 Tab 11    levalbuterol tartrate (XOPENEX) 45 mcg/actuation inhaler INHALE 2 PUFFS EVERY 4 HOURS AS NEEDED  0    montelukast (SINGULAIR) 10 mg tablet TAKE 1 TABLET BY MOUTH ONCE A DAY  3    HYDROcodone-acetaminophen (NORCO) 5-325 mg per tablet TAKE 1 TABLET EVERY 6 HOURS AS NEEDED  0    DULoxetine (CYMBALTA) 60 mg capsule Take 60 mg by mouth daily.  allopurinol (ZYLOPRIM) 100 mg tablet Take 100 mg by mouth daily.  tamsulosin (FLOMAX) 0.4 mg capsule Take 0.4 mg by mouth daily.  colchicine 0.6 mg tablet Take 0.6 mg by mouth every other day.  cyclobenzaprine (FLEXERIL) 5 mg tablet Take 5 mg by mouth daily as needed for Muscle Spasm(s).  aspirin 81 mg chewable tablet Take 81 mg by mouth daily.  ascorbic acid, vitamin C, (VITAMIN C) 500 mg tablet Take 1 Tab by mouth two (2) times a day. 60 Tab 6       Allergies: Allergies   Allergen Reactions    Morphine Other (comments)     Hallucinations. Confusion. Impaired judgement    Chlorhexidine Rash       Physical Exam:   Vitals:    Visit Vitals  BP 98/58 (BP 1 Location: Right arm, BP Patient Position: Sitting)   Pulse 61   Temp 96.9 °F (36.1 °C) (Oral)   Resp 24   Ht 5' 8\" (1.727 m)   Wt 186 lb (84.4 kg)   SpO2 98%   BMI 28.28 kg/m²        Physical Exam  Constitutional:       Appearance: He is normal weight. HENT:      Nose: Congestion present. Cardiovascular:      Rate and Rhythm: Normal rate and regular rhythm. Heart sounds: S1 normal and S2 normal. Murmur present. Systolic murmur present. Pulmonary:      Effort: Tachypnea present. Breath sounds: Examination of the right-upper field reveals rhonchi. Examination of the left-upper field reveals rhonchi.  Examination of the left-middle field reveals decreased breath sounds. Examination of the left-lower field reveals decreased breath sounds. Decreased breath sounds and rhonchi present. Comments: +frequent congested cough  Abdominal:      General: Bowel sounds are normal. There is distension. Musculoskeletal:      Right lower leg: No edema. Left lower leg: No edema. Skin:     General: Skin is warm and dry. Neurological:      Mental Status: He is alert and oriented to person, place, and time. Psychiatric:         Mood and Affect: Mood normal.         Behavior: Behavior normal. Behavior is cooperative. Thought Content:  Thought content normal.         Judgment: Judgment normal.           ecent Labs:     Lab Results   Component Value Date/Time    WBC 8.3 11/26/2019 01:17 PM    HGB 9.6 (L) 11/26/2019 01:17 PM    HCT 30.5 (L) 11/26/2019 01:17 PM    PLATELET 994 92/17/7996 01:17 PM    MCV 91 11/26/2019 01:17 PM     Lab Results   Component Value Date/Time    GFR est non-AA 47 (L) 12/13/2019 01:06 PM    GFR est AA 54 (L) 12/13/2019 01:06 PM    Creatinine 1.56 (H) 12/13/2019 01:06 PM    BUN 36 (H) 12/13/2019 01:06 PM    Sodium 139 12/13/2019 01:06 PM    Potassium 5.4 (H) 12/13/2019 01:06 PM    Chloride 105 12/13/2019 01:06 PM    CO2 20 12/13/2019 01:06 PM    Magnesium 2.4 (H) 11/26/2019 01:17 PM    Phosphorus 1.8 (L) 10/01/2019 03:49 AM     Lab Results   Component Value Date/Time    TSH 3.790 06/12/2019 03:05 PM    T4, Free 1.68 06/12/2019 03:05 PM      Lab Results   Component Value Date/Time    Sodium 139 12/13/2019 01:06 PM    Potassium 5.4 (H) 12/13/2019 01:06 PM    Chloride 105 12/13/2019 01:06 PM    CO2 20 12/13/2019 01:06 PM    Anion gap 7 11/10/2019 11:37 AM    Glucose 104 (H) 12/13/2019 01:06 PM    BUN 36 (H) 12/13/2019 01:06 PM    Creatinine 1.56 (H) 12/13/2019 01:06 PM    BUN/Creatinine ratio 23 12/13/2019 01:06 PM    GFR est AA 54 (L) 12/13/2019 01:06 PM    GFR est non-AA 47 (L) 12/13/2019 01:06 PM    Calcium 9.1 12/13/2019 01:06 PM Bilirubin, total 0.3 11/26/2019 01:17 PM    ALT (SGPT) 24 11/26/2019 01:17 PM    AST (SGOT) 28 11/26/2019 01:17 PM    Alk.  phosphatase 94 11/26/2019 01:17 PM    Protein, total 6.9 11/26/2019 01:17 PM    Albumin 4.2 11/26/2019 01:17 PM    Globulin 3.9 11/10/2019 05:03 AM    A-G Ratio 1.6 11/26/2019 01:17 PM      Lab Results   Component Value Date/Time    Hemoglobin A1c 5.9 05/22/2019 02:56 AM       Thank you for letting us see him with you,      Tyson Perea, MARY  [address] [facility]  [address] [place] [address] [phone]  Fax [phone]

## 2020-01-10 ENCOUNTER — TELEPHONE (OUTPATIENT)
Dept: CARDIOLOGY CLINIC | Age: 65
End: 2020-01-10

## 2020-01-10 ENCOUNTER — DOCUMENTATION ONLY (OUTPATIENT)
Dept: CARDIOLOGY CLINIC | Age: 65
End: 2020-01-10

## 2020-01-10 DIAGNOSIS — R06.02 SHORTNESS OF BREATH: ICD-10-CM

## 2020-01-10 DIAGNOSIS — I50.22 CHRONIC SYSTOLIC CONGESTIVE HEART FAILURE (HCC): Primary | ICD-10-CM

## 2020-01-10 NOTE — PROGRESS NOTES
Received the patient's spouses [place] paperwork - left it with Maryann Desir to complete.     The Anali, MSW, LCSW    Clinical    [facility]s [phone]

## 2020-01-10 NOTE — TELEPHONE ENCOUNTER
I called patient, advised him per TOSHIA Faust:  Recent lab results Hgb down 1gram from 9.6-8.6- ensure he is not bleeding anywhere.   Potassium elevated at 5.4 please increase veltassa to 16.4mg daily, creatinine elevated 1.73 decrease entresto to 49/51, NTproBNP elevated at 9,163, take Bumex 2mg daily for 7 days- I think he is volume overloaded and I would like for him to go to his PCP today before the weekend to be treated for his cough- it could be precipitating his HF.  Please see if he got the PA/LAT completed.  Get labs drawn in 1 week- CMP, Mag, NTproBNP, CBC. He states understanding of instructions. I called [facility] to have them fax xray results to [facility]. He also states he has no bleeding, will continue to monitor, will re-check labs next Thursday.

## 2020-01-10 NOTE — TELEPHONE ENCOUNTER
Spoke with patient about CXR results, he does have some pulmonary edema. Will cont plan set by Zeina Robison and check his weight and volume status next week.

## 2020-01-15 ENCOUNTER — TELEPHONE (OUTPATIENT)
Dept: CARDIOLOGY CLINIC | Age: 65
End: 2020-01-15

## 2020-01-15 NOTE — TELEPHONE ENCOUNTER
Telephone Call RE:  Lab Reminder      Outcome:     [x] Patient verbalizes understanding    [] Unable to reach   [] Left message              []       Mallorie Manzano

## 2020-01-16 ENCOUNTER — TELEPHONE (OUTPATIENT)
Dept: CARDIOLOGY CLINIC | Age: 65
End: 2020-01-16

## 2020-01-16 LAB
ALBUMIN SERPL-MCNC: 4 G/DL (ref 3.6–4.8)
ALBUMIN/GLOB SERPL: 1.7 {RATIO} (ref 1.2–2.2)
ALP SERPL-CCNC: 115 IU/L (ref 39–117)
ALT SERPL-CCNC: 19 IU/L (ref 0–44)
AST SERPL-CCNC: 18 IU/L (ref 0–40)
BILIRUB SERPL-MCNC: 0.4 MG/DL (ref 0–1.2)
BUN SERPL-MCNC: 51 MG/DL (ref 8–27)
BUN/CREAT SERPL: 26 (ref 10–24)
CALCIUM SERPL-MCNC: 8.8 MG/DL (ref 8.6–10.2)
CHLORIDE SERPL-SCNC: 102 MMOL/L (ref 96–106)
CO2 SERPL-SCNC: 20 MMOL/L (ref 20–29)
CREAT SERPL-MCNC: 1.99 MG/DL (ref 0.76–1.27)
ERYTHROCYTE [DISTWIDTH] IN BLOOD BY AUTOMATED COUNT: 15.2 % (ref 11.6–15.4)
GLOBULIN SER CALC-MCNC: 2.4 G/DL (ref 1.5–4.5)
GLUCOSE SERPL-MCNC: 90 MG/DL (ref 65–99)
HCT VFR BLD AUTO: 24.9 % (ref 37.5–51)
HGB BLD-MCNC: 8.5 G/DL (ref 13–17.7)
MAGNESIUM SERPL-MCNC: 2 MG/DL (ref 1.6–2.3)
MCH RBC QN AUTO: 27.6 PG (ref 26.6–33)
MCHC RBC AUTO-ENTMCNC: 34.1 G/DL (ref 31.5–35.7)
MCV RBC AUTO: 81 FL (ref 79–97)
NT-PROBNP SERPL-MCNC: 6358 PG/ML (ref 0–210)
PLATELET # BLD AUTO: 377 X10E3/UL (ref 150–450)
POTASSIUM SERPL-SCNC: 5 MMOL/L (ref 3.5–5.2)
PROT SERPL-MCNC: 6.4 G/DL (ref 6–8.5)
RBC # BLD AUTO: 3.08 X10E6/UL (ref 4.14–5.8)
SODIUM SERPL-SCNC: 136 MMOL/L (ref 134–144)
WBC # BLD AUTO: 6.8 X10E3/UL (ref 3.4–10.8)

## 2020-01-17 NOTE — TELEPHONE ENCOUNTER
Davion Merritt NP  You 15 hours ago (6:49 PM)      Lab results from 1/16/2020: NTproBNP improved to 6358, creatinine is elevated at 1.99.  How are his weights at home? Has he needed to take prn bumex?  Potassium is better at 5, continue the higher dose of veltassa and eating low potassium diet.  Is he feeling any better?      Routing comment     Attempted to contact patient to discuss. Message left. Will await return call. Shelby Lanier RN.

## 2020-01-17 NOTE — TELEPHONE ENCOUNTER
Contacted patient to discuss lab results and obtain status update. Per patient weight today is \"up a bit\" to 172.5lbs. Patient reported weights for the previous 5 days have been consistently \"169lb-170lbs. \" He stated he may have eaten \"a little\" extra salt yesterday, but has otherwise been monitoring sodium intake. He also stated he is trying to drink three bottles of water to get to 32-48 ounces daily, but has not always been meeting his goal. He denied any dizziness, lightheadedness, swelling, shortness of breath, or chest pain. Overall, he stated he is feeling better. Patient did not have blood pressure readings done today, but readings yesterday BP: 111/59 P: 77 (prior to meds) BP: 97/56 (after meds). Blood pressures on average have been 90's/50's-60's after medications. Patient stated he has not taken any Bumex \"since Tuesday. \" Informed patient may need to take PRN dose of Bumex tomorrow if weight further increases, but will review with Corazon Lacy NP for further recommendations. Patient educated to follow strict low sodium and low potassium diet. Patient confirmed he is taking the entresto 49mg/51mg and is inquiring if he is able to increase back up to the 97mg/103mg. Informed patient provider may not want to increase due to worsening renal function, but will review with Corazon Lacy NP. Patient would also like to discuss doing periodic lab work every few weeks and \"does not want to wait another month,\" to ensure close monitoring. Informed patient will review with Corazon Lacy NP and contact him with further recommendations. Patient verbalized understanding and had no further questions. Elmira Bueno RN.

## 2020-01-28 ENCOUNTER — DOCUMENTATION ONLY (OUTPATIENT)
Dept: CARDIOLOGY CLINIC | Age: 65
End: 2020-01-28

## 2020-01-28 NOTE — PROGRESS NOTES
Patient's wife inquired about the status of her FMLA.  sent a staff message to Mai Peterson following up on the request. Patient's wife also inquired about an advanced heart failure cardiologist in [address] [place] [address] for her  as they are moving there.  sent a staff message to Dr. Jax Schulz inquiring further.     Dianne Lr, MSW, LCSW    Clinical    [facility] [phone]

## 2020-01-29 ENCOUNTER — TELEPHONE (OUTPATIENT)
Dept: CARDIOLOGY CLINIC | Age: 65
End: 2020-01-29

## 2020-01-29 DIAGNOSIS — R06.02 SHORTNESS OF BREATH: ICD-10-CM

## 2020-01-29 DIAGNOSIS — I50.22 CHRONIC SYSTOLIC CONGESTIVE HEART FAILURE (HCC): Primary | ICD-10-CM

## 2020-01-29 NOTE — TELEPHONE ENCOUNTER
Reviewed with Dr. Frank Gallegos who recommended V.O.R.B patient have BMP and NT proBNP drawn.  [facility]

## 2020-01-29 NOTE — TELEPHONE ENCOUNTER
Patient called, states he wants to go for labs tomorrow-no complaints, just wants to keep an eye on things-please fax over lab orders today.   Thank you

## 2020-01-30 RX ORDER — AMIODARONE HYDROCHLORIDE 200 MG/1
TABLET ORAL
Qty: 90 TAB | Refills: 1 | Status: SHIPPED | OUTPATIENT
Start: 2020-01-30

## 2020-01-30 NOTE — TELEPHONE ENCOUNTER
Requested Prescriptions     Signed Prescriptions Disp Refills    amiodarone (CORDARONE) 200 mg tablet 90 Tab 1     Sig: TAKE 1 TABLET BY MOUTH EVERY DAY     Authorizing Provider: Ramirez Melvin     Ordering User: Margy Crespo     Refill per verbal order Dr. Sindi Boyer.

## 2020-02-03 ENCOUNTER — DOCUMENTATION ONLY (OUTPATIENT)
Dept: CARDIOLOGY CLINIC | Age: 65
End: 2020-02-03

## 2020-02-04 ENCOUNTER — TELEPHONE (OUTPATIENT)
Dept: CARDIOLOGY CLINIC | Age: 65
End: 2020-02-04

## 2020-02-04 NOTE — TELEPHONE ENCOUNTER
----- Message from Pawan Torres LCSW sent at 1/28/2020 10:56 AM EST -----  Regarding: Heart Failure Cardiologist in [address] [place] [address]  Dr. Jeffery Tse called me and shared she and Sudhakar are moving to [place]. She shared you recommended Sudhakar seek cardiac care in [address] [place] [address] and she wants to know who you recommend there (providers names). If you could please let her know when they come to see you for Sudhakar's next appointment in February.     Thanks,  Kristian Lyle

## 2020-02-04 NOTE — TELEPHONE ENCOUNTER
Reviewed with Dr. Efrain Judd who stated she does not know of any provider specifically, but did recommend patient look into [facility] in [facility] as previously discussed. Contacted Erica Springer to notify. Informed her of the following:  [facility] in [address] [place] [address]. To schedule an appointment or find out more, contact the clinic at [phone]. [facility]   Try to find a doctor certified in [address] verbalized understanding and stated she will reach out. Erica Gian then reported that patient increased his Entresto to 97/103mg \"about 2 weeks ago,\" in preparation for upcoming echo. Inquired if patient had previously discussed lab work done (See 1/29/20 telephone encounter). She stated he had not. Discussed with Erica Villarrealurbon dose was decreased due to elevated potassium and worsening renal function. Stress the importance to follow up on labs as Entresto can affect both renal function and potassium levels. Requested patient go for repeat BMP and NT proBNP as soon as possible to follow up on creatinine and potassium. Lab orders previously faxed to [facility] verbalized understanding, stated she will notify patient and have him go to lab dio, and had no further questions or concerns. Will await labs. Larry Jane RN.

## 2020-02-05 LAB
BUN SERPL-MCNC: 54 MG/DL (ref 8–27)
BUN/CREAT SERPL: 29 (ref 10–24)
CALCIUM SERPL-MCNC: 8.8 MG/DL (ref 8.6–10.2)
CHLORIDE SERPL-SCNC: 104 MMOL/L (ref 96–106)
CO2 SERPL-SCNC: 17 MMOL/L (ref 20–29)
CREAT SERPL-MCNC: 1.86 MG/DL (ref 0.76–1.27)
GLUCOSE SERPL-MCNC: 112 MG/DL (ref 65–99)
NT-PROBNP SERPL-MCNC: 3813 PG/ML (ref 0–210)
POTASSIUM SERPL-SCNC: 5 MMOL/L (ref 3.5–5.2)
SODIUM SERPL-SCNC: 139 MMOL/L (ref 134–144)

## 2020-02-05 NOTE — TELEPHONE ENCOUNTER
Results for Lexus Blackwell (MRN [id number]) as of 2/5/2020 14:37   Ref. Range 2/5/2020 09:39   Sodium Latest Ref Range: 134 - 144 mmol/L 139   Potassium Latest Ref Range: 3.5 - 5.2 mmol/L 5.0   Chloride Latest Ref Range: 96 - 106 mmol/L 104   CO2 Latest Ref Range: 20 - 29 mmol/L 17 (L)   Glucose Latest Ref Range: 65 - 99 mg/dL 112 (H)   BUN Latest Ref Range: 8 - 27 mg/dL 54 (H)   Creatinine Latest Ref Range: 0.76 - 1.27 mg/dL 1.86 (H)   BUN/Creatinine ratio Latest Ref Range: 10 - 24  29 (H)   Calcium Latest Ref Range: 8.6 - 10.2 mg/dL 8.8   GFR est non-AA Latest Ref Range: >59 mL/min/1.73 38 (L)   GFR est AA Latest Ref Range: >59 mL/min/1.73 44 (L)   NT pro-BNP Latest Ref Range: 0 - 210 pg/mL 3,813 (H)     Patient lab work received. Potassium unchanged from previous level of 5.0(1/16/20). Creatinine improved from previous level of 1.99(1/16/20). NT ProBNP improved from previous level of 6,358 (1/16/20). Will review with Dr. Estela Judd and notify patient of any further recommendations. Pietro Rao RN.

## 2020-02-10 NOTE — TELEPHONE ENCOUNTER
Message Contents   MD Winter Mitchell             Please let the patient know their labs are stable. Hi NT proBNP is significantly better and his Cr is stable on the bumex 1 mg every other day. Per Dr. Altaf Minor V.O.R.B patient may also continue Entresto 97/103mg by mouth BID. Contacted patient to notify. Patient verbalized understanding and also requested to be contacted with echo results once they are received. He stated he would like to know results prior to 2/19/20 appointment. Informed patient will have Dr. Altaf Minor review as soon as results finalized and contact him with results. Patient verbalize understanding and had no further questions. Perlita Hill RN.

## 2020-02-12 ENCOUNTER — HOSPITAL ENCOUNTER (OUTPATIENT)
Dept: NON INVASIVE DIAGNOSTICS | Age: 65
Discharge: HOME | End: 2020-02-12
Attending: INTERNAL MEDICINE
Payer: COMMERCIAL

## 2020-02-12 VITALS
HEIGHT: 68 IN | SYSTOLIC BLOOD PRESSURE: 98 MMHG | BODY MASS INDEX: 28.07 KG/M2 | WEIGHT: 185.19 LBS | DIASTOLIC BLOOD PRESSURE: 58 MMHG

## 2020-02-12 DIAGNOSIS — I50.22 CHRONIC SYSTOLIC CONGESTIVE HEART FAILURE (HCC): ICD-10-CM

## 2020-02-12 DIAGNOSIS — I25.10 CAD, MULTIPLE VESSEL: ICD-10-CM

## 2020-02-12 DIAGNOSIS — I25.5 ISCHEMIC CARDIOMYOPATHY: ICD-10-CM

## 2020-02-12 LAB
AV VELOCITY RATIO: 0.88
ECHO AO ROOT DIAM: 2.86 CM
ECHO AV AREA PEAK VELOCITY: 2.7 CM2
ECHO AV PEAK GRADIENT: 5.5 MMHG
ECHO AV PEAK VELOCITY: 117.46 CM/S
ECHO LA MAJOR AXIS: 4.94 CM
ECHO LA TO AORTIC ROOT RATIO: 1.73
ECHO LV E' LATERAL VELOCITY: 12.79 CM/S
ECHO LV E' SEPTAL VELOCITY: 9.79 CM/S
ECHO LV INTERNAL DIMENSION DIASTOLIC: 6 CM (ref 4.2–5.9)
ECHO LV INTERNAL DIMENSION SYSTOLIC: 4.84 CM
ECHO LV IVSD: 0.91 CM (ref 0.6–1)
ECHO LV MASS 2D: 278.1 G (ref 88–224)
ECHO LV MASS INDEX 2D: 140.6 G/M2 (ref 49–115)
ECHO LV POSTERIOR WALL DIASTOLIC: 1.01 CM (ref 0.6–1)
ECHO LVOT DIAM: 1.98 CM
ECHO LVOT PEAK GRADIENT: 4.3 MMHG
ECHO LVOT PEAK VELOCITY: 103.81 CM/S
ECHO MV A VELOCITY: 62.26 CM/S
ECHO MV AREA PHT: 4 CM2
ECHO MV E DECELERATION TIME (DT): 191.4 MS
ECHO MV E VELOCITY: 121.31 CM/S
ECHO MV E/A RATIO: 1.95
ECHO MV E/E' LATERAL: 9.48
ECHO MV E/E' RATIO (AVERAGED): 10.94
ECHO MV E/E' SEPTAL: 12.39
ECHO MV PRESSURE HALF TIME (PHT): 55.5 MS
ECHO PULMONARY ARTERY SYSTOLIC PRESSURE (PASP): 50 MMHG
ECHO PV MAX VELOCITY: 108.3 CM/S
ECHO PV PEAK GRADIENT: 4.7 MMHG
ECHO RV INTERNAL DIMENSION: 3.74 CM
ECHO RV TAPSE: 1.31 CM (ref 1.5–2)
ECHO TV REGURGITANT MAX VELOCITY: 337.32 CM/S
ECHO TV REGURGITANT PEAK GRADIENT: 45.5 MMHG
LVFS 2D: 19.38 %
MV DEC SLOPE: 6.34

## 2020-02-12 PROCEDURE — C8929 TTE W OR WO FOL WCON,DOPPLER: HCPCS

## 2020-02-12 PROCEDURE — 74011000250 HC RX REV CODE- 250: Performed by: INTERNAL MEDICINE

## 2020-02-12 PROCEDURE — 74011250636 HC RX REV CODE- 250/636: Performed by: INTERNAL MEDICINE

## 2020-02-12 RX ADMIN — SODIUM CHLORIDE 1.5 ML: 9 INJECTION INTRAMUSCULAR; INTRAVENOUS; SUBCUTANEOUS at 11:00

## 2020-02-13 ENCOUNTER — TELEPHONE (OUTPATIENT)
Dept: CARDIOLOGY CLINIC | Age: 65
End: 2020-02-13

## 2020-02-13 NOTE — TELEPHONE ENCOUNTER
Contacted patient to notify. Patient verbalized understanding and had no further questions. Alisa Garrison RN.

## 2020-02-13 NOTE — TELEPHONE ENCOUNTER
----- Message from Jason Shea MD sent at 2/13/2020  4:26 PM EST -----  Estiven Bernard,    Please let Mr. Mann know that his LVEF has improved from 16-20% to 25-30%. We can discuss the results in more detail at his next clinic visit.     Thank you!  ----- Message -----  From: Obdulio Avila MD  Sent: 2/12/2020   4:15 PM EST  To: Jason Shea MD

## 2020-02-19 ENCOUNTER — OFFICE VISIT (OUTPATIENT)
Dept: CARDIOLOGY CLINIC | Age: 65
End: 2020-02-19

## 2020-02-19 VITALS
SYSTOLIC BLOOD PRESSURE: 116 MMHG | WEIGHT: 182 LBS | HEART RATE: 63 BPM | HEIGHT: 68 IN | RESPIRATION RATE: 18 BRPM | BODY MASS INDEX: 27.58 KG/M2 | TEMPERATURE: 97.4 F | DIASTOLIC BLOOD PRESSURE: 62 MMHG | OXYGEN SATURATION: 98 %

## 2020-02-19 DIAGNOSIS — R06.02 SHORTNESS OF BREATH: ICD-10-CM

## 2020-02-19 DIAGNOSIS — I50.22 CHRONIC SYSTOLIC CONGESTIVE HEART FAILURE (HCC): ICD-10-CM

## 2020-02-19 DIAGNOSIS — R40.0 DAYTIME SOMNOLENCE: ICD-10-CM

## 2020-02-19 DIAGNOSIS — I25.10 CAD, MULTIPLE VESSEL: Primary | ICD-10-CM

## 2020-02-19 DIAGNOSIS — I73.9 PAD (PERIPHERAL ARTERY DISEASE) (HCC): ICD-10-CM

## 2020-02-19 RX ORDER — EPLERENONE 25 MG/1
12.5 TABLET, FILM COATED ORAL DAILY
Qty: 15 TAB | Refills: 2 | Status: SHIPPED | OUTPATIENT
Start: 2020-02-19 | End: 2020-03-02 | Stop reason: ALTCHOICE

## 2020-02-19 NOTE — LETTER
2/19/2020 4:20 PM 
 
Patient:  Ravi Cardenas YOB: 1955 Date of Visit: 2/19/2020 Dear Inderjit Campuzano MD 
[place] [id number] VIA In Basket Polly Gonzales MD 
[address] [facility] [address] [place] [id number] VIA Facsimile: [phone] 
 : Thank you for referring Mr. Priya Stanton to me for evaluation/treatment. Below are the relevant portions of my assessment and plan of care. If you have questions, please do not hesitate to call me. I look forward to following Mr. Mann along with you. Sincerely, Merline Mitchell MD

## 2020-02-19 NOTE — TELEPHONE ENCOUNTER
Requested Prescriptions     Signed Prescriptions Disp Refills    sacubitril-valsartan (ENTRESTO) 49 mg/51 mg tablet 60 Tab 3     Sig: Take 1 Tab by mouth two (2) times a day.      Authorizing Provider: Sylvain Escudero     Ordering User: Agustin Correa

## 2020-02-19 NOTE — PROGRESS NOTES
[facility] Clinic Note      DOS:  2/19/2020  REFERRING PROVIDER: Anabella Valdes MD  PRIMARY CARE PHYSICIAN: Arabella Fisher MD  PRIMARY CARDIOLOGIST:  Zeynep Jefferson MD   EP: Dariana Brown MD   PULMONARY: Rozina Jimenes MD       IMPRESSION/PLAN:   ICM s/p HMII as BTT on 12/1/16 and explant, NYHA Class IV, LVEF 20-25%     Continue entresto to 97/103mg, 1 tablet twice daily- may decrease pending lab results   Cont carvedilol 6.25mg BID   Start eplerenone 25 mg, 0.5 tablet daily   Take bumex 1.0 mg daily only as needed for shortness of breath or swelling   Daily weights, record and bring to next clinic visit   Continue low Na+ and low K+ diet    Abstain from smoking and ETOH   Labs today - BMP, NT proBNP, Mag today   Recheck BMP in 1 week (after starting eplerenone)   Follow up in [facility] in 1 month   Referred to Dr. Daja Saleem in [facility]     CAD s/p CABG (SVG to RCA and LIMA to LAD) on 12/1/16 s/p BMS x2 -> SVG-RCA graft - no angina    Continue ASA, statin   Cont Coreg     Mitral Regurgitation, severe - improved to mod   Follow with serial TTEs      Hyperkalemia   Continue Veltassa   Check CMP today   Low potassium Diet    Chronic Left Pleural Effusion    s/p left thoracentesis on 7/5/2019   Encourage use of IS    Check CXR today      High Risk of SCD    s/p SQ AICD (5/20/19)   No shocks   Followed by Dr. Sherrie Rayo    Acute Cough    Taking mucinex dm prn at home   Check CXR   Check CBC   Encouraged to follow up with Arabella Fisher MD       PAD    difficult femoral access with LVAD explant              No symptoms of claudication      Chronic Anticoagulation    s/p LVAD explant              Continue eliquis 5 mg  twice daily     Goutk              Continue colchicine 0.6 mg every other day              Continue allopurinol 100 mg daily   Denies recent gouty attacks    Chronic Lower back pain              On oxycodone, flexeril   Hasn't taken since ICD implant   Back pain improved with increased activity- going to gym    H/o tobacco abuse   Abstaining from nicotine    Encouraged complete cessation     H/o alcohol abuse   Currently abstaining   Encouraged complete abstinence      Seizure disorder - early 25s              No recent seizure activity     Peripheral neuropathy              On cymbalta   ATTR- Negative     Prevention              Influenza annually              Pneumonia vaccine every 5 years    Claudication   Check ABIs       Chief Complaint   Patient presents with    Follow-up    Shortness of Breath     Off and on for the past month         HPI: Daniela Marley is a 62y.o. year old male  with a history of HFrEF in the setting of ICM s/p LVAD and recent LVAD explant complicated by PVD,  remote tobacco use and alcohol abuse (quit 11/2016) who presents for follow up of his HFrEF. Mr. Leon Wick presented 11/22/2016 for decompressive laminectomy at K0-E7 complicated by myocardial infarction. The [facility](11/25/16) revealed severe left main and 3 vessel disease along with a 60% right common iliac stenosis and  LVEF was 10%. He subsequently underwent placement of an IABP followed by placement of a HeartMate2 LVAD on 12/1/2016 with concomitant CABG (SVG to the RCA, LIMA to LAD). His postoperative course was complicated by RV failure and an ileus requiring TPN resulting and a transaminitis. He had multiple episodes of Torsade on 12/4/2016 prompting repeat catheterization revealing an occluded RCA vein graft. Two BMS were placed in the RCA graft. He also had SVT requiring cardioversion. He had a single lead ICD placed on 26/88/63 complicated by a hematoma. The ICD was later removed on 1/20/17. Following his LVAD surgery he did require inpatient rehab.      Mr. Leon Wick was listed for heart transplantation at [facility]. He was called in for transplantation on 7/14/2018 but on pre-op DELLA was noted to have normal LV function and the transplant surgery was aborted.  He subsequently was admitted on 9/5/2018 for LVAD explant- his post-operative course was complicated by pneumonia and bilateral pleural effusions. He was discharged on home O2 which he discontinued on his own.       After device explant, his subsequent echocardiograms showed deterioration of LVEF and worsening mitral regurgitation from 40-45% with moderate to severe MR on 18 to 35-40% with moderate-to-severe MR on 10/15/18. He has been removed from the transplant list at [facility] and is not interested in pursuing transplant evaluation at another center at this time. His entresto was discontinued at the time of his AICD implant due to hypotension. He subsequently took diovan but felt worse. He is tolerating the entresto with no dizziness, presyncope, or syncope. He was seen by Dr. Arpan Thompson, who stated that his mitral regurgitation has decreased significantly with optimizing his HF medications. He returns to clinic today for follow up. His wife is with him today. His brother recently passed away. He noted leg fatigue following the  and had to \"collapse\" on the couch. He denies claudication when walking short distances. He does report fatigue and snoring. His sleep study, more than a year ago while on the LVAD, revealed no significant apneic episodes. He denies CP, presyncope, syncope, lightheadedness, dizziness, orthopnea, nocturia, PND, nausea, or vomiting. He admits to dyspnea with exertion but reports a good appetite. He and his wife recently purchased a home in [place], [place] and plan to move in 2020. CARDIAC EVALUATION  TTE 19: LVEF 32%, LVAD plug at LV apex, grade 2 diastolic dysfunction, mild-mod MR, mild TR, PAPs 43 mmHg, mild LAE  DELLA 2018 - normal LV function and the transplant surgery was aborted.  He subsequently was admitted on 2018 for LVAD explant  ECHO 18:LVEF 40-45% mod-severe MR   ECHO 10/15/18:EF 35-40%, mod-severe MR   ECHO 10/31/18: TDS, EF 30-35%, reduced RVEF, TAPSE 1.6, LAE, mild- mod MR/TR  ECHO 19: EF 25-30%, question of thrombus in apex. Mild- mod TR. Consider cardiac MR to exclude left ventricular thrombus. ECHO 2019: EF 16-20%, mod MR, mod-severe TR, severe pulm HTN (PA systolic pressure 85 mmHg), mildly reduced RV systolic function (TAPSE 7.94 cm)  ECHO 19: EF 20-25%, moderately reduced RV systolic function, mild MR  ECHO 2020: Dilated LV ( LVIDd 6 cm),  EF 64-26%, grade 2 diastolic dysfunction, RV dysfunction, TAPSE 1.31 cm, AoV sclerosis, mod MR, mod-severe TR    CATH 16: severe LM, and 3 VD with 60% right common iliac stenosis. EF: 10%. ----S/p IABP   ----S/p HeartMate2 LVAD 2016   S/p CAB2016: LIMA-> LAD, SVG-> RCA     CATH 16 following torsades SVG-> RCA TO   ---- s/p BMS x 2-. RCA  RHC 2017:RA: 15/15 14, RV: 21/13 14,PA: 23/28, m 20, PCWP 13, PVR 1.67 CO 5.67, CI 3    S/p LVAD explant 18 (UVA)    SVT s/p DCCV  AICD  single lead cx by hematoma  AICD explanted 17    EKG:    10/31/18 NSR QRS 88ms   ms  19: SR rate 68bpm QRS 94ms Qtc 438 ms    Review of Systems   Constitutional: Positive for malaise/fatigue. Negative for chills and fever. HENT: Positive for congestion. Negative for sinus pain and sore throat. Eyes: Negative. Respiratory: Positive for cough and shortness of breath. Negative for sputum production. Cardiovascular: Negative for chest pain, palpitations, orthopnea, claudication and leg swelling. Gastrointestinal: Negative for abdominal pain, heartburn, nausea and vomiting. Genitourinary: Negative. Musculoskeletal: Positive for back pain. Negative for falls. Chronic back pain   Skin: Negative. Neurological: Negative for dizziness. Endo/Heme/Allergies: Negative. Psychiatric/Behavioral: Negative.         History:  Past Medical History:   Diagnosis Date    Arrhythmia     SVT    Arthritis     CAD (coronary artery disease)     Chronic pain     back    [facility]     DSOUZA (dyspnea on exertion) 2019    Gout     Heart failure ([facility]     Hypertension     ICD (implantable cardioverter-defibrillator) in place     Long term current use of anticoagulant therapy     LVAD (left ventricular assist device) present ([facility] 2016    Myocardial infarction ([facility]     Pacemaker     SubQ ICD    Raynaud disease     Seizures ([facility]     strobic seizures early      Past Surgical History:   Procedure Laterality Date    CABG, ARTERY-VEIN, TWO  2016    CARDIAC SURG PROCEDURE UNLIST  2016    LVAD    HX CORONARY ARTERY BYPASS GRAFT      HX CORONARY STENT PLACEMENT      HX HEART CATHETERIZATION      HX HEENT      wisdom teeth removed    HX ORTHOPAEDIC  2016    laminectomy    HX PACEMAKER      ICD - removed 2017    HX PTCA      DE INSJ OF SUBQ IMPLANTABLE DEFIBRILLATOR ELECTRODE N/A 2019    INSERT SUBQ ICD w/ anesthesia performed by Víctor Skinner MD at [facility] CATH LAB     Social History     Socioeconomic History    Marital status:      Spouse name: Marcy Kirk    Number of children: 2    Years of education: Not on file    Highest education level: Some college, no degree   Occupational History    Not on file   Social Needs    Financial resource strain: Not hard at all   Epi-Blair insecurity:     Worry: Never true     Inability: Never true    Transportation needs:     Medical: No     Non-medical: No   Tobacco Use    Smoking status: Former Smoker     Packs/day: 1.50     Years: 30.00     Pack years: 45.00     Last attempt to quit: 2008     Years since quittin.1    Smokeless tobacco: Never Used   Substance and Sexual Activity    Alcohol use: No    Drug use: No    Sexual activity: Yes   Lifestyle    Physical activity:     Days per week: Not on file     Minutes per session: Not on file    Stress: Not on file   Relationships    Social connections:     Talks on phone: Not on file     Gets together: Not on file Attends Hinduism service: Not on file     Active member of club or organization: Not on file     Attends meetings of clubs or organizations: Not on file     Relationship status: Not on file    Intimate partner violence:     Fear of current or ex partner: Not on file     Emotionally abused: Not on file     Physically abused: Not on file     Forced sexual activity: Not on file   Other Topics Concern     Service Not Asked    Blood Transfusions Not Asked    Caffeine Concern Not Asked    Occupational Exposure Not Asked    Hobby Hazards Not Asked    Sleep Concern Not Asked    Stress Concern Not Asked    Weight Concern Not Asked    Special Diet Not Asked    Back Care Not Asked    Exercise Not Asked    Bike Helmet Not Asked   [address] [facility] Not Asked    Self-Exams Not Asked   Social History Narrative    Not on file     Family History   Problem Relation Age of Onset    Diabetes Mother     Dementia Mother     Other Mother         carotid artery blockage    Heart Disease Father     Stroke Father     Heart Attack Father         several    Hypertension Father     Elevated Lipids Father     No Known Problems Sister     Cancer Brother         brain    Lung Disease Sister     COPD Sister     Cancer Sister         lung       Current Medications:   Current Outpatient Medications   Medication Sig Dispense Refill    sacubitril-valsartan (ENTRESTO) 49 mg/51 mg tablet Take 1 Tab by mouth two (2) times a day. (Patient taking differently: Take 2 Tabs by mouth two (2) times a day.) 60 Tab 3    amiodarone (CORDARONE) 200 mg tablet TAKE 1 TABLET BY MOUTH EVERY DAY 90 Tab 1    patiromer calcium sorbitex (VELTASSA) 16.8 gram powder Take 16.8 g by mouth daily. 30 Packet 3    bumetanide (BUMEX) 0.5 mg tablet Take 2 Tabs by mouth daily as needed (shortness of breath).  (Patient taking differently: Take 1 mg by mouth every other day.) 90 Tab 3    pregabalin (LYRICA) 50 mg capsule Take 50 mg by mouth three (3) times daily. 2    betamethasone dipropionate (DIPROSONE) 0.05 % topical cream daily as needed. 4    carvedilol (COREG) 6.25 mg tablet Take 1 Tab by mouth two (2) times daily (with meals). 180 Tab 3    tadalafil (CIALIS) 20 mg tablet 20 mg as needed. 6    atorvastatin (LIPITOR) 40 mg tablet TAKE 1 TABLET BY MOUTH EVERY DAY 30 Tab 11    apixaban (ELIQUIS) 5 mg tablet Take 1 Tab by mouth two (2) times a day. 60 Tab 11    levalbuterol tartrate (XOPENEX) 45 mcg/actuation inhaler INHALE 2 PUFFS EVERY 4 HOURS AS NEEDED  0    montelukast (SINGULAIR) 10 mg tablet TAKE 1 TABLET BY MOUTH ONCE A DAY  3    HYDROcodone-acetaminophen (NORCO) 5-325 mg per tablet TAKE 1 TABLET EVERY 6 HOURS AS NEEDED  0    DULoxetine (CYMBALTA) 60 mg capsule Take 60 mg by mouth daily.  allopurinol (ZYLOPRIM) 100 mg tablet Take 100 mg by mouth daily.  tamsulosin (FLOMAX) 0.4 mg capsule Take 0.4 mg by mouth daily.  colchicine 0.6 mg tablet Take 0.6 mg by mouth every other day.  cyclobenzaprine (FLEXERIL) 5 mg tablet Take 5 mg by mouth daily as needed for Muscle Spasm(s).  aspirin 81 mg chewable tablet Take 81 mg by mouth daily.  ascorbic acid, vitamin C, (VITAMIN C) 500 mg tablet Take 1 Tab by mouth two (2) times a day. 60 Tab 6       Allergies: Allergies   Allergen Reactions    Morphine Other (comments)     Hallucinations. Confusion. Impaired judgement    Chlorhexidine Rash       Physical Exam:   Vitals:    Visit Vitals  /62 (BP 1 Location: Left arm, BP Patient Position: Sitting)   Pulse 63   Temp 97.4 °F (36.3 °C) (Oral)   Resp 18   Ht 5' 8\" (1.727 m)   Wt 182 lb (82.6 kg)   SpO2 98%   BMI 27.67 kg/m²        Physical Exam  Constitutional:       Appearance: He is normal weight. HENT:      Nose: Congestion present. Cardiovascular:      Rate and Rhythm: Normal rate and regular rhythm. Heart sounds: S1 normal and S2 normal. Murmur present. Systolic murmur present.    Pulmonary:      Effort: Tachypnea present. Breath sounds: Examination of the right-upper field reveals rhonchi. Examination of the left-upper field reveals rhonchi. Examination of the left-middle field reveals decreased breath sounds. Examination of the left-lower field reveals decreased breath sounds. Decreased breath sounds and rhonchi present. Comments: +frequent congested cough  Abdominal:      General: Bowel sounds are normal. There is distension. Musculoskeletal:      Right lower leg: No edema. Left lower leg: No edema. Skin:     General: Skin is warm and dry. Neurological:      Mental Status: He is alert and oriented to person, place, and time. Psychiatric:         Mood and Affect: Mood normal.         Behavior: Behavior normal. Behavior is cooperative. Thought Content:  Thought content normal.         Judgment: Judgment normal.           Recent Labs:     Lab Results   Component Value Date/Time    WBC 6.8 01/16/2020 12:34 PM    HGB 8.5 (L) 01/16/2020 12:34 PM    HCT 24.9 (L) 01/16/2020 12:34 PM    PLATELET 791 87/29/0808 12:34 PM    MCV 81 01/16/2020 12:34 PM     Lab Results   Component Value Date/Time    GFR est non-AA 38 (L) 02/05/2020 09:39 AM    GFR est AA 44 (L) 02/05/2020 09:39 AM    Creatinine 1.86 (H) 02/05/2020 09:39 AM    BUN 54 (H) 02/05/2020 09:39 AM    Sodium 139 02/05/2020 09:39 AM    Potassium 5.0 02/05/2020 09:39 AM    Chloride 104 02/05/2020 09:39 AM    CO2 17 (L) 02/05/2020 09:39 AM    Magnesium 2.0 01/16/2020 12:34 PM    Phosphorus 1.8 (L) 10/01/2019 03:49 AM     Lab Results   Component Value Date/Time    TSH 3.790 06/12/2019 03:05 PM    T4, Free 1.68 06/12/2019 03:05 PM      Lab Results   Component Value Date/Time    Sodium 139 02/05/2020 09:39 AM    Potassium 5.0 02/05/2020 09:39 AM    Chloride 104 02/05/2020 09:39 AM    CO2 17 (L) 02/05/2020 09:39 AM    Anion gap 7 11/10/2019 11:37 AM    Glucose 112 (H) 02/05/2020 09:39 AM    BUN 54 (H) 02/05/2020 09:39 AM    Creatinine 1.86 (H) 02/05/2020 09:39 AM    BUN/Creatinine ratio 29 (H) 02/05/2020 09:39 AM    GFR est AA 44 (L) 02/05/2020 09:39 AM    GFR est non-AA 38 (L) 02/05/2020 09:39 AM    Calcium 8.8 02/05/2020 09:39 AM    Bilirubin, total 0.4 01/16/2020 12:34 PM    ALT (SGPT) 19 01/16/2020 12:34 PM    AST (SGOT) 18 01/16/2020 12:34 PM    Alk.  phosphatase 115 01/16/2020 12:34 PM    Protein, total 6.4 01/16/2020 12:34 PM    Albumin 4.0 01/16/2020 12:34 PM    Globulin 3.9 11/10/2019 05:03 AM    A-G Ratio 1.7 01/16/2020 12:34 PM      Lab Results   Component Value Date/Time    Hemoglobin A1c 5.9 05/22/2019 02:56 AM       Thank you for letting us see him with you,      Andreina Rocha MD  [facility]  [address] [phone]  Fax [phone]

## 2020-02-19 NOTE — PROGRESS NOTES
1. Do you Snore loudly (loud enough to be heart through closed doors or your bed-partner elbows you for snoring at night)? Yes    2. Do you often feel Tired, fatigued, or sleepy during the daytime (such as falling asleep during driving or talking to someone)? Yes    3. Has anyone Observed you stop breathing or choking/gasping during your sleep? N    4.  Do you have or are being treated for high blood Pressure? Yes    5. Is your Body mass index more than 35? No     6. Age older than 48? Yes    7. Large Neck size? For male, is your shirt collar 17 inches / 43 cm or larger? For female, is your shirt collar 16 inches / 41 cm or larger? No    8. Gender = male?  Yes    Score: 5 High risk      ENOCH low risk - yes to 0 - 2 questions  ENOCH - intermediate risk - yes to 3 - 4 questions  ENOCH - high risk:  yes to 5-8 questions  Or yes to 2 or more of 4 STOP questions + male gender  Or yes to 2 or more of 4 STOP questions + BMI greater than 35  Or yes to 2 or more of 4 STOP questions + neck circumference 17 inches/43 cm in male or 16 inches/41 cm in female

## 2020-02-21 LAB
BUN SERPL-MCNC: 57 MG/DL (ref 8–27)
BUN/CREAT SERPL: 34 (ref 10–24)
CALCIUM SERPL-MCNC: 9.2 MG/DL (ref 8.6–10.2)
CHLORIDE SERPL-SCNC: 102 MMOL/L (ref 96–106)
CO2 SERPL-SCNC: 17 MMOL/L (ref 20–29)
CREAT SERPL-MCNC: 1.7 MG/DL (ref 0.76–1.27)
GLUCOSE SERPL-MCNC: 92 MG/DL (ref 65–99)
MAGNESIUM SERPL-MCNC: 2.1 MG/DL (ref 1.6–2.3)
NT-PROBNP SERPL-MCNC: 5123 PG/ML (ref 0–210)
POTASSIUM SERPL-SCNC: 5.7 MMOL/L (ref 3.5–5.2)
SODIUM SERPL-SCNC: 135 MMOL/L (ref 134–144)

## 2020-02-26 DIAGNOSIS — I50.22 CHRONIC SYSTOLIC CONGESTIVE HEART FAILURE (HCC): ICD-10-CM

## 2020-02-27 ENCOUNTER — TELEPHONE (OUTPATIENT)
Dept: CARDIOLOGY CLINIC | Age: 65
End: 2020-02-27

## 2020-02-27 NOTE — TELEPHONE ENCOUNTER
Lab work reviewed with Dr. Ricci Pitts. Per Dr. Ricci Pitts verify if patient taking [place] as ordered. Contacted patient who stated he had not been using Veltassa \"religiously\" but did start using it consistently when he start the eplerenone last week. Will review with Dr. Ricci Pitts and contact patient with further instruction. Shelby Lanier RN.

## 2020-02-28 LAB
BUN SERPL-MCNC: 73 MG/DL (ref 8–27)
BUN/CREAT SERPL: 34 (ref 10–24)
CALCIUM SERPL-MCNC: 8.8 MG/DL (ref 8.6–10.2)
CHLORIDE SERPL-SCNC: 106 MMOL/L (ref 96–106)
CO2 SERPL-SCNC: 15 MMOL/L (ref 20–29)
CREAT SERPL-MCNC: 2.14 MG/DL (ref 0.76–1.27)
GLUCOSE SERPL-MCNC: 91 MG/DL (ref 65–99)
POTASSIUM SERPL-SCNC: 5.8 MMOL/L (ref 3.5–5.2)
SODIUM SERPL-SCNC: 136 MMOL/L (ref 134–144)

## 2020-02-28 NOTE — TELEPHONE ENCOUNTER
Per Dr. Pettit Stage patient to continue all medications for now and get repeat lab work today. He is to ensure continued consistent use of veltassa and follow low potassium diet. Contacted patient to notify. He verbalized understanding, stated he will have lab done today and had no further questions. Jordy Guido RN.

## 2020-03-02 ENCOUNTER — TELEPHONE (OUTPATIENT)
Dept: CARDIOLOGY CLINIC | Age: 65
End: 2020-03-02

## 2020-03-02 DIAGNOSIS — R06.02 SHORTNESS OF BREATH: ICD-10-CM

## 2020-03-02 DIAGNOSIS — I50.22 CHRONIC SYSTOLIC CONGESTIVE HEART FAILURE (HCC): Primary | ICD-10-CM

## 2020-03-02 NOTE — TELEPHONE ENCOUNTER
Patient is wanting to ensure that their machine is working as it is blinking white every other day and seems to be having issues. Patient will submit a manual transmission and would like to know if you can call and let him know that it was received. Please advise.     Phone: [phone]
Pt home monitor is not communicating. Pt instructed to call [employer] @ [phone].
No

## 2020-03-02 NOTE — TELEPHONE ENCOUNTER
Patient's wife Carlo Silva returned call. PHI authorization verified. She stated they did contact Dr. Aliza Harp on Saturday as patient was fatigued and lethargic with BP of 87/40. Dr. Aliza Harp had advised patient stop eplerenone and increase fluid intake as patient is dehydrated. Per Carlo Silva patient has stopped eplerenone and has not taken since Saturday. Patient is feeling better and BP has improved per Carlo Silva. Informed her of lab results and recommendations to no longer take eplerenone, continue Veltassa as ordered, and increase fluid intake with a goal of at lease 6-8 eight ounce glasses of water daily. Ayoamparo Ricardo informed low potassium diet information mailed today. Lab orders faxed to [facility] and requested patient have labs drawn morning of 3/5/20. Encouraged Carlo Silva to contact [facility] [phone] with any further questions or concerns. She verbalized understanding and had no further questions at this time. Contacted patient. Informed him of results and recommendations. Patient verbalized understanding and had no further questions. Sheyla Jane RN.

## 2020-03-02 NOTE — TELEPHONE ENCOUNTER
May from [facility], Dr Julissa Almaguer office called confirming receipt of order from Dr Karime Rice to stop blood thinners

## 2020-03-02 NOTE — TELEPHONE ENCOUNTER
Lab work reviewed by Dr. Nash Ro. Per Dr. Nash Ro V.O.R.B patient to stop eplerenone and ensure low potassium diet and prescribed use of Veltassa for potassium level of 5.8. Patient also to increase fluid intake per Dr. Nash Ro V.O.R.B for creatinine increase to 2.14 (from 1.7 on 2/21/20). Dr. Sagar Bauer recommended V.O.R.B repeat BMP and NT proBNP on 3/5/20. Orders placed and faxed to [facility]. Attempted to contact patient. Message left. Will await return call. Deni Calvo RN.

## 2020-03-05 ENCOUNTER — TELEPHONE (OUTPATIENT)
Dept: CARDIOLOGY CLINIC | Age: 65
End: 2020-03-05

## 2020-03-05 ENCOUNTER — APPOINTMENT (OUTPATIENT)
Dept: GENERAL RADIOLOGY | Age: 65
DRG: 812 | End: 2020-03-05
Attending: EMERGENCY MEDICINE
Payer: COMMERCIAL

## 2020-03-05 ENCOUNTER — HOSPITAL ENCOUNTER (INPATIENT)
Age: 65
LOS: 4 days | Discharge: HOME | DRG: 812 | End: 2020-03-09
Attending: EMERGENCY MEDICINE | Admitting: HOSPITALIST
Payer: COMMERCIAL

## 2020-03-05 DIAGNOSIS — I50.22 CHRONIC SYSTOLIC HEART FAILURE (HCC): ICD-10-CM

## 2020-03-05 DIAGNOSIS — T82.7XXD INFECTION INVOLVING IMPLANTABLE CARDIOVERTER-DEFIBRILLATOR (ICD), SUBSEQUENT ENCOUNTER: ICD-10-CM

## 2020-03-05 DIAGNOSIS — Z79.01 CHRONIC ANTICOAGULATION: ICD-10-CM

## 2020-03-05 DIAGNOSIS — I50.9 CONGESTIVE HEART FAILURE, UNSPECIFIED HF CHRONICITY, UNSPECIFIED HEART FAILURE TYPE (HCC): ICD-10-CM

## 2020-03-05 DIAGNOSIS — Z91.89 AT RISK FOR OBSTRUCTIVE SLEEP APNEA: ICD-10-CM

## 2020-03-05 DIAGNOSIS — Z98.890 S/P LUMBAR LAMINECTOMY: ICD-10-CM

## 2020-03-05 DIAGNOSIS — D64.9 SYMPTOMATIC ANEMIA: Primary | ICD-10-CM

## 2020-03-05 DIAGNOSIS — I50.23 ACUTE ON CHRONIC SYSTOLIC (CONGESTIVE) HEART FAILURE (HCC): ICD-10-CM

## 2020-03-05 DIAGNOSIS — I25.10 CAD, MULTIPLE VESSEL: ICD-10-CM

## 2020-03-05 DIAGNOSIS — R04.0 EPISTAXIS: ICD-10-CM

## 2020-03-05 DIAGNOSIS — R06.09 DOE (DYSPNEA ON EXERTION): ICD-10-CM

## 2020-03-05 DIAGNOSIS — R06.02 SHORTNESS OF BREATH: ICD-10-CM

## 2020-03-05 DIAGNOSIS — I25.5 ISCHEMIC CARDIOMYOPATHY: ICD-10-CM

## 2020-03-05 DIAGNOSIS — D50.0 IRON DEFICIENCY ANEMIA DUE TO CHRONIC BLOOD LOSS: ICD-10-CM

## 2020-03-05 DIAGNOSIS — N18.9 CHRONIC RENAL IMPAIRMENT, UNSPECIFIED CKD STAGE: ICD-10-CM

## 2020-03-05 DIAGNOSIS — N17.9 AKI (ACUTE KIDNEY INJURY) (HCC): ICD-10-CM

## 2020-03-05 DIAGNOSIS — I10 ESSENTIAL HYPERTENSION: ICD-10-CM

## 2020-03-05 DIAGNOSIS — I50.22 CHRONIC SYSTOLIC CONGESTIVE HEART FAILURE (HCC): ICD-10-CM

## 2020-03-05 LAB
ALBUMIN SERPL-MCNC: 3.4 G/DL (ref 3.5–5)
ALBUMIN/GLOB SERPL: 0.9 {RATIO} (ref 1.1–2.2)
ALP SERPL-CCNC: 103 U/L (ref 45–117)
ALT SERPL-CCNC: 34 U/L (ref 12–78)
ANION GAP SERPL CALC-SCNC: 10 MMOL/L (ref 5–15)
ANION GAP SERPL CALC-SCNC: 6 MMOL/L (ref 5–15)
ARTERIAL PATENCY WRIST A: ABNORMAL
AST SERPL-CCNC: 35 U/L (ref 15–37)
BASE DEFICIT BLD-SCNC: 12 MMOL/L
BASOPHILS # BLD: 0.1 K/UL (ref 0–0.1)
BASOPHILS NFR BLD: 1 % (ref 0–1)
BDY SITE: ABNORMAL
BILIRUB SERPL-MCNC: 0.5 MG/DL (ref 0.2–1)
BNP SERPL-MCNC: 7210 PG/ML
BUN SERPL-MCNC: 63 MG/DL (ref 6–20)
BUN SERPL-MCNC: 65 MG/DL (ref 6–20)
BUN SERPL-MCNC: 65 MG/DL (ref 8–27)
BUN/CREAT SERPL: 32 (ref 12–20)
BUN/CREAT SERPL: 35 (ref 10–24)
BUN/CREAT SERPL: 35 (ref 12–20)
CA-I BLD-SCNC: 1.32 MMOL/L (ref 1.12–1.32)
CALCIUM SERPL-MCNC: 8.3 MG/DL (ref 8.5–10.1)
CALCIUM SERPL-MCNC: 8.6 MG/DL (ref 8.5–10.1)
CALCIUM SERPL-MCNC: 8.7 MG/DL (ref 8.6–10.2)
CHLORIDE SERPL-SCNC: 109 MMOL/L (ref 96–106)
CHLORIDE SERPL-SCNC: 112 MMOL/L (ref 97–108)
CHLORIDE SERPL-SCNC: 113 MMOL/L (ref 97–108)
CK SERPL-CCNC: 156 U/L (ref 39–308)
CO2 SERPL-SCNC: 13 MMOL/L (ref 20–29)
CO2 SERPL-SCNC: 14 MMOL/L (ref 21–32)
CO2 SERPL-SCNC: 15 MMOL/L (ref 21–32)
COMMENT, HOLDF: NORMAL
CREAT SERPL-MCNC: 1.84 MG/DL (ref 0.76–1.27)
CREAT SERPL-MCNC: 1.84 MG/DL (ref 0.7–1.3)
CREAT SERPL-MCNC: 1.99 MG/DL (ref 0.7–1.3)
DIFFERENTIAL METHOD BLD: ABNORMAL
EOSINOPHIL # BLD: 0.3 K/UL (ref 0–0.4)
EOSINOPHIL NFR BLD: 4 % (ref 0–7)
ERYTHROCYTE [DISTWIDTH] IN BLOOD BY AUTOMATED COUNT: 17.7 % (ref 11.5–14.5)
GAS FLOW.O2 O2 DELIVERY SYS: ABNORMAL L/MIN
GLOBULIN SER CALC-MCNC: 3.8 G/DL (ref 2–4)
GLUCOSE SERPL-MCNC: 101 MG/DL (ref 65–100)
GLUCOSE SERPL-MCNC: 110 MG/DL (ref 65–100)
GLUCOSE SERPL-MCNC: 99 MG/DL (ref 65–99)
HCO3 BLD-SCNC: 14.9 MMOL/L (ref 22–26)
HCT VFR BLD AUTO: 21.1 % (ref 36.6–50.3)
HEMOCCULT STL QL: NEGATIVE
HGB BLD-MCNC: 6.4 G/DL (ref 12.1–17)
IMM GRANULOCYTES # BLD AUTO: 0 K/UL (ref 0–0.04)
IMM GRANULOCYTES NFR BLD AUTO: 0 % (ref 0–0.5)
LYMPHOCYTES # BLD: 0.8 K/UL (ref 0.8–3.5)
LYMPHOCYTES NFR BLD: 11 % (ref 12–49)
MCH RBC QN AUTO: 27.8 PG (ref 26–34)
MCHC RBC AUTO-ENTMCNC: 30.3 G/DL (ref 30–36.5)
MCV RBC AUTO: 91.7 FL (ref 80–99)
MONOCYTES # BLD: 0.7 K/UL (ref 0–1)
MONOCYTES NFR BLD: 10 % (ref 5–13)
NEUTS SEG # BLD: 5.2 K/UL (ref 1.8–8)
NEUTS SEG NFR BLD: 74 % (ref 32–75)
NRBC # BLD: 0 K/UL (ref 0–0.01)
NRBC BLD-RTO: 0 PER 100 WBC
NT-PROBNP SERPL-MCNC: 6116 PG/ML (ref 0–210)
PCO2 BLD: 33.8 MMHG (ref 35–45)
PH BLD: 7.25 [PH] (ref 7.35–7.45)
PLATELET # BLD AUTO: 237 K/UL (ref 150–400)
PMV BLD AUTO: 12.4 FL (ref 8.9–12.9)
PO2 BLD: 25 MMHG (ref 80–100)
POTASSIUM SERPL-SCNC: 5.4 MMOL/L (ref 3.5–5.1)
POTASSIUM SERPL-SCNC: 5.7 MMOL/L (ref 3.5–5.2)
POTASSIUM SERPL-SCNC: 5.8 MMOL/L (ref 3.5–5.1)
PROT SERPL-MCNC: 7.2 G/DL (ref 6.4–8.2)
RBC # BLD AUTO: 2.3 M/UL (ref 4.1–5.7)
RBC MORPH BLD: ABNORMAL
RBC MORPH BLD: ABNORMAL
SAMPLES BEING HELD,HOLD: NORMAL
SAO2 % BLD: 38 % (ref 92–97)
SODIUM SERPL-SCNC: 134 MMOL/L (ref 134–144)
SODIUM SERPL-SCNC: 134 MMOL/L (ref 136–145)
SODIUM SERPL-SCNC: 136 MMOL/L (ref 136–145)
SPECIMEN TYPE: ABNORMAL
TROPONIN I SERPL-MCNC: 0.07 NG/ML
WBC # BLD AUTO: 7.1 K/UL (ref 4.1–11.1)

## 2020-03-05 PROCEDURE — 36600 WITHDRAWAL OF ARTERIAL BLOOD: CPT

## 2020-03-05 PROCEDURE — 36415 COLL VENOUS BLD VENIPUNCTURE: CPT

## 2020-03-05 PROCEDURE — 80053 COMPREHEN METABOLIC PANEL: CPT

## 2020-03-05 PROCEDURE — 77030040361 HC SLV COMPR DVT MDII -B

## 2020-03-05 PROCEDURE — 82550 ASSAY OF CK (CPK): CPT

## 2020-03-05 PROCEDURE — 86923 COMPATIBILITY TEST ELECTRIC: CPT

## 2020-03-05 PROCEDURE — 71045 X-RAY EXAM CHEST 1 VIEW: CPT

## 2020-03-05 PROCEDURE — 83880 ASSAY OF NATRIURETIC PEPTIDE: CPT

## 2020-03-05 PROCEDURE — 99284 EMERGENCY DEPT VISIT MOD MDM: CPT

## 2020-03-05 PROCEDURE — 93005 ELECTROCARDIOGRAM TRACING: CPT

## 2020-03-05 PROCEDURE — 84484 ASSAY OF TROPONIN QUANT: CPT

## 2020-03-05 PROCEDURE — P9016 RBC LEUKOCYTES REDUCED: HCPCS

## 2020-03-05 PROCEDURE — 82272 OCCULT BLD FECES 1-3 TESTS: CPT

## 2020-03-05 PROCEDURE — 85025 COMPLETE CBC W/AUTO DIFF WBC: CPT

## 2020-03-05 PROCEDURE — 36430 TRANSFUSION BLD/BLD COMPNT: CPT

## 2020-03-05 PROCEDURE — 65660000000 HC RM CCU STEPDOWN

## 2020-03-05 PROCEDURE — 82803 BLOOD GASES ANY COMBINATION: CPT

## 2020-03-05 PROCEDURE — 86900 BLOOD TYPING SEROLOGIC ABO: CPT

## 2020-03-05 PROCEDURE — 30233N1 TRANSFUSION OF NONAUTOLOGOUS RED BLOOD CELLS INTO PERIPHERAL VEIN, PERCUTANEOUS APPROACH: ICD-10-PCS | Performed by: INTERNAL MEDICINE

## 2020-03-05 PROCEDURE — 74011250636 HC RX REV CODE- 250/636: Performed by: HOSPITALIST

## 2020-03-05 RX ORDER — DULOXETIN HYDROCHLORIDE 60 MG/1
60 CAPSULE, DELAYED RELEASE ORAL DAILY
Status: DISCONTINUED | OUTPATIENT
Start: 2020-03-06 | End: 2020-03-09 | Stop reason: HOSPADM

## 2020-03-05 RX ORDER — SODIUM CHLORIDE 0.9 % (FLUSH) 0.9 %
5-40 SYRINGE (ML) INJECTION EVERY 8 HOURS
Status: DISCONTINUED | OUTPATIENT
Start: 2020-03-06 | End: 2020-03-09 | Stop reason: HOSPADM

## 2020-03-05 RX ORDER — ZOLPIDEM TARTRATE 5 MG/1
5 TABLET ORAL
Status: DISCONTINUED | OUTPATIENT
Start: 2020-03-05 | End: 2020-03-09 | Stop reason: HOSPADM

## 2020-03-05 RX ORDER — DOCUSATE SODIUM 100 MG/1
100 CAPSULE, LIQUID FILLED ORAL 2 TIMES DAILY
Status: DISCONTINUED | OUTPATIENT
Start: 2020-03-06 | End: 2020-03-09 | Stop reason: HOSPADM

## 2020-03-05 RX ORDER — ONDANSETRON 2 MG/ML
4 INJECTION INTRAMUSCULAR; INTRAVENOUS
Status: DISCONTINUED | OUTPATIENT
Start: 2020-03-05 | End: 2020-03-09 | Stop reason: HOSPADM

## 2020-03-05 RX ORDER — TAMSULOSIN HYDROCHLORIDE 0.4 MG/1
0.4 CAPSULE ORAL DAILY
Status: DISCONTINUED | OUTPATIENT
Start: 2020-03-06 | End: 2020-03-09 | Stop reason: HOSPADM

## 2020-03-05 RX ORDER — CYCLOBENZAPRINE HCL 10 MG
5 TABLET ORAL
Status: DISCONTINUED | OUTPATIENT
Start: 2020-03-06 | End: 2020-03-09 | Stop reason: HOSPADM

## 2020-03-05 RX ORDER — CARVEDILOL 6.25 MG/1
6.25 TABLET ORAL 2 TIMES DAILY WITH MEALS
Status: DISCONTINUED | OUTPATIENT
Start: 2020-03-06 | End: 2020-03-08

## 2020-03-05 RX ORDER — SODIUM CHLORIDE 0.9 % (FLUSH) 0.9 %
5-40 SYRINGE (ML) INJECTION AS NEEDED
Status: DISCONTINUED | OUTPATIENT
Start: 2020-03-05 | End: 2020-03-09 | Stop reason: HOSPADM

## 2020-03-05 RX ORDER — HYDROCODONE BITARTRATE AND ACETAMINOPHEN 5; 325 MG/1; MG/1
1 TABLET ORAL
Status: DISCONTINUED | OUTPATIENT
Start: 2020-03-05 | End: 2020-03-09 | Stop reason: HOSPADM

## 2020-03-05 RX ORDER — FUROSEMIDE 10 MG/ML
40 INJECTION INTRAMUSCULAR; INTRAVENOUS ONCE
Status: COMPLETED | OUTPATIENT
Start: 2020-03-05 | End: 2020-03-05

## 2020-03-05 RX ORDER — ASCORBIC ACID 500 MG
500 TABLET ORAL 2 TIMES DAILY
Status: DISCONTINUED | OUTPATIENT
Start: 2020-03-06 | End: 2020-03-09 | Stop reason: HOSPADM

## 2020-03-05 RX ORDER — ALBUTEROL SULFATE 0.83 MG/ML
2.5 SOLUTION RESPIRATORY (INHALATION)
Status: DISCONTINUED | OUTPATIENT
Start: 2020-03-06 | End: 2020-03-09 | Stop reason: HOSPADM

## 2020-03-05 RX ORDER — SODIUM BICARBONATE 650 MG/1
650 TABLET ORAL 3 TIMES DAILY
Status: DISCONTINUED | OUTPATIENT
Start: 2020-03-06 | End: 2020-03-09

## 2020-03-05 RX ORDER — ATORVASTATIN CALCIUM 40 MG/1
40 TABLET, FILM COATED ORAL DAILY
Status: DISCONTINUED | OUTPATIENT
Start: 2020-03-06 | End: 2020-03-09 | Stop reason: HOSPADM

## 2020-03-05 RX ORDER — MONTELUKAST SODIUM 10 MG/1
10 TABLET ORAL
Status: DISCONTINUED | OUTPATIENT
Start: 2020-03-06 | End: 2020-03-09 | Stop reason: HOSPADM

## 2020-03-05 RX ORDER — AMIODARONE HYDROCHLORIDE 200 MG/1
200 TABLET ORAL DAILY
Status: DISCONTINUED | OUTPATIENT
Start: 2020-03-06 | End: 2020-03-09 | Stop reason: HOSPADM

## 2020-03-05 RX ORDER — SODIUM CHLORIDE 9 MG/ML
250 INJECTION, SOLUTION INTRAVENOUS AS NEEDED
Status: DISCONTINUED | OUTPATIENT
Start: 2020-03-05 | End: 2020-03-06

## 2020-03-05 RX ORDER — ALLOPURINOL 100 MG/1
100 TABLET ORAL DAILY
Status: DISCONTINUED | OUTPATIENT
Start: 2020-03-06 | End: 2020-03-09 | Stop reason: HOSPADM

## 2020-03-05 RX ORDER — COLCHICINE 0.6 MG/1
0.6 TABLET ORAL EVERY OTHER DAY
Status: DISCONTINUED | OUTPATIENT
Start: 2020-03-06 | End: 2020-03-09 | Stop reason: HOSPADM

## 2020-03-05 RX ORDER — PREGABALIN 25 MG/1
50 CAPSULE ORAL 3 TIMES DAILY
Status: DISCONTINUED | OUTPATIENT
Start: 2020-03-06 | End: 2020-03-09 | Stop reason: HOSPADM

## 2020-03-05 RX ORDER — ACETAMINOPHEN 325 MG/1
650 TABLET ORAL
Status: DISCONTINUED | OUTPATIENT
Start: 2020-03-05 | End: 2020-03-09 | Stop reason: HOSPADM

## 2020-03-05 RX ORDER — NALOXONE HYDROCHLORIDE 0.4 MG/ML
0.4 INJECTION, SOLUTION INTRAMUSCULAR; INTRAVENOUS; SUBCUTANEOUS AS NEEDED
Status: DISCONTINUED | OUTPATIENT
Start: 2020-03-05 | End: 2020-03-09 | Stop reason: HOSPADM

## 2020-03-05 RX ADMIN — FUROSEMIDE 40 MG: 10 INJECTION, SOLUTION INTRAMUSCULAR; INTRAVENOUS at 21:57

## 2020-03-05 NOTE — TELEPHONE ENCOUNTER
Patient called with shortness of breath, pale, BP 95/? , no chest pain, states he feels like he is dehydrated, generally \"feeling like crap\"-on his way to ED. I notified Antonia Nayak.

## 2020-03-05 NOTE — ED PROVIDER NOTES
Date of Service:  3/5/2020    Patient:  Gretchen Cruz    Chief Complaint:  Fatigue and Generalized Body Aches       HPI:  Gretchen Cruz is a 62 y.o.  male who presents for evaluation of fatigue. Patient reports onset of fatigue for the past 2 days with associated myalgias. He notes that he has history of heart failure and CAD, and that he is followed by Dr. Michi Finch for cardiology. Patient states that 5 days ago, he had a change in his diuretic medications, but states that he stopped taking the new diuretic after his symptoms began. Patient arrives to the ED with continued fatigue and myalgias, as well as some shortness of breath. He denies fever, cough, congestion, leg swelling, tarry stool, rectal bleeding, chest pain, abdominal pain, nausea or vomiting.      Cardiology: Piotr Kirby MD  Note written by Radha Soni, as dictated by Solomon Sahu DO 6:14 PM             Past Medical History:   Diagnosis Date    Arrhythmia     SVT    Arthritis     CAD (coronary artery disease)     Chronic pain     back    Congestive heart failure ([address]     DSOUZA (dyspnea on exertion) 1/30/2019    Gout     Heart failure ([address]     Hypertension     ICD (implantable cardioverter-defibrillator) in place     Long term current use of anticoagulant therapy     LVAD (left ventricular assist device) present ([address] 12/01/2016    Myocardial infarction ([address]     Pacemaker     SubQ ICD    Raynaud disease     Seizures ([address]     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       Past Surgical History:   Procedure Laterality Date    CABG, ARTERY-VEIN, TWO  12/01/2016    CARDIAC SURG PROCEDURE UNLIST  12/01/2016    LVAD    HX CORONARY ARTERY BYPASS GRAFT      HX CORONARY STENT PLACEMENT      HX HEART CATHETERIZATION      HX HEENT      wisdom teeth removed    HX ORTHOPAEDIC  11/22/2016    laminectomy    HX PACEMAKER      ICD - removed 1/2017    HX PTCA      [place] INSJ OF SUBQ IMPLANTABLE DEFIBRILLATOR ELECTRODE N/A 5/20/2019 INSERT SUBQ ICD w/ anesthesia performed by Alla Padron MD at [facility]         Family History:   Problem Relation Age of Onset    Diabetes Mother     Dementia Mother     Other Mother         carotid artery blockage    Heart Disease Father     Stroke Father     Heart Attack Father         several    Hypertension Father     Elevated Lipids Father     No Known Problems Sister     Cancer Brother         brain    Lung Disease Sister     COPD Sister     Cancer Sister         lung       Social History     Socioeconomic History    Marital status:      Spouse name: Barry Koenig    Number of children: 2    Years of education: Not on file    Highest education level: Some college, no degree   Occupational History    Not on file   Social Needs    Financial resource strain: Not hard at all   [employer] insecurity:     Worry: Never true     Inability: Never true   Kitani needs:     Medical: No     Non-medical: No   Tobacco Use    Smoking status: Former Smoker     Packs/day: 1.50     Years: 30.00     Pack years: 45.00     Last attempt to quit:      Years since quittin.1    Smokeless tobacco: Never Used   Substance and Sexual Activity    Alcohol use: No    Drug use: No    Sexual activity: Yes   Lifestyle    Physical activity:     Days per week: Not on file     Minutes per session: Not on file    Stress: Not on file   Relationships    Social connections:     Talks on phone: Not on file     Gets together: Not on file     Attends Oriental orthodox service: Not on file     Active member of club or organization: Not on file     Attends meetings of clubs or organizations: Not on file     Relationship status: Not on file    Intimate partner violence:     Fear of current or ex partner: Not on file     Emotionally abused: Not on file     Physically abused: Not on file     Forced sexual activity: Not on file   Other Topics Concern   [address] Service Not Asked    Blood Transfusions Not Asked    Caffeine Concern Not Asked    Occupational Exposure Not Asked    Hobby Hazards Not Asked    Sleep Concern Not Asked    Stress Concern Not Asked    Weight Concern Not Asked    Special Diet Not Asked    Back Care Not Asked    Exercise Not Asked    Bike Helmet Not Asked   [address] Not Asked    Self-Exams Not Asked   Social History Narrative    Not on file         ALLERGIES: Morphine and Chlorhexidine    Review of Systems   Constitutional: Positive for fatigue. Negative for fever. HENT: Negative for congestion and hearing loss. Eyes: Negative for visual disturbance. Respiratory: Positive for shortness of breath (worse than baseline). Negative for cough. Cardiovascular: Negative for chest pain. Gastrointestinal: Negative for abdominal pain, anal bleeding, blood in stool, diarrhea, nausea and vomiting. Genitourinary: Negative for flank pain and hematuria. Musculoskeletal: Positive for myalgias. Negative for back pain. Skin: Negative for rash. Neurological: Negative for dizziness and light-headedness. Psychiatric/Behavioral: Negative for suicidal ideas. Vitals:    03/05/20 1600   BP: 98/60   Pulse: 63   Resp: 20   Temp: 97.9 °F (36.6 °C)   SpO2: 100%   Weight: 83 kg (183 lb)   Height: 5' 8\" (1.727 m)            Physical Exam  Constitutional:       General: He is not in acute distress. Appearance: He is well-developed. HENT:      Head: Normocephalic and atraumatic. Eyes:      Conjunctiva/sclera: Conjunctivae normal.      Pupils: Pupils are equal, round, and reactive to light. Neck:      Musculoskeletal: Neck supple. Vascular: No JVD. Trachea: No tracheal deviation. Cardiovascular:      Rate and Rhythm: Normal rate and regular rhythm. Heart sounds: No murmur. No friction rub. Pulmonary:      Effort: Pulmonary effort is normal. No respiratory distress. Breath sounds: Normal breath sounds.    Abdominal:      General: There is no distension. Palpations: Abdomen is soft. Tenderness: There is no abdominal tenderness. Genitourinary:     Comments: Rectal exam unremarkable, female nurse present  Musculoskeletal:         General: No tenderness or deformity. Right lower leg: Edema present. Left lower leg: Edema present. Skin:     General: Skin is warm and dry. Capillary Refill: Capillary refill takes less than 2 seconds. Coloration: Skin is pale. Findings: No rash. Neurological:      Mental Status: He is alert and oriented to person, place, and time. Psychiatric:         Behavior: Behavior normal.         Thought Content:  Thought content normal.         Judgment: Judgment normal.      Note written by Radha Manuel, as dictated by Keon Burnette DO 6:14 PM    MDM  Number of Diagnoses or Management Options  Chronic renal impairment, unspecified CKD stage:   Congestive heart failure, unspecified HF chronicity, unspecified heart failure type [facility]):   Symptomatic anemia:      Amount and/or Complexity of Data Reviewed  Decide to obtain previous medical records or to obtain history from someone other than the patient: yes      ED Course as of Mar 05 2026   Thu Mar 05, 2020   1929 Occult blood, stool: NEGATIVE  [GG]      ED Course User Index  [GG] Keon Burnette DO     VITAL SIGNS:  Patient Vitals for the past 4 hrs:   Temp Pulse Resp BP SpO2   03/05/20 2314 97.7 °F (36.5 °C) 72 18 122/67 96 %   03/05/20 2259 97.8 °F (36.6 °C) 71 19 117/62 96 %   03/05/20 2245 97.9 °F (36.6 °C) 70 20 112/56 95 %   03/05/20 2230 97.6 °F (36.4 °C) 70 18 107/59 100 %   03/05/20 2210  68 19 107/59 95 %   03/05/20 2115  66 17 104/60 100 %   03/05/20 2106  68 20  98 %   03/05/20 2100    133/74          LABS:  Recent Results (from the past 6 hour(s))   EKG, 12 LEAD, INITIAL    Collection Time: 03/05/20  4:36 PM   Result Value Ref Range    Ventricular Rate 63 BPM    Atrial Rate 63 BPM    P-R Interval 268 ms QRS Duration 98 ms    Q-T Interval 460 ms    QTC Calculation (Bezet) 470 ms    Calculated P Axis 42 degrees    Calculated R Axis 37 degrees    Calculated T Axis 80 degrees    Diagnosis       Sinus rhythm with 1st degree AV block  Nonspecific T wave abnormality  Prolonged QT  When compared with ECG of 08-NOV-2019 15:43,  ND interval has increased     SAMPLES BEING HELD    Collection Time: 03/05/20  4:44 PM   Result Value Ref Range    SAMPLES BEING HELD 1RED,1BLU     COMMENT        Add-on orders for these samples will be processed based on acceptable specimen integrity and analyte stability, which may vary by analyte. METABOLIC PANEL, COMPREHENSIVE    Collection Time: 03/05/20  4:47 PM   Result Value Ref Range    Sodium 134 (L) 136 - 145 mmol/L    Potassium 5.8 (H) 3.5 - 5.1 mmol/L    Chloride 113 (H) 97 - 108 mmol/L    CO2 15 (LL) 21 - 32 mmol/L    Anion gap 6 5 - 15 mmol/L    Glucose 110 (H) 65 - 100 mg/dL    BUN 63 (H) 6 - 20 MG/DL    Creatinine 1.99 (H) 0.70 - 1.30 MG/DL    BUN/Creatinine ratio 32 (H) 12 - 20      GFR est AA 41 (L) >60 ml/min/1.73m2    GFR est non-AA 34 (L) >60 ml/min/1.73m2    Calcium 8.6 8.5 - 10.1 MG/DL    Bilirubin, total 0.5 0.2 - 1.0 MG/DL    ALT (SGPT) 34 12 - 78 U/L    AST (SGOT) 35 15 - 37 U/L    Alk.  phosphatase 103 45 - 117 U/L    Protein, total 7.2 6.4 - 8.2 g/dL    Albumin 3.4 (L) 3.5 - 5.0 g/dL    Globulin 3.8 2.0 - 4.0 g/dL    A-G Ratio 0.9 (L) 1.1 - 2.2     CBC WITH AUTOMATED DIFF    Collection Time: 03/05/20  4:47 PM   Result Value Ref Range    WBC 7.1 4.1 - 11.1 K/uL    RBC 2.30 (L) 4.10 - 5.70 M/uL    HGB 6.4 (L) 12.1 - 17.0 g/dL    HCT 21.1 (L) 36.6 - 50.3 %    MCV 91.7 80.0 - 99.0 FL    MCH 27.8 26.0 - 34.0 PG    MCHC 30.3 30.0 - 36.5 g/dL    RDW 17.7 (H) 11.5 - 14.5 %    PLATELET 651 409 - 716 K/uL    MPV 12.4 8.9 - 12.9 FL    NRBC 0.0 0  WBC    ABSOLUTE NRBC 0.00 0.00 - 0.01 K/uL    NEUTROPHILS 74 32 - 75 %    LYMPHOCYTES 11 (L) 12 - 49 %    MONOCYTES 10 5 - 13 % EOSINOPHILS 4 0 - 7 %    BASOPHILS 1 0 - 1 %    IMMATURE GRANULOCYTES 0 0.0 - 0.5 %    ABS. NEUTROPHILS 5.2 1.8 - 8.0 K/UL    ABS. LYMPHOCYTES 0.8 0.8 - 3.5 K/UL    ABS. MONOCYTES 0.7 0.0 - 1.0 K/UL    ABS. EOSINOPHILS 0.3 0.0 - 0.4 K/UL    ABS. BASOPHILS 0.1 0.0 - 0.1 K/UL    ABS. IMM. GRANS. 0.0 0.00 - 0.04 K/UL    DF SMEAR SCANNED      RBC COMMENTS ANISOCYTOSIS  1+        RBC COMMENTS SAM CELLS  PRESENT       TROPONIN I    Collection Time: 03/05/20  4:47 PM   Result Value Ref Range    Troponin-I, Qt. 0.07 (H) <0.05 ng/mL   CK W/ REFLX CKMB    Collection Time: 03/05/20  4:47 PM   Result Value Ref Range     39 - 308 U/L   OCCULT BLOOD, STOOL    Collection Time: 03/05/20  6:19 PM   Result Value Ref Range    Occult blood, stool NEGATIVE  NEG     NT-PRO BNP    Collection Time: 03/05/20  6:30 PM   Result Value Ref Range    NT pro-BNP 7,210 (H) <125 PG/ML        IMAGING:  XR CHEST PORT   Final Result   IMPRESSION: Mild interstitial edema with small left pleural effusion            Medications During Visit:  Medications - No data to display      DECISION MAKING:  Luis Cano is a 62 y.o. male who comes in as above. Here, patient appears pale. He is found to be anemic. Occult blood negative. I believe this most likely be delusional from his CHF versus decreased production secondary to chronic renal insufficiency. At this time patient will be admitted to the hospitalist service. Blood consent obtained. Patient remained stable and agreeable to stay. IMPRESSION:  1. Symptomatic anemia    2. Congestive heart failure, unspecified HF chronicity, unspecified heart failure type ([address]    3. Chronic renal impairment, unspecified CKD stage        DISPOSITION:  Admitted      Procedures      Hospitalist Perfect Serve for Admission  8:26 PM    ED Room Number: ER06/06  Patient Name and age:  Luis Cano 62 y.o.  male  Working Diagnosis:   1. Symptomatic anemia    2.  Congestive heart failure, unspecified HF chronicity, unspecified heart failure type ([facility]    3. Chronic renal impairment, unspecified CKD stage      Readmission: no  Isolation Requirements:  no  Recommended Level of Care:  med/surg  Code Status:  Full Code  Department:[facility] ED - 21   Other:  CHF, weak, tired. Anemia - prob overload. Appears well. Trop 0.07 but always a little up. Appears well. Type pending, no blood ordered yet as he is stable. No signs of blood loss.

## 2020-03-05 NOTE — ED TRIAGE NOTES
Triage:  Pt to ED due to concerns over continued fatigue and body aches. Pt and family is concerned about dehydration. Pt had recent changes in PO diuretic. However, even after holding the med the patients symptoms have continued.

## 2020-03-06 ENCOUNTER — APPOINTMENT (OUTPATIENT)
Dept: NON INVASIVE DIAGNOSTICS | Age: 65
DRG: 812 | End: 2020-03-06
Attending: NURSE PRACTITIONER
Payer: COMMERCIAL

## 2020-03-06 LAB
ALBUMIN SERPL-MCNC: 3.2 G/DL (ref 3.5–5)
ALBUMIN SERPL-MCNC: 3.2 G/DL (ref 3.5–5)
ALBUMIN SERPL-MCNC: 3.3 G/DL (ref 3.5–5)
ALBUMIN/GLOB SERPL: 0.9 {RATIO} (ref 1.1–2.2)
ALBUMIN/GLOB SERPL: 1.1 {RATIO} (ref 1.1–2.2)
ALP SERPL-CCNC: 111 U/L (ref 45–117)
ALP SERPL-CCNC: 114 U/L (ref 45–117)
ALT SERPL-CCNC: 29 U/L (ref 12–78)
ALT SERPL-CCNC: 32 U/L (ref 12–78)
ANION GAP SERPL CALC-SCNC: 6 MMOL/L (ref 5–15)
ANION GAP SERPL CALC-SCNC: 9 MMOL/L (ref 5–15)
ANION GAP SERPL CALC-SCNC: 9 MMOL/L (ref 5–15)
AST SERPL-CCNC: 22 U/L (ref 15–37)
AST SERPL-CCNC: 25 U/L (ref 15–37)
ATRIAL RATE: 63 BPM
AV VELOCITY RATIO: 0.48
BASOPHILS # BLD: 0.1 K/UL (ref 0–0.1)
BASOPHILS NFR BLD: 1 % (ref 0–1)
BILIRUB SERPL-MCNC: 0.6 MG/DL (ref 0.2–1)
BILIRUB SERPL-MCNC: 0.6 MG/DL (ref 0.2–1)
BNP SERPL-MCNC: ABNORMAL PG/ML
BUN SERPL-MCNC: 58 MG/DL (ref 6–20)
BUN SERPL-MCNC: 59 MG/DL (ref 6–20)
BUN SERPL-MCNC: 61 MG/DL (ref 6–20)
BUN/CREAT SERPL: 35 (ref 12–20)
BUN/CREAT SERPL: 36 (ref 12–20)
BUN/CREAT SERPL: 36 (ref 12–20)
CALCIUM SERPL-MCNC: 8.4 MG/DL (ref 8.5–10.1)
CALCIUM SERPL-MCNC: 8.5 MG/DL (ref 8.5–10.1)
CALCIUM SERPL-MCNC: 8.8 MG/DL (ref 8.5–10.1)
CALCULATED P AXIS, ECG09: 42 DEGREES
CALCULATED R AXIS, ECG10: 37 DEGREES
CALCULATED T AXIS, ECG11: 80 DEGREES
CHLORIDE SERPL-SCNC: 113 MMOL/L (ref 97–108)
CHLORIDE SERPL-SCNC: 113 MMOL/L (ref 97–108)
CHLORIDE SERPL-SCNC: 114 MMOL/L (ref 97–108)
CO2 SERPL-SCNC: 16 MMOL/L (ref 21–32)
CO2 SERPL-SCNC: 17 MMOL/L (ref 21–32)
CO2 SERPL-SCNC: 17 MMOL/L (ref 21–32)
CREAT SERPL-MCNC: 1.63 MG/DL (ref 0.7–1.3)
CREAT SERPL-MCNC: 1.65 MG/DL (ref 0.7–1.3)
CREAT SERPL-MCNC: 1.71 MG/DL (ref 0.7–1.3)
DIAGNOSIS, 93000: NORMAL
DIFFERENTIAL METHOD BLD: ABNORMAL
ECHO AV AREA PEAK VELOCITY: 1.8 CM2
ECHO AV PEAK GRADIENT: 9.3 MMHG
ECHO AV PEAK VELOCITY: 152.08 CM/S
ECHO LA MAJOR AXIS: 4.08 CM
ECHO LV E' LATERAL VELOCITY: 7.43 CM/S
ECHO LV E' SEPTAL VELOCITY: 6.47 CM/S
ECHO LV INTERNAL DIMENSION DIASTOLIC: 5.21 CM (ref 4.2–5.9)
ECHO LV INTERNAL DIMENSION SYSTOLIC: 3.97 CM
ECHO LV IVSD: 0.94 CM (ref 0.6–1)
ECHO LV MASS 2D: 189.4 G (ref 88–224)
ECHO LV MASS INDEX 2D: 96.2 G/M2 (ref 49–115)
ECHO LV POSTERIOR WALL DIASTOLIC: 0.81 CM (ref 0.6–1)
ECHO LVOT DIAM: 2.21 CM
ECHO LVOT PEAK GRADIENT: 2.1 MMHG
ECHO LVOT PEAK VELOCITY: 73.27 CM/S
ECHO MV A VELOCITY: 55.27 CM/S
ECHO MV E VELOCITY: 97.22 CM/S
ECHO MV E/A RATIO: 1.76
ECHO MV E/E' LATERAL: 13.08
ECHO MV E/E' RATIO (AVERAGED): 14.06
ECHO MV E/E' SEPTAL: 15.03
ECHO PULMONARY ARTERY SYSTOLIC PRESSURE (PASP): 47 MMHG
ECHO PV MAX VELOCITY: 111.93 CM/S
ECHO PV PEAK GRADIENT: 5 MMHG
ECHO RV TAPSE: 1.46 CM (ref 1.5–2)
ECHO TV REGURGITANT MAX VELOCITY: 343.29 CM/S
ECHO TV REGURGITANT PEAK GRADIENT: 47.1 MMHG
EOSINOPHIL # BLD: 0.3 K/UL (ref 0–0.4)
EOSINOPHIL NFR BLD: 4 % (ref 0–7)
ERYTHROCYTE [DISTWIDTH] IN BLOOD BY AUTOMATED COUNT: 17.3 % (ref 11.5–14.5)
ERYTHROCYTE [DISTWIDTH] IN BLOOD BY AUTOMATED COUNT: 17.5 % (ref 11.5–14.5)
FERRITIN SERPL-MCNC: 31 NG/ML (ref 26–388)
GLOBULIN SER CALC-MCNC: 3 G/DL (ref 2–4)
GLOBULIN SER CALC-MCNC: 3.6 G/DL (ref 2–4)
GLUCOSE BLD STRIP.AUTO-MCNC: 130 MG/DL (ref 65–100)
GLUCOSE BLD STRIP.AUTO-MCNC: 148 MG/DL (ref 65–100)
GLUCOSE SERPL-MCNC: 149 MG/DL (ref 65–100)
GLUCOSE SERPL-MCNC: 151 MG/DL (ref 65–100)
GLUCOSE SERPL-MCNC: 89 MG/DL (ref 65–100)
HCT VFR BLD AUTO: 21.5 % (ref 36.6–50.3)
HCT VFR BLD AUTO: 22.8 % (ref 36.6–50.3)
HCT VFR BLD AUTO: 24 % (ref 36.6–50.3)
HGB BLD-MCNC: 6.7 G/DL (ref 12.1–17)
HGB BLD-MCNC: 7.2 G/DL (ref 12.1–17)
HGB BLD-MCNC: 7.2 G/DL (ref 12.1–17)
IMM GRANULOCYTES # BLD AUTO: 0 K/UL (ref 0–0.04)
IMM GRANULOCYTES NFR BLD AUTO: 0 % (ref 0–0.5)
IRON SATN MFR SERPL: 8 % (ref 20–50)
IRON SERPL-MCNC: 26 UG/DL (ref 35–150)
LACTATE SERPL-SCNC: 1.3 MMOL/L (ref 0.4–2)
LVFS 2D: 23.84 %
LYMPHOCYTES # BLD: 0.8 K/UL (ref 0.8–3.5)
LYMPHOCYTES NFR BLD: 10 % (ref 12–49)
MCH RBC QN AUTO: 28 PG (ref 26–34)
MCH RBC QN AUTO: 28.2 PG (ref 26–34)
MCHC RBC AUTO-ENTMCNC: 31.2 G/DL (ref 30–36.5)
MCHC RBC AUTO-ENTMCNC: 31.6 G/DL (ref 30–36.5)
MCV RBC AUTO: 89.4 FL (ref 80–99)
MCV RBC AUTO: 90 FL (ref 80–99)
MONOCYTES # BLD: 0.7 K/UL (ref 0–1)
MONOCYTES NFR BLD: 9 % (ref 5–13)
NEUTS SEG # BLD: 6.2 K/UL (ref 1.8–8)
NEUTS SEG NFR BLD: 76 % (ref 32–75)
NRBC # BLD: 0 K/UL (ref 0–0.01)
NRBC # BLD: 0 K/UL (ref 0–0.01)
NRBC BLD-RTO: 0 PER 100 WBC
NRBC BLD-RTO: 0 PER 100 WBC
P-R INTERVAL, ECG05: 268 MS
PHOSPHATE SERPL-MCNC: 3.4 MG/DL (ref 2.6–4.7)
PLATELET # BLD AUTO: 207 K/UL (ref 150–400)
PLATELET # BLD AUTO: 237 K/UL (ref 150–400)
PMV BLD AUTO: 12 FL (ref 8.9–12.9)
PMV BLD AUTO: 12.1 FL (ref 8.9–12.9)
POTASSIUM SERPL-SCNC: 4.3 MMOL/L (ref 3.5–5.1)
POTASSIUM SERPL-SCNC: 4.4 MMOL/L (ref 3.5–5.1)
POTASSIUM SERPL-SCNC: 4.8 MMOL/L (ref 3.5–5.1)
PROT SERPL-MCNC: 6.2 G/DL (ref 6.4–8.2)
PROT SERPL-MCNC: 6.9 G/DL (ref 6.4–8.2)
Q-T INTERVAL, ECG07: 460 MS
QRS DURATION, ECG06: 98 MS
QTC CALCULATION (BEZET), ECG08: 470 MS
RBC # BLD AUTO: 2.39 M/UL (ref 4.1–5.7)
RBC # BLD AUTO: 2.55 M/UL (ref 4.1–5.7)
RBC MORPH BLD: ABNORMAL
SERVICE CMNT-IMP: ABNORMAL
SERVICE CMNT-IMP: ABNORMAL
SODIUM SERPL-SCNC: 137 MMOL/L (ref 136–145)
SODIUM SERPL-SCNC: 138 MMOL/L (ref 136–145)
SODIUM SERPL-SCNC: 139 MMOL/L (ref 136–145)
TIBC SERPL-MCNC: 318 UG/DL (ref 250–450)
TROPONIN I SERPL-MCNC: 0.07 NG/ML
VENTRICULAR RATE, ECG03: 63 BPM
WBC # BLD AUTO: 7 K/UL (ref 4.1–11.1)
WBC # BLD AUTO: 8.1 K/UL (ref 4.1–11.1)

## 2020-03-06 PROCEDURE — 36415 COLL VENOUS BLD VENIPUNCTURE: CPT

## 2020-03-06 PROCEDURE — 82962 GLUCOSE BLOOD TEST: CPT

## 2020-03-06 PROCEDURE — 74011000250 HC RX REV CODE- 250: Performed by: INTERNAL MEDICINE

## 2020-03-06 PROCEDURE — 80069 RENAL FUNCTION PANEL: CPT

## 2020-03-06 PROCEDURE — 74011000250 HC RX REV CODE- 250: Performed by: HOSPITALIST

## 2020-03-06 PROCEDURE — 85018 HEMOGLOBIN: CPT

## 2020-03-06 PROCEDURE — 83605 ASSAY OF LACTIC ACID: CPT

## 2020-03-06 PROCEDURE — C8929 TTE W OR WO FOL WCON,DOPPLER: HCPCS

## 2020-03-06 PROCEDURE — 84484 ASSAY OF TROPONIN QUANT: CPT

## 2020-03-06 PROCEDURE — 80053 COMPREHEN METABOLIC PANEL: CPT

## 2020-03-06 PROCEDURE — P9016 RBC LEUKOCYTES REDUCED: HCPCS

## 2020-03-06 PROCEDURE — 36430 TRANSFUSION BLD/BLD COMPNT: CPT

## 2020-03-06 PROCEDURE — 74011250637 HC RX REV CODE- 250/637: Performed by: HOSPITALIST

## 2020-03-06 PROCEDURE — 83540 ASSAY OF IRON: CPT

## 2020-03-06 PROCEDURE — 85025 COMPLETE CBC W/AUTO DIFF WBC: CPT

## 2020-03-06 PROCEDURE — 74011250636 HC RX REV CODE- 250/636: Performed by: NURSE PRACTITIONER

## 2020-03-06 PROCEDURE — 99222 1ST HOSP IP/OBS MODERATE 55: CPT | Performed by: INTERNAL MEDICINE

## 2020-03-06 PROCEDURE — 74011250636 HC RX REV CODE- 250/636: Performed by: HOSPITALIST

## 2020-03-06 PROCEDURE — 74011000250 HC RX REV CODE- 250: Performed by: NURSE PRACTITIONER

## 2020-03-06 PROCEDURE — 65660000001 HC RM ICU INTERMED STEPDOWN

## 2020-03-06 PROCEDURE — C9113 INJ PANTOPRAZOLE SODIUM, VIA: HCPCS | Performed by: HOSPITALIST

## 2020-03-06 PROCEDURE — 83880 ASSAY OF NATRIURETIC PEPTIDE: CPT

## 2020-03-06 PROCEDURE — 74011250636 HC RX REV CODE- 250/636: Performed by: INTERNAL MEDICINE

## 2020-03-06 PROCEDURE — 82728 ASSAY OF FERRITIN: CPT

## 2020-03-06 PROCEDURE — 74011250637 HC RX REV CODE- 250/637: Performed by: OTOLARYNGOLOGY

## 2020-03-06 PROCEDURE — 85027 COMPLETE CBC AUTOMATED: CPT

## 2020-03-06 PROCEDURE — C9113 INJ PANTOPRAZOLE SODIUM, VIA: HCPCS | Performed by: NURSE PRACTITIONER

## 2020-03-06 RX ORDER — OXYMETAZOLINE HCL 0.05 %
2 SPRAY, NON-AEROSOL (ML) NASAL 2 TIMES DAILY
Status: DISCONTINUED | OUTPATIENT
Start: 2020-03-06 | End: 2020-03-09 | Stop reason: HOSPADM

## 2020-03-06 RX ORDER — OXYMETAZOLINE HCL 0.05 %
2 SPRAY, NON-AEROSOL (ML) NASAL 2 TIMES DAILY
Status: DISCONTINUED | OUTPATIENT
Start: 2020-03-06 | End: 2020-03-06

## 2020-03-06 RX ADMIN — PREGABALIN 50 MG: 25 CAPSULE ORAL at 19:04

## 2020-03-06 RX ADMIN — OXYMETAZOLINE HYDROCHLORIDE 2 SPRAY: 0.05 SPRAY NASAL at 19:04

## 2020-03-06 RX ADMIN — PREGABALIN 50 MG: 25 CAPSULE ORAL at 00:05

## 2020-03-06 RX ADMIN — DOCUSATE SODIUM 100 MG: 100 CAPSULE, LIQUID FILLED ORAL at 18:45

## 2020-03-06 RX ADMIN — SODIUM BICARBONATE 650 MG: 650 TABLET ORAL at 10:32

## 2020-03-06 RX ADMIN — TAMSULOSIN HYDROCHLORIDE 0.4 MG: 0.4 CAPSULE ORAL at 10:34

## 2020-03-06 RX ADMIN — CARVEDILOL 6.25 MG: 6.25 TABLET, FILM COATED ORAL at 07:36

## 2020-03-06 RX ADMIN — SODIUM CHLORIDE 40 MG: 9 INJECTION INTRAMUSCULAR; INTRAVENOUS; SUBCUTANEOUS at 21:52

## 2020-03-06 RX ADMIN — SODIUM CHLORIDE 1.5 ML: 9 INJECTION INTRAMUSCULAR; INTRAVENOUS; SUBCUTANEOUS at 16:00

## 2020-03-06 RX ADMIN — AMIODARONE HYDROCHLORIDE 200 MG: 200 TABLET ORAL at 11:27

## 2020-03-06 RX ADMIN — SODIUM BICARBONATE 650 MG: 650 TABLET ORAL at 19:04

## 2020-03-06 RX ADMIN — DOCUSATE SODIUM 100 MG: 100 CAPSULE, LIQUID FILLED ORAL at 10:40

## 2020-03-06 RX ADMIN — ATORVASTATIN CALCIUM 40 MG: 40 TABLET, FILM COATED ORAL at 10:32

## 2020-03-06 RX ADMIN — ALLOPURINOL 100 MG: 100 TABLET ORAL at 10:32

## 2020-03-06 RX ADMIN — OXYCODONE HYDROCHLORIDE AND ACETAMINOPHEN 500 MG: 500 TABLET ORAL at 18:45

## 2020-03-06 RX ADMIN — PREGABALIN 50 MG: 25 CAPSULE ORAL at 10:32

## 2020-03-06 RX ADMIN — SODIUM CHLORIDE 40 MG: 9 INJECTION INTRAMUSCULAR; INTRAVENOUS; SUBCUTANEOUS at 10:28

## 2020-03-06 RX ADMIN — MONTELUKAST SODIUM 10 MG: 10 TABLET, FILM COATED ORAL at 21:53

## 2020-03-06 RX ADMIN — DULOXETINE HYDROCHLORIDE 60 MG: 60 CAPSULE, DELAYED RELEASE ORAL at 10:32

## 2020-03-06 RX ADMIN — OXYCODONE HYDROCHLORIDE AND ACETAMINOPHEN 500 MG: 500 TABLET ORAL at 10:32

## 2020-03-06 RX ADMIN — Medication 10 ML: at 21:52

## 2020-03-06 NOTE — CONSULTS
[address] [facility] NOTE  [facility] office  [phone] NP in-hospital cell phone M-F until 4:30  After 5pm or on weekends, please call  for physician on call        NAME:  Jena Trimble   :   1955   MRN:   [id number]       Referring Physician: Francoise Martinez    Consult Date: 3/6/2020 11:35 AM    Chief Complaint: anemia     History of Present Illness:  Patient is a 62 y.o. who is seen in consultation at the request of Dr. Francoise Martinez for anemia. Medical history as listed below includes CHF (history of LVAD - explant), atrial fibrillation - on Eliquis, and CKD. He presented for fatigue and shortness of breath. Found to have anemia. He reports epistaxis for the past 6 months, more severe currently. He denies GI symptoms - no nausea, abdominal pain, constipation, diarrhea, melena, or hematochezia. No NSAID's, alcohol, or tobacco. Never had an EGD. Colonoscopy 2008 normal    I have reviewed the emergency room note, hospital admission note, notes by all other clinicians who have seen the patient during this hospitalization to date. I have reviewed the problem list and the reason for this hospitalization. I have reviewed the allergies and the medications the patient was taking at home prior to this hospitalization.     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       PSH:  Past Surgical History:   Procedure Laterality Date    CABG, ARTERY-VEIN, TWO  12/01/2016    CARDIAC SURG PROCEDURE UNLIST  12/01/2016    LVAD    HX CORONARY ARTERY BYPASS GRAFT      HX CORONARY STENT PLACEMENT      HX HEART CATHETERIZATION      HX HEENT      wisdom teeth removed    HX ORTHOPAEDIC  11/22/2016    laminectomy    HX PACEMAKER      ICD - removed 1/2017    HX PTCA      MI INSJ OF SUBQ IMPLANTABLE DEFIBRILLATOR ELECTRODE N/A 5/20/2019    INSERT SUBQ ICD w/ anesthesia performed by Teddy George MD at [facility] CATH LAB       Allergies: Allergies   Allergen Reactions    Morphine Other (comments)     Hallucinations. Confusion. Impaired judgement    Chlorhexidine Rash       Home Medications:  Prior to Admission Medications   Prescriptions Last Dose Informant Patient Reported? Taking? DULoxetine (CYMBALTA) 60 mg capsule 3/4/2020 at 0900  Yes Yes   Sig: Take 60 mg by mouth daily. HYDROcodone-acetaminophen (NORCO) 5-325 mg per tablet Unknown at Unknown time  Yes No   Sig: TAKE 1 TABLET EVERY 6 HOURS AS NEEDED   allopurinol (ZYLOPRIM) 100 mg tablet 3/5/2020 at 0900  Yes Yes   Sig: Take 100 mg by mouth daily. amiodarone (CORDARONE) 200 mg tablet 3/5/2020 at 0900  No Yes   Sig: TAKE 1 TABLET BY MOUTH EVERY DAY   apixaban (ELIQUIS) 5 mg tablet 3/4/2020 at Unknown time  No Yes   Sig: Take 1 Tab by mouth two (2) times a day. ascorbic acid, vitamin C, (VITAMIN C) 500 mg tablet 3/5/2020 at Unknown time  No Yes   Sig: Take 1 Tab by mouth two (2) times a day. aspirin 81 mg chewable tablet 3/5/2020 at 0900  Yes Yes   Sig: Take 81 mg by mouth daily. atorvastatin (LIPITOR) 40 mg tablet 3/5/2020 at 0900  No Yes   Sig: TAKE 1 TABLET BY MOUTH EVERY DAY   betamethasone dipropionate (DIPROSONE) 0.05 % topical cream Unknown at Unknown time  Yes No   Sig: daily as needed. bumetanide (BUMEX) 0.5 mg tablet Not Taking at Unknown time  No No   Sig: Take 2 Tabs by mouth daily as needed (shortness of breath). Patient taking differently: Take 1 mg by mouth every other day. carvedilol (COREG) 6.25 mg tablet 3/5/2020 at 0900  No Yes   Sig: Take 1 Tab by mouth two (2) times daily (with meals). colchicine 0.6 mg tablet 3/5/2020 at Unknown time  Yes Yes   Sig: Take 0.6 mg by mouth every other day. cyclobenzaprine (FLEXERIL) 5 mg tablet Unknown at Unknown time  Yes No   Sig: Take 5 mg by mouth daily as needed for Muscle Spasm(s). levalbuterol tartrate (XOPENEX) 45 mcg/actuation inhaler Unknown at Unknown time  Yes No   Sig: INHALE 2 PUFFS EVERY 4 HOURS AS NEEDED   montelukast (SINGULAIR) 10 mg tablet 3/5/2020 at 900  Yes Yes   Sig: TAKE 1 TABLET BY MOUTH ONCE A DAY   patiromer calcium sorbitex (VELTASSA) 16.8 gram powder 3/5/2020 at 1200  No Yes   Sig: Take 16.8 g by mouth daily. pregabalin (LYRICA) 50 mg capsule 3/5/2020 at 0900  Yes Yes   Sig: Take 50 mg by mouth three (3) times daily. sacubitril-valsartan (ENTRESTO) 97 mg/103 mg tablet 3/5/2020 at 0900  No Yes   Sig: Take 1 Tab by mouth two (2) times a day. tadalafil (CIALIS) 20 mg tablet Unknown at 0900  Yes No   Si mg as needed. tamsulosin (FLOMAX) 0.4 mg capsule 3/4/2020 at Unknown time  Yes Yes   Sig: Take 0.4 mg by mouth daily.       Facility-Administered Medications: None       Hospital Medications:  Current Facility-Administered Medications   Medication Dose Route Frequency    sacubitriL-valsartan (ENTRESTO) 49-51 mg tablet 1 Tab  1 Tab Oral Q12H    0.9% sodium chloride infusion 250 mL  250 mL IntraVENous PRN    allopurinoL (ZYLOPRIM) tablet 100 mg  100 mg Oral DAILY    amiodarone (CORDARONE) tablet 200 mg  200 mg Oral DAILY    ascorbic acid (vitamin C) (VITAMIN C) tablet 500 mg  500 mg Oral BID    atorvastatin (LIPITOR) tablet 40 mg  40 mg Oral DAILY    carvediloL (COREG) tablet 6.25 mg  6.25 mg Oral BID WITH MEALS    colchicine tablet 0.6 mg  0.6 mg Oral EVERY OTHER DAY    DULoxetine (CYMBALTA) capsule 60 mg  60 mg Oral DAILY    cyclobenzaprine (FLEXERIL) tablet 5 mg  5 mg Oral DAILY PRN    HYDROcodone-acetaminophen (NORCO) 5-325 mg per tablet 1 Tab  1 Tab Oral Q4H PRN    albuterol (PROVENTIL VENTOLIN) nebulizer solution 2.5 mg  2.5 mg Nebulization Q4H PRN    montelukast (SINGULAIR) tablet 10 mg  10 mg Oral QHS    patiromer calcium sorbitex (VELTASSA) powder 16.8 g  16.8 g Oral DAILY    pregabalin (LYRICA) capsule 50 mg  50 mg Oral TID    tamsulosin (FLOMAX) capsule 0.4 mg  0.4 mg Oral DAILY    sodium chloride (NS) flush 5-40 mL  5-40 mL IntraVENous Q8H    sodium chloride (NS) flush 5-40 mL  5-40 mL IntraVENous PRN    naloxone (NARCAN) injection 0.4 mg  0.4 mg IntraVENous PRN    zolpidem (AMBIEN) tablet 5 mg  5 mg Oral QHS PRN    pantoprazole (PROTONIX) 40 mg in 0.9% sodium chloride 10 mL injection  40 mg IntraVENous DAILY    acetaminophen (TYLENOL) tablet 650 mg  650 mg Oral Q4H PRN    ondansetron (ZOFRAN) injection 4 mg  4 mg IntraVENous Q4H PRN    docusate sodium (COLACE) capsule 100 mg  100 mg Oral BID    sodium bicarbonate tablet 650 mg  650 mg Oral TID       Social History:  Social History     Tobacco Use    Smoking status: Former Smoker     Packs/day: 1.50     Years: 30.00     Pack years: 45.00     Last attempt to quit:      Years since quittin.1    Smokeless tobacco: Never Used   Substance Use Topics    Alcohol use: No       Family History:  Family History   Problem Relation Age of Onset    Diabetes Mother     Dementia Mother     Other Mother         carotid artery blockage    Heart Disease Father     Stroke Father     Heart Attack Father         several    Hypertension Father     Elevated Lipids Father     No Known Problems Sister     Cancer Brother         brain    Lung Disease Sister     COPD Sister     Cancer Sister         lung       Review of Systems:  Constitutional: negative fever, negative chills, negative weight loss  Eyes:   negative visual changes  ENT:   negative sore throat, tongue or lip swelling  Respiratory:  negative cough, negative dyspnea  Cards:  negative for chest pain, palpitations,+ lower extremity edema  GI:   See HPI  :  negative for frequency, dysuria  Integument:  negative for rash and pruritus  Heme:  +for easy bruising and gum/nose bleeding  Musculoskeletal:+for myalgias, back pain and muscle weakness  Neuro:  negative for headaches, dizziness, vertigo  Psych:  negative for feelings of anxiety, depression     Objective:     Patient Vitals for the past 8 hrs:   BP Temp Pulse Resp SpO2   03/06/20 0835 96/61 98 °F (36.7 °C) 73 16 91 %   03/06/20 0726 114/55  79     03/06/20 0339 104/61 97.7 °F (36.5 °C) 73 16 92 %     No intake/output data recorded. 03/04 1901 - 03/06 0700  In: -   Out: 900 [Urine:900]    EXAM:     CONST:  Pleasant male lying in bed, no acute distress   NEURO:  alert and oriented x 3   HEENT: EOMI, no scleral icterus, nose bleed with clots   LUNGS: clear to ausculation, (-) wheeze   CARD:  S1 S2   ABD:  soft, no tenderness, no rebound, bowel sounds (+) all 4 quadrants, no masses, non distended   EXT:  no edema, warm   PSYCH: full, not anxious     Data Review     Recent Labs     03/06/20  0743 03/05/20  1647   WBC 8.1 7.1   HGB 7.2* 6.4*   HCT 22.8* 21.1*    237     Recent Labs     03/06/20  0743 03/05/20  1959    136   K 4.8 5.4*   * 112*   CO2 16* 14*   BUN 61* 65*   CREA 1.71* 1.84*   GLU 89 101*   CA 8.8 8.3*     Recent Labs     03/06/20  0743 03/05/20  1647   SGOT 25 35    103   TP 6.9 7.2   ALB 3.3* 3.4*   GLOB 3.6 3.8     No results for input(s): INR, PTP, APTT, INREXT in the last 72 hours.        Assessment:     · Anemia with hemoccult negative stool. hgb 6.4-->7.2 status post 1 unit pRBC's; iron deficient last May  · Epistaxis   · CKD  · CHF/atrial fibrillation/CAD status post CABG: on Eliquis last dose 3/4/2020     Patient Active Problem List   Diagnosis Code    S/P laminectomy Z98.890    Sinus tachycardia R00.0    Acute respiratory failure with hypoxia ([facility]) J96.01    Essential hypertension I10    Alcohol abuse F10.10    Chronic systolic heart failure ([facility]) I50.22    CAD, multiple vessel I25.10    Ischemic cardiomyopathy I25.5    MI (myocardial infarction) ([facility]) I21.9    Sustained VT (ventricular tachycardia) ([facility]) I47.2    Chronic anticoagulation Z79.01    Gout M10.9    ICD (implantable cardioverter-defibrillator) infection ([address] T82. 7XXA    Erectile dysfunction N52.9    DSOUZA (dyspnea on exertion) R06.09    CHF (congestive heart failure) ([facility]) I50.9    At risk for obstructive sleep apnea Z91.89    Carotid arterial disease ([facility]) I73.9    Chronic back pain greater than 3 months duration M54.9, G89.29    Coronary artery disease involving coronary bypass graft I25.810    Heart burn R12    History of tobacco abuse Z87.891    Muscle spasm M62.838    S/P lumbar laminectomy Z98.890    Seizure disorder ([facility]) G40.909    HTN (hypertension) I10    PAD (peripheral artery disease) ([facility]) I73.9    Mitral valve regurgitation I34.0    KLEVER (acute kidney injury) ([facility]) N17.9    Vomiting and diarrhea R11.10, R19.7    Acute on chronic systolic (congestive) heart failure ([facility]) I50.23    Anemia D64.9     Plan:     · Consider endoscopic evaluation when cleared by cardiology  · Agree with PPI, increase BID  · Iron panel tomorrow morning, consider IV supplementation   · Trend CBC, transfuse as necessary  · Patient discussed with and will be seen by Dr. Gerhardt Lima  · Thank you for allowing me to participate in care of Danish Salamanca     Signed By: Bijan Shah NP     3/6/2020  11:35 AM   Gastroenterology Attending Physician attestation statement and comments. This patient was seen and examined by me in a face-to-face visit today. I reviewed the medical record including lab work, imaging and other provider notes. I confirmed the history as described above.  I spoke to the patient, reviewed the medical record including lab work, imaging and other provider notes. I discussed this case in detail with kayla macias. I formulated an  assessment of this patient and developed a treatment plan. I agree with the above consultation note. I agree with the history, exam and assessment and plan as outlined in the note.   I would like to add the following:   Abdomen is soft  No evidence of GI bleed, no need for urgent EGD  He had epistaxis at time of visit this pm, I suspect it contributing to his anemia  Hold eliquis  Will follow again on Monday, please call for questions

## 2020-03-06 NOTE — PROGRESS NOTES
Sent Dr. Shaila Conteh message to see if meds should be given while NPO.    2786 Per MD, give am meds. 1206 Patient passed clot through nose. Sent Dr. Shaila Conteh and also asked about whether patient will be getting another unit of blood.

## 2020-03-06 NOTE — PROGRESS NOTES
[id number]   TRANSFER - IN REPORT:    Verbal report received from [id number] Dajuan Gillespie (name) on Rafi Irizarry  being received from [address] (unit) for routine progression of care      Report consisted of patients Situation, Background, Assessment and   Recommendations(SBAR). Information from the following report(s) Kardex, ED Summary, Procedure Summary and Intake/Output was reviewed with the receiving nurse. Opportunity for questions and clarification was provided. Assessment completed upon patients arrival to unit and care assumed. 2383  Paged HF Michelle Bhatti NP and updated on patient bp status.   Give 1 unit of blood

## 2020-03-06 NOTE — CONSULTS
Dr. Jordy Landers,    Thank you for involving me in this patient's care. Please see below for my assessment and feel free to contact me directly with any questions. -BF    OTOLARYNGOLOGY - HEAD AND NECK SURGERY HISTORY AND PHYSICAL    Requesting Physician:    Ruth Russo MD     CC:   Nose bleeding    HPI:     Shabbir Chapman is a 62 y.o. male seen today in consultation at the request of Dr. Jordy Ladners for epistaxis. Mr. Carson Ornelas is currently admitted for anemic workup. Thought to be from GI source. Had a brief nose bleed a month ago and another one that started this morning around 11am.  Low volume and located on the left side only. Currently breathing well and not actively bleeding. Eliquis is on hold.     Past Medical History:   Diagnosis Date    Arrhythmia     SVT    Arthritis     CAD (coronary artery disease)     Chronic pain     back    Congestive heart failure (HCC)     DSOUZA (dyspnea on exertion) 1/30/2019    Gout     Heart failure ([address]     Hypertension     ICD (implantable cardioverter-defibrillator) in place     Long term current use of anticoagulant therapy     LVAD (left ventricular assist device) present ([address] 12/01/2016    Myocardial infarction ([address]     Pacemaker     SubQ ICD    Raynaud disease     Seizures ([address]     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     Past Surgical History:   Procedure Laterality Date    CABG, ARTERY-VEIN, TWO  12/01/2016    CARDIAC SURG PROCEDURE UNLIST  12/01/2016    LVAD    HX CORONARY ARTERY BYPASS GRAFT      HX CORONARY STENT PLACEMENT      HX HEART CATHETERIZATION      HX HEENT      wisdom teeth removed    HX ORTHOPAEDIC  11/22/2016    laminectomy    HX PACEMAKER      ICD - removed 1/2017    HX PTCA      [place] INSJ OF SUBQ IMPLANTABLE DEFIBRILLATOR ELECTRODE N/A 5/20/2019    INSERT SUBQ ICD w/ anesthesia performed by Kerrie Nguyen MD at [facility] CATH LAB     Current Facility-Administered Medications   Medication Dose Route Frequency    [Held by provider] sacubitriL-valsartan (ENTRESTO) 49-51 mg tablet 1 Tab  1 Tab Oral Q12H    pantoprazole (PROTONIX) 40 mg in 0.9% sodium chloride 10 mL injection  40 mg IntraVENous Q12H    oxymetazoline (AFRIN) 0.05 % nasal spray 2 Spray  2 Spray Both Nostrils BID    perflutren lipid microspheres (DEFINITY) in NS bolus IV  1.5 mL IntraVENous RAD ONCE    allopurinoL (ZYLOPRIM) tablet 100 mg  100 mg Oral DAILY    amiodarone (CORDARONE) tablet 200 mg  200 mg Oral DAILY    ascorbic acid (vitamin C) (VITAMIN C) tablet 500 mg  500 mg Oral BID    atorvastatin (LIPITOR) tablet 40 mg  40 mg Oral DAILY    [Held by provider] carvediloL (COREG) tablet 6.25 mg  6.25 mg Oral BID WITH MEALS    colchicine tablet 0.6 mg  0.6 mg Oral EVERY OTHER DAY    DULoxetine (CYMBALTA) capsule 60 mg  60 mg Oral DAILY    cyclobenzaprine (FLEXERIL) tablet 5 mg  5 mg Oral DAILY PRN    HYDROcodone-acetaminophen (NORCO) 5-325 mg per tablet 1 Tab  1 Tab Oral Q4H PRN    albuterol (PROVENTIL VENTOLIN) nebulizer solution 2.5 mg  2.5 mg Nebulization Q4H PRN    montelukast (SINGULAIR) tablet 10 mg  10 mg Oral QHS    patiromer calcium sorbitex (VELTASSA) powder 16.8 g  16.8 g Oral DAILY    pregabalin (LYRICA) capsule 50 mg  50 mg Oral TID    tamsulosin (FLOMAX) capsule 0.4 mg  0.4 mg Oral DAILY    sodium chloride (NS) flush 5-40 mL  5-40 mL IntraVENous Q8H    sodium chloride (NS) flush 5-40 mL  5-40 mL IntraVENous PRN    naloxone (NARCAN) injection 0.4 mg  0.4 mg IntraVENous PRN    zolpidem (AMBIEN) tablet 5 mg  5 mg Oral QHS PRN    acetaminophen (TYLENOL) tablet 650 mg  650 mg Oral Q4H PRN    ondansetron (ZOFRAN) injection 4 mg  4 mg IntraVENous Q4H PRN    docusate sodium (COLACE) capsule 100 mg  100 mg Oral BID    sodium bicarbonate tablet 650 mg  650 mg Oral TID      Allergies   Allergen Reactions    Morphine Other (comments)     Hallucinations. Confusion.  Impaired judgement    Chlorhexidine Rash     Family History   Problem Relation Age of Onset    Diabetes Mother     Dementia Mother     Other Mother         carotid artery blockage    Heart Disease Father     Stroke Father     Heart Attack Father         several    Hypertension Father     Elevated Lipids Father     No Known Problems Sister     Cancer Brother         brain    Lung Disease Sister     COPD Sister     Cancer Sister         lung     Social History     Tobacco Use    Smoking status: Former Smoker     Packs/day: 1.50     Years: 30.00     Pack years: 45.00     Last attempt to quit:      Years since quittin.1    Smokeless tobacco: Never Used   Substance Use Topics    Alcohol use: No    Drug use: No         REVIEW OF SYSTEMS  A full 10 point review of systems was performed today. The review was non-pertinent other than the details already listed in the history of present illness. Visit Vitals  BP (!) 82/40   Pulse 70   Temp 98 °F (36.7 °C)   Resp 16   Ht 5' 8\" (1.727 m)   Wt 83 kg (183 lb)   SpO2 92%   BMI 27.82 kg/m²        PHYSICAL EXAM  General:  No acute distress. Alert and oriented x 3. MSK:   Normal muscle bulk  Psych:  Mood and affect appropriate. Neuro:  CN II - XII grossly intact bilaterally. Eyes:  PERRL/EOMI, no nystagmus. ENT:   EACs are patent, clean and dry. TMs clear and intact with normal anatomic landmarks. No middle ear fluid. Anterior rhinoscopy without mucopurulence or polyps. NO BLEEDING. I DO NOT APPRECIATE PROMINENT VESSELS. OC/OP clear without masses or lesions. Lymph:  Neck soft and supple without lymphadenopathy. Resp:   No audible stridor or wheezing. Skin:   Head and neck skin is without suspicious lesions.     DATA REVIEW:  Lab Results   Component Value Date/Time    INR 1.1 2019 10:05 AM    INR 1.8 (H) 2019 03:29 PM    INR 1.7 (H) 2019 02:56 AM    Prothrombin time 11.4 (H) 2019 10:05 AM    Prothrombin time 18.1 (H) 2019 03:29 PM    Prothrombin time 17.1 (H) 2019 02:56 AM      Lab Results   Component Value Date/Time    WBC 7.0 03/06/2020 02:45 PM    HGB 6.7 (L) 03/06/2020 02:45 PM    HCT 21.5 (L) 03/06/2020 02:45 PM    PLATELET 209 05/08/4836 02:45 PM    MCV 90.0 03/06/2020 02:45 PM        ASSESSMENT/PLAN:  63 yo M with low volume epistaxis currently stopped with Afrin. 1) No nose blowing.  2) apply vaseline to anterior nasal vestibule BID. 3) Nasal saline sprays BID  4) Afrin 2 sprays each nostril q 12 hours x 3 days. 5) keep HOB elevated. 6) Avoid valsava, consider colace. 7) Avoid nasal prongs if oxygen is needed, and only used humidified oxygen. 8) Please attempt to keep patient normotensive. 9)  If patient re-bleeds, Sit patient up and lean forward in the sniffing position and apply pressure to the anterior septum for 12 minutes. If bleeding continues please call on call ENT. Otherwise, signing off. Jo Thorne, [address], [facility]  [address] [facility], [address]   Y) [phone] (C) [phone]

## 2020-03-06 NOTE — ED NOTES
Spoke with [facility] in regards to blood transfusion. MD Tiajuana Shone wants PRBCs that is not eradicated.

## 2020-03-06 NOTE — ROUTINE PROCESS
TRANSFER - OUT REPORT: 
 
Verbal report given to [facility] RN(name) on Danish Salamanca  being transferred to [address] [place] [address](unit) for routine progression of care Report consisted of patients Situation, Background, Assessment and  
Recommendations(SBAR). Information from the following report(s) SBAR, ED Summary, [facility] and Recent Results was reviewed with the receiving nurse. Lines:  
Peripheral IV 03/05/20 Right Hand (Active) Site Assessment Clean, dry, & intact 3/5/2020  4:50 PM  
Phlebitis Assessment 0 3/5/2020  4:50 PM  
Infiltration Assessment 0 3/5/2020  4:50 PM  
Dressing Status Clean, dry, & intact 3/5/2020  4:50 PM  
Dressing Type Transparent 3/5/2020  4:50 PM  
Hub Color/Line Status Pink;Capped;Flushed;Patent 3/5/2020  4:50 PM  
Action Taken Blood drawn 3/5/2020  4:50 PM  
   
Peripheral IV 03/05/20 Right Antecubital (Active) Site Assessment Clean, dry, & intact 3/5/2020  9:57 PM  
Phlebitis Assessment 0 3/5/2020  9:57 PM  
Infiltration Assessment 0 3/5/2020  9:57 PM  
Dressing Status Clean, dry, & intact 3/5/2020  9:57 PM  
  
 
Opportunity for questions and clarification was provided. Patient transported with: 
 Monitor Registered Nurse

## 2020-03-06 NOTE — ED NOTES
While patient was eating, he opened his mouth and both me and his female significant other noted blood in his mouth. Pt denies any injuries. Pt then started spitting up bloody phlegm. Made Pt NPO. MD Lainey Dillard aware.

## 2020-03-06 NOTE — H&P
History & Physical    Primary Care Provider: Raffy Gonzalez MD  Source of Information: Patient    History of Presenting Illness:   Parris Lopez is a 62 y.o. male who presents with fatigue     Parris Lopez is a 62 y. o. with a history of HFrEF in the setting of ICM s/p LVAD and then LVAD explant complicated by PVD, CKD, chronic anticoagulation  And chronic back pain,    who presents for evaluation of fatigue. Patient reports onset of fatigue for the past 2 days with associated myalgias. He notes that he has history of heart failure and CAD, and that he is followed by Dr. Helena Webster for cardiology. Patient states that 5 days ago, he had a change in his diuretic medications to eplerenone, , but states that he stopped taking the new diuretic after his symptoms began. Patient arrives to the ED with continued fatigue and myalgias, as well as some shortness of breath. sob worse by mild exertion. He denies fever, cough, congestion, leg swelling, tarry stool, rectal bleeding, chest pain, abdominal pain, nausea or vomiting. Denied weight gaining. Review of Systems:  General: HPI, no changes of sleep or appetite  HEENT: no headache, no vision changes, no nose discharge, no hearing changes   RES: no wheezing, no cough,hpi, dyspnea   CVS: no cp, no palpitation.    Muscular: no joint swelling, + back pain , no leg swelling  Skin: no rash, no itching   GI: no vomiting, no diarrhea, HPI   : no dysuria, no hematuria  Hemo: no gum bleeding, no petechial   Neuro: no sensation changes, no focal weakness    Endo: no polydipsia   Psych: denied depression     Past Medical History:   Diagnosis Date    Arrhythmia     SVT    Arthritis     CAD (coronary artery disease)     Chronic pain     back    Congestive heart failure (HCC)     DSOUZA (dyspnea on exertion) 1/30/2019    Gout     [facility] failure [address] [address]     Hypertension     ICD (implantable cardioverter-defibrillator) in place     Long term current use of anticoagulant therapy     LVAD (left ventricular assist device) present ([facility] [address] 12/01/2016    Myocardial infarction ([facility] [address]     Pacemaker     SubQ ICD    Raynaud disease     Seizures (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      Past Surgical History:   Procedure Laterality Date    CABG, ARTERY-VEIN, TWO  12/01/2016    CARDIAC SURG PROCEDURE UNLIST  12/01/2016    LVAD    HX CORONARY ARTERY BYPASS GRAFT      HX CORONARY STENT PLACEMENT      HX HEART CATHETERIZATION      HX HEENT      wisdom teeth removed    HX ORTHOPAEDIC  11/22/2016    laminectomy    HX PACEMAKER      ICD - removed 1/2017    HX PTCA      [place] INSJ OF SUBQ IMPLANTABLE DEFIBRILLATOR ELECTRODE N/A 5/20/2019    INSERT SUBQ ICD w/ anesthesia performed by James Palm MD at [address] [place] [address] CATH LAB     Prior to Admission medications    Medication Sig Start Date End Date Taking? Authorizing Provider   sacubitril-valsartan (ENTRESTO) 97 mg/103 mg tablet Take 1 Tab by mouth two (2) times a day. 2/19/20   Cheikh Mckenzie MD   amiodarone (CORDARONE) 200 mg tablet TAKE 1 TABLET BY MOUTH EVERY DAY 1/30/20   James Palm MD   patiromer calcium sorbitex (VELTASSA) 16.8 gram powder Take 16.8 g by mouth daily. 1/10/20   Kentrell Raya NP   bumetanide (BUMEX) 0.5 mg tablet Take 2 Tabs by mouth daily as needed (shortness of breath). Patient taking differently: Take 1 mg by mouth every other day. 12/5/19   Cheikh Mckenzie MD   pregabalin (LYRICA) 50 mg capsule Take 50 mg by mouth three (3) times daily. 10/9/19   Provider, Historical   betamethasone dipropionate (DIPROSONE) 0.05 % topical cream daily as needed. 9/5/19   Provider, Historical   carvedilol (COREG) 6.25 mg tablet Take 1 Tab by mouth two (2) times daily (with meals). 9/5/19   hCeikh Mckenzie MD   tadalafil (CIALIS) 20 mg tablet 20 mg as needed.  6/20/19   Provider, Historical   atorvastatin (LIPITOR) 40 mg tablet TAKE 1 TABLET BY MOUTH EVERY DAY 6/21/19   [facility] Castre[facility] DAWIT   apixaban (ELIQUIS) 5 mg tablet Take 1 Tab by mouth two (2) times a day. 5/24/19   Nish Koch MD   levalbuterol tartrate (XOPENEX) 45 mcg/actuation inhaler INHALE 2 PUFFS EVERY 4 HOURS AS NEEDED 5/8/19   Provider, Historical   montelukast (SINGULAIR) 10 mg tablet TAKE 1 TABLET BY MOUTH ONCE A DAY 5/7/19   Provider, Historical   HYDROcodone-acetaminophen (NORCO) 5-325 mg per tablet TAKE 1 TABLET EVERY 6 HOURS AS NEEDED 11/2/18   Provider, Historical   DULoxetine (CYMBALTA) 60 mg capsule Take 60 mg by mouth daily. Provider, Historical   allopurinol (ZYLOPRIM) 100 mg tablet Take 100 mg by mouth daily. Provider, Historical   tamsulosin (FLOMAX) 0.4 mg capsule Take 0.4 mg by mouth daily. 10/15/18   Provider, Historical   colchicine 0.6 mg tablet Take 0.6 mg by mouth every other day. Provider, Historical   cyclobenzaprine (FLEXERIL) 5 mg tablet Take 5 mg by mouth daily as needed for Muscle Spasm(s). Provider, Historical   aspirin 81 mg chewable tablet Take 81 mg by mouth daily. Provider, Historical   ascorbic acid, vitamin C, (VITAMIN C) 500 mg tablet Take 1 Tab by mouth two (2) times a day. 2/2/17   Manny Richards, NP     Allergies   Allergen Reactions    Morphine Other (comments)     Hallucinations. Confusion.  Impaired judgement    Chlorhexidine Rash      Family History   Problem Relation Age of Onset    Diabetes Mother     Dementia Mother     Other Mother         carotid artery blockage    Heart Disease Father     Stroke Father     Heart Attack Father         several    Hypertension Father     Elevated Lipids Father     No Known Problems Sister     Cancer Brother         brain    Lung Disease Sister     COPD Sister    [place] Cancer Sister         lung        SOCIAL HISTORY:  Patient resides:  Independently x   Assisted Living    SNF    With family care       Smoking history:   None    Former x   Chronic      Alcohol history:   None x   Social    Chronic      Ambulates: Independently x   w/cane    w/walker    w/wc    CODE STATUS:  DNR    Full x   Other      Objective:     Physical Exam:     Visit Vitals  /60   Pulse 66   Temp 97.9 °F (36.6 °C)   Resp 17   Ht 5' 8\" (1.727 m)   Wt 83 kg (183 lb)   SpO2 100%   BMI 27.83 kg/m²      O2 Device: Room air    General:  Alert, cooperative, no distress, appears stated age. Pale    Head:  Normocephalic, without obvious abnormality, atraumatic. Eyes:  Conjunctivae/corneas clear. PERRL, EOMs intact. Nose: Nares normal. Septum midline. Mucosa normal. No drainage or sinus tenderness. Throat: Lips, mucosa, and tongue normal. Teeth and gums normal.   Neck: Supple, symmetrical, trachea midline, no adenopathy, thyroid: no enlargement/tenderness/nodules, no carotid bruit and no JVD. Back:   Symmetric, no curvature. ROM normal. No CVA tenderness. Lungs:   Clear to auscultation bilaterally. Chest wall:  No tenderness or deformity. Heart:  Regular rate and rhythm, S1, S2 normal, no murmur, click, rub or gallop. Abdomen:   Soft, non-tender. Bowel sounds normal. No masses,  No organomegaly. Extremities: Extremities normal, atraumatic, no cyanosis or edema. Pulses: 2+ and symmetric all extremities. Skin: Skin color, texture, turgor normal. No rashes or lesions   Neurologic: CNII-XII intact. No focal weakness            Data Review:     Recent Days:  Recent Labs     03/05/20  1647   WBC 7.1   HGB 6.4*   HCT 21.1*        Recent Labs     03/05/20  1959 03/05/20  1647 03/05/20  1318    134* 134   K 5.4* 5.8* 5.7*   * 113* 109*   CO2 14* 15* 13*   * 110* 99   BUN 65* 63* 65*   CREA 1.84* 1.99* 1.84*   CA 8.3* 8.6 8.7   ALB  --  3.4*  --    SGOT  --  35  --    ALT  --  34  --      No results for input(s): PH, PCO2, PO2, HCO3, FIO2 in the last 72 hours.     24 Hour Results:  Recent Results (from the past 24 hour(s))   METABOLIC PANEL, BASIC    Collection Time: 03/05/20  1:18 PM   Result Value Ref Range Glucose 99 65 - 99 mg/dL    BUN 65 (H) 8 - 27 mg/dL    Creatinine 1.84 (H) 0.76 - 1.27 mg/dL    GFR est non-AA 38 (L) >59 mL/min/1.73    GFR est AA 44 (L) >59 mL/min/1.73    BUN/Creatinine ratio 35 (H) 10 - 24    Sodium 134 134 - 144 mmol/L    Potassium 5.7 (H) 3.5 - 5.2 mmol/L    Chloride 109 (H) 96 - 106 mmol/L    CO2 13 (L) 20 - 29 mmol/L    Calcium 8.7 8.6 - 10.2 mg/dL   NT-PRO BNP    Collection Time: 03/05/20  1:18 PM   Result Value Ref Range    PROBNP 6,116 (H) 0 - 210 pg/mL   EKG, 12 LEAD, INITIAL    Collection Time: 03/05/20  4:36 PM   Result Value Ref Range    Ventricular Rate 63 BPM    Atrial Rate 63 BPM    P-R Interval 268 ms    QRS Duration 98 ms    Q-T Interval 460 ms    QTC Calculation (Bezet) 470 ms    Calculated P Axis 42 degrees    Calculated R Axis 37 degrees    Calculated T Axis 80 degrees    Diagnosis       Sinus rhythm with 1st degree AV block  Nonspecific T wave abnormality  Prolonged QT  When compared with ECG of 08-NOV-2019 15:43,  [place] interval has increased     SAMPLES BEING HELD    Collection Time: 03/05/20  4:44 PM   Result Value Ref Range    SAMPLES BEING HELD 1RED,1BLU     COMMENT        Add-on orders for these samples will be processed based on acceptable specimen integrity and analyte stability, which may vary by analyte. METABOLIC PANEL, COMPREHENSIVE    Collection Time: 03/05/20  4:47 PM   Result Value Ref Range    Sodium 134 (L) 136 - 145 mmol/L    Potassium 5.8 (H) 3.5 - 5.1 mmol/L    Chloride 113 (H) 97 - 108 mmol/L    CO2 15 (LL) 21 - 32 mmol/L    Anion gap 6 5 - 15 mmol/L    Glucose 110 (H) 65 - 100 mg/dL    BUN 63 (H) 6 - 20 MG/DL    Creatinine 1.99 (H) 0.70 - 1.30 MG/DL    BUN/Creatinine ratio 32 (H) 12 - 20      GFR est AA 41 (L) >60 ml/min/1.73m2    GFR est non-AA 34 (L) >60 ml/min/1.73m2    Calcium 8.6 8.5 - 10.1 MG/DL    Bilirubin, total 0.5 0.2 - 1.0 MG/DL    ALT (SGPT) 34 12 - 78 U/L    AST (SGOT) 35 15 - 37 U/L    Alk.  phosphatase 103 45 - 117 U/L    Protein, total 7.2 6.4 - 8.2 g/dL    Albumin 3.4 (L) 3.5 - 5.0 g/dL    Globulin 3.8 2.0 - 4.0 g/dL    A-G Ratio 0.9 (L) 1.1 - 2.2     CBC WITH AUTOMATED DIFF    Collection Time: 03/05/20  4:47 PM   Result Value Ref Range    WBC 7.1 4.1 - 11.1 K/uL    RBC 2.30 (L) 4.10 - 5.70 M/uL    HGB 6.4 (L) 12.1 - 17.0 g/dL    HCT 21.1 (L) 36.6 - 50.3 %    MCV 91.7 80.0 - 99.0 FL    MCH 27.8 26.0 - 34.0 PG    MCHC 30.3 30.0 - 36.5 g/dL    RDW 17.7 (H) 11.5 - 14.5 %    PLATELET 137 940 - 125 K/uL    MPV 12.4 8.9 - 12.9 FL    NRBC 0.0 0  WBC    ABSOLUTE NRBC 0.00 0.00 - 0.01 K/uL    NEUTROPHILS 74 32 - 75 %    LYMPHOCYTES 11 (L) 12 - 49 %    MONOCYTES 10 5 - 13 %    EOSINOPHILS 4 0 - 7 %    BASOPHILS 1 0 - 1 %    IMMATURE GRANULOCYTES 0 0.0 - 0.5 %    ABS. NEUTROPHILS 5.2 1.8 - 8.0 K/UL    ABS. LYMPHOCYTES 0.8 0.8 - 3.5 K/UL    ABS. MONOCYTES 0.7 0.0 - 1.0 K/UL    ABS. EOSINOPHILS 0.3 0.0 - 0.4 K/UL    ABS. BASOPHILS 0.1 0.0 - 0.1 K/UL    ABS. IMM.  GRANS. 0.0 0.00 - 0.04 K/UL    DF SMEAR SCANNED      RBC COMMENTS ANISOCYTOSIS  1+        RBC COMMENTS SAM CELLS  PRESENT       TROPONIN I    Collection Time: 03/05/20  4:47 PM   Result Value Ref Range    Troponin-I, Qt. 0.07 (H) <0.05 ng/mL   CK W/ REFLX CKMB    Collection Time: 03/05/20  4:47 PM   Result Value Ref Range     39 - 308 U/L   OCCULT BLOOD, STOOL    Collection Time: 03/05/20  6:19 PM   Result Value Ref Range    Occult blood, stool NEGATIVE  NEG     NT-PRO BNP    Collection Time: 03/05/20  6:30 PM   Result Value Ref Range    NT pro-BNP 7,210 (H) <168 PG/ML   METABOLIC PANEL, BASIC    Collection Time: 03/05/20  7:59 PM   Result Value Ref Range    Sodium 136 136 - 145 mmol/L    Potassium 5.4 (H) 3.5 - 5.1 mmol/L    Chloride 112 (H) 97 - 108 mmol/L    CO2 14 (LL) 21 - 32 mmol/L    Anion gap 10 5 - 15 mmol/L    Glucose 101 (H) 65 - 100 mg/dL    BUN 65 (H) 6 - 20 MG/DL    Creatinine 1.84 (H) 0.70 - 1.30 MG/DL    BUN/Creatinine ratio 35 (H) 12 - 20      GFR est AA 45 (L) >60 ml/min/1.73m2    GFR est non-AA 37 (L) >60 ml/min/1.73m2    Calcium 8.3 (L) 8.5 - 10.1 MG/DL   TYPE & SCREEN    Collection Time: 03/05/20  8:12 PM   Result Value Ref Range    Crossmatch Expiration 03/08/2020     ABO/Rh(D) PENDING     Antibody screen PENDING          Imaging:   Xr Chest Port    Result Date: 3/5/2020  IMPRESSION: Mild interstitial edema with small left pleural effusion      Assessment:     Active Problems:    Anemia (3/5/2020)           Plan:     1. Symptomatic anemia: will need transfusion. Will give one unit tonight and iv lasix after the transfusion. Likely need another unit tomorrow to gaol of Hb around 8 due to his CAD status. Etiology of his acute vs subacute anemia unclear. Hb was 8.5 on 1/16/2020. He is on eliquis and asa. Stool ob neg now, but still should consider GIB. Will consult GI. Npo after midnight. Pt said his last dose of eliquis was this morning and anyway he is going to hold his eliquis until next Monday for a scheduled back injection. Will also hold his asa tomorrow am. May restart  Asa if ok with GI.   2. Chronic systolic congestive heart failure: difficulty to management due to his CKD. Continue his entresto. Diuretics will defer to his cardiologist.   3. Positive troponin: likely chronic, will repeat 2nd in 6 hours   4. CKD stage3: with chronic hyperkalemia and metabolic acidosis, continue daily veltassa, check venous gas, start po bicarb and consult nephrologist   5. Pafib: continue amiodarone. eliquis on hold due to his future back procedure and anemia.         Signed By: Tahmina Desir MD     March 5, 2020

## 2020-03-06 NOTE — PROGRESS NOTES
Primary Nurse Maranda Lezama RN and Rina RN performed a dual skin assessment on this patient Impairment noted- see wound doc flow sheet  Toi score is 22    Scab on LLE.

## 2020-03-06 NOTE — CONSULTS
NEPHROLOGY CONSULT NOTE     Patient: Jessenia Santo MRN: [id number]  PCP: Hattie Denton MD   :     1955  Age:   62 y.o. Sex:  male      Referring physician: Andrzej Delong MD  Reason for consultation: 62 y.o. male with Anemia [Y56.4] complicated by KLEVER   Admission Date: 3/5/2020  5:52 PM  LOS: 1 day      ASSESSMENT and PLAN :   CKD III:  - suspect 2/2 chronic HTN, CMP w/ need for diuretics  - baseline Cr 1.5 to 2 depending on his diuretics  - ok to continue Entresto  - daily labs while here    Hyperkalemia:  - from CKD and Entresto  - continue Veltassa    Metabolic Acidosis:  - continue oral bicarb    HFrEF:  - s/p HM II 16, explanted  - EF 20-25%  - on Coreg and Entresto  - per HF service    HTN:  -  BP stable    Severe Anemia:  - transfused 3/5 overnight  - check iron studies in AM  - GI consulted    CAD s/p CABG  Moderate MR    Gout:  - on allopurinol and colchicine     Active Problems / Assessment AAActive  : Active Problems:    Anemia (3/5/2020)         Subjective:   HPI: Jessenia Santo is a 62 y.o.  male who has been admitted to the hospital for fatigue. He has a hx of ICM, s/p LVAD in , explanted, CAD s/p CABG, chronic effusion, moderate MR, CKD III w/ baseline Cr of 1.5 to 2, not followed by nephrology who was admitted for the above symptoms. Found to have a hgb of 6.4. He was transfused overnight, now hgb > 7. Cr 1.8 on admission, K 5.8 which has improved w/ Veltassa. He feels ok,  On RA currently, no cp, sob, n/v/d reported.     Past Medical Hx:   Past Medical History:   Diagnosis Date    Arrhythmia     SVT    Arthritis     CAD (coronary artery disease)     Chronic pain     back    Congestive heart failure (HCC)     DSOUZA (dyspnea on exertion) 2019    Gout     Heart failure ([facility] [address]     Hypertension     ICD (implantable cardioverter-defibrillator) in place     Long term current use of anticoagulant therapy     LVAD (left ventricular assist device) present ([facility] [address] 12/01/2016    Myocardial infarction ([facility] [address]     Pacemaker     SubQ ICD    Raynaud disease     Seizures ([facility] [address]     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        Past Surgical Hx:     Past Surgical History:   Procedure Laterality Date    CABG, ARTERY-VEIN, TWO  12/01/2016    CARDIAC SURG PROCEDURE UNLIST  12/01/2016    LVAD    HX CORONARY ARTERY BYPASS GRAFT      HX CORONARY STENT PLACEMENT      HX HEART CATHETERIZATION      HX HEENT      wisdom teeth removed    HX ORTHOPAEDIC  11/22/2016    laminectomy    HX PACEMAKER      ICD - removed 1/2017    HX PTCA      [place] INSJ OF SUBQ IMPLANTABLE DEFIBRILLATOR ELECTRODE N/A 5/20/2019    INSERT SUBQ ICD w/ anesthesia performed by Tai Jennings MD at [facility] CATH LAB       Medications:  Prior to Admission medications    Medication Sig Start Date End Date Taking? Authorizing Provider   sacubitril-valsartan (ENTRESTO) 97 mg/103 mg tablet Take 1 Tab by mouth two (2) times a day. 2/19/20  Yes Olegario Weems MD   amiodarone (CORDARONE) 200 mg tablet TAKE 1 TABLET BY MOUTH EVERY DAY 1/30/20  Yes Tai Jennings MD   patiromer calcium sorbitex (VELTASSA) 16.8 gram powder Take 16.8 g by mouth daily. 1/10/20  Yes Biju Bush, MARY   pregabalin (LYRICA) 50 mg capsule Take 50 mg by mouth three (3) times daily. 10/9/19  Yes Provider, Historical   carvedilol (COREG) 6.25 mg tablet Take 1 Tab by mouth two (2) times daily (with meals). 9/5/19  Yes Kristi Weems MD   atorvastatin (LIPITOR) 40 mg tablet TAKE 1 TABLET BY MOUTH EVERY DAY 6/21/19  Yes Jose Carlos Weems MD   apixaban (ELIQUIS) 5 mg tablet Take 1 Tab by mouth two (2) times a day. 5/24/19  Yes Jose Carlos Weems MD   montelukast (SINGULAIR) 10 mg tablet TAKE 1 TABLET BY MOUTH ONCE A DAY 5/7/19  Yes Provider, Historical   DULoxetine (CYMBALTA) 60 mg capsule Take 60 mg by mouth daily. Yes Provider, Historical   allopurinol (ZYLOPRIM) 100 mg tablet Take 100 mg by mouth daily.    Yes Provider, Historical   tamsulosin (FLOMAX) 0.4 mg capsule Take 0.4 mg by mouth daily. 10/15/18  Yes Provider, Historical   colchicine 0.6 mg tablet Take 0.6 mg by mouth every other day. Yes Provider, Historical   aspirin 81 mg chewable tablet Take 81 mg by mouth daily. Yes Provider, Historical   ascorbic acid, vitamin C, (VITAMIN C) 500 mg tablet Take 1 Tab by mouth two (2) times a day. 2/2/17  Yes Deborah Singletary NP   bumetanide (BUMEX) 0.5 mg tablet Take 2 Tabs by mouth daily as needed (shortness of breath). Patient taking differently: Take 1 mg by mouth every other day. 12/5/19   Virgen Garibay MD   betamethasone dipropionate (DIPROSONE) 0.05 % topical cream daily as needed. 9/5/19   Provider, Historical   tadalafil (CIALIS) 20 mg tablet 20 mg as needed. 6/20/19   Provider, Historical   levalbuterol tartrate (XOPENEX) 45 mcg/actuation inhaler INHALE 2 PUFFS EVERY 4 HOURS AS NEEDED 5/8/19   Provider, Historical   HYDROcodone-acetaminophen (NORCO) 5-325 mg per tablet TAKE 1 TABLET EVERY 6 HOURS AS NEEDED 11/2/18   Provider, Historical   cyclobenzaprine (FLEXERIL) 5 mg tablet Take 5 mg by mouth daily as needed for Muscle Spasm(s). Provider, Historical       Allergies   Allergen Reactions    Morphine Other (comments)     Hallucinations. Confusion. Impaired judgement    Chlorhexidine Rash       Social Hx:  reports that he quit smoking about 12 years ago. He has a 45.00 pack-year smoking history. He has never used smokeless tobacco. He reports that he does not drink alcohol or use drugs.      Family History   Problem Relation Age of Onset    Diabetes Mother     Dementia Mother     Other Mother         carotid artery blockage    Heart Disease Father     Stroke Father     Heart Attack Father         several    Hypertension Father     Elevated Lipids Father     No Known Problems Sister     Cancer Brother         brain    Lung Disease Sister     COPD Sister     Cancer Sister         lung       Review of Systems:  A twelve point review of system was performed today. Pertinent positives and negatives are mentioned in the HPI. The reminder of the ROS is negative and noncontributory. Objective:    Vitals:    Vitals:    03/06/20 0227 03/06/20 0339 03/06/20 0726 03/06/20 0835   BP: 109/63 104/61 114/55 96/61   Pulse: 73 73 79 73   Resp: 16 16  16   Temp: 97.8 °F (36.6 °C) 97.7 °F (36.5 °C)  98 °F (36.7 °C)   SpO2: 93% 92%  91%   Weight:       Height:         I&O's:  03/05 0701 - 03/06 0700  In: -   Out: 900 [Urine:900]  Visit Vitals  BP 96/61 (BP Patient Position: At rest)   Pulse 73   Temp 98 °F (36.7 °C)   Resp 16   Ht 5' 8\" (1.727 m)   Wt 83 kg (183 lb)   SpO2 91%   BMI 27.83 kg/m²       Physical Exam:  General:Alert, No distress,   HEENT: Eyes are PERRL. Conjunctiva without pallor ,erythema. The sclerae without icterus. .   Neck:Supple,no mass palpable  Lungs : Clears to auscultation Bilaterally, Normal respiratory effort  CVS: RRR, S1 S2 normal, No rub,  no LE edema  Abdomen: Soft, Non tender, No hepatosplenomegaly, bowel sounds present  Extremities: No cyanosis, No clubbing  Skin: No rash or lesions.   Lymph nodes: No palpable nodes  MS: No joint swelling, erythema, warmth  Neurologic: non focal, AAO x 3  Psych: normal affect    Laboratory Results:    Lab Results   Component Value Date    BUN 61 (H) 03/06/2020     03/06/2020    K 4.8 03/06/2020     (H) 03/06/2020    CO2 16 (L) 03/06/2020       Lab Results   Component Value Date    BUN 61 (H) 03/06/2020    BUN 65 (H) 03/05/2020    BUN 63 (H) 03/05/2020    BUN 65 (H) 03/05/2020    BUN 73 (H) 02/28/2020    K 4.8 03/06/2020    K 5.4 (H) 03/05/2020    K 5.8 (H) 03/05/2020    K 5.7 (H) 03/05/2020    K 5.8 (H) 02/28/2020       Lab Results   Component Value Date    WBC 8.1 03/06/2020    RBC 2.55 (L) 03/06/2020    HGB 7.2 (L) 03/06/2020    HCT 22.8 (L) 03/06/2020    MCV 89.4 03/06/2020    MCH 28.2 03/06/2020    RDW 17.3 (H) 03/06/2020     03/06/2020 Lab Results   Component Value Date    PHOS 1.8 (L) 10/01/2019       Urine dipstick:   Lab Results   Component Value Date/Time    Color YELLOW/STRAW 01/23/2017 03:42 PM    Appearance CLEAR 01/23/2017 03:42 PM    Specific gravity 1.011 01/23/2017 03:42 PM    pH (UA) 6.0 01/23/2017 03:42 PM    Protein NEGATIVE  01/23/2017 03:42 PM    Glucose NEGATIVE  01/23/2017 03:42 PM    Ketone NEGATIVE  01/23/2017 03:42 PM    Bilirubin NEGATIVE  01/23/2017 03:42 PM    Urobilinogen 0.2 01/23/2017 03:42 PM    Nitrites NEGATIVE  01/23/2017 03:42 PM    Leukocyte Esterase NEGATIVE  01/23/2017 03:42 PM    Epithelial cells FEW 01/23/2017 03:42 PM    Bacteria NEGATIVE  01/23/2017 03:42 PM    WBC 0-4 01/23/2017 03:42 PM    RBC 0-5 01/23/2017 03:42 PM       I have reviewed the following: All pertinent labs, microbiology data, radiology imaging for my assessment         Thank you for allowing us to participate in the care of this patient. We will follow patient. Please dont hesitate to call with any questions    Rodolfo Villeda MD  3/6/2020        [facility] [address]  Phone - [phone]   Fax - [phone]  [email or web address]

## 2020-03-06 NOTE — ROUTINE PROCESS
Bedside shift change report given to UniqueRN (oncoming nurse) by Mayi Dillard RN(offgoing nurse). Report given with SBAR.

## 2020-03-06 NOTE — PROGRESS NOTES
Hospitalist Progress Note  Jess Srinivasan MD  Answering service: [phone] from in house phone      Date of Service:  3/6/2020  NAME:  Lokesh Cerrato  :  1955  MRN:  [id number]    Admission Summary:   64M s/p LVAD for sCHF  Interval history / Subjective:   Patient seen and examined at bedside, feels well, having some nose bleeds     Assessment & Plan:     #. Acute on chronic anemia: unknown etiology, suspect GI blood loss/AVMs.  - dropped HB to 6.4, was 8.4 in January. S/p 1 unit RBC, monitor CBC closely   - GI following, iron profile, ferritin, hemoccult pending. Heart failure need to clear him prior to endoscopy    #. Epistaxis: small amounts/dripping on/off. ENT cs pending, will use Afrin nose drops. Monitor off AC    #. CAD: stable, no chest pain. home regimen, monitor  #. Elevated trops: likely demand/ Type2 MI. Cardiology following. #. Chronic Systolic CHF: s/p LVAD- which was removed. Currently ef 20-25%. #. S/p ICD/PPM  #. Paroxysmal Afib: holding eliquis for now. Rate controlled. - Advanced heart failure team following. #. CKD3: monitor renal function, avoid nephrotoxics, Nephrology following. Code status: full  DVT prophylaxis: SCD  Care Plan discussed with: Patient/Family and Nurse  Disposition: TBD     Hospital Problems  Date Reviewed: 2019          Codes Class Noted POA    Anemia ICD-10-CM: D64.9  ICD-9-CM: 285.9  3/5/2020 Unknown            Review of Systems:   Pertinent items are mentioned in interval history. Vital Signs:    Last 24hrs VS reviewed since prior progress note.  Most recent are:  Visit Vitals  BP 96/61 (BP Patient Position: At rest)   Pulse 73   Temp 98 °F (36.7 °C)   Resp 16   Ht 5' 8\" (1.727 m)   Wt 83 kg (183 lb)   SpO2 91%   BMI 27.83 kg/m²         Intake/Output Summary (Last 24 hours) at 3/6/2020 1230  Last data filed at 3/6/2020 0207  Gross per 24 hour   Intake    Output 900 ml   Net -900 ml        Physical Examination:   General:  Alert, oriented, No acute distress  Card:  S1, S2 without murmurs, good peripheral perfusion, warm peripheries  Resp:  No accessory muscle use, Good AE, no wheezes, no rhonchi  Abd:  Soft, non-tender, non-distended  Extremities:  No cyanosis or clubbing, no significant edema  Neuro:  Grossly normal, no focal neuro deficits, follows commands   Psych:  Good insight, AAOx3, not agitated. Data Review:    Review and/or order of clinical lab test  Review and/or order of tests in the radiology section of [facility]  Review and/or order of tests in the medicine section of [facility]  Labs:     Recent Labs     03/06/20  0743 03/05/20  1647   WBC 8.1 7.1   HGB 7.2* 6.4*   HCT 22.8* 21.1*    237     Recent Labs     03/06/20  0743 03/05/20 1959 03/05/20  1647    136 134*   K 4.8 5.4* 5.8*   * 112* 113*   CO2 16* 14* 15*   BUN 61* 65* 63*   CREA 1.71* 1.84* 1.99*   GLU 89 101* 110*   CA 8.8 8.3* 8.6     Recent Labs     03/06/20  0743 03/05/20  1647   SGOT 25 35   ALT 32 34    103   TBILI 0.6 0.5   TP 6.9 7.2   ALB 3.3* 3.4*   GLOB 3.6 3.8     No results for input(s): INR, PTP, APTT, INREXT in the last 72 hours. No results for input(s): FE, TIBC, PSAT, FERR in the last 72 hours. Lab Results   Component Value Date/Time    Folate 13.0 05/24/2019 12:00 AM      No results for input(s): PH, PCO2, PO2 in the last 72 hours.   Recent Labs     03/06/20  0743 03/05/20  1647   TROIQ 0.07* 0.07*     Lab Results   Component Value Date/Time    Cholesterol, total 117 06/12/2019 03:05 PM    HDL Cholesterol 75 06/12/2019 03:05 PM    LDL, calculated 36 06/12/2019 03:05 PM    Triglyceride 28 06/12/2019 03:05 PM    CHOL/HDL Ratio 2.3 11/27/2016 03:58 AM     Lab Results   Component Value Date/Time    Glucose (POC) 101 (H) 05/22/2019 07:43 AM    Glucose (POC) 111 (H) 05/21/2019 08:52 PM    Glucose (POC) 159 (H) 05/21/2019 03:57 PM    Glucose (POC) 87 12/21/2016 11:59 AM    Glucose (POC) 92 12/21/2016 06:55 AM     Lab Results   Component Value Date/Time    Color YELLOW/STRAW 01/23/2017 03:42 PM    Appearance CLEAR 01/23/2017 03:42 PM    Specific gravity 1.011 01/23/2017 03:42 PM    pH (UA) 6.0 01/23/2017 03:42 PM    Protein NEGATIVE  01/23/2017 03:42 PM    Glucose NEGATIVE  01/23/2017 03:42 PM    Ketone NEGATIVE  01/23/2017 03:42 PM    Bilirubin NEGATIVE  01/23/2017 03:42 PM    Urobilinogen 0.2 01/23/2017 03:42 PM    Nitrites NEGATIVE  01/23/2017 03:42 PM    Leukocyte Esterase NEGATIVE  01/23/2017 03:42 PM    Epithelial cells FEW 01/23/2017 03:42 PM    Bacteria NEGATIVE  01/23/2017 03:42 PM    WBC 0-4 01/23/2017 03:42 PM    RBC 0-5 01/23/2017 03:42 PM     Medications Reviewed:     Current Facility-Administered Medications   Medication Dose Route Frequency    sacubitriL-valsartan (ENTRESTO) 49-51 mg tablet 1 Tab  1 Tab Oral Q12H    pantoprazole (PROTONIX) 40 mg in 0.9% sodium chloride 10 mL injection  40 mg IntraVENous Q12H    0.9% sodium chloride infusion 250 mL  250 mL IntraVENous PRN    allopurinoL (ZYLOPRIM) tablet 100 mg  100 mg Oral DAILY    amiodarone (CORDARONE) tablet 200 mg  200 mg Oral DAILY    ascorbic acid (vitamin C) (VITAMIN C) tablet 500 mg  500 mg Oral BID    atorvastatin (LIPITOR) tablet 40 mg  40 mg Oral DAILY    carvediloL (COREG) tablet 6.25 mg  6.25 mg Oral BID WITH MEALS    colchicine tablet 0.6 mg  0.6 mg Oral EVERY OTHER DAY    DULoxetine (CYMBALTA) capsule 60 mg  60 mg Oral DAILY    cyclobenzaprine (FLEXERIL) tablet 5 mg  5 mg Oral DAILY PRN    HYDROcodone-acetaminophen (NORCO) 5-325 mg per tablet 1 Tab  1 Tab Oral Q4H PRN    albuterol (PROVENTIL VENTOLIN) nebulizer solution 2.5 mg  2.5 mg Nebulization Q4H PRN    montelukast (SINGULAIR) tablet 10 mg  10 mg Oral QHS    patiromer calcium sorbitex (VELTASSA) powder 16.8 g  16.8 g Oral DAILY    pregabalin (LYRICA) capsule 50 mg  50 mg Oral TID    tamsulosin (FLOMAX) capsule 0.4 mg  0.4 mg Oral DAILY    sodium chloride (NS) flush 5-40 mL  5-40 mL IntraVENous Q8H    sodium chloride (NS) flush 5-40 mL  5-40 mL IntraVENous PRN    naloxone (NARCAN) injection 0.4 mg  0.4 mg IntraVENous PRN    zolpidem (AMBIEN) tablet 5 mg  5 mg Oral QHS PRN    acetaminophen (TYLENOL) tablet 650 mg  650 mg Oral Q4H PRN    ondansetron (ZOFRAN) injection 4 mg  4 mg IntraVENous Q4H PRN    docusate sodium (COLACE) capsule 100 mg  100 mg Oral BID    sodium bicarbonate tablet 650 mg  650 mg Oral TID   ______________________________________________________________________  EXPECTED LENGTH OF STAY: - - -  ACTUAL LENGTH OF STAY:          1               Baldomero Meigs, MD

## 2020-03-06 NOTE — PROGRESS NOTES
Checked on [facility] - offered support. He shared he's hopeful to receive Afrin soon for his nose bleed. Denies any other complaints/concerns at this time. Shared his wife was here but she left to go home. Will follow up with her at support group meeting on Monday.     Misty Pacheco, MSW, LCSW    Clinical    [address]s [phone]

## 2020-03-06 NOTE — CONSULTS
[facility] Clinic Note      DOS:  3/6/2020  REFERRING PROVIDER: Светлана Quijano MD  PRIMARY CARE PHYSICIAN: Raffy Gonzalez MD  PRIMARY CARDIOLOGIST:  Kathie Knox MD   EP: Lisa Waldrop MD   PULMONARY: Louetta Castleman, MD       IMPRESSION/PLAN:   ICM s/p HMII as BTT on 12/1/16 and explant, NYHA Class IV, LVEF 20-25%     Repeat TTE today    Hold Coreg and Entresto due to IVVD due to epistaxis    Hold eplerenone due to hyperkalemia    Hold diuretics; reassess in AM    Strict I/O, daily weights, Na+ restricted diet    Trend CMP, proBNP    Acute blood loss anemia due to epistaxis   ENT consult pending   Afrin, saline spray    Transfuse for Hgb < 8     FOB negative; appreciate GI consult   Iron profile      CAD s/p CABG (SVG to RCA and LIMA to LAD) on 12/1/16 s/p BMS x2 -> SVG-RCA graft - no angina    Continue ASA, statin   Resume Coreg when BP normalizes     Mitral Regurgitation, severe - improved to mod   Follow with serial TTEs      Hyperkalemia   Continue Veltassa   Hold Entresto, eplerenone    Chronic Left Pleural Effusion    s/p left thoracentesis on 7/5/2019   Encourage use of IS    CXR in AM      High Risk of SCD    s/p SQ AICD (5/20/19)   No shocks   Followed by Dr. Helena Beckwith       PAD    Difficult femoral access with LVAD explant              No symptoms of claudication      Chronic Anticoagulation    Eliquis on hold due to acute blood loss anemia     Gout              Continue colchicine 0.6 mg every other day              Continue allopurinol 100 mg daily   Denies recent gouty attacks    Chronic Lower back pain              On oxycodone, flexeril   Hasn't taken since ICD implant   Back pain improved with increased activity- going to gym    H/o tobacco abuse   Abstaining from nicotine    Encouraged complete cessation     H/o alcohol abuse   Currently abstaining   Encouraged complete abstinence      Seizure disorder - early 25s              No recent seizure activity     Peripheral neuropathy On cymbalta   ATTR- Negative     Remainder of care per primary. Chief Complaint   Patient presents with    Fatigue    Generalized Body Aches         HPI: Gretchen Cruz is a 62y.o. year old male  with a history of HFrEF in the setting of ICM s/p LVAD and recent LVAD explant complicated by PVD,  remote tobacco use and alcohol abuse (quit 11/2016) who presents for follow up of his HFrEF. Mr. Bogdan Hartman presented 11/22/2016 for decompressive laminectomy at [facility] complicated by myocardial infarction. The [facility](11/25/16) revealed severe left main and 3 vessel disease along with a 60% right common iliac stenosis and  LVEF was 10%. He subsequently underwent placement of an IABP followed by placement of a HeartMate2 LVAD on 12/1/2016 with concomitant CABG (SVG to the RCA, LIMA to LAD). His postoperative course was complicated by RV failure and an ileus requiring TPN resulting and a transaminitis. He had multiple episodes of Torsade on 12/4/2016 prompting repeat catheterization revealing an occluded RCA vein graft. Two BMS were placed in the RCA graft. He also had SVT requiring cardioversion. He had a single lead ICD placed on 37/06/56 complicated by a hematoma. The ICD was later removed on 1/20/17. Following his LVAD surgery he did require inpatient rehab.      Mr. Bogdan Hartman was listed for heart transplantation at [facility]. He was called in for transplantation on 7/14/2018 but on pre-op DELLA was noted to have normal LV function and the transplant surgery was aborted. He subsequently was admitted on 9/5/2018 for LVAD explant- his post-operative course was complicated by pneumonia and bilateral pleural effusions. He was discharged on home O2 which he discontinued on his own.       After device explant, his subsequent echocardiograms showed deterioration of LVEF and worsening mitral regurgitation from 40-45% with moderate to severe MR on 9/19/18 to 35-40% with moderate-to-severe MR on 10/15/18.   He has been removed from the transplant list at [facility] and is not interested in pursuing transplant evaluation at another center at this time. His entresto was discontinued at the time of his AICD implant due to hypotension. He subsequently took diovan but felt worse. He is tolerating the entresto with no dizziness, presyncope, or syncope. He was seen by Dr. Reyes Caceres, who stated that his mitral regurgitation has decreased significantly with optimizing his HF medications. He presented to the [address] [place] [address] last evening with complaints of fatigue. ED evaluation revealed significant anemia, Hgb 6.1. HE has been admitted for further evaluation and treatment. The [facility] has been consulted for assistance with the management of his HF. CARDIAC EVALUATION  TTE 19: LVEF 32%, LVAD plug at LV apex, grade 2 diastolic dysfunction, mild-mod MR, mild TR, PAPs 43 mmHg, mild LAE  DELLA 2018 - normal LV function and the transplant surgery was aborted. He subsequently was admitted on 2018 for LVAD explant  ECHO 18:LVEF 40-45% mod-severe MR   ECHO 10/15/18:EF 35-40%, mod-severe MR   ECHO 10/31/18: TDS, EF 30-35%, reduced RVEF, TAPSE 1.6, LAE, mild- mod MR/TR  ECHO 19: EF 25-30%, question of thrombus in apex. Mild- mod TR. Consider cardiac MR to exclude left ventricular thrombus. ECHO 2019: EF 16-20%, mod MR, mod-severe TR, severe pulm HTN (PA systolic pressure 85 mmHg), mildly reduced RV systolic function (TAPSE 8.09 cm)  ECHO 19: EF 20-25%, moderately reduced RV systolic function, mild MR  ECHO 2020: Dilated LV ( LVIDd 6 cm),  EF 85-95%, grade 2 diastolic dysfunction, RV dysfunction, TAPSE 1.31 cm, AoV sclerosis, mod MR, mod-severe TR    CATH 16: severe LM, and 3 VD with 60% right common iliac stenosis. EF: 10%. ----S/p IABP   ----S/p HeartMate2 LVAD 2016   S/p CAB2016: LIMA-> LAD, SVG-> RCA     CATH 16 following torsades SVG-> RCA TO   ---- s/p BMS x 2-.  RCA  [facility] 2017:RA: 15/15 14, RV:  14,PA: 23/28, m 20, PCWP 13, PVR 1.67 CO 5.67, CI 3    S/p LVAD explant 9/5/18 ([facility])    SVT s/p DCCV  AICD 12/116/16 single lead cx by hematoma  AICD explanted 1/20/17    EKG:    10/31/18 NSR QRS 88ms   ms  6/11/19: SR rate 68bpm QRS 94ms Qtc 438 ms    Review of Systems   Constitutional: Positive for malaise/fatigue. Negative for chills and fever. HENT: Positive for congestion and nosebleeds. Negative for sinus pain and sore throat. Eyes: Negative. Respiratory: Negative for cough, sputum production and shortness of breath. Cardiovascular: Negative for chest pain, palpitations, orthopnea, claudication and leg swelling. Gastrointestinal: Negative for abdominal pain, heartburn, nausea and vomiting. Genitourinary: Negative. Musculoskeletal: Positive for back pain. Negative for falls. Chronic back pain   Skin: Negative. Neurological: Negative for dizziness. Endo/Heme/Allergies: Negative. Psychiatric/Behavioral: Negative.         History:  Past Medical History:   Diagnosis Date    Arrhythmia     SVT    Arthritis     CAD (coronary artery disease)     Chronic pain     back    Congestive heart failure (HCC)     DSOUZA (dyspnea on exertion) 1/30/2019    Gout     Heart failure ([address]     Hypertension     ICD (implantable cardioverter-defibrillator) in place     Long term current use of anticoagulant therapy     LVAD (left ventricular assist device) present ([address] 12/01/2016    Myocardial infarction ([address]     Pacemaker     SubQ ICD    Raynaud disease     Seizures ([address]     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     Past Surgical History:   Procedure Laterality Date    CABG, ARTERY-VEIN, TWO  12/01/2016    CARDIAC SURG PROCEDURE UNLIST  12/01/2016    LVAD    HX CORONARY ARTERY BYPASS GRAFT      HX CORONARY STENT PLACEMENT      HX HEART CATHETERIZATION      HX HEENT      wisdom teeth removed    HX ORTHOPAEDIC  11/22/2016    laminectomy    HX PACEMAKER      ICD - removed 1/2017    HX PTCA      NV INSJ OF SUBQ IMPLANTABLE DEFIBRILLATOR ELECTRODE N/A 2019    INSERT SUBQ ICD w/ anesthesia performed by Marshal Tucker MD at [facility]     Social History     Socioeconomic History    Marital status:      Spouse name: Keily Mora    Number of children: 2    Years of education: Not on file    Highest education level: Some college, no degree   Occupational History    Not on file   Social Needs    Financial resource strain: Not hard at all   [employer] insecurity:     Worry: Never true     Inability: Never true   [employer] needs:     Medical: No     Non-medical: No   Tobacco Use    Smoking status: Former Smoker     Packs/day: 1.50     Years: 30.00     Pack years: 45.00     Last attempt to quit:      Years since quittin.1    Smokeless tobacco: Never Used   Substance and Sexual Activity    Alcohol use: No    Drug use: No    Sexual activity: Yes   Lifestyle    Physical activity:     Days per week: Not on file     Minutes per session: Not on file    Stress: Not on file   Relationships    Social connections:     Talks on phone: Not on file     Gets together: Not on file     Attends [employer] service: Not on file     Active member of club or organization: Not on file     Attends meetings of clubs or organizations: Not on file     Relationship status: Not on file    Intimate partner violence:     Fear of current or ex partner: Not on file     Emotionally abused: Not on file     Physically abused: Not on file     Forced sexual activity: Not on file   Other Topics Concern     Service Not Asked    Blood Transfusions Not Asked    Caffeine Concern Not Asked    Occupational Exposure Not Asked   [place] Hazards Not Asked    Sleep Concern Not Asked    Stress Concern Not Asked    Weight Concern Not Asked    Special Diet Not Asked    Back Care Not Asked    Exercise Not Asked    Bike Helmet Not Asked    Seat Belt Not Asked    Self-Exams Not Asked Social History Narrative    Not on file     Family History   Problem Relation Age of Onset    Diabetes Mother     Dementia Mother     Other Mother         carotid artery blockage    Heart Disease Father     Stroke Father     Heart Attack Father         several    Hypertension Father     Elevated Lipids Father     No Known Problems Sister     Cancer Brother         brain    Lung Disease Sister     COPD Sister     Cancer Sister         lung       Current Medications:   Current Facility-Administered Medications   Medication Dose Route Frequency Provider Last Rate Last Dose    [Held by provider] sacubitriL-valsartan (ENTRESTO) 49-51 mg tablet 1 Tab  1 Tab Oral Q12H Amy Richards NP        pantoprazole (PROTONIX) 40 mg in 0.9% sodium chloride 10 mL injection  40 mg IntraVENous Q12H Daria Tristan NP        oxymetazoline (AFRIN) 0.05 % nasal spray 2 Spray  2 Spray Both Nostrils BID Antoni Mccoy MD        perflutren lipid microspheres (DEFINITY) in NS bolus IV  1.5 mL IntraVENous RAD ONCE AlmJovanni saravia MD        allopurinoL (ZYLOPRIM) tablet 100 mg  100 mg Oral DAILY Maricel Moss MD   100 mg at 03/06/20 1032    amiodarone (CORDARONE) tablet 200 mg  200 mg Oral DAILY Amanda Garza MD   200 mg at 03/06/20 1127    ascorbic acid (vitamin C) (VITAMIN C) tablet 500 mg  500 mg Oral BID Maricel Moss MD   500 mg at 03/06/20 1032    atorvastatin (LIPITOR) tablet 40 mg  40 mg Oral DAILY Amanda Garza MD   40 mg at 03/06/20 1032    [Held by provider] carvediloL (COREG) tablet 6.25 mg  6.25 mg Oral BID WITH MEALS Maricel Moss MD   6.25 mg at 03/06/20 3098    colchicine tablet 0.6 mg  0.6 mg Oral EVERY OTHER DAY Amanda Garza MD        DULoxetine (CYMBALTA) capsule 60 mg  60 mg Oral DAILY Amanda Garza MD   60 mg at 03/06/20 1032    cyclobenzaprine (FLEXERIL) tablet 5 mg  5 mg Oral DAILY PRN Amanda Garza MD        HYDROcodone-acetaminophen [facility]) 5-325 mg per tablet 1 Tab  1 Tab Oral Q4H PRN Amanda Garza MD        albuterol (PROVENTIL VENTOLIN) nebulizer solution 2.5 mg  2.5 mg Nebulization Q4H PRN Beata Chris MD        montelukast (SINGULAIR) tablet 10 mg  10 mg Oral QHS Beata Chris MD        patiromer calcium sorbitex (VELTASSA) powder 16.8 g  16.8 g Oral DAILY Beata Chris MD        pregabalin (LYRICA) capsule 50 mg  50 mg Oral TID Maricel Moss MD   50 mg at 03/06/20 1032    tamsulosin (FLOMAX) capsule 0.4 mg  0.4 mg Oral DAILY Betaa Chris MD   0.4 mg at 03/06/20 1034    sodium chloride (NS) flush 5-40 mL  5-40 mL IntraVENous Q8H Beata Chris MD   Stopped at 03/06/20 0000    sodium chloride (NS) flush 5-40 mL  5-40 mL IntraVENous PRN Beata Chris MD        naloxone [facility]) injection 0.4 mg  0.4 mg IntraVENous PRN Beata Chris MD        zolpidem [facility]) tablet 5 mg  5 mg Oral QHS PRN Beata Chris MD        acetaminophen (TYLENOL) tablet 650 mg  650 mg Oral Q4H PRN Beata Chris MD        ondansetron [facility]) injection 4 mg  4 mg IntraVENous Q4H PRN Beata Chris MD        docusate sodium (COLACE) capsule 100 mg  100 mg Oral BID Maricel Moss MD   100 mg at 03/06/20 1040    sodium bicarbonate tablet 650 mg  650 mg Oral TID Beata Chris MD   650 mg at 03/06/20 1032       Allergies: Allergies   Allergen Reactions    Morphine Other (comments)     Hallucinations. Confusion. Impaired judgement    Chlorhexidine Rash       Physical Exam:   Vitals:    Visit Vitals  BP (!) 82/40   Pulse 70   Temp 98 °F (36.7 °C)   Resp 16   Ht 5' 8\" (1.727 m)   Wt 183 lb (83 kg)   SpO2 92%   BMI 27.82 kg/m²        Physical Exam  Constitutional:       Appearance: He is normal weight. HENT:      Nose: Congestion present. Right Nostril: Epistaxis present. Left Nostril: Epistaxis present. Cardiovascular:      Rate and Rhythm: Normal rate and regular rhythm. Heart sounds: S1 normal and S2 normal. Murmur present. Systolic murmur present. Pulmonary:      Effort: Tachypnea present. Breath sounds: Examination of the right-upper field reveals rhonchi.  Examination of the left-upper field reveals rhonchi. Examination of the left-middle field reveals decreased breath sounds. Examination of the left-lower field reveals decreased breath sounds. Decreased breath sounds and rhonchi present. Comments: +frequent congested cough  Abdominal:      General: Bowel sounds are normal. There is distension. Musculoskeletal:      Right lower leg: No edema. Left lower leg: No edema. Skin:     General: Skin is warm and dry. Neurological:      Mental Status: He is alert and oriented to person, place, and time. Psychiatric:         Mood and Affect: Mood normal.         Behavior: Behavior normal. Behavior is cooperative. Thought Content:  Thought content normal.         Judgment: Judgment normal.           Recent Labs:     Lab Results   Component Value Date/Time    WBC 7.0 03/06/2020 02:45 PM    HGB 6.7 (L) 03/06/2020 02:45 PM    HCT 21.5 (L) 03/06/2020 02:45 PM    PLATELET 685 82/17/9793 02:45 PM    MCV 90.0 03/06/2020 02:45 PM     Lab Results   Component Value Date/Time    GFR est non-AA 42 (L) 03/06/2020 02:45 PM    GFR est non-AA 43 (L) 03/06/2020 02:45 PM    GFR est AA 51 (L) 03/06/2020 02:45 PM    GFR est AA 52 (L) 03/06/2020 02:45 PM    Creatinine 1.65 (H) 03/06/2020 02:45 PM    Creatinine 1.63 (H) 03/06/2020 02:45 PM    BUN 58 (H) 03/06/2020 02:45 PM    BUN 59 (H) 03/06/2020 02:45 PM    Sodium 139 03/06/2020 02:45 PM    Sodium 137 03/06/2020 02:45 PM    Potassium 4.4 03/06/2020 02:45 PM    Potassium 4.3 03/06/2020 02:45 PM    Chloride 113 (H) 03/06/2020 02:45 PM    Chloride 114 (H) 03/06/2020 02:45 PM    CO2 17 (L) 03/06/2020 02:45 PM    CO2 17 (L) 03/06/2020 02:45 PM    Magnesium 2.1 02/21/2020 01:40 PM    Phosphorus 3.4 03/06/2020 02:45 PM     Lab Results   Component Value Date/Time    TSH 3.790 06/12/2019 03:05 PM    T4, Free 1.68 06/12/2019 03:05 PM      Lab Results   Component Value Date/Time    Sodium 139 03/06/2020 02:45 PM    Sodium 137 03/06/2020 02:45 PM Potassium 4.4 03/06/2020 02:45 PM    Potassium 4.3 03/06/2020 02:45 PM    Chloride 113 (H) 03/06/2020 02:45 PM    Chloride 114 (H) 03/06/2020 02:45 PM    CO2 17 (L) 03/06/2020 02:45 PM    CO2 17 (L) 03/06/2020 02:45 PM    Anion gap 9 03/06/2020 02:45 PM    Anion gap 6 03/06/2020 02:45 PM    Glucose 149 (H) 03/06/2020 02:45 PM    Glucose 151 (H) 03/06/2020 02:45 PM    BUN 58 (H) 03/06/2020 02:45 PM    BUN 59 (H) 03/06/2020 02:45 PM    Creatinine 1.65 (H) 03/06/2020 02:45 PM    Creatinine 1.63 (H) 03/06/2020 02:45 PM    BUN/Creatinine ratio 35 (H) 03/06/2020 02:45 PM    BUN/Creatinine ratio 36 (H) 03/06/2020 02:45 PM    GFR est AA 51 (L) 03/06/2020 02:45 PM    GFR est AA 52 (L) 03/06/2020 02:45 PM    GFR est non-AA 42 (L) 03/06/2020 02:45 PM    GFR est non-AA 43 (L) 03/06/2020 02:45 PM    Calcium 8.4 (L) 03/06/2020 02:45 PM    Calcium 8.5 03/06/2020 02:45 PM    Bilirubin, total 0.6 03/06/2020 02:45 PM    ALT (SGPT) 29 03/06/2020 02:45 PM    AST (SGOT) 22 03/06/2020 02:45 PM    Alk. phosphatase 114 03/06/2020 02:45 PM    Protein, total 6.2 (L) 03/06/2020 02:45 PM    Albumin 3.2 (L) 03/06/2020 02:45 PM    Albumin 3.2 (L) 03/06/2020 02:45 PM    Globulin 3.0 03/06/2020 02:45 PM    A-G Ratio 1.1 03/06/2020 02:45 PM      Lab Results   Component Value Date/Time    Hemoglobin A1c 5.9 05/22/2019 02:56 AM       Thank you for letting us see him with you,      Brendon Garcia, NP  [address]  [address]  Office [phone]  Fax [phone]    [facility] ATTENDING ADDENDUM    Patient was seen and examined in person. Data and notes were reviewed. I have discussed and agree with the plan as noted in the NP note above without further additions.     Poonam Becerra MD PhD  [facility]

## 2020-03-06 NOTE — NURSE NAVIGATOR
Chart reviewed by Heart Failure Nurse Navigator. Heart Failure database completed. EF:  25-30% (echo dated 2/12/2020)     ACEi/ARB/ARNi: Jelly Funker 97/103mg Q12hr    BB: Coreg 6.25mg BID    Aldosterone Antagonist: Eplerenone 12.5mg every day-currently on hold    Obstructive Sleep Apnea Screening:   STOP-BANG score:   Referred to Sleep Medicine:     CRT  ICD implant 5/20/19. NYHA Functional Class not assigned. Documentation requested     Heart Failure Teach Back in Patient Education. Heart Failure Avoiding Triggers on Discharge Instructions. Cardiologist: Dr. Zeynep Flores discharge follow up phone call to be made within 48-72 hours of discharge.

## 2020-03-07 ENCOUNTER — APPOINTMENT (OUTPATIENT)
Dept: GENERAL RADIOLOGY | Age: 65
DRG: 812 | End: 2020-03-07
Attending: NURSE PRACTITIONER
Payer: COMMERCIAL

## 2020-03-07 LAB
ABO + RH BLD: NORMAL
ALBUMIN SERPL-MCNC: 3 G/DL (ref 3.5–5)
ALBUMIN/GLOB SERPL: 0.9 {RATIO} (ref 1.1–2.2)
ALP SERPL-CCNC: 104 U/L (ref 45–117)
ALT SERPL-CCNC: 30 U/L (ref 12–78)
ANION GAP SERPL CALC-SCNC: 7 MMOL/L (ref 5–15)
AST SERPL-CCNC: 22 U/L (ref 15–37)
BILIRUB SERPL-MCNC: 0.5 MG/DL (ref 0.2–1)
BLD PROD TYP BPU: NORMAL
BLD PROD TYP BPU: NORMAL
BLOOD GROUP ANTIBODIES SERPL: NORMAL
BNP SERPL-MCNC: 9566 PG/ML
BPU ID: NORMAL
BPU ID: NORMAL
BUN SERPL-MCNC: 55 MG/DL (ref 6–20)
BUN/CREAT SERPL: 37 (ref 12–20)
CALCIUM SERPL-MCNC: 8.1 MG/DL (ref 8.5–10.1)
CHLORIDE SERPL-SCNC: 116 MMOL/L (ref 97–108)
CO2 SERPL-SCNC: 17 MMOL/L (ref 21–32)
CREAT SERPL-MCNC: 1.5 MG/DL (ref 0.7–1.3)
CROSSMATCH RESULT,%XM: NORMAL
CROSSMATCH RESULT,%XM: NORMAL
ERYTHROCYTE [DISTWIDTH] IN BLOOD BY AUTOMATED COUNT: 17.1 % (ref 11.5–14.5)
GLOBULIN SER CALC-MCNC: 3.4 G/DL (ref 2–4)
GLUCOSE BLD STRIP.AUTO-MCNC: 121 MG/DL (ref 65–100)
GLUCOSE BLD STRIP.AUTO-MCNC: 131 MG/DL (ref 65–100)
GLUCOSE BLD STRIP.AUTO-MCNC: 140 MG/DL (ref 65–100)
GLUCOSE BLD STRIP.AUTO-MCNC: 97 MG/DL (ref 65–100)
GLUCOSE SERPL-MCNC: 86 MG/DL (ref 65–100)
HAPTOGLOB SERPL-MCNC: 138 MG/DL (ref 30–200)
HCT VFR BLD AUTO: 23.6 % (ref 36.6–50.3)
HEMOCCULT STL QL: POSITIVE
HGB BLD-MCNC: 7.3 G/DL (ref 12.1–17)
INR PPP: 1.1 (ref 0.9–1.1)
LDH SERPL L TO P-CCNC: 159 U/L (ref 85–241)
MCH RBC QN AUTO: 27.8 PG (ref 26–34)
MCHC RBC AUTO-ENTMCNC: 30.9 G/DL (ref 30–36.5)
MCV RBC AUTO: 89.7 FL (ref 80–99)
NRBC # BLD: 0 K/UL (ref 0–0.01)
NRBC BLD-RTO: 0 PER 100 WBC
PLATELET # BLD AUTO: 199 K/UL (ref 150–400)
PMV BLD AUTO: 11.5 FL (ref 8.9–12.9)
POTASSIUM SERPL-SCNC: 4.7 MMOL/L (ref 3.5–5.1)
PROT SERPL-MCNC: 6.4 G/DL (ref 6.4–8.2)
PROTHROMBIN TIME: 11.5 SEC (ref 9–11.1)
RBC # BLD AUTO: 2.63 M/UL (ref 4.1–5.7)
SERVICE CMNT-IMP: ABNORMAL
SERVICE CMNT-IMP: NORMAL
SODIUM SERPL-SCNC: 140 MMOL/L (ref 136–145)
SPECIMEN EXP DATE BLD: NORMAL
STATUS OF UNIT,%ST: NORMAL
STATUS OF UNIT,%ST: NORMAL
UNIT DIVISION, %UDIV: 0
UNIT DIVISION, %UDIV: 0
WBC # BLD AUTO: 5.4 K/UL (ref 4.1–11.1)

## 2020-03-07 PROCEDURE — 82272 OCCULT BLD FECES 1-3 TESTS: CPT

## 2020-03-07 PROCEDURE — 82962 GLUCOSE BLOOD TEST: CPT

## 2020-03-07 PROCEDURE — 83010 ASSAY OF HAPTOGLOBIN QUANT: CPT

## 2020-03-07 PROCEDURE — 71045 X-RAY EXAM CHEST 1 VIEW: CPT

## 2020-03-07 PROCEDURE — 74011250636 HC RX REV CODE- 250/636: Performed by: INTERNAL MEDICINE

## 2020-03-07 PROCEDURE — C9113 INJ PANTOPRAZOLE SODIUM, VIA: HCPCS | Performed by: NURSE PRACTITIONER

## 2020-03-07 PROCEDURE — 85610 PROTHROMBIN TIME: CPT

## 2020-03-07 PROCEDURE — 74011000258 HC RX REV CODE- 258: Performed by: INTERNAL MEDICINE

## 2020-03-07 PROCEDURE — 36415 COLL VENOUS BLD VENIPUNCTURE: CPT

## 2020-03-07 PROCEDURE — 99233 SBSQ HOSP IP/OBS HIGH 50: CPT | Performed by: INTERNAL MEDICINE

## 2020-03-07 PROCEDURE — 83615 LACTATE (LD) (LDH) ENZYME: CPT

## 2020-03-07 PROCEDURE — 83880 ASSAY OF NATRIURETIC PEPTIDE: CPT

## 2020-03-07 PROCEDURE — 74011250636 HC RX REV CODE- 250/636: Performed by: NURSE PRACTITIONER

## 2020-03-07 PROCEDURE — 74011000250 HC RX REV CODE- 250: Performed by: NURSE PRACTITIONER

## 2020-03-07 PROCEDURE — 74011250637 HC RX REV CODE- 250/637: Performed by: INTERNAL MEDICINE

## 2020-03-07 PROCEDURE — 80053 COMPREHEN METABOLIC PANEL: CPT

## 2020-03-07 PROCEDURE — 85027 COMPLETE CBC AUTOMATED: CPT

## 2020-03-07 PROCEDURE — 74011250637 HC RX REV CODE- 250/637: Performed by: HOSPITALIST

## 2020-03-07 PROCEDURE — 65660000001 HC RM ICU INTERMED STEPDOWN

## 2020-03-07 RX ADMIN — SODIUM BICARBONATE 650 MG: 650 TABLET ORAL at 23:32

## 2020-03-07 RX ADMIN — PREGABALIN 50 MG: 25 CAPSULE ORAL at 21:27

## 2020-03-07 RX ADMIN — SODIUM BICARBONATE 650 MG: 650 TABLET ORAL at 23:33

## 2020-03-07 RX ADMIN — SODIUM CHLORIDE 40 MG: 9 INJECTION INTRAMUSCULAR; INTRAVENOUS; SUBCUTANEOUS at 21:26

## 2020-03-07 RX ADMIN — DULOXETINE HYDROCHLORIDE 60 MG: 60 CAPSULE, DELAYED RELEASE ORAL at 09:00

## 2020-03-07 RX ADMIN — OXYCODONE HYDROCHLORIDE AND ACETAMINOPHEN 500 MG: 500 TABLET ORAL at 18:03

## 2020-03-07 RX ADMIN — PREGABALIN 50 MG: 25 CAPSULE ORAL at 08:35

## 2020-03-07 RX ADMIN — DOCUSATE SODIUM 100 MG: 100 CAPSULE, LIQUID FILLED ORAL at 08:35

## 2020-03-07 RX ADMIN — Medication 10 ML: at 12:24

## 2020-03-07 RX ADMIN — TAMSULOSIN HYDROCHLORIDE 0.4 MG: 0.4 CAPSULE ORAL at 08:36

## 2020-03-07 RX ADMIN — PATIROMER 16.8 G: 8.4 POWDER, FOR SUSPENSION ORAL at 12:21

## 2020-03-07 RX ADMIN — PREGABALIN 50 MG: 25 CAPSULE ORAL at 18:03

## 2020-03-07 RX ADMIN — OXYMETAZOLINE HYDROCHLORIDE 2 SPRAY: 0.05 SPRAY NASAL at 08:37

## 2020-03-07 RX ADMIN — Medication 10 ML: at 21:28

## 2020-03-07 RX ADMIN — Medication 10 ML: at 06:28

## 2020-03-07 RX ADMIN — DOCUSATE SODIUM 100 MG: 100 CAPSULE, LIQUID FILLED ORAL at 18:03

## 2020-03-07 RX ADMIN — SACUBITRIL AND VALSARTAN 1 TABLET: 24; 26 TABLET, FILM COATED ORAL at 21:27

## 2020-03-07 RX ADMIN — OXYCODONE HYDROCHLORIDE AND ACETAMINOPHEN 500 MG: 500 TABLET ORAL at 08:36

## 2020-03-07 RX ADMIN — AMIODARONE HYDROCHLORIDE 200 MG: 200 TABLET ORAL at 08:35

## 2020-03-07 RX ADMIN — MONTELUKAST SODIUM 10 MG: 10 TABLET, FILM COATED ORAL at 21:27

## 2020-03-07 RX ADMIN — ATORVASTATIN CALCIUM 40 MG: 40 TABLET, FILM COATED ORAL at 08:36

## 2020-03-07 RX ADMIN — IRON SUCROSE 200 MG: 20 INJECTION, SOLUTION INTRAVENOUS at 09:58

## 2020-03-07 RX ADMIN — ALLOPURINOL 100 MG: 100 TABLET ORAL at 08:36

## 2020-03-07 RX ADMIN — SODIUM CHLORIDE 40 MG: 9 INJECTION INTRAMUSCULAR; INTRAVENOUS; SUBCUTANEOUS at 08:35

## 2020-03-07 RX ADMIN — SODIUM BICARBONATE 650 MG: 650 TABLET ORAL at 18:03

## 2020-03-07 RX ADMIN — COLCHICINE 0.6 MG: 0.6 TABLET, FILM COATED ORAL at 23:28

## 2020-03-07 RX ADMIN — SODIUM BICARBONATE 650 MG: 650 TABLET ORAL at 08:35

## 2020-03-07 NOTE — PROGRESS NOTES
Orders received, chart reviewed. Per patient he has been up walking in hallway using his rollator today. He does not feel like he has any therapy needs at this time. Will complete order. Please re consult if status changes.  José Blackmon, PT

## 2020-03-07 NOTE — PROGRESS NOTES
Problem: Falls - Risk of  Goal: *Absence of Falls  Description  Document Edmonia Morning Fall Risk and appropriate interventions in the flowsheet.   Outcome: Progressing Towards Goal  Note: Fall Risk Interventions:            Medication Interventions: Patient to call before getting OOB

## 2020-03-07 NOTE — PROGRESS NOTES
Hospitalist Progress Note  Sofy Collier MD  Answering service: [phone] from in house phone      Date of Service:  3/7/2020  NAME:  Dylon Warren  :  1955  MRN:  [id number]    Admission Summary:   64M s/p LVAD for sCHF  Interval history / Subjective:   Patient seen and examined at bedside, feels ok. Comfortable breathing at rest. Iron levels low. Will give iv iron while inpatient. Tells me they are moving to [place] next week     Assessment & Plan:     #. Acute on chronic anemia: unknown etiology, suspect GI blood loss/AVMs vs Epistaxis  - dropped HB to 6.4, was 8.4 in January. S/p 1 unit RBC, monitor CBC closely   - GI following: no urgent Endoscopies. , iron levels low, ferritin 31, hemoccult -ve. - will give Iron iv infusion while inpatient. Continue po supplement on dc. #. Epistaxis: small amounts/dripping on/off. ENT evaluated, Afrin, Vaseline, saline spray. Monitor off AC    #. CAD: stable, no chest pain. home regimen, monitor  #. Elevated trops: likely demand/ Type2 MI. Cardiology following. #. Chronic Systolic CHF: s/p LVAD- which was removed. Currently ef 20-25%. #. S/p ICD/PPM  #. Paroxysmal Afib: holding eliquis for now. Rate controlled- Advanced heart failure team following. #. CKD3: monitor renal function, avoid nephrotoxics, Nephrology following. Code status: full  DVT prophylaxis: SCD  Care Plan discussed with: Patient/Family and Nurse  Disposition: TBD     Hospital Problems  Date Reviewed: 2019          Codes Class Noted POA    Anemia ICD-10-CM: D64.9  ICD-9-CM: 285.9  3/5/2020 Unknown            Review of Systems:   Pertinent items are mentioned in interval history. Vital Signs:    Last 24hrs VS reviewed since prior progress note.  Most recent are:  Visit Vitals  BP (!) 95/35 (BP 1 Location: Left arm, BP Patient Position: Sitting)   Pulse 79   Temp 98 °F (36.7 °C)   Resp 18   Ht 5' 8\" (1.727 m) Wt 82.6 kg (182 lb 1.6 oz)   SpO2 97%   BMI 27.69 kg/m²         Intake/Output Summary (Last 24 hours) at 3/7/2020 0857  Last data filed at 3/7/2020 6871  Gross per 24 hour   Intake 805 ml   Output 1150 ml   Net -345 ml        Physical Examination:   General:  Alert, oriented, No acute distress  Card:  S1, S2 without murmurs, good peripheral perfusion, warm peripheries  Resp:  No accessory muscle use, Good AE, no wheezes, no rhonchi  Abd:  Soft, non-tender, non-distended  Extremities:  No cyanosis or clubbing, no significant edema  Neuro:  Grossly normal, no focal neuro deficits, follows commands   Psych:  Good insight, AAOx3, not agitated. Data Review:    Review and/or order of clinical lab test  Review and/or order of tests in the radiology section of CPT  Review and/or order of tests in the medicine section of CPT  Labs:     Recent Labs     03/07/20 0310 03/06/20 2116 03/06/20  1445   WBC 5.4  --  7.0   HGB 7.3* 7.2* 6.7*   HCT 23.6* 24.0* 21.5*     --  207     Recent Labs     03/07/20 0310 03/06/20  1445 03/06/20  0743    139  137 138   K 4.7 4.4  4.3 4.8   * 113*  114* 113*   CO2 17* 17*  17* 16*   BUN 55* 58*  59* 61*   CREA 1.50* 1.65*  1.63* 1.71*   GLU 86 149*  151* 89   CA 8.1* 8.4*  8.5 8.8   PHOS  --  3.4  --      Recent Labs     03/07/20 0310 03/06/20  1445 03/06/20  0743   SGOT 22 22 25   ALT 30 29 32    114 111   TBILI 0.5 0.6 0.6   TP 6.4 6.2* 6.9   ALB 3.0* 3.2*  3.2* 3.3*   GLOB 3.4 3.0 3.6     Recent Labs     03/07/20 0310   INR 1.1   PTP 11.5*      Recent Labs     03/06/20  1445   TIBC 318   PSAT 8*   FERR 31      Lab Results   Component Value Date/Time    Folate 13.0 05/24/2019 12:00 AM      No results for input(s): PH, PCO2, PO2 in the last 72 hours.   Recent Labs     03/06/20  0743 03/05/20  1647   TROIQ 0.07* 0.07*     Lab Results   Component Value Date/Time    Cholesterol, total 117 06/12/2019 03:05 PM    HDL Cholesterol 75 06/12/2019 03:05 PM LDL, calculated 36 06/12/2019 03:05 PM    Triglyceride 28 06/12/2019 03:05 PM    CHOL/HDL Ratio 2.3 11/27/2016 03:58 AM     Lab Results   Component Value Date/Time    Glucose (POC) 97 03/07/2020 06:18 AM    Glucose (POC) 148 (H) 03/06/2020 09:18 PM    Glucose (POC) 130 (H) 03/06/2020 05:04 PM    Glucose (POC) 101 (H) 05/22/2019 07:43 AM    Glucose (POC) 111 (H) 05/21/2019 08:52 PM     Lab Results   Component Value Date/Time    Color YELLOW/STRAW 01/23/2017 03:42 PM    Appearance CLEAR 01/23/2017 03:42 PM    Specific gravity 1.011 01/23/2017 03:42 PM    pH (UA) 6.0 01/23/2017 03:42 PM    Protein NEGATIVE  01/23/2017 03:42 PM    Glucose NEGATIVE  01/23/2017 03:42 PM    Ketone NEGATIVE  01/23/2017 03:42 PM    Bilirubin NEGATIVE  01/23/2017 03:42 PM    Urobilinogen 0.2 01/23/2017 03:42 PM    Nitrites NEGATIVE  01/23/2017 03:42 PM    Leukocyte Esterase NEGATIVE  01/23/2017 03:42 PM    Epithelial cells FEW 01/23/2017 03:42 PM    Bacteria NEGATIVE  01/23/2017 03:42 PM    WBC 0-4 01/23/2017 03:42 PM    RBC 0-5 01/23/2017 03:42 PM     Medications Reviewed:     Current Facility-Administered Medications   Medication Dose Route Frequency    [Held by provider] sacubitriL-valsartan (ENTRESTO) 49-51 mg tablet 1 Tab  1 Tab Oral Q12H    pantoprazole (PROTONIX) 40 mg in 0.9% sodium chloride 10 mL injection  40 mg IntraVENous Q12H    oxymetazoline (AFRIN) 0.05 % nasal spray 2 Spray  2 Spray Both Nostrils BID    allopurinoL (ZYLOPRIM) tablet 100 mg  100 mg Oral DAILY    amiodarone (CORDARONE) tablet 200 mg  200 mg Oral DAILY    ascorbic acid (vitamin C) (VITAMIN C) tablet 500 mg  500 mg Oral BID    atorvastatin (LIPITOR) tablet 40 mg  40 mg Oral DAILY    [Held by provider] carvediloL (COREG) tablet 6.25 mg  6.25 mg Oral BID WITH MEALS    colchicine tablet 0.6 mg  0.6 mg Oral EVERY OTHER DAY    DULoxetine (CYMBALTA) capsule 60 mg  60 mg Oral DAILY    cyclobenzaprine (FLEXERIL) tablet 5 mg  5 mg Oral DAILY PRN    HYDROcodone-acetaminophen (NORCO) 5-325 mg per tablet 1 Tab  1 Tab Oral Q4H PRN    albuterol (PROVENTIL VENTOLIN) nebulizer solution 2.5 mg  2.5 mg Nebulization Q4H PRN    montelukast (SINGULAIR) tablet 10 mg  10 mg Oral QHS    patiromer calcium sorbitex (VELTASSA) powder 16.8 g  16.8 g Oral DAILY    pregabalin (LYRICA) capsule 50 mg  50 mg Oral TID    tamsulosin (FLOMAX) capsule 0.4 mg  0.4 mg Oral DAILY    sodium chloride (NS) flush 5-40 mL  5-40 mL IntraVENous Q8H    sodium chloride (NS) flush 5-40 mL  5-40 mL IntraVENous PRN    naloxone (NARCAN) injection 0.4 mg  0.4 mg IntraVENous PRN    zolpidem (AMBIEN) tablet 5 mg  5 mg Oral QHS PRN    acetaminophen (TYLENOL) tablet 650 mg  650 mg Oral Q4H PRN    ondansetron (ZOFRAN) injection 4 mg  4 mg IntraVENous Q4H PRN    docusate sodium (COLACE) capsule 100 mg  100 mg Oral BID    sodium bicarbonate tablet 650 mg  650 mg Oral TID   ______________________________________________________________________  EXPECTED LENGTH OF STAY: - - -  ACTUAL LENGTH OF STAY:          2               Jess Srinivasan MD

## 2020-03-07 NOTE — PROGRESS NOTES
[facility] Progress Note      DOS:  3/7/2020  REFERRING PROVIDER: Monie Cadena MD  PRIMARY CARE PHYSICIAN: Lennie Frost MD  PRIMARY CARDIOLOGIST:  Ivett Hernandez MD   EP: Jairo Muniz MD   PULMONARY: Alex Anderson MD       IMPRESSION/PLAN:   ICM s/p HMII as BTT on 12/1/16 and explant, NYHA Class IV, LVEF 20-25%     Hold Coreg due to IVVD due to epistaxis    Resume entresto 24/26 mg, 1 tablet BID - monitor    Intolerant to eplerenone due to hyperkalemia    Hold diuretics; reassess in AM    Strict I/O, daily weights, Na+ restricted diet    Trend CMP, proBNP    Acute blood loss anemia due to epistaxis   ENT consult appreciated   Afrin, saline spray    Transfuse for Hgb < 8     FOB negative; appreciate GI consult   Iron profile      CAD s/p CABG (SVG to RCA and LIMA to LAD) on 12/1/16 s/p BMS x2 -> SVG-RCA graft - no angina    Continue ASA, statin   Resume Coreg when BP improves     Mitral Regurgitation, severe - improved to mod   Follow with serial TTEs      Hyperkalemia   Continue Veltassa   Resume entresto at low dose and monitor serum potassium   Hold eplerenone    Chronic Left Pleural Effusion    s/p left thoracentesis on 7/5/2019   Encourage use of IS    CXR in AM      High Risk of SCD    s/p SQ AICD (5/20/19)   No shocks   Followed by Dr. Alejandrina Celaya       PAD    Difficult femoral access with LVAD explant              No symptoms of claudication      Chronic Anticoagulation    Eliquis on hold due to acute blood loss anemia     Gout              Continue colchicine 0.6 mg every other day              Continue allopurinol 100 mg daily   Denies recent gouty attacks    Chronic Lower back pain              On oxycodone, flexeril   Hasn't taken since ICD implant   Back pain improved with increased activity- going to gym    H/o tobacco abuse   Abstaining from nicotine    Encouraged complete cessation     H/o alcohol abuse   Currently abstaining   Encouraged complete abstinence      Seizure disorder - early 25s              No recent seizure activity     Peripheral neuropathy              On cymbalta   ATTR- Negative     Remainder of care per primary. Thank you for letting us see him with you,      Kristi Humphrey MD, [facility]  Chief of Cardiology, [facility]  [address] [facility], [address] [place] [address], [address]  Office [phone]  Fax [phone]         Chief Complaint   Patient presents with    Fatigue    Generalized Body Aches         HPI: Padmini Dumont is a 62y.o. year old male  with a history of HFrEF in the setting of ICM s/p LVAD and recent LVAD explant complicated by PVD,  remote tobacco use and alcohol abuse (quit 11/2016) who presents for follow up of his HFrEF. Mr. Yasmani Johnson presented 11/22/2016 for decompressive laminectomy at G9-S3 complicated by myocardial infarction. The [facility](11/25/16) revealed severe left main and 3 vessel disease along with a 60% right common iliac stenosis and  LVEF was 10%. He subsequently underwent placement of an IABP followed by placement of a HeartMate2 LVAD on 12/1/2016 with concomitant CABG (SVG to the RCA, LIMA to LAD). His postoperative course was complicated by RV failure and an ileus requiring TPN resulting and a transaminitis. He had multiple episodes of Torsade on 12/4/2016 prompting repeat catheterization revealing an occluded RCA vein graft. Two BMS were placed in the RCA graft. He also had SVT requiring cardioversion. He had a single lead ICD placed on 44/30/44 complicated by a hematoma. The ICD was later removed on 1/20/17. Following his LVAD surgery he did require inpatient rehab.      Mr. Yasmani Johnson was listed for heart transplantation at [facility]. He was called in for transplantation on 7/14/2018 but on pre-op DELLA was noted to have normal LV function and the transplant surgery was aborted.  He subsequently was admitted on 9/5/2018 for LVAD explant- his post-operative course was complicated by pneumonia and bilateral pleural effusions. He was discharged on home O2 which he discontinued on his own.       After device explant, his subsequent echocardiograms showed deterioration of LVEF and worsening mitral regurgitation from 40-45% with moderate to severe MR on 9/19/18 to 35-40% with moderate-to-severe MR on 10/15/18. He has been removed from the transplant list at [facility] and is not interested in pursuing transplant evaluation at another center at this time. His entresto was discontinued at the time of his AICD implant due to hypotension. He subsequently took diovan but felt worse. He is tolerating the entresto with no dizziness, presyncope, or syncope. He was seen by Dr. Moraima Brantley, who stated that his mitral regurgitation has decreased significantly with optimizing his HF medications. He presented to the [facility] last evening with complaints of fatigue. ED evaluation revealed significant anemia, Hgb 6.1. HE has been admitted for further evaluation and treatment. The [facility] has been consulted for assistance with the management of his HF. CARDIAC EVALUATION  TTE 4/7/19: LVEF 32%, LVAD plug at LV apex, grade 2 diastolic dysfunction, mild-mod MR, mild TR, PAPs 43 mmHg, mild LAE  DELLA 7/14/2018 - normal LV function and the transplant surgery was aborted. He subsequently was admitted on 9/5/2018 for LVAD explant  ECHO 9/19/18:LVEF 40-45% mod-severe MR   ECHO 10/15/18:EF 35-40%, mod-severe MR   ECHO 10/31/18: TDS, EF 30-35%, reduced RVEF, TAPSE 1.6, LAE, mild- mod MR/TR  ECHO 1/16/19: EF 25-30%, question of thrombus in apex. Mild- mod TR. Consider cardiac MR to exclude left ventricular thrombus.   ECHO 6/13/2019: EF 16-20%, mod MR, mod-severe TR, severe pulm HTN (PA systolic pressure 85 mmHg), mildly reduced RV systolic function (TAPSE 8.87 cm)  ECHO 8/16/19: EF 20-25%, moderately reduced RV systolic function, mild MR  ECHO 2/12/2020: Dilated LV ( LVIDd 6 cm),  EF 37-43%, grade 2 diastolic dysfunction, RV dysfunction, TAPSE 1.31 cm, AoV sclerosis, mod MR, mod-severe TR  ECHO 3/6/20: Normal LV size (5.21 cm), EF 20-25%, mild-mod MR, mild-mod TR, PAPs 47 mmHg    CATH 16: severe LM, and 3 VD with 60% right common iliac stenosis. EF: 10%. ----S/p IABP   ----S/p HeartMate2 LVAD 2016   S/p CAB2016: LIMA-> LAD, SVG-> RCA     CATH 16 following torsades SVG-> RCA TO   ---- s/p BMS x 2-. RCA  RHC 2017:RA: 15/15 14, RV:  14,PA: , m 20, PCWP 13, PVR 1.67 CO 5.67, CI 3    S/p LVAD explant 18 ([facility])    SVT s/p DCCV  AICD  single lead cx by hematoma  AICD explanted 17    EKG:    10/31/18 NSR QRS 88ms   ms  19: SR rate 68bpm QRS 94ms Qtc 438 ms    Review of Systems   Constitutional: Positive for malaise/fatigue. Negative for chills and fever. HENT: Positive for congestion and nosebleeds. Negative for sinus pain and sore throat. Eyes: Negative. Respiratory: Negative for cough, sputum production and shortness of breath. Cardiovascular: Negative for chest pain, palpitations, orthopnea, claudication and leg swelling. Gastrointestinal: Negative for abdominal pain, heartburn, nausea and vomiting. Genitourinary: Negative. Musculoskeletal: Positive for back pain. Negative for falls. Chronic back pain   Skin: Negative. Neurological: Negative for dizziness. Endo/Heme/Allergies: Negative. Psychiatric/Behavioral: Negative.         History:  Past Medical History:   Diagnosis Date    Arrhythmia     SVT    Arthritis     CAD (coronary artery disease)     Chronic pain     back    Congestive heart failure (HCC)     DSOUZA (dyspnea on exertion) 2019    Gout     [facility]     Hypertension     ICD (implantable cardioverter-defibrillator) in place     Long term current use of anticoagulant therapy     LVAD (left ventricular assist device) present ([facility] 2016    Myocardial infarction ([facility]  Pacemaker     SubQ ICD    Raynaud disease     Seizures ([facility]     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     Past Surgical History:   Procedure Laterality Date    CABG, ARTERY-VEIN, TWO  2016    CARDIAC SURG PROCEDURE UNLIST  2016    LVAD    HX CORONARY ARTERY BYPASS GRAFT      HX CORONARY STENT PLACEMENT      HX HEART CATHETERIZATION      HX HEENT      wisdom teeth removed    HX ORTHOPAEDIC  2016    laminectomy    HX PACEMAKER      ICD - removed 2017    HX PTCA      NC INSJ OF SUBQ IMPLANTABLE DEFIBRILLATOR ELECTRODE N/A 2019    INSERT SUBQ ICD w/ anesthesia performed by Tracie Yao MD at [facility] CATH LAB     Social History     Socioeconomic History    Marital status:      Spouse name: Mitch Triana    Number of children: 2    Years of education: Not on file    Highest education level: Some college, no degree   Occupational History    Not on file   Social Needs    Financial resource strain: Not hard at all   [place] insecurity:     Worry: Never true     Inability: Never true    Transportation needs:     Medical: No     Non-medical: No   Tobacco Use    Smoking status: Former Smoker     Packs/day: 1.50     Years: 30.00     Pack years: 45.00     Last attempt to quit:      Years since quittin.1    Smokeless tobacco: Never Used   Substance and Sexual Activity    Alcohol use: No    Drug use: No    Sexual activity: Yes   Lifestyle    Physical activity:     Days per week: Not on file     Minutes per session: Not on file    Stress: Not on file   Relationships    Social connections:     Talks on phone: Not on file     Gets together: Not on file     Attends Bahai service: Not on file     Active member of club or organization: Not on file     Attends meetings of clubs or organizations: Not on file     Relationship status: Not on file    Intimate partner violence:     Fear of current or ex partner: Not on file     Emotionally abused: Not on file Physically abused: Not on file     Forced sexual activity: Not on file   Other Topics Concern     Service Not Asked    Blood Transfusions Not Asked    Caffeine Concern Not Asked    Occupational Exposure Not Asked    Hobby Hazards Not Asked    Sleep Concern Not Asked    Stress Concern Not Asked    Weight Concern Not Asked    Special Diet Not Asked    Back Care Not Asked    Exercise Not Asked    Bike Helmet Not Asked   [address] Not Asked    Self-Exams Not Asked   Social History Narrative    Not on file     Family History   Problem Relation Age of Onset    Diabetes Mother     Dementia Mother     Other Mother         carotid artery blockage    Heart Disease Father     Stroke Father     Heart Attack Father         several    Hypertension Father     Elevated Lipids Father     No Known Problems Sister     Cancer Brother         brain    Lung Disease Sister     COPD Sister     Cancer Sister         lung       Current Medications:   Current Facility-Administered Medications   Medication Dose Route Frequency Provider Last Rate Last Dose    iron sucrose (VENOFER) 200 mg in 0.9% sodium chloride 100 mL IVPB  200 mg IntraVENous Q24H Jovanni Arias  mL/hr at 03/07/20 0958 200 mg at 03/07/20 0958    sodium chloride (OCEAN) 0.65 % nasal squeeze bottle 2 Spray  2 Spray Both Nostrils Q2H PRN Jovanni Arias MD        [Held by provider] sacubitriL-valsartan (ENTRESTO) 49-51 mg tablet 1 Tab  1 Tab Oral Q12H Israel Richards NP        pantoprazole (PROTONIX) 40 mg in 0.9% sodium chloride 10 mL injection  40 mg IntraVENous Q12H Daria Tristan NP   40 mg at 03/07/20 0835    oxymetazoline (AFRIN) 0.05 % nasal spray 2 Spray  2 Spray Both Nostrils BID Michel Sandhu MD   2 Henefer at 03/07/20 3547    allopurinoL (ZYLOPRIM) tablet 100 mg  100 mg Oral DAILY Ousmane Santiago MD   100 mg at 03/07/20 7130    amiodarone (CORDARONE) tablet 200 mg  200 mg Oral DAILY Ousmane Santiago MD   200 mg at 03/07/20 4076    ascorbic acid (vitamin C) (VITAMIN C) tablet 500 mg  500 mg Oral BID Diego Perez MD   500 mg at 03/07/20 4480    atorvastatin (LIPITOR) tablet 40 mg  40 mg Oral DAILY Diego Perez MD   40 mg at 03/07/20 0836    [Held by provider] carvediloL (COREG) tablet 6.25 mg  6.25 mg Oral BID WITH MEALS Diego Perez MD   6.25 mg at 03/06/20 8466    colchicine tablet 0.6 mg  0.6 mg Oral EVERY OTHER DAY Diego Perez MD        DULoxetine (CYMBALTA) capsule 60 mg  60 mg Oral DAILY Diego Perez MD   60 mg at 03/06/20 1032    cyclobenzaprine (FLEXERIL) tablet 5 mg  5 mg Oral DAILY PRN Diego Perez MD        HYDROcodone-acetaminophen [facility]) 5-325 mg per tablet 1 Tab  1 Tab Oral Q4H PRN Diego Perez MD        albuterol (PROVENTIL VENTOLIN) nebulizer solution 2.5 mg  2.5 mg Nebulization Q4H PRN Diego Perez MD        montelukast (SINGULAIR) tablet 10 mg  10 mg Oral QHS Diego Perez MD   10 mg at 03/06/20 2153    patiromer calcium sorbitex (VELTASSA) powder 16.8 g  16.8 g Oral DAILY Diego Perez MD        pregabalin (LYRICA) capsule 50 mg  50 mg Oral TID Diego Perez MD   50 mg at 03/07/20 0835    tamsulosin (FLOMAX) capsule 0.4 mg  0.4 mg Oral DAILY Diego Perez MD   0.4 mg at 03/07/20 0836    sodium chloride (NS) flush 5-40 mL  5-40 mL IntraVENous Q8H Diego Perez MD   10 mL at 03/06/20 2152    sodium chloride (NS) flush 5-40 mL  5-40 mL IntraVENous PRN Diego Preez MD   10 mL at 03/07/20 0502    naloxone [facility]) injection 0.4 mg  0.4 mg IntraVENous PRN Diego Perez MD        zolpidem [facility], [facility]) tablet 5 mg  5 mg Oral QHS PRN Diego Perez MD        acetaminophen (TYLENOL) tablet 650 mg  650 mg Oral Q4H PRN Diego Perez MD        ondansetron [facility]) injection 4 mg  4 mg IntraVENous Q4H PRN Diego Perez MD        docusate sodium (COLACE) capsule 100 mg  100 mg Oral BID Maricel Moss MD   100 mg at 03/07/20 3031    sodium bicarbonate tablet 650 mg  650 mg Oral TID Diego Perez MD   650 mg at 03/07/20 0516       Allergies:    Allergies   Allergen Reactions    Morphine Other (comments) Hallucinations. Confusion. Impaired judgement    Chlorhexidine Rash       Physical Exam:   Vitals:    Visit Vitals  BP (!) 95/35 (BP 1 Location: Left arm, BP Patient Position: Sitting)   Pulse 79   Temp 98 °F (36.7 °C)   Resp 18   Ht 5' 8\" (1.727 m)   Wt 182 lb 1.6 oz (82.6 kg)   SpO2 97%   BMI 27.69 kg/m²        Physical Exam  Constitutional:       Appearance: He is normal weight. HENT:      Nose: Congestion present. Right Nostril: Epistaxis present. Left Nostril: Epistaxis present. Cardiovascular:      Rate and Rhythm: Normal rate and regular rhythm. Heart sounds: S1 normal and S2 normal. Murmur present. Systolic murmur present. Pulmonary:      Effort: Tachypnea present. Breath sounds: Examination of the right-upper field reveals rhonchi. Examination of the left-upper field reveals rhonchi. Examination of the left-middle field reveals decreased breath sounds. Examination of the left-lower field reveals decreased breath sounds. Decreased breath sounds and rhonchi present. Comments: +frequent congested cough  Abdominal:      General: Bowel sounds are normal. There is distension. Musculoskeletal:      Right lower leg: No edema. Left lower leg: No edema. Skin:     General: Skin is warm and dry. Neurological:      Mental Status: He is alert and oriented to person, place, and time. Psychiatric:         Mood and Affect: Mood normal.         Behavior: Behavior normal. Behavior is cooperative. Thought Content:  Thought content normal.         Judgment: Judgment normal.           Recent Labs:     Lab Results   Component Value Date/Time    WBC 5.4 03/07/2020 03:10 AM    HGB 7.3 (L) 03/07/2020 03:10 AM    HCT 23.6 (L) 03/07/2020 03:10 AM    PLATELET 417 16/23/9850 03:10 AM    MCV 89.7 03/07/2020 03:10 AM     Lab Results   Component Value Date/Time    GFR est non-AA 47 (L) 03/07/2020 03:10 AM    GFR est AA 57 (L) 03/07/2020 03:10 AM    Creatinine 1.50 (H) 03/07/2020 03:10 AM BUN 55 (H) 03/07/2020 03:10 AM    Sodium 140 03/07/2020 03:10 AM    Potassium 4.7 03/07/2020 03:10 AM    Chloride 116 (H) 03/07/2020 03:10 AM    CO2 17 (L) 03/07/2020 03:10 AM    Magnesium 2.1 02/21/2020 01:40 PM    Phosphorus 3.4 03/06/2020 02:45 PM     Lab Results   Component Value Date/Time    TSH 3.790 06/12/2019 03:05 PM    T4, Free 1.68 06/12/2019 03:05 PM      Lab Results   Component Value Date/Time    Sodium 140 03/07/2020 03:10 AM    Potassium 4.7 03/07/2020 03:10 AM    Chloride 116 (H) 03/07/2020 03:10 AM    CO2 17 (L) 03/07/2020 03:10 AM    Anion gap 7 03/07/2020 03:10 AM    Glucose 86 03/07/2020 03:10 AM    BUN 55 (H) 03/07/2020 03:10 AM    Creatinine 1.50 (H) 03/07/2020 03:10 AM    BUN/Creatinine ratio 37 (H) 03/07/2020 03:10 AM    GFR est AA 57 (L) 03/07/2020 03:10 AM    GFR est non-AA 47 (L) 03/07/2020 03:10 AM    Calcium 8.1 (L) 03/07/2020 03:10 AM    Bilirubin, total 0.5 03/07/2020 03:10 AM    ALT (SGPT) 30 03/07/2020 03:10 AM    AST (SGOT) 22 03/07/2020 03:10 AM    Alk.  phosphatase 104 03/07/2020 03:10 AM    Protein, total 6.4 03/07/2020 03:10 AM    Albumin 3.0 (L) 03/07/2020 03:10 AM    Globulin 3.4 03/07/2020 03:10 AM    A-G Ratio 0.9 (L) 03/07/2020 03:10 AM      Lab Results   Component Value Date/Time    Hemoglobin A1c 5.9 05/22/2019 02:56 AM       Thank you for letting us see him with you,      Sandor Aviles MD  [address], [address] [place] [address]  Office [phone]  Fax [phone]

## 2020-03-08 LAB
ALBUMIN SERPL-MCNC: 2.9 G/DL (ref 3.5–5)
ALBUMIN/GLOB SERPL: 0.9 {RATIO} (ref 1.1–2.2)
ALP SERPL-CCNC: 101 U/L (ref 45–117)
ALT SERPL-CCNC: 31 U/L (ref 12–78)
ANION GAP SERPL CALC-SCNC: 7 MMOL/L (ref 5–15)
AST SERPL-CCNC: 25 U/L (ref 15–37)
BILIRUB SERPL-MCNC: 0.5 MG/DL (ref 0.2–1)
BNP SERPL-MCNC: 6301 PG/ML
BUN SERPL-MCNC: 40 MG/DL (ref 6–20)
BUN/CREAT SERPL: 29 (ref 12–20)
CALCIUM SERPL-MCNC: 8.2 MG/DL (ref 8.5–10.1)
CHLORIDE SERPL-SCNC: 115 MMOL/L (ref 97–108)
CO2 SERPL-SCNC: 19 MMOL/L (ref 21–32)
CREAT SERPL-MCNC: 1.36 MG/DL (ref 0.7–1.3)
ERYTHROCYTE [DISTWIDTH] IN BLOOD BY AUTOMATED COUNT: 17.3 % (ref 11.5–14.5)
GLOBULIN SER CALC-MCNC: 3.2 G/DL (ref 2–4)
GLUCOSE BLD STRIP.AUTO-MCNC: 102 MG/DL (ref 65–100)
GLUCOSE BLD STRIP.AUTO-MCNC: 104 MG/DL (ref 65–100)
GLUCOSE BLD STRIP.AUTO-MCNC: 106 MG/DL (ref 65–100)
GLUCOSE SERPL-MCNC: 96 MG/DL (ref 65–100)
HCT VFR BLD AUTO: 23.4 % (ref 36.6–50.3)
HGB BLD-MCNC: 7.2 G/DL (ref 12.1–17)
MCH RBC QN AUTO: 28 PG (ref 26–34)
MCHC RBC AUTO-ENTMCNC: 30.8 G/DL (ref 30–36.5)
MCV RBC AUTO: 91.1 FL (ref 80–99)
NRBC # BLD: 0 K/UL (ref 0–0.01)
NRBC BLD-RTO: 0 PER 100 WBC
PLATELET # BLD AUTO: 214 K/UL (ref 150–400)
PMV BLD AUTO: 11.9 FL (ref 8.9–12.9)
POTASSIUM SERPL-SCNC: 4.4 MMOL/L (ref 3.5–5.1)
PROT SERPL-MCNC: 6.1 G/DL (ref 6.4–8.2)
RBC # BLD AUTO: 2.57 M/UL (ref 4.1–5.7)
SERVICE CMNT-IMP: ABNORMAL
SODIUM SERPL-SCNC: 141 MMOL/L (ref 136–145)
WBC # BLD AUTO: 6.5 K/UL (ref 4.1–11.1)

## 2020-03-08 PROCEDURE — 74011000258 HC RX REV CODE- 258: Performed by: INTERNAL MEDICINE

## 2020-03-08 PROCEDURE — 99233 SBSQ HOSP IP/OBS HIGH 50: CPT | Performed by: INTERNAL MEDICINE

## 2020-03-08 PROCEDURE — 74011250636 HC RX REV CODE- 250/636: Performed by: INTERNAL MEDICINE

## 2020-03-08 PROCEDURE — 82962 GLUCOSE BLOOD TEST: CPT

## 2020-03-08 PROCEDURE — 65660000001 HC RM ICU INTERMED STEPDOWN

## 2020-03-08 PROCEDURE — 74011250636 HC RX REV CODE- 250/636: Performed by: NURSE PRACTITIONER

## 2020-03-08 PROCEDURE — 74011000250 HC RX REV CODE- 250: Performed by: NURSE PRACTITIONER

## 2020-03-08 PROCEDURE — 36415 COLL VENOUS BLD VENIPUNCTURE: CPT

## 2020-03-08 PROCEDURE — C9113 INJ PANTOPRAZOLE SODIUM, VIA: HCPCS | Performed by: NURSE PRACTITIONER

## 2020-03-08 PROCEDURE — 85027 COMPLETE CBC AUTOMATED: CPT

## 2020-03-08 PROCEDURE — 83880 ASSAY OF NATRIURETIC PEPTIDE: CPT

## 2020-03-08 PROCEDURE — 74011250637 HC RX REV CODE- 250/637: Performed by: HOSPITALIST

## 2020-03-08 PROCEDURE — 80053 COMPREHEN METABOLIC PANEL: CPT

## 2020-03-08 PROCEDURE — 74011250637 HC RX REV CODE- 250/637: Performed by: INTERNAL MEDICINE

## 2020-03-08 RX ORDER — CARVEDILOL 3.12 MG/1
3.12 TABLET ORAL 2 TIMES DAILY WITH MEALS
Status: DISCONTINUED | OUTPATIENT
Start: 2020-03-08 | End: 2020-03-09 | Stop reason: HOSPADM

## 2020-03-08 RX ADMIN — CARVEDILOL 3.12 MG: 3.12 TABLET, FILM COATED ORAL at 18:23

## 2020-03-08 RX ADMIN — SODIUM BICARBONATE 650 MG: 650 TABLET ORAL at 18:27

## 2020-03-08 RX ADMIN — SACUBITRIL AND VALSARTAN 1 TABLET: 49; 51 TABLET, FILM COATED ORAL at 18:23

## 2020-03-08 RX ADMIN — MONTELUKAST SODIUM 10 MG: 10 TABLET, FILM COATED ORAL at 21:06

## 2020-03-08 RX ADMIN — SACUBITRIL AND VALSARTAN 1 TABLET: 24; 26 TABLET, FILM COATED ORAL at 08:17

## 2020-03-08 RX ADMIN — PATIROMER 16.8 G: 8.4 POWDER, FOR SUSPENSION ORAL at 12:13

## 2020-03-08 RX ADMIN — OXYCODONE HYDROCHLORIDE AND ACETAMINOPHEN 500 MG: 500 TABLET ORAL at 08:17

## 2020-03-08 RX ADMIN — TAMSULOSIN HYDROCHLORIDE 0.4 MG: 0.4 CAPSULE ORAL at 08:17

## 2020-03-08 RX ADMIN — IRON SUCROSE 200 MG: 20 INJECTION, SOLUTION INTRAVENOUS at 16:03

## 2020-03-08 RX ADMIN — PREGABALIN 50 MG: 25 CAPSULE ORAL at 08:18

## 2020-03-08 RX ADMIN — DOCUSATE SODIUM 100 MG: 100 CAPSULE, LIQUID FILLED ORAL at 18:23

## 2020-03-08 RX ADMIN — AMIODARONE HYDROCHLORIDE 200 MG: 200 TABLET ORAL at 08:17

## 2020-03-08 RX ADMIN — OXYCODONE HYDROCHLORIDE AND ACETAMINOPHEN 500 MG: 500 TABLET ORAL at 18:23

## 2020-03-08 RX ADMIN — SODIUM BICARBONATE 650 MG: 650 TABLET ORAL at 08:17

## 2020-03-08 RX ADMIN — CARVEDILOL 3.12 MG: 3.12 TABLET, FILM COATED ORAL at 12:13

## 2020-03-08 RX ADMIN — Medication 10 ML: at 21:06

## 2020-03-08 RX ADMIN — Medication 10 ML: at 06:10

## 2020-03-08 RX ADMIN — SODIUM CHLORIDE 40 MG: 9 INJECTION INTRAMUSCULAR; INTRAVENOUS; SUBCUTANEOUS at 08:18

## 2020-03-08 RX ADMIN — DULOXETINE HYDROCHLORIDE 60 MG: 60 CAPSULE, DELAYED RELEASE ORAL at 08:17

## 2020-03-08 RX ADMIN — SODIUM CHLORIDE 40 MG: 9 INJECTION INTRAMUSCULAR; INTRAVENOUS; SUBCUTANEOUS at 21:05

## 2020-03-08 RX ADMIN — ATORVASTATIN CALCIUM 40 MG: 40 TABLET, FILM COATED ORAL at 08:17

## 2020-03-08 RX ADMIN — APIXABAN 5 MG: 5 TABLET, FILM COATED ORAL at 18:23

## 2020-03-08 RX ADMIN — DOCUSATE SODIUM 100 MG: 100 CAPSULE, LIQUID FILLED ORAL at 08:17

## 2020-03-08 RX ADMIN — ALLOPURINOL 100 MG: 100 TABLET ORAL at 08:18

## 2020-03-08 RX ADMIN — PREGABALIN 50 MG: 25 CAPSULE ORAL at 18:27

## 2020-03-08 NOTE — PROGRESS NOTES
Physical Therapy rounded on patient yesterday. Per note: Patient he has been up walking in hallway using his rollator today. He does not feel like he has any therapy needs at this time. Will complete order. Please re consult if status changes.     April Goddard MS, OTR/L, CSRS

## 2020-03-08 NOTE — PROGRESS NOTES
Problem: Falls - Risk of  Goal: *Absence of Falls  Description  Document Zhang Haider Fall Risk and appropriate interventions in the flowsheet.   Outcome: Progressing Towards Goal  Note: Fall Risk Interventions:            Medication Interventions: Patient to call before getting OOB

## 2020-03-08 NOTE — PROGRESS NOTES
Spiritual Care Partner Volunteer visited patient in room 450/01 on 3.08.2020. Documented by: : Rev. Kirstin Torres.  Jermaine Paula; [facility], to contact [phone] [address] call: [phone]

## 2020-03-08 NOTE — PROGRESS NOTES
Hospitalist Progress Note  Mustapha Mayer MD  Answering service: [phone] from in house phone      Date of Service:  3/8/2020  NAME:  Precious Butts  :  1955  MRN:  [id number]    Admission Summary:   64M s/p LVAD for sCHF  Interval history / Subjective:   Patient seen and examined at bedside, feels ok. Cardiology adjusting his meds, will stay overnight, dc tomorrow     Assessment & Plan:     #. Acute on chronic anemia: unknown etiology, suspect GI blood loss/AVMs vs Epistaxis  - dropped HB to 6.4, was 8.4 in January. S/p 1 unit RBC, monitor CBC closely   - GI following: no urgent Endoscopies. , iron levels low, ferritin 31, hemoccult -ve. - will give Iron iv infusion while inpatient. Continue po supplement on dc. #. Epistaxis: small amounts/dripping on/off. ENT evaluated, Afrin, Vaseline, saline spray. Monitor off AC    #. CAD: stable, no chest pain. home regimen, monitor  #. Elevated trops: likely demand/ Type2 MI. Cardiology following. #. Chronic Systolic CHF: s/p LVAD- which was removed. Currently ef 20-25%. #. S/p ICD/PPM  #. Paroxysmal Afib: holding eliquis for now. Rate controlled- Advanced heart failure team following. #. CKD3: monitor renal function, avoid nephrotoxics, Nephrology following. Code status: full  DVT prophylaxis: SCD  Care Plan discussed with: Patient/Family and Nurse  Disposition: TBD     Hospital Problems  Date Reviewed: 2019          Codes Class Noted POA    Anemia ICD-10-CM: D64.9  ICD-9-CM: 285.9  3/5/2020 Unknown            Review of Systems:   Pertinent items are mentioned in interval history. Vital Signs:    Last 24hrs VS reviewed since prior progress note.  Most recent are:  Visit Vitals  /45 (BP 1 Location: Left arm, BP Patient Position: At rest)   Pulse 76   Temp 97.7 °F (36.5 °C)   Resp 16   Ht 5' 8\" (1.727 m)   Wt 81.6 kg (179 lb 14.3 oz)   SpO2 95%   BMI 27.35 kg/m² Intake/Output Summary (Last 24 hours) at 3/8/2020 1457  Last data filed at 3/8/2020 0801  Gross per 24 hour   Intake 350 ml   Output    Net 350 ml        Physical Examination:   General:  Alert, oriented, No acute distress  Card:  S1, S2 without murmurs, good peripheral perfusion, warm peripheries  Resp:  No accessory muscle use, Good AE, no wheezes, no rhonchi  Abd:  Soft, non-tender, non-distended  Extremities:  No cyanosis or clubbing, no significant edema  Neuro:  Grossly normal, no focal neuro deficits, follows commands   Psych:  Good insight, AAOx3, not agitated. Data Review:    Review and/or order of clinical lab test  Review and/or order of tests in the radiology section of CPT  Review and/or order of tests in the medicine section of CPT  Labs:     Recent Labs     03/08/20 0432 03/07/20 0310   WBC 6.5 5.4   HGB 7.2* 7.3*   HCT 23.4* 23.6*    199     Recent Labs     03/08/20 0432 03/07/20 0310 03/06/20  1445    140 139  137   K 4.4 4.7 4.4  4.3   * 116* 113*  114*   CO2 19* 17* 17*  17*   BUN 40* 55* 58*  59*   CREA 1.36* 1.50* 1.65*  1.63*   GLU 96 86 149*  151*   CA 8.2* 8.1* 8.4*  8.5   PHOS  --   --  3.4     Recent Labs     03/08/20 0432 03/07/20 0310 03/06/20  1445   SGOT 25 22 22   ALT 31 30 29    104 114   TBILI 0.5 0.5 0.6   TP 6.1* 6.4 6.2*   ALB 2.9* 3.0* 3.2*  3.2*   GLOB 3.2 3.4 3.0     Recent Labs     03/07/20  0310   INR 1.1   PTP 11.5*      Recent Labs     03/06/20  1445   TIBC 318   PSAT 8*   FERR 31      Lab Results   Component Value Date/Time    Folate 13.0 05/24/2019 12:00 AM      No results for input(s): PH, PCO2, PO2 in the last 72 hours.   Recent Labs     03/06/20  0743 03/05/20  1647   TROIQ 0.07* 0.07*     Lab Results   Component Value Date/Time    Cholesterol, total 117 06/12/2019 03:05 PM    HDL Cholesterol 75 06/12/2019 03:05 PM    LDL, calculated 36 06/12/2019 03:05 PM    Triglyceride 28 06/12/2019 03:05 PM    CHOL/HDL Ratio 2.3 11/27/2016 03:58 AM     Lab Results   Component Value Date/Time    Glucose (POC) 102 (H) 03/08/2020 11:10 AM    Glucose (POC) 104 (H) 03/08/2020 06:09 AM    Glucose (POC) 121 (H) 03/07/2020 09:22 PM    Glucose (POC) 140 (H) 03/07/2020 04:07 PM    Glucose (POC) 131 (H) 03/07/2020 11:07 AM     Lab Results   Component Value Date/Time    Color YELLOW/STRAW 01/23/2017 03:42 PM    Appearance CLEAR 01/23/2017 03:42 PM    Specific gravity 1.011 01/23/2017 03:42 PM    pH (UA) 6.0 01/23/2017 03:42 PM    Protein NEGATIVE  01/23/2017 03:42 PM    Glucose NEGATIVE  01/23/2017 03:42 PM    Ketone NEGATIVE  01/23/2017 03:42 PM    Bilirubin NEGATIVE  01/23/2017 03:42 PM    Urobilinogen 0.2 01/23/2017 03:42 PM    Nitrites NEGATIVE  01/23/2017 03:42 PM    Leukocyte Esterase NEGATIVE  01/23/2017 03:42 PM    Epithelial cells FEW 01/23/2017 03:42 PM    Bacteria NEGATIVE  01/23/2017 03:42 PM    WBC 0-4 01/23/2017 03:42 PM    RBC 0-5 01/23/2017 03:42 PM     Medications Reviewed:     Current Facility-Administered Medications   Medication Dose Route Frequency    carvediloL (COREG) tablet 3.125 mg  3.125 mg Oral BID WITH MEALS    apixaban (ELIQUIS) tablet 5 mg  5 mg Oral BID    sacubitriL-valsartan (ENTRESTO) 49-51 mg tablet 1 Tab  1 Tab Oral Q12H    iron sucrose (VENOFER) 200 mg in 0.9% sodium chloride 100 mL IVPB  200 mg IntraVENous Q24H    sodium chloride (OCEAN) 0.65 % nasal squeeze bottle 2 Spray  2 Spray Both Nostrils Q2H PRN    pantoprazole (PROTONIX) 40 mg in 0.9% sodium chloride 10 mL injection  40 mg IntraVENous Q12H    oxymetazoline (AFRIN) 0.05 % nasal spray 2 Spray  2 Spray Both Nostrils BID    allopurinoL (ZYLOPRIM) tablet 100 mg  100 mg Oral DAILY    amiodarone (CORDARONE) tablet 200 mg  200 mg Oral DAILY    ascorbic acid (vitamin C) (VITAMIN C) tablet 500 mg  500 mg Oral BID    atorvastatin (LIPITOR) tablet 40 mg  40 mg Oral DAILY    colchicine tablet 0.6 mg  0.6 mg Oral EVERY OTHER DAY    DULoxetine (CYMBALTA) capsule 60 mg  60 mg Oral DAILY    cyclobenzaprine (FLEXERIL) tablet 5 mg  5 mg Oral DAILY PRN    HYDROcodone-acetaminophen (NORCO) 5-325 mg per tablet 1 Tab  1 Tab Oral Q4H PRN    albuterol (PROVENTIL VENTOLIN) nebulizer solution 2.5 mg  2.5 mg Nebulization Q4H PRN    montelukast (SINGULAIR) tablet 10 mg  10 mg Oral QHS    patiromer calcium sorbitex (VELTASSA) powder 16.8 g  16.8 g Oral DAILY    pregabalin (LYRICA) capsule 50 mg  50 mg Oral TID    tamsulosin (FLOMAX) capsule 0.4 mg  0.4 mg Oral DAILY    sodium chloride (NS) flush 5-40 mL  5-40 mL IntraVENous Q8H    sodium chloride (NS) flush 5-40 mL  5-40 mL IntraVENous PRN    naloxone (NARCAN) injection 0.4 mg  0.4 mg IntraVENous PRN    zolpidem (AMBIEN) tablet 5 mg  5 mg Oral QHS PRN    acetaminophen (TYLENOL) tablet 650 mg  650 mg Oral Q4H PRN    ondansetron (ZOFRAN) injection 4 mg  4 mg IntraVENous Q4H PRN    docusate sodium (COLACE) capsule 100 mg  100 mg Oral BID    sodium bicarbonate tablet 650 mg  650 mg Oral TID   ______________________________________________________________________  EXPECTED LENGTH OF STAY: - - -  ACTUAL LENGTH OF STAY:          3               Joana Levine MD

## 2020-03-08 NOTE — PROGRESS NOTES
[facility] Progress Note      DOS:  3/8/2020  REFERRING PROVIDER: Noah Perez MD  PRIMARY CARE PHYSICIAN: Kaveh Carl MD  PRIMARY CARDIOLOGIST:  Josefa Srivastava MD   EP: Andreas Verde MD   PULMONARY: Vida Marie MD       IMPRESSION/PLAN:   ICM s/p HMII as BTT on 12/1/16 and explant, NYHA Class IV, LVEF 20-25%     Resume low dose coreg 3.125 mg BID    Increase entresto to 49/51 mg, 1 tablet BID - monitor    Intolerant to eplerenone due to hyperkalemia    Hold diuretics   Resume eliquis   Strict I/O, daily weights, Na+ restricted diet    Trend CMP, proBNP    Acute blood loss anemia due to epistaxis   ENT consult appreciated   Afrin, saline spray    Transfuse for Hgb < 8     FOB negative; appreciate GI consult   Iron profile   Monitor H>H with resuming eliquis      CAD s/p CABG (SVG to RCA and LIMA to LAD) on 12/1/16 s/p BMS x2 -> SVG-RCA graft - no angina    Continue ASA, statin   Resume Coreg when BP improves     Mitral Regurgitation, severe - improved to mod   Follow with serial TTEs      Hyperkalemia   Continue Veltassa   Monitor serum potassium with resuming entresto   Hold eplerenone    Chronic Left Pleural Effusion    s/p left thoracentesis on 7/5/2019   Encourage use of IS    CXR in AM      High Risk of SCD    s/p SQ AICD (5/20/19)   No shocks   Followed by Dr. Matesu Sanchez       PAD    Difficult femoral access with LVAD explant              No symptoms of claudication      Chronic Anticoagulation    Eliquis on hold due to acute blood loss anemia     Gout              Continue colchicine 0.6 mg every other day              Continue allopurinol 100 mg daily   Denies recent gouty attacks    Chronic Lower back pain              On oxycodone, flexeril   Hasn't taken since ICD implant   Back pain improved with increased activity- going to gym    H/o tobacco abuse   Abstaining from nicotine    Encouraged complete cessation     H/o alcohol abuse   Currently abstaining   Encouraged complete abstinence      Seizure disorder - early 25s              No recent seizure activity     Peripheral neuropathy              On cymbalta   ATTR- Negative     Remainder of care per primary. Thank you for letting us see him with you,      Kristi Evans MD, [facility], [address] [place] [address]  Chief of Cardiology, [facility] [address], [address] [place] [address], [address] [place] [address]  Office [phone]  Fax [phone]         Chief Complaint   Patient presents with    Fatigue    Generalized Body Aches         HPI: Nick Mackey is a 62y.o. year old male  with a history of HFrEF in the setting of ICM s/p LVAD and recent LVAD explant complicated by PVD,  remote tobacco use and alcohol abuse (quit 11/2016) who presents for follow up of his HFrEF. Mr. Niya Bell presented 11/22/2016 for decompressive laminectomy at I1-O0 complicated by myocardial infarction. The [facility](11/25/16) revealed severe left main and 3 vessel disease along with a 60% right common iliac stenosis and  LVEF was 10%. He subsequently underwent placement of an IABP followed by placement of a HeartMate2 LVAD on 12/1/2016 with concomitant CABG (SVG to the RCA, LIMA to LAD). His postoperative course was complicated by RV failure and an ileus requiring TPN resulting and a transaminitis. He had multiple episodes of Torsade on 12/4/2016 prompting repeat catheterization revealing an occluded RCA vein graft. Two BMS were placed in the RCA graft. He also had SVT requiring cardioversion. He had a single lead ICD placed on 16/09/80 complicated by a hematoma. The ICD was later removed on 1/20/17. Following his LVAD surgery he did require inpatient rehab.      Mr. Niya Bell was listed for heart transplantation at [facility]. He was called in for transplantation on 7/14/2018 but on pre-op DELLA was noted to have normal LV function and the transplant surgery was aborted.  He subsequently was admitted on 9/5/2018 for LVAD explant- his post-operative course was complicated by pneumonia and bilateral pleural effusions. He was discharged on home O2 which he discontinued on his own.       After device explant, his subsequent echocardiograms showed deterioration of LVEF and worsening mitral regurgitation from 40-45% with moderate to severe MR on 9/19/18 to 35-40% with moderate-to-severe MR on 10/15/18. He has been removed from the transplant list at [facility] and is not interested in pursuing transplant evaluation at another center at this time. His entresto was discontinued at the time of his AICD implant due to hypotension. He subsequently took diovan but felt worse. He is tolerating the entresto with no dizziness, presyncope, or syncope. He was seen by Dr. Priscilla Mcallister, who stated that his mitral regurgitation has decreased significantly with optimizing his HF medications. He presented to the [facility] [place] last evening with complaints of fatigue. ED evaluation revealed significant anemia, Hgb 6.1. HE has been admitted for further evaluation and treatment. [facility] consulted for assistance with the management of his HF. CARDIAC EVALUATION  TTE 4/7/19: LVEF 32%, LVAD plug at LV apex, grade 2 diastolic dysfunction, mild-mod MR, mild TR, PAPs 43 mmHg, mild LAE  DELLA 7/14/2018 - normal LV function and the transplant surgery was aborted. He subsequently was admitted on 9/5/2018 for LVAD explant  ECHO 9/19/18:LVEF 40-45% mod-severe MR   ECHO 10/15/18:EF 35-40%, mod-severe MR   ECHO 10/31/18: TDS, EF 30-35%, reduced RVEF, TAPSE 1.6, LAE, mild- mod MR/TR  ECHO 1/16/19: EF 25-30%, question of thrombus in apex. Mild- mod TR. Consider cardiac MR to exclude left ventricular thrombus.   ECHO 6/13/2019: EF 16-20%, mod MR, mod-severe TR, severe pulm HTN (PA systolic pressure 85 mmHg), mildly reduced RV systolic function (TAPSE 3.28 cm)  ECHO 8/16/19: EF 20-25%, moderately reduced RV systolic function, mild MR  ECHO 2/12/2020: Dilated LV ( LVIDd 6 cm),  EF 38-13%, grade 2 diastolic dysfunction, RV dysfunction, TAPSE 1.31 cm, AoV sclerosis, mod MR, mod-severe TR  ECHO 3/6/20: Normal LV size (5.21 cm), EF 20-25%, mild-mod MR, mild-mod TR, PAPs 47 mmHg    CATH 16: severe LM, and 3 VD with 60% right common iliac stenosis. EF: 10%. ----S/p IABP   ----S/p HeartMate2 LVAD 2016   S/p CAB2016: LIMA-> LAD, SVG-> RCA     CATH 16 following torsades SVG-> RCA TO   ---- s/p BMS x 2-. RCA  RHC 2017:RA: 15/15 14, RV:  14,PA: 23/28, m 20, PCWP 13, PVR 1.67 CO 5.67, CI 3    S/p LVAD explant 18 ([facility])    SVT s/p DCCV  AICD  single lead cx by hematoma  AICD explanted 17    EKG:    10/31/18 NSR QRS 88ms   ms  19: SR rate 68bpm QRS 94ms Qtc 438 ms    Review of Systems   Constitutional: Positive for malaise/fatigue. Negative for chills and fever. HENT: Positive for congestion and nosebleeds. Negative for sinus pain and sore throat. Eyes: Negative. Respiratory: Negative for cough, sputum production and shortness of breath. Cardiovascular: Negative for chest pain, palpitations, orthopnea, claudication and leg swelling. Gastrointestinal: Negative for abdominal pain, heartburn, nausea and vomiting. Genitourinary: Negative. Musculoskeletal: Positive for back pain. Negative for falls. Chronic back pain   Skin: Negative. Neurological: Negative for dizziness. Endo/Heme/Allergies: Negative. Psychiatric/Behavioral: Negative.         History:  Past Medical History:   Diagnosis Date    Arrhythmia     SVT    Arthritis     CAD (coronary artery disease)     Chronic pain     back    Congestive heart failure (HCC)     DSOUZA (dyspnea on exertion) 2019    Gout     [facility] ([facility]     Hypertension     ICD (implantable cardioverter-defibrillator) in place     Long term current use of anticoagulant therapy     LVAD (left ventricular assist device) present ([facility] 2016    Myocardial infarction [facility])     Pacemaker     SubQ ICD    Raynaud disease     Seizures ([facility]     strobic seizures early 's     Past Surgical History:   Procedure Laterality Date    CABG, ARTERY-VEIN, TWO  2016    CARDIAC SURG PROCEDURE UNLIST  2016    LVAD    HX CORONARY ARTERY BYPASS GRAFT      HX CORONARY STENT PLACEMENT      HX HEART CATHETERIZATION      HX HEENT      wisdom teeth removed    HX ORTHOPAEDIC  2016    laminectomy    HX PACEMAKER      ICD - removed 2017    HX PTCA      WA INSJ OF SUBQ IMPLANTABLE DEFIBRILLATOR ELECTRODE N/A 2019    INSERT SUBQ ICD w/ anesthesia performed by Marshal Tucker MD at [facility] CATH LAB     Social History     Socioeconomic History    Marital status:      Spouse name: Keily Mora    Number of children: 2    Years of education: Not on file    Highest education level: Some college, no degree   Occupational History    Not on file   Social Needs    Financial resource strain: Not hard at all   Saxon99tests insecurity:     Worry: Never true     Inability: Never true    Transportation needs:     Medical: No     Non-medical: No   Tobacco Use    Smoking status: Former Smoker     Packs/day: 1.50     Years: 30.00     Pack years: 45.00     Last attempt to quit:      Years since quittin.1    Smokeless tobacco: Never Used   Substance and Sexual Activity    Alcohol use: No    Drug use: No    Sexual activity: Yes   Lifestyle    Physical activity:     Days per week: Not on file     Minutes per session: Not on file    Stress: Not on file   Relationships    Social connections:     Talks on phone: Not on file     Gets together: Not on file     Attends Spiritism service: Not on file     Active member of club or organization: Not on file     Attends meetings of clubs or organizations: Not on file     Relationship status: Not on file    Intimate partner violence:     Fear of current or ex partner: Not on file     Emotionally abused: Not on file     Physically abused: Not on file     Forced sexual activity: Not on file   Other Topics Concern     Service Not Asked    Blood Transfusions Not Asked    Caffeine Concern Not Asked    Occupational Exposure Not Asked    Hobby Hazards Not Asked    Sleep Concern Not Asked    Stress Concern Not Asked    Weight Concern Not Asked    Special Diet Not Asked    Back Care Not Asked    Exercise Not Asked    Bike Helmet Not Asked   [address] Not Asked    Self-Exams Not Asked   Social History Narrative    Not on file     Family History   Problem Relation Age of Onset    Diabetes Mother     Dementia Mother     Other Mother         carotid artery blockage    Heart Disease Father     Stroke Father     Heart Attack Father         several    Hypertension Father     Elevated Lipids Father     No Known Problems Sister     Cancer Brother         brain    Lung Disease Sister     COPD Sister     Cancer Sister         lung       Current Medications:   Current Facility-Administered Medications   Medication Dose Route Frequency Provider Last Rate Last Dose    carvediloL (COREG) tablet 3.125 mg  3.125 mg Oral BID WITH MEALS Greg Weems MD        iron sucrose (VENOFER) 200 mg in 0.9% sodium chloride 100 mL IVPB  200 mg IntraVENous Q24H Jovanni Arias  mL/hr at 03/07/20 0958 200 mg at 03/07/20 0958    sodium chloride (OCEAN) 0.65 % nasal squeeze bottle 2 Spray  2 Spray Both Nostrils Q2H PRN Jovanni Arias MD        sacubitriL-valsartan (ENTRESTO) 24-26 mg tablet 1 Tab  1 Tab Oral Q12H Kristi Weems MD   1 Tab at 03/08/20 0817    pantoprazole (PROTONIX) 40 mg in 0.9% sodium chloride 10 mL injection  40 mg IntraVENous Q12H Daria Tristan NP   40 mg at 03/08/20 0818    oxymetazoline (AFRIN) 0.05 % nasal spray 2 Spray  2 Spray Both Nostrils BID Stefani Menjivar MD   Stopped at 03/08/20 0819    allopurinoL (ZYLOPRIM) tablet 100 mg  100 mg Oral DAILY Vick Little MD 100 mg at 03/08/20 0818    amiodarone (CORDARONE) tablet 200 mg  200 mg Oral DAILY Maryann Dotson MD   200 mg at 03/08/20 1949    ascorbic acid (vitamin C) (VITAMIN C) tablet 500 mg  500 mg Oral BID Maryann Dotson MD   500 mg at 03/08/20 7272    atorvastatin (LIPITOR) tablet 40 mg  40 mg Oral DAILY Maryann Dotson MD   40 mg at 03/08/20 1896    colchicine tablet 0.6 mg  0.6 mg Oral EVERY OTHER DAY Maryann Dotson MD   0.6 mg at 03/07/20 2328    DULoxetine (CYMBALTA) capsule 60 mg  60 mg Oral DAILY Maryann Dotson MD   60 mg at 03/08/20 5627    cyclobenzaprine (FLEXERIL) tablet 5 mg  5 mg Oral DAILY PRN Maryann Dotson MD        HYDROcodone-acetaminophen [facility]) 5-325 mg per tablet 1 Tab  1 Tab Oral Q4H PRN Maryann Dotson MD        albuterol (PROVENTIL VENTOLIN) nebulizer solution 2.5 mg  2.5 mg Nebulization Q4H PRN Maryann Dotson MD        montelukast (SINGULAIR) tablet 10 mg  10 mg Oral QHS Maryann Dotson MD   10 mg at 03/07/20 2127    patiromer calcium sorbitex (VELTASSA) powder 16.8 g  16.8 g Oral DAILY Maryann Dotson MD   16.8 g at 03/07/20 1221    pregabalin (LYRICA) capsule 50 mg  50 mg Oral TID Maryann Dotson MD   50 mg at 03/08/20 0818    tamsulosin (FLOMAX) capsule 0.4 mg  0.4 mg Oral DAILY Maryann Dotson MD   0.4 mg at 03/08/20 0817    sodium chloride (NS) flush 5-40 mL  5-40 mL IntraVENous Q8H Maryann Dotson MD   10 mL at 03/08/20 0610    sodium chloride (NS) flush 5-40 mL  5-40 mL IntraVENous PRN Maryann Dotson MD   10 mL at 03/07/20 7529    naloxone [facility]) injection 0.4 mg  0.4 mg IntraVENous PRN Maryann Dotson MD        zolpidem [facility]) tablet 5 mg  5 mg Oral QHS PRN Maryann Dotson MD        acetaminophen (TYLENOL) tablet 650 mg  650 mg Oral Q4H PRN Maryann Dotson MD        ondansetron [facility]) injection 4 mg  4 mg IntraVENous Q4H PRN Maryann Dotson MD        docusate sodium (COLACE) capsule 100 mg  100 mg Oral BID Maricel Moss MD   100 mg at 03/08/20 6447    sodium bicarbonate tablet 650 mg  650 mg Oral TID Maryann Dotson MD   650 mg at 03/08/20 6257       Allergies:    Allergies   Allergen Reactions    Morphine Other (comments)     Hallucinations. Confusion. Impaired judgement    Chlorhexidine Rash       Physical Exam:   Vitals:    Visit Vitals  /53 (BP 1 Location: Right arm, BP Patient Position: Supine; Head of bed elevated (Comment degrees))   Pulse 81   Temp 97.6 °F (36.4 °C)   Resp 18   Ht 5' 8\" (1.727 m)   Wt 179 lb 14.3 oz (81.6 kg)   SpO2 91%   BMI 27.35 kg/m²        Physical Exam  Constitutional:       Appearance: He is normal weight. HENT:      Nose: Congestion present. Right Nostril: Epistaxis present. Left Nostril: Epistaxis present. Cardiovascular:      Rate and Rhythm: Normal rate and regular rhythm. Heart sounds: S1 normal and S2 normal. Murmur present. Systolic murmur present. Pulmonary:      Effort: Tachypnea present. Breath sounds: Examination of the right-upper field reveals rhonchi. Examination of the left-upper field reveals rhonchi. Examination of the left-middle field reveals decreased breath sounds. Examination of the left-lower field reveals decreased breath sounds. Decreased breath sounds and rhonchi present. Comments: +frequent congested cough  Abdominal:      General: Bowel sounds are normal. There is distension. Musculoskeletal:      Right lower leg: No edema. Left lower leg: No edema. Skin:     General: Skin is warm and dry. Neurological:      Mental Status: He is alert and oriented to person, place, and time. Psychiatric:         Mood and Affect: Mood normal.         Behavior: Behavior normal. Behavior is cooperative. Thought Content:  Thought content normal.         Judgment: Judgment normal.           Recent Labs:     Lab Results   Component Value Date/Time    WBC 6.5 03/08/2020 04:32 AM    HGB 7.2 (L) 03/08/2020 04:32 AM    HCT 23.4 (L) 03/08/2020 04:32 AM    PLATELET 132 79/03/9173 04:32 AM    MCV 91.1 03/08/2020 04:32 AM     Lab Results   Component Value Date/Time    GFR est non-AA 53 (L) 03/08/2020 04:32 AM GFR est AA >60 03/08/2020 04:32 AM    Creatinine 1.36 (H) 03/08/2020 04:32 AM    BUN 40 (H) 03/08/2020 04:32 AM    Sodium 141 03/08/2020 04:32 AM    Potassium 4.4 03/08/2020 04:32 AM    Chloride 115 (H) 03/08/2020 04:32 AM    CO2 19 (L) 03/08/2020 04:32 AM    Magnesium 2.1 02/21/2020 01:40 PM    Phosphorus 3.4 03/06/2020 02:45 PM     Lab Results   Component Value Date/Time    TSH 3.790 06/12/2019 03:05 PM    T4, Free 1.68 06/12/2019 03:05 PM      Lab Results   Component Value Date/Time    Sodium 141 03/08/2020 04:32 AM    Potassium 4.4 03/08/2020 04:32 AM    Chloride 115 (H) 03/08/2020 04:32 AM    CO2 19 (L) 03/08/2020 04:32 AM    Anion gap 7 03/08/2020 04:32 AM    Glucose 96 03/08/2020 04:32 AM    BUN 40 (H) 03/08/2020 04:32 AM    Creatinine 1.36 (H) 03/08/2020 04:32 AM    BUN/Creatinine ratio 29 (H) 03/08/2020 04:32 AM    GFR est AA >60 03/08/2020 04:32 AM    GFR est non-AA 53 (L) 03/08/2020 04:32 AM    Calcium 8.2 (L) 03/08/2020 04:32 AM    Bilirubin, total 0.5 03/08/2020 04:32 AM    ALT (SGPT) 31 03/08/2020 04:32 AM    AST (SGOT) 25 03/08/2020 04:32 AM    Alk.  phosphatase 101 03/08/2020 04:32 AM    Protein, total 6.1 (L) 03/08/2020 04:32 AM    Albumin 2.9 (L) 03/08/2020 04:32 AM    Globulin 3.2 03/08/2020 04:32 AM    A-G Ratio 0.9 (L) 03/08/2020 04:32 AM      Lab Results   Component Value Date/Time    Hemoglobin A1c 5.9 05/22/2019 02:56 AM       Thank you for letting us see him with you,      Abbey Corey MD  [facility]  [facility], [address] [place] [address]  Office [phone]  Fax [phone]

## 2020-03-09 VITALS
BODY MASS INDEX: 27.5 KG/M2 | WEIGHT: 181.44 LBS | OXYGEN SATURATION: 96 % | SYSTOLIC BLOOD PRESSURE: 114 MMHG | TEMPERATURE: 97.9 F | HEIGHT: 68 IN | HEART RATE: 87 BPM | DIASTOLIC BLOOD PRESSURE: 54 MMHG | RESPIRATION RATE: 18 BRPM

## 2020-03-09 DIAGNOSIS — I50.22 CHRONIC SYSTOLIC CONGESTIVE HEART FAILURE (HCC): Primary | ICD-10-CM

## 2020-03-09 LAB
ALBUMIN SERPL-MCNC: 3.1 G/DL (ref 3.5–5)
ALBUMIN/GLOB SERPL: 0.9 {RATIO} (ref 1.1–2.2)
ALP SERPL-CCNC: 108 U/L (ref 45–117)
ALT SERPL-CCNC: 32 U/L (ref 12–78)
ANION GAP SERPL CALC-SCNC: 9 MMOL/L (ref 5–15)
AST SERPL-CCNC: 23 U/L (ref 15–37)
BILIRUB SERPL-MCNC: 0.5 MG/DL (ref 0.2–1)
BNP SERPL-MCNC: 7268 PG/ML
BUN SERPL-MCNC: 34 MG/DL (ref 6–20)
BUN/CREAT SERPL: 25 (ref 12–20)
CALCIUM SERPL-MCNC: 8.3 MG/DL (ref 8.5–10.1)
CHLORIDE SERPL-SCNC: 112 MMOL/L (ref 97–108)
CO2 SERPL-SCNC: 19 MMOL/L (ref 21–32)
CREAT SERPL-MCNC: 1.38 MG/DL (ref 0.7–1.3)
ERYTHROCYTE [DISTWIDTH] IN BLOOD BY AUTOMATED COUNT: 17.9 % (ref 11.5–14.5)
GLOBULIN SER CALC-MCNC: 3.4 G/DL (ref 2–4)
GLUCOSE BLD STRIP.AUTO-MCNC: 103 MG/DL (ref 65–100)
GLUCOSE BLD STRIP.AUTO-MCNC: 105 MG/DL (ref 65–100)
GLUCOSE SERPL-MCNC: 86 MG/DL (ref 65–100)
HCT VFR BLD AUTO: 24.6 % (ref 36.6–50.3)
HGB BLD-MCNC: 7.4 G/DL (ref 12.1–17)
MCH RBC QN AUTO: 27.4 PG (ref 26–34)
MCHC RBC AUTO-ENTMCNC: 30.1 G/DL (ref 30–36.5)
MCV RBC AUTO: 91.1 FL (ref 80–99)
NRBC # BLD: 0 K/UL (ref 0–0.01)
NRBC BLD-RTO: 0 PER 100 WBC
PLATELET # BLD AUTO: 232 K/UL (ref 150–400)
PMV BLD AUTO: 11.6 FL (ref 8.9–12.9)
POTASSIUM SERPL-SCNC: 4.4 MMOL/L (ref 3.5–5.1)
PROT SERPL-MCNC: 6.5 G/DL (ref 6.4–8.2)
RBC # BLD AUTO: 2.7 M/UL (ref 4.1–5.7)
SERVICE CMNT-IMP: ABNORMAL
SERVICE CMNT-IMP: ABNORMAL
SODIUM SERPL-SCNC: 140 MMOL/L (ref 136–145)
WBC # BLD AUTO: 6.9 K/UL (ref 4.1–11.1)

## 2020-03-09 PROCEDURE — 74011000258 HC RX REV CODE- 258: Performed by: INTERNAL MEDICINE

## 2020-03-09 PROCEDURE — 74011250637 HC RX REV CODE- 250/637: Performed by: HOSPITALIST

## 2020-03-09 PROCEDURE — 74011000250 HC RX REV CODE- 250: Performed by: NURSE PRACTITIONER

## 2020-03-09 PROCEDURE — 99238 HOSP IP/OBS DSCHRG MGMT 30/<: CPT | Performed by: INTERNAL MEDICINE

## 2020-03-09 PROCEDURE — C9113 INJ PANTOPRAZOLE SODIUM, VIA: HCPCS | Performed by: NURSE PRACTITIONER

## 2020-03-09 PROCEDURE — 36415 COLL VENOUS BLD VENIPUNCTURE: CPT

## 2020-03-09 PROCEDURE — 74011250636 HC RX REV CODE- 250/636: Performed by: NURSE PRACTITIONER

## 2020-03-09 PROCEDURE — 82962 GLUCOSE BLOOD TEST: CPT

## 2020-03-09 PROCEDURE — 83880 ASSAY OF NATRIURETIC PEPTIDE: CPT

## 2020-03-09 PROCEDURE — 74011250637 HC RX REV CODE- 250/637: Performed by: INTERNAL MEDICINE

## 2020-03-09 PROCEDURE — 74011250636 HC RX REV CODE- 250/636: Performed by: INTERNAL MEDICINE

## 2020-03-09 PROCEDURE — 80053 COMPREHEN METABOLIC PANEL: CPT

## 2020-03-09 PROCEDURE — 85027 COMPLETE CBC AUTOMATED: CPT

## 2020-03-09 RX ORDER — CARVEDILOL 3.12 MG/1
3.12 TABLET ORAL 2 TIMES DAILY WITH MEALS
Qty: 60 TAB | Refills: 0 | Status: SHIPPED | OUTPATIENT
Start: 2020-03-09 | End: 2020-04-08

## 2020-03-09 RX ORDER — DOCUSATE SODIUM 100 MG/1
100 CAPSULE, LIQUID FILLED ORAL 2 TIMES DAILY
Qty: 60 CAP | Refills: 2 | Status: SHIPPED | OUTPATIENT
Start: 2020-03-09 | End: 2020-06-07

## 2020-03-09 RX ORDER — BUMETANIDE 0.5 MG/1
1 TABLET ORAL
Qty: 90 TAB | Refills: 3 | Status: SHIPPED | OUTPATIENT
Start: 2020-03-11

## 2020-03-09 RX ADMIN — SACUBITRIL AND VALSARTAN 1 TABLET: 49; 51 TABLET, FILM COATED ORAL at 09:26

## 2020-03-09 RX ADMIN — ALLOPURINOL 100 MG: 100 TABLET ORAL at 09:00

## 2020-03-09 RX ADMIN — SODIUM CHLORIDE 40 MG: 9 INJECTION INTRAMUSCULAR; INTRAVENOUS; SUBCUTANEOUS at 09:24

## 2020-03-09 RX ADMIN — DULOXETINE HYDROCHLORIDE 60 MG: 60 CAPSULE, DELAYED RELEASE ORAL at 09:24

## 2020-03-09 RX ADMIN — OXYMETAZOLINE HYDROCHLORIDE 2 SPRAY: 0.05 SPRAY NASAL at 09:25

## 2020-03-09 RX ADMIN — IRON SUCROSE 200 MG: 20 INJECTION, SOLUTION INTRAVENOUS at 09:51

## 2020-03-09 RX ADMIN — Medication 10 ML: at 06:22

## 2020-03-09 RX ADMIN — PREGABALIN 50 MG: 25 CAPSULE ORAL at 09:24

## 2020-03-09 RX ADMIN — OXYCODONE HYDROCHLORIDE AND ACETAMINOPHEN 500 MG: 500 TABLET ORAL at 09:24

## 2020-03-09 RX ADMIN — SODIUM BICARBONATE 650 MG: 650 TABLET ORAL at 09:24

## 2020-03-09 RX ADMIN — TAMSULOSIN HYDROCHLORIDE 0.4 MG: 0.4 CAPSULE ORAL at 09:24

## 2020-03-09 RX ADMIN — CARVEDILOL 3.12 MG: 3.12 TABLET, FILM COATED ORAL at 09:24

## 2020-03-09 RX ADMIN — AMIODARONE HYDROCHLORIDE 200 MG: 200 TABLET ORAL at 09:25

## 2020-03-09 RX ADMIN — ATORVASTATIN CALCIUM 40 MG: 40 TABLET, FILM COATED ORAL at 09:25

## 2020-03-09 RX ADMIN — DOCUSATE SODIUM 100 MG: 100 CAPSULE, LIQUID FILLED ORAL at 09:24

## 2020-03-09 RX ADMIN — APIXABAN 5 MG: 5 TABLET, FILM COATED ORAL at 09:24

## 2020-03-09 NOTE — PROGRESS NOTES
[facility]   [address] [facility], [address] [place] [address], [facility]  Phone: [phone]   [phone]       Nephrology Progress Note  Loc Mcbride     1955     [id number]  Date of Admission : 3/5/2020  03/09/20    CC: Follow up for CKD       Assessment and Plan   CKD III:  - suspect 2/2 chronic HTN, CMP w/ need for diuretics  - Cr stable w/ Entresto   - daily labs while here     Hyperkalemia:  - from CKD and Entresto  - continue Veltassa 16.8 gm daily      Metabolic Acidosis:  - continue oral bicarb     HFrEF:  - s/p HM II 12/1/16, explanted  - EF 20-25%  - on Coreg and Entresto  - per HF service     HTN:  -  BP stable     Severe Anemia:  - s/p IV iron   - GI consulted     CAD s/p CABG  Moderate MR     Gout:  - on allopurinol and colchicine     Interval History:  Seen and examined   Denies any ongoing bleeding   Hb stable   Received IV venofer     Review of Systems: Review of systems not obtained due to patient factors.     Current Medications:   Current Facility-Administered Medications   Medication Dose Route Frequency    carvediloL (COREG) tablet 3.125 mg  3.125 mg Oral BID WITH MEALS    apixaban (ELIQUIS) tablet 5 mg  5 mg Oral BID    sacubitriL-valsartan (ENTRESTO) 49-51 mg tablet 1 Tab  1 Tab Oral Q12H    iron sucrose (VENOFER) 200 mg in 0.9% sodium chloride 100 mL IVPB  200 mg IntraVENous Q24H    sodium chloride (OCEAN) 0.65 % nasal squeeze bottle 2 Spray  2 Spray Both Nostrils Q2H PRN    pantoprazole (PROTONIX) 40 mg in 0.9% sodium chloride 10 mL injection  40 mg IntraVENous Q12H    oxymetazoline (AFRIN) 0.05 % nasal spray 2 Spray  2 Spray Both Nostrils BID    allopurinoL (ZYLOPRIM) tablet 100 mg  100 mg Oral DAILY    amiodarone (CORDARONE) tablet 200 mg  200 mg Oral DAILY    ascorbic acid (vitamin C) (VITAMIN C) tablet 500 mg  500 mg Oral BID    atorvastatin (LIPITOR) tablet 40 mg  40 mg Oral DAILY    colchicine tablet 0.6 mg  0.6 mg Oral EVERY OTHER DAY    DULoxetine (CYMBALTA) capsule 60 mg  60 mg Oral DAILY    cyclobenzaprine (FLEXERIL) tablet 5 mg  5 mg Oral DAILY PRN    HYDROcodone-acetaminophen (NORCO) 5-325 mg per tablet 1 Tab  1 Tab Oral Q4H PRN    albuterol (PROVENTIL VENTOLIN) nebulizer solution 2.5 mg  2.5 mg Nebulization Q4H PRN    montelukast (SINGULAIR) tablet 10 mg  10 mg Oral QHS    patiromer calcium sorbitex (VELTASSA) powder 16.8 g  16.8 g Oral DAILY    pregabalin (LYRICA) capsule 50 mg  50 mg Oral TID    tamsulosin (FLOMAX) capsule 0.4 mg  0.4 mg Oral DAILY    sodium chloride (NS) flush 5-40 mL  5-40 mL IntraVENous Q8H    sodium chloride (NS) flush 5-40 mL  5-40 mL IntraVENous PRN    naloxone (NARCAN) injection 0.4 mg  0.4 mg IntraVENous PRN    zolpidem (AMBIEN) tablet 5 mg  5 mg Oral QHS PRN    acetaminophen (TYLENOL) tablet 650 mg  650 mg Oral Q4H PRN    ondansetron (ZOFRAN) injection 4 mg  4 mg IntraVENous Q4H PRN    docusate sodium (COLACE) capsule 100 mg  100 mg Oral BID    sodium bicarbonate tablet 650 mg  650 mg Oral TID      Allergies   Allergen Reactions    Morphine Other (comments)     Hallucinations. Confusion. Impaired judgement    Chlorhexidine Rash       Objective:  Vitals:    Vitals:    03/08/20 2025 03/08/20 2315 03/09/20 0326 03/09/20 0740   BP: 113/55 130/68 119/52 111/55   Pulse: 70 70 76 82   Resp: 17 16 19 18   Temp: 97.2 °F (36.2 °C) 97.5 °F (36.4 °C) 97.6 °F (36.4 °C) 98.2 °F (36.8 °C)   SpO2: 100% 99% 91% 90%   Weight:   82.3 kg (181 lb 7 oz)    Height:         Intake and Output:  No intake/output data recorded. 03/07 1901 - 03/09 0700  In: 3632 [P.O.:1320;  I.V.:220]  Out: 0     Physical Examination:    General: NAD,Conversant   Neck:  Supple, no mass  Resp:  CTA  CV:  VAD sounds   GI:  Soft, NT, + Bowel sounds, no hepatosplenomegaly  Neurologic:  Non focal  Psych:             AAO x 3 appropriate affect   Skin:  No Rash  :  No simental     []    High complexity decision making was performed  []    Patient is at high-risk of decompensation with multiple organ involvement    Lab Data Personally Reviewed: I have reviewed all the pertinent labs, microbiology data and radiology studies during assessment.     Recent Labs     03/09/20 0337 03/08/20 0432 03/07/20 0310 03/06/20  1445    141 140 139  137   K 4.4 4.4 4.7 4.4  4.3   * 115* 116* 113*  114*   CO2 19* 19* 17* 17*  17*   GLU 86 96 86 149*  151*   BUN 34* 40* 55* 58*  59*   CREA 1.38* 1.36* 1.50* 1.65*  1.63*   CA 8.3* 8.2* 8.1* 8.4*  8.5   PHOS  --   --   --  3.4   ALB 3.1* 2.9* 3.0* 3.2*  3.2*   SGOT 23 25 22 22   ALT 32 31 30 29   INR  --   --  1.1  --      Recent Labs     03/09/20 0337 03/08/20 0432 03/07/20 0310 03/06/20  2116 03/06/20  1445   WBC 6.9 6.5 5.4  --  7.0   HGB 7.4* 7.2* 7.3* 7.2* 6.7*   HCT 24.6* 23.4* 23.6* 24.0* 21.5*    214 199  --  207     No results found for: SDES  Lab Results   Component Value Date/Time    Culture result: NO GROWTH 4 DAYS 01/24/2017 05:33 AM    Culture result: MODERATE  ENTEROBACTER SPECIES   01/20/2017 05:56 PM    Culture result: FEW  ENTEROBACTER CLOACAE   01/20/2017 05:56 PM    Culture result: LIGHT  ENTEROBACTER CLOACAE   01/20/2017 05:55 PM    Culture result: NO GROWTH 6 DAYS 01/20/2017 03:50 PM     Recent Results (from the past 24 hour(s))   GLUCOSE, POC    Collection Time: 03/08/20 11:10 AM   Result Value Ref Range    Glucose (POC) 102 (H) 65 - 100 mg/dL    Performed by Kathia Degroot    GLUCOSE, POC    Collection Time: 03/08/20  9:04 PM   Result Value Ref Range    Glucose (POC) 106 (H) 65 - 100 mg/dL    Performed by Lawrence Gallegos    METABOLIC PANEL, COMPREHENSIVE    Collection Time: 03/09/20  3:37 AM   Result Value Ref Range    Sodium 140 136 - 145 mmol/L    Potassium 4.4 3.5 - 5.1 mmol/L    Chloride 112 (H) 97 - 108 mmol/L    CO2 19 (L) 21 - 32 mmol/L    Anion gap 9 5 - 15 mmol/L    Glucose 86 65 - 100 mg/dL    BUN 34 (H) 6 - 20 MG/DL    Creatinine 1.38 (H) 0.70 - 1.30 MG/DL    BUN/Creatinine ratio 25 (H) 12 - 20      GFR est AA >60 >60 ml/min/1.73m2    GFR est non-AA 52 (L) >60 ml/min/1.73m2    Calcium 8.3 (L) 8.5 - 10.1 MG/DL    Bilirubin, total 0.5 0.2 - 1.0 MG/DL    ALT (SGPT) 32 12 - 78 U/L    AST (SGOT) 23 15 - 37 U/L    Alk. phosphatase 108 45 - 117 U/L    Protein, total 6.5 6.4 - 8.2 g/dL    Albumin 3.1 (L) 3.5 - 5.0 g/dL    Globulin 3.4 2.0 - 4.0 g/dL    A-G Ratio 0.9 (L) 1.1 - 2.2     NT-PRO BNP    Collection Time: 03/09/20  3:37 AM   Result Value Ref Range    NT pro-BNP 7,268 (H) <125 PG/ML   CBC W/O DIFF    Collection Time: 03/09/20  3:37 AM   Result Value Ref Range    WBC 6.9 4.1 - 11.1 K/uL    RBC 2.70 (L) 4.10 - 5.70 M/uL    HGB 7.4 (L) 12.1 - 17.0 g/dL    HCT 24.6 (L) 36.6 - 50.3 %    MCV 91.1 80.0 - 99.0 FL    MCH 27.4 26.0 - 34.0 PG    MCHC 30.1 30.0 - 36.5 g/dL    RDW 17.9 (H) 11.5 - 14.5 %    PLATELET 934 906 - 551 K/uL    MPV 11.6 8.9 - 12.9 FL    NRBC 0.0 0  WBC    ABSOLUTE NRBC 0.00 0.00 - 0.01 K/uL   GLUCOSE, POC    Collection Time: 03/09/20  6:07 AM   Result Value Ref Range    Glucose (POC) 103 (H) 65 - 100 mg/dL    Performed by Franny Villalba (CON)            I have reviewed the flowsheets. Chart and Pertinent Notes have been reviewed. No change in PMH ,family and social history from Consult note.       Ignacio Castle MD

## 2020-03-09 NOTE — PROGRESS NOTES
Hospital follow-up PCP transitional care appointment has been scheduled with Dr. Rufino Thomason for Wednesday, 3/18/20 at 10:45 a.m. Pending patient discharge.   Rian Primrose, Care Management Specialist.

## 2020-03-09 NOTE — PROGRESS NOTES
Transitions of Care Plan:    Patient medically stable for discharge home today    Disposition:  Home with family to transport    1120 - CM attempted to meet with patient to provide IMM letter and confirm discharge plan. [facility] RN is actively discharging patient. Reason for Admission:   Anemia               RUR Score:     31%    PCP: First and Last name:  Cari Solano   Name of Practice:    Are you a current patient: Yes/No:    Approximate date of last visit:             Resources/supports as identified by patient/family:   Family support                Top Challenges facing patient (as identified by patient/family and CM): Finances/Medication cost?      [employer]? Independent; family will transport home              Support system or lack thereof? Family                     Living arrangements? Address on file. Self-care/ADLs/Cognition? Independent          Current Advanced Directive/Advance Care Plan: On file. Plan for utilizing home health:    No need indicated                 Transition of Care Plan:                  Chart review assessment and CM participated in IDR. Patient is independent; ready for discharge today; patient had a LVAD and was previously explanted in 2016. Patient cleared for discharge from CM standpoint. CM attempted to meet with patient; however, nursing staff was tending to patient for discharge. Piper Guzman, MPH    Care Management Interventions  PCP Verified by CM: Yes  Palliative Care Criteria Met (RRAT>21 & CHF Dx)?: No  Mode of Transport at Discharge:  Other (see comment)(Family, private car)  Transition of Care Consult (CM Consult): Discharge Planning  MyChart Signup: Yes  Discharge Durable Medical Equipment: No  Health Maintenance Reviewed: Yes  Physical Therapy Consult: Yes  Occupational Therapy Consult: Yes  Speech Therapy Consult: No  Current Support Network: Lives with Spouse  Confirm Follow Up Transport: Self  Discharge Location  Discharge Placement: Home

## 2020-03-09 NOTE — PROGRESS NOTES
0730: Bedside shift change report given to Randi Shrestha (oncoming nurse) by Rosamaria Mcleod (offgoing nurse). Report included the following information SBAR and Kardex. 1133: I have reviewed discharge instructions with the patient. The patient verbalized understanding. Current Discharge Medication List      START taking these medications    Details   sacubitril-valsartan (ENTRESTO) 49 mg/51 mg tablet Take 1 Tab by mouth every twelve (12) hours for 30 days. Qty: 60 Tab, Refills: 0      docusate sodium (COLACE) 100 mg capsule Take 1 Cap by mouth two (2) times a day for 90 days. Qty: 60 Cap, Refills: 2      Ferrous Fumarate 325 mg (106 mg iron) tab Take 1 Tab by mouth daily for 30 days. Qty: 30 Tab, Refills: 0         CONTINUE these medications which have CHANGED    Details   carvediloL (COREG) 3.125 mg tablet Take 1 Tab by mouth two (2) times daily (with meals) for 30 days. Qty: 60 Tab, Refills: 0      bumetanide (BUMEX) 0.5 mg tablet Take 2 Tabs by mouth daily as needed (shortness of breath). Qty: 90 Tab, Refills: 3         CONTINUE these medications which have NOT CHANGED    Details   amiodarone (CORDARONE) 200 mg tablet TAKE 1 TABLET BY MOUTH EVERY DAY  Qty: 90 Tab, Refills: 1      patiromer calcium sorbitex (VELTASSA) 16.8 gram powder Take 16.8 g by mouth daily. Qty: 30 Packet, Refills: 3      pregabalin (LYRICA) 50 mg capsule Take 50 mg by mouth three (3) times daily. Refills: 2      atorvastatin (LIPITOR) 40 mg tablet TAKE 1 TABLET BY MOUTH EVERY DAY  Qty: 30 Tab, Refills: 11    Comments: DX Code Needed  . apixaban (ELIQUIS) 5 mg tablet Take 1 Tab by mouth two (2) times a day. Qty: 60 Tab, Refills: 11      montelukast (SINGULAIR) 10 mg tablet TAKE 1 TABLET BY MOUTH ONCE A DAY  Refills: 3      DULoxetine (CYMBALTA) 60 mg capsule Take 60 mg by mouth daily. allopurinol (ZYLOPRIM) 100 mg tablet Take 100 mg by mouth daily. tamsulosin (FLOMAX) 0.4 mg capsule Take 0.4 mg by mouth daily. colchicine 0.6 mg tablet Take 0.6 mg by mouth every other day. aspirin 81 mg chewable tablet Take 81 mg by mouth daily. ascorbic acid, vitamin C, (VITAMIN C) 500 mg tablet Take 1 Tab by mouth two (2) times a day. Qty: 60 Tab, Refills: 6      betamethasone dipropionate (DIPROSONE) 0.05 % topical cream daily as needed. Refills: 4      tadalafil (CIALIS) 20 mg tablet 20 mg as needed. Refills: 6      levalbuterol tartrate (XOPENEX) 45 mcg/actuation inhaler INHALE 2 PUFFS EVERY 4 HOURS AS NEEDED  Refills: 0      HYDROcodone-acetaminophen (NORCO) 5-325 mg per tablet TAKE 1 TABLET EVERY 6 HOURS AS NEEDED  Refills: 0      cyclobenzaprine (FLEXERIL) 5 mg tablet Take 5 mg by mouth daily as needed for Muscle Spasm(s). STOP taking these medications       sacubitril-valsartan (ENTRESTO) 97 mg/103 mg tablet Comments:   Reason for Stopping:             Problem: Falls - Risk of  Goal: *Absence of Falls  Description  Document Katerina Fall Risk and appropriate interventions in the flowsheet.   Outcome: Progressing Towards Goal  Note: Fall Risk Interventions:  Mobility Interventions: Patient to call before getting OOB         Medication Interventions: Teach patient to arise slowly

## 2020-03-09 NOTE — PROGRESS NOTES
Problem: Falls - Risk of  Goal: *Absence of Falls  Description  Document Perlita Stoll Fall Risk and appropriate interventions in the flowsheet.   Outcome: Progressing Towards Goal  Note: Fall Risk Interventions:  Mobility Interventions: Patient to call before getting OOB         Medication Interventions: Teach patient to arise slowly

## 2020-03-09 NOTE — PROGRESS NOTES
[facility] Progress Note      DOS:  3/9/2020  REFERRING PROVIDER: Lawanda Hernandez MD  PRIMARY CARE PHYSICIAN: Irma Rivera MD  PRIMARY CARDIOLOGIST:  Cristopher Cantu MD   EP: Renetta Gonsales MD   PULMONARY: Kayla Solo MD       24Hr Events  Hgb stable  BP tolerating medications     IMPRESSION/PLAN:   ICM s/p HMII as BTT on 12/1/16 and explant, NYHA Class IV, LVEF 20-25%     Cont low dose coreg 3.125 mg BID    Cont entresto to 49/51 mg, 1 tablet BID - monitor    Intolerant to eplerenone due to hyperkalemia    Hold diuretics   Cont eliquis   Strict I/O, daily weights, Na+ restricted diet    Trend CMP, proBNP    Acute blood loss anemia due to epistaxis   ENT consult appreciated   Afrin, saline spray    Transfuse for Hgb < 8     FOB negative; appreciate GI consult   Iron profile   Monitor H>H with resuming eliquis      CAD s/p CABG (SVG to RCA and LIMA to LAD) on 12/1/16 s/p BMS x2 -> SVG-RCA graft - no angina    Continue ASA, statin, BBrx     Mitral Regurgitation, severe - improved to mod   Follow with serial TTEs      Hyperkalemia   Continue Veltassa   Monitor serum potassium with resuming entresto   Hold eplerenone    Chronic Left Pleural Effusion    s/p left thoracentesis on 7/5/2019    High Risk of SCD    s/p SQ AICD (5/20/19)   No shocks   Followed by Dr. Jing Chance       PAD    Difficult femoral access with LVAD explant              No symptoms of claudication      Chronic Anticoagulation    Eliquis resumed, watch hgb      Gout              Continue colchicine 0.6 mg every other day              Continue allopurinol 100 mg daily   Denies recent gouty attacks    Chronic Lower back pain           Back pain improved with increased activity- going to gym   PRN flexeril     H/o tobacco abuse   Abstaining from nicotine    Encouraged complete cessation     H/o alcohol abuse   Currently abstaining   Encouraged complete abstinence      Seizure disorder - early 25s              No recent seizure activity     Peripheral neuropathy              On cymbalta   ATTR- Negative     Remainder of care per primary. Ok to DC home today, follow up tomorrow with labs before leaving for [place] on 3/12      Patient seen and examined. Data and note reviewed. I have discussed and agree with the plans as noted. 62year old male with a history of ICM s/p LVAD explant who was admitted with epistaxis. His GDMT was held on admission due to hypotension and resumed once his epistaxis was controlled. Discharge home today with follow up in the [facility] in 1-2 days. Thank you for allowing us to participate in your patient's care. Kristi Baig MD, [facility], [address] [place] [address]  Chief of Cardiology, [address] [place] [address], [address] [place] [address], [address] [place] [address]  Office [phone]  Fax [phone]         Chief Complaint   Patient presents with    Fatigue    Generalized Body Aches         HPI: Jennifer Carlson is a 62y.o. year old male  with a history of HFrEF in the setting of ICM s/p LVAD and recent LVAD explant complicated by PVD,  remote tobacco use and alcohol abuse (quit 11/2016) who presents for follow up of his HFrEF. Mr. Sandeep Garcia presented 11/22/2016 for decompressive laminectomy at F7-X3 complicated by myocardial infarction. The [facility](11/25/16) revealed severe left main and 3 vessel disease along with a 60% right common iliac stenosis and  LVEF was 10%. He subsequently underwent placement of an IABP followed by placement of a HeartMate2 LVAD on 12/1/2016 with concomitant CABG (SVG to the RCA, LIMA to LAD). His postoperative course was complicated by RV failure and an ileus requiring TPN resulting and a transaminitis. He had multiple episodes of Torsade on 12/4/2016 prompting repeat catheterization revealing an occluded RCA vein graft. Two BMS were placed in the RCA graft. He also had SVT requiring cardioversion.  He had a single lead ICD placed on 27/67/93 complicated by a hematoma. The ICD was later removed on 1/20/17. Following his LVAD surgery he did require inpatient rehab.      Mr. Rebel Kimble was listed for heart transplantation at [facility]. He was called in for transplantation on 7/14/2018 but on pre-op DELLA was noted to have normal LV function and the transplant surgery was aborted. He subsequently was admitted on 9/5/2018 for LVAD explant- his post-operative course was complicated by pneumonia and bilateral pleural effusions. He was discharged on home O2 which he discontinued on his own.       After device explant, his subsequent echocardiograms showed deterioration of LVEF and worsening mitral regurgitation from 40-45% with moderate to severe MR on 9/19/18 to 35-40% with moderate-to-severe MR on 10/15/18. He has been removed from the transplant list at [facility] and is not interested in pursuing transplant evaluation at another center at this time. His entresto was discontinued at the time of his AICD implant due to hypotension. He subsequently took diovan but felt worse. He is tolerating the entresto with no dizziness, presyncope, or syncope. He was seen by Dr. Vahe Fonseca, who stated that his mitral regurgitation has decreased significantly with optimizing his HF medications. He presented to the [facility] last evening with complaints of fatigue. ED evaluation revealed significant anemia, Hgb 6.1. HE has been admitted for further evaluation and treatment. The [facility] has been consulted for assistance with the management of his HF. CARDIAC EVALUATION  TTE 4/7/19: LVEF 32%, LVAD plug at LV apex, grade 2 diastolic dysfunction, mild-mod MR, mild TR, PAPs 43 mmHg, mild LAE  DELLA 7/14/2018 - normal LV function and the transplant surgery was aborted.  He subsequently was admitted on 9/5/2018 for LVAD explant  ECHO 9/19/18:LVEF 40-45% mod-severe MR   ECHO 10/15/18:EF 35-40%, mod-severe MR   ECHO 10/31/18: TDS, EF 30-35%, reduced RVEF, TAPSE 1.6, LAE, mild- mod MR/TR  ECHO 19: EF 25-30%, question of thrombus in apex. Mild- mod TR. Consider cardiac MR to exclude left ventricular thrombus. ECHO 2019: EF 16-20%, mod MR, mod-severe TR, severe pulm HTN (PA systolic pressure 85 mmHg), mildly reduced RV systolic function (TAPSE 8.00 cm)  ECHO 19: EF 20-25%, moderately reduced RV systolic function, mild MR  ECHO 2020: Dilated LV ( LVIDd 6 cm),  EF 15-38%, grade 2 diastolic dysfunction, RV dysfunction, TAPSE 1.31 cm, AoV sclerosis, mod MR, mod-severe TR  ECHO 3/6/20: Normal LV size (5.21 cm), EF 20-25%, mild-mod MR, mild-mod TR, PAPs 47 mmHg    CATH 16: severe LM, and 3 VD with 60% right common iliac stenosis. EF: 10%. ----S/p IABP   ----S/p HeartMate2 LVAD 2016   S/p CAB2016: LIMA-> LAD, SVG-> RCA     CATH 16 following torsades SVG-> RCA TO   ---- s/p BMS x 2-. RCA  RHC 2017:RA: 15/15 14, RV: 21/13 14,PA: 23/28, m 20, PCWP 13, PVR 1.67 CO 5.67, CI 3    S/p LVAD explant 18 (UVA)    SVT s/p DCCV  AICD  single lead cx by hematoma  AICD explanted 17    EKG:    10/31/18 NSR QRS 88ms   ms  19: SR rate 68bpm QRS 94ms Qtc 438 ms    Review of Systems   Constitutional: Negative for chills, fever and malaise/fatigue. HENT: Negative for congestion, nosebleeds, sinus pain and sore throat. Eyes: Negative. Respiratory: Negative for cough, sputum production and shortness of breath. Cardiovascular: Negative for chest pain, palpitations, orthopnea, claudication and leg swelling. Gastrointestinal: Negative for abdominal pain, heartburn, nausea and vomiting. Genitourinary: Negative. Musculoskeletal: Positive for back pain. Negative for falls. Chronic back pain   Skin: Negative. Neurological: Negative for dizziness. Endo/Heme/Allergies: Negative. Psychiatric/Behavioral: Negative.         History:  Past Medical History:   Diagnosis Date    Arrhythmia     SVT    Arthritis     CAD (coronary artery disease)     Chronic pain     back    Congestive heart failure (HCC)     DSOUZA (dyspnea on exertion) 2019    Gout     [facility]     Hypertension     ICD (implantable cardioverter-defibrillator) in place     Long term current use of anticoagulant therapy     LVAD (left ventricular assist device) present ([facility] 2016    Myocardial infarction ([facility]     Pacemaker     SubQ ICD    Raynaud disease     Seizures ([facility]     strobic seizures early      Past Surgical History:   Procedure Laterality Date    CABG, ARTERY-VEIN, TWO  2016    CARDIAC SURG PROCEDURE UNLIST  2016    LVAD    HX CORONARY ARTERY BYPASS GRAFT      HX CORONARY STENT PLACEMENT      HX HEART CATHETERIZATION      HX HEENT      wisdom teeth removed    HX ORTHOPAEDIC  2016    laminectomy    HX PACEMAKER      ICD - removed 2017    HX PTCA      IA INSJ OF SUBQ IMPLANTABLE DEFIBRILLATOR ELECTRODE N/A 2019    INSERT SUBQ ICD w/ anesthesia performed by Jane Lovell MD at [facility] CATH LAB     Social History     Socioeconomic History    Marital status:      Spouse name: Kin Schofield    Number of children: 2    Years of education: Not on file    Highest education level: Some college, no degree   Occupational History    Not on file   Social Needs    Financial resource strain: Not hard at all   Epi-Blair insecurity:     Worry: Never true     Inability: Never true    Transportation needs:     Medical: No     Non-medical: No   Tobacco Use    Smoking status: Former Smoker     Packs/day: 1.50     Years: 30.00     Pack years: 45.00     Last attempt to quit: 2008     Years since quittin.1    Smokeless tobacco: Never Used   Substance and Sexual Activity    Alcohol use: No    Drug use: No    Sexual activity: Yes   Lifestyle    Physical activity:     Days per week: Not on file     Minutes per session: Not on file    Stress: Not on file   Relationships    Social connections: Talks on phone: Not on file     Gets together: Not on file     Attends [employer] service: Not on file     Active member of club or organization: Not on file     Attends meetings of clubs or organizations: Not on file     Relationship status: Not on file    Intimate partner violence:     Fear of current or ex partner: Not on file     Emotionally abused: Not on file     Physically abused: Not on file     Forced sexual activity: Not on file   Other Topics Concern     Service Not Asked    Blood Transfusions Not Asked    Caffeine Concern Not Asked    Occupational Exposure Not Asked    Hobby Hazards Not Asked    Sleep Concern Not Asked    Stress Concern Not Asked    Weight Concern Not Asked    Special Diet Not Asked    Back Care Not Asked    Exercise Not Asked    Bike Helmet Not Asked   [address] Not Asked    Self-Exams Not Asked   Social History Narrative    Not on file     Family History   Problem Relation Age of Onset    Diabetes Mother     Dementia Mother     Other Mother         carotid artery blockage    Heart Disease Father     Stroke Father     Heart Attack Father         several    Hypertension Father     Elevated Lipids Father     No Known Problems Sister     Cancer Brother         brain    Lung Disease Sister     COPD Sister     Cancer Sister         lung       Current Medications:   Current Facility-Administered Medications   Medication Dose Route Frequency Provider Last Rate Last Dose    carvediloL (COREG) tablet 3.125 mg  3.125 mg Oral BID WITH MEALS Kristi Weems MD   3.125 mg at 03/09/20 0924    apixaban (ELIQUIS) tablet 5 mg  5 mg Oral BID Bishop Ad PASTOR MD   5 mg at 03/09/20 0924    sacubitriL-valsartan (ENTRESTO) 49-51 mg tablet 1 Tab  1 Tab Oral Q12H Kristi Weems MD   1 Tab at 03/09/20 0926    sodium chloride (OCEAN) 0.65 % nasal squeeze bottle 2 Spray  2 Spray Both Nostrils Q2H PRN Jovanni Arias MD        pantoprazole (PROTONIX) 40 mg in 0.9% sodium chloride 10 mL injection  40 mg IntraVENous Q12H Shandamilagros Deal, NP   40 mg at 03/09/20 4998    oxymetazoline (AFRIN) 0.05 % nasal spray 2 Spray  2 Spray Both Nostrils BID Kari Stevens MD   2 Spray at 03/09/20 4606    allopurinoL (ZYLOPRIM) tablet 100 mg  100 mg Oral DAILY Altagracia Christian MD   100 mg at 03/09/20 0900    amiodarone (CORDARONE) tablet 200 mg  200 mg Oral DAILY Altagracia Christian MD   200 mg at 03/09/20 1891    ascorbic acid (vitamin C) (VITAMIN C) tablet 500 mg  500 mg Oral BID Altagracia Christian MD   500 mg at 03/09/20 2990    atorvastatin (LIPITOR) tablet 40 mg  40 mg Oral DAILY Altagracia Christian MD   40 mg at 03/09/20 4251    colchicine tablet 0.6 mg  0.6 mg Oral EVERY OTHER DAY Altagracia Christian MD   0.6 mg at 03/07/20 2328    DULoxetine (CYMBALTA) capsule 60 mg  60 mg Oral DAILY Altagracia Christian MD   60 mg at 03/09/20 7773    cyclobenzaprine (FLEXERIL) tablet 5 mg  5 mg Oral DAILY PRN Altagracia Christian MD        HYDROcodone-acetaminophen [facility]) 5-325 mg per tablet 1 Tab  1 Tab Oral Q4H PRN Altagracia Christian MD        albuterol (PROVENTIL VENTOLIN) nebulizer solution 2.5 mg  2.5 mg Nebulization Q4H PRN Altagracia Christian MD        montelukast (SINGULAIR) tablet 10 mg  10 mg Oral QHS Altagracia Christian MD   10 mg at 03/08/20 2106    patiromer calcium sorbitex (VELTASSA) powder 16.8 g  16.8 g Oral DAILY Altagracia Christian MD   16.8 g at 03/08/20 1213    pregabalin (LYRICA) capsule 50 mg  50 mg Oral TID Altagracia Christian MD   50 mg at 03/09/20 0924    tamsulosin (FLOMAX) capsule 0.4 mg  0.4 mg Oral DAILY Altagracia Christian MD   0.4 mg at 03/09/20 0409    sodium chloride (NS) flush 5-40 mL  5-40 mL IntraVENous Q8H Altagracia Christian MD   10 mL at 03/09/20 9486    sodium chloride (NS) flush 5-40 mL  5-40 mL IntraVENous PRN Altagracia Christian MD   10 mL at 03/07/20 4023    naloxone [facility]) injection 0.4 mg  0.4 mg IntraVENous PRN Altagracia Christian MD        zolpidem [facility] [place]) tablet 5 mg  5 mg Oral QHS PRN Altagracia Christian MD        acetaminophen (TYLENOL) tablet 650 mg  650 mg Oral Q4H PRN Altagracia Christian MD       [facility] (ZOFRAN) injection 4 mg  4 mg IntraVENous Q4H PRN Beata Chris MD        docusate sodium (COLACE) capsule 100 mg  100 mg Oral BID Beata Chris MD   100 mg at 03/09/20 8077       Allergies: Allergies   Allergen Reactions    Morphine Other (comments)     Hallucinations. Confusion. Impaired judgement    Chlorhexidine Rash       Physical Exam:   Vitals:    Visit Vitals  /55 (BP 1 Location: Left arm, BP Patient Position: At rest)   Pulse 82   Temp 98.2 °F (36.8 °C)   Resp 18   Ht 5' 8\" (1.727 m)   Wt 181 lb 7 oz (82.3 kg)   SpO2 90%   BMI 27.59 kg/m²        Physical Exam  Constitutional:       Appearance: He is normal weight. HENT:      Nose: No congestion. Cardiovascular:      Rate and Rhythm: Normal rate and regular rhythm. Heart sounds: S1 normal and S2 normal. Murmur present. Systolic murmur present. Pulmonary:      Breath sounds: Normal breath sounds. No rhonchi. Comments: +frequent congested cough  Abdominal:      General: Bowel sounds are normal. There is no distension. Musculoskeletal:      Right lower leg: No edema. Left lower leg: No edema. Skin:     General: Skin is warm and dry. Neurological:      Mental Status: He is alert and oriented to person, place, and time. Psychiatric:         Mood and Affect: Mood normal.         Behavior: Behavior normal. Behavior is cooperative. Thought Content:  Thought content normal.         Judgment: Judgment normal.           Recent Labs:     Lab Results   Component Value Date/Time    WBC 6.9 03/09/2020 03:37 AM    HGB 7.4 (L) 03/09/2020 03:37 AM    HCT 24.6 (L) 03/09/2020 03:37 AM    PLATELET 849 33/58/9876 03:37 AM    MCV 91.1 03/09/2020 03:37 AM     Lab Results   Component Value Date/Time    GFR est non-AA 52 (L) 03/09/2020 03:37 AM    GFR est AA >60 03/09/2020 03:37 AM    Creatinine 1.38 (H) 03/09/2020 03:37 AM    BUN 34 (H) 03/09/2020 03:37 AM    Sodium 140 03/09/2020 03:37 AM    Potassium 4.4 03/09/2020 03:37 AM Chloride 112 (H) 03/09/2020 03:37 AM    CO2 19 (L) 03/09/2020 03:37 AM    Magnesium 2.1 02/21/2020 01:40 PM    Phosphorus 3.4 03/06/2020 02:45 PM     Lab Results   Component Value Date/Time    TSH 3.790 06/12/2019 03:05 PM    T4, Free 1.68 06/12/2019 03:05 PM      Lab Results   Component Value Date/Time    Sodium 140 03/09/2020 03:37 AM    Potassium 4.4 03/09/2020 03:37 AM    Chloride 112 (H) 03/09/2020 03:37 AM    CO2 19 (L) 03/09/2020 03:37 AM    Anion gap 9 03/09/2020 03:37 AM    Glucose 86 03/09/2020 03:37 AM    BUN 34 (H) 03/09/2020 03:37 AM    Creatinine 1.38 (H) 03/09/2020 03:37 AM    BUN/Creatinine ratio 25 (H) 03/09/2020 03:37 AM    GFR est AA >60 03/09/2020 03:37 AM    GFR est non-AA 52 (L) 03/09/2020 03:37 AM    Calcium 8.3 (L) 03/09/2020 03:37 AM    Bilirubin, total 0.5 03/09/2020 03:37 AM    ALT (SGPT) 32 03/09/2020 03:37 AM    AST (SGOT) 23 03/09/2020 03:37 AM    Alk.  phosphatase 108 03/09/2020 03:37 AM    Protein, total 6.5 03/09/2020 03:37 AM    Albumin 3.1 (L) 03/09/2020 03:37 AM    Globulin 3.4 03/09/2020 03:37 AM    A-G Ratio 0.9 (L) 03/09/2020 03:37 AM      Lab Results   Component Value Date/Time    Hemoglobin A1c 5.9 05/22/2019 02:56 AM       Thank you for letting us see him with you,      Diann Church, MARY  [address] [facility]  [address] [place] [address]  Office [phone]  Fax [phone]

## 2020-03-09 NOTE — DISCHARGE SUMMARY
Discharge Summary       PATIENT ID: Chris Case  MRN: [id number]   YOB: 1955    DATE OF ADMISSION: 3/5/2020  5:52 PM    DATE OF DISCHARGE: 3/9/2020   PRIMARY CARE PROVIDER: Kiara Reed MD     ATTENDING PHYSICIAN: Heidi Lofton MD  DISCHARGING PROVIDER: Heidi Lofton MD    To contact this individual call [phone] and ask the  to page. If unavailable ask to be transferred the Adult Hospitalist Department. CONSULTATIONS: IP CONSULT TO GASTROENTEROLOGY  IP CONSULT TO NEPHROLOGY  IP CONSULT TO OTOLARYNGOLOGY  IP CONSULT TO CARDIOLOGY    PROCEDURES/SURGERIES: * No surgery found *    ADMITTING DIAGNOSES & HOSPITAL COURSE:   Admitted with low HB, had epistaxis and likely slow GI blood loss, severe CHF. Advanced heart failure team following, adjusting his meds. Will f/u op with chf team, GI and ENT. Risk of Re-Admission: high  DISCHARGE DIAGNOSES / PLAN:      #. Acute on chronic anemia: unknown etiology, suspect GI blood loss/AVMs vs Epistaxis  - dropped HB to 6.4, was 8.4 in January. S/p 1 unit RBC, monitor CBC closely   - GI following: no urgent Endoscopies. , iron levels low, ferritin 31, hemoccult -ve. - will give Iron iv infusion while inpatient. Continue po supplement on dc.     #. Epistaxis: small amounts/dripping on/off. ENT evaluated, Afrin, Vaseline, saline spray. F/u with ENT within the week.     #. CAD: stable, no chest pain. home regimen, monitor  #. Elevated trops: likely demand/ Type2 MI. Cardiology following. #. Chronic Systolic CHF: s/p LVAD- which was removed. Currently ef 20-25%. adjusted doses of Entresto and carvedilol  #. S/p ICD/PPM  #. Paroxysmal Afib: eliquis. Rate controlled- Advanced heart failure team following.     #. CKD3: monitor renal function, avoid nephrotoxics, Nephrology f/u op.      FOLLOW UP APPOINTMENTS:    Follow-up Information     Follow up With Specialties Details Why Contact Info    Kiara Reed MD [facility] In 1 week [address]  [phone]        In 2 days  Advanced Heart failure team        ENT for Epistaxis         ADDITIONAL CARE RECOMMENDATIONS:  Follow up with PCP, CHF team, GI and ENT    DIET: Resume previous diet    ACTIVITY: Activity as tolerated    DISCHARGE MEDICATIONS:  Current Discharge Medication List      START taking these medications    Details   sacubitril-valsartan (ENTRESTO) 49 mg/51 mg tablet Take 1 Tab by mouth every twelve (12) hours for 30 days. Qty: 60 Tab, Refills: 0      docusate sodium (COLACE) 100 mg capsule Take 1 Cap by mouth two (2) times a day for 90 days. Qty: 60 Cap, Refills: 2      Ferrous Fumarate 325 mg (106 mg iron) tab Take 1 Tab by mouth daily for 30 days. Qty: 30 Tab, Refills: 0         CONTINUE these medications which have CHANGED    Details   carvediloL (COREG) 3.125 mg tablet Take 1 Tab by mouth two (2) times daily (with meals) for 30 days. Qty: 60 Tab, Refills: 0         CONTINUE these medications which have NOT CHANGED    Details   amiodarone (CORDARONE) 200 mg tablet TAKE 1 TABLET BY MOUTH EVERY DAY  Qty: 90 Tab, Refills: 1      patiromer calcium sorbitex (VELTASSA) 16.8 gram powder Take 16.8 g by mouth daily. Qty: 30 Packet, Refills: 3      pregabalin (LYRICA) 50 mg capsule Take 50 mg by mouth three (3) times daily. Refills: 2      atorvastatin (LIPITOR) 40 mg tablet TAKE 1 TABLET BY MOUTH EVERY DAY  Qty: 30 Tab, Refills: 11    Comments: DX Code Needed  . apixaban (ELIQUIS) 5 mg tablet Take 1 Tab by mouth two (2) times a day. Qty: 60 Tab, Refills: 11      montelukast (SINGULAIR) 10 mg tablet TAKE 1 TABLET BY MOUTH ONCE A DAY  Refills: 3      DULoxetine (CYMBALTA) 60 mg capsule Take 60 mg by mouth daily. allopurinol (ZYLOPRIM) 100 mg tablet Take 100 mg by mouth daily. tamsulosin (FLOMAX) 0.4 mg capsule Take 0.4 mg by mouth daily. colchicine 0.6 mg tablet Take 0.6 mg by mouth every other day.       aspirin 81 mg chewable tablet Take 81 mg by mouth daily. ascorbic acid, vitamin C, (VITAMIN C) 500 mg tablet Take 1 Tab by mouth two (2) times a day. Qty: 60 Tab, Refills: 6      betamethasone dipropionate (DIPROSONE) 0.05 % topical cream daily as needed. Refills: 4      tadalafil (CIALIS) 20 mg tablet 20 mg as needed. Refills: 6      levalbuterol tartrate (XOPENEX) 45 mcg/actuation inhaler INHALE 2 PUFFS EVERY 4 HOURS AS NEEDED  Refills: 0      HYDROcodone-acetaminophen (NORCO) 5-325 mg per tablet TAKE 1 TABLET EVERY 6 HOURS AS NEEDED  Refills: 0      cyclobenzaprine (FLEXERIL) 5 mg tablet Take 5 mg by mouth daily as needed for Muscle Spasm(s). STOP taking these medications       sacubitril-valsartan (ENTRESTO) 97 mg/103 mg tablet Comments:   Reason for Stopping:         bumetanide (BUMEX) 0.5 mg tablet Comments:   Reason for Stopping:             NOTIFY YOUR PHYSICIAN FOR ANY OF THE FOLLOWING:   Fever over 101 degrees for 24 hours. Chest pain, shortness of breath, fever, chills, nausea, vomiting, diarrhea, change in mentation, falling, weakness, bleeding. Severe pain or pain not relieved by medications. Or, any other signs or symptoms that you may have questions about. DISPOSITION:    Home With:   OT  PT  HH  RN       Long term SNF/Inpatient Rehab   x Independent/assisted living    Hospice    Other:     PATIENT CONDITION AT DISCHARGE:     Functional status    Poor     Deconditioned     Independent      Cognition     Lucid     Forgetful     Dementia      Catheters/lines (plus indication)    Fernandez     PICC     PEG     None      Code status     Full code     DNR      PHYSICAL EXAMINATION AT DISCHARGE:  Patient seen and examined at bedside, Condition stable, explained discharge and follow up plans.   /55 (BP 1 Location: Left arm, BP Patient Position: At rest)   Pulse 82   Temp 98.2 °F (36.8 °C)   Resp 18   Ht 5' 8\" (1.727 m)   Wt 82.3 kg (181 lb 7 oz)   SpO2 90%   BMI 27.59 kg/m²   General:  Alert, oriented, No acute distress  Resp:  No accessory muscle use, Good AE. Neuro:  Grossly normal, no focal neuro deficits, follows commands   CHRONIC MEDICAL DIAGNOSES:  Problem List as of 3/9/2020 Date Reviewed: 9/5/2019          Codes Class Noted - Resolved    CHF (congestive heart failure) ([facility] ICD-10-CM: I50.9  ICD-9-CM: 428.0  5/20/2019 - Present        Anemia ICD-10-CM: D64.9  ICD-9-CM: 285.9  3/5/2020 - Present        Acute on chronic systolic (congestive) heart failure ([facility] ICD-10-CM: I50.23  ICD-9-CM: 428.23, 428.0  11/8/2019 - Present        KLEVER (acute kidney injury) ([facility] ICD-10-CM: N17.9  ICD-9-CM: 584.9  9/29/2019 - Present        Vomiting and diarrhea ICD-10-CM: R11.10, R19.7  ICD-9-CM: 787.03, 787.91  9/29/2019 - Present        Mitral valve regurgitation ICD-10-CM: I34.0  ICD-9-CM: 424.0  7/25/2019 - Present    Overview Signed 8/9/2019  9:05 AM by Chidi Anthony MD     DELLA 7/8/19:  EF 36 - 40%, severe MR.              Heart burn ICD-10-CM: R12  ICD-9-CM: 787.1  6/26/2019 - Present        DSOUZA (dyspnea on exertion) ICD-10-CM: R06.09  ICD-9-CM: 786.09  1/30/2019 - Present        At risk for obstructive sleep apnea ICD-10-CM: Z91.89  ICD-9-CM: V49.89  9/3/2018 - Present        Muscle spasm ICD-10-CM: Y90.442  ICD-9-CM: 728.85  7/16/2018 - Present        Erectile dysfunction ICD-10-CM: N52.9  ICD-9-CM: 607.84  9/8/2017 - Present        Carotid arterial disease ([facility] ICD-10-CM: I73.9  ICD-9-CM: 447.9  6/29/2017 - Present        PAD (peripheral artery disease) ([facility] ICD-10-CM: I73.9  ICD-9-CM: 443.9  6/29/2017 - Present        Chronic back pain greater than 3 months duration ICD-10-CM: M54.9, G89.29  ICD-9-CM: 724.5, 338.29  6/13/2017 - Present        History of tobacco abuse ICD-10-CM: Z87.891  ICD-9-CM: V15.82  6/13/2017 - Present    Overview Signed 6/26/2019  1:21 PM by Chidi Anthony MD     Overview:   Quit in 2008             ICD (implantable cardioverter-defibrillator) infection ([facility] ICD-10-CM: T82. 7XXA  ICD-9-CM: 996.61  2017 - Present    Overview Signed 2017 11:39 AM by Bree Ibrahim NP     2017: Removal of dual chamber AICD generator and its leads under fluoroscopy  Cultures 17  RA lead/ICD pocket: gram negative rods, cultures enterobacter cloacae  RV lead gram negative rods enterobacter species     DELLA 17: No vegetation on valves              Gout (Chronic) ICD-10-CM: M10.9  ICD-9-CM: 274.9  2016 - Present        Chronic anticoagulation ICD-10-CM: Z79.01  ICD-9-CM: V58.61  2016 - Present        Sustained VT (ventricular tachycardia) ([facility] ICD-10-CM: I47.2  ICD-9-CM: 427.1  2016 - Present    Overview Signed 2016  9:43 PM by Satish Grant MD     2016 defib 150 J             Coronary artery disease involving coronary bypass graft ICD-10-CM: I25.810  ICD-9-CM: 414.05  2016 - Present        MI (myocardial infarction) ([facility] ICD-10-CM: I21.9  ICD-9-CM: 410.90  2016 - Present        Chronic systolic heart failure (HCC) ICD-10-CM: I50.22  ICD-9-CM: 428.22  2016 - Present        CAD, multiple vessel ICD-10-CM: I25.10  ICD-9-CM: 414.00  2016 - Present    Overview Signed 2018  1:27 PM by Bryon Briggs     CATH 16: severe LM, and 3 VD with 60% right common iliac stenosis. EF: 10%. ----S/p IABP   S/p CAB2016: LIMA-> LAD, SVG-> RCA     CATH 16 following torsades SVG-> RCA TO   ---- s/p BMS x 2-.  RCA                                 Ischemic cardiomyopathy ICD-10-CM: I25.5  ICD-9-CM: 414.8  2016 - Present        Sinus tachycardia ICD-10-CM: R00.0  ICD-9-CM: 427.89  2016 - Present        Acute respiratory failure with hypoxia [facility]) ICD-10-CM: J96.01  ICD-9-CM: 518.81  2016 - Present        Essential hypertension ICD-10-CM: I10  ICD-9-CM: 401.9  2016 - Present        Alcohol abuse ICD-10-CM: F10.10  ICD-9-CM: 305.00  2016 - Present        S/P laminectomy ICD-10-CM: V78.588  ICD-9-CM: V45.89 11/22/2016 - Present        S/P lumbar laminectomy ICD-10-CM: Z98.890  ICD-9-CM: V45.89  11/22/2016 - Present        Seizure disorder ([facility] ICD-10-CM: G40.909  ICD-9-CM: 345.90  11/22/2016 - Present    Overview Signed 6/26/2019  1:21 PM by Virgen Garibay MD     Overview:   Bedelia Punches seizure             HTN (hypertension) ICD-10-CM: I10  ICD-9-CM: 401.9  11/22/2016 - Present        RESOLVED: Left ventricular assist device present ([facility] ICD-10-CM: Z95.811  ICD-9-CM: V43.21  12/27/2016 - 6/26/2019        RESOLVED: S/P ICD (internal cardiac defibrillator) procedure ICD-10-CM: Z95.810  ICD-9-CM: V45.02  12/16/2016 - 1/9/2019    Overview Signed 12/16/2016  3:08 PM by Zhane Santiago MD     12/16/16 Dual chamber Medtronic ICD for secondary prevention of sudden death  DFT < = 25 J             RESOLVED: VF (ventricular fibrillation) ([facility] ICD-10-CM: I49.01  ICD-9-CM: 427.41  12/13/2016 - 12/30/2016    Overview Signed 12/13/2016  9:44 PM by Zhane Santiago MD     12/4/2016 defibrillated 150 J             RESOLVED: HCAP (healthcare-associated pneumonia) ICD-10-CM: J18.9  ICD-9-CM: 489  11/24/2016 - 12/30/2016              38 minutes were spent with the patient on counseling and coordination of care.     Signed:   Fifi Enriquez MD  3/9/2020  10:10 AM

## 2020-03-09 NOTE — DISCHARGE INSTRUCTIONS
Discharge Instructions       PATIENT ID: Shabbir Chapman  MRN: [id number]   YOB: 1955    DATE OF ADMISSION: 3/5/2020  5:52 PM    DATE OF DISCHARGE: 3/9/2020    PRIMARY CARE PROVIDER: Myrna Malin MD     ATTENDING PHYSICIAN: Bao Jessica MD  DISCHARGING PROVIDER: Becca Melvin MD    To contact this individual call [phone] and ask the  to page. If unavailable ask to be transferred the Adult Hospitalist Department. DISCHARGE DIAGNOSES Epistaxis, suspected slow GI bleed. CHF     CONSULTATIONS: IP CONSULT TO GASTROENTEROLOGY  IP CONSULT TO NEPHROLOGY  IP CONSULT TO OTOLARYNGOLOGY  IP CONSULT TO CARDIOLOGY    PROCEDURES/SURGERIES: * No surgery found *    FOLLOW UP APPOINTMENTS:   Follow-up Information     Follow up With Specialties Details Why Contact Info    Myrna Malin MD Family Practice In 1 week  [address]  [phone]        In 2 days  Advanced Heart failure team        ENT for Epistaxis           ADDITIONAL CARE RECOMMENDATIONS: follow up with PCP     DIET: Resume previous diet    DISCHARGE MEDICATIONS:  Iron supplement,  Changes to cardiac meds per heart failure team.    · It is important that you take the medication exactly as they are prescribed. · Keep your medication in the bottles provided by the pharmacist and keep a list of the medication names, dosages, and times to be taken in your wallet. · Do not take other medications without consulting your doctor. NOTIFY YOUR PHYSICIAN FOR ANY OF THE FOLLOWING:   Fever over 101 degrees for 24 hours. Chest pain, shortness of breath, fever, chills, nausea, vomiting, diarrhea, change in mentation, falling, weakness, bleeding. Severe pain or pain not relieved by medications. Or, any other signs or symptoms that you may have questions about. It was our pleasure to help take care of you and we hope you get well very soon.     DISPOSITION:    Home With:   OT  PT  [facility]  RN SNF/Inpatient Rehab/[facility]AC   x Independent/assisted living    Hospice    Other:     CDMP Checked:   Yes x     PROBLEM LIST Updated:  Yes x     Signed:   Jermaine Persaud MD  3/9/2020  10:08 AM

## 2020-03-10 ENCOUNTER — HOSPITAL ENCOUNTER (OUTPATIENT)
Dept: INFUSION THERAPY | Age: 65
Discharge: HOME | End: 2020-03-10
Payer: COMMERCIAL

## 2020-03-10 ENCOUNTER — PATIENT OUTREACH (OUTPATIENT)
Dept: CASE MANAGEMENT | Age: 65
End: 2020-03-10

## 2020-03-10 ENCOUNTER — OFFICE VISIT (OUTPATIENT)
Dept: CARDIOLOGY CLINIC | Age: 65
End: 2020-03-10

## 2020-03-10 VITALS
OXYGEN SATURATION: 89 % | BODY MASS INDEX: 27.52 KG/M2 | DIASTOLIC BLOOD PRESSURE: 62 MMHG | HEIGHT: 68 IN | TEMPERATURE: 98.8 F | HEART RATE: 82 BPM | WEIGHT: 181.6 LBS | SYSTOLIC BLOOD PRESSURE: 102 MMHG | RESPIRATION RATE: 24 BRPM

## 2020-03-10 VITALS
HEART RATE: 76 BPM | DIASTOLIC BLOOD PRESSURE: 58 MMHG | SYSTOLIC BLOOD PRESSURE: 98 MMHG | RESPIRATION RATE: 24 BRPM | TEMPERATURE: 98 F | OXYGEN SATURATION: 93 %

## 2020-03-10 DIAGNOSIS — R06.02 SHORTNESS OF BREATH: ICD-10-CM

## 2020-03-10 DIAGNOSIS — I50.22 CHRONIC SYSTOLIC CONGESTIVE HEART FAILURE (HCC): Primary | ICD-10-CM

## 2020-03-10 LAB
ALBUMIN SERPL-MCNC: 4 G/DL (ref 3.8–4.8)
ALBUMIN/GLOB SERPL: 1.8 {RATIO} (ref 1.2–2.2)
ALP SERPL-CCNC: 116 IU/L (ref 39–117)
ALT SERPL-CCNC: 21 IU/L (ref 0–44)
AST SERPL-CCNC: 23 IU/L (ref 0–40)
BILIRUB SERPL-MCNC: 0.9 MG/DL (ref 0–1.2)
BUN SERPL-MCNC: 30 MG/DL (ref 8–27)
BUN/CREAT SERPL: 22 (ref 10–24)
CALCIUM SERPL-MCNC: 8.8 MG/DL (ref 8.6–10.2)
CHLORIDE SERPL-SCNC: 105 MMOL/L (ref 96–106)
CO2 SERPL-SCNC: 18 MMOL/L (ref 20–29)
CREAT SERPL-MCNC: 1.36 MG/DL (ref 0.76–1.27)
GLOBULIN SER CALC-MCNC: 2.2 G/DL (ref 1.5–4.5)
GLUCOSE SERPL-MCNC: 157 MG/DL (ref 65–99)
NT-PROBNP SERPL-MCNC: ABNORMAL PG/ML (ref 0–210)
POTASSIUM SERPL-SCNC: 4.8 MMOL/L (ref 3.5–5.2)
PROT SERPL-MCNC: 6.2 G/DL (ref 6–8.5)
SODIUM SERPL-SCNC: 138 MMOL/L (ref 134–144)

## 2020-03-10 PROCEDURE — 96374 THER/PROPH/DIAG INJ IV PUSH: CPT

## 2020-03-10 PROCEDURE — 74011000250 HC RX REV CODE- 250: Performed by: NURSE PRACTITIONER

## 2020-03-10 RX ORDER — BUMETANIDE 0.25 MG/ML
2 INJECTION INTRAMUSCULAR; INTRAVENOUS ONCE
Status: COMPLETED | OUTPATIENT
Start: 2020-03-10 | End: 2020-03-10

## 2020-03-10 RX ADMIN — BUMETANIDE 2 MG: 0.25 INJECTION INTRAMUSCULAR; INTRAVENOUS at 16:04

## 2020-03-10 NOTE — PATIENT INSTRUCTIONS
Please go to [facility] for IV Bumex dose    Please resume bumex 0.5mg every other day tomorrow

## 2020-03-10 NOTE — PROGRESS NOTES
[facility] Progress Note      DOS:  3/10/2020  REFERRING PROVIDER: Criselda De Oliveira MD  PRIMARY CARE PHYSICIAN: Vanessa Saravia MD  PRIMARY CARDIOLOGIST:  Jaz Arriaga MD   EP: Arely Tesfaye MD   PULMONARY: William Bueno MD       IMPRESSION/PLAN:   ICM s/p HMII as BTT on 12/1/16 and explant, NYHA Class IV, LVEF 20-25%     Cont low dose coreg 3.125 mg BID    Cont entresto to 49/51 mg, 1 tablet BID - monitor    Intolerant to eplerenone due to hyperkalemia    IV bumex x 1 at [facility]   Resume 0.5mg bumex PO tomorrow   HF RN to check on patient tomorrow    Cont eliquis   Strict I/O, daily weights, Na+ restricted diet    Trend CMP, proBNP    Acute blood loss anemia due to epistaxis   Resolved    FOB negative; appreciate GI consult   Iron profile      CAD s/p CABG (SVG to RCA and LIMA to LAD) on 12/1/16 s/p BMS x2 -> SVG-RCA graft - no angina    Continue ASA, statin, BBrx     Mitral Regurgitation, severe - improved to mod   Follow with serial TTEs      Hyperkalemia   Continue Veltassa   Monitor serum potassium with resuming entresto   Hold eplerenone    Chronic Left Pleural Effusion    s/p left thoracentesis on 7/5/2019    High Risk of SCD    s/p SQ AICD (5/20/19)   No shocks   Followed by Dr. Christine Lay       PAD    Difficult femoral access with LVAD explant              No symptoms of claudication      Chronic Anticoagulation    Eliquis resumed     Gout              Continue colchicine 0.6 mg every other day              Continue allopurinol 100 mg daily   Denies recent gouty attacks    Chronic Lower back pain           Back pain improved with increased activity- going to gym   PRN flexeril     H/o tobacco abuse   Abstaining from nicotine    Encouraged complete cessation     H/o alcohol abuse   Currently abstaining   Encouraged complete abstinence      Seizure disorder - early 25s              No recent seizure activity     Peripheral neuropathy              On cymbalta   ATTR- Negative     Moving to [place] this week, has appt with new physician on 3/20/20. Chief Complaint   Patient presents with   [facility] Follow Up    Shortness of Breath         HPI: Roxanna Gottron is a 62y.o. year old male  with a history of HFrEF in the setting of ICM s/p LVAD and recent LVAD explant complicated by PVD,  remote tobacco use and alcohol abuse (quit 11/2016) who presents for follow up of his HFrEF. Mr. Kevin Thomas presented 11/22/2016 for decompressive laminectomy at [facility] complicated by myocardial infarction. The [facility](11/25/16) revealed severe left main and 3 vessel disease along with a 60% right common iliac stenosis and  LVEF was 10%. He subsequently underwent placement of an IABP followed by placement of a HeartMate2 LVAD on 12/1/2016 with concomitant CABG (SVG to the RCA, LIMA to LAD). His postoperative course was complicated by RV failure and an ileus requiring TPN resulting and a transaminitis. He had multiple episodes of Torsade on 12/4/2016 prompting repeat catheterization revealing an occluded RCA vein graft. Two BMS were placed in the RCA graft. He also had SVT requiring cardioversion. He had a single lead ICD placed on 89/91/91 complicated by a hematoma. The ICD was later removed on 1/20/17. Following his LVAD surgery he did require inpatient rehab.      Mr. Kevin Thomas was listed for heart transplantation at [facility]. He was called in for transplantation on 7/14/2018 but on pre-op DELLA was noted to have normal LV function and the transplant surgery was aborted. He subsequently was admitted on 9/5/2018 for LVAD explant- his post-operative course was complicated by pneumonia and bilateral pleural effusions. He was discharged on home O2 which he discontinued on his own.       After device explant, his subsequent echocardiograms showed deterioration of LVEF and worsening mitral regurgitation from 40-45% with moderate to severe MR on 9/19/18 to 35-40% with moderate-to-severe MR on 10/15/18.   He has been removed from the transplant list at [facility] and is not interested in pursuing transplant evaluation at another center at this time. His entresto was discontinued at the time of his AICD implant due to hypotension. He subsequently took diovan but felt worse. He is tolerating the entresto with no dizziness, presyncope, or syncope. He was seen by Dr. Hung Buchanan, who stated that his mitral regurgitation has decreased significantly with optimizing his HF medications. He had a recent admission to [facility] for complaints of fatigue. ED evaluation revealed significant anemia, Hgb 6.1 from epistaxis, ENT was consulted, he received 2units PRBC and his medications were optimized. He presents to clinic today for hospital follow up, he is more SOB, his weight is stable, his PBNP has doubled since yesterday. He and his wife are planning on driving to [place] this week and he needs to be diuresed before he leaves. He does have a visit set up with his cardiologist in [place]. CARDIAC EVALUATION  TTE 4/7/19: LVEF 32%, LVAD plug at LV apex, grade 2 diastolic dysfunction, mild-mod MR, mild TR, PAPs 43 mmHg, mild LAE  DELLA 7/14/2018 - normal LV function and the transplant surgery was aborted. He subsequently was admitted on 9/5/2018 for LVAD explant  ECHO 9/19/18:LVEF 40-45% mod-severe MR   ECHO 10/15/18:EF 35-40%, mod-severe MR   ECHO 10/31/18: TDS, EF 30-35%, reduced RVEF, TAPSE 1.6, LAE, mild- mod MR/TR  ECHO 1/16/19: EF 25-30%, question of thrombus in apex. Mild- mod TR. Consider cardiac MR to exclude left ventricular thrombus.   ECHO 6/13/2019: EF 16-20%, mod MR, mod-severe TR, severe pulm HTN (PA systolic pressure 85 mmHg), mildly reduced RV systolic function (TAPSE 8.79 cm)  ECHO 8/16/19: EF 20-25%, moderately reduced RV systolic function, mild MR  ECHO 2/12/2020: Dilated LV ( LVIDd 6 cm),  EF 50-15%, grade 2 diastolic dysfunction, RV dysfunction, TAPSE 1.31 cm, AoV sclerosis, mod MR, mod-severe TR  ECHO 3/6/20: Normal LV size (5.21 cm), EF 20-25%, mild-mod MR, mild-mod TR, PAPs 47 mmHg    CATH 16: severe LM, and 3 VD with 60% right common iliac stenosis. EF: 10%. ----S/p IABP   ----S/p HeartMate2 LVAD 2016   S/p CAB2016: LIMA-> LAD, SVG-> RCA     CATH 16 following torsades SVG-> RCA TO   ---- s/p BMS x 2-. RCA  RHC 2017:RA: 15/15 14, RV:  14,PA: , m 20, PCWP 13, PVR 1.67 CO 5.67, CI 3    S/p LVAD explant 18 ([facility])    SVT s/p DCCV  AICD  single lead cx by hematoma  AICD explanted 17    EKG:    10/31/18 NSR QRS 88ms   ms  19: SR rate 68bpm QRS 94ms Qtc 438 ms    Review of Systems   Constitutional: Negative for chills, fever and malaise/fatigue. HENT: Negative for congestion, nosebleeds, sinus pain and sore throat. Eyes: Negative. Respiratory: Positive for shortness of breath. Negative for cough and sputum production. Cardiovascular: Negative for chest pain, palpitations, orthopnea, claudication and leg swelling. Gastrointestinal: Negative for abdominal pain, heartburn, nausea and vomiting. Genitourinary: Negative. Musculoskeletal: Negative for back pain and falls. Chronic back pain   Skin: Negative. Neurological: Negative for dizziness. Endo/Heme/Allergies: Negative. Psychiatric/Behavioral: Negative.         History:  Past Medical History:   Diagnosis Date    Arrhythmia     SVT    Arthritis     CAD (coronary artery disease)     Chronic pain     back    Congestive heart failure (HCC)     DSOUZA (dyspnea on exertion) 2019    Gout     Heart failure ([address]     Hypertension     ICD (implantable cardioverter-defibrillator) in place     Long term current use of anticoagulant therapy     LVAD (left ventricular assist device) present ([address] 2016    Myocardial infarction ([address]     Pacemaker     SubQ ICD    Raynaud disease     Seizures [address]     strobic seizures early      Past Surgical History:   Procedure Laterality Date    CABG, ARTERY-VEIN, TWO  2016    CARDIAC SURG PROCEDURE UNLIST  2016    LVAD    HX CORONARY ARTERY BYPASS GRAFT      HX CORONARY STENT PLACEMENT      HX HEART CATHETERIZATION      HX HEENT      wisdom teeth removed    HX ORTHOPAEDIC  2016    laminectomy    HX PACEMAKER      ICD - removed 2017    HX PTCA      CA INSJ OF SUBQ IMPLANTABLE DEFIBRILLATOR ELECTRODE N/A 2019    INSERT SUBQ ICD w/ anesthesia performed by Alton Redding MD at [facility] CATH LAB     Social History     Socioeconomic History    Marital status:      Spouse name: Cheyenne Landis    Number of children: 2    Years of education: Not on file    Highest education level: Some college, no degree   Occupational History    Not on file   Social Needs    Financial resource strain: Not hard at all   Signostics insecurity:     Worry: Never true     Inability: Never true   [employer] needs:     Medical: No     Non-medical: No   Tobacco Use    Smoking status: Former Smoker     Packs/day: 1.50     Years: 30.00     Pack years: 45.00     Last attempt to quit:      Years since quittin.1    Smokeless tobacco: Never Used   Substance and Sexual Activity    Alcohol use: No    Drug use: No    Sexual activity: Yes   Lifestyle    Physical activity:     Days per week: Not on file     Minutes per session: Not on file    Stress: Not on file   Relationships    Social connections:     Talks on phone: Not on file     Gets together: Not on file     Attends Evangelical service: Not on file     Active member of club or organization: Not on file     Attends meetings of clubs or organizations: Not on file     Relationship status: Not on file    Intimate partner violence:     Fear of current or ex partner: Not on file     Emotionally abused: Not on file     Physically abused: Not on file     Forced sexual activity: Not on file   Other Topics Concern   [address] Service Not Asked    Blood Transfusions Not Asked    Caffeine Concern Not Asked    Occupational Exposure Not Asked    Hobby Hazards Not Asked    Sleep Concern Not Asked    Stress Concern Not Asked    Weight Concern Not Asked    Special Diet Not Asked    Back Care Not Asked    Exercise Not Asked    Bike Helmet Not Asked   [address] Not Asked    Self-Exams Not Asked   Social History Narrative    Not on file     Family History   Problem Relation Age of Onset    Diabetes Mother     Dementia Mother     Other Mother         carotid artery blockage    Heart Disease Father     Stroke Father     Heart Attack Father         several    Hypertension Father     Elevated Lipids Father     No Known Problems Sister     Cancer Brother         brain    Lung Disease Sister     COPD Sister     Cancer Sister         lung       Current Medications:   Current Outpatient Medications   Medication Sig Dispense Refill    sacubitril-valsartan (ENTRESTO) 49 mg/51 mg tablet Take 1 Tab by mouth every twelve (12) hours for 30 days. 60 Tab 0    docusate sodium (COLACE) 100 mg capsule Take 1 Cap by mouth two (2) times a day for 90 days. 60 Cap 2    Ferrous Fumarate 325 mg (106 mg iron) tab Take 1 Tab by mouth daily for 30 days. 30 Tab 0    amiodarone (CORDARONE) 200 mg tablet TAKE 1 TABLET BY MOUTH EVERY DAY 90 Tab 1    patiromer calcium sorbitex (VELTASSA) 16.8 gram powder Take 16.8 g by mouth daily. 30 Packet 3    pregabalin (LYRICA) 50 mg capsule Take 50 mg by mouth three (3) times daily. 2    betamethasone dipropionate (DIPROSONE) 0.05 % topical cream daily as needed. 4    tadalafil (CIALIS) 20 mg tablet 20 mg as needed. 6    atorvastatin (LIPITOR) 40 mg tablet TAKE 1 TABLET BY MOUTH EVERY DAY 30 Tab 11    apixaban (ELIQUIS) 5 mg tablet Take 1 Tab by mouth two (2) times a day.  60 Tab 11    levalbuterol tartrate (XOPENEX) 45 mcg/actuation inhaler INHALE 2 PUFFS EVERY 4 HOURS AS NEEDED  0    montelukast (SINGULAIR) 10 mg tablet TAKE 1 TABLET BY MOUTH ONCE A DAY  3    HYDROcodone-acetaminophen (NORCO) 5-325 mg per tablet TAKE 1 TABLET EVERY 6 HOURS AS NEEDED  0    DULoxetine (CYMBALTA) 60 mg capsule Take 60 mg by mouth daily.  allopurinol (ZYLOPRIM) 100 mg tablet Take 100 mg by mouth daily.  tamsulosin (FLOMAX) 0.4 mg capsule Take 0.4 mg by mouth daily.  colchicine 0.6 mg tablet Take 0.6 mg by mouth every other day.  cyclobenzaprine (FLEXERIL) 5 mg tablet Take 5 mg by mouth daily as needed for Muscle Spasm(s).  aspirin 81 mg chewable tablet Take 81 mg by mouth daily.  ascorbic acid, vitamin C, (VITAMIN C) 500 mg tablet Take 1 Tab by mouth two (2) times a day. 60 Tab 6    carvediloL (COREG) 3.125 mg tablet Take 1 Tab by mouth two (2) times daily (with meals) for 30 days. 60 Tab 0    [START ON 3/11/2020] bumetanide (BUMEX) 0.5 mg tablet Take 2 Tabs by mouth daily as needed (shortness of breath). 90 Tab 3       Allergies: Allergies   Allergen Reactions    Morphine Other (comments)     Hallucinations. Confusion. Impaired judgement    Chlorhexidine Rash       Physical Exam:   Vitals:    Visit Vitals  /62 (BP 1 Location: Right arm, BP Patient Position: Sitting)   Pulse 82   Temp 98.8 °F (37.1 °C) (Oral)   Resp 24   Ht 5' 8\" (1.727 m)   Wt 181 lb 9.6 oz (82.4 kg)   SpO2 (!) 89%   BMI 27.61 kg/m²        Physical Exam  Constitutional:       Appearance: He is normal weight. HENT:      Nose: No congestion. Cardiovascular:      Rate and Rhythm: Normal rate and regular rhythm. Heart sounds: S1 normal and S2 normal. Murmur present. Systolic murmur present. Pulmonary:      Breath sounds: Normal breath sounds. No rhonchi. Comments: +tachypneic  Abdominal:      General: Bowel sounds are normal. There is no distension. Musculoskeletal:      Right lower leg: No edema. Left lower leg: No edema. Skin:     General: Skin is warm and dry. Neurological:      Mental Status: He is alert and oriented to person, place, and time. Psychiatric:         Mood and Affect: Mood normal.         Behavior: Behavior normal. Behavior is cooperative. Thought Content: Thought content normal.         Judgment: Judgment normal.           Recent Labs:     Lab Results   Component Value Date/Time    WBC 6.9 03/09/2020 03:37 AM    HGB 7.4 (L) 03/09/2020 03:37 AM    HCT 24.6 (L) 03/09/2020 03:37 AM    PLATELET 737 56/82/0656 03:37 AM    MCV 91.1 03/09/2020 03:37 AM     Lab Results   Component Value Date/Time    GFR est non-AA 55 (L) 03/10/2020 10:48 AM    GFR est AA 64 03/10/2020 10:48 AM    Creatinine 1.36 (H) 03/10/2020 10:48 AM    BUN 30 (H) 03/10/2020 10:48 AM    Sodium 138 03/10/2020 10:48 AM    Potassium 4.8 03/10/2020 10:48 AM    Chloride 105 03/10/2020 10:48 AM    CO2 18 (L) 03/10/2020 10:48 AM    Magnesium 2.1 02/21/2020 01:40 PM    Phosphorus 3.4 03/06/2020 02:45 PM     Lab Results   Component Value Date/Time    TSH 3.790 06/12/2019 03:05 PM    T4, Free 1.68 06/12/2019 03:05 PM      Lab Results   Component Value Date/Time    Sodium 138 03/10/2020 10:48 AM    Potassium 4.8 03/10/2020 10:48 AM    Chloride 105 03/10/2020 10:48 AM    CO2 18 (L) 03/10/2020 10:48 AM    Anion gap 9 03/09/2020 03:37 AM    Glucose 157 (H) 03/10/2020 10:48 AM    BUN 30 (H) 03/10/2020 10:48 AM    Creatinine 1.36 (H) 03/10/2020 10:48 AM    BUN/Creatinine ratio 22 03/10/2020 10:48 AM    GFR est AA 64 03/10/2020 10:48 AM    GFR est non-AA 55 (L) 03/10/2020 10:48 AM    Calcium 8.8 03/10/2020 10:48 AM    Bilirubin, total 0.9 03/10/2020 10:48 AM    ALT (SGPT) 21 03/10/2020 10:48 AM    AST (SGOT) 23 03/10/2020 10:48 AM    Alk.  phosphatase 116 03/10/2020 10:48 AM    Protein, total 6.2 03/10/2020 10:48 AM    Albumin 4.0 03/10/2020 10:48 AM    Globulin 3.4 03/09/2020 03:37 AM    A-G Ratio 1.8 03/10/2020 10:48 AM      Lab Results   Component Value Date/Time    Hemoglobin A1c 5.9 05/22/2019 02:56 AM       Thank you for letting us see him with you,      Carmen Stoll, NP  [address], [address] [facility], [address] [place] [address]  Office [phone]  Fax [phone]

## 2020-03-10 NOTE — PROGRESS NOTES
Hospital Discharge Follow-Up      Date/Time:  3/10/2020 12:32 PM    Loretta Alarcon RN, [facility], [facility]  Care transitions nurse [phone]  [facility] Coordination Team    Patient was admitted to [facility] on 3/5 and discharged on 3/9/2020  for anemia, nosebleed;  CHF history. The physician discharge summary was available at the time of outreach. Patient was contacted within 1 business days of discharge. Follow up with [facility] 3/10 - Pt attended follow up with Kalyn Oviedo - NP on 3/10 - he was advised to go to diuresis center for Bumex - and then advised to resume 0.5 mg Bumex on 3/11 -   Office nurse, Tavo Olmos - called him to check on weights, bp and response to therapy. Pt called back -   \"Patient returned call, he states he lost 2 pounds since yesterday, he is feeling much better. He did already take 1 mg bumex today, will take 0.5 mg next two days. He and his wife are headed to [facility], will call with a [facility] name for next week. \".     Pt has been seen by [facility] and office has contacted him re: the above. Top Challenges reviewed with the provider   CHF  Anemia -   Epistaxis - episode in hospital - ENT consultation  Hx LVAD removal  PAD    Advance Care Planning:   Does patient have an Advance Directive:  reviewed and current        Heart Failure Note    Do you have a Scale:    yes   How often do you weigh:  daily - am    Daily Weight (document daily weights in flowsheets):   183 lbs 3/9/2020   Zone:(Pt Reported)  green     EF: 20-25% (result) on 3/5/2020 (date)   Type of HF:   HFrEF   · Normal cavity size and wall thickness. Severe systolic function. Estimated left ventricular ejection fraction is 20 - 25%. Visually measured ejection fraction. No regional wall motion abnormality noted. · Mildly dilated left atrium. · Not well visualized. · Cannot rule out bicuspid aortic valve. Aortic valve leaflet calcification present. no stenosis  · Mild to moderate mitral valve regurgitation is present.   · Mild to moderate tricuspid valve regurgitation is present. · Mild pulmonary hypertension. estimated pa systolic pressure 12BY    Cardiac Device present: ICD      Heart Failure Medications: Betablocker, Diuretic, Anticoagulant     Method of communication with provider :chart routing, staff message, phone    Was this a readmission? no   Patient stated reason for the readmission: n/a   agrees to contact the PCP office for questions related to their healthcare. CTN provided contact information for future reference. Disease Specific:   CHF    Patients top risk factors for readmission:  functional physical ability, medical condition, medication management, utilization of services    Home Health orders at discharge: [address]: n/a  Date of initial visit: none    Durable Medical Equipment ordered at discharge: none  [address] received: none    Medication(s):   New Medications at Discharge: Colace, Iron, Entresto 49-51   Changed Medications at Discharge: Bumex 0.5 mg, Coreg 3.125 mg   Discontinued Medications at Discharge: [facility]     Medication reconciliation was performed with patient, who verbalizes understanding of administration of home medications. There were no barriers to obtaining medications identified at this time.     Referral to Pharm D needed: no     [facility] follow up appointment(s):   Future Appointments   Date Time Provider Kylah Sneed   3/10/2020  1:30 PM Alberto Schreiber NP [facility] JAHAIRA SCHED   3/17/2020  1:15 PM [id number] JAHAIRA SCHED   4/9/2020  2:00 PM Mark Adrian MD [facility] SCHED   6/26/2020 10:30 AM PACEMAKER, STFRMARLEY [facility] JAHAIRA SCHED   6/26/2020 10:40 AM [facility]      Non-[facility] follow up appointment(s): none noted    [facility]:  information provided as a resource     ttk

## 2020-03-10 NOTE — PROGRESS NOTES
[address] [facility] @ [facility] VISIT NOTE    0820 Patient arrives for IV Bumex once without acute problems. Please see connect care for complete assessment and education provided. Vital signs stable throughout and prior to discharge, Pt. Tolerated treatment well and discharged without incident. Patient is aware of no further [facility] appointments and to call MD office in the morning. Medications Verified by Nelson Pimentel RN via [employer]:  1.  Bumex 2mg IVP at [facility] 65 22   Patient Vitals for the past 12 hrs:   Temp Pulse Resp BP SpO2   03/10/20 1640 98 °F (36.7 °C) 76 24 98/58 93 %   03/10/20 1544 97.9 °F (36.6 °C) 87 25 105/51 (!) 89 %

## 2020-03-11 ENCOUNTER — TELEPHONE (OUTPATIENT)
Dept: CARDIOLOGY CLINIC | Age: 65
End: 2020-03-11

## 2020-03-11 DIAGNOSIS — R06.02 SHORTNESS OF BREATH: ICD-10-CM

## 2020-03-11 DIAGNOSIS — I50.22 CHRONIC SYSTOLIC CONGESTIVE HEART FAILURE (HCC): Primary | ICD-10-CM

## 2020-03-11 NOTE — TELEPHONE ENCOUNTER
Left message for patient to return call with message per Kalin Duffy:  what his response was like with the bumex? I would like him to resume his 0.5mg bumex PO daily until Friday. Gerald Carmona resume his usual PRN dose. He should get labs in 1 week please.  No other changes to his medications at this time. Patient returned call, he states he lost 2 pounds since yesterday, he is feeling much better. He did already take 1 mg bumex today, will take 0.5 mg next two days. He and his wife are headed to [facility], will call with a [facility] name for next week.

## 2020-03-17 ENCOUNTER — OFFICE VISIT (OUTPATIENT)
Dept: CARDIOLOGY CLINIC | Age: 65
End: 2020-03-17

## 2020-03-17 DIAGNOSIS — Z95.810 S/P ICD (INTERNAL CARDIAC DEFIBRILLATOR) PROCEDURE: Primary | ICD-10-CM

## 2020-03-23 ENCOUNTER — TELEPHONE (OUTPATIENT)
Dept: CARDIOLOGY CLINIC | Age: 65
End: 2020-03-23

## 2020-03-23 NOTE — TELEPHONE ENCOUNTER
Patient's wife called to let you know he went to [facility] in [place] today-please call him.   Thank you

## 2020-03-23 NOTE — TELEPHONE ENCOUNTER
Quality 137: Melanoma: Continuity Of Care - Recall System: Patient information entered into a recall system that includes: target date for the next exam specified AND a process to follow up with patients regarding missed or unscheduled appointments Xochitl Browning who stated they did go get labs done today and wanted [facility] to be aware. She stated they also had an appointment with the new heart failure doctor that went well. She wanted Dr. Scout Wasserman to be aware. Informed her will await lab results and contact her once reviewed by Dr. Carolina Macias. Major Clay verbalized understanding and had no further questions. Nando Sorensen RN. Quality 131: Pain Assessment And Follow-Up: Pain assessment using a standardized tool is documented as negative, no follow-up plan required Quality 130: Documentation Of Current Medications In The Medical Record: Current Medications Documented Detail Level: Detailed Quality 265: Biopsy Follow-Up: Biopsy results reviewed, communicated, tracked, and documented Quality 110: Preventive Care And Screening: Influenza Immunization: Influenza Immunization Administered during Influenza season

## 2020-03-24 LAB
BUN SERPL-MCNC: 24 MG/DL (ref 8–27)
BUN/CREAT SERPL: 17 (ref 10–24)
CALCIUM SERPL-MCNC: 8.7 MG/DL (ref 8.6–10.2)
CHLORIDE SERPL-SCNC: 102 MMOL/L (ref 96–106)
CO2 SERPL-SCNC: 20 MMOL/L (ref 20–29)
CREAT SERPL-MCNC: 1.41 MG/DL (ref 0.76–1.27)
GLUCOSE SERPL-MCNC: 88 MG/DL (ref 65–99)
NT-PROBNP SERPL-MCNC: 7288 PG/ML (ref 0–210)
POTASSIUM SERPL-SCNC: 5.2 MMOL/L (ref 3.5–5.2)
SODIUM SERPL-SCNC: 139 MMOL/L (ref 134–144)

## 2020-03-26 NOTE — TELEPHONE ENCOUNTER
Lab work reviewed by Dr. Carolina Macias who stated labs stable and no changes to be made. Contacted patient to notify. He verbalized understanding and had no further questions. Per patient they are returning from [place] trip on 3/28/20. Patient does have upcoming appointment 4/9/20. Reviewed with Dr. Carolina Macias who recommended patient's visit be converted to virtual visit to reduce exposure risk given patient's recent travel. Will notify CHRISTIANA Patel to contact patient to offer virtual visit. Nando Sorensen RN.

## 2020-04-09 ENCOUNTER — TELEPHONE (OUTPATIENT)
Dept: CARDIOLOGY CLINIC | Age: 65
End: 2020-04-09

## 2020-04-09 ENCOUNTER — VIRTUAL VISIT (OUTPATIENT)
Dept: CARDIOLOGY CLINIC | Age: 65
End: 2020-04-09

## 2020-04-09 VITALS
TEMPERATURE: 97.6 F | HEIGHT: 68 IN | BODY MASS INDEX: 26.52 KG/M2 | WEIGHT: 175 LBS | HEART RATE: 67 BPM | DIASTOLIC BLOOD PRESSURE: 44 MMHG | SYSTOLIC BLOOD PRESSURE: 88 MMHG

## 2020-04-09 DIAGNOSIS — I50.22 CHRONIC SYSTOLIC CONGESTIVE HEART FAILURE (HCC): Primary | ICD-10-CM

## 2020-04-09 NOTE — TELEPHONE ENCOUNTER
I spoke with patient prior to his  2:00pm [facility] appointment with Dr. Genesis Simental.     Patient reports vital signs as:  BP 88/44  Pulse 67  Temp  97.6  Weight  175lb     Patient states he has his medications with him.

## 2020-04-09 NOTE — PATIENT INSTRUCTIONS
No medication changes Testing Ordered: 
 
Please contact the Coordination of Care team at [phone] to schedule the previously ordered Ankle Brachial Index test (HECTOR). Other Recommendations:  
  
Ensure your drinking an adequate amount of water with a goal of 6-8 eight ounce glasses (1.5-2 liters) of fluid daily. Your urine should be clear and light yellow straw colored. If your blood pressure begins to consistently run below 90/60 and/or you begin to experience dizziness or lightheadedness, please contact the [facility] at [phone]. Please practice good infection prevention by diligently washing your hands, avoid touching your face, avoiding large crowds and public places, and avoid visits with anyone who is currently sick, or has recently been sick. If you begin to experience symptoms of sickness including fever, cough, shortness of breath please contact your primary care provider to discuss. Follow up 1 month with [facility] with MD for VIRTUAL VISIT Please monitor your blood pressures daily prior to medications and 2 hours after taking medications. Bring a written record of your blood pressures to your next appointment. Please monitor your weights daily upon waking and after using the bathroom. Keep a written records of your weights and bring to your next appointment. If you have a weight gain of 3 or more pounds overnight OR 5 or more pounds in one week please contact our office. Thank you for allowing us the privilege of being a part of your healthcare team! Please do not hesitate to contact our office at [phone] with any questions or concerns. Heart Failure Patients and COVID-19 March 31, 2020 in [address]. A Statement from the [address] The Heart Failure Society of [address] [place] [address]) wants to ensure that heart failure patients are appropriately informed during the novel Coronavirus COVID-19 pandemic. COVID-19 symptoms vary among infected people and can include cough, fever, shortness of breath, muscle aches, profound fatigue, loss of sense of smell and taste, and diarrhea. Not every infected person will have symptoms which is why social distancing is imperative. Most people have only mild symptoms, but others have severe symptoms that require hospitalization and occasionally intensive care. Because you are a patient with a heart failure, you are at higher risk of getting very sick if you contract Coronavirus and, therefore, it is important to practice good hand hygiene, social distancing, and staying at home as much as possible. If you are experiencing symptoms of COVID-19, please call your primary healthcare clinician. For your safety and the safety of others, and to reduce potential exposures to the Coronavirus, please do not go to an urgent care clinic or emergency room for non-urgent or non-emergency issues unless you have been instructed to do so by your primary healthcare clinician. However, if you have life-threatening symptoms like difficulty breathing, call 911. We want to reduce the spread of the Coronavirus as much as possible and make sure our healthcare resources are not overwhelmed with non-urgent cases. You may have noticed that your healthcare clinicians have converted your in-person appointments to telephone or video calls, and some hospitals are not allowing visitors. The goal is to keep patients from chauncey the Coronavirus and to limit the number of healthcare workers in the hospital. If you are sick and need to be seen or get lab testing, you should still do so; otherwise, you can help by 1. Learning how to use your smartphone or computer to participate in telehealth video visits 2.  Keeping track of missed visits and studies and working with your team to reschedule them once social distancing measures are relaxed Also, do not stop any of your medications unless instructed by your healthcare team to do so. It is important that you have an adequate supply of your heart failure medications and that you request extended duration of supplies and refills from your providers during these times. We have a lot to learn about COVID-19 and currently there are several ongoing clinical trials to discover therapies that might help treat the infection and vaccines that can prevent the infection. The [facility] and other scientific institutions are working hard, with our patients in mind, to get through this pandemic as quickly as possible. Finally, we recommend that you get your information from trusted, professional healthcare sources including national societies like the [employer], federal agencies like the [employer] for [employer], and your local hospitals and health departments. Please access updated patient information on the [address] [phone]) [facility]. We wish you safety and health during this challenging time.

## 2020-04-09 NOTE — PROGRESS NOTES
Consent: Marli Jacome, who was seen by synchronous (real-time) audio-video technology, and/or his healthcare decision maker, is aware that this patient-initiated, Telehealth encounter on 4/9/2020 is a billable service, with coverage as determined by his insurance carrier. He is aware that he may receive a bill and has provided verbal consent to proceed: Yes. Sudhakar and his wife recently traveled to [place] without any incidents. He had an appointment with Dr. Ralf Castillo who agreed to accept him as a patient. He has been ambulating in his neighborhood with a rollator without dyspnea. He continues to take bumex as needed for weight gain or shortness of breath. He was unable to complete his sleep study or have the ABIs due to COVID-19.      Assessment & Plan:    ICM s/p HMII as BTT on 12/1/16 and explant, NYHA Class IV, LVEF 20-25%                Cont low dose coreg 3.125 mg BID              Cont entresto 49/51 mg, 1 tablet BID - monitor              Intolerant to eplerenone due to hyperkalemia              Take bumex 0.5 mg daily as needed for shortness of breath             Strict I/O, daily weights, Na+ restricted diet              Trend CMP, proBNP             Will reassess CardioMems once COVID-19 restrictions are lifted             Follow up in the [facility] in 4 weeks via virtual visit             Follow up with Dr. Ralf Castillo in [place], [place]                           CAD s/p CABG (SVG to RCA and LIMA to LAD) on 12/1/16 s/p BMS x2 -> SVG-RCA graft - no angina              Continue ASA, statin, BBrx      Mitral Regurgitation, severe - improved to mod             Follow with serial TTEs             Hyperkalemia             Continue Veltassa - encourage compliance             Monitor serum potassium concerning     Chronic Left Pleural Effusion              s/p left thoracentesis on 7/5/2019     High Risk of SCD              s/p SQ AICD (5/20/19)             No shocks             Followed by  Francy                          PAD              Difficult femoral access with LVAD explant             ABIs once COVID-19 restrictions are lifted       Chronic Anticoagulation              Continue eliquis     Gout              Continue colchicine 0.6 mg every other day              Continue allopurinol 100 mg daily              Denies recent gouty attacks     Chronic Lower back pain              Back pain improved following recent steroid injection              PRN flexeril      H/o tobacco abuse             Abstaining from nicotine             Encouraged complete cessation      H/o alcohol abuse             Currently abstaining             Encouraged complete abstinence      Seizure disorder - early 25s              No recent seizure activity     Peripheral neuropathy              On cymbalta             ATTR- Negative      Thank you for letting us see him with you,      Kristi Valiente MD, [facility], [address] [place] [address]  Chief of Cardiology, [facility]  [address] [facility], [address] [place] [address], [address] [place] [address]  Office [phone]  Fax [phone]           Follow-up and Dispositions    · Return in about 1 month (around 5/9/2020) for with MD for VIRTUAL VISIT. I spent at least 40 minutes with this new patient, and >50% of the time was spent counseling and/or coordinating care regarding chronic systolic heart failure, sleep apnea. 712  Subjective:   Qasim Ortega is a 62 y.o. male who was seen for No chief complaint on file. Prior to Admission medications    Medication Sig Start Date End Date Taking? Authorizing Provider   sacubitril-valsartan (ENTRESTO) 49 mg/51 mg tablet Take 1 Tab by mouth every twelve (12) hours. 4/9/20  Yes Damien Weems MD   bumetanide (BUMEX) 0.5 mg tablet Take 2 Tabs by mouth daily as needed (shortness of breath).  3/11/20  Yes Jovanni Arias MD   carvediloL (COREG) 3.125 mg tablet Take 1 Tab by mouth two (2) times daily (with meals) for 30 days. 3/9/20 4/8/20  Lucina Arias MD   docusate sodium (COLACE) 100 mg capsule Take 1 Cap by mouth two (2) times a day for 90 days. 3/9/20 6/7/20  Lucina Arias MD   Ferrous Fumarate 325 mg (106 mg iron) tab Take 1 Tab by mouth daily for 30 days. 3/9/20 4/8/20  Lucina Arias MD   amiodarone (CORDARONE) 200 mg tablet TAKE 1 TABLET BY MOUTH EVERY DAY 1/30/20   Rogelio Hare MD   patiromer calcium sorbitex (VELTASSA) 16.8 gram powder Take 16.8 g by mouth daily. 1/10/20   Chantell Hoskins NP   pregabalin (LYRICA) 50 mg capsule Take 50 mg by mouth three (3) times daily. 10/9/19   Provider, Historical   betamethasone dipropionate (DIPROSONE) 0.05 % topical cream daily as needed. 9/5/19   Provider, Historical   tadalafil (CIALIS) 20 mg tablet 20 mg as needed. 6/20/19   Provider, Historical   atorvastatin (LIPITOR) 40 mg tablet TAKE 1 TABLET BY MOUTH EVERY DAY 6/21/19   Janki Hoyos MD   apixaban (ELIQUIS) 5 mg tablet Take 1 Tab by mouth two (2) times a day. 5/24/19   Janki Hoyos MD   levalbuterol tartrate (XOPENEX) 45 mcg/actuation inhaler INHALE 2 PUFFS EVERY 4 HOURS AS NEEDED 5/8/19   Provider, Historical   montelukast (SINGULAIR) 10 mg tablet TAKE 1 TABLET BY MOUTH ONCE A DAY 5/7/19   Provider, Historical   HYDROcodone-acetaminophen (NORCO) 5-325 mg per tablet TAKE 1 TABLET EVERY 6 HOURS AS NEEDED 11/2/18   Provider, Historical   DULoxetine (CYMBALTA) 60 mg capsule Take 60 mg by mouth daily. Provider, Historical   allopurinol (ZYLOPRIM) 100 mg tablet Take 100 mg by mouth daily. Provider, Historical   tamsulosin (FLOMAX) 0.4 mg capsule Take 0.4 mg by mouth daily. 10/15/18   Provider, Historical   colchicine 0.6 mg tablet Take 0.6 mg by mouth every other day. Provider, Historical   cyclobenzaprine (FLEXERIL) 5 mg tablet Take 5 mg by mouth daily as needed for Muscle Spasm(s).     Provider, Historical   aspirin 81 mg chewable tablet Take 81 mg by mouth daily. Provider, Historical   ascorbic acid, vitamin C, (VITAMIN C) 500 mg tablet Take 1 Tab by mouth two (2) times a day. 2/2/17   Adelaide Richards NP     Allergies   Allergen Reactions    Morphine Other (comments)     Hallucinations. Confusion. Impaired judgement    Chlorhexidine Rash       Patient Active Problem List   Diagnosis Code    S/P laminectomy Z98.890    Sinus tachycardia R00.0    Acute respiratory failure with hypoxia ([facility]) J96.01    Essential hypertension I10    Alcohol abuse F10.10    Chronic systolic heart failure ([facility]) I50.22    CAD, multiple vessel I25.10    Ischemic cardiomyopathy I25.5    MI (myocardial infarction) ([facility]) I21.9    Sustained VT (ventricular tachycardia) ([facility]) I47.2    Chronic anticoagulation Z79.01    Gout M10.9    ICD (implantable cardioverter-defibrillator) infection ([facility] [address] T82. 7XXA    Erectile dysfunction N52.9    DSOUZA (dyspnea on exertion) R06.09    CHF (congestive heart failure) ([facility]) I50.9    At risk for obstructive sleep apnea Z91.89    Carotid arterial disease ([facility]) I73.9    Chronic back pain greater than 3 months duration M54.9, G89.29    Coronary artery disease involving coronary bypass graft I25.810    Heart burn R12    History of tobacco abuse Z87.891    Muscle spasm M62.838    S/P lumbar laminectomy Z98.890    Seizure disorder ([facility]) G40.909    HTN (hypertension) I10    PAD (peripheral artery disease) ([facility]) I73.9    Mitral valve regurgitation I34.0    KLEVER (acute kidney injury) ([facility]) N17.9    Vomiting and diarrhea R11.10, R19.7    Acute on chronic systolic (congestive) heart failure ([facility]) I50.23    Anemia D64.9       Review of Systems   Constitutional: Negative for chills and fever. HENT: Negative. Eyes: Negative. Respiratory: Positive for cough and shortness of breath. Cardiovascular: Negative. Gastrointestinal: Negative. Skin: Negative. Neurological: Negative. Endo/Heme/Allergies: Negative. Psychiatric/Behavioral: Negative. Objective:   Vital Signs: (As obtained by patient/caregiver at home)  Visit Vitals  BP (!) 88/44 Comment: patient home report   Pulse 67 Comment: patient home report   Temp 97.6 °F (36.4 °C) (Oral) Comment: patient home report   Ht 5' 8\" (1.727 m)   Wt 175 lb (79.4 kg) Comment: patient home report   BMI 26.61 kg/m²        [INSTRUCTIONS:  \"[x]\" Indicates a positive item  \"[]\" Indicates a negative item  -- DELETE ALL ITEMS NOT EXAMINED]    Constitutional: [x] Appears well-developed and well-nourished [x] No apparent distress      [] Abnormal -     Mental status: [x] Alert and awake  [x] Oriented to person/place/time [x] Able to follow commands    [] Abnormal -     Eyes:   EOM    [x]  Normal    [] Abnormal -   Sclera  [x]  Normal    [] Abnormal -          Discharge [x]  None visible   [] Abnormal -     HENT: [x] Normocephalic, atraumatic  [] Abnormal -   [x] Mouth/Throat: Mucous membranes are moist    External Ears [x] Normal  [] Abnormal -    Neck: [x] No visualized mass [] Abnormal -     Pulmonary/Chest: [x] Respiratory effort normal   [x] No visualized signs of difficulty breathing or respiratory distress        [] Abnormal -      Musculoskeletal:   [x] Normal gait with no signs of ataxia         [x] Normal range of motion of neck        [] Abnormal -     Neurological:        [x] No Facial Asymmetry (Cranial nerve 7 motor function) (limited exam due to video visit)          [x] No gaze palsy        [] Abnormal -          Skin:        [x] No significant exanthematous lesions or discoloration noted on facial skin         [] Abnormal -            Psychiatric:       [x] Normal Affect [] Abnormal -        [x] No Hallucinations    Other pertinent observable physical exam findings:-        We discussed the expected course, resolution and complications of the diagnosis(es) in detail. Medication risks, benefits, costs, interactions, and alternatives were discussed as indicated. I advised him to contact the office if his condition worsens, changes or fails to improve as anticipated. He expressed understanding with the diagnosis(es) and plan. Amanda Bland is a 62 y.o. male being evaluated by a video visit encounter for concerns as above. A caregiver was present when appropriate. Due to this being a TeleHealth encounter (During [facility] emergency), evaluation of the following organ systems was limited: Vitals/Constitutional/EENT/Resp/CV/GI//MS/Neuro/Skin/Heme-Lymph-Imm. Pursuant to the emergency declaration under the [facility] waiver authority and the [employer] Act, this Virtual  Visit was conducted, with patient's (and/or legal guardian's) consent, to reduce the patient's risk of exposure to COVID-19 and provide necessary medical care. Services were provided through a video synchronous discussion virtually to substitute for in-person clinic visit. Patient and provider were located at their individual homes.         Mikayla Bonilla MD

## 2020-04-14 RX ORDER — PATIROMER 16.8 G/1
16.8 POWDER, FOR SUSPENSION ORAL DAILY
Qty: 30 PACKET | Refills: 3 | Status: SHIPPED | OUTPATIENT
Start: 2020-04-14

## 2020-04-14 NOTE — TELEPHONE ENCOUNTER
Requested Prescriptions     Signed Prescriptions Disp Refills    patiromer calcium sorbitex (Veltassa) 16.8 gram powder 30 Packet 3     Sig: Take 16.8 g by mouth daily.      Authorizing Provider: Thai Rolon     Ordering User: Timoteo Rodriguez

## 2020-04-20 ENCOUNTER — TELEPHONE (OUTPATIENT)
Dept: CARDIOLOGY CLINIC | Age: 65
End: 2020-04-20

## 2020-04-20 DIAGNOSIS — R06.09 DOE (DYSPNEA ON EXERTION): ICD-10-CM

## 2020-04-20 DIAGNOSIS — I50.22 CHRONIC SYSTOLIC CONGESTIVE HEART FAILURE (HCC): Primary | ICD-10-CM

## 2020-04-20 NOTE — TELEPHONE ENCOUNTER
Patient called and wants to know when you want labs again and also if you want him to continue to take iron tablets-he was only given a one month supply. Please advise, thank you. I called patient and advised him of labs per Dr Bekah Nguyen, he will go tomorrow-lab orders faxed.

## 2020-04-23 LAB
BUN SERPL-MCNC: 32 MG/DL (ref 8–27)
BUN/CREAT SERPL: 24 (ref 10–24)
CALCIUM SERPL-MCNC: 8.9 MG/DL (ref 8.6–10.2)
CHLORIDE SERPL-SCNC: 98 MMOL/L (ref 96–106)
CO2 SERPL-SCNC: 22 MMOL/L (ref 20–29)
CREAT SERPL-MCNC: 1.35 MG/DL (ref 0.76–1.27)
GLUCOSE SERPL-MCNC: 101 MG/DL (ref 65–99)
MAGNESIUM SERPL-MCNC: 1.9 MG/DL (ref 1.6–2.3)
NT-PROBNP SERPL-MCNC: 4147 PG/ML (ref 0–210)
POTASSIUM SERPL-SCNC: 4.8 MMOL/L (ref 3.5–5.2)
SODIUM SERPL-SCNC: 136 MMOL/L (ref 134–144)

## 2020-04-24 ENCOUNTER — TELEPHONE (OUTPATIENT)
Dept: CARDIOLOGY CLINIC | Age: 65
End: 2020-04-24

## 2020-04-24 NOTE — TELEPHONE ENCOUNTER
Lab work reviewed by Dr. Ilda Greer. Per Dr. Ilda Greer labs stable. No changes to be made. Contacted patient to notify. Patient verbalized understanding and stated they have set moving date for May 18th, 2020. He confirmed upcoming virtual visit and had no further questions at this time. Stefani Verde RN.

## 2020-05-04 ENCOUNTER — TELEPHONE (OUTPATIENT)
Dept: CARDIOLOGY CLINIC | Age: 65
End: 2020-05-04

## 2020-05-04 RX ORDER — CARVEDILOL 3.12 MG/1
3.12 TABLET ORAL 2 TIMES DAILY WITH MEALS
Qty: 180 TAB | Refills: 0 | Status: SHIPPED | OUTPATIENT
Start: 2020-05-04

## 2020-05-04 NOTE — TELEPHONE ENCOUNTER
Requested Prescriptions     Signed Prescriptions Disp Refills    carvediloL (COREG) 3.125 mg tablet 180 Tab 0     Sig: Take 1 Tab by mouth two (2) times daily (with meals).      Authorizing Provider: Estefana Alpers     Ordering User: Hilda Phillip     Patient notified

## 2020-05-08 ENCOUNTER — TELEPHONE (OUTPATIENT)
Dept: CARDIOLOGY CLINIC | Age: 65
End: 2020-05-08

## 2020-05-11 ENCOUNTER — VIRTUAL VISIT (OUTPATIENT)
Dept: CARDIOLOGY CLINIC | Age: 65
End: 2020-05-11

## 2020-05-11 VITALS
SYSTOLIC BLOOD PRESSURE: 101 MMHG | DIASTOLIC BLOOD PRESSURE: 50 MMHG | WEIGHT: 179 LBS | HEART RATE: 69 BPM | BODY MASS INDEX: 27.22 KG/M2

## 2020-05-11 DIAGNOSIS — R06.02 SHORTNESS OF BREATH: ICD-10-CM

## 2020-05-11 DIAGNOSIS — I50.22 CHRONIC SYSTOLIC CONGESTIVE HEART FAILURE (HCC): Primary | ICD-10-CM

## 2020-05-11 NOTE — PROGRESS NOTES
Consent: Sridevi Cardona, who was seen by synchronous (real-time) audio-video technology, and/or his healthcare decision maker, is aware that this patient-initiated, Telehealth encounter on 5/11/2020 is a billable service, with coverage as determined by his insurance carrier. He is aware that he may receive a bill and has provided verbal consent to proceed: Yes. Sudhakar and his wife are relocating to [place]. He had an appointment with Dr. Nathan Calvert who agreed to accept him as a patient. He has been ambulating in his neighborhood with a rollator without dyspnea. He continues to take bumex as needed for weight gain or shortness of breath. He was unable to complete his sleep study or have the ABIs due to COVID-19.      Assessment & Plan:    ICM s/p HMII as BTT on 12/1/16 and explant, NYHA Class IV, LVEF 20-25%                Cont low dose coreg 3.125 mg BID              Cont entresto 49/51 mg, 1 tablet BID - monitor              Intolerant to eplerenone due to hyperkalemia              Take bumex 0.5 mg daily as needed for shortness of breath             Strict I/O, daily weights, Na+ restricted diet              Check CMP, NT-proBNP, Mg, CBC              Follow up with Dr. Nathan Calvert in [place], [place] in 4 weeks                           CAD s/p CABG (SVG to RCA and LIMA to LAD) on 12/1/16 s/p BMS x2 -> SVG-RCA graft - no angina              Continue ASA, statin, BBrx      Mitral Regurgitation, severe - improved to mild-mod             Follow with serial TTEs             Hyperkalemia             Continue Veltassa - encourage compliance             Monitor serum potassium concerning     Chronic Left Pleural Effusion              s/p left thoracentesis on 7/5/2019     High Risk of SCD              s/p SQ AICD (5/20/19)             No shocks             Followed by Dr. Cindy White                          PAD              Difficult femoral access with LVAD explant             ABIs once COVID-19 restrictions are lifted       Chronic Anticoagulation              Continue eliquis     Gout              Continue colchicine 0.6 mg every other day              Continue allopurinol 100 mg daily              Denies recent gouty attacks     Chronic Lower back pain              Back pain improved following recent steroid injection              PRN flexeril      H/o tobacco abuse             Abstaining from nicotine             Encouraged complete cessation      H/o alcohol abuse             Currently abstaining             Encouraged complete abstinence      Seizure disorder - early 25s              No recent seizure activity     Peripheral neuropathy              On cymbalta             ATTR- Negative      Thank you for letting us see him with you,      Kristi Treadwell MD, [facility], [address] [place] [address]  Chief of Cardiology, [facility] [address], [address] [place] [address]  Office [phone]  Fax [phone]                 I spent at least 40 minutes with this new patient, and >50% of the time was spent counseling and/or coordinating care regarding chronic systolic heart failure, sleep apnea. 712  Subjective:   Alie Syed is a 62 y.o. male who was seen for CHF      Prior to Admission medications    Medication Sig Start Date End Date Taking? Authorizing Provider   carvediloL (COREG) 3.125 mg tablet Take 1 Tab by mouth two (2) times daily (with meals). 5/4/20  Yes Lynn Rivera MD   patiromer calcium sorbitex (Veltassa) 16.8 gram powder Take 16.8 g by mouth daily. 4/14/20  Yes Fernanda Weems MD   sacubitril-valsartan (ENTRESTO) 49 mg/51 mg tablet Take 1 Tab by mouth every twelve (12) hours. 4/9/20  Yes Fernanda Weems MD   docusate sodium (COLACE) 100 mg capsule Take 1 Cap by mouth two (2) times a day for 90 days.  3/9/20 6/7/20 Yes Jovanni Arias MD   bumetanide (BUMEX) 0.5 mg tablet Take 2 Tabs by mouth daily as needed (shortness of breath). 3/11/20  Yes Jovanni Arias MD   amiodarone (CORDARONE) 200 mg tablet TAKE 1 TABLET BY MOUTH EVERY DAY 1/30/20  Yes Deja Worrell MD   pregabalin (LYRICA) 50 mg capsule Take 50 mg by mouth three (3) times daily. 10/9/19  Yes Provider, Historical   betamethasone dipropionate (DIPROSONE) 0.05 % topical cream daily as needed. 9/5/19  Yes Provider, Historical   tadalafil (CIALIS) 20 mg tablet 20 mg as needed. 6/20/19  Yes Provider, Historical   atorvastatin (LIPITOR) 40 mg tablet TAKE 1 TABLET BY MOUTH EVERY DAY 6/21/19  Yes Laly Weems MD   apixaban (ELIQUIS) 5 mg tablet Take 1 Tab by mouth two (2) times a day. 5/24/19  Yes Laly Weems MD   levalbuterol tartrate (XOPENEX) 45 mcg/actuation inhaler INHALE 2 PUFFS EVERY 4 HOURS AS NEEDED 5/8/19  Yes Provider, Historical   montelukast (SINGULAIR) 10 mg tablet TAKE 1 TABLET BY MOUTH ONCE A DAY 5/7/19  Yes Provider, Historical   HYDROcodone-acetaminophen (NORCO) 5-325 mg per tablet TAKE 1 TABLET EVERY 6 HOURS AS NEEDED 11/2/18  Yes Provider, Historical   DULoxetine (CYMBALTA) 60 mg capsule Take 60 mg by mouth daily. Yes Provider, Historical   allopurinol (ZYLOPRIM) 100 mg tablet Take 100 mg by mouth daily. Yes Provider, Historical   tamsulosin (FLOMAX) 0.4 mg capsule Take 0.4 mg by mouth daily. 10/15/18  Yes Provider, Historical   colchicine 0.6 mg tablet Take 0.6 mg by mouth every other day. Yes Provider, Historical   cyclobenzaprine (FLEXERIL) 5 mg tablet Take 5 mg by mouth daily as needed for Muscle Spasm(s). Yes Provider, Historical   aspirin 81 mg chewable tablet Take 81 mg by mouth daily. Yes Provider, Historical   ascorbic acid, vitamin C, (VITAMIN C) 500 mg tablet Take 1 Tab by mouth two (2) times a day. 2/2/17  Yes Natasha Prakash NP     Allergies   Allergen Reactions    Morphine Other (comments)     Hallucinations. Confusion.  Impaired judgement    Chlorhexidine Rash       Patient Active Problem List   Diagnosis Code    S/P laminectomy [id number]    Sinus tachycardia R00.0    Acute respiratory failure with hypoxia ([facility]) J96.01    Essential hypertension I10    Alcohol abuse F10.10    Chronic systolic heart failure ([facility]) I50.22    CAD, multiple vessel I25.10    Ischemic cardiomyopathy I25.5    MI (myocardial infarction) ([facility]) I21.9    Sustained VT (ventricular tachycardia) ([facility]) I47.2    Chronic anticoagulation Z79.01    Gout M10.9    ICD (implantable cardioverter-defibrillator) infection ([address] T82. 7XXA    Erectile dysfunction N52.9    DSOUZA (dyspnea on exertion) R06.00    CHF (congestive heart failure) ([facility]) I50.9    At risk for obstructive sleep apnea Z91.89    Carotid arterial disease ([facility]) I77.9    Chronic back pain greater than 3 months duration M54.9, G89.29    Coronary artery disease involving coronary bypass graft I25.810    Heart burn R12    History of tobacco abuse Z87.891    Muscle spasm M62.838    S/P lumbar laminectomy Z98.890    Seizure disorder ([facility]) G40.909    HTN (hypertension) I10    PAD (peripheral artery disease) ([facility]) I73.9    Mitral valve regurgitation I34.0    KLEVER (acute kidney injury) ([facility]) N17.9    Vomiting and diarrhea R11.10, R19.7    Acute on chronic systolic (congestive) heart failure ([facility]) I50.23    Anemia D64.9       Review of Systems   Constitutional: Negative for chills and fever. HENT: Negative. Eyes: Negative. Respiratory: Negative for cough and shortness of breath. Cardiovascular: Negative for leg swelling. Gastrointestinal: Negative. Skin: Negative. Neurological: Negative. Endo/Heme/Allergies: Negative. Psychiatric/Behavioral: Negative.             Objective:   Vital Signs: (As obtained by patient/caregiver at home)  Visit Vitals  /50   Pulse 69   Wt 179 lb (81.2 kg) Comment: self reported   BMI 27.22 kg/m²        [INSTRUCTIONS:  \"[x]\" Indicates a positive item  \"[]\" Indicates a negative item  -- DELETE ALL ITEMS NOT EXAMINED]    Constitutional: [x] Appears well-developed and well-nourished [x] No apparent distress      [] Abnormal -     Mental status: [x] Alert and awake  [x] Oriented to person/place/time [x] Able to follow commands    [] Abnormal -     Eyes:   EOM    [x]  Normal    [] Abnormal -   Sclera  [x]  Normal    [] Abnormal -          Discharge [x]  None visible   [] Abnormal -     HENT: [x] Normocephalic, atraumatic  [] Abnormal -   [x] Mouth/Throat: Mucous membranes are moist    External Ears [x] Normal  [] Abnormal -    Neck: [x] No visualized mass [] Abnormal -     Pulmonary/Chest: [x] Respiratory effort normal   [x] No visualized signs of difficulty breathing or respiratory distress        [] Abnormal -      Musculoskeletal:   [x] Normal gait with no signs of ataxia         [x] Normal range of motion of neck        [] Abnormal -     Neurological:        [x] No Facial Asymmetry (Cranial nerve 7 motor function) (limited exam due to video visit)          [x] No gaze palsy        [] Abnormal -          Skin:        [x] No significant exanthematous lesions or discoloration noted on facial skin         [] Abnormal -            Psychiatric:       [x] Normal Affect [] Abnormal -        [x] No Hallucinations    Other pertinent observable physical exam findings:-        We discussed the expected course, resolution and complications of the diagnosis(es) in detail. Medication risks, benefits, costs, interactions, and alternatives were discussed as indicated. I advised him to contact the office if his condition worsens, changes or fails to improve as anticipated. He expressed understanding with the diagnosis(es) and plan. Berto Goodman is a 62 y.o. male being evaluated by a video visit encounter for concerns as above. A caregiver was present when appropriate.  Due to this being a TeleHealth encounter (During [facility] public health emergency), evaluation of the following organ systems was limited: Vitals/Constitutional/EENT/Resp/CV/GI//MS/Neuro/Skin/Heme-Lymph-Imm. Pursuant to the emergency declaration under the [address] [place] [address], [facility] waiver authority and the [employer] Act, this Virtual  Visit was conducted, with patient's (and/or legal guardian's) consent, to reduce the patient's risk of exposure to COVID-19 and provide necessary medical care. Services were provided through a video synchronous discussion virtually to substitute for in-person clinic visit. Patient and provider were located at their individual homes.         Scooby Flynn MD

## 2020-05-11 NOTE — PATIENT INSTRUCTIONS
Testing ordered: 
 
Labs Monday at [place] [address] Other Recommendations:  
  
Ensure your drinking an adequate amount of water with a goal of 6-8 eight ounce glasses (1.5-2 liters) of fluid daily. Your urine should be clear and light yellow straw colored. If your blood pressure begins to consistently run below 90/60 and/or you begin to experience dizziness or lightheadedness, please contact the [place] [address] at [phone]. Please practice good infection prevention by diligently washing your hands, avoid touching your face, avoiding large crowds and public places, and avoid visits with anyone who is currently sick, or has recently been sick. If you begin to experience symptoms of sickness including fever, cough, shortness of breath please contact your primary care provider to discuss. Follow up with [facility] [place] [address] in [place] as planned. Please monitor your blood pressures daily prior to medications and 2 hours after taking medications. Bring a written record of your blood pressures to your next appointment. Please monitor your weights daily upon waking and after using the bathroom. Keep a written records of your weights and bring to your next appointment. If you have a weight gain of 3 or more pounds overnight OR 5 or more pounds in one week please contact our office. Thank you for allowing us the privilege of being a part of your healthcare team! Please do not hesitate to contact our office at [phone] with any questions or concerns. Heart Failure Patients and COVID-19 March 31, 2020 in [address] [place] [address] Rd. A Statement from the [address] The Heart Failure Society of [address] [place] [address]) wants to ensure that heart failure patients are appropriately informed during the novel Coronavirus COVID-19 pandemic.  
  
COVID-19 symptoms vary among infected people and can include cough, fever, shortness of breath, muscle aches, profound fatigue, loss of sense of smell and taste, and diarrhea. Not every infected person will have symptoms which is why social distancing is imperative. Most people have only mild symptoms, but others have severe symptoms that require hospitalization and occasionally intensive care. Because you are a patient with a heart failure, you are at higher risk of getting very sick if you contract Coronavirus and, therefore, it is important to practice good hand hygiene, social distancing, and staying at home as much as possible. If you are experiencing symptoms of COVID-19, please call your primary healthcare clinician. For your safety and the safety of others, and to reduce potential exposures to the Coronavirus, please do not go to an urgent care clinic or emergency room for non-urgent or non-emergency issues unless you have been instructed to do so by your primary healthcare clinician. However, if you have life-threatening symptoms like difficulty breathing, call 911. We want to reduce the spread of the Coronavirus as much as possible and make sure our healthcare resources are not overwhelmed with non-urgent cases. You may have noticed that your healthcare clinicians have converted your in-person appointments to telephone or video calls, and some hospitals are not allowing visitors. The goal is to keep patients from chauncey the Coronavirus and to limit the number of healthcare workers in the hospital. If you are sick and need to be seen or get lab testing, you should still do so; otherwise, you can help by 1. Learning how to use your smartphone or computer to participate in telehealth video visits 2. Keeping track of missed visits and studies and working with your team to reschedule them once social distancing measures are relaxed Also, do not stop any of your medications unless instructed by your healthcare team to do so. It is important that you have an adequate supply of your heart failure medications and that you request extended duration of supplies and refills from your providers during these times. We have a lot to learn about COVID-19 and currently there are several ongoing clinical trials to discover therapies that might help treat the infection and vaccines that can prevent the infection. [facility] and other scientific institutions are working hard, with our patients in mind, to get through this pandemic as quickly as possible. Finally, we recommend that you get your information from trusted, professional healthcare sources including national societies like the [employer], federal agencies like the [employer], and your local hospitals and health departments. Please access updated patient information on the [address] [phone]) \A Chronology of [place] [facility]\"". We wish you safety and health during this challenging time.

## 2020-05-20 LAB
ALBUMIN SERPL-MCNC: 4.6 G/DL (ref 3.8–4.8)
ALBUMIN/GLOB SERPL: 1.8 {RATIO} (ref 1.2–2.2)
ALP SERPL-CCNC: 117 IU/L (ref 39–117)
ALT SERPL-CCNC: 28 IU/L (ref 0–44)
AST SERPL-CCNC: 28 IU/L (ref 0–40)
BILIRUB SERPL-MCNC: 0.6 MG/DL (ref 0–1.2)
BUN SERPL-MCNC: 35 MG/DL (ref 8–27)
BUN/CREAT SERPL: 25 (ref 10–24)
CALCIUM SERPL-MCNC: 9.3 MG/DL (ref 8.6–10.2)
CHLORIDE SERPL-SCNC: 114 MMOL/L (ref 96–106)
CO2 SERPL-SCNC: 17 MMOL/L (ref 20–29)
CREAT SERPL-MCNC: 1.41 MG/DL (ref 0.76–1.27)
ERYTHROCYTE [DISTWIDTH] IN BLOOD BY AUTOMATED COUNT: 15.4 % (ref 11.6–15.4)
GLOBULIN SER CALC-MCNC: 2.5 G/DL (ref 1.5–4.5)
GLUCOSE SERPL-MCNC: 91 MG/DL (ref 65–99)
HCT VFR BLD AUTO: 35.5 % (ref 37.5–51)
HGB BLD-MCNC: 11.1 G/DL (ref 13–17.7)
MAGNESIUM SERPL-MCNC: 2.2 MG/DL (ref 1.6–2.3)
MCH RBC QN AUTO: 28.2 PG (ref 26.6–33)
MCHC RBC AUTO-ENTMCNC: 31.3 G/DL (ref 31.5–35.7)
MCV RBC AUTO: 90 FL (ref 79–97)
NT-PROBNP SERPL-MCNC: 5282 PG/ML (ref 0–210)
PLATELET # BLD AUTO: 233 X10E3/UL (ref 150–450)
POTASSIUM SERPL-SCNC: 5 MMOL/L (ref 3.5–5.2)
PROT SERPL-MCNC: 7.1 G/DL (ref 6–8.5)
RBC # BLD AUTO: 3.93 X10E6/UL (ref 4.14–5.8)
SODIUM SERPL-SCNC: 137 MMOL/L (ref 134–144)
WBC # BLD AUTO: 7.5 X10E3/UL (ref 3.4–10.8)

## 2020-05-27 RX ORDER — APIXABAN 5 MG/1
TABLET, FILM COATED ORAL
Qty: 60 TAB | Refills: 11 | Status: SHIPPED | OUTPATIENT
Start: 2020-05-27

## 2020-07-02 ENCOUNTER — TELEPHONE (OUTPATIENT)
Dept: CARDIOLOGY CLINIC | Age: 65
End: 2020-07-02

## 2020-07-02 NOTE — TELEPHONE ENCOUNTER
Patient called asking for lab orders to be sent in to [address] [place] [address] in [place]. He states he is feeling well, just wants \"to keep up with his labs\". He states Dr. Bernard Solo told him it would be ok. He has appt with Heart failure MD [place] July 24, was just in hospital last week for neck surgery. Please advise. I returned call to patient, advised him per Dr. Bernard Solo:  I would advise him to have his local cardiologist order labs. Since I don't have a license in [place], I am not able to order his labs  He states understanding, has no further questions.

## 2020-07-24 ENCOUNTER — TELEPHONE (OUTPATIENT)
Dept: CARDIOLOGY CLINIC | Age: 65
End: 2020-07-24

## 2020-07-24 NOTE — TELEPHONE ENCOUNTER
Patients spouse would like to have his ICD released to another doctor, they have moved to [place].    Dr Armen Mujica [phone]    Phone: [phone]

## 2021-01-01 ENCOUNTER — APPOINTMENT (RX ONLY)
Dept: URBAN - METROPOLITAN AREA CLINIC 131 | Facility: CLINIC | Age: 66
Setting detail: DERMATOLOGY
End: 2021-01-01

## 2021-01-01 DIAGNOSIS — D18.0 HEMANGIOMA: ICD-10-CM

## 2021-01-01 DIAGNOSIS — L81.4 OTHER MELANIN HYPERPIGMENTATION: ICD-10-CM

## 2021-01-01 DIAGNOSIS — L57.8 OTHER SKIN CHANGES DUE TO CHRONIC EXPOSURE TO NONIONIZING RADIATION: ICD-10-CM

## 2021-01-01 DIAGNOSIS — I87.2 VENOUS INSUFFICIENCY (CHRONIC) (PERIPHERAL): ICD-10-CM

## 2021-01-01 DIAGNOSIS — L82.1 OTHER SEBORRHEIC KERATOSIS: ICD-10-CM

## 2021-01-01 DIAGNOSIS — L21.8 OTHER SEBORRHEIC DERMATITIS: ICD-10-CM | Status: INADEQUATELY CONTROLLED

## 2021-01-01 PROCEDURE — ? PRESCRIPTION MEDICATION MANAGEMENT

## 2021-01-01 PROCEDURE — ? PRESCRIPTION

## 2021-01-01 PROCEDURE — ? COUNSELING

## 2021-01-01 PROCEDURE — 99204 OFFICE O/P NEW MOD 45 MIN: CPT

## 2021-01-01 PROCEDURE — ? SUNSCREEN RECOMMENDATIONS

## 2021-01-01 RX ORDER — KETOCONAZOLE 20 MG/ML
SHAMPOO, SUSPENSION TOPICAL QD
Qty: 120 | Refills: 2 | Status: ERX | COMMUNITY
Start: 2021-01-01

## 2021-01-01 RX ADMIN — KETOCONAZOLE: 20 SHAMPOO, SUSPENSION TOPICAL at 00:00

## 2021-01-01 ASSESSMENT — LOCATION DETAILED DESCRIPTION DERM
LOCATION DETAILED: LEFT DISTAL PRETIBIAL REGION
LOCATION DETAILED: INFERIOR THORACIC SPINE
LOCATION DETAILED: RIGHT SUPERIOR LATERAL BUCCAL CHEEK
LOCATION DETAILED: RIGHT CHIN
LOCATION DETAILED: RIGHT SUPERIOR MEDIAL MIDBACK
LOCATION DETAILED: RIGHT DISTAL PRETIBIAL REGION
LOCATION DETAILED: RIGHT MID-UPPER BACK
LOCATION DETAILED: PHILTRUM
LOCATION DETAILED: LEFT SUPERIOR MEDIAL BUCCAL CHEEK
LOCATION DETAILED: RIGHT SUPERIOR MEDIAL UPPER BACK

## 2021-01-01 ASSESSMENT — LOCATION SIMPLE DESCRIPTION DERM
LOCATION SIMPLE: RIGHT LOWER BACK
LOCATION SIMPLE: RIGHT UPPER BACK
LOCATION SIMPLE: RIGHT CHEEK
LOCATION SIMPLE: UPPER BACK
LOCATION SIMPLE: LEFT PRETIBIAL REGION
LOCATION SIMPLE: LEFT CHEEK
LOCATION SIMPLE: UPPER LIP
LOCATION SIMPLE: CHIN
LOCATION SIMPLE: RIGHT PRETIBIAL REGION

## 2021-01-01 ASSESSMENT — LOCATION ZONE DERM
LOCATION ZONE: TRUNK
LOCATION ZONE: FACE
LOCATION ZONE: LIP
LOCATION ZONE: LEG

## 2021-01-05 ENCOUNTER — TELEPHONE (OUTPATIENT)
Dept: CARDIOLOGY CLINIC | Age: 66
End: 2021-01-05

## 2021-01-05 NOTE — TELEPHONE ENCOUNTER
Wife called stating patient has been in and out of hospital for anemia. She would like call back regarding reason that patient is on eliquis-for afib or for cannula that was left in from LVAD. The doctors want to try to get him off of eliquis. Thank you    I called wife back and advised her per Magdalena Choudhury; He is on eliquis for the retained cannula not for Afib. She states understanding.

## 2021-01-06 ENCOUNTER — TELEPHONE (OUTPATIENT)
Dept: CARDIOLOGY CLINIC | Age: 66
End: 2021-01-06

## 2021-01-06 ENCOUNTER — DOCUMENTATION ONLY (OUTPATIENT)
Dept: CARDIOLOGY CLINIC | Age: 66
End: 2021-01-06

## 2021-01-06 NOTE — TELEPHONE ENCOUNTER
Returned call to patients wife informing her that our provider would like to speak directly with physician taking care of patient. Gregor Hector will let us know later today the name and contact number for the physician in [place].

## 2021-01-06 NOTE — PROGRESS NOTES
Patient is seen by [facility]. His primary cardiologist Dr. Jason Stephens. is on vacation .   Attending covering is Dr. Anastasiya Jerez #[phone]

## 2021-10-21 PROBLEM — L57.8 OTHER SKIN CHANGES DUE TO CHRONIC EXPOSURE TO NONIONIZING RADIATION: Status: ACTIVE | Noted: 2021-01-01

## 2021-10-21 PROBLEM — L81.4 OTHER MELANIN HYPERPIGMENTATION: Status: ACTIVE | Noted: 2021-01-01

## 2021-10-21 PROBLEM — I87.2 VENOUS INSUFFICIENCY (CHRONIC) (PERIPHERAL): Status: ACTIVE | Noted: 2021-01-01

## 2021-10-21 PROBLEM — D18.01 HEMANGIOMA OF SKIN AND SUBCUTANEOUS TISSUE: Status: ACTIVE | Noted: 2021-01-01

## 2021-10-21 PROBLEM — L21.8 OTHER SEBORRHEIC DERMATITIS: Status: ACTIVE | Noted: 2021-01-01

## 2021-10-21 PROBLEM — L82.1 OTHER SEBORRHEIC KERATOSIS: Status: ACTIVE | Noted: 2021-01-01

## 2021-10-21 NOTE — PROCEDURE: MIPS QUALITY
Quality 130: Documentation Of Current Medications In The Medical Record: Documentation of a medical reason(s) for not documenting, updating, or reviewing the patientâs current medications list (e.g., patient is in an urgent or emergent medical situation)
Additional Notes: no meds
Detail Level: Detailed

## 2021-10-21 NOTE — PROCEDURE: PRESCRIPTION MEDICATION MANAGEMENT
Detail Level: Zone
Initiate Treatment: Ketoconazole shampoo
Render In Strict Bullet Format?: No
Plan: Amlactin OTC

## 2022-02-03 NOTE — TELEPHONE ENCOUNTER
Telephone Call RE:  Appointment reminder     Outcome:     [x] Patient confirmed appointment   [] Patient rescheduled appointment for    [] Unable to reach   [] Left message              [] Other:       Kenneth Wells
No

## 2023-11-08 NOTE — PATIENT INSTRUCTIONS
1. Continue taking medications as prescribed. 2. Contact the VAD/HF team if symptoms develop or worsen despite medical management. 3.   Please let our office know if your nausea and diarrhea do not improve within the next three days. 4.   Follow up in 1 week.
0

## (undated) DEVICE — 3M™ IOBAN™ 2 ANTIMICROBIAL INCISE DRAPE 6640EZ: Brand: IOBAN™ 2

## (undated) DEVICE — PACEMAKER PACK: Brand: MEDLINE INDUSTRIES, INC.

## (undated) DEVICE — SUTURE ETHBND EXCEL SZ 2 L30IN NONABSORBABLE GRN L40MM V-37 MX69G

## (undated) DEVICE — BIPOLAR SEALER 23-112-1 AQM 6.0: Brand: AQUAMANTYS ®

## (undated) DEVICE — SUTURE MCRYL 3-0 L27IN ABSRB VLT SH L26MM 1/2 CIR Y316H

## (undated) DEVICE — ZIP 8I SURGICAL SKIN CLOSURE DEVICE: Brand: ZIP 8I SURGICAL SKIN CLOSURE DEVICE

## (undated) DEVICE — MEDI-TRACE CADENCE ADULT, DEFIBRILLATION ELECTRODE -RTS  (10 PR/PK) - PHYSIO-CONTROL: Brand: MEDI-TRACE CADENCE

## (undated) DEVICE — DRSG AQUACEL SURG 3.5X6IN -- CONVERT TO ITEM 369227

## (undated) DEVICE — SUTURE VCRL SZ 2-0 L36IN ABSRB UD L40MM CT 1/2 CIR J957H

## (undated) DEVICE — REM POLYHESIVE ADULT PATIENT RETURN ELECTRODE: Brand: VALLEYLAB

## (undated) DEVICE — PLASMABLADE PS200-040 4.0: Brand: PLASMABLADE™